# Patient Record
Sex: MALE | Race: WHITE | NOT HISPANIC OR LATINO | Employment: FULL TIME | URBAN - METROPOLITAN AREA
[De-identification: names, ages, dates, MRNs, and addresses within clinical notes are randomized per-mention and may not be internally consistent; named-entity substitution may affect disease eponyms.]

---

## 2018-01-24 ENCOUNTER — OFFICE VISIT (OUTPATIENT)
Dept: CARDIOLOGY CLINIC | Facility: CLINIC | Age: 58
End: 2018-01-24
Payer: COMMERCIAL

## 2018-01-24 VITALS
BODY MASS INDEX: 28.21 KG/M2 | WEIGHT: 208.3 LBS | SYSTOLIC BLOOD PRESSURE: 110 MMHG | DIASTOLIC BLOOD PRESSURE: 80 MMHG | HEIGHT: 72 IN

## 2018-01-24 DIAGNOSIS — I25.10 ATHEROSCLEROSIS OF NATIVE CORONARY ARTERY OF NATIVE HEART WITHOUT ANGINA PECTORIS: Primary | ICD-10-CM

## 2018-01-24 DIAGNOSIS — E78.2 MIXED HYPERLIPIDEMIA: ICD-10-CM

## 2018-01-24 DIAGNOSIS — I10 BENIGN ESSENTIAL HYPERTENSION: ICD-10-CM

## 2018-01-24 PROCEDURE — 99214 OFFICE O/P EST MOD 30 MIN: CPT | Performed by: INTERNAL MEDICINE

## 2018-01-24 RX ORDER — METOPROLOL SUCCINATE 50 MG/1
1 TABLET, EXTENDED RELEASE ORAL DAILY
COMMUNITY
Start: 2017-09-26 | End: 2018-10-30 | Stop reason: SDUPTHER

## 2018-01-24 RX ORDER — ATORVASTATIN CALCIUM 40 MG/1
1 TABLET, FILM COATED ORAL
COMMUNITY
Start: 2017-12-22 | End: 2018-10-30 | Stop reason: SDUPTHER

## 2018-01-24 RX ORDER — PRASUGREL 10 MG/1
1 TABLET, FILM COATED ORAL DAILY
COMMUNITY
Start: 2016-12-28 | End: 2018-03-20 | Stop reason: SDUPTHER

## 2018-01-24 RX ORDER — LISINOPRIL 10 MG/1
1 TABLET ORAL DAILY
COMMUNITY
Start: 2017-09-26 | End: 2018-10-30 | Stop reason: SDUPTHER

## 2018-01-24 NOTE — PATIENT INSTRUCTIONS
Low-Sodium Diet   AMBULATORY CARE:   A low-sodium diet  limits foods that are high in sodium (salt)  You will need to follow a low-sodium diet if you have high blood pressure, kidney disease, or heart failure  You may also need to follow this diet if you have a condition that is causing your body to retain (hold) extra fluid  You may need to limit the amount of sodium you eat to 1,500 mg  Ask your healthcare provider how much sodium you can have each day  How to use food labels to choose foods that are low in sodium:  Read food labels to find the amount of sodium they contain  The amount of sodium is listed in milligrams (mg)  The % Daily Value (DV) column tells you how much of your daily needs are met by 1 serving of the food for each nutrient listed  Choose foods that have less than 5% of the DV of sodium  These foods are considered low in sodium  Foods that have 20% or more of the DV of sodium are considered high in sodium  Some food labels may also list any of the following terms that tell you about the sodium content in the food:  · Sodium-free:  Less than 5 mg in each serving    · Very low sodium:  35 mg of sodium or less in each serving    · Low sodium:  140 mg of sodium or less in each serving    · Reduced sodium: At least 25% less sodium in each serving than the regular type    · Light in sodium:  50% less sodium in each serving    · Unsalted or no added salt:  No extra salt is added during processing (the food may still contain sodium)  Foods to avoid:  Salty foods are high in sodium   You should avoid the following:  · Processed foods:      ¨ Mixes for cornbread, biscuits, cake, and pudding     ¨ Instant foods, such as potatoes, cereals, noodles, and rice     ¨ Packaged foods, such as bread stuffing, rice and pasta mixes, snack dip mixes, and macaroni and cheese     ¨ Canned foods, such as canned vegetables, soups, broths, sauces, and vegetable or tomato juice    ¨ Snack foods, such as salted chips, popcorn, pretzels, pork rinds, salted crackers, and salted nuts    ¨ Frozen foods, such as dinners, entrees, vegetables with sauces, and breaded meats    ¨ Sauerkraut, pickled vegetables, and other foods prepared in brine    · Meats and cheeses:      ¨ Smoked or cured meat, such as corned beef, kebede, ham, hot dogs, and sausage    ¨ Canned meats or spreads, such as potted meats, sardines, anchovies, and imitation seafood    ¨ Deli or lunch meats, such as bologna, ham, turkey, and roast beef    ¨ Processed cheese, such as American cheese and cheese spreads    · Condiments, sauces, and seasonings:      ¨ Salt (¼ teaspoon of salt contains 575 mg of sodium)    ¨ Seasonings made with salt, such as garlic salt, celery salt, onion salt, and seasoned salt    ¨ Regular soy sauce, barbecue sauce, teriyaki sauce, steak sauce, Worcestershire sauce, and most flavored vinegars    ¨ Canned gravy and mixes     ¨ Regular condiments, such as mustard, ketchup, and salad dressings    ¨ Pickles and olives    ¨ Meat tenderizers and monosodium glutamate (MSG)  Foods to include:  Read the food label to find the exact amount of sodium in each serving  · Bread and cereal:  Try to choose breads with less than 80 mg of sodium per serving  ¨ Bread, roll, geovanny, tortilla, or unsalted crackers  ¨ Ready-to-eat cereals with less than 5% DV of sodium (examples include shredded wheat and puffed rice)    ¨ Pasta    · Vegetables and fruits:      ¨ Unsalted fresh, frozen, or canned vegetables    ¨ Fresh, frozen, or canned fruits    ¨ Fruit juice    · Dairy:  One serving has about 150 mg of sodium  ¨ Milk, all types    ¨ Yogurt    ¨ Hard cheese, such as cheddar, Swiss, [employer], or mozzarella    · Meat and other protein foods:  Some raw meats may have added sodium       ¨ Plain meats, fish, and poultry     ¨ Eggs    · Other foods:      ¨ Homemade pudding    ¨ Unsalted nuts, popcorn, or pretzels    ¨ Unsalted butter or margarine  Ways to decrease sodium:   · Add spices and herbs to foods instead of salt during cooking  Use salt-free seasonings to add flavor to foods  Examples include onion powder, garlic powder, basil, carty powder, paprika, and parsley  Try lemon or lime juice or vinegar to give foods a tart flavor  Use hot peppers, pepper, or cayenne pepper to add a spicy flavor to foods  · Do not keep a salt shaker at your kitchen table  This may help keep you from adding salt to food at the table  It may take time to get used to enjoying the natural flavor of food instead of adding salt  Talk to your healthcare provider before you use salt substitutes  Some salt substitutes have a high amount of potassium and need to be avoided if you have kidney disease  · Choose low-sodium foods at restaurants  Meals from restaurants are often high in sodium  Some restaurants have nutrition information on the menu that tells you the amount of sodium in their foods  If possible, ask for your food to be prepared with less, or no salt  · Shop for unsalted or low-sodium foods and snacks at the grocery store  Examples include unsalted or low-sodium broths, soups, and canned vegetables  Choose fresh or frozen vegetables instead  Choose unsalted nuts or seeds or fresh fruits or vegetables as snacks  Read food labels and choose salt-free, very low-sodium, or low-sodium foods  © 2017 [address] [employer] Information is for End User's use only and may not be sold, redistributed or otherwise used for commercial purposes  All illustrations and images included in CareNotes® are the copyrighted property of [employer]  or [address]  The above information is an  only  It is not intended as medical advice for individual conditions or treatments  Talk to your doctor, nurse or pharmacist before following any medical regimen to see if it is safe and effective for you

## 2018-01-24 NOTE — PROGRESS NOTES
Cardiology Follow Up    [facility]  1960  [id number]  [address] [facility]  [address] [place] [address] [address]    1  Atherosclerosis of native coronary artery of native heart without angina pectoris     2  Benign essential hypertension     3  Mixed hyperlipidemia         Interval History:  Patient feels well without any complaints since last visit  He has been staying active and trying to watch his weight - although did gain some weight last year, with a heart healthy diet and exercise regimen  Patient Active Problem List   Diagnosis    Atherosclerotic heart disease of native coronary artery without angina pectoris    Benign essential hypertension    Type 2 or unspecified type diabetes mellitus    Hyperlipidemia    Obstructive sleep apnea     Past Medical History:   Diagnosis Date    Coronary artery disease     cath Oct 2013: 95% prox LAD, 40% mid LAD, EF 60%, MANUEL to prox LAD (Xience Rx 3 5x18mm)     Social History     Social History    Marital status: /Civil Union     Spouse name: N/A    Number of children: N/A    Years of education: N/A     Occupational History    Not on file  Social History Main Topics    Smoking status: Former Smoker    Smokeless tobacco: Former User    Alcohol use Yes      Comment: social    Drug use: No    Sexual activity: Not on file     Other Topics Concern    Not on file     Social History Narrative    No narrative on file      Family History   Problem Relation Age of Onset    No Known Problems Mother      History reviewed  No pertinent surgical history      Current Outpatient Prescriptions:     aspirin (ASPIRIN LOW DOSE) 81 MG tablet, Take 1 tablet by mouth daily, Disp: , Rfl:     atorvastatin (LIPITOR) 40 mg tablet, Take 1 tablet by mouth, Disp: , Rfl:     lisinopril (ZESTRIL) 10 mg tablet, Take 1 tablet by mouth daily, Disp: , Rfl:     metoprolol succinate (TOPROL-XL) 50 mg 24 hr tablet, Take 1 tablet by mouth daily, Disp: , Rfl:     prasugrel (EFFIENT) tablet, Take 1 tablet by mouth daily, Disp: , Rfl:     metFORMIN (GLUCOPHAGE) 1000 MG tablet, Take 1 tablet by mouth 2 (two) times a day, Disp: , Rfl:   No Known Allergies    Labs:  No visits with results within 6 Month(s) from this visit     Latest known visit with results is:   Admission on 10/23/2015, Discharged on 10/23/2015   Component Date Value    GLUCOSE, GLUCOMETER 10/23/2015 216*    Protime 10/23/2015 13 0     INR 10/23/2015 1 04     PTT 10/23/2015 29     LACTIC ACID 10/23/2015 2 2     Sodium 10/23/2015 132*    Potassium 10/23/2015 4 1     Chloride 10/23/2015 96*    CO2 10/23/2015 23 1     Anion Gap 10/23/2015 13     Total Bilirubin 10/23/2015 1 04*    Total Protein 10/23/2015 7 1     Alkaline Phosphatase 10/23/2015 68     ALT 10/23/2015 80*    AST 10/23/2015 82*    Glucose 10/23/2015 194*    Albumin 10/23/2015 3 8     BUN 10/23/2015 13     Calcium 10/23/2015 8 9     Creatinine 10/23/2015 1 47*    AAGFR 10/23/2015 >60     NAAGFR 10/23/2015 50     PLT ESTIMATE 10/23/2015 DECREASED     WBC 10/23/2015 3 26*    RBC 10/23/2015 4 95     Hemoglobin 10/23/2015 14 8     Hematocrit 10/23/2015 41 7     MCV 10/23/2015 84     MCH 10/23/2015 29 9     MCHC 10/23/2015 35 5     RDW 10/23/2015 12 2     MPV 10/23/2015 10 0     Platelets 45/32/3928 80*    Neutrophils Relative 10/23/2015 73     Lymphocytes Relative 10/23/2015 12*    Monocytes Relative 10/23/2015 14*    Basophils Relative 10/23/2015 1     Neutrophils Absolute 10/23/2015 2 38     Lymphocytes Absolute 10/23/2015 0 39*    Monocytes Absolute 10/23/2015 0 46     Eosinophils Absolute 10/23/2015 0 00     Basophils Absolute 10/23/2015 0 03     Color, UA 10/23/2015 Yellow     Clarity, UA 10/23/2015 Clear     Glucose, UA 10/23/2015 Negative     Bilirubin, UA 10/23/2015 Negative     Ketones, UA 10/23/2015 15 (1+)*    Specific Gravity, UA 10/23/2015 1 020     Blood, UA 10/23/2015 Negative     pH, UA 10/23/2015 6 0     Protein, UA 10/23/2015 Trace*    Urobilinogen, UA 10/23/2015 2 0*    Nitrite, UA 10/23/2015 Negative     Leukocytes, UA 10/23/2015 Negative     Epithelial Cells 10/23/2015 Occasional     WBC, UA 10/23/2015 1-2     RBC, UA 10/23/2015 2-3     MUCOUS THREADS 10/23/2015 Occasional     Bacteria, UA 10/23/2015 None Seen     ERYTHROCYTE SEDIMENTATIO* 10/23/2015 5     C-REACTIVE PROTEIN 10/23/2015 66 1*    INFLU A PCR 10/23/2015 Not Detected     INFLU B PCR 10/23/2015 Not Detected     RSV PCR 10/23/2015 Not Detected     POC Troponin I 10/23/2015 0 01     Microbiology 10/23/2015 Specimen: Blood Culture Set     Microbiology 10/23/2015 Collected: 10/23/2015 13:45     Microbiology 10/23/2015       Microbiology 10/23/2015 Status: Final      Last Updated: 10/28/2015 22:14           Microbiology 10/23/2015       Microbiology 10/23/2015       Microbiology 10/23/2015   CULTURE RESULT (Final)     Microbiology 10/23/2015     No Growth After 5 Days     Microbiology 10/23/2015       Microbiology 10/23/2015       Microbiology 10/23/2015 Specimen: Blood Culture Set     Microbiology 10/23/2015 Collected: 10/23/2015 13:30     Microbiology 10/23/2015       Microbiology 10/23/2015 Status: Final      Last Updated: 10/28/2015 22:14           Microbiology 10/23/2015       Microbiology 10/23/2015       Microbiology 10/23/2015   CULTURE RESULT (Final)     Microbiology 10/23/2015     No Growth After 5 Days     Microbiology 10/23/2015       Microbiology 10/23/2015       LYME DISEASE(B BURGDORFE* 10/23/2015 See written report from [employer]       No results found for: CHOL, TRIG, HDL, LDLDIRECT  Imaging: No results found  Review of Systems:  Review of Systems   Constitutional: Negative for activity change, appetite change, fatigue and fever  HENT: Negative for nosebleeds and sore throat      Eyes: Negative for photophobia and visual disturbance  Respiratory: Negative for cough, chest tightness, shortness of breath and wheezing  Cardiovascular: Negative for chest pain, palpitations and leg swelling  Gastrointestinal: Negative for abdominal pain, diarrhea, nausea and vomiting  Endocrine: Negative for polyuria  Genitourinary: Negative for dysuria, frequency and hematuria  Musculoskeletal: Positive for back pain  Negative for arthralgias and gait problem  Skin: Negative for pallor and rash  Neurological: Negative for dizziness, syncope, speech difficulty and light-headedness  Hematological: Does not bruise/bleed easily  Psychiatric/Behavioral: Negative for agitation, behavioral problems and confusion  Physical Exam:  Physical Exam   Constitutional: He is oriented to person, place, and time  He appears well-developed and well-nourished  No distress  HENT:   Head: Normocephalic and atraumatic  Nose: Nose normal    Eyes: EOM are normal  Pupils are equal, round, and reactive to light  No scleral icterus  Neck: Normal range of motion  Neck supple  No JVD present  Cardiovascular: Normal rate, regular rhythm, S1 normal and S2 normal   Exam reveals no gallop, no S3, no distant heart sounds and no friction rub  No systolic murmur is present   Pulmonary/Chest: Effort normal and breath sounds normal  No respiratory distress  He has no wheezes  He has no rales  Abdominal: Soft  Bowel sounds are normal  He exhibits no distension  Musculoskeletal: He exhibits no edema or deformity  Neurological: He is alert and oriented to person, place, and time  No cranial nerve deficit  Skin: Skin is warm and dry  He is not diaphoretic  No erythema  Psychiatric: He has a normal mood and affect  His behavior is normal    Vitals reviewed  EKG performed in office:  NSR, NML EKG    Discussion/Summary:  1) CAD: s/p MANUEL to prox LAD, doing well and asymptomatic  Continue medical management        2) HTN: BP well-controlled, continue current management     3) Hyperlipidemia: current values are well-controlled with LDL of 61 (goal<70)  Will continue Lipitor at current dose  Recheck at this time

## 2018-03-20 DIAGNOSIS — I25.10 CORONARY ARTERY DISEASE INVOLVING NATIVE CORONARY ARTERY OF NATIVE HEART WITHOUT ANGINA PECTORIS: Primary | ICD-10-CM

## 2018-03-20 RX ORDER — PRASUGREL 10 MG/1
TABLET, FILM COATED ORAL
Qty: 90 TABLET | Refills: 0 | Status: SHIPPED | OUTPATIENT
Start: 2018-03-20 | End: 2018-07-14 | Stop reason: SDUPTHER

## 2018-06-29 ENCOUNTER — TELEPHONE (OUTPATIENT)
Dept: CARDIOLOGY CLINIC | Facility: CLINIC | Age: 58
End: 2018-06-29

## 2018-06-29 NOTE — TELEPHONE ENCOUNTER
Spouse called stating they lost their script for BW and asked to have a new one mailed  Placed in mail today

## 2018-07-14 DIAGNOSIS — I25.10 CORONARY ARTERY DISEASE INVOLVING NATIVE CORONARY ARTERY OF NATIVE HEART WITHOUT ANGINA PECTORIS: ICD-10-CM

## 2018-07-18 RX ORDER — PRASUGREL 10 MG/1
TABLET, FILM COATED ORAL
Qty: 90 TABLET | Refills: 0 | Status: SHIPPED | OUTPATIENT
Start: 2018-07-18 | End: 2018-10-30 | Stop reason: SDUPTHER

## 2018-09-16 LAB
CHOLEST SERPL-MCNC: 181 MG/DL (ref 100–199)
CHOLEST/HDLC SERPL: 2.5 RATIO (ref 0–5)
HDLC SERPL-MCNC: 71 MG/DL
LDLC SERPL CALC-MCNC: 93 MG/DL (ref 0–99)
SL AMB VLDL CHOLESTEROL CALC: 17 MG/DL (ref 5–40)
TRIGL SERPL-MCNC: 84 MG/DL (ref 0–149)

## 2018-09-21 ENCOUNTER — OFFICE VISIT (OUTPATIENT)
Dept: CARDIOLOGY CLINIC | Facility: CLINIC | Age: 58
End: 2018-09-21
Payer: COMMERCIAL

## 2018-09-21 VITALS
HEIGHT: 72 IN | BODY MASS INDEX: 27.98 KG/M2 | SYSTOLIC BLOOD PRESSURE: 118 MMHG | WEIGHT: 206.6 LBS | OXYGEN SATURATION: 98 % | HEART RATE: 82 BPM | DIASTOLIC BLOOD PRESSURE: 64 MMHG

## 2018-09-21 DIAGNOSIS — I10 BENIGN ESSENTIAL HYPERTENSION: ICD-10-CM

## 2018-09-21 DIAGNOSIS — E78.2 MIXED HYPERLIPIDEMIA: ICD-10-CM

## 2018-09-21 DIAGNOSIS — I25.10 ATHEROSCLEROSIS OF NATIVE CORONARY ARTERY OF NATIVE HEART WITHOUT ANGINA PECTORIS: Primary | ICD-10-CM

## 2018-09-21 PROCEDURE — 99214 OFFICE O/P EST MOD 30 MIN: CPT | Performed by: INTERNAL MEDICINE

## 2018-09-21 NOTE — PROGRESS NOTES
Cardiology Follow Up    Jose Santiago [facility]  1960  [id number]     [address] [facility]  [address] [place] [address] [address]    1  Atherosclerosis of native coronary artery of native heart without angina pectoris     2  Benign essential hypertension     3  Mixed hyperlipidemia       Chief Complaint   Patient presents with    Follow-up     f/u with lab    patient had arm pain one day in inside of left arm ring finger was a sleep  went away   Fatigue     per wife, he said from job      Interval History:  Patient feels well without any complaints since last visit  He has been staying active and trying to watch his weight  Some fatigue that he attributes to his job  He had some pain on the inside of his left arm last week with some tingling in his left 2 digits - has not come back again  Patient Active Problem List   Diagnosis    Atherosclerotic heart disease of native coronary artery without angina pectoris    Benign essential hypertension    Type 2 or unspecified type diabetes mellitus    Hyperlipidemia    Obstructive sleep apnea     Past Medical History:   Diagnosis Date    Coronary artery disease     cath Oct 2013: 95% prox LAD, 40% mid LAD, EF 60%, MANUEL to prox LAD (Xience Rx 3 5x18mm)     Social History     Social History    Marital status: /Civil Union     Spouse name: N/A    Number of children: N/A    Years of education: N/A     Occupational History    Not on file       Social History Main Topics    Smoking status: Former Smoker    Smokeless tobacco: Former User    Alcohol use Yes      Comment: social    Drug use: No    Sexual activity: Not on file     Other Topics Concern    Not on file     Social History Narrative    No narrative on file      Family History   Problem Relation Age of Onset    No Known Problems Mother     Heart attack Father     Arrhythmia Neg Hx     Clotting disorder Neg Hx     Fainting Neg Hx     Asthma Neg Hx     Anuerysm Neg Hx     Hypertension Neg Hx     Heart disease Neg Hx      History reviewed  No pertinent surgical history  Current Outpatient Prescriptions:     aspirin (ASPIRIN LOW DOSE) 81 MG tablet, Take 1 tablet by mouth daily, Disp: , Rfl:     atorvastatin (LIPITOR) 40 mg tablet, Take 1 tablet by mouth, Disp: , Rfl:     lisinopril (ZESTRIL) 10 mg tablet, Take 1 tablet by mouth daily, Disp: , Rfl:     metFORMIN (GLUCOPHAGE) 1000 MG tablet, Take 1 tablet by mouth 2 (two) times a day, Disp: , Rfl:     metoprolol succinate (TOPROL-XL) 50 mg 24 hr tablet, Take 1 tablet by mouth daily, Disp: , Rfl:     prasugrel (EFFIENT) tablet, TAKE 1 TABLET DAILY, Disp: 90 tablet, Rfl: 0  No Known Allergies    Labs:  No visits with results within 6 Month(s) from this visit  Latest known visit with results is:   Office Visit on 01/24/2018   Component Date Value    Cholesterol, Total 09/15/2018 181     Triglycerides 09/15/2018 84     HDL 09/15/2018 71     SL AMB VLDL CHOLESTEROL * 09/15/2018 17     LDL Direct 09/15/2018 93     T  Chol/HDL Ratio 09/15/2018 2 5      Lab Results   Component Value Date    TRIG 84 09/15/2018    HDL 71 09/15/2018     Imaging: No results found  Review of Systems:  Review of Systems   Constitutional: Negative for activity change, appetite change, fatigue and fever  HENT: Negative for nosebleeds and sore throat  Eyes: Negative for photophobia and visual disturbance  Respiratory: Negative for cough, chest tightness, shortness of breath and wheezing  Cardiovascular: Negative for chest pain, palpitations and leg swelling  Gastrointestinal: Negative for abdominal pain, diarrhea, nausea and vomiting  Endocrine: Negative for polyuria  Genitourinary: Negative for dysuria, frequency and hematuria  Musculoskeletal: Positive for back pain  Negative for arthralgias and gait problem  Skin: Negative for pallor and rash  Neurological: Negative for dizziness, syncope, speech difficulty and light-headedness  Hematological: Does not bruise/bleed easily  Psychiatric/Behavioral: Negative for agitation, behavioral problems and confusion  Physical Exam:  Physical Exam   Constitutional: He is oriented to person, place, and time  He appears well-developed and well-nourished  No distress  HENT:   Head: Normocephalic and atraumatic  Nose: Nose normal    Eyes: EOM are normal  Pupils are equal, round, and reactive to light  No scleral icterus  Neck: Normal range of motion  Neck supple  No JVD present  Cardiovascular: Normal rate, regular rhythm, S1 normal and S2 normal   Exam reveals no gallop, no S3, no distant heart sounds and no friction rub  No murmur heard  No systolic murmur is present   Pulmonary/Chest: Effort normal and breath sounds normal  No respiratory distress  He has no wheezes  He has no rales  Abdominal: Soft  Bowel sounds are normal  He exhibits no distension  Musculoskeletal: He exhibits no edema or deformity  Neurological: He is alert and oriented to person, place, and time  No cranial nerve deficit  Skin: Skin is warm and dry  He is not diaphoretic  No erythema  Psychiatric: He has a normal mood and affect  His behavior is normal    Vitals reviewed  Blood pressure 118/64, pulse 82, height 6' (1 829 m), weight 93 7 kg (206 lb 9 6 oz), SpO2 98 %  Discussion/Summary:  1) CAD: s/p MANUEL to prox LAD, doing well and asymptomatic  Continue medical management     2) HTN: BP well-controlled, continue current management      3) Hyperlipidemia: current values are well-controlled with LDL of 61 (goal<70)  Will continue Lipitor at current dose

## 2018-09-21 NOTE — PATIENT INSTRUCTIONS
Coronary Artery Disease   AMBULATORY CARE:   Coronary artery disease (CAD)  is narrowing of the arteries to your heart caused by a buildup of plaque  Plaque is made up of cholesterol and other substances  The narrowing in your arteries decreases the amount of blood that can flow to your heart  This causes your heart to get less oxygen  You may not have any symptoms of CAD  It is important for you to manage CAD even if you feel well  CAD can lead to a heart attack if it is not managed  Common symptoms include the following:   · Chest pain (angina), causing burning, squeezing, or crushing tightness in your chest    · Pain that spreads to your neck, jaw, or shoulder blade    · Nausea, vomiting, sweating, fainting, and hands and feet that are cold to the touch  Call 911 for any of the following:   · You have any of the following signs of a heart attack:      ¨ Squeezing, pressure, or pain in your chest that lasts longer than 5 minutes or returns    ¨ Discomfort or pain in your back, neck, jaw, stomach, or arm     ¨ Trouble breathing    ¨ Nausea or vomiting    ¨ Lightheadedness or a sudden cold sweat, especially with chest pain or trouble breathing    Contact your healthcare provider if:   · You have chest pain that is more frequent, or you have chest pain at rest     · You have questions or concerns about your condition or care  Medicines used to treat CAD:   · Blood pressure medicines  are given to lower your blood pressure  ACE inhibitors help keep your blood vessels relaxed and open to help keep blood flowing into your heart  Beta-blockers keep your heart pumping strongly and regularly so it does not work too hard to get oxygen  · Cholesterol medicines  help lower blood cholesterol levels  · Nitrates , such as nitroglycerin, relax the arteries of your heart so it gets more oxygen  They help to relieve your chest pain  · Antiplatelets , such as aspirin, help prevent blood clots   Take your antiplatelet medicine exactly as directed  These medicines make it more likely for you to bleed or bruise  If you are told to take aspirin, do not take acetaminophen or ibuprofen instead  · Blood thinners  keep clots from forming in your blood  Clots may cause heart attacks, strokes, or death  This medicine makes it more likely for you to bleed or bruise  · Do not take certain medicines without asking your healthcare provider first   These include NSAIDs, herbal or vitamin supplements, or hormones (estrogen or progestin)  Procedures used to treat CAD:   · Angioplasty  may be done to open an artery blocked by plaque  A tube with a balloon on the end is threaded into the blocked artery  Once the tube is in the artery, the balloon is inflated  As the balloon inflates, it presses the plaque against the artery wall to open the artery  A stent may be placed in your artery to keep it open  · Coronary artery bypass graft surgery (CABG)  is open heart surgery  Healthcare providers take arteries or veins from other areas in your body and use them to bypass or go around the blocked arteries of your heart  Cardiac rehabilitation:  Your healthcare provider may recommend that you attend cardiac rehabilitation (rehab)  This is a program run by specialists who will help you safely strengthen your heart and reduce the risk for more heart disease  The plan includes exercise, relaxation, stress management, and heart-healthy nutrition  Healthcare providers will also check to make sure any medicines you are taking are working  Manage CAD to prevent a heart attack:   · Do not smoke  Nicotine and other chemicals in cigarettes and cigars can cause heart and lung damage  Ask your healthcare provider for information if you currently smoke and need help to quit  E-cigarettes or smokeless tobacco still contain nicotine  Talk to your healthcare provider before you use these products  · Exercise regularly    Exercise at least 30 minutes each day, on most days of the week  Exercise helps to lower high cholesterol and high blood pressure  It can also help you maintain a healthy weight  Ask your healthcare provider about the kind of exercise you should do and how to get started  · Maintain a healthy weight  If you are overweight, talk to your healthcare provider about how to lose weight  A weight loss of 10% can improve your heart health  · Eat heart-healthy foods  Include fresh fruits and vegetables in your meal plan  Choose low-fat foods, such as skim or 1% fat milk, low-fat cheese and yogurt, fish, chicken (without skin), and lean meats  Eat two 4-ounce servings of fish high in omega-3 fats each week, such as salmon, fresh tuna, and herring  Do not eat foods that are high in sodium, such as canned foods, potato chips, salty snacks, and cold cuts  Put less table salt on your food  · Limit or do not drink alcohol  A drink of alcohol is 12 ounces of beer, 5 ounces of wine, or 1½ ounces of liquor  · Manage other health conditions  Follow your healthcare provider's advice on how to manage other conditions that can affect your heart health  These include diabetes, high blood pressure, and high cholesterol  You may need to take medicines for these conditions and make other lifestyle changes  · Ask if you should have a flu vaccine  The flu can be dangerous for a person who has CAD  The flu vaccine is available every year in the fall  Follow up with your healthcare provider as directed: You may need to return for other tests  You may also be referred to a cardiac surgeon  Write down your questions so you remember to ask them during your visits  © 2017 [address]  Information is for End User's use only and may not be sold, redistributed or otherwise used for commercial purposes  All illustrations and images included in CareNotes® are the copyrighted property of [employer]  or [employer]    The above information is an  only  It is not intended as medical advice for individual conditions or treatments  Talk to your doctor, nurse or pharmacist before following any medical regimen to see if it is safe and effective for you

## 2018-10-30 DIAGNOSIS — I25.10 CORONARY ARTERY DISEASE INVOLVING NATIVE CORONARY ARTERY OF NATIVE HEART WITHOUT ANGINA PECTORIS: ICD-10-CM

## 2018-10-30 RX ORDER — PRASUGREL 10 MG/1
TABLET, FILM COATED ORAL
Qty: 90 TABLET | Refills: 0 | Status: SHIPPED | OUTPATIENT
Start: 2018-10-30 | End: 2019-02-09 | Stop reason: SDUPTHER

## 2018-10-30 RX ORDER — ATORVASTATIN CALCIUM 40 MG/1
TABLET, FILM COATED ORAL
Qty: 90 TABLET | Refills: 0 | Status: SHIPPED | OUTPATIENT
Start: 2018-10-30 | End: 2019-05-20 | Stop reason: SDUPTHER

## 2018-10-30 RX ORDER — LISINOPRIL 10 MG/1
TABLET ORAL
Qty: 90 TABLET | Refills: 3 | Status: SHIPPED | OUTPATIENT
Start: 2018-10-30 | End: 2019-11-26 | Stop reason: SDUPTHER

## 2018-10-30 RX ORDER — METOPROLOL SUCCINATE 50 MG/1
TABLET, EXTENDED RELEASE ORAL
Qty: 90 TABLET | Refills: 3 | Status: SHIPPED | OUTPATIENT
Start: 2018-10-30 | End: 2019-11-26 | Stop reason: SDUPTHER

## 2019-02-09 DIAGNOSIS — I25.10 CORONARY ARTERY DISEASE INVOLVING NATIVE CORONARY ARTERY OF NATIVE HEART WITHOUT ANGINA PECTORIS: ICD-10-CM

## 2019-02-11 RX ORDER — PRASUGREL 10 MG/1
TABLET, FILM COATED ORAL
Qty: 90 TABLET | Refills: 0 | Status: SHIPPED | OUTPATIENT
Start: 2019-02-11 | End: 2019-05-20 | Stop reason: SDUPTHER

## 2019-05-20 DIAGNOSIS — I25.10 CORONARY ARTERY DISEASE INVOLVING NATIVE CORONARY ARTERY OF NATIVE HEART WITHOUT ANGINA PECTORIS: ICD-10-CM

## 2019-05-21 RX ORDER — PRASUGREL 10 MG/1
TABLET, FILM COATED ORAL
Qty: 90 TABLET | Refills: 0 | Status: SHIPPED | OUTPATIENT
Start: 2019-05-21 | End: 2019-08-20 | Stop reason: SDUPTHER

## 2019-05-21 RX ORDER — ATORVASTATIN CALCIUM 40 MG/1
TABLET, FILM COATED ORAL
Qty: 90 TABLET | Refills: 0 | Status: SHIPPED | OUTPATIENT
Start: 2019-05-21 | End: 2019-08-20 | Stop reason: SDUPTHER

## 2019-08-20 DIAGNOSIS — I25.10 CORONARY ARTERY DISEASE INVOLVING NATIVE CORONARY ARTERY OF NATIVE HEART WITHOUT ANGINA PECTORIS: ICD-10-CM

## 2019-08-20 RX ORDER — ATORVASTATIN CALCIUM 40 MG/1
TABLET, FILM COATED ORAL
Qty: 90 TABLET | Refills: 4 | Status: SHIPPED | OUTPATIENT
Start: 2019-08-20

## 2019-08-20 RX ORDER — PRASUGREL 10 MG/1
TABLET, FILM COATED ORAL
Qty: 90 TABLET | Refills: 4 | Status: SHIPPED | OUTPATIENT
Start: 2019-08-20 | End: 2020-09-29

## 2019-11-26 DIAGNOSIS — I25.10 CORONARY ARTERY DISEASE INVOLVING NATIVE CORONARY ARTERY OF NATIVE HEART WITHOUT ANGINA PECTORIS: ICD-10-CM

## 2019-11-27 RX ORDER — LISINOPRIL 10 MG/1
TABLET ORAL
Qty: 90 TABLET | Refills: 4 | Status: SHIPPED | OUTPATIENT
Start: 2019-11-27 | End: 2021-01-03

## 2019-11-27 RX ORDER — METOPROLOL SUCCINATE 50 MG/1
TABLET, EXTENDED RELEASE ORAL
Qty: 90 TABLET | Refills: 4 | Status: SHIPPED | OUTPATIENT
Start: 2019-11-27 | End: 2021-01-03

## 2020-09-24 DIAGNOSIS — I25.10 CORONARY ARTERY DISEASE INVOLVING NATIVE CORONARY ARTERY OF NATIVE HEART WITHOUT ANGINA PECTORIS: ICD-10-CM

## 2020-09-29 RX ORDER — PRASUGREL 10 MG/1
TABLET, FILM COATED ORAL
Qty: 90 TABLET | Refills: 3 | Status: SHIPPED | OUTPATIENT
Start: 2020-09-29 | End: 2021-11-10

## 2021-01-03 DIAGNOSIS — I25.10 CORONARY ARTERY DISEASE INVOLVING NATIVE CORONARY ARTERY OF NATIVE HEART WITHOUT ANGINA PECTORIS: ICD-10-CM

## 2021-01-03 RX ORDER — LISINOPRIL 10 MG/1
TABLET ORAL
Qty: 90 TABLET | Refills: 3 | Status: SHIPPED | OUTPATIENT
Start: 2021-01-03 | End: 2021-06-23

## 2021-01-03 RX ORDER — METOPROLOL SUCCINATE 50 MG/1
TABLET, EXTENDED RELEASE ORAL
Qty: 90 TABLET | Refills: 3 | Status: SHIPPED | OUTPATIENT
Start: 2021-01-03 | End: 2022-02-16 | Stop reason: SDUPTHER

## 2021-04-08 DIAGNOSIS — Z23 ENCOUNTER FOR IMMUNIZATION: ICD-10-CM

## 2021-05-17 ENCOUNTER — OFFICE VISIT (OUTPATIENT)
Dept: OBGYN CLINIC | Facility: HOSPITAL | Age: 61
End: 2021-05-17
Payer: COMMERCIAL

## 2021-05-17 VITALS
HEART RATE: 97 BPM | BODY MASS INDEX: 26.11 KG/M2 | SYSTOLIC BLOOD PRESSURE: 125 MMHG | WEIGHT: 192.8 LBS | DIASTOLIC BLOOD PRESSURE: 81 MMHG | HEIGHT: 72 IN

## 2021-05-17 DIAGNOSIS — G56.21 CUBITAL TUNNEL SYNDROME ON RIGHT: ICD-10-CM

## 2021-05-17 DIAGNOSIS — R20.0 NUMBNESS AND TINGLING IN RIGHT HAND: Primary | ICD-10-CM

## 2021-05-17 DIAGNOSIS — M72.0 DUPUYTREN'S CONTRACTURE OF RIGHT HAND: ICD-10-CM

## 2021-05-17 DIAGNOSIS — R20.2 NUMBNESS AND TINGLING IN RIGHT HAND: Primary | ICD-10-CM

## 2021-05-17 PROCEDURE — 99203 OFFICE O/P NEW LOW 30 MIN: CPT | Performed by: PHYSICIAN ASSISTANT

## 2021-05-17 NOTE — PROGRESS NOTES
Assessment/Plan   Diagnoses and all orders for this visit:        Cubital tunnel syndrome on right  -     US MSK limited; Future    Dupuytren's nodule of right hand    - Follow up with Dr Lisseth Cole after the US          Subjective   Patient ID: Jose Abraham is a 62 y o  male  Vitals:    05/17/21 1857   BP: 125/81   Pulse: 80     59yo male comes in for an evaluation of his right hand  He has been having numbness of the ring and long finger for about 3 months with no specific injury  The numbness is constant  It is not affected by wrist or elbow movement  No numbness in the arm  No pain  No neck pain  He has noticed some weakness in right hand   He has also recently nocticed some dimpling on his palm  This is not painful  He would like to follow up in [facility]  The following portions of the patient's history were reviewed and updated as appropriate: allergies, current medications, past family history, past medical history, past social history, past surgical history and problem list     Review of Systems  Ortho Exam  Past Medical History:   Diagnosis Date    Coronary artery disease     cath Oct 2013: 95% prox LAD, 40% mid LAD, EF 60%, MANUEL to prox LAD (Xience Rx 3 5x18mm)     History reviewed  No pertinent surgical history  Family History   Problem Relation Age of Onset    No Known Problems Mother     Heart attack Father     Arrhythmia Neg Hx     Clotting disorder Neg Hx     Fainting Neg Hx     Asthma Neg Hx     Anuerysm Neg Hx     Hypertension Neg Hx     Heart disease Neg Hx      Social History     Occupational History    Not on file   Tobacco Use    Smoking status: Former Smoker    Smokeless tobacco: Former User   Substance and Sexual Activity    Alcohol use: Yes     Comment: social    Drug use: No    Sexual activity: Not on file       Review of Systems   Constitutional: Negative  HENT: Negative  Eyes: Negative  Respiratory: Negative      Cardiovascular: Negative  Gastrointestinal: Negative  Endocrine: Negative  Genitourinary: Negative  Musculoskeletal: As below      Allergic/Immunologic: Negative  Neurological: Negative  Hematological: Negative  Psychiatric/Behavioral: Negative  Objective   Physical Exam    · Constitutional: Awake, Alert, Oriented  · Eyes: EOMI  · Psych: Mood and affect appropriate  · Heart: regular rate and rhythm  · Lungs: No audible wheezing  · Abdomen: soft  · Lymph: no lymphedema   right hand:  - Appearance   There is some dimpling / nodules of the palm just proximal to the ring finger     No swelling, discoloration, deformity, or ecchymosis  - Palpation  o + nodule just proximal to the ring mcp on the palm  o No tenderness to palpation of the wrist, hand, or fingers  - ROM  o Full passive ROM of All DIP, PIP, and MCP joints  - Motor  o 5/5 flexion, 5/5 extension  - Special Tests  o + Tinel's sign at the elbow  - NVI distally

## 2021-06-16 ENCOUNTER — HOSPITAL ENCOUNTER (OUTPATIENT)
Dept: ULTRASOUND IMAGING | Facility: HOSPITAL | Age: 61
Discharge: HOME | End: 2021-06-16
Payer: COMMERCIAL

## 2021-06-16 DIAGNOSIS — G56.21 CUBITAL TUNNEL SYNDROME ON RIGHT: ICD-10-CM

## 2021-06-16 PROCEDURE — 76882 US LMTD JT/FCL EVL NVASC XTR: CPT

## 2021-06-23 ENCOUNTER — OFFICE VISIT (OUTPATIENT)
Dept: OBGYN CLINIC | Facility: CLINIC | Age: 61
End: 2021-06-23
Payer: COMMERCIAL

## 2021-06-23 VITALS
HEIGHT: 72 IN | SYSTOLIC BLOOD PRESSURE: 145 MMHG | DIASTOLIC BLOOD PRESSURE: 95 MMHG | HEART RATE: 89 BPM | WEIGHT: 192 LBS | BODY MASS INDEX: 26.01 KG/M2

## 2021-06-23 DIAGNOSIS — G56.21 CUBITAL TUNNEL SYNDROME ON RIGHT: Primary | ICD-10-CM

## 2021-06-23 DIAGNOSIS — M72.0 DUPUYTREN'S DISEASE OF PALM OF RIGHT HAND: ICD-10-CM

## 2021-06-23 PROCEDURE — 99243 OFF/OP CNSLTJ NEW/EST LOW 30: CPT | Performed by: SURGERY

## 2021-06-23 RX ORDER — LISINOPRIL 20 MG/1
20 TABLET ORAL DAILY
COMMUNITY
Start: 2021-05-24 | End: 2022-02-23 | Stop reason: SDUPTHER

## 2021-06-23 NOTE — PATIENT INSTRUCTIONS
Cubital Tunnel Syndrome  What many people call the funny bone really is a nerve  This ulnar nerve runs behind a bone in the elbow through a space called the cubital tunnel (Figure 1)  Although banging the funny bone usually causes temporary symptoms, chronic pressure on or stretching of the nerve can affect the blood supply to the ulnar nerve, causing numbness or tingling in the ring and small fingers, pain in the forearm, and/or weakness in the hand  This is called cubital tunnel syndrome      Causes  There are a few causes of this ulnar nerve problem  These include:  Pressure  Because the nerve runs through that funny bone groove and has little padding over it, direct pressure (like leaning your arm on an arm rest) can compress the nerve, causing your arm and hand--especially the ring and small fingers--to fall asleep      Stretch  Keeping the elbow bent for a long time can stretchthe nerve behind the elbow  This usually happens during sleep  Anatomy  Sometimes, the ulnar nerve does not stay in its place and snaps back and forth over a bony bump as the elbow is moved  Repetitive snapping can irritate the nerve  Sometimes, the soft tissues over the nerve become thicker or there is an extra muscle over the nerve that can keep the nerve from working correctly  Signs and Symptoms  Cubital tunnel syndrome can cause pain, loss of sensation, and/or tingling  Pins and needles usually are felt in the ring and small fingers  These symptoms are often felt when the elbow is kept bent for a long time, such as while holding a phone or while sleeping  Some people feel weak or clumsy  Loss of sensation and loss of strength or muscle in the hand is serious  Diagnosis  Your doctor will be able to tell a lot by asking you about your symptoms and examining you  S/he might test you for other medical problems like diabetes or thyroid disease   A test called electromyography (EMG) and/or nerve conduction study (NCS) might be needed to see how much the nerve and muscle are being affected  This test also checks for other problems like a pinched nerve in the neck, which can cause similar symptoms  Treatment  The first treatment is to avoid actions that cause symptoms  Wrapping a pillow or towel around the elbow or wearing a splint at night to keep the elbow from bending during sleep can help  Avoiding leaning on the funny bone part of the elbow can help also  A hand therapist can help you learn ways to avoid pressure on the nerve  When symptoms are severe or not getting better, surgery may be needed to relieve the pressure on the nerve  This can involve releasing the nerve, moving the nerve to the front of the elbow, and/or removing a part of the bone  Your surgeon will talk to you about what is the right option for you and guide your care  Therapy sometimes is needed after surgery, and the time it takes to recover varies  Numbness and tingling may improve quickly or slowly, and it may take many months for the strength in your hand to improve  Cubital tunnel symptoms may not totally go away after surgery, especially if symptoms are severe  © 2012 [employer] Hand  www handcare  org  --------------------------------------------------------------------------------------------------------------------------------------------------------------------------------------------------------------------------        What is Dupuytren's Disease? Dupuytren's disease is an abnormal thickening of the tissue just beneath the skin  This thickening occurs in the palm and can extend into the fingers (see Figure 1)  Firm pits, nodules, and cords may develop that can cause the fingers to bend into the palm (see Figure 2), which is a condition described as Dupuytren's contracture  Although the skin may become involved in the process, the deeper structures--such as the tendons--are not directly involved  Occasionally, the disease will cause thickening on top of the finger knuckles (knuckle pads), or lumps or cords within the soles of the feet (plantar fibromatosis)  Causes  The cause of Dupuytren's disease is unknown but may be associated with certain biochemical factors within the involved tissue  The problem is more common in men over age 36 and in people of [place] descent  There is no proven evidence that hand injuries or specific occupational exposures lead to a higher risk of developing Dupuytren's disease  Signs and Symptoms  Symptoms of Dupuytren's disease usually include lumps and pits within the palm  The lumps are generally firm and adherent to the skin  Thick cords may develop, extending from the palm into one or more fingers, with the ring and little fingers most commonly affected  These cords may be mistaken for tendons, but they actually lie between the skin and the tendons  These cords cause bending or contractures of the fingers  In many cases, both hands are affected, although the degree of involvement may vary  The initial lumps may produce discomfort that usually resolves, but Dupuytren's disease is not typically painful  The disease may first be noticed because of difficulty placing the hand flat on an even surface, such as a tabletop (see Figure 3)  As the tissue thickens and fingers are drawn into the palm, one may notice increasing difficulty with activities such as washing, wearing gloves, shaking hands, and putting hands into pockets  Progression is unpredictable  Some individuals will have only small lumps or cords while others will develop severely bent fingers  More severe disease often occurs with an earlier age of onset  Treatment  In mild cases, especially if hand function is not affected, only observation is needed  For more severe cases, various treatment options are available in order to straighten the finger(s)   These options may include needles or open surgery  Your treating surgeon will discuss the method most appropriate for your condition based upon the stage and pattern of the disease and the joints involved  The goal of treatment is to improve finger position and thereby hand function  Despite treatment, the disease process may recur  Before treatment, your treating surgeon will discuss realistic goals, possible risks and results  Specific surgical considerations include the following:   The presence of a lump in the palm does not mean that surgery is required or that the disease will progress   Correction of finger position is best accomplished with milder contractures or contractures that affect the base of the finger  Complete correction sometimes cannot be attained, especially of the middle and end joints in the finger   Skin grafts are sometimes required to cover open areas in the fingers if the skin is deficient   The nerves that provide feeling to the fingertips are often intertwined with the cords   Splinting and hand therapy are often required after surgery in order to maximize and maintain the improvement in finger position and function  © 2012 American [employer] for [employer] Hand  www handcare  org

## 2021-06-23 NOTE — PROGRESS NOTES
Mohini SERRANO  Attending, Orthopaedic Surgery  Hand, Wrist, and Elbow Surgery  [facility]      ORTHOPAEDIC HAND, WRIST, AND ELBOW OFFICE  VISIT       ASSESSMENT/PLAN:      63 yo male with right cubital tunnel syndrome and right dupuytren's nodule in the palm    Discussed anatomy and physiology of the above diagnoses  Along with treatment options  In regards to the Dupuytren's nodule there is no indicated treatment at this time  Patient has no contractures but should continue to keep an eye on this  We discussed that this can get worse with any hand trauma or surgeries  Regards to the cubital tunnel which was seen on ultrasound already options include ulnar nerve glides, nighttime bracing with a knee pad or towel, oral steroids / anti-inflammatories, injectable steroids, and surgery  Patient opted to proceed with ulnar nerve glides and bracing, exercises printed out today  He he is a diabetic and reports his last A1c was just over 7 0 so will hold off on oral steroids at this time  If no improvement in 4-5 weeks or so will consider the next step likely with injection  The patient verbalized understanding of exam findings and treatment plan  We engaged in the shared decision-making process and treatment options were discussed at length with the patient  Surgical and conservative management discussed today along with risks and benefits  Diagnoses and all orders for this visit:    Cubital tunnel syndrome on right    Dupuytren's disease of palm of right hand    Other orders  -     lisinopril (ZESTRIL) 20 mg tablet        Follow Up:  Return in 5 weeks (on 7/28/2021) for Recheck  To Do Next Visit:  Re-evaluation of current issue      General Discussions:  Cubital Tunnel Syndrome: The anatomy and physiology of cubital tunnel syndrome were discussed with the patient today in the office    Typically, increased elbow flexion activities decrease blood flow within the intraneural spaces, resulting in a feeling of numbness, tingling, weakness, or clumsiness within the hand and fingers  Occasionally, anatomic structures such as medial elbow osteophytes, the medial head of the triceps, were subluxing ulnar nerve may result in increased pressure or aggravation at the cubital tunnel  Typical signs and symptoms usually include numbness and tingling within the ring and small finger, weakness with , and weakness with pinch  Conservative treatment and includes nocturnal bracing to keep the elbow in a semi-extended position, activity modification, therapy, and avoiding excessive elbow flexion activities  Vitamin B6 one tablet daily over the counter may helpful to reduce symptoms  A majority of patients typically respond to conservative treatment over a period of approximately 3-6 months  EMG/NCV testing of the ulnar nerve at the elbow is not as reliable as carpal tunnel syndrome  Surgical intervention in the form of in situ release of the ulnar nerve at the elbow or ulnar nerve transposition may be required in up to 20% of patients          ____________________________________________________________________________________________________________________________________________      CHIEF COMPLAINT:  Chief Complaint   Patient presents with    Right Hand - Numbness       SUBJECTIVE:  Mike Bear is a 62y o  year old RHD male seen in consultation requested by Marlon Woods [facility] who presents for evaluation of right small and ring finger paresthesias  He notes these symptoms have been present for a few weeks and involve persistent paresthesias in the small and ring fingers  He denies any previous symptoms or any paresthesias in other fingers  No injuries or surgeries on the elbow previously  He also has a nodule in the palm, no pain or contractures in the hand  He was seen by Marlon Woods and underwent an ultrasound of the elbow which showed evidence of cubital tunnel         Pain/symptom timing:  Worse during the day when active  Pain/symptom context:  Worse with activites and work  Pain/symptom modifying factors:  Rest makes better, activities make worse  Pain/symptom associated signs/symptoms: none    Prior treatment   · NSAIDsNo   · Injections No   · Bracing/Orthotics No    Physical Therapy No     I have personally reviewed all the relevant PMH, PSH, SH, FH, Medications and allergies      PAST MEDICAL HISTORY:  Past Medical History:   Diagnosis Date    Coronary artery disease     cath Oct 2013: 95% prox LAD, 40% mid LAD, EF 60%, MANUEL to prox LAD (Xience Rx 3 5x18mm)       PAST SURGICAL HISTORY:  History reviewed  No pertinent surgical history      FAMILY HISTORY:  Family History   Problem Relation Age of Onset    No Known Problems Mother     Heart attack Father     Arrhythmia Neg Hx     Clotting disorder Neg Hx     Fainting Neg Hx     Asthma Neg Hx     Anuerysm Neg Hx     Hypertension Neg Hx     Heart disease Neg Hx        SOCIAL HISTORY:  Social History     Tobacco Use    Smoking status: Former Smoker    Smokeless tobacco: Former User   Vaping Use    Vaping Use: Never used   Substance Use Topics    Alcohol use: Yes     Comment: social    Drug use: No       MEDICATIONS:    Current Outpatient Medications:     aspirin (ASPIRIN LOW DOSE) 81 MG tablet, Take 1 tablet by mouth daily, Disp: , Rfl:     lisinopril (ZESTRIL) 20 mg tablet, , Disp: , Rfl:     metFORMIN (GLUCOPHAGE) 1000 MG tablet, Take 1 tablet by mouth 2 (two) times a day, Disp: , Rfl:     metoprolol succinate (TOPROL-XL) 50 mg 24 hr tablet, TAKE 1 TABLET DAILY, Disp: 90 tablet, Rfl: 3    prasugrel (EFFIENT) tablet, TAKE 1 TABLET DAILY, Disp: 90 tablet, Rfl: 3    atorvastatin (LIPITOR) 40 mg tablet, TAKE 1 TABLET AT BEDTIME (NEED OFFICE VISIT FOR REFILLS) (Patient not taking: Reported on 5/17/2021), Disp: 90 tablet, Rfl: 4    ALLERGIES:  No Known Allergies        REVIEW OF SYSTEMS:  Review of Systems   Constitutional: Negative for chills, fatigue, fever and unexpected weight change  HENT: Negative for hearing loss, nosebleeds and sore throat  Eyes: Negative for pain, redness and visual disturbance  Respiratory: Negative for cough, shortness of breath and wheezing  Cardiovascular: Negative for chest pain, palpitations and leg swelling  Gastrointestinal: Negative for abdominal pain, nausea and vomiting  Endocrine: Negative for polydipsia and polyuria  Genitourinary: Negative for difficulty urinating and hematuria  Musculoskeletal: Negative for arthralgias, joint swelling and myalgias  Skin: Negative for rash and wound  Neurological: Positive for numbness  Negative for dizziness and headaches  Psychiatric/Behavioral: Negative for decreased concentration, dysphoric mood and suicidal ideas  The patient is not nervous/anxious  VITALS:  Vitals:    06/23/21 1158   BP: 145/95   Pulse: 89       LABS:  HgA1c: No results found for: HGBA1C  BMP:   Lab Results   Component Value Date    GLUCOSE 194 (H) 10/23/2015    CALCIUM 8 9 10/23/2015     (L) 10/23/2015    K 4 1 10/23/2015    CO2 23 1 10/23/2015    CL 96 (L) 10/23/2015    BUN 13 10/23/2015    CREATININE 1 47 (H) 10/23/2015       _____________________________________________________  PHYSICAL EXAMINATION:  General: well developed and well nourished, alert, oriented times 3 and appears comfortable  Psychiatric: Normal  HEENT: Normocephalic, Atraumatic Trachea Midline, No torticollis  Pulmonary: No audible wheezing or respiratory distress   Abdomen/GI: Non tender, non distended   Cardiovascular: No pitting edema, 2+ radial pulse   Skin: No masses, erythema, lacerations, fluctation, ulcerations  Neurovascular: Sensation Intact to the Median, Ulnar, Radial Nerve, Motor Intact to the Median, Ulnar, Radial Nerve and Pulses Intact  Musculoskeletal: Normal, except as noted in detailed exam and in HPI        MUSCULOSKELETAL EXAMINATION:  Right Ulnar Nerve Exam:    Negative intrinsic atrophy  Negative  deformity at the elbow  Full range of motion with flexion and extension of the elbow  Negative ulnar nerve compression test at the elbow  Negative tinels over the ulnar nerve at the elbow  Negative cross finger test in the index and long fingers  FDP strength is 5/5 to the ring finger  FDP strength is 5/5 to the small finger    Intrinsic strength 5/5        ___________________________________________________  STUDIES REVIEWED:  I have personally reviewed ultrasound of the right elbow which demonstrates evidence of right cubital tunnel syndrome            PROCEDURES PERFORMED:  Procedures  No Procedures performed today    _____________________________________________________      Scribe Attestation    I,:  Damion Lorenzo PA-C am acting as a scribe while in the presence of the attending physician :       I,:  Chantell Bermudez MD personally performed the services described in this documentation    as scribed in my presence :

## 2021-06-23 NOTE — LETTER
June 23, 2021     Elba Valentin MD  [facility]  [address]  [address]    Patient: Jose Antonio Wallace   YOB: 1960   Date of Visit: 6/23/2021       Dear Dr Gustavo Gardner: Thank you for referring Johnnie Alonso to me for evaluation  Below are my notes for this consultation  If you have questions, please do not hesitate to call me  I look forward to following your patient along with you  Sincerely,        Gema Love MD        CC: No Recipients  Gema Love MD  6/23/2021  1:00 PM  Lexii SERRANO  Attending, Orthopaedic Surgery  Hand, Wrist, and Elbow Surgery  [facility]      ORTHOPAEDIC HAND, WRIST, AND ELBOW OFFICE  VISIT       ASSESSMENT/PLAN:      61 yo male with right cubital tunnel syndrome and right dupuytren's nodule in the palm    Discussed anatomy and physiology of the above diagnoses  Along with treatment options  In regards to the Dupuytren's nodule there is no indicated treatment at this time  Patient has no contractures but should continue to keep an eye on this  We discussed that this can get worse with any hand trauma or surgeries  Regards to the cubital tunnel which was seen on ultrasound already options include ulnar nerve glides, nighttime bracing with a knee pad or towel, oral steroids / anti-inflammatories, injectable steroids, and surgery  Patient opted to proceed with ulnar nerve glides and bracing, exercises printed out today  He he is a diabetic and reports his last A1c was just over 7 0 so will hold off on oral steroids at this time  If no improvement in 4-5 weeks or so will consider the next step likely with injection  The patient verbalized understanding of exam findings and treatment plan  We engaged in the shared decision-making process and treatment options were discussed at length with the patient   Surgical and conservative management discussed today along with risks and benefits  Diagnoses and all orders for this visit:    Cubital tunnel syndrome on right    Dupuytren's disease of palm of right hand    Other orders  -     lisinopril (ZESTRIL) 20 mg tablet        Follow Up:  Return in 5 weeks (on 7/28/2021) for Recheck  To Do Next Visit:  Re-evaluation of current issue      General Discussions:  Cubital Tunnel Syndrome: The anatomy and physiology of cubital tunnel syndrome were discussed with the patient today in the office  Typically, increased elbow flexion activities decrease blood flow within the intraneural spaces, resulting in a feeling of numbness, tingling, weakness, or clumsiness within the hand and fingers  Occasionally, anatomic structures such as medial elbow osteophytes, the medial head of the triceps, were subluxing ulnar nerve may result in increased pressure or aggravation at the cubital tunnel  Typical signs and symptoms usually include numbness and tingling within the ring and small finger, weakness with , and weakness with pinch  Conservative treatment and includes nocturnal bracing to keep the elbow in a semi-extended position, activity modification, therapy, and avoiding excessive elbow flexion activities  Vitamin B6 one tablet daily over the counter may helpful to reduce symptoms  A majority of patients typically respond to conservative treatment over a period of approximately 3-6 months  EMG/NCV testing of the ulnar nerve at the elbow is not as reliable as carpal tunnel syndrome    Surgical intervention in the form of in situ release of the ulnar nerve at the elbow or ulnar nerve transposition may be required in up to 20% of patients          ____________________________________________________________________________________________________________________________________________      CHIEF COMPLAINT:  Chief Complaint   Patient presents with    Right Hand - Numbness       SUBJECTIVE:  Radha Garcia is a 62y o  year old RHD male seen in consultation requested by Priyanka Prajapati [facility] who presents for evaluation of right small and ring finger paresthesias  He notes these symptoms have been present for a few weeks and involve persistent paresthesias in the small and ring fingers  He denies any previous symptoms or any paresthesias in other fingers  No injuries or surgeries on the elbow previously  He also has a nodule in the palm, no pain or contractures in the hand  He was seen by Priyanka Prajapati and underwent an ultrasound of the elbow which showed evidence of cubital tunnel  Pain/symptom timing:  Worse during the day when active  Pain/symptom context:  Worse with activites and work  Pain/symptom modifying factors:  Rest makes better, activities make worse  Pain/symptom associated signs/symptoms: none    Prior treatment   · NSAIDsNo   · Injections No   · Bracing/Orthotics No    Physical Therapy No     I have personally reviewed all the relevant PMH, PSH, SH, FH, Medications and allergies      PAST MEDICAL HISTORY:  Past Medical History:   Diagnosis Date    Coronary artery disease     cath Oct 2013: 95% prox LAD, 40% mid LAD, EF 60%, MANUEL to prox LAD (Xience Rx 3 5x18mm)       PAST SURGICAL HISTORY:  History reviewed  No pertinent surgical history      FAMILY HISTORY:  Family History   Problem Relation Age of Onset    No Known Problems Mother     Heart attack Father     Arrhythmia Neg Hx     Clotting disorder Neg Hx     Fainting Neg Hx     Asthma Neg Hx     Anuerysm Neg Hx     Hypertension Neg Hx     Heart disease Neg Hx        SOCIAL HISTORY:  Social History     Tobacco Use    Smoking status: Former Smoker    Smokeless tobacco: Former User   Vaping Use    Vaping Use: Never used   Substance Use Topics    Alcohol use: Yes     Comment: social    Drug use: No       MEDICATIONS:    Current Outpatient Medications:     aspirin (ASPIRIN LOW DOSE) 81 MG tablet, Take 1 tablet by mouth daily, Disp: , Rfl:     lisinopril (ZESTRIL) 20 mg tablet, , Disp: , Rfl:     metFORMIN (GLUCOPHAGE) 1000 MG tablet, Take 1 tablet by mouth 2 (two) times a day, Disp: , Rfl:     metoprolol succinate (TOPROL-XL) 50 mg 24 hr tablet, TAKE 1 TABLET DAILY, Disp: 90 tablet, Rfl: 3    prasugrel (EFFIENT) tablet, TAKE 1 TABLET DAILY, Disp: 90 tablet, Rfl: 3    atorvastatin (LIPITOR) 40 mg tablet, TAKE 1 TABLET AT BEDTIME (NEED OFFICE VISIT FOR REFILLS) (Patient not taking: Reported on 5/17/2021), Disp: 90 tablet, Rfl: 4    ALLERGIES:  No Known Allergies        REVIEW OF SYSTEMS:  Review of Systems   Constitutional: Negative for chills, fatigue, fever and unexpected weight change  HENT: Negative for hearing loss, nosebleeds and sore throat  Eyes: Negative for pain, redness and visual disturbance  Respiratory: Negative for cough, shortness of breath and wheezing  Cardiovascular: Negative for chest pain, palpitations and leg swelling  Gastrointestinal: Negative for abdominal pain, nausea and vomiting  Endocrine: Negative for polydipsia and polyuria  Genitourinary: Negative for difficulty urinating and hematuria  Musculoskeletal: Negative for arthralgias, joint swelling and myalgias  Skin: Negative for rash and wound  Neurological: Positive for numbness  Negative for dizziness and headaches  Psychiatric/Behavioral: Negative for decreased concentration, dysphoric mood and suicidal ideas  The patient is not nervous/anxious          VITALS:  Vitals:    06/23/21 1158   BP: 145/95   Pulse: 89       LABS:  HgA1c: No results found for: HGBA1C  BMP:   Lab Results   Component Value Date    GLUCOSE 194 (H) 10/23/2015    CALCIUM 8 9 10/23/2015     (L) 10/23/2015    K 4 1 10/23/2015    CO2 23 1 10/23/2015    CL 96 (L) 10/23/2015    BUN 13 10/23/2015    CREATININE 1 47 (H) 10/23/2015       _____________________________________________________  PHYSICAL EXAMINATION:  General: well developed and well nourished, alert, oriented times 3 and appears comfortable  Psychiatric: Normal  HEENT: Normocephalic, Atraumatic Trachea Midline, No torticollis  Pulmonary: No audible wheezing or respiratory distress   Abdomen/GI: Non tender, non distended   Cardiovascular: No pitting edema, 2+ radial pulse   Skin: No masses, erythema, lacerations, fluctation, ulcerations  Neurovascular: Sensation Intact to the Median, Ulnar, Radial Nerve, Motor Intact to the Median, Ulnar, Radial Nerve and Pulses Intact  Musculoskeletal: Normal, except as noted in detailed exam and in HPI  MUSCULOSKELETAL EXAMINATION:  Right Ulnar Nerve Exam:    Negative intrinsic atrophy  Negative  deformity at the elbow  Full range of motion with flexion and extension of the elbow  Negative ulnar nerve compression test at the elbow  Negative tinels over the ulnar nerve at the elbow  Negative cross finger test in the index and long fingers  FDP strength is 5/5 to the ring finger  FDP strength is 5/5 to the small finger    Intrinsic strength 5/5        ___________________________________________________  STUDIES REVIEWED:  I have personally reviewed ultrasound of the right elbow which demonstrates evidence of right cubital tunnel syndrome            PROCEDURES PERFORMED:  Procedures  No Procedures performed today    _____________________________________________________      Scribe Attestation    I,:  Senia Vazquez PA-C am acting as a scribe while in the presence of the attending physician :       I,:  Bryant Dominguez MD personally performed the services described in this documentation    as scribed in my presence :

## 2021-09-04 ENCOUNTER — IMMUNIZATIONS (OUTPATIENT)
Dept: FAMILY MEDICINE CLINIC | Facility: HOSPITAL | Age: 61
End: 2021-09-04

## 2021-09-04 DIAGNOSIS — Z23 ENCOUNTER FOR IMMUNIZATION: Primary | ICD-10-CM

## 2021-09-04 PROCEDURE — 91300 SARS-COV-2 / COVID-19 MRNA VACCINE (PFIZER-BIONTECH) 30 MCG: CPT

## 2021-09-04 PROCEDURE — 0001A SARS-COV-2 / COVID-19 MRNA VACCINE (PFIZER-BIONTECH) 30 MCG: CPT

## 2021-11-10 DIAGNOSIS — I25.10 CORONARY ARTERY DISEASE INVOLVING NATIVE CORONARY ARTERY OF NATIVE HEART WITHOUT ANGINA PECTORIS: ICD-10-CM

## 2021-11-10 RX ORDER — PRASUGREL 10 MG/1
TABLET, FILM COATED ORAL
Qty: 90 TABLET | Refills: 3 | Status: SHIPPED | OUTPATIENT
Start: 2021-11-10

## 2021-12-23 ENCOUNTER — OFFICE VISIT (OUTPATIENT)
Dept: URGENT CARE | Facility: CLINIC | Age: 61
End: 2021-12-23
Payer: COMMERCIAL

## 2021-12-23 ENCOUNTER — NURSE TRIAGE (OUTPATIENT)
Dept: OTHER | Facility: OTHER | Age: 61
End: 2021-12-23

## 2021-12-23 VITALS
BODY MASS INDEX: 26.14 KG/M2 | HEART RATE: 86 BPM | HEIGHT: 72 IN | OXYGEN SATURATION: 97 % | WEIGHT: 193 LBS | RESPIRATION RATE: 20 BRPM | TEMPERATURE: 97.9 F

## 2021-12-23 DIAGNOSIS — Z11.59 SPECIAL SCREENING EXAMINATION FOR VIRAL DISEASE: Primary | ICD-10-CM

## 2021-12-23 PROCEDURE — U0003 INFECTIOUS AGENT DETECTION BY NUCLEIC ACID (DNA OR RNA); SEVERE ACUTE RESPIRATORY SYNDROME CORONAVIRUS 2 (SARS-COV-2) (CORONAVIRUS DISEASE [COVID-19]), AMPLIFIED PROBE TECHNIQUE, MAKING USE OF HIGH THROUGHPUT TECHNOLOGIES AS DESCRIBED BY CMS-2020-01-R: HCPCS | Performed by: PHYSICIAN ASSISTANT

## 2021-12-23 PROCEDURE — U0005 INFEC AGEN DETEC AMPLI PROBE: HCPCS | Performed by: PHYSICIAN ASSISTANT

## 2021-12-23 PROCEDURE — 99213 OFFICE O/P EST LOW 20 MIN: CPT | Performed by: PHYSICIAN ASSISTANT

## 2021-12-25 LAB — SARS-COV-2 RNA RESP QL NAA+PROBE: POSITIVE

## 2022-02-16 DIAGNOSIS — I25.10 CORONARY ARTERY DISEASE INVOLVING NATIVE CORONARY ARTERY OF NATIVE HEART WITHOUT ANGINA PECTORIS: ICD-10-CM

## 2022-02-17 RX ORDER — METOPROLOL SUCCINATE 50 MG/1
50 TABLET, EXTENDED RELEASE ORAL DAILY
Qty: 90 TABLET | Refills: 3 | Status: SHIPPED | OUTPATIENT
Start: 2022-02-17 | End: 2022-05-02

## 2022-02-23 ENCOUNTER — OFFICE VISIT (OUTPATIENT)
Dept: CARDIOLOGY CLINIC | Facility: CLINIC | Age: 62
End: 2022-02-23
Payer: COMMERCIAL

## 2022-02-23 VITALS
DIASTOLIC BLOOD PRESSURE: 78 MMHG | BODY MASS INDEX: 25.53 KG/M2 | OXYGEN SATURATION: 99 % | SYSTOLIC BLOOD PRESSURE: 160 MMHG | HEIGHT: 72 IN | HEART RATE: 101 BPM | WEIGHT: 188.5 LBS

## 2022-02-23 DIAGNOSIS — I10 HYPERTENSION, UNSPECIFIED TYPE: Primary | ICD-10-CM

## 2022-02-23 DIAGNOSIS — E78.5 HYPERLIPIDEMIA, UNSPECIFIED HYPERLIPIDEMIA TYPE: ICD-10-CM

## 2022-02-23 DIAGNOSIS — I25.10 CORONARY ARTERY DISEASE INVOLVING NATIVE HEART WITHOUT ANGINA PECTORIS, UNSPECIFIED VESSEL OR LESION TYPE: ICD-10-CM

## 2022-02-23 PROCEDURE — 93000 ELECTROCARDIOGRAM COMPLETE: CPT | Performed by: NURSE PRACTITIONER

## 2022-02-23 PROCEDURE — 99215 OFFICE O/P EST HI 40 MIN: CPT | Performed by: NURSE PRACTITIONER

## 2022-02-23 RX ORDER — LISINOPRIL 20 MG/1
TABLET ORAL
Qty: 60 TABLET | Refills: 3 | Status: SHIPPED | OUTPATIENT
Start: 2022-02-23

## 2022-02-23 NOTE — PATIENT INSTRUCTIONS
2gm sodium low fat low cholesterol low carbohydrate no concentrated sweets diet, eating fresh is best     Increase Lisinopril to 20mg BID  Rx walking Nuclear Stress test, hold Metoprolol prior to stress test   2 D echocardiogram   Fasting Lipid panel

## 2022-02-23 NOTE — PROGRESS NOTES
Cardiology Follow Up    Brigitte Barahona [facility]  1960  [id number]  [facility]  [address] [id number] [phone]    No diagnosis found      Interval History: ***        HPI:  CAD  Hypertension  Hyperlipidemia  ESTHER  Patient Active Problem List   Diagnosis    Atherosclerotic heart disease of native coronary artery without angina pectoris    Benign essential hypertension    Type 2 or unspecified type diabetes mellitus    Hyperlipidemia    Obstructive sleep apnea     Past Medical History:   Diagnosis Date    Coronary artery disease     cath Oct 2013: 95% prox LAD, 40% mid LAD, EF 60%, MANUEL to prox LAD (Xience Rx 3 5x18mm)     Social History     Socioeconomic History    Marital status: /Civil Union     Spouse name: Not on file    Number of children: Not on file    Years of education: Not on file    Highest education level: Not on file   Occupational History    Not on file   Tobacco Use    Smoking status: Former Smoker    Smokeless tobacco: Former User   Vaping Use    Vaping Use: Never used   Substance and Sexual Activity    Alcohol use: Yes     Comment: social    Drug use: No    Sexual activity: Not on file   Other Topics Concern    Not on file   Social History Narrative    Not on file     Social Determinants of Health     Financial Resource Strain: Not on file   Food Insecurity: Not on file   Transportation Needs: Not on file   Physical Activity: Not on file   Stress: Not on file   Social Connections: Not on file   Intimate Partner Violence: Not on file   Housing Stability: Not on file      Family History   Problem Relation Age of Onset    No Known Problems Mother     Heart attack Father     Arrhythmia Neg Hx     Clotting disorder Neg Hx     Fainting Neg Hx     Asthma Neg Hx     Anuerysm Neg Hx     Hypertension Neg Hx     Heart disease Neg Hx      No past surgical history on file  Current Outpatient Medications:     aspirin (ASPIRIN LOW DOSE) 81 MG tablet, Take 1 tablet by mouth daily, Disp: , Rfl:     atorvastatin (LIPITOR) 40 mg tablet, TAKE 1 TABLET AT BEDTIME (NEED OFFICE VISIT FOR REFILLS) (Patient not taking: Reported on 5/17/2021), Disp: 90 tablet, Rfl: 4    lisinopril (ZESTRIL) 20 mg tablet, , Disp: , Rfl:     metFORMIN (GLUCOPHAGE) 1000 MG tablet, Take 1 tablet by mouth 2 (two) times a day, Disp: , Rfl:     metoprolol succinate (TOPROL-XL) 50 mg 24 hr tablet, Take 1 tablet (50 mg total) by mouth daily, Disp: 90 tablet, Rfl: 3    prasugrel (EFFIENT) tablet, TAKE 1 TABLET DAILY, Disp: 90 tablet, Rfl: 3  No Known Allergies    Labs:{Recent [id number]::"WRQ applicable"}  Imaging: No results found      Review of Systems:  Review of Systems    Physical Exam:  Physical Exam    Discussion/Summary:***

## 2022-02-23 NOTE — PROGRESS NOTES
Cardiology Consultation     Arnuad Hansen  [id number]  1960  [facility] [id number]    1  Hypertension, unspecified type  POCT ECG    NM myocardial perfusion spect (rx stress and/or rest)    lisinopril (ZESTRIL) 20 mg tablet   2  Coronary artery disease involving native heart without angina pectoris, unspecified vessel or lesion type  Echo complete w/ contrast if indicated    NM myocardial perfusion spect (rx stress and/or rest)   3  Hyperlipidemia, unspecified hyperlipidemia type  Lipid panel    Echo complete w/ contrast if indicated    NM myocardial perfusion spect (rx stress and/or rest)    lisinopril (ZESTRIL) 20 mg tablet     Mr [place] Mccarthy presents to our office for a follow up visit  He has not been seen in our office since 9/2018  Mery Munoz did not come to the office due to [place]  In December he and his wife tested positive for COVID-19  They stayed home and monitored symptoms  His wife has been concerned that Mery Munoz is more fatigued  He denies  worsening shortness of breath chest pain palpitations lightheadedness or dizziness  Leeroy is attempting to control his DM with eating one meal a day and walking 2 miles during his lunch time  He has been experiencing right eye diabetic retinopathy and peripheral neuropathy with difficulty walking         HPI:  12/23/21 COVID-19+   CAD hx MANUEL to prox LAD  Hypertension  Hyperlipidemia 9/15/18 , TG 84, HDL 71, LDL 93   ESTHER  DM2 HgbA1C 7 2   Diabetic retinopathy cannot see well  Neuropathy in feet balance off     EOTH drinks a couple cups of wine every night   Patient Active Problem List     Patient Active Problem List   Diagnosis    Atherosclerotic heart disease of native coronary artery without angina pectoris    Benign essential hypertension    Type 2 or unspecified type diabetes mellitus    Hyperlipidemia  Obstructive sleep apnea     Past Medical History:   Diagnosis Date    Coronary artery disease     cath Oct 2013: 95% prox LAD, 40% mid LAD, EF 60%, MANUEL to prox LAD (Xience Rx 3 5x18mm)     Social History     Socioeconomic History    Marital status: /Civil Union     Spouse name: Not on file    Number of children: Not on file    Years of education: Not on file    Highest education level: Not on file   Occupational History    Not on file   Tobacco Use    Smoking status: Former Smoker    Smokeless tobacco: Former User   Vaping Use    Vaping Use: Never used   Substance and Sexual Activity    Alcohol use: Yes     Comment: social    Drug use: No    Sexual activity: Not on file   Other Topics Concern    Not on file   Social History Narrative    Not on file     Social Determinants of Health     Financial Resource Strain: Not on file   Food Insecurity: Not on file   Transportation Needs: Not on file   Physical Activity: Not on file   Stress: Not on file   Social Connections: Not on file   Intimate Partner Violence: Not on file   Housing Stability: Not on file      Family History   Problem Relation Age of Onset    No Known Problems Mother     Heart attack Father     Arrhythmia Neg Hx     Clotting disorder Neg Hx     Fainting Neg Hx     Asthma Neg Hx     Anuerysm Neg Hx     Hypertension Neg Hx     Heart disease Neg Hx      No past surgical history on file      Current Outpatient Medications:     aspirin (ASPIRIN LOW DOSE) 81 MG tablet, Take 1 tablet by mouth daily, Disp: , Rfl:     lisinopril (ZESTRIL) 20 mg tablet, 20 mg daily  , Disp: , Rfl:     metFORMIN (GLUCOPHAGE) 1000 MG tablet, Take 1,000 mg by mouth daily with breakfast  , Disp: , Rfl:     metoprolol succinate (TOPROL-XL) 50 mg 24 hr tablet, Take 1 tablet (50 mg total) by mouth daily, Disp: 90 tablet, Rfl: 3    prasugrel (EFFIENT) tablet, TAKE 1 TABLET DAILY (Patient taking differently: 10 mg daily  ), Disp: 90 tablet, Rfl: 3   atorvastatin (LIPITOR) 40 mg tablet, TAKE 1 TABLET AT BEDTIME (NEED OFFICE VISIT FOR REFILLS) (Patient not taking: Reported on 5/17/2021), Disp: 90 tablet, Rfl: 4  No Known Allergies  Vitals:    02/23/22 1640   BP: 160/78   BP Location: Left arm   Patient Position: Sitting   Cuff Size: Standard   Pulse: 101   SpO2: 99%   Weight: 85 5 kg (188 lb 8 oz)   Height: 6' (1 829 m)       Labs:  No visits with results within 2 Month(s) from this visit  Latest known visit with results is:   Office Visit on 12/23/2021   Component Date Value    SARS-CoV-2 12/23/2021 Positive*     Imaging: No results found  Review of Systems:  Review of Systems   Constitutional: Positive for fatigue  Eyes: Positive for visual disturbance  Neurological: Positive for tremors  Neuropathy in feet        Physical Exam:  Physical Exam  Vitals reviewed  Constitutional:       Appearance: Normal appearance  HENT:      Head: Normocephalic  Cardiovascular:      Rate and Rhythm: Regular rhythm  Tachycardia present  Pulses: Normal pulses  Heart sounds: Normal heart sounds  Pulmonary:      Effort: Pulmonary effort is normal       Breath sounds: Normal breath sounds  Abdominal:      General: Bowel sounds are normal       Palpations: Abdomen is soft  Musculoskeletal:         General: Normal range of motion  Cervical back: Normal range of motion and neck supple  Right lower leg: No edema  Left lower leg: No edema  Skin:     General: Skin is warm and dry  Capillary Refill: Capillary refill takes less than 2 seconds  Neurological:      General: No focal deficit present  Mental Status: He is alert and oriented to person, place, and time  Psychiatric:         Mood and Affect: Mood normal          Behavior: Behavior normal          Discussion/Summary:  1  Hx of CAD hx MANUEL to prox LAD, worsening fatigue, recent COVID-19+  Risk factors include DM2 uncontrolled, HLD not taking statin and HTN   Rx walking nuclear stress test R/O ischemia  Continue on aspirin 81 mg daily, Effient 10 mg daily, metoprolol succinate 50 mg daily, increase lisinopril to 20 mg b i d  heart healthy diet  2  Hypertension RUE sitting 158/78 increase Lisinopril to 20mg BID, continue on  Metoprolol succinate 50 mg daily, 2 D echocardiogram evaluate wall function and valvular function, 2gm sodium diet   3  Hyperlipidemia 9/15/18 , TG 84, HDL 71, LDL 93 - not taking statin, fasting lipid panel      4  DM2 HgbA1C 7 2  Continue on metformin 1000 mg daily

## 2022-04-11 ENCOUNTER — HOSPITAL ENCOUNTER (OUTPATIENT)
Dept: NON INVASIVE DIAGNOSTICS | Facility: CLINIC | Age: 62
Discharge: HOME | End: 2022-04-11
Payer: COMMERCIAL

## 2022-04-11 VITALS
SYSTOLIC BLOOD PRESSURE: 160 MMHG | HEIGHT: 72 IN | DIASTOLIC BLOOD PRESSURE: 78 MMHG | WEIGHT: 188 LBS | HEART RATE: 86 BPM | BODY MASS INDEX: 25.47 KG/M2

## 2022-04-11 DIAGNOSIS — E78.5 HYPERLIPIDEMIA, UNSPECIFIED HYPERLIPIDEMIA TYPE: ICD-10-CM

## 2022-04-11 DIAGNOSIS — I25.10 CORONARY ARTERY DISEASE INVOLVING NATIVE HEART WITHOUT ANGINA PECTORIS, UNSPECIFIED VESSEL OR LESION TYPE: ICD-10-CM

## 2022-04-11 DIAGNOSIS — I10 HYPERTENSION, UNSPECIFIED TYPE: ICD-10-CM

## 2022-04-11 LAB
AORTIC ROOT: 3.2 CM
APICAL FOUR CHAMBER EJECTION FRACTION: 56 %
ASCENDING AORTA: 3 CM (ref 2.07–3.1)
AV LVOT PEAK GRADIENT: 2 MMHG
AV PEAK GRADIENT: 6 MMHG
BASELINE ST DEPRESSION: 0 MM
DOP CALC LVOT AREA: 3.46 CM2
DOP CALC LVOT DIAMETER: 2.1 CM
E WAVE DECELERATION TIME: 141 MS
FRACTIONAL SHORTENING: 26 % (ref 28–44)
INTERVENTRICULAR SEPTUM IN DIASTOLE (PARASTERNAL SHORT AXIS VIEW): 1.2 CM
INTERVENTRICULAR SEPTUM: 1.2 CM (ref 0.54–1.02)
LAAS-AP2: 13.6 CM2
LAAS-AP4: 14.2 CM2
LEFT ATRIUM AREA SYSTOLE SINGLE PLANE A4C: 12.8 CM2
LEFT ATRIUM SIZE: 3.4 CM
LEFT INTERNAL DIMENSION IN SYSTOLE: 3.1 CM (ref 3.2–4.85)
LEFT VENTRICULAR INTERNAL DIMENSION IN DIASTOLE: 4.2 CM (ref 5.28–7.87)
LEFT VENTRICULAR POSTERIOR WALL IN END DIASTOLE: 1.1 CM (ref 0.53–1)
LEFT VENTRICULAR STROKE VOLUME: 43 ML
LVSV (TEICH): 43 ML
MV E'TISSUE VEL-SEP: 8 CM/S
MV PEAK A VEL: 0.57 M/S
MV PEAK E VEL: 51 CM/S
MV STENOSIS PRESSURE HALF TIME: 41 MS
MV VALVE AREA P 1/2 METHOD: 5.37 CM2
NUC STRESS EJECTION FRACTION: 66 %
RATE PRESSURE PRODUCT: NORMAL
RIGHT ATRIAL 2D VOLUME: 19 ML
RIGHT ATRIUM AREA SYSTOLE A4C: 9.8 CM2
RIGHT VENTRICLE ID DIMENSION: 3.2 CM
SL CV LEFT ATRIUM LENGTH A2C: 4.8 CM
SL CV LV EF: 60
SL CV PED ECHO LEFT VENTRICLE DIASTOLIC VOLUME (MOD BIPLANE) 2D: 80 ML
SL CV PED ECHO LEFT VENTRICLE SYSTOLIC VOLUME (MOD BIPLANE) 2D: 37 ML
SL CV REST NUCLEAR ISOTOPE DOSE: 10.9 MCI
SL CV STRESS NUCLEAR ISOTOPE DOSE: 32.7 MCI
SL CV STRESS RECOVERY BP: NORMAL MMHG
SL CV STRESS RECOVERY HR: 106 BPM
SL CV STRESS RECOVERY O2 SAT: 98 %
STRESS ANGINA INDEX: 0
STRESS BASELINE BP: NORMAL MMHG
STRESS BASELINE HR: 88 BPM
STRESS O2 SAT REST: 98 %
STRESS PEAK HR: 126 BPM
STRESS POST O2 SAT PEAK: 98 %
STRESS POST PEAK BP: 154 MMHG
STRESS ST DEPRESSION: 0 MM
STRESS/REST PERFUSION RATIO: 1.12
Z-SCORE OF ASCENDING AORTA: 1.58
Z-SCORE OF INTERVENTRICULAR SEPTUM IN END DIASTOLE: 3.48
Z-SCORE OF LEFT VENTRICULAR DIMENSION IN END DIASTOLE: -4.29
Z-SCORE OF LEFT VENTRICULAR DIMENSION IN END SYSTOLE: -1.9
Z-SCORE OF LEFT VENTRICULAR POSTERIOR WALL IN END DIASTOLE: 2.77

## 2022-04-11 PROCEDURE — 93016 CV STRESS TEST SUPVJ ONLY: CPT | Performed by: INTERNAL MEDICINE

## 2022-04-11 PROCEDURE — 93017 CV STRESS TEST TRACING ONLY: CPT

## 2022-04-11 PROCEDURE — 78452 HT MUSCLE IMAGE SPECT MULT: CPT

## 2022-04-11 PROCEDURE — 93306 TTE W/DOPPLER COMPLETE: CPT

## 2022-04-11 PROCEDURE — 78452 HT MUSCLE IMAGE SPECT MULT: CPT | Performed by: INTERNAL MEDICINE

## 2022-04-11 PROCEDURE — A9502 TC99M TETROFOSMIN: HCPCS

## 2022-04-11 PROCEDURE — G1004 CDSM NDSC: HCPCS

## 2022-04-11 PROCEDURE — 93018 CV STRESS TEST I&R ONLY: CPT | Performed by: INTERNAL MEDICINE

## 2022-04-11 PROCEDURE — 93306 TTE W/DOPPLER COMPLETE: CPT | Performed by: INTERNAL MEDICINE

## 2022-04-11 RX ADMIN — REGADENOSON 0.4 MG: 0.08 INJECTION, SOLUTION INTRAVENOUS at 12:31

## 2022-04-12 ENCOUNTER — TELEPHONE (OUTPATIENT)
Dept: CARDIOLOGY CLINIC | Facility: CLINIC | Age: 62
End: 2022-04-12

## 2022-04-12 LAB
CHEST PAIN STATEMENT: NORMAL
MAX DIASTOLIC BP: 100 MMHG
MAX HEART RATE: 126 BPM
MAX PREDICTED HEART RATE: 160 BPM
MAX. SYSTOLIC BP: 172 MMHG
PROTOCOL NAME: NORMAL
REASON FOR TERMINATION: NORMAL
TARGET HR FORMULA: NORMAL
TEST INDICATION: NORMAL
TIME IN EXERCISE PHASE: NORMAL

## 2022-04-12 NOTE — TELEPHONE ENCOUNTER
----- Message from Gia Franklin, [address] sent at 4/12/2022 10:27 AM EDT -----  Please call Jake Amado and inform him the 2 D echocardiogram is normal   Thank you     Called pt and lft msg re: normal Echo rslts

## 2022-04-12 NOTE — TELEPHONE ENCOUNTER
----- Message from Irvin Duran, [address] sent at 4/12/2022  8:55 AM EDT -----  Please call Leanna Nuno and inform him the stress test was normal   Thank you      Called pt and lft msg re: normal ST rslts  Adult Serous Otitis or Middle Ear Effusion (ear fluid) Treatment:  We use a multifaceted treatment to help clear ear fluid involving mucolytics, decongestants, antibiotics, nasal steroids, and the Valsalva maneuver.      1. Mucinex  (guaifenesin,) - helps to break up mucus and decongest the eustachian tube to help fluid exit the inner ear.  1 tab twice per day for 14 days.     2. Antibiotic - Helps prevent the reinfection of the fluid - any area that is warm and wet is prone to infection.  Bactrim DS 1 tab twice per day for 14 days.    3. Valsalva Maneuver - ear popping - forces air into the eustachian tube to allow for fluid to exit.  Do this 40 times per day and especially after you use your nasal spray.    4. Nasal Steroid Spray - Nasal steroids will reach the eustachian tube and help to decrease inflammation and open up the eustachian tube.  Fluticasone - 1 spray in each nostril twice per day.

## 2022-04-30 DIAGNOSIS — I25.10 CORONARY ARTERY DISEASE INVOLVING NATIVE CORONARY ARTERY OF NATIVE HEART WITHOUT ANGINA PECTORIS: ICD-10-CM

## 2022-05-02 RX ORDER — METOPROLOL SUCCINATE 50 MG/1
TABLET, EXTENDED RELEASE ORAL
Qty: 90 TABLET | Refills: 3 | Status: SHIPPED | OUTPATIENT
Start: 2022-05-02

## 2022-10-30 DIAGNOSIS — I25.10 CORONARY ARTERY DISEASE INVOLVING NATIVE CORONARY ARTERY OF NATIVE HEART WITHOUT ANGINA PECTORIS: ICD-10-CM

## 2022-10-31 RX ORDER — PRASUGREL 10 MG/1
10 TABLET, FILM COATED ORAL DAILY
Qty: 90 TABLET | Refills: 3 | Status: SHIPPED | OUTPATIENT
Start: 2022-10-31

## 2023-05-07 DIAGNOSIS — I25.10 CORONARY ARTERY DISEASE INVOLVING NATIVE CORONARY ARTERY OF NATIVE HEART WITHOUT ANGINA PECTORIS: ICD-10-CM

## 2023-05-08 RX ORDER — METOPROLOL SUCCINATE 50 MG/1
TABLET, EXTENDED RELEASE ORAL
Qty: 90 TABLET | Refills: 3 | Status: SHIPPED | OUTPATIENT
Start: 2023-05-08

## 2023-07-18 ENCOUNTER — TELEPHONE (OUTPATIENT)
Dept: NEUROLOGY | Facility: CLINIC | Age: 63
End: 2023-07-18

## 2023-07-18 NOTE — TELEPHONE ENCOUNTER
Called and scheduled sooner new patient appt (from waitlist) for: Tues, 8/15/23 at 12:30pm w/ Dr. Maryse Cee in [facility]. (Preferred CV office, and will be away on vacation, comes back 8/11/23.)    They have date/time/address of office.

## 2023-08-15 ENCOUNTER — OFFICE VISIT (OUTPATIENT)
Dept: NEUROLOGY | Facility: CLINIC | Age: 63
End: 2023-08-15
Payer: COMMERCIAL

## 2023-08-15 VITALS
SYSTOLIC BLOOD PRESSURE: 144 MMHG | WEIGHT: 187 LBS | DIASTOLIC BLOOD PRESSURE: 76 MMHG | OXYGEN SATURATION: 99 % | BODY MASS INDEX: 25.33 KG/M2 | RESPIRATION RATE: 20 BRPM | HEIGHT: 72 IN | HEART RATE: 89 BPM | TEMPERATURE: 97.7 F

## 2023-08-15 DIAGNOSIS — G62.9 PERIPHERAL NEUROPATHY: ICD-10-CM

## 2023-08-15 DIAGNOSIS — G25.0 TREMOR, ESSENTIAL: Primary | ICD-10-CM

## 2023-08-15 DIAGNOSIS — R26.2 AMBULATORY DYSFUNCTION: ICD-10-CM

## 2023-08-15 DIAGNOSIS — Z78.9 ALCOHOL USE: ICD-10-CM

## 2023-08-15 PROBLEM — F10.90 ALCOHOL USE: Status: ACTIVE | Noted: 2023-08-15

## 2023-08-15 PROCEDURE — 99205 OFFICE O/P NEW HI 60 MIN: CPT | Performed by: PSYCHIATRY & NEUROLOGY

## 2023-08-15 RX ORDER — PRIMIDONE 50 MG/1
TABLET ORAL
Qty: 60 TABLET | Refills: 3 | Status: SHIPPED | OUTPATIENT
Start: 2023-08-15

## 2023-08-15 NOTE — ASSESSMENT & PLAN NOTE
He has a history of hand tremor particularly when he tries to write or hold a cup. He gave me a writing sample today in which he does have a mild tremor on tracing swirls. He does not have micrographia. He does not have shuffling gait or any other features to suggest parkinsonism. His wife notes a voice tremor at times. He notices that when he drinks alcohol, the tremor dissipates. He most likely has essential tremor. Recommendations:  -Start primidone 50 mg tablet, half tablet twice daily for 5 days, followed by half tablet in the morning and 1 tablet in the evening for 5 days, followed by 1 tablet twice daily  -Advised to reduce alcohol consumption given addition of new medication  -I avoided topiramate as he is having some memory issues. His wife says he asks repetitive questions. This could also be related to underlying alcohol use.   -If primidone ineffective or not tolerable, we could consider asking cardiology if metoprolol could be switched to propranolol

## 2023-08-15 NOTE — PROGRESS NOTES
Assessment/Plan:     Tremor, essential  He has a history of hand tremor particularly when he tries to write or hold a cup. He gave me a writing sample today in which he does have a mild tremor on tracing swirls. He does not have micrographia. He does not have shuffling gait or any other features to suggest parkinsonism. His wife notes a voice tremor at times. He notices that when he drinks alcohol, the tremor dissipates. He most likely has essential tremor.     Recommendations:  -Start primidone 50 mg tablet, half tablet twice daily for 5 days, followed by half tablet in the morning and 1 tablet in the evening for 5 days, followed by 1 tablet twice daily  -Advised to reduce alcohol consumption given addition of new medication  -I avoided topiramate as he is having some memory issues. His wife says he asks repetitive questions. This could also be related to underlying alcohol use. -If primidone ineffective or not tolerable, we could consider asking cardiology if metoprolol could be switched to propranolol     Peripheral neuropathy  On exam, he has neuropathy involving both small and large fibers. This is most likely related to underlying diabetes. However, he admits to not eating well, does drink alcohol and could have a nutritional component.     Recommendations:  -Check EMG 1 upper and lower extremity to evaluate extent of neuropathy  -Check vitamin B12 and SPEP with immunofixation for completeness  -Advised to reduce alcohol consumption as excessive alcohol can worsen neuropathy     Alcohol use  He drinks daily. Advised that this will have an impact on both his memory and nerve function. Encouraged to taper.     Ambulatory dysfunction  He has significant difficulty walking likely related to underlying peripheral neuropathy. I offered PT. Patient is not inclined to try PT at this time due to his work schedule. He will reconsider after EMG completed.   Should they reconsider earlier and request a prescription for PT, they can certainly do so by portal.        Follow-up in 3 months or sooner if difficulties. I like to see him within the next 3 months to see his response to the primidone.     Total time 65 minutes including face-to-face time, review of previous data, on the same day of this visit.     Subjective:     Mr. Elvia Cruz is a 71-year-old right handed gentleman with PMH HTN, CAD s/p stent, HLD, diabetes, macular edema, HTN/DM retinopathy, alcohol use, who presents for new patient neurology consultation for frequent falls and tremor. He is here with his wife.     He developed symptoms of peripheral neuropathy in the last few years. He has had trouble walking and decreased balance. Feet feel heavy. Sometimes he has sharp shooting pain in the feet, despite feeling like his feet "are not there."     He had COVID in Dec 2021 and Feb 2023. He took Paxlovid the 2nd time. He did not need to be hospitalized. Vision worsened after having COVID. He overall feels worse since having had COVID.     He just returned from [place]. He rode a scooter while there. [facility] were at [facility]. For the first couple of days, he insisted on walking unassisted and then finally agreed to use the scooter.     He uses a cane on weekends. He does not use it at work. He is a  at a chemical plant.     He cannot walk stairs anymore. He gets very anxious when seeing stairs. He has fallen frequently. Last fall in June 2023. He was not using a cane at the time. Wife witnessed the fall in bedroom. He lost his balance. Sometimes the cats stop right in front of him and almost provoke a fall.      He is having trouble writing. His feels like his brain is giving a command but the hand is not responding. He has trouble writing numbers. Wine seems to help. No family history of tremor. Voice seems shaky. Head does not shake. He notes tremor of both hands, on holding a glass or typing.      He drives to work and home only. He has limited site on the R. He tells me ophthalmology cleared him to drive.     He drinks a lot of alcohol. His wife thinks it is to self medicate. He drinks 3 drinks per night.     He does not eat much. He is taking metformin only once in the morning. Per PCP last note, last HbA1c was 6.0.                    Objective:     Blood pressure 144/76, pulse 89, temperature 97.7 °F (36.5 °C), temperature source Temporal, resp. rate 20, height 5' 11.5" (1.816 m), weight 84.8 kg (187 lb), SpO2 99 %.     Physical Exam  Constitutional:       Appearance: Normal appearance. Eyes:      Comments: Right eye prosthesis   Neck:      Vascular: No carotid bruit. Cardiovascular:      Rate and Rhythm: Normal rate and regular rhythm. Pulmonary:      Effort: Pulmonary effort is normal.      Breath sounds: Normal breath sounds. Musculoskeletal:      Right lower leg: Edema present. Left lower leg: Edema present. Skin:     Comments: Clammy skin   Psychiatric:         Mood and Affect: Mood normal.            Neurological Exam     Cranial Nerves  CN II: Vision test: Right eye prosthesis. Mental status: Awake, alert, oriented to person place and time. Naming, knowledge, repetition, concentration and naming intact.     Cranial nerves: Extraocular movements intact. Pupils equally round and reactive to light. Visual fields full to confrontation. Facial sensation intact. Face symmetric. Hearing intact. Tongue, uvula, palate midline and intact. Sternocleidomastoid intact.     Motor:   Deltoid: Right 5-/5, left 5-5  Biceps: Right 5-/5, left 5-/5  Triceps: Right 5-/5, left 5-/5  : Right 4+/5, left 4+/5  Intrinsic hand muscles: Right 4/5, left 4/5     Iliopsoas: Right 5/5, left 4+/5  Quadriceps: Right 5/5, left 5/5  Tibialis anterior: Right 5-/5, left 5-/5  Medial gastrocnemius: Right 5-/5, left -5/5  Extensor hallucis longus: Right 4/5, left 4/5  Abductor hallucis: Right 5-/5, left 5-/5     Normal tone.   Rapid alternating movements intact. Tremor on finger-to-nose bilaterally. No resting tremor. No head tremor. No voice tremor.     Cerebellar: No dysmetria. Heel-to-shin intact. He walks with a cane. He has to look at the floor when he is walking.     Deep tendon reflexes absent. Plantar responses mute.     Sensory: Light touch intact. Vibration reduced to the ankle on the right and the shin on the left side. Position sense impaired. Romberg testing abnormal.     Light touch, temperature, vibration sensation intact. Romberg negative.        ROS:     Review of Systems   Constitutional: Positive for fatigue (always). Negative for appetite change and fever. HENT: Negative. Negative for hearing loss, tinnitus, trouble swallowing and voice change. 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 Negative for photophobia and pain. Respiratory: Negative. Negative for shortness of breath. Cardiovascular: Negative. Negative for palpitations. Gastrointestinal: Negative. Negative for nausea and vomiting. Endocrine: Negative. Negative for cold intolerance. Genitourinary: Negative. Negative for dysuria, frequency and urgency. Musculoskeletal: Positive for back pain (mid back tylenol helps a little ) and gait problem (poor balance ). Negative for myalgias and neck pain. Skin: Negative. Negative for rash. Allergic/Immunologic: Negative. Neurological: Positive for dizziness, tremors (hands, sometimes on right foot), weakness (legs), light-headedness and numbness (feet with pain). Negative for seizures, syncope, facial asymmetry, speech difficulty and headaches. Hematological: Negative. Does not bruise/bleed easily. Psychiatric/Behavioral: Negative for confusion, hallucinations and sleep disturbance.    All other systems reviewed and are negative.        DATA:  6/9/2022  Hemoglobin 14.3  Platelets 083 BUN 10  Creatinine 0.88  Glucose 168  AST 81  ALT 45   cholesterol 253  Triglycerides 130      PSA 1.7

## 2023-08-15 NOTE — PROGRESS NOTES
Patient ID: Damian Carpio is a 65 y.o. male. Assessment/Plan:    Tremor, essential  He has a history of hand tremor particularly when he tries to write or hold a cup. He gave me a writing sample today in which he does have a mild tremor on tracing swirls. He does not have micrographia. He does not have shuffling gait or any other features to suggest parkinsonism. His wife notes a voice tremor at times. He notices that when he drinks alcohol, the tremor dissipates. He most likely has essential tremor. Recommendations:  -Start primidone 50 mg tablet, half tablet twice daily for 5 days, followed by half tablet in the morning and 1 tablet in the evening for 5 days, followed by 1 tablet twice daily  -Advised to reduce alcohol consumption given addition of new medication  -I avoided topiramate as he is having some memory issues. His wife says he asks repetitive questions. This could also be related to underlying alcohol use. -If primidone ineffective or not tolerable, we could consider asking cardiology if metoprolol could be switched to propranolol    Peripheral neuropathy  On exam, he has neuropathy involving both small and large fibers. This is most likely related to underlying diabetes. However, he admits to not eating well, does drink alcohol and could have a nutritional component. Recommendations:  -Check EMG 1 upper and lower extremity to evaluate extent of neuropathy  -Check vitamin B12 and SPEP with immunofixation for completeness  -Advised to reduce alcohol consumption as excessive alcohol can worsen neuropathy    Alcohol use  He drinks daily. Advised that this will have an impact on both his memory and nerve function. Encouraged to taper. Ambulatory dysfunction  He has significant difficulty walking likely related to underlying peripheral neuropathy. I offered PT. Patient is not inclined to try PT at this time due to his work schedule. He will reconsider after EMG completed. Should they reconsider earlier and request a prescription for PT, they can certainly do so by portal.      Follow-up in 3 months or sooner if difficulties. I like to see him within the next 3 months to see his response to the primidone. Total time 65 minutes including face-to-face time, review of previous data, on the same day of this visit. Subjective:    Mr. Venkata Adame is a 60-year-old right handed gentleman with PMH HTN, CAD s/p stent, HLD, diabetes, macular edema, HTN/DM retinopathy, alcohol use, who presents for new patient neurology consultation for frequent falls and tremor. He is here with his wife. He developed symptoms of peripheral neuropathy in the last few years. He has had trouble walking and decreased balance. Feet feel heavy. Sometimes he has sharp shooting pain in the feet, despite feeling like his feet "are not there."    He had COVID in Dec 2021 and Feb 2023. He took Paxlovid the 2nd time. He did not need to be hospitalized. Vision worsened after having COVID. He overall feels worse since having had COVID. He just returned from [place]. He rode a scooter while there. They were at [facility]. For the first couple of days, he insisted on walking unassisted and then finally agreed to use the scooter. He uses a cane on weekends. He does not use it at work. He is a  at a chemical plant. He cannot walk stairs anymore. He gets very anxious when seeing stairs. He has fallen frequently. Last fall in June 2023. He was not using a cane at the time. Wife witnessed the fall in bedroom. He lost his balance. Sometimes the cats stop right in front of him and almost provoke a fall. He is having trouble writing. His feels like his brain is giving a command but the hand is not responding. He has trouble writing numbers. Wine seems to help. No family history of tremor. Voice seems shaky. Head does not shake. He notes tremor of both hands, on holding a glass or typing. He drives to work and home only. He has limited site on the [employer]. He tells me ophthalmology cleared him to drive. He drinks a lot of alcohol. His wife thinks it is to self medicate. He drinks 3 drinks per night. He does not eat much. He is taking metformin only once in the morning. Per PCP last note, last HbA1c was 6.0. Objective:    Blood pressure 144/76, pulse 89, temperature 97.7 °F (36.5 °C), temperature source Temporal, resp. rate 20, height 5' 11.5" (1.816 m), weight 84.8 kg (187 lb), SpO2 99 %. Physical Exam  Constitutional:       Appearance: Normal appearance. Eyes:      Comments: Right eye prosthesis   Neck:      Vascular: No carotid bruit. Cardiovascular:      Rate and Rhythm: Normal rate and regular rhythm. Pulmonary:      Effort: Pulmonary effort is normal.      Breath sounds: Normal breath sounds. Musculoskeletal:      Right lower leg: Edema present. Left lower leg: Edema present. Skin:     Comments: Clammy skin   Psychiatric:         Mood and Affect: Mood normal.         Neurological Exam    Cranial Nerves  CN II: Vision test: Right eye prosthesis. Mental status: Awake, alert, oriented to person place and time. Naming, knowledge, repetition, concentration and naming intact. Cranial nerves: Extraocular movements intact. Pupils equally round and reactive to light. Visual fields full to confrontation. Facial sensation intact. Face symmetric. Hearing intact. Tongue, uvula, palate midline and intact. Sternocleidomastoid intact.     Motor:   Deltoid: Right 5-/5, left 5-5  Biceps: Right 5-/5, left 5-/5  Triceps: Right 5-/5, left 5-/5  : Right 4+/5, left 4+/5  Intrinsic hand muscles: Right 4/5, left 4/5    Iliopsoas: Right 5/5, left 4+/5  Quadriceps: Right 5/5, left 5/5  Tibialis anterior: Right 5-/5, left 5-/5  Medial gastrocnemius: Right 5-/5, left -5/5  Extensor hallucis longus: Right 4/5, left 4/5  Abductor hallucis: Right 5-/5, left 5-/5    Normal tone. Rapid alternating movements intact. Tremor on finger-to-nose bilaterally. No resting tremor. No head tremor. No voice tremor. Cerebellar: No dysmetria. Heel-to-shin intact. He walks with a cane. He has to look at the floor when he is walking. Deep tendon reflexes absent. Plantar responses mute. Sensory: Light touch intact. Vibration reduced to the ankle on the right and the shin on the left side. Position sense impaired. Romberg testing abnormal.    Light touch, temperature, vibration sensation intact. Romberg negative. ROS:    Review of Systems   Constitutional: Positive for fatigue (always). Negative for appetite change and fever. HENT: Negative. Negative for hearing loss, tinnitus, trouble swallowing and voice change. 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 Negative for photophobia and pain. Respiratory: Negative. Negative for shortness of breath. Cardiovascular: Negative. Negative for palpitations. Gastrointestinal: Negative. Negative for nausea and vomiting. Endocrine: Negative. Negative for cold intolerance. Genitourinary: Negative. Negative for dysuria, frequency and urgency. Musculoskeletal: Positive for back pain (mid back tylenol helps a little ) and gait problem (poor balance ). Negative for myalgias and neck pain. Skin: Negative. Negative for rash. Allergic/Immunologic: Negative. Neurological: Positive for dizziness, tremors (hands, sometimes on right foot), weakness (legs), light-headedness and numbness (feet with pain). Negative for seizures, syncope, facial asymmetry, speech difficulty and headaches. Hematological: Negative. Does not bruise/bleed easily. Psychiatric/Behavioral: Negative for confusion, hallucinations and sleep disturbance. All other systems reviewed and are negative.       DATA:  6/9/2022  Hemoglobin 14.3  Platelets 760 BUN 10  Creatinine 0.88  Glucose 168  AST 81  ALT 45 cholesterol 253  Triglycerides 130      PSA 1.7

## 2023-08-15 NOTE — ASSESSMENT & PLAN NOTE
On exam, he has neuropathy involving both small and large fibers. This is most likely related to underlying diabetes. However, he admits to not eating well, does drink alcohol and could have a nutritional component.     Recommendations:  -Check EMG 1 upper and lower extremity to evaluate extent of neuropathy  -Check vitamin B12 and SPEP with immunofixation for completeness  -Advised to reduce alcohol consumption as excessive alcohol can worsen neuropathy

## 2023-08-15 NOTE — ASSESSMENT & PLAN NOTE
He has significant difficulty walking likely related to underlying peripheral neuropathy. I offered PT. Patient is not inclined to try PT at this time due to his work schedule. He will reconsider after EMG completed.   Should they reconsider earlier and request a prescription for PT, they can certainly do so by portal.

## 2023-08-15 NOTE — ASSESSMENT & PLAN NOTE
He drinks daily. Advised that this will have an impact on both his memory and nerve function. Encouraged to taper.

## 2023-08-16 ENCOUNTER — TELEPHONE (OUTPATIENT)
Dept: NEUROLOGY | Facility: CLINIC | Age: 63
End: 2023-08-16

## 2023-08-16 NOTE — TELEPHONE ENCOUNTER
Pharmacy calling for clarification on primidone script. Directions currently read: 1/2 tab oral twice a day for 5 days, then 1/2 tab in AM and 1/2 tab in PM x 5 days, then 1 tab twice a day continously      Per office note, should read: Start primidone 50 mg tablet, half tablet twice daily for 5 days, followed by half tablet in the morning and 1 tablet in the evening for 5 days, followed by 1 tablet twice daily. Call returned to pharmacy. Clarification provided. Nothing further needed at this time.

## 2023-09-01 ENCOUNTER — APPOINTMENT (EMERGENCY)
Dept: RADIOLOGY | Facility: HOSPITAL | Age: 63
DRG: 312 | End: 2023-09-01
Payer: COMMERCIAL

## 2023-09-01 ENCOUNTER — APPOINTMENT (OUTPATIENT)
Dept: NON INVASIVE DIAGNOSTICS | Facility: HOSPITAL | Age: 63
DRG: 312 | End: 2023-09-01
Payer: COMMERCIAL

## 2023-09-01 ENCOUNTER — APPOINTMENT (OUTPATIENT)
Dept: CT IMAGING | Facility: HOSPITAL | Age: 63
DRG: 312 | End: 2023-09-01
Payer: COMMERCIAL

## 2023-09-01 ENCOUNTER — APPOINTMENT (EMERGENCY)
Dept: CT IMAGING | Facility: HOSPITAL | Age: 63
DRG: 312 | End: 2023-09-01
Payer: COMMERCIAL

## 2023-09-01 ENCOUNTER — HOSPITAL ENCOUNTER (INPATIENT)
Facility: HOSPITAL | Age: 63
LOS: 1 days | Discharge: HOME | DRG: 312 | End: 2023-09-04
Attending: SURGERY | Admitting: INTERNAL MEDICINE
Payer: COMMERCIAL

## 2023-09-01 DIAGNOSIS — G25.0 ESSENTIAL TREMOR: ICD-10-CM

## 2023-09-01 DIAGNOSIS — R55 SYNCOPE AND COLLAPSE: Primary | ICD-10-CM

## 2023-09-01 DIAGNOSIS — E53.8 VITAMIN B 12 DEFICIENCY: ICD-10-CM

## 2023-09-01 DIAGNOSIS — F10.20 ALCOHOL DEPENDENCE (HCC): ICD-10-CM

## 2023-09-01 DIAGNOSIS — R07.81 RIB PAIN ON LEFT SIDE: ICD-10-CM

## 2023-09-01 DIAGNOSIS — M54.9 OTHER CHRONIC BACK PAIN: ICD-10-CM
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
## 2023-09-01 DIAGNOSIS — S00.83XA FACIAL CONTUSION: ICD-10-CM

## 2023-09-01 DIAGNOSIS — G89.29 OTHER CHRONIC BACK PAIN: ICD-10-CM

## 2023-09-01 PROBLEM — I10 PRIMARY HYPERTENSION: Status: ACTIVE | Noted: 2023-09-01

## 2023-09-01 PROBLEM — E11.42 TYPE 2 DIABETES MELLITUS WITH DIABETIC POLYNEUROPATHY, WITHOUT LONG-TERM CURRENT USE OF INSULIN (HCC): Status: ACTIVE | Noted: 2023-09-01

## 2023-09-01 PROBLEM — I25.10 ATHEROSCLEROSIS OF NATIVE CORONARY ARTERY OF NATIVE HEART WITHOUT ANGINA PECTORIS: Status: ACTIVE | Noted: 2023-09-01

## 2023-09-01 LAB
2HR DELTA HS TROPONIN: 0 NG/L
2HR DELTA HS TROPONIN: 0 NG/L
4HR DELTA HS TROPONIN: 1 NG/L
4HR DELTA HS TROPONIN: 1 NG/L
ALBUMIN SERPL BCP-MCNC: 3.7 G/DL (ref 3.5–5)
ALBUMIN SERPL BCP-MCNC: 3.7 G/DL (ref 3.5–5)
ALP SERPL-CCNC: 68 U/L (ref 34–104)
ALP SERPL-CCNC: 68 U/L (ref 34–104)
ALT SERPL W P-5'-P-CCNC: 25 U/L (ref 7–52)
ALT SERPL W P-5'-P-CCNC: 25 U/L (ref 7–52)
ANION GAP SERPL CALCULATED.3IONS-SCNC: 13 MMOL/L
APTT PPP: 26 SECONDS (ref 23–37)
APTT PPP: 26 SECONDS (ref 23–37)
AST SERPL W P-5'-P-CCNC: 33 U/L (ref 13–39)
AST SERPL W P-5'-P-CCNC: 33 U/L (ref 13–39)
BASE EXCESS BLDA CALC-SCNC: -8 MMOL/L (ref -2–3)
BASE EXCESS BLDA CALC-SCNC: -8 MMOL/L (ref -2–3)
BASOPHILS # BLD AUTO: 0.02 THOUSANDS/ÂΜL (ref 0–0.1)
BASOPHILS NFR BLD AUTO: 0 % (ref 0–1)
BILIRUB SERPL-MCNC: 0.44 MG/DL (ref 0.2–1)
BILIRUB SERPL-MCNC: 0.44 MG/DL (ref 0.2–1)
BNP SERPL-MCNC: 14 PG/ML (ref 0–100)
BNP SERPL-MCNC: 14 PG/ML (ref 0–100)
BUN SERPL-MCNC: 15 MG/DL (ref 5–25)
BUN SERPL-MCNC: 15 MG/DL (ref 5–25)
BUN SERPL-MCNC: 17 MG/DL (ref 5–25)
BUN SERPL-MCNC: 17 MG/DL (ref 5–25)
CA-I BLD-SCNC: 1.19 MMOL/L (ref 1.12–1.32)
CA-I BLD-SCNC: 1.19 MMOL/L (ref 1.12–1.32)
CALCIUM SERPL-MCNC: 8.9 MG/DL (ref 8.4–10.2)
CARDIAC TROPONIN I PNL SERPL HS: 2 NG/L
CARDIAC TROPONIN I PNL SERPL HS: 3 NG/L
CARDIAC TROPONIN I PNL SERPL HS: 3 NG/L
CHLORIDE SERPL-SCNC: 106 MMOL/L (ref 96–108)
CHLORIDE SERPL-SCNC: 106 MMOL/L (ref 96–108)
CHLORIDE SERPL-SCNC: 107 MMOL/L (ref 96–108)
CHLORIDE SERPL-SCNC: 107 MMOL/L (ref 96–108)
CHOLEST SERPL-MCNC: 187 MG/DL
CHOLEST SERPL-MCNC: 187 MG/DL
CK SERPL-CCNC: 59 U/L (ref 39–308)
CK SERPL-CCNC: 59 U/L (ref 39–308)
CO2 SERPL-SCNC: 16 MMOL/L (ref 21–32)
CO2 SERPL-SCNC: 16 MMOL/L (ref 21–32)
CO2 SERPL-SCNC: 17 MMOL/L (ref 21–32)
CO2 SERPL-SCNC: 17 MMOL/L (ref 21–32)
CREAT SERPL-MCNC: 0.97 MG/DL (ref 0.6–1.3)
CREAT SERPL-MCNC: 0.97 MG/DL (ref 0.6–1.3)
CREAT SERPL-MCNC: 1.08 MG/DL (ref 0.6–1.3)
CREAT SERPL-MCNC: 1.08 MG/DL (ref 0.6–1.3)
E WAVE DECELERATION TIME: 104 MS
E WAVE DECELERATION TIME: 104 MS
EOSINOPHIL # BLD AUTO: 0.04 THOUSAND/ÂΜL (ref 0–0.61)
EOSINOPHIL # BLD AUTO: 0.04 THOUSAND/ÂΜL (ref 0–0.61)
EOSINOPHIL # BLD AUTO: 0.05 THOUSAND/ÂΜL (ref 0–0.61)
EOSINOPHIL # BLD AUTO: 0.05 THOUSAND/ÂΜL (ref 0–0.61)
EOSINOPHIL NFR BLD AUTO: 1 % (ref 0–6)
ERYTHROCYTE [DISTWIDTH] IN BLOOD BY AUTOMATED COUNT: 12.8 % (ref 11.6–15.1)
ERYTHROCYTE [DISTWIDTH] IN BLOOD BY AUTOMATED COUNT: 12.8 % (ref 11.6–15.1)
ERYTHROCYTE [DISTWIDTH] IN BLOOD BY AUTOMATED COUNT: 12.9 % (ref 11.6–15.1)
ERYTHROCYTE [DISTWIDTH] IN BLOOD BY AUTOMATED COUNT: 12.9 % (ref 11.6–15.1)
FRACTIONAL SHORTENING: 32 (ref 28–44)
FRACTIONAL SHORTENING: 32 (ref 28–44)
GFR SERPL CREATININE-BSD FRML MDRD: 73 ML/MIN/1.73SQ M
GFR SERPL CREATININE-BSD FRML MDRD: 73 ML/MIN/1.73SQ M
GFR SERPL CREATININE-BSD FRML MDRD: 83 ML/MIN/1.73SQ M
GFR SERPL CREATININE-BSD FRML MDRD: 83 ML/MIN/1.73SQ M
GLUCOSE SERPL-MCNC: 142 MG/DL (ref 65–140)
GLUCOSE SERPL-MCNC: 142 MG/DL (ref 65–140)
GLUCOSE SERPL-MCNC: 143 MG/DL (ref 65–140)
GLUCOSE SERPL-MCNC: 145 MG/DL (ref 65–140)
GLUCOSE SERPL-MCNC: 226 MG/DL (ref 65–140)
GLUCOSE SERPL-MCNC: 226 MG/DL (ref 65–140)
HCO3 BLDA-SCNC: 16 MMOL/L (ref 24–30)
HCO3 BLDA-SCNC: 16 MMOL/L (ref 24–30)
HCT VFR BLD AUTO: 34.7 % (ref 36.5–49.3)
HCT VFR BLD AUTO: 34.7 % (ref 36.5–49.3)
HCT VFR BLD AUTO: 37.2 % (ref 36.5–49.3)
HCT VFR BLD AUTO: 37.2 % (ref 36.5–49.3)
HCT VFR BLD CALC: 34 % (ref 36.5–49.3)
HCT VFR BLD CALC: 34 % (ref 36.5–49.3)
HDLC SERPL-MCNC: 84 MG/DL
HDLC SERPL-MCNC: 84 MG/DL
HGB BLD-MCNC: 11.8 G/DL (ref 12–17)
HGB BLD-MCNC: 11.8 G/DL (ref 12–17)
HGB BLD-MCNC: 12.4 G/DL (ref 12–17)
HGB BLD-MCNC: 12.4 G/DL (ref 12–17)
HGB BLDA-MCNC: 11.6 G/DL (ref 12–17)
HGB BLDA-MCNC: 11.6 G/DL (ref 12–17)
IMM GRANULOCYTES # BLD AUTO: 0.04 THOUSAND/UL (ref 0–0.2)
IMM GRANULOCYTES # BLD AUTO: 0.04 THOUSAND/UL (ref 0–0.2)
IMM GRANULOCYTES # BLD AUTO: 0.06 THOUSAND/UL (ref 0–0.2)
IMM GRANULOCYTES # BLD AUTO: 0.06 THOUSAND/UL (ref 0–0.2)
IMM GRANULOCYTES NFR BLD AUTO: 1 % (ref 0–2)
INR PPP: 0.93 (ref 0.84–1.19)
INR PPP: 0.93 (ref 0.84–1.19)
INTERVENTRICULAR SEPTUM IN DIASTOLE (PARASTERNAL SHORT AXIS VIEW): 1.2 CM
INTERVENTRICULAR SEPTUM IN DIASTOLE (PARASTERNAL SHORT AXIS VIEW): 1.2 CM
INTERVENTRICULAR SEPTUM: 1.2 CM (ref 0.6–1.1)
INTERVENTRICULAR SEPTUM: 1.2 CM (ref 0.6–1.1)
LAAS-AP2: 17.3 CM2
LAAS-AP2: 17.3 CM2
LAAS-AP4: 14.8 CM2
LAAS-AP4: 14.8 CM2
LDLC SERPL CALC-MCNC: 84 MG/DL (ref 0–100)
LDLC SERPL CALC-MCNC: 84 MG/DL (ref 0–100)
LEFT ATRIUM SIZE: 3.2 CM
LEFT ATRIUM SIZE: 3.2 CM
LEFT ATRIUM VOLUME (MOD BIPLANE): 40 ML
LEFT ATRIUM VOLUME (MOD BIPLANE): 40 ML
LEFT INTERNAL DIMENSION IN SYSTOLE: 1.7 CM (ref 2.1–4)
LEFT INTERNAL DIMENSION IN SYSTOLE: 1.7 CM (ref 2.1–4)
LEFT VENTRICULAR INTERNAL DIMENSION IN DIASTOLE: 2.5 CM (ref 3.5–6)
LEFT VENTRICULAR INTERNAL DIMENSION IN DIASTOLE: 2.5 CM (ref 3.5–6)
LEFT VENTRICULAR POSTERIOR WALL IN END DIASTOLE: 1.2 CM
LEFT VENTRICULAR POSTERIOR WALL IN END DIASTOLE: 1.2 CM
LEFT VENTRICULAR STROKE VOLUME: 14 ML
LEFT VENTRICULAR STROKE VOLUME: 14 ML
LVSV (TEICH): 14 ML
LVSV (TEICH): 14 ML
LYMPHOCYTES # BLD AUTO: 1.47 THOUSANDS/ÂΜL (ref 0.6–4.47)
LYMPHOCYTES # BLD AUTO: 1.47 THOUSANDS/ÂΜL (ref 0.6–4.47)
LYMPHOCYTES # BLD AUTO: 1.69 THOUSANDS/ÂΜL (ref 0.6–4.47)
LYMPHOCYTES # BLD AUTO: 1.69 THOUSANDS/ÂΜL (ref 0.6–4.47)
LYMPHOCYTES NFR BLD AUTO: 24 % (ref 14–44)
LYMPHOCYTES NFR BLD AUTO: 24 % (ref 14–44)
LYMPHOCYTES NFR BLD AUTO: 31 % (ref 14–44)
LYMPHOCYTES NFR BLD AUTO: 31 % (ref 14–44)
MAGNESIUM SERPL-MCNC: 1.6 MG/DL (ref 1.9–2.7)
MAGNESIUM SERPL-MCNC: 1.6 MG/DL (ref 1.9–2.7)
MAGNESIUM SERPL-MCNC: 1.8 MG/DL (ref 1.9–2.7)
MCH RBC QN AUTO: 35.1 PG (ref 26.8–34.3)
MCH RBC QN AUTO: 35.1 PG (ref 26.8–34.3)
MCH RBC QN AUTO: 35.6 PG (ref 26.8–34.3)
MCH RBC QN AUTO: 35.6 PG (ref 26.8–34.3)
MCHC RBC AUTO-ENTMCNC: 33.3 G/DL (ref 31.4–37.4)
MCHC RBC AUTO-ENTMCNC: 33.3 G/DL (ref 31.4–37.4)
MCHC RBC AUTO-ENTMCNC: 34 G/DL (ref 31.4–37.4)
MCHC RBC AUTO-ENTMCNC: 34 G/DL (ref 31.4–37.4)
MCV RBC AUTO: 103 FL (ref 82–98)
MCV RBC AUTO: 103 FL (ref 82–98)
MCV RBC AUTO: 107 FL (ref 82–98)
MCV RBC AUTO: 107 FL (ref 82–98)
MONOCYTES # BLD AUTO: 0.72 THOUSAND/ÂΜL (ref 0.17–1.22)
MONOCYTES # BLD AUTO: 0.72 THOUSAND/ÂΜL (ref 0.17–1.22)
MONOCYTES # BLD AUTO: 0.73 THOUSAND/ÂΜL (ref 0.17–1.22)
MONOCYTES # BLD AUTO: 0.73 THOUSAND/ÂΜL (ref 0.17–1.22)
MONOCYTES NFR BLD AUTO: 12 % (ref 4–12)
MONOCYTES NFR BLD AUTO: 12 % (ref 4–12)
MONOCYTES NFR BLD AUTO: 13 % (ref 4–12)
MONOCYTES NFR BLD AUTO: 13 % (ref 4–12)
MV PEAK A VEL: 0.84 M/S
MV PEAK A VEL: 0.84 M/S
MV PEAK E VEL: 45 CM/S
MV PEAK E VEL: 45 CM/S
MV STENOSIS PRESSURE HALF TIME: 30 MS
MV STENOSIS PRESSURE HALF TIME: 30 MS
MV VALVE AREA P 1/2 METHOD: 7.33
MV VALVE AREA P 1/2 METHOD: 7.33
NEUTROPHILS # BLD AUTO: 2.98 THOUSANDS/ÂΜL (ref 1.85–7.62)
NEUTROPHILS # BLD AUTO: 2.98 THOUSANDS/ÂΜL (ref 1.85–7.62)
NEUTROPHILS # BLD AUTO: 3.73 THOUSANDS/ÂΜL (ref 1.85–7.62)
NEUTROPHILS # BLD AUTO: 3.73 THOUSANDS/ÂΜL (ref 1.85–7.62)
NEUTS SEG NFR BLD AUTO: 54 % (ref 43–75)
NEUTS SEG NFR BLD AUTO: 54 % (ref 43–75)
NEUTS SEG NFR BLD AUTO: 62 % (ref 43–75)
NEUTS SEG NFR BLD AUTO: 62 % (ref 43–75)
NONHDLC SERPL-MCNC: 103 MG/DL
NONHDLC SERPL-MCNC: 103 MG/DL
NRBC BLD AUTO-RTO: 0 /100 WBCS
PCO2 BLD: 17 MMOL/L (ref 21–32)
PCO2 BLD: 17 MMOL/L (ref 21–32)
PCO2 BLD: 28.9 MM HG (ref 42–50)
PCO2 BLD: 28.9 MM HG (ref 42–50)
PH BLD: 7.35 [PH] (ref 7.3–7.4)
PH BLD: 7.35 [PH] (ref 7.3–7.4)
PLATELET # BLD AUTO: 109 THOUSANDS/UL (ref 149–390)
PLATELET # BLD AUTO: 109 THOUSANDS/UL (ref 149–390)
PLATELET # BLD AUTO: 114 THOUSANDS/UL (ref 149–390)
PLATELET # BLD AUTO: 114 THOUSANDS/UL (ref 149–390)
PMV BLD AUTO: 8.9 FL (ref 8.9–12.7)
PMV BLD AUTO: 8.9 FL (ref 8.9–12.7)
PMV BLD AUTO: 9.3 FL (ref 8.9–12.7)
PMV BLD AUTO: 9.3 FL (ref 8.9–12.7)
PO2 BLD: 36 MM HG (ref 35–45)
PO2 BLD: 36 MM HG (ref 35–45)
POTASSIUM BLD-SCNC: 4.8 MMOL/L (ref 3.5–5.3)
POTASSIUM BLD-SCNC: 4.8 MMOL/L (ref 3.5–5.3)
POTASSIUM SERPL-SCNC: 4.8 MMOL/L (ref 3.5–5.3)
POTASSIUM SERPL-SCNC: 4.8 MMOL/L (ref 3.5–5.3)
POTASSIUM SERPL-SCNC: 5 MMOL/L (ref 3.5–5.3)
POTASSIUM SERPL-SCNC: 5 MMOL/L (ref 3.5–5.3)
POTASSIUM SERPL-SCNC: 5.8 MMOL/L (ref 3.5–5.3)
POTASSIUM SERPL-SCNC: 5.8 MMOL/L (ref 3.5–5.3)
PROT SERPL-MCNC: 6.3 G/DL (ref 6.4–8.4)
PROT SERPL-MCNC: 6.3 G/DL (ref 6.4–8.4)
PROTHROMBIN TIME: 13 SECONDS (ref 11.6–14.5)
PROTHROMBIN TIME: 13 SECONDS (ref 11.6–14.5)
RBC # BLD AUTO: 3.36 MILLION/UL (ref 3.88–5.62)
RBC # BLD AUTO: 3.36 MILLION/UL (ref 3.88–5.62)
RBC # BLD AUTO: 3.48 MILLION/UL (ref 3.88–5.62)
RBC # BLD AUTO: 3.48 MILLION/UL (ref 3.88–5.62)
RIGHT ATRIUM AREA SYSTOLE A4C: 11.8 CM2
RIGHT ATRIUM AREA SYSTOLE A4C: 11.8 CM2
RIGHT VENTRICLE ID DIMENSION: 2.7 CM
RIGHT VENTRICLE ID DIMENSION: 2.7 CM
SAO2 % BLD FROM PO2: 67 % (ref 60–85)
SAO2 % BLD FROM PO2: 67 % (ref 60–85)
SL CV LEFT ATRIUM LENGTH A2C: 5.8 CM
SL CV LEFT ATRIUM LENGTH A2C: 5.8 CM
SL CV LV EF: 65
SL CV LV EF: 65
SL CV PED ECHO LEFT VENTRICLE DIASTOLIC VOLUME (MOD BIPLANE) 2D: 22 ML
SL CV PED ECHO LEFT VENTRICLE DIASTOLIC VOLUME (MOD BIPLANE) 2D: 22 ML
SL CV PED ECHO LEFT VENTRICLE SYSTOLIC VOLUME (MOD BIPLANE) 2D: 9 ML
SL CV PED ECHO LEFT VENTRICLE SYSTOLIC VOLUME (MOD BIPLANE) 2D: 9 ML
SODIUM BLD-SCNC: 136 MMOL/L (ref 136–145)
SODIUM BLD-SCNC: 136 MMOL/L (ref 136–145)
SODIUM SERPL-SCNC: 136 MMOL/L (ref 135–147)
SPECIMEN SOURCE: ABNORMAL
SPECIMEN SOURCE: ABNORMAL
TRICUSPID ANNULAR PLANE SYSTOLIC EXCURSION: 2 CM
TRICUSPID ANNULAR PLANE SYSTOLIC EXCURSION: 2 CM
TRIGL SERPL-MCNC: 97 MG/DL
TRIGL SERPL-MCNC: 97 MG/DL
TSH SERPL DL<=0.05 MIU/L-ACNC: 0.92 UIU/ML (ref 0.45–4.5)
TSH SERPL DL<=0.05 MIU/L-ACNC: 0.92 UIU/ML (ref 0.45–4.5)
WBC # BLD AUTO: 5.51 THOUSAND/UL (ref 4.31–10.16)
WBC # BLD AUTO: 5.51 THOUSAND/UL (ref 4.31–10.16)
WBC # BLD AUTO: 6.04 THOUSAND/UL (ref 4.31–10.16)
WBC # BLD AUTO: 6.04 THOUSAND/UL (ref 4.31–10.16)

## 2023-09-01 PROCEDURE — 99223 1ST HOSP IP/OBS HIGH 75: CPT | Performed by: INTERNAL MEDICINE

## 2023-09-01 PROCEDURE — 72125 CT NECK SPINE W/O DYE: CPT

## 2023-09-01 PROCEDURE — 82330 ASSAY OF CALCIUM: CPT

## 2023-09-01 PROCEDURE — 71045 X-RAY EXAM CHEST 1 VIEW: CPT

## 2023-09-01 PROCEDURE — 82948 REAGENT STRIP/BLOOD GLUCOSE: CPT

## 2023-09-01 PROCEDURE — 80061 LIPID PANEL: CPT

## 2023-09-01 PROCEDURE — G1004 CDSM NDSC: HCPCS

## 2023-09-01 PROCEDURE — 90471 IMMUNIZATION ADMIN: CPT

## 2023-09-01 PROCEDURE — 93306 TTE W/DOPPLER COMPLETE: CPT

## 2023-09-01 PROCEDURE — 83735 ASSAY OF MAGNESIUM: CPT

## 2023-09-01 PROCEDURE — 93005 ELECTROCARDIOGRAM TRACING: CPT

## 2023-09-01 PROCEDURE — 84132 ASSAY OF SERUM POTASSIUM: CPT

## 2023-09-01 PROCEDURE — 97163 PT EVAL HIGH COMPLEX 45 MIN: CPT

## 2023-09-01 PROCEDURE — 85025 COMPLETE CBC W/AUTO DIFF WBC: CPT

## 2023-09-01 PROCEDURE — 84484 ASSAY OF TROPONIN QUANT: CPT

## 2023-09-01 PROCEDURE — 93308 TTE F-UP OR LMTD: CPT | Performed by: NURSE PRACTITIONER

## 2023-09-01 PROCEDURE — NC001 PR NO CHARGE: Performed by: NURSE PRACTITIONER

## 2023-09-01 PROCEDURE — 36415 COLL VENOUS BLD VENIPUNCTURE: CPT | Performed by: SURGERY

## 2023-09-01 PROCEDURE — 99285 EMERGENCY DEPT VISIT HI MDM: CPT

## 2023-09-01 PROCEDURE — 71250 CT THORAX DX C-: CPT

## 2023-09-01 PROCEDURE — 97167 OT EVAL HIGH COMPLEX 60 MIN: CPT

## 2023-09-01 PROCEDURE — EDAIR PR ED AIR: Performed by: EMERGENCY MEDICINE

## 2023-09-01 PROCEDURE — 74176 CT ABD & PELVIS W/O CONTRAST: CPT

## 2023-09-01 PROCEDURE — 84484 ASSAY OF TROPONIN QUANT: CPT | Performed by: SURGERY

## 2023-09-01 PROCEDURE — 80053 COMPREHEN METABOLIC PANEL: CPT | Performed by: SURGERY

## 2023-09-01 PROCEDURE — 94664 DEMO&/EVAL PT USE INHALER: CPT

## 2023-09-01 PROCEDURE — 83036 HEMOGLOBIN GLYCOSYLATED A1C: CPT

## 2023-09-01 PROCEDURE — 99205 OFFICE O/P NEW HI 60 MIN: CPT | Performed by: SURGERY

## 2023-09-01 PROCEDURE — 83880 ASSAY OF NATRIURETIC PEPTIDE: CPT

## 2023-09-01 PROCEDURE — 82947 ASSAY GLUCOSE BLOOD QUANT: CPT

## 2023-09-01 PROCEDURE — 90715 TDAP VACCINE 7 YRS/> IM: CPT | Performed by: SURGERY

## 2023-09-01 PROCEDURE — 84443 ASSAY THYROID STIM HORMONE: CPT

## 2023-09-01 PROCEDURE — 80048 BASIC METABOLIC PNL TOTAL CA: CPT

## 2023-09-01 PROCEDURE — 85025 COMPLETE CBC W/AUTO DIFF WBC: CPT | Performed by: SURGERY

## 2023-09-01 PROCEDURE — 85610 PROTHROMBIN TIME: CPT | Performed by: SURGERY

## 2023-09-01 PROCEDURE — 82550 ASSAY OF CK (CPK): CPT

## 2023-09-01 PROCEDURE — 82803 BLOOD GASES ANY COMBINATION: CPT

## 2023-09-01 PROCEDURE — 84295 ASSAY OF SERUM SODIUM: CPT

## 2023-09-01 PROCEDURE — 93306 TTE W/DOPPLER COMPLETE: CPT | Performed by: INTERNAL MEDICINE

## 2023-09-01 PROCEDURE — 85014 HEMATOCRIT: CPT

## 2023-09-01 PROCEDURE — 70450 CT HEAD/BRAIN W/O DYE: CPT

## 2023-09-01 PROCEDURE — 76705 ECHO EXAM OF ABDOMEN: CPT | Performed by: NURSE PRACTITIONER

## 2023-09-01 PROCEDURE — 85730 THROMBOPLASTIN TIME PARTIAL: CPT | Performed by: SURGERY

## 2023-09-01 RX ORDER — LISINOPRIL 20 MG/1
20 TABLET ORAL DAILY
COMMUNITY

## 2023-09-01 RX ORDER — METOPROLOL SUCCINATE 50 MG/1
50 TABLET, EXTENDED RELEASE ORAL DAILY
Status: DISCONTINUED | OUTPATIENT
Start: 2023-09-01 | End: 2023-09-01

## 2023-09-01 RX ORDER — INSULIN LISPRO 100 [IU]/ML
1-6 INJECTION, SOLUTION INTRAVENOUS; SUBCUTANEOUS
Status: DISCONTINUED | OUTPATIENT
Start: 2023-09-01 | End: 2023-09-01

## 2023-09-01 RX ORDER — METOPROLOL SUCCINATE 50 MG/1
50 TABLET, EXTENDED RELEASE ORAL DAILY
Status: DISCONTINUED | OUTPATIENT
Start: 2023-09-01 | End: 2023-09-04 | Stop reason: HOSPADM

## 2023-09-01 RX ORDER — OXYCODONE HYDROCHLORIDE 5 MG/1
5 TABLET ORAL EVERY 4 HOURS PRN
Status: DISCONTINUED | OUTPATIENT
Start: 2023-09-01 | End: 2023-09-04 | Stop reason: HOSPADM

## 2023-09-01 RX ORDER — LISINOPRIL 20 MG/1
20 TABLET ORAL DAILY
Status: DISCONTINUED | OUTPATIENT
Start: 2023-09-01 | End: 2023-09-01

## 2023-09-01 RX ORDER — ACETAMINOPHEN 325 MG/1
975 TABLET ORAL EVERY 8 HOURS SCHEDULED
Status: DISCONTINUED | OUTPATIENT
Start: 2023-09-01 | End: 2023-09-04 | Stop reason: HOSPADM

## 2023-09-01 RX ORDER — POLYETHYLENE GLYCOL 3350 17 G/17G
17 POWDER, FOR SOLUTION ORAL DAILY
Status: DISCONTINUED | OUTPATIENT
Start: 2023-09-01 | End: 2023-09-04 | Stop reason: HOSPADM

## 2023-09-01 RX ORDER — PRASUGREL 10 MG/1
10 TABLET, FILM COATED ORAL DAILY
Status: DISCONTINUED | OUTPATIENT
Start: 2023-09-01 | End: 2023-09-04 | Stop reason: HOSPADM

## 2023-09-01 RX ORDER — HYDROMORPHONE HCL IN WATER/PF 6 MG/30 ML
0.2 PATIENT CONTROLLED ANALGESIA SYRINGE INTRAVENOUS EVERY 2 HOUR PRN
Status: DISCONTINUED | OUTPATIENT
Start: 2023-09-01 | End: 2023-09-04 | Stop reason: HOSPADM

## 2023-09-01 RX ORDER — PRIMIDONE 50 MG/1
50 TABLET ORAL EVERY 12 HOURS SCHEDULED
Status: DISCONTINUED | OUTPATIENT
Start: 2023-09-01 | End: 2023-09-01

## 2023-09-01 RX ORDER — PRIMIDONE 50 MG/1
50 TABLET ORAL EVERY 12 HOURS SCHEDULED
Status: ON HOLD | COMMUNITY
End: 2023-09-04 | Stop reason: SDUPTHER

## 2023-09-01 RX ORDER — ATORVASTATIN CALCIUM 40 MG/1
40 TABLET, FILM COATED ORAL DAILY
COMMUNITY

## 2023-09-01 RX ORDER — LISINOPRIL 20 MG/1
20 TABLET ORAL DAILY
Status: DISCONTINUED | OUTPATIENT
Start: 2023-09-01 | End: 2023-09-04 | Stop reason: HOSPADM

## 2023-09-01 RX ORDER — PRASUGREL 10 MG/1
10 TABLET, FILM COATED ORAL DAILY
COMMUNITY

## 2023-09-01 RX ORDER — MAGNESIUM SULFATE HEPTAHYDRATE 40 MG/ML
2 INJECTION, SOLUTION INTRAVENOUS ONCE
Status: COMPLETED | OUTPATIENT
Start: 2023-09-01 | End: 2023-09-01

## 2023-09-01 RX ORDER — LANOLIN ALCOHOL/MO/W.PET/CERES
100 CREAM (GRAM) TOPICAL DAILY
Status: DISCONTINUED | OUTPATIENT
Start: 2023-09-01 | End: 2023-09-04 | Stop reason: HOSPADM

## 2023-09-01 RX ORDER — METOPROLOL SUCCINATE 50 MG/1
50 TABLET, EXTENDED RELEASE ORAL DAILY
COMMUNITY

## 2023-09-01 RX ORDER — FEXOFENADINE HCL 60 MG/1
60 TABLET, FILM COATED ORAL DAILY
COMMUNITY

## 2023-09-01 RX ORDER — METHOCARBAMOL 500 MG/1
500 TABLET, FILM COATED ORAL EVERY 6 HOURS PRN
Status: DISCONTINUED | OUTPATIENT
Start: 2023-09-01 | End: 2023-09-03

## 2023-09-01 RX ORDER — LIDOCAINE 50 MG/G
1 PATCH TOPICAL DAILY
Status: DISCONTINUED | OUTPATIENT
Start: 2023-09-01 | End: 2023-09-02

## 2023-09-01 RX ORDER — FOLIC ACID 1 MG/1
1 TABLET ORAL DAILY
Status: DISCONTINUED | OUTPATIENT
Start: 2023-09-01 | End: 2023-09-04 | Stop reason: HOSPADM

## 2023-09-01 RX ORDER — ENOXAPARIN SODIUM 100 MG/ML
40 INJECTION SUBCUTANEOUS DAILY
Status: DISCONTINUED | OUTPATIENT
Start: 2023-09-01 | End: 2023-09-01

## 2023-09-01 RX ORDER — OXYCODONE HYDROCHLORIDE 5 MG/1
5 TABLET ORAL EVERY 4 HOURS PRN
Status: DISCONTINUED | OUTPATIENT
Start: 2023-09-01 | End: 2023-09-01

## 2023-09-01 RX ORDER — KETOROLAC TROMETHAMINE 30 MG/ML
15 INJECTION, SOLUTION INTRAMUSCULAR; INTRAVENOUS EVERY 6 HOURS PRN
Status: DISCONTINUED | OUTPATIENT
Start: 2023-09-01 | End: 2023-09-02

## 2023-09-01 RX ORDER — INSULIN LISPRO 100 [IU]/ML
1-5 INJECTION, SOLUTION INTRAVENOUS; SUBCUTANEOUS
Status: DISCONTINUED | OUTPATIENT
Start: 2023-09-01 | End: 2023-09-04 | Stop reason: HOSPADM

## 2023-09-01 RX ORDER — PRASUGREL 10 MG/1
10 TABLET, FILM COATED ORAL DAILY
Status: DISCONTINUED | OUTPATIENT
Start: 2023-09-01 | End: 2023-09-01

## 2023-09-01 RX ORDER — ACETAMINOPHEN 325 MG/1
650 TABLET ORAL EVERY 4 HOURS PRN
Status: DISCONTINUED | OUTPATIENT
Start: 2023-09-01 | End: 2023-09-01

## 2023-09-01 RX ORDER — PRIMIDONE 50 MG/1
50 TABLET ORAL EVERY 12 HOURS SCHEDULED
Status: DISCONTINUED | OUTPATIENT
Start: 2023-09-01 | End: 2023-09-04 | Stop reason: HOSPADM

## 2023-09-01 RX ADMIN — NICOTINE POLACRILEX 2 MG: 2 GUM, CHEWING ORAL at 13:24

## 2023-09-01 RX ADMIN — LIDOCAINE 1 PATCH: 50 PATCH TOPICAL at 08:44

## 2023-09-01 RX ADMIN — ASPIRIN 81 MG: 81 TABLET, COATED ORAL at 08:46

## 2023-09-01 RX ADMIN — METHOCARBAMOL TABLETS 500 MG: 500 TABLET, COATED ORAL at 13:23

## 2023-09-01 RX ADMIN — PRIMIDONE 50 MG: 50 TABLET ORAL at 08:46

## 2023-09-01 RX ADMIN — Medication 100 MG: at 08:45

## 2023-09-01 RX ADMIN — INSULIN LISPRO 2 UNITS: 100 INJECTION, SOLUTION INTRAVENOUS; SUBCUTANEOUS at 21:06

## 2023-09-01 RX ADMIN — LISINOPRIL 20 MG: 20 TABLET ORAL at 08:47

## 2023-09-01 RX ADMIN — MAGNESIUM SULFATE HEPTAHYDRATE 2 G: 40 INJECTION, SOLUTION INTRAVENOUS at 05:27

## 2023-09-01 RX ADMIN — MULTIPLE VITAMINS W/ MINERALS TAB 1 TABLET: TAB ORAL at 09:08

## 2023-09-01 RX ADMIN — NICOTINE POLACRILEX 2 MG: 2 GUM, CHEWING ORAL at 17:33

## 2023-09-01 RX ADMIN — OXYCODONE HYDROCHLORIDE 5 MG: 5 TABLET ORAL at 08:45

## 2023-09-01 RX ADMIN — PRASUGREL 10 MG: 10 TABLET, FILM COATED ORAL at 08:44

## 2023-09-01 RX ADMIN — ACETAMINOPHEN 975 MG: 325 TABLET, FILM COATED ORAL at 13:23

## 2023-09-01 RX ADMIN — KETOROLAC TROMETHAMINE 15 MG: 30 INJECTION, SOLUTION INTRAMUSCULAR at 14:20

## 2023-09-01 RX ADMIN — NICOTINE POLACRILEX 2 MG: 2 GUM, CHEWING ORAL at 21:05

## 2023-09-01 RX ADMIN — HYDROMORPHONE HYDROCHLORIDE 0.2 MG: 0.2 INJECTION, SOLUTION INTRAMUSCULAR; INTRAVENOUS; SUBCUTANEOUS at 10:48

## 2023-09-01 RX ADMIN — ACETAMINOPHEN 650 MG: 325 TABLET, FILM COATED ORAL at 05:32

## 2023-09-01 RX ADMIN — NICOTINE POLACRILEX 2 MG: 2 GUM, CHEWING ORAL at 09:12

## 2023-09-01 RX ADMIN — ACETAMINOPHEN 975 MG: 325 TABLET, FILM COATED ORAL at 21:05

## 2023-09-01 RX ADMIN — METOPROLOL SUCCINATE 50 MG: 50 TABLET, EXTENDED RELEASE ORAL at 08:46

## 2023-09-01 RX ADMIN — POLYETHYLENE GLYCOL 3350 17 G: 17 POWDER, FOR SOLUTION ORAL at 08:46

## 2023-09-01 RX ADMIN — PRIMIDONE 50 MG: 50 TABLET ORAL at 21:05

## 2023-09-01 RX ADMIN — FOLIC ACID 1 MG: 1 TABLET ORAL at 08:46

## 2023-09-01 RX ADMIN — TETANUS TOXOID, REDUCED DIPHTHERIA TOXOID AND ACELLULAR PERTUSSIS VACCINE, ADSORBED 0.5 ML: 5; 2.5; 8; 8; 2.5 SUSPENSION INTRAMUSCULAR at 00:45

## 2023-09-01 NOTE — QUICK NOTE
Previous and additional CT imaging reviewed due to patient's complaints of facial pain and back pain noted during tertiary survey. No evidence of underlying facial fractures on review of the CT scan of the head which did evaluate his facial structures including the left maxillary sinus at the area of his tenderness. Additionally the CT scan of the chest, abdomen and pelvis completed today was reviewed without any evidence of underlying intrathoracic or abdominal traumatic injuries and/or bony abnormalities of the spine. Back pain is consistent with his chronic issues and not with an acute traumatic injury. No further work-up indicated from a trauma service standpoint. Trauma service will sign off, but remains available for reevaluation at any time. Please call with questions or concerns.     Fabian Luque PA-C  9/1/2023  1:27 PM

## 2023-09-01 NOTE — ASSESSMENT & PLAN NOTE
· Drinks 2 or more glasses of wine daily  · Denies drinking beer or hard liquor    Plan  · CIWA protocol  · Brief intervention encouraging patient to decrease intake

## 2023-09-01 NOTE — ASSESSMENT & PLAN NOTE
No results found for: "HGBA1C"    No results for input(s): "POCGLU" in the last 72 hours.     Blood Sugar Average: Last 72 hrs:  Plan:  • Hold home regimen: metformin 1,000 mg daily  • Diet Consistent CHO Level 2 (5 CHO servings/75g CHO per meal)  • SSI and Accucheck  • Goal -180 while admitted, adjusting insulin regimen as appropriate  • Monitor for hypoglycemia and treat per protocol  • Will check HbA1c

## 2023-09-01 NOTE — ASSESSMENT & PLAN NOTE
· BIB by EMS following syncopal event that occurred PTA  · Similar episode occurred 5 days ago  · Multiple falls within the past months to years  · Poor coordination and balance 2/2 peripheral neuropathy  · Right eye prosthesis  · H/O alcohol dependence  · Essential tremor present  · ED workup: ECG showed NSR with 88 bpm, trops negative, CBC and CMP unremarkable  · Trauma team workup unremarkable  · Ddx include orthostatic hypotension, vasovagal, cardiac etiology    Plan  · Telemetry  · ECHO  · Orthostatic BP  · PT/OT  · Fall precautions

## 2023-09-01 NOTE — PLAN OF CARE
Problem: OCCUPATIONAL THERAPY ADULT  Goal: Performs self-care activities at highest level of function for planned discharge setting. See evaluation for individualized goals. Description: Treatment Interventions: ADL retraining, Functional transfer training, Endurance training, Cognitive reorientation, Patient/family training, Equipment evaluation/education, Compensatory technique education, Energy conservation, Activityengagement          See flowsheet documentation for full assessment, interventions and recommendations. Note: Limitation: Decreased ADL status, Decreased Safe judgement during ADL, Decreased cognition, Decreased endurance, Decreased self-care trans, Decreased high-level ADLs (impaired fxnl mobility, act vaibhav, fxnl reach, fxnl sitting balance, fxnl sitting vaibhav, attention to task, safety awareness, insight, sequencing, problem solving)  Prognosis: Good  Assessment: Pt is a 65 y.o. male seen for OT evaluation s/p [facility] on 9/1/2023 due to fall. Diagnosed with Syncope and collapse. Personal and env factors supporting pt at time of IE include (I) PLOF, supportive wife, accessible home environment and [address] [facility]. Personal and env factors inhibiting engagement in occupations include lifestyle patterns and limited social support (is home alone most of the time). Performance deficits that affect the pt’s occupational performance can be seen above. Due to pt's current functional limitations and medical complications pt is functioning below baseline. Pt would benefit from continued skilled OT treatment in order to maximize safety, independence and overall performance with ADLs, functional mobility and cognition in order to achieve highest level of function.      OT Discharge Recommendation: Home with outpatient rehabilitation (OP cognitive therapy + F[employer] Drive Evaluation)

## 2023-09-01 NOTE — H&P
[facility]  H&P  Name: Travon Mendiola 65 y.o. male I MRN: [id number]  Unit/Bed#: S -01 I Date of Admission: 9/1/2023   Date of Service: 9/1/2023 I Hospital Day: 0      Assessment/Plan   * Syncope and collapse  Assessment & Plan  · BIB by EMS following syncopal event that occurred PTA  · Similar episode occurred 5 days ago  · Multiple falls within the past months to years  · Poor coordination and balance 2/2 peripheral neuropathy  · Right eye prosthesis  · H/O alcohol dependence  · Essential tremor present  · ED workup: ECG showed NSR with 88 bpm, trops negative, CBC and CMP unremarkable  · Trauma team workup unremarkable  · Ddx include orthostatic hypotension, vasovagal, cardiac etiology    Plan  · Telemetry  · ECHO  · Orthostatic BP  · PT/OT  · Fall precautions    Type 2 diabetes mellitus with diabetic polyneuropathy, without long-term current use of insulin ([address])  Assessment & Plan  No results found for: "HGBA1C"    No results for input(s): "POCGLU" in the last 72 hours. Blood Sugar Average: Last 72 hrs:  Plan:  • Hold home regimen: metformin 1,000 mg daily  • Diet Consistent CHO Level 2 (5 CHO servings/75g CHO per meal)  • SSI and Accucheck  • Goal -180 while admitted, adjusting insulin regimen as appropriate  • Monitor for hypoglycemia and treat per protocol  • Will check HbA1c        Alcohol dependence (HCC)  Assessment & Plan  · Drinks 2 or more glasses of wine daily  · Denies drinking beer or hard liquor    Plan  · CIWA protocol  · Brief intervention encouraging patient to decrease intake    Chronic back pain  Assessment & Plan  · Self medicates with wine and Tylenol daily  · TRAUMA - CT C-spine impression: No cervical spine fracture or traumatic malalignment. · MRI L-spine 2015 revealed Mild L3-4 and L4-5 degenerative disc disease. Small left foraminal disc protrusion at L3-4. Only minimal canal stenosis noted.   No significant foraminal narrowing or foraminal nerve impingement. Plan  · Multimodal pain regimen  · Consider repeat MRI (probably more appropriate in the outpatient setting)      Atherosclerosis of native coronary artery of native heart without angina pectoris  Assessment & Plan  · Oct 2013: 95% prox LAD, 40% mid LAD, EF 60%; MANUEL to prox LAD  · Continue home medications: aspirin, metoprolol, prasugrel  · Patient stopped taking Lipitor due to adverse reactions      Primary hypertension  Assessment & Plan  · Blood Pressure: (!) 171/93  · Continue home medication: Lisinopril and metoprolol    Essential tremor  Assessment & Plan  · Continue home medication: primidone         VTE Pharmacologic Prophylaxis: VTE Score: 3 Moderate Risk (Score 3-4) - Pharmacological DVT Prophylaxis Ordered: other medication prasugrel. Code Status: Level 1 - Full Code    Discussion with family: Updated  (wife and daughter) at bedside. Anticipated Length of Stay: Patient will be admitted on an observation basis with an anticipated length of stay of less than 2 midnights secondary to syncope and collapse. Chief Complaint: syncope    History of Present Illness:  Sheila Do is a 65 y.o. male with a PMH of HTN, CAD s/p stent placement, HLD, T2DM, macular edema, HTN/DM retinopathy and alcohol use who presents after sustaining a fall at home just prior to arrival. The patient reports that he got up to use the bathroom when he fell. He doesn't remember how he fell or  the moments that immediately preceded/followed the fall. His wife, who was in another room, heard him fall and when she called out to him, he did not respond. She reports that he was unconscious for a couple of minutes before he became responsive again. Patient had a similar event occur 5 days ago, but he did not want to come to the hospital at that time. The patient does endorse extensive difficulty coordination/balance due to the peripheral neuropathy.   He uses a cane to ambulate and a scooter for longer distances. When standing up, he often has to stand for 1-2 minutes before he can start walking. He also has a history of chronic back pain and has been self-medicating with Tylenol and alcohol daily. The patient admits to having at least 2 glasses of wine per day. Of note, the patient has been having frequent falls over the past year due to his comorbid conditions. Upon arrival to the ED, patient was assessed by the trauma team and CT brain and trauma imaging studies were unremarkable. The patient does have some bruising on his left and left side that is painful. He also has some bruising on the left cheek from the fall. He denies any CP, SOB, abdominal pain, N/V or Headache. Labs were unremarkable. The patient will be admitted for continued assessment for syncope. Review of Systems:  Review of Systems   Constitutional: Positive for fatigue. Negative for chills and fever. Eyes: Positive for visual disturbance. Respiratory: Negative for shortness of breath. Cardiovascular: Negative for chest pain. Gastrointestinal: Negative for abdominal pain, nausea and vomiting. Musculoskeletal: Positive for back pain. Skin: Positive for wound. Neurological: Positive for syncope. Negative for dizziness and headaches. Past Medical and Surgical History:   No past medical history on file. No past surgical history on file. Meds/Allergies:  Prior to Admission medications    Medication Sig Start Date End Date Taking?  Authorizing Provider   aspirin (ECOTRIN LOW STRENGTH) 81 mg EC tablet Take 81 mg by mouth daily   Yes Historical Provider, MD   fexofenadine (ALLEGRA) 60 MG tablet Take 60 mg by mouth daily   Yes Historical Provider, MD   lisinopril (ZESTRIL) 20 mg tablet Take 20 mg by mouth daily   Yes Historical Provider, MD   metFORMIN (GLUCOPHAGE) 1000 MG tablet Take 1,000 mg by mouth daily with breakfast   Yes Historical Provider, MD   metoprolol succinate (TOPROL-XL) 50 mg 24 hr tablet Take 50 mg by mouth daily   Yes Historical Provider, MD   prasugrel (EFFIENT) tablet Take 10 mg by mouth daily   Yes Historical Provider, MD   primidone (MYSOLINE) 50 mg tablet Take 50 mg by mouth every 12 (twelve) hours   Yes Historical Provider, MD   atorvastatin (LIPITOR) 40 mg tablet Take 40 mg by mouth daily  Patient not taking: Reported on 9/1/2023    Historical Provider, MD     I have reviewed home medications with patient family member. Allergies: No Known Allergies    Social History:  Marital Status: /Civil Union   Occupation:  Patient Pre-hospital Living Situation: Home, With spouse  Patient Pre-hospital Level of Mobility: walks with cane  Patient Pre-hospital Diet Restrictions: diabetic  Substance Use History:   Social History     Substance and Sexual Activity   Alcohol Use Not on file     Social History     Tobacco Use   Smoking Status Not on file   Smokeless Tobacco Not on file     Social History     Substance and Sexual Activity   Drug Use Not on file       Family History:  No family history on file. Physical Exam:     Vitals:   Blood Pressure: (!) 171/93 (09/01/23 0416)  Pulse: 71 (09/01/23 0600)  Temperature: 97.7 °F (36.5 °C) (09/01/23 0316)  Temp Source: Oral (09/01/23 0007)  Respirations: 20 (09/01/23 0200)  Height: 6' (182.9 cm) (09/01/23 0316)  Weight - Scale: 86.4 kg (190 lb 7.6 oz) (09/01/23 0539)  SpO2: 91 % (09/01/23 0600)    Physical Exam  Vitals reviewed. Constitutional:       General: He is not in acute distress. Appearance: Normal appearance. He is not ill-appearing. HENT:      Head: Normocephalic and atraumatic. Right Ear: External ear normal.      Left Ear: External ear normal.   Eyes:      Extraocular Movements: Extraocular movements intact. Conjunctiva/sclera: Conjunctivae normal.      Pupils: Pupils are equal, round, and reactive to light. Comments: Right eye prosthesis   Cardiovascular:      Rate and Rhythm: Normal rate and regular rhythm.       Pulses: Normal pulses. Heart sounds: Normal heart sounds. Pulmonary:      Effort: Pulmonary effort is normal. No respiratory distress. Breath sounds: Normal breath sounds. Abdominal:      Palpations: Abdomen is soft. Tenderness: There is no abdominal tenderness. There is no guarding or rebound. Musculoskeletal:         General: Normal range of motion. Cervical back: Normal range of motion and neck supple. Right lower leg: No edema. Left lower leg: No edema. Skin:     General: Skin is warm and dry. Findings: Bruising (left arm, left cheek) present. Neurological:      General: No focal deficit present. Mental Status: He is alert and oriented to person, place, and time. Cranial Nerves: No cranial nerve deficit. Sensory: No sensory deficit. Coordination: Coordination abnormal.      Gait: Gait abnormal.         Additional Data:     Lab Results:  Results from last 7 days   Lab Units 09/01/23  0515   WBC Thousand/uL 6.04   HEMOGLOBIN g/dL 12.4   HEMATOCRIT % 37.2   PLATELETS Thousands/uL 109*   NEUTROS PCT % 62   LYMPHS PCT % 24   MONOS PCT % 12   EOS PCT % 1     Results from last 7 days   Lab Units 09/01/23  0515 09/01/23  0013 09/01/23  0012   SODIUM mmol/L 136  --  136   POTASSIUM mmol/L 5.8*  --  4.8   CHLORIDE mmol/L 107  --  106   CO2 mmol/L 16*  --  17*   CO2, I-STAT   --    < >  --    BUN mg/dL 15  --  17   CREATININE mg/dL 0.97  --  1.08   ANION GAP mmol/L 13  --  13   CALCIUM mg/dL 8.9  --  8.9   ALBUMIN g/dL  --   --  3.7   TOTAL BILIRUBIN mg/dL  --   --  0.44   ALK PHOS U/L  --   --  68   ALT U/L  --   --  25   AST U/L  --   --  33   GLUCOSE RANDOM mg/dL 145*  --  142*    < > = values in this interval not displayed. Results from last 7 days   Lab Units 09/01/23  0012   INR  0.93                   Lines/Drains:  Invasive Devices     Peripheral Intravenous Line  Duration           Peripheral IV 09/01/23 Distal;Dorsal (posterior); Left Forearm <1 day Peripheral IV 09/01/23 Right Antecubital <1 day                    Imaging: Reviewed radiology reports from this admission including: chest xray, CT head and xray(s)  TRAUMA - CT head wo contrast   Final Result by Parminder Duque MD (09/01 0040)      No intracranial hemorrhage or calvarial fracture. Above findings discussed with Dr. Suma Thomas at 12:40 a.m. on 9/1/2023. Workstation performed: [id number]         TRAUMA - CT spine cervical wo contrast   Final Result by Parminder Duque MD (09/01 0050)      No cervical spine fracture or traumatic malalignment. Above findings discussed with Dr. Suma Thomas at 12:40 a.m. on 9/1/2023. Workstation performed: [id number]         XR trauma multiple    (Results Pending)   XR chest 1 view    (Results Pending)       EKG and Other Studies Reviewed on Admission:   · EKG: NSR. HR 88.    ** Please Note: This note has been constructed using a voice recognition system.  **

## 2023-09-01 NOTE — ED PROVIDER NOTES
Emergency Department Airway Evaluation and Management Form    History  Obtained from: EMS and patient  Patient has no allergy information on record. Chief Complaint   Patient presents with   • Trauma     Fall slurred speech thinners syncopal episode     HPI    No past medical history on file. No past surgical history on file. No family history on file. I have reviewed and agree with the history as documented. Review of Systems    Physical Exam  /77   Pulse 90   Temp 97.5 °F (36.4 °C) (Oral)   Resp 20   Wt 86.5 kg (190 lb 11.2 oz)   SpO2 100%     Physical Exam    ED Medications  Medications   tetanus-diphtheria-acellular pertussis (BOOSTRIX) IM injection 0.5 mL (has no administration in time range)       Intubation  Procedures    Notes  60-year-old male presents to the emergency department via EMS. They provided history that patient was found on the floor in his home with loss of consciousness. Fall occurred just after standing. Patient does not have recollection of preceding symptoms or of event including falling. He had additionally fallen with loss of consciousness on Sunday (4 days ago). He reports having injured his left arm and left side of his chest at that time. He declined interest in evaluation at that point. He was receptive to this tonight. He notes that his speech is slightly slurred-newly tonight. No other neurological deficits noted by EMS. Patient endorses having had a couple of alcoholic beverages tonight. He denies that this was more than typical.  He does take aspirin and Effient among others. Speech is very mildly dysarthric. Strong odor of alcohol appreciated on breath. No orofacial trauma. Airway intact. O2 sat 100% on room air. Respirations unlabored. Cervical collar was applied. No airway intervention needed.   Following logroll/assessment of spine care continued by trauma team.    Final Diagnosis  Final diagnoses:   Syncope and collapse       ED Provider  Electronically Signed by     Jair Garcia MD  09/01/23 1114

## 2023-09-01 NOTE — ASSESSMENT & PLAN NOTE
· Self medicates with wine and Tylenol daily  · TRAUMA - CT C-spine impression: No cervical spine fracture or traumatic malalignment. · MRI L-spine 2015 revealed Mild L3-4 and L4-5 degenerative disc disease. Small left foraminal disc protrusion at L3-4. Only minimal canal stenosis noted. No significant foraminal narrowing or foraminal nerve impingement.     Plan  · Multimodal pain regimen  · Consider repeat MRI (probably more appropriate in the outpatient setting)

## 2023-09-01 NOTE — H&P
H&P - Trauma   Lyndsey Allison 65 y.o. male MRN: [id number]  Unit/Bed#: TR-02 Encounter: [id number]    Trauma Alert: Level B   Model of Arrival: Ambulance    Trauma Team: Attending Gt Lopez and AWA Rod  Consultants:     None     Assessment/Plan   Active Problems / Assessment:   · Syncope and collapse-CT head and C-spine were negative for acute traumatic injury. Plan:   · Admit to medicine for syncope work-up. · Trauma will continue to follow for tertiary evaluation in AM    History of Present Illness     Chief Complaint: "I feel fine now"  Mechanism:Fall     HPI:    Lyndsey Allison is a 65 y.o. male with history of diabetes type 2, CAD with previous stenting, and chronic back pain, presenting this evening for evaluation after suffering a fall. Patient does not remember any of the events associated with the fall, though denies any presyncopal type symptoms including but not limited to headache, dizziness, lightheadedness, chest pain, palpitations, shortness of breath, difficulty breathing. EMS describes that wife heard a thud upstairs and found patient lying facedown on the floor making "gurgling sounds". He was noted to have mild slurred speech per patient and family. Patient does note having several glasses of wine this evening. He denies having any weakness, numbness, tingling, loss of bowel or bladder function, seizure type activity. On my examination patient denies any complaints. He states that he feels "fine now". Patient does note having another event of syncope and collapse approximately 5 days prior, again not associated with any presyncopal type symptoms. He does take aspirin/Effient. Review of Systems   Skin:        Abrasion on left arm from previous fall. Neurological: Positive for syncope. Negative for headaches. All other systems reviewed and are negative. 12-point, complete review of systems was reviewed and negative except as stated above.      Historical Information Medical history: Diabetes type 2, CAD, chronic pain  Surgical history: Previous cardiac stenting  Social history: Previous smoker, occasional alcohol use, 3 glasses of wine this evening. No drug use. There is no immunization history for the selected administration types on file for this patient. Last Tetanus: Unknown. Updated. Family History: Non-contributory     Meds/Allergies   all current active meds have been reviewed  Allergies have not been reviewed; Not on File    Objective   Initial Vitals:   Temperature: 97.5 °F (36.4 °C) (09/01/23 0007)  Pulse: 95 (09/01/23 0000)  Respirations: 20 (09/01/23 0000)  Blood Pressure: 163/80 (09/01/23 0000)    Primary Survey:   Airway:        Status: patent;        Pre-hospital Interventions: none        Hospital Interventions: none  Breathing:        Pre-hospital Interventions: none       Effort: normal       Right breath sounds: normal       Left breath sounds: normal  Circulation:        Rhythm: regular       Rate: regular   Right Pulses Left Pulses    R radial: 2+  R femoral: 2+  R pedal: 2+     L radial: 2+  L femoral: 2+  L pedal: 2+       Disability: Interventions: 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 Appearance: He is not ill-appearing. HENT:      Head: Normocephalic and atraumatic. Comments: Denies any head or face pain on palpation. Right Ear: External ear normal.      Left Ear: External ear normal.      Nose: Nose normal.      Mouth/Throat:      Mouth: Mucous membranes are moist.      Pharynx: Oropharynx is clear.    Eyes:      Extraocular Movements: Extraocular movements intact. Pupils: Pupils are equal, round, and reactive to light. Comments: Left eye has subconjunctival hemorrhage which she associates with previous eye injections from earlier this week. Neck:      Comments: Cervical collar in place. Cardiovascular:      Rate and Rhythm: Normal rate and regular rhythm. Pulses: Normal pulses. Heart sounds: Normal heart sounds. No murmur heard. No friction rub. No gallop. Pulmonary:      Effort: Pulmonary effort is normal. No respiratory distress. Breath sounds: Normal breath sounds. No stridor. No wheezing, rhonchi or rales. Chest:      Chest wall: No tenderness. Abdominal:      General: Abdomen is flat. Bowel sounds are normal. There is no distension. Palpations: Abdomen is soft. Tenderness: There is no abdominal tenderness. There is no guarding. Genitourinary:     Comments: Pelvis is stable. Musculoskeletal:      Comments: No extremity tenderness. Patient is able to fully range all extremities with 5/5 strength. No lateralizing weakness. No C, T, L-spine tenderness. No palpable step-offs. No joint effusions. No hematomas. Skin:     General: Skin is warm and dry. Capillary Refill: Capillary refill takes less than 2 seconds. Comments: Scattered old ecchymosis. Abrasion noted on left forearm. Neurological:      General: No focal deficit present. Mental Status: He is alert and oriented to person, place, and time. Sensory: No sensory deficit. Motor: No weakness. Psychiatric:         Mood and Affect: Mood normal.         Behavior: Behavior normal.         Thought Content: Thought content normal.      Comments: Speech is clear. No slurring appreciated. Invasive Devices     Peripheral Intravenous Line  Duration           Peripheral IV 09/01/23 Distal;Dorsal (posterior); Left Forearm <1 day    Peripheral IV 09/01/23 Right Antecubital <1 day              Lab Results: I have personally reviewed all pertinent laboratory/test results 09/01/23 and in the preceding 24 hours. No results for input(s): "WBC", "HGB", "HCT", "PLT", "BANDSPCT", "SODIUM", "K", "CL", "CO2", "BUN", "CREATININE", "GLUC", "CAIONIZED", "MG", "PHOS", "AST", "ALT", "ALB", "TBILI", "DBILI", "ALKPHOS", "PTT", "INR", "HSTNI0", "HSTNI2", "BNP", "LACTICACID" in the last 72 hours. Imaging Results: I have personally reviewed pertinent images saved in PACS. CT scan findings (and other pertinent positive findings on images) were discussed with radiology. My interpretation of the images/reports are as follows:  Chest Xray(s): negative for acute findings   FAST exam(s): negative for acute findings   CT Scan(s): negative for acute findings   Additional Xray(s): N/A     Other Studies: none    Code Status: No Order  Advance Directive and Living Will:      Power of :    POLST:    I have spent 28 minutes with Patient and family today in which greater than 50% of this time was spent in counseling/coordination of care regarding Diagnostic results, Prognosis, Risks and benefits of tx options, Instructions for management, Patient and family education, Importance of tx compliance, Risk factor reductions, Impressions, Counseling / Coordination of care, Documenting in the medical record, Reviewing / ordering tests, medicine, procedures  , Obtaining or reviewing history   and Communicating with other healthcare professionals . Cervical Collar Clearance: The patient had a CT scan of the cervical spine demonstrating no acute injury. On exam, the patient had no midline point tenderness or paresthesias/numbness/weakness in the extremities. The patient had full range of motion (was then able to flex, extend, and rotate head laterally) without pain. There were no distracting injuries and the patient was not intoxicated. The patient's cervical spine was cleared radiologically and clinically.  Cervical collar removed at this time.      MANNY Scott  9/1/2023 2:44 AM

## 2023-09-01 NOTE — RESPIRATORY THERAPY NOTE
09/01/23 0842   Incentive Spirometry Tx   IS level of assistance Assisted by respiratory care provider;Assisted by nursing; Independent   Frequency q1hr W/A   Respiratory Effort Normal   Treatment Tolerance Tolerated well   Incentive Spirometry Goal (mL) 1164 mL  (Per Rib fracture goal)   Incentive Spirometry Achieved (mL) 2250 mL

## 2023-09-01 NOTE — ASSESSMENT & PLAN NOTE
· Secondary to syncope and collapse  · Left-sided facial contusion with tenderness  · CT head showed left maxillary sinus which does not appear to show any fracture or maxillary fluid. CT head negative for acute traumatic injury. · Defer CT face  · Ice and analgesia as needed.

## 2023-09-01 NOTE — ASSESSMENT & PLAN NOTE
· Secondary to syncope and collapse  · Patient complaining of left lateral rib pain on tertiary evaluation. Tender on exam.  No flail segment. No orthopnea, no tachypnea. No crepitus.   · CT chest, abdomen pelvis pending  · Analgesia as needed  · Rib fracture protocol

## 2023-09-01 NOTE — ASSESSMENT & PLAN NOTE
· Oct 2013: 95% prox LAD, 40% mid LAD, EF 60%; MANUEL to prox LAD  · Continue home medications: aspirin, metoprolol, prasugrel  · Patient stopped taking Lipitor due to adverse reactions

## 2023-09-01 NOTE — LETTER
[address] [address]  Dept: [phone]    September 4, 2023     Patient: Aman Arevalo   YOB: 1960   Date of Visit: 9/1/2023       To Whom it May Concern:    Aman Arevalo is under my professional care. He was seen in the hospital from 9/1/2023 to 09/04/23. He  may return to work on 9/5/2023 as tolerated . If you have any questions or concerns, please don't hesitate to call.          Sincerely,        Geneva Lara MD

## 2023-09-01 NOTE — PROGRESS NOTES
[facility]  Progress Note  Name: Nav Cano  MRN: [id number]  Unit/Bed#: S -01 I Date of Admission: 9/1/2023   Date of Service: 9/1/2023 I Hospital Day: 0    Assessment/Plan   * Syncope and collapse  Assessment & Plan  · Over the past 5 days patient has suffered 2 syncopal events, most recently last night. · On primary and secondary evaluation patient was found to have no injuries and admitted to medicine for syncope work-up. · Continue syncope work-up per medical service  · On tertiary evaluation patient does complain of left facial pain, left lateral rib pain, and chronic back pain-see below for further details. Facial contusion  Assessment & Plan  · Secondary to syncope and collapse  · Left-sided facial contusion with tenderness  · CT head showed left maxillary sinus which does not appear to show any fracture or maxillary fluid. CT head negative for acute traumatic injury. · Defer CT face  · Ice and analgesia as needed. Rib pain on left side  Assessment & Plan  · Secondary to syncope and collapse  · Patient complaining of left lateral rib pain on tertiary evaluation. Tender on exam.  No flail segment. No orthopnea, no tachypnea. No crepitus. · CT chest, abdomen pelvis pending  · Analgesia as needed  · Rib fracture protocol    Chronic back pain  Assessment & Plan  · Patient describes having chronic back pain. · Nontender on initial evaluation. During tertiary evaluation he does complain of lower T-spine and upper L-spine generalized tenderness. No palpable step-offs. · Patient has not had any CT imaging since 2015 of his spine. We will complete CT chest, abdomen and pelvis to rule out fracture. · Neurologically intact with the exception of chronic lower extremity neuropathy  · Analgesia as needed.     Fall-resolved as of 9/1/2023  Assessment & Plan  · Injuries listed below  · PT/OT             TRAUMA TERTIARY SURVEY NOTE    VTE Prophylaxis:Sequential compression device (Venodyne)  and Reason for no pharmacologic prophylaxis Per primary team-slim     Disposition: Trauma team will continue to follow until CT chest, abdomen, pelvis is completed. If negative trauma team will sign off. Please contact with any questions or concerns. Code status:  Level 1 - Full Code    Consultants: None    Subjective     Mechanism of Injury:Fall     Chief Complaint: "I could be doing better"    HPI/Last 24 hour events: This is a 20-year-old male who suffered 2 episodes of syncope and collapse over the past 5 days. Patient states that since coming to the hospital he has has had worsening left lateral rib pain. He denies any shortness of breath or difficulty breathing. He also notes that his chronic back pain has been causing him significant discomfort. He denies any change in his back pain. He denies any new numbness, tingling, weakness associated. He does note having baseline lower extremity neuropathy. He also notes a left facial contusion that has become more sore overnight. Patient denies any other complaints this morning. Objective   Vitals:   Temp:  [97.5 °F (36.4 °C)-98.6 °F (37 °C)] 98.6 °F (37 °C)  HR:  [70-98] 98  Resp:  [19-20] 19  BP: (121-171)/(73-93) 150/85    I/O       08/30 0701  08/31 0700 08/31 0701  09/01 0700 09/01 0701  09/02 0700    Urine (mL/kg/hr)  300     Total Output  300     Net  -300                   Physical Exam:   GENERAL APPEARANCE: No acute distress  NEURO: GCS is 15. Light touch sensation decreased in lower extremities. No lateralizing weakness. HEENT: Left maxillary facial contusion mildly tender on palpation. No crepitus. 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 Neck is supple. CV: Regular rate and rhythm.  +2 radial dorsalis pedis pulses, bilaterally. LUNGS: Clear to auscultation, bilaterally. Left lateral ribs are tender on palpation. No crepitus. No orthopnea. No tachypnea. GI: Abdomen is soft nontender. : Pelvis is stable.   MSK: Fully ranging all extremities. No deformities. SKIN: Warm, dry. Abrasion on left forearm. Invasive Devices     Peripheral Intravenous Line  Duration           Peripheral IV 09/01/23 Distal;Dorsal (posterior); Left Forearm <1 day    Peripheral IV 09/01/23 Right Antecubital <1 day                      PIC Score  PIC Pain Score: 2 (9/1/2023  5:32 AM)       If the Total PIC Score </=5, did you consult APS and evaluate patient for further intervention?: yes      Pain:    Incentive Spirometry  Cough  3 = Controlled  4 = Above goal volume 3 = Strong  2 = Moderate  3 = Goal to alert volume 2 = Weak  1 = Severe  2 = Below alert volume 1 = Absent     1 = Unable to perform IS      Lab Results:   Results: I have personally reviewed all pertinent laboratory/tests results, BMP/CMP:   Lab Results   Component Value Date    SODIUM 136 09/01/2023    K 5.8 (H) 09/01/2023     09/01/2023    CO2 16 (L) 09/01/2023    CO2 17 (L) 09/01/2023    BUN 15 09/01/2023    CREATININE 0.97 09/01/2023    GLUCOSE 145 (H) 09/01/2023    CALCIUM 8.9 09/01/2023    AST 33 09/01/2023    ALT 25 09/01/2023    ALKPHOS 68 09/01/2023    EGFR 83 09/01/2023    and CBC:   Lab Results   Component Value Date    WBC 6.04 09/01/2023    HGB 12.4 09/01/2023    HCT 37.2 09/01/2023     (H) 09/01/2023     (L) 09/01/2023    RBC 3.48 (L) 09/01/2023    MCH 35.6 (H) 09/01/2023    MCHC 33.3 09/01/2023    RDW 12.9 09/01/2023    MPV 8.9 09/01/2023    NRBC 0 09/01/2023       Imaging Results: I have personally reviewed pertinent reports.     Chest Xray(s): pending   FAST exam(s): N/A   CT Scan(s): pending   Additional Xray(s): N/A     Other Studies: none

## 2023-09-01 NOTE — PROCEDURES
POC FAST US    Date/Time: 9/1/2023 12:11 AM    Performed by: MANNY Tenorio  Authorized by: MANNY Tenorio    Patient location:  Trauma  Procedure details:     Exam Type:  Diagnostic    Indications: blunt abdominal trauma and blunt chest trauma      Assess for:  Intra-abdominal fluid and pericardial effusion    Technique: FAST      Views obtained:  Heart - Pericardial sac, LUQ - Splenorenal space, Suprapubic - Pouch of Foreign and RUQ - Ann's Pouch    Image quality: diagnostic      Image availability:  Images available in PACS and video obtained  FAST Findings:     RUQ (Hepatorenal) free fluid: absent      LUQ (Splenorenal) free fluid: absent      Suprapubic free fluid: absent      Cardiac wall motion: identified      Pericardial effusion: absent    Interpretation:     Impressions: negative

## 2023-09-01 NOTE — PLAN OF CARE
Problem: MOBILITY - ADULT  Goal: Maintain or return to baseline ADL function  Description: INTERVENTIONS:  -  Assess patient's ability to carry out ADLs; assess patient's baseline for ADL function and identify physical deficits which impact ability to perform ADLs (bathing, care of mouth/teeth, toileting, grooming, dressing, etc.)  - Assess/evaluate cause of self-care deficits   - Assess range of motion  - Assess patient's mobility; develop plan if impaired  - Assess patient's need for assistive devices and provide as appropriate  - Encourage maximum independence but intervene and supervise when necessary  - Involve family in performance of ADLs  - Assess for home care needs following discharge   - Consider OT consult to assist with ADL evaluation and planning for discharge  - Provide patient education as appropriate  Outcome: Progressing     Problem: PAIN - ADULT  Goal: Verbalizes/displays adequate comfort level or baseline comfort level  Description: Interventions:  - Encourage patient to monitor pain and request assistance  - Assess pain using appropriate pain scale  - Administer analgesics based on type and severity of pain and evaluate response  - Implement non-pharmacological measures as appropriate and evaluate response  - Consider cultural and social influences on pain and pain management  - Notify physician/advanced practitioner if interventions unsuccessful or patient reports new pain  Outcome: Not Progressing     Problem: SAFETY ADULT  Goal: Maintain or return to baseline ADL function  Description: INTERVENTIONS:  -  Assess patient's ability to carry out ADLs; assess patient's baseline for ADL function and identify physical deficits which impact ability to perform ADLs (bathing, care of mouth/teeth, toileting, grooming, dressing, etc.)  - Assess/evaluate cause of self-care deficits   - Assess range of motion  - Assess patient's mobility; develop plan if impaired  - Assess patient's need for assistive devices and provide as appropriate  - Encourage maximum independence but intervene and supervise when necessary  - Involve family in performance of ADLs  - Assess for home care needs following discharge   - Consider OT consult to assist with ADL evaluation and planning for discharge  - Provide patient education as appropriate  Outcome: Progressing     Problem: METABOLIC, FLUID AND ELECTROLYTES - ADULT  Goal: Electrolytes maintained within normal limits  Description: INTERVENTIONS:  - Monitor labs and assess patient for signs and symptoms of electrolyte imbalances  - Administer electrolyte replacement as ordered  - Monitor response to electrolyte replacements, including repeat lab results as appropriate  - Instruct patient on fluid and nutrition as appropriate  Outcome: Progressing  Goal: Glucose maintained within target range  Description: INTERVENTIONS:  - Monitor Blood Glucose as ordered  - Assess for signs and symptoms of hyperglycemia and hypoglycemia  - Administer ordered medications to maintain glucose within target range  - Assess nutritional intake and initiate nutrition service referral as needed  Outcome: Progressing

## 2023-09-01 NOTE — ASSESSMENT & PLAN NOTE
· Over the past 5 days patient has suffered 2 syncopal events, most recently last night. · On primary and secondary evaluation patient was found to have no injuries and admitted to medicine for syncope work-up. · Continue syncope work-up per medical service  · On tertiary evaluation patient does complain of left facial pain, left lateral rib pain, and chronic back pain-see below for further details.

## 2023-09-01 NOTE — PLAN OF CARE
Problem: PHYSICAL THERAPY ADULT  Goal: Performs mobility at highest level of function for planned discharge setting. See evaluation for individualized goals. Description: Treatment/Interventions: Functional transfer training, LE strengthening/ROM, Therapeutic exercise, Elevations, Cognitive reorientation, Patient/family training, Equipment eval/education, Bed mobility, Gait training, Spoke to nursing, Spoke to case management, OT          See flowsheet documentation for full assessment, interventions and recommendations. 9/1/2023 1532 by Foster Soulier, PT  Note: Prognosis: Fair  Problem List: Decreased strength, Decreased endurance, Impaired balance, Decreased mobility, Decreased safety awareness, Impaired vision, Obesity, Pain  Assessment: Pt is a 65 y.o. male seen for PT evaluation s/p admit to [facility] on 9/1/2023. Pt was admitted with a primary dx of: syncope and collapse. PT now consulted for assessment of mobility and d/c needs. Pt with Up and OOB as tolerated orders. Pts current comorbidities and personal factors effecting treatment include: chronic back pain, DM II, alcohol dependence, history of falls, HTN, atherosclerosis of native coronary artery of native heart. . Pts current clinical presentation is Unstable/Unpredictable (high complexity) due to Ongoing medical management for primary dx, Decreased activity tolerance compared to baseline, Fall risk, Increased assistance needed from caregiver at current time, Ongoing telemetry monitoring, Cog status. Prior to admission, pt was independent without AD. Upon evaluation, pt currently is requiring Modified Independent for bed mobility; Supervision for transfers and CGA for ambulation 100 ft w/ no AD.  Pt presents at PT eval functioning below baseline and currently w/ overall mobility deficits 2* to: BLE weakness, impaired balance, gait deviations, decreased activity tolerance compared to baseline, decreased functional mobility tolerance compared to baseline, decreased safety awareness, impaired judgement, fall risk. Pt currently at a fall risk 2* to impairments listed above. Pt will continue to benefit from skilled acute PT interventions to address stated impairments; to maximize functional mobility; for ongoing pt/ family training; and DME needs. At conclusion of PT session all needs in reach, RN notified of session findings/recommendations and pt returned back in recliner chair with phone and call bell within reach. Pt denies any further questions at this time. Recommend home with family care and OPPT upon hospital D/C. PT Discharge Recommendation: Home with outpatient rehabilitation (vestibular/balance)    See flowsheet documentation for full assessment. 9/1/2023 1532 by Luisito Baron PT  Note: Prognosis: Fair  Problem List: Decreased strength, Decreased endurance, Impaired balance, Decreased mobility, Decreased safety awareness, Impaired vision, Obesity, Pain  Assessment: Pt is a 65 y.o. male seen for PT evaluation s/p admit to [address] [place] [address] on 9/1/2023. Pt was admitted with a primary dx of: syncope and collapse. PT now consulted for assessment of mobility and d/c needs. Pt with Up and OOB as tolerated orders. Pts current comorbidities and personal factors effecting treatment include: chronic back pain, DM II, alcohol dependence, history of falls, HTN, atherosclerosis of native coronary artery of native heart. . Pts current clinical presentation is Unstable/Unpredictable (high complexity) due to Ongoing medical management for primary dx, Decreased activity tolerance compared to baseline, Fall risk, Increased assistance needed from caregiver at current time, Ongoing telemetry monitoring, Cog status. Prior to admission, pt was independent without AD. Upon evaluation, pt currently is requiring Modified Independent for bed mobility; Supervision for transfers and CGA for ambulation 100 ft w/ no AD.  [facility] and currently w/ overall mobility deficits 2* to: BLE weakness, impaired balance, gait deviations, decreased activity tolerance compared to baseline, decreased functional mobility tolerance compared to baseline, decreased safety awareness, impaired judgement, fall risk. Pt currently at a fall risk 2* to impairments listed above. Pt will continue to benefit from skilled acute PT interventions to address stated impairments; to maximize functional mobility; for ongoing pt/ family training; and DME needs. At conclusion of PT session all needs in reach, RN notified of session findings/recommendations and pt returned back in recliner chair with phone and call bell within reach. Pt denies any further questions at this time. Recommend home with family care and OPPT upon hospital D/C. PT Discharge Recommendation: Home with outpatient rehabilitation (vestibular/balance)    See flowsheet documentation for full assessment.

## 2023-09-01 NOTE — PHYSICAL THERAPY NOTE
Physical Therapy Evaluation    Patient's Name: Aide Bazan    Admitting Diagnosis  Syncope and collapse [R55]  Unwitnessed fall [R29.6]    Problem List  Patient Active Problem List   Diagnosis    Syncope and collapse    Chronic back pain    Type 2 diabetes mellitus with diabetic polyneuropathy, without long-term current use of insulin (HCC)    Atherosclerosis of native coronary artery of native heart without angina pectoris    Primary hypertension    Essential tremor    Alcohol dependence ([address])    Facial contusion    Rib pain on left side        09/01/23 1330 pt seen from [id number]   Note Type   Note type Evaluation   Pain Assessment   Pain Assessment Tool 0-10   Pain Score 8  (pain prior to PT evaluation, per RN, pt noted discomfort in chest during PT eval, did not state pain)   Pain Location/Orientation Location: Chest   Restrictions/Precautions   Weight Bearing Precautions Per Order No   Other Precautions Fall Risk;Pain;Telemetry; Chair Alarm; Bed Alarm;Cognitive   Home Living   Type of [address] [place] [address] One level;Stairs to enter with rails  (2 FARTUN)   Bathroom Shower/Tub Tub/shower unit   Bathroom Toilet Standard   Home Equipment Cane  (walking stick)   Prior Function   Level of [place] Independent with ADLs; Independent with functional mobility   Lives With Spouse   Receives Help From Family   IADLs Independent with driving; Independent with medication management; Family/Friend/Other provides meals   Falls in the last 6 months (S)  5 to 10   Vocational Full time employment  ()   Comments + drives to work, utilizing walking stick out of home occasionally   General   Family/Caregiver Present Yes   Cognition   Arousal/Participation Cooperative   Orientation Level Oriented X4   Following Commands Follows one step commands with increased time or repetition   Subjective   Subjective pt states most recent falls he is "blacking" out, stating not remembering the falls   RLE Assessment RLE Assessment WFL  (functionally 3+/5)   LLE Assessment   LLE Assessment WFL  (functionally 3+/5)   Bed Mobility   Supine to Sit 6  Modified independent   Additional items Increased time required;HOB elevated   Transfers   Sit to Stand 5  Supervision   Additional items Increased time required;Verbal cues   Stand to Sit 5  Supervision   Additional items Increased time required;Verbal cues   Additional Comments significant sway noted with standing balance when urinating in toilet, required CGA for task   Ambulation/Elevation   Gait pattern Wide TAMEKA; Decreased foot clearance   Gait Assistance 4  Minimal assist  (CGA)   Additional items Assist x 1   Assistive Device None   Distance 100'x2   Stair Management Assistance Not tested  ("i don't do stairs, i have a panic attack")   Ambulation/Elevation Additional Comments right sided sway with ambulation, minor vc for obstacle negotiation   Balance   Static Sitting Fair +   Dynamic Sitting Fair   Static Standing Fair -   Dynamic Standing Fair -   Ambulatory Fair -   Activity Tolerance   Activity Tolerance Patient limited by fatigue;Patient limited by pain   Medical Staff Made Aware care coordinated with LEI Meeks CM   Nurse Made Aware RN pre/post   Assessment   Prognosis Fair   Problem List Decreased strength;Decreased endurance; Impaired balance;Decreased mobility; Decreased safety awareness; Impaired vision;Obesity;Pain   Assessment Pt is a 65 y.o. male seen for PT evaluation s/p admit to [facility] on 9/1/2023. Pt was admitted with a primary dx of: syncope and collapse. PT now consulted for assessment of mobility and d/c needs. Pt with Up and OOB as tolerated orders. Pts current comorbidities and personal factors effecting treatment include: chronic back pain, DM II, alcohol dependence, history of falls, HTN, atherosclerosis of native coronary artery of native heart.  . Pts current clinical presentation is Unstable/Unpredictable (high complexity) due to Ongoing medical management for primary dx, Decreased activity tolerance compared to baseline, Fall risk, Increased assistance needed from caregiver at current time, Ongoing telemetry monitoring, Cog status. Prior to admission, pt was independent without AD. Upon evaluation, pt currently is requiring Modified Independent for bed mobility; Supervision for transfers and CGA for ambulation 100 ft w/ no AD. Pt presents at PT eval functioning below baseline and currently w/ overall mobility deficits 2* to: BLE weakness, impaired balance, gait deviations, decreased activity tolerance compared to baseline, decreased functional mobility tolerance compared to baseline, decreased safety awareness, impaired judgement, fall risk. Pt currently at a fall risk 2* to impairments listed above. Pt will continue to benefit from skilled acute PT interventions to address stated impairments; to maximize functional mobility; for ongoing pt/ family training; and DME needs. At conclusion of PT session all needs in reach, RN notified of session findings/recommendations and pt returned back in recliner chair with phone and call bell within reach. Pt denies any further questions at this time. Recommend home with family care and OPPT upon hospital D/C. Goals   Patient Goals to go home   STG Expiration Date 09/11/23   Short Term Goal #1 In 10 days pt will be able to: 1. Demonstrate ability to perform all aspects of bed mobility independently to improve functional safety. 2. Perform functional transfers independently to facilitate safe return to previous living environment. 3.  Ambulate 250 ft independently with stable vitals to improve safety with household distances and reduce fall risk. 4. Improve LE strength grades by 1 to increase ease of functional mobility with transfers and gait. 5. Pt will demonstrate improved balance by one grade in order to decrease risk of falls. 6. Climb 6 steps with 1 HR independently to simulate entrance to home.    PT Treatment Day 0   Plan   Treatment/Interventions Functional transfer training;LE strengthening/ROM; Therapeutic exercise;Elevations;Cognitive reorientation;Patient/family training;Equipment eval/education; Bed mobility;Gait training;Spoke to nursing;Spoke to case management;OT   PT Frequency 1-2x/wk   Recommendation   PT Discharge Recommendation Home with outpatient rehabilitation  (vestibular/balance)   AM-PAC Basic Mobility Inpatient   Turning in Flat Bed Without Bedrails 3   Lying on Back to Sitting on Edge of Flat Bed Without Bedrails 3   Moving Bed to Chair 3   Standing Up From Chair Using Arms 4   Walk in Room 4   Climb 3-5 Stairs With Railing 3   Basic Mobility Inpatient Raw Score 20   Basic Mobility Standardized Score 43.99   Highest Level Of Mobility   JH-HLM Goal 6: Walk 10 steps or more   JH-HLM Achieved 7: Walk 25 feet or more   End of Consult   Patient Position at End of Consult Bedside chair; All needs within reach  (with OT ivonne)   The patient's AM-PAC Basic Mobility Inpatient Short Form Raw Score is 20. A Raw score of greater than 16 suggests the patient may benefit from discharge to home. Please also refer to the recommendation of the Physical Therapist for safe discharge planning.   Arvind Sage, PT

## 2023-09-01 NOTE — QUICK NOTE
Seen and examined at bedside. He reports he is continuing to have back pain, and still is unable to remember events immediately preceding or following the fall last night. He does have anterior chest pain following echocardiogram that was performed shortly prior to my evaluation. He does report he has unsteadiness on his feet, but not necessarily dizziness or lightheadedness. He has had this for several years. Wife states he has had multiple falls over the past year, however the falls that occurred Sunday and last night were different because he does not remember them and he has seemed to be "off" for approximately the past week. VSS. Wife is present at bedside; updated wife and patient regarding interval lab results. Physical Exam  Vitals reviewed. Constitutional:       General: He is not in acute distress. Appearance: Normal appearance. He is not ill-appearing, toxic-appearing or diaphoretic. 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 Heart sounds: Normal heart sounds. Pulmonary:      Effort: Pulmonary effort is normal. No respiratory distress. Breath sounds: Normal breath sounds. Abdominal:      General: Abdomen is flat. Bowel sounds are normal.   Skin:     General: Skin is warm and dry. Findings: Bruising (L periorbital bruising, bilateral UE) and wound (two wounds are bandaged on the LUE just distal to the elbow) present. Neurological:      Mental Status: He is alert and oriented to person, place, and time.       Comments: Gait not observed, reported to be unsteady per wife   Psychiatric:         Mood and Affect: Mood normal.         Behavior: Behavior normal.       - Repeat K drawn following K of 5.8, repeat 5.0.  - Echo with EF 41-15%; systolic function is normal, wall motion is mornal, prior TTE in 2022 compared and no significant changes noted compared to the prior study  - CK negative, TSH normal  - Repeat mag 1.8  - OT rec home with home outpatient rehab (OP cognitive therapy + fitness to drive eval)  - Orthostatic BPs negative    - Started robaxin 500mg q6h  - Scheduled tylenol 975 q8h  - Pain regimen adjusted to toradol 15mg IV for moderate pain  - Recheck CBC, CMP, magnesium in a.m.  - Fall precautions  - Continue on telemetry  - PT consulted, appreciate recs

## 2023-09-01 NOTE — OCCUPATIONAL THERAPY NOTE
Occupational Therapy Evaluation     Patient Name: Jean Claude Zarate  Today's Date: 9/1/2023  Problem List  Principal Problem:    Syncope and collapse  Active Problems:    Chronic back pain    Type 2 diabetes mellitus with diabetic polyneuropathy, without long-term current use of insulin (HCC)    Atherosclerosis of native coronary artery of native heart without angina pectoris    Primary hypertension    Essential tremor    Alcohol dependence ([address])    Facial contusion    Rib pain on left side    Past Medical History  No past medical history on file. Past Surgical History  No past surgical history on file. 09/01/23 1331   OT Last Visit   OT Visit Date 09/01/23   Note Type   Note type Evaluation   Pain Assessment   Pain Assessment Tool 0-10   Pain Score No Pain   Restrictions/Precautions   Weight Bearing Precautions Per Order No   Other Precautions Cognitive; Chair Alarm; Bed Alarm; Fall Risk   Home Living   Type of [address] [place] [address] One level;Stairs to enter with rails  (2 FARTUN)   Bathroom Shower/Tub Tub/shower unit   Bathroom Toilet Standard   Bathroom Equipment   (none: reports that he holds onto the door of the shower)   [address]  (walking stick)   Additional Comments no use of AD in home, furniture surfing. Use of walking stick in community. Reports not using AD at work   Prior Function   Level of [place] Independent with ADLs  (Pt reports that he gets dressed in standing and leaning against the bed or the wall. Reports that he supports his UE on the back of a chair. Denies ever sitting in the chair to get dressed)   Lives With Spouse   Receives Help From Family   IADLs Independent with driving; Independent with medication management; Family/Friend/Other provides meals  ((I) with laundry and cleaning, (A) with cooking)   Falls in the last 6 months (S)  5 to 10   Vocational Full time employment  (: mainly sedentary work)   Comments Pt reports that he drives daily to/from work   Lifestyle   Autonomy PTA pt living with wife in [address], pt (I) with ADLs and IADLs, (+)falls, (+)drives, no use of AD in home   Reciprocal Relationships supportive wife, however wife works 3-4 jobs and is rarely home   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, reports that he does not use a walking stick or cane at work because he doesn't want to look weak   [facility] enjoys watching [place] and drinking wine   General   Family/Caregiver Present Yes  (wife)   Subjective   Subjective "I don't think I'd go" (to OP balance therapy)   ADL   Eating Assistance 6  Modified independent   Grooming Assistance 6  Modified Independent   UB Bathing Assistance 5  Supervision/Setup   LB Bathing Assistance 5  Supervision/Setup   UB Dressing Assistance 5  Supervision/Setup   LB Dressing Assistance 5  Supervision/Setup   LB Dressing Deficit Increased time to complete;Supervision/safety;Verbal cueing; Thread RLE into pants; Thread LLE into pants;Pull up over hips  (standing against wall requiring min A for steadying)   Toileting Assistance  5  Supervision/Setup   Toileting Deficit   (standing at toilet, requiring min A for steadying)   Bed Mobility   Supine to Sit 6  Modified independent   Transfers   Sit to Stand 5  Supervision   Additional items Increased time required;Verbal cues   Stand to Sit 5  Supervision   Additional items Increased time required;Verbal cues   Additional Comments no use of AD, requiring steadying assist in standing, with use of UE on wall vs door frame for upright balance   Functional Mobility   Functional Mobility 4  Minimal assistance  (CGA)   Additional Comments Ax1; functional household distance   Additional items   (no AD)   Balance   Static Sitting Fair +   Dynamic Sitting Fair   Static Standing Fair -   Dynamic Standing Poor +   Ambulatory Poor +   Activity Tolerance   Activity Tolerance Patient limited by fatigue   Medical Staff Made Aware PT [facility]TA[facility] [place]   RUE Assessment   RUE Assessment WFL   LUE Assessment   LUE Assessment WFL   Hand Function   Gross Motor Coordination Functional   Fine Motor Coordination Functional   Vision - Complex Assessment   Additional Comments (S)  Blind in R eye, prosthesis   Cognition   Overall Cognitive Status (S)  Impaired   Arousal/Participation Alert; Cooperative   Attention Attends with cues to redirect   Orientation Level Oriented X4  (disoriented to time of day reports 5:30 pm)   Memory Decreased short term memory;Decreased recall of recent events   Following Commands Follows one step commands with increased time or repetition   Comments Pt with limited recall, poor problem solving and safety awareness   Cognition Assessment Tools (S)    (Short Blessed Test)   Score (S)  5  (score indicates questionable impairment: would benefit from continued cognitive evaluation)   Assessment   Limitation Decreased ADL status; Decreased Safe judgement during ADL;Decreased cognition;Decreased endurance;Decreased self-care trans;Decreased high-level ADLs  (impaired fxnl mobility, act vaibhav, fxnl reach, fxnl sitting balance, fxnl sitting vaibhav, attention to task, safety awareness, insight, sequencing, problem solving)   Prognosis Good   Assessment Pt is a 65 y.o. male seen for OT evaluation s/p [facility] due to fall. Diagnosed with Syncope and collapse. Personal and env factors supporting pt at time of IE include (I) PLOF, supportive wife, accessible home environment and [address]. Personal and env factors inhibiting engagement in occupations include lifestyle patterns and limited social support (is home alone most of the time). Performance deficits that affect the pt’s occupational performance can be seen above. Due to pt's current functional limitations and medical complications pt is functioning below baseline.  Pt would benefit from continued skilled OT treatment in order to maximize safety, independence and overall performance with ADLs, functional mobility and cognition in order to achieve highest level of function. Goals   Patient Goals to go home   LTG Time Frame 10-14   Long Term Goal see goals listed below   Plan   Treatment Interventions ADL retraining;Functional transfer training; Endurance training;Cognitive reorientation;Patient/family training;Equipment evaluation/education; Compensatory technique education; Energy conservation; Activityengagement   Goal Expiration Date 09/11/23   OT Treatment Day 0   OT Frequency 3-5x/wk   Recommendation   OT Discharge Recommendation Home with outpatient rehabilitation  (OP cognitive therapy + Fitness to Drive Evaluation)   AM-PAC Daily Activity Inpatient   Lower Body Dressing 3   Bathing 3   Toileting 3   Upper Body Dressing 3   Grooming 4   Eating 4   Daily Activity Raw Score 20   Daily Activity Standardized Score (Calc for Raw Score >=11) 42.03   AM-PAC Applied Cognition Inpatient   Following a Speech/Presentation 3   Understanding Ordinary Conversation 3   Taking Medications 3   Remembering Where Things Are Placed or Put Away 2   Remembering List of 4-5 Errands 2   Taking Care of Complicated Tasks 2   Applied Cognition Raw Score 15   Applied Cognition Standardized Score 33.54   End of Consult   Patient Position at End of Consult Bedside chair;Bed/Chair alarm activated; All needs within reach       GOALS:      -Patient will be Mod I with UB dressing using AE and AD as needed in order to increase (I) with ADLs    -Patient will be Mod I with UB bathing using AE and AD as needed in order to increase (I) with ADLs    -Patient will be Mod I with LB dressing with use of AE and AD as needed in order to increase (I) with ADLs    -Patient will be Mod I with LB bathing with use of AE and AD as needed in order to increase (I) with ADLs    -Patient will complete toileting w/ Mod I w/ G hygiene/thoroughness in order to reduce caregiver burden    -Patient will demonstrate Mod I with bed mobility for ability to manage own comfort and initiate OOB tasks.     -Patient will perform functional transfers with Mod I to/from all surfaces using DME as needed in order to increase (I) with functional tasks    -Patient will be Mod I with functional mobility to/from bathroom for increased independence with toileting tasks    -Patient will engage in ongoing cognitive assessment in order to assist with safe discharge planning/recommendations. The patient's raw score on the -PAC Daily Activity Inpatient Short Form is 20. A raw score of greater than or equal to 19 suggests the patient may benefit from discharge to home. Please refer to the recommendation of the Occupational Therapist for safe discharge planning. This session, pt required and most appropriately benefited from skilled OT/PT co-eval due to significant regression from functional baseline and decreased activity tolerance. OT and PT goals were addressed separately as seen in documentation.      Rojelio Reardon MS, OTR/L

## 2023-09-01 NOTE — ASSESSMENT & PLAN NOTE
· Patient describes having chronic back pain. · Nontender on initial evaluation. During tertiary evaluation he does complain of lower T-spine and upper L-spine generalized tenderness. No palpable step-offs. · Patient has not had any CT imaging since 2015 of his spine. We will complete CT chest, abdomen and pelvis to rule out fracture. · Neurologically intact with the exception of chronic lower extremity neuropathy  · Analgesia as needed.

## 2023-09-02 LAB
ALBUMIN SERPL BCP-MCNC: 3.8 G/DL (ref 3.5–5)
ALBUMIN SERPL BCP-MCNC: 3.8 G/DL (ref 3.5–5)
ALP SERPL-CCNC: 59 U/L (ref 34–104)
ALP SERPL-CCNC: 59 U/L (ref 34–104)
ALT SERPL W P-5'-P-CCNC: 22 U/L (ref 7–52)
ALT SERPL W P-5'-P-CCNC: 22 U/L (ref 7–52)
ANION GAP SERPL CALCULATED.3IONS-SCNC: 12 MMOL/L
ANION GAP SERPL CALCULATED.3IONS-SCNC: 12 MMOL/L
AST SERPL W P-5'-P-CCNC: 25 U/L (ref 13–39)
AST SERPL W P-5'-P-CCNC: 25 U/L (ref 13–39)
ATRIAL RATE: 88 BPM
ATRIAL RATE: 88 BPM
BASOPHILS # BLD AUTO: 0.02 THOUSANDS/ÂΜL (ref 0–0.1)
BASOPHILS # BLD AUTO: 0.02 THOUSANDS/ÂΜL (ref 0–0.1)
BASOPHILS NFR BLD AUTO: 0 % (ref 0–1)
BASOPHILS NFR BLD AUTO: 0 % (ref 0–1)
BILIRUB SERPL-MCNC: 1.02 MG/DL (ref 0.2–1)
BILIRUB SERPL-MCNC: 1.02 MG/DL (ref 0.2–1)
BUN SERPL-MCNC: 18 MG/DL (ref 5–25)
BUN SERPL-MCNC: 18 MG/DL (ref 5–25)
CALCIUM SERPL-MCNC: 9 MG/DL (ref 8.4–10.2)
CALCIUM SERPL-MCNC: 9 MG/DL (ref 8.4–10.2)
CHLORIDE SERPL-SCNC: 100 MMOL/L (ref 96–108)
CHLORIDE SERPL-SCNC: 100 MMOL/L (ref 96–108)
CO2 SERPL-SCNC: 21 MMOL/L (ref 21–32)
CO2 SERPL-SCNC: 21 MMOL/L (ref 21–32)
CREAT SERPL-MCNC: 0.98 MG/DL (ref 0.6–1.3)
CREAT SERPL-MCNC: 0.98 MG/DL (ref 0.6–1.3)
EOSINOPHIL # BLD AUTO: 0.05 THOUSAND/ÂΜL (ref 0–0.61)
EOSINOPHIL # BLD AUTO: 0.05 THOUSAND/ÂΜL (ref 0–0.61)
EOSINOPHIL NFR BLD AUTO: 1 % (ref 0–6)
EOSINOPHIL NFR BLD AUTO: 1 % (ref 0–6)
ERYTHROCYTE [DISTWIDTH] IN BLOOD BY AUTOMATED COUNT: 12.6 % (ref 11.6–15.1)
ERYTHROCYTE [DISTWIDTH] IN BLOOD BY AUTOMATED COUNT: 12.6 % (ref 11.6–15.1)
GFR SERPL CREATININE-BSD FRML MDRD: 82 ML/MIN/1.73SQ M
GFR SERPL CREATININE-BSD FRML MDRD: 82 ML/MIN/1.73SQ M
GLUCOSE SERPL-MCNC: 137 MG/DL (ref 65–140)
GLUCOSE SERPL-MCNC: 137 MG/DL (ref 65–140)
GLUCOSE SERPL-MCNC: 138 MG/DL (ref 65–140)
GLUCOSE SERPL-MCNC: 138 MG/DL (ref 65–140)
GLUCOSE SERPL-MCNC: 150 MG/DL (ref 65–140)
GLUCOSE SERPL-MCNC: 150 MG/DL (ref 65–140)
GLUCOSE SERPL-MCNC: 247 MG/DL (ref 65–140)
GLUCOSE SERPL-MCNC: 247 MG/DL (ref 65–140)
HCT VFR BLD AUTO: 34.6 % (ref 36.5–49.3)
HCT VFR BLD AUTO: 34.6 % (ref 36.5–49.3)
HGB BLD-MCNC: 12.1 G/DL (ref 12–17)
HGB BLD-MCNC: 12.1 G/DL (ref 12–17)
IMM GRANULOCYTES # BLD AUTO: 0.03 THOUSAND/UL (ref 0–0.2)
IMM GRANULOCYTES # BLD AUTO: 0.03 THOUSAND/UL (ref 0–0.2)
IMM GRANULOCYTES NFR BLD AUTO: 1 % (ref 0–2)
IMM GRANULOCYTES NFR BLD AUTO: 1 % (ref 0–2)
LYMPHOCYTES # BLD AUTO: 1.2 THOUSANDS/ÂΜL (ref 0.6–4.47)
LYMPHOCYTES # BLD AUTO: 1.2 THOUSANDS/ÂΜL (ref 0.6–4.47)
LYMPHOCYTES NFR BLD AUTO: 22 % (ref 14–44)
LYMPHOCYTES NFR BLD AUTO: 22 % (ref 14–44)
MAGNESIUM SERPL-MCNC: 1.6 MG/DL (ref 1.9–2.7)
MAGNESIUM SERPL-MCNC: 1.6 MG/DL (ref 1.9–2.7)
MCH RBC QN AUTO: 36.2 PG (ref 26.8–34.3)
MCH RBC QN AUTO: 36.2 PG (ref 26.8–34.3)
MCHC RBC AUTO-ENTMCNC: 35 G/DL (ref 31.4–37.4)
MCHC RBC AUTO-ENTMCNC: 35 G/DL (ref 31.4–37.4)
MCV RBC AUTO: 104 FL (ref 82–98)
MCV RBC AUTO: 104 FL (ref 82–98)
MONOCYTES # BLD AUTO: 0.63 THOUSAND/ÂΜL (ref 0.17–1.22)
MONOCYTES # BLD AUTO: 0.63 THOUSAND/ÂΜL (ref 0.17–1.22)
MONOCYTES NFR BLD AUTO: 11 % (ref 4–12)
MONOCYTES NFR BLD AUTO: 11 % (ref 4–12)
NEUTROPHILS # BLD AUTO: 3.66 THOUSANDS/ÂΜL (ref 1.85–7.62)
NEUTROPHILS # BLD AUTO: 3.66 THOUSANDS/ÂΜL (ref 1.85–7.62)
NEUTS SEG NFR BLD AUTO: 65 % (ref 43–75)
NEUTS SEG NFR BLD AUTO: 65 % (ref 43–75)
NRBC BLD AUTO-RTO: 0 /100 WBCS
NRBC BLD AUTO-RTO: 0 /100 WBCS
P AXIS: 64 DEGREES
P AXIS: 64 DEGREES
PLATELET # BLD AUTO: 110 THOUSANDS/UL (ref 149–390)
PLATELET # BLD AUTO: 110 THOUSANDS/UL (ref 149–390)
PMV BLD AUTO: 9.3 FL (ref 8.9–12.7)
PMV BLD AUTO: 9.3 FL (ref 8.9–12.7)
POTASSIUM SERPL-SCNC: 4.7 MMOL/L (ref 3.5–5.3)
POTASSIUM SERPL-SCNC: 4.7 MMOL/L (ref 3.5–5.3)
PR INTERVAL: 162 MS
PR INTERVAL: 162 MS
PROT SERPL-MCNC: 6.6 G/DL (ref 6.4–8.4)
PROT SERPL-MCNC: 6.6 G/DL (ref 6.4–8.4)
QRS AXIS: 47 DEGREES
QRS AXIS: 47 DEGREES
QRSD INTERVAL: 66 MS
QRSD INTERVAL: 66 MS
QT INTERVAL: 362 MS
QT INTERVAL: 362 MS
QTC INTERVAL: 438 MS
QTC INTERVAL: 438 MS
RBC # BLD AUTO: 3.34 MILLION/UL (ref 3.88–5.62)
RBC # BLD AUTO: 3.34 MILLION/UL (ref 3.88–5.62)
SODIUM SERPL-SCNC: 133 MMOL/L (ref 135–147)
SODIUM SERPL-SCNC: 133 MMOL/L (ref 135–147)
T WAVE AXIS: 64 DEGREES
T WAVE AXIS: 64 DEGREES
VENTRICULAR RATE: 88 BPM
VENTRICULAR RATE: 88 BPM
VIT B12 SERPL-MCNC: 153 PG/ML (ref 180–914)
VIT B12 SERPL-MCNC: 153 PG/ML (ref 180–914)
WBC # BLD AUTO: 5.59 THOUSAND/UL (ref 4.31–10.16)
WBC # BLD AUTO: 5.59 THOUSAND/UL (ref 4.31–10.16)

## 2023-09-02 PROCEDURE — 83735 ASSAY OF MAGNESIUM: CPT

## 2023-09-02 PROCEDURE — 82607 VITAMIN B-12: CPT

## 2023-09-02 PROCEDURE — 80053 COMPREHEN METABOLIC PANEL: CPT

## 2023-09-02 PROCEDURE — 93010 ELECTROCARDIOGRAM REPORT: CPT | Performed by: INTERNAL MEDICINE

## 2023-09-02 PROCEDURE — 82948 REAGENT STRIP/BLOOD GLUCOSE: CPT

## 2023-09-02 PROCEDURE — 85025 COMPLETE CBC W/AUTO DIFF WBC: CPT

## 2023-09-02 PROCEDURE — 99232 SBSQ HOSP IP/OBS MODERATE 35: CPT | Performed by: INTERNAL MEDICINE

## 2023-09-02 RX ORDER — LIDOCAINE 50 MG/G
2 PATCH TOPICAL DAILY
Status: DISCONTINUED | OUTPATIENT
Start: 2023-09-02 | End: 2023-09-04 | Stop reason: HOSPADM

## 2023-09-02 RX ORDER — CYANOCOBALAMIN 1000 UG/ML
1000 INJECTION, SOLUTION INTRAMUSCULAR; SUBCUTANEOUS
Status: DISCONTINUED | OUTPATIENT
Start: 2023-09-02 | End: 2023-09-03

## 2023-09-02 RX ORDER — MAGNESIUM SULFATE HEPTAHYDRATE 40 MG/ML
4 INJECTION, SOLUTION INTRAVENOUS ONCE
Status: COMPLETED | OUTPATIENT
Start: 2023-09-02 | End: 2023-09-02

## 2023-09-02 RX ADMIN — Medication 100 MG: at 08:46

## 2023-09-02 RX ADMIN — INSULIN LISPRO 2 UNITS: 100 INJECTION, SOLUTION INTRAVENOUS; SUBCUTANEOUS at 21:07

## 2023-09-02 RX ADMIN — ACETAMINOPHEN 975 MG: 325 TABLET, FILM COATED ORAL at 21:05

## 2023-09-02 RX ADMIN — METHOCARBAMOL TABLETS 500 MG: 500 TABLET, COATED ORAL at 03:55

## 2023-09-02 RX ADMIN — NICOTINE POLACRILEX 2 MG: 2 GUM, CHEWING ORAL at 10:19

## 2023-09-02 RX ADMIN — OXYCODONE HYDROCHLORIDE 5 MG: 5 TABLET ORAL at 09:35

## 2023-09-02 RX ADMIN — ASPIRIN 81 MG: 81 TABLET, COATED ORAL at 08:47

## 2023-09-02 RX ADMIN — ACETAMINOPHEN 975 MG: 325 TABLET, FILM COATED ORAL at 05:00

## 2023-09-02 RX ADMIN — MAGNESIUM SULFATE HEPTAHYDRATE 4 G: 40 INJECTION, SOLUTION INTRAVENOUS at 10:12

## 2023-09-02 RX ADMIN — ACETAMINOPHEN 975 MG: 325 TABLET, FILM COATED ORAL at 14:03

## 2023-09-02 RX ADMIN — LISINOPRIL 20 MG: 20 TABLET ORAL at 08:46

## 2023-09-02 RX ADMIN — OXYCODONE HYDROCHLORIDE 5 MG: 5 TABLET ORAL at 03:48

## 2023-09-02 RX ADMIN — PRASUGREL 10 MG: 10 TABLET, FILM COATED ORAL at 10:11

## 2023-09-02 RX ADMIN — METHOCARBAMOL TABLETS 500 MG: 500 TABLET, COATED ORAL at 09:35

## 2023-09-02 RX ADMIN — CYANOCOBALAMIN 1000 MCG: 1000 INJECTION, SOLUTION INTRAMUSCULAR; SUBCUTANEOUS at 17:25

## 2023-09-02 RX ADMIN — METOPROLOL SUCCINATE 50 MG: 50 TABLET, EXTENDED RELEASE ORAL at 08:46

## 2023-09-02 RX ADMIN — FOLIC ACID 1 MG: 1 TABLET ORAL at 08:46

## 2023-09-02 RX ADMIN — LIDOCAINE 1 PATCH: 50 PATCH TOPICAL at 08:46

## 2023-09-02 RX ADMIN — OXYCODONE HYDROCHLORIDE 5 MG: 5 TABLET ORAL at 21:05

## 2023-09-02 RX ADMIN — PRIMIDONE 50 MG: 50 TABLET ORAL at 21:05

## 2023-09-02 RX ADMIN — LIDOCAINE 2 PATCH: 700 PATCH TOPICAL at 12:33

## 2023-09-02 RX ADMIN — INSULIN LISPRO 1 UNITS: 100 INJECTION, SOLUTION INTRAVENOUS; SUBCUTANEOUS at 08:49

## 2023-09-02 RX ADMIN — NICOTINE POLACRILEX 2 MG: 2 GUM, CHEWING ORAL at 03:51

## 2023-09-02 RX ADMIN — PRIMIDONE 50 MG: 50 TABLET ORAL at 08:47

## 2023-09-02 RX ADMIN — MULTIPLE VITAMINS W/ MINERALS TAB 1 TABLET: TAB ORAL at 08:46

## 2023-09-02 NOTE — ASSESSMENT & PLAN NOTE
· Drinks 2 or more glasses of wine daily  · Denies drinking beer or hard liquor    Plan  · CIWA protocol; spoke with nurse and all CIWA's were 0  · Brief intervention encouraging patient to decrease intake

## 2023-09-02 NOTE — ASSESSMENT & PLAN NOTE
· BIB by EMS following syncopal event that occurred PTA  · Similar episode occurred last week on prior ED visit  · Multiple falls within the past months to years  · Poor coordination and balance 2/2 peripheral neuropathy  · Right eye prosthesis  · H/O alcohol dependence  · Essential tremor present  · ED workup: ECG showed NSR trops negative, CBC and CMP unremarkable  · Trauma team workup unremarkable  · Orthostatic Hypotension BP readings negitive  · Echo and telemetry unremarkable   · Macrocytosis mcv of 105 w/ low magnesium   · Ddx: ambulatory dysfunction due to poor vision, neuropathy and alcohol abuse; Seizure & medication induced syncope not excluded (less likely)  Plan  · Telemetry  · Orthostatic BP  · PT/OT  · Fall precautions  · Neuro consult pending   · Mg replaced 4g over 4 hours IVpg  · b12 ordered  · Thiamine and folate supplementation

## 2023-09-02 NOTE — ASSESSMENT & PLAN NOTE
No results found for: "HGBA1C"    Recent Labs     09/01/23  1113 09/01/23  1608 09/01/23  2106 09/02/23  1227   POCGLU 143* 143* 226* 138       Blood Sugar Average: Last 72 hrs:  Plan:  • Hold home regimen: metformin 1,000 mg daily  • Diet Consistent CHO Level 2 (5 CHO servings/75g CHO per meal)  • SSI and Accucheck  • Goal -180 while admitted, adjusting insulin regimen as appropriate  • Monitor for hypoglycemia and treat per protocol  • Will check HbA1c

## 2023-09-02 NOTE — ASSESSMENT & PLAN NOTE
· Self medicates with wine and Tylenol daily  · TRAUMA - CT C-spine impression: No cervical spine fracture or traumatic malalignment. · MRI L-spine 2015 revealed Mild L3-4 and L4-5 degenerative disc disease. Small left foraminal disc protrusion at L3-4. Only minimal canal stenosis noted. No significant foraminal narrowing or foraminal nerve impingement.     Plan  · Multimodal pain regimen  · Lidocaine patches for chest and back   · Toradol d/c due to risk with alcohol abuse  · Hydromorphone and Robaxin as needed  · Consider repeat MRI (probably more appropriate in the outpatient setting)

## 2023-09-02 NOTE — PROGRESS NOTES
[facility]  Progress Note  Name: Zachery Lucio  MRN: [id number]  Unit/Bed#: S -01 I Date of Admission: 9/1/2023   Date of Service: 9/2/2023 I Hospital Day: 0    Assessment/Plan   Rib pain on left side  Assessment & Plan  Lidocaine patch ordered for chest    Robaxin and hydromorphone as needed    Facial contusion  Assessment & Plan  Stable today. Bruising most likely due to blood thinners and history of fall        Alcohol dependence ([address])  Assessment & Plan  · Drinks 2 or more glasses of wine daily  · Denies drinking beer or hard liquor    Plan  · CIWA protocol; spoke with nurse and all CIWA's were 0  · Brief intervention encouraging patient to decrease intake    Essential tremor  Assessment & Plan  · Continue home medication: primidone      Primary hypertension  Assessment & Plan  Blood Pressure: 161/88   Hypertensive episodes most likely caused by pain. We will continue to monitor. Blood pressure seems to decrease with pain control.   · Continue home medication: Lisinopril and metoprolol    Atherosclerosis of native coronary artery of native heart without angina pectoris  Assessment & Plan  · Oct 2013: 95% prox LAD, 40% mid LAD, EF 60%; MANUEL to prox LAD  · Continue home medications: aspirin, metoprolol, prasugrel  · Patient stopped taking Lipitor due to adverse reactions      Type 2 diabetes mellitus with diabetic polyneuropathy, without long-term current use of insulin [facility])  Assessment & Plan  No results found for: "HGBA1C"    Recent Labs     09/01/23  1113 09/01/23  1608 09/01/23  2106 09/02/23  1227   POCGLU 143* 143* 226* 138       Blood Sugar Average: Last 72 hrs:  Plan:  • Hold home regimen: metformin 1,000 mg daily  • Diet Consistent CHO Level 2 (5 CHO servings/75g CHO per meal)  • SSI and Accucheck  • Goal -180 while admitted, adjusting insulin regimen as appropriate  • Monitor for hypoglycemia and treat per protocol  • Will check HbA1c        Chronic back pain  Assessment & Plan  · Self medicates with wine and Tylenol daily  · TRAUMA - CT C-spine impression: No cervical spine fracture or traumatic malalignment. · MRI L-spine 2015 revealed Mild L3-4 and L4-5 degenerative disc disease. Small left foraminal disc protrusion at L3-4. Only minimal canal stenosis noted. No significant foraminal narrowing or foraminal nerve impingement. Plan  · Multimodal pain regimen  · Lidocaine patches for chest and back   · Toradol d/c due to risk with alcohol abuse  · Hydromorphone and Robaxin as needed  · Consider repeat MRI (probably more appropriate in the outpatient setting)      * Syncope and collapse  Assessment & Plan  · BIB by EMS following syncopal event that occurred PTA  · Similar episode occurred last week on prior ED visit  · Multiple falls within the past months to years  · Poor coordination and balance 2/2 peripheral neuropathy  · Right eye prosthesis  · H/O alcohol dependence  · Essential tremor present  · ED workup: ECG showed NSR trops negative, CBC and CMP unremarkable  · Trauma team workup unremarkable  · Orthostatic Hypotension BP readings negitive  · Echo and telemetry unremarkable   · Macrocytosis mcv of 105 w/ low magnesium   · Ddx: ambulatory dysfunction due to poor vision, neuropathy and alcohol abuse; Seizure & medication induced syncope not excluded (less likely)  Plan  · Telemetry  · Orthostatic BP  · PT/OT  · Fall precautions  · Neuro consult pending   · Mg replaced 4g over 4 hours IVpg  · b12 ordered  · Thiamine and folate supplementation              VTE Pharmacologic Prophylaxis:   VTE Score: 3 Moderate Risk (Score 3-4) - Pharmacological DVT Prophylaxis Contraindicated. Sequential Compression Devices Ordered. Mechanical VTE Prophylaxis in Place: Yes    Patient Centered Rounds: I have performed bedside rounds with nursing staff today.     Discussions with Specialists or Other Care Team Provider: Neurology     Education and Discussions with Family / Patient: Updated  (wife) at bedside. Current Length of Stay: 0 day(s)    Current Patient Status: Observation     Discharge Plan / Estimated Discharge Date: Anticipate discharge in 48 hrs to home. Code Status: Level 1 - Full Code      Subjective:   Patient seen at the bedside today in no acute distress. Patient complains of chronic back pain and right-sided rib pain. Patient states this comes and goes and the pain medicine is helping slightly. Patient states he had no issues sleeping overnight. He is just frustrated that there are no answers as to his condition. Patient and his wife educated at bedside ensuring them we are undergoing a thorough work-up. They want to pursue all avenues. Told the patient and wife we will reach out to neurology for consult. Patient and wife are relieved to hear that there is no cardiac abnormalities. Patient becomes visibly frustrated when questioned about alcohol use and does not think that is the culprit of the problem. Objective:     Vitals:   Temp (24hrs), Av.2 °F (36.8 °C), Min:98.1 °F (36.7 °C), Max:98.4 °F (36.9 °C)    Temp:  [98.1 °F (36.7 °C)-98.4 °F (36.9 °C)] 98.1 °F (36.7 °C)  HR:  [72-85] 72  Resp:  [16-18] 16  BP: (117-184)/() 161/88  SpO2:  [97 %-99 %] 98 %  Body mass index is 25.8 kg/m². Input and Output Summary (last 24 hours):     No intake or output data in the 24 hours ending 23 1441    Physical Exam:     Physical Exam  Constitutional:       Appearance: Normal appearance. HENT:      Head: Normocephalic. Comments: Bruising around the left orbit  Cardiovascular:      Rate and Rhythm: Normal rate and regular rhythm. Pulses: Normal pulses. Heart sounds: Normal heart sounds. No murmur heard. Pulmonary:      Effort: Pulmonary effort is normal. No respiratory distress. Breath sounds: Normal breath sounds. No stridor. No wheezing or rhonchi. Abdominal:      General: There is no distension. Palpations: Abdomen is soft. Tenderness: There is no abdominal tenderness. There is no guarding. Musculoskeletal:      Comments: Bruising and erythema down the left arm   Neurological:      General: No focal deficit present. Mental Status: He is alert and oriented to person, place, and time. Psychiatric:         Mood and Affect: Mood normal.         Behavior: Behavior normal.         Thought Content:  Thought content normal.          Additional Data:     Labs:  Results from last 7 days   Lab Units 09/02/23  0453   WBC Thousand/uL 5.59   HEMOGLOBIN g/dL 12.1   HEMATOCRIT % 34.6*   PLATELETS Thousands/uL 110*   NEUTROS PCT % 65   LYMPHS PCT % 22   MONOS PCT % 11   EOS PCT % 1     Results from last 7 days   Lab Units 09/02/23  0453   SODIUM mmol/L 133*   POTASSIUM mmol/L 4.7   CHLORIDE mmol/L 100   CO2 mmol/L 21   BUN mg/dL 18   CREATININE mg/dL 0.98   ANION GAP mmol/L 12   CALCIUM mg/dL 9.0   ALBUMIN g/dL 3.8   TOTAL BILIRUBIN mg/dL 1.02*   ALK PHOS U/L 59   ALT U/L 22   AST U/L 25   GLUCOSE RANDOM mg/dL 150*     Results from last 7 days   Lab Units 09/01/23  0012   INR  0.93     Results from last 7 days   Lab Units 09/02/23  1227 09/01/23  2106 09/01/23  1608 09/01/23  1113   POC GLUCOSE mg/dl 138 226* 143* 143*               Imaging: Reviewed radiology reports from this admission including: abdominal/pelvic CT scan, CT head and ECHO    Recent Cultures (last 7 days):           Lines/Drains:  Invasive Devices     Peripheral Intravenous Line  Duration           Peripheral IV 09/01/23 Right Antecubital 1 day                Telemetry:   Telemetry Orders (From admission, onward)             24 Hour Telemetry Monitoring  Continuous x 24 Hours (Telem)        Question:  Reason for 24 Hour Telemetry  Answer:  Syncope suspected to be cardiac in origin                    Last 24 Hours Medication List:   Current Facility-Administered Medications   Medication Dose Route Frequency Provider Last Rate   • acetaminophen 975 mg Oral [facility] Skyler Arambula, DO     • aspirin  81 mg Oral Daily Jesus Manuel Bush MD     • folic acid  1 mg Oral Daily Jesus Manuel Bush MD     • HYDROmorphone  0.2 mg Intravenous Q2H PRN Jesus Manuel Bush MD     • insulin lispro  1-5 Units Subcutaneous 4x Daily (with meals and at bedtime) Pink Lady, DO     • lidocaine  2 patch Topical Daily Maurilio Perdomo MD     • lisinopril  20 mg Oral Daily Jesus Manuel Bush MD     • methocarbamol  500 mg Oral Q6H PRN Lacy Hammond DO     • metoprolol succinate  50 mg Oral Daily Jesus Manuel Bush MD     • multivitamin-minerals  1 tablet Oral Daily Jesus Manuel Bush MD     • nicotine polacrilex  2 mg Oral Q2H PRN Jesus Manuel Bush MD     • oxyCODONE  5 mg Oral Q4H PRN Skyler Arambula DO     • polyethylene glycol  17 g Oral Daily Jesus Manuel Bush MD     • prasugrel  10 mg Oral Daily Jesus Manuel Bush MD     • primidone  50 mg Oral Q12H Luli Ontiveros MD     • thiamine  100 mg Oral Daily Jesus Manuel Bush MD          Today, Patient Was Seen By: Lacho Dave DO    ** Please Note: This note has been constructed using a voice recognition system.  **

## 2023-09-02 NOTE — UTILIZATION REVIEW
Initial Clinical Review    Admission: Date/Time/Statement: 9/1/23 0221 observation AND CHANGED 9/3/23 1650 TO INPATIENT AS HAS SURPASSED 48 HOURS IN OBSERVATION AND SYNCOPAL WORK UP IN PROGRESS, TELEMETRY CONTINUES; BACK AND RIB PAIN WITH ONGOING PAIN CONTROL   Admission Orders (From admission, onward)     Ordered        09/03/23 1650  Inpatient Admission  Once                      Orders Placed This Encounter   Procedures   • Inpatient Admission     Standing Status:   Standing     Number of Occurrences:   1     Order Specific Question:   Level of Care     Answer:   Med Surg [16]     Order Specific Question:   Estimated length of stay     Answer:   More than 2 Midnights     Order Specific Question:   Certification     Answer:   I certify that inpatient services are medically necessary for this patient for a duration of greater than two midnights. See H&P and MD Progress Notes for additional information about the patient's course of treatment. ED Arrival Information     Expected   -    Arrival   9/1/2023 00:01    Acuity   Emergent            Means of arrival   Ambulance    Escorted by   [facility]) Emergency Squad    Service   Hospitalist    Admission type   Emergency            Arrival complaint   -           Chief Complaint   Patient presents with   • Trauma     Fall slurred speech thinners syncopal episode       Initial Presentation: 65 y.o. male from home to ED via ems admitted to observation [address]  due to Syncope and collapse/alcohol dependence. PMH of HTN, CAD s/p stent placement, HLD, T2DM, macular edema, HTN/DM retinopathy and alcohol use. Presented   after fall from syncopal event just prior to arrival, wife heard thud and found patient lying face down making gurgling sounds. Slurred speech. Had several glasses wine earlier in evening. Has previous syncope about 5 days previous to arrival.   On asa,/Effient. Recently started Primidone for tremors. Has + back pain.   On exam abrasion left arm previous fall. Left eye has subconjunctival hemorrhage which she associates with previous eye injections from earlier this week. Scattered old ecchymosis. Alert and oriented. No slurred speech. Glucose 142. H&h 11.8/34.7. Platelets 724. In the ED given tetanus. Plan is admit to medicine. Telemetry. Echo. CIWA. Start thiamine and folate. PT/OT. Pain control. Avoid narcotics. Continue home medications. 9/2/23 observation:   Has chronic back pain, right sided rib pain today. Becomes frustrated about alcohol use and does not think that is contributing to problem. Wants all avenues explored for culprit of symptoms. No signs withdrawal.   On exam bruising around the left orbit. Bruising and erythema down left arm. Orthostatic Hypotension BP readings negitive. H&H 12.1/34. 6. Plan is Lidocaine patch, continue robaxin and Dilaudid as needed for pain control. Dc Toradol. CIWA. BP control and continue home medications. Goal glucose 140 -180, continue SSI. continue telemetry. Consult neurology. Replete Mg, continue thiamine and folate, B12 ordered. 9/3/23 CHANGED TO INPATIENT   Has dyspnea with exertion. Fatigued. Continued back pain, rates 8 to 10/10. On exam sclera red on left eye, right with prosthesis. Lungs diminished breath sounds. Incentive spirometry 1164 with goal of 2400. Telemetry sinus. Continue pain control. Given dose of B12. Neurology to consult. Pain control in progress. 9/3/23 per neurology - patient with syncope. Differential is use of Primidone (not typical) vs seizures (doubt Primidone would cause this0. Unsteady gait is likely due to neuropathy. On week of admission, 2 episodes of LOC with standing and confused about 10 minutes afterward. Recommend vitamin B12, outpatient eeg, taper off Primidone.       ED Triage Vitals   Temperature Pulse Respirations Blood Pressure SpO2   09/01/23 0007 09/01/23 0000 09/01/23 0000 09/01/23 0000 09/01/23 0000   97.5 °F (36.4 °C) 95 20 163/80 100 %      Temp Source Heart Rate Source Patient Position - Orthostatic VS BP Location FiO2 (%)   09/01/23 0007 09/01/23 0000 09/01/23 0410 09/01/23 0410 --   Oral Monitor Lying - Orthostatic VS Left arm       Pain Score       09/01/23 0316       4          Wt Readings from Last 1 Encounters:   09/03/23 83.7 kg (184 lb 9.6 oz)     Additional Vital Signs:   09/03/23 07:12:06 97.5 °F (36.4 °C) 71 16 149/91 110 94 % -- -- --   09/02/23 21:56:14 97.6 °F (36.4 °C) 77 -- 120/75 90 95 % -- -- --   09/02/23 2100 -- -- -- -- -- -- -- None (Room air) --   09/02/23 14:57:42 98.1 °F (36.7 °C) 75 -- 130/73 92 98 % -- -- --   09/02/23 0756 98.1 °F (36.7 °C) 72 16 161/88 112 98 %        09/02/23 05:04:41 -- 85 -- 166/101 Abnormal  123 98 % -- -- --   09/02/23 03:10:37 98.4 °F (36.9 °C) 75 18 184/93 Abnormal  123 98 % -- -- --   09/01/23 22:32:01 98.2 °F (36.8 °C) 80 18 155/89 111 97 % -- -- --   09/01/23 22:31:50 98.2 °F (36.8 °C) -- 18 155/89 111 -- -- -- --   09/01/23 18:29:17 98.1 °F (36.7 °C) 77 18 119/70 86 99 % -- -- --   09/01/23 14:50:41 98.3 °F (36.8 °C) 82 18 117/77 90 98 % -- -- --   09/01/23 11:13:39 98.2 °F (36.8 °C) 96 19 163/86 112 96 % -- -- --   09/01/23 0845 -- 113 Abnormal  -- -- -- -- -- -- --   09/01/23 0805 -- 103 -- 150/85 -- 97 % -- None (Room air) --   09/01/23 06:40:02 98.6 °F (37 °C) 98 19 150/85 107 96 % -- -- --   09/01/23 0423 -- -- -- -- -- 91 % 0 L/min None (Room air) --   09/01/23 0416 -- 70 -- 171/93 Abnormal  -- -- -- -- Standing for 3 minutes - Orthostatic VS   09/01/23 0413 -- 70 -- 134/88 -- -- -- -- Standing - Orthostatic VS   09/01/23 0411 -- 76 -- 144/88 -- -- -- -- Sitting - Orthostatic VS   09/01/23 0410 -- 75 -- 150/88 -- -- -- -- Lying - Orthostatic VS   09/01/23 03:16:36 97.7 °F (36.5 °C) 75 -- 146/89 108 91 % -- -- --     CIWA  9/3/23:  0 at 0400.   0 at 0000.  9/2/23:   0 at 2156.   0 at 1600. 0 at 1200.  0 at 0800.   0 at 0504.   0 at 0310.     9/1/23:  0 at 2231.   0 at [address] [facility],  [address].   0 at 1600.  0 at 1200.   0 at 0805.  0 at 0413    Pertinent Labs/Diagnostic Test Results:   CT chest abdomen pelvis wo contrast   Final Result by Orquidea Nava MD (09/01 1231)      No acute traumatic injury in the chest abdomen or pelvis. Incidental 9 mm nodule in the left lobe of the thyroid gland identified on this CT. According to guidelines published in the February 2015 white paper on incidental thyroid nodules in the Journal of the [employer] of Radiology Brandon Coello), because the    nodule(s) are less than 1.5 cm in size and without suspicious feature, no further evaluation is recommended      Mild centrilobular emphysema. Workstation performed: [id number]         TRAUMA - CT head wo contrast   Final Result by Yessica Ying MD (09/01 0040)      No intracranial hemorrhage or calvarial fracture. Above findings discussed with Dr. Blake Guzmán at 12:40 a.m. on 9/1/2023. Workstation performed: [id number]         TRAUMA - CT spine cervical wo contrast   Final Result by Yessica Ying MD (09/01 0050)      No cervical spine fracture or traumatic malalignment. Above findings discussed with Dr. Blake Guzmán at 12:40 a.m. on 9/1/2023. Workstation performed: [id number]         XR trauma multiple    (Results Pending)   XR chest 1 view    (Results Pending)     9/1/23 ecg Normal sinus rhythm   Normal ECG   No previous ECGs available     9/1/23 echo Left Ventricle: Left ventricular cavity size is normal. The left ventricular ejection fraction is 60-65%. Systolic function is normal. Wall motion is normal.  •  Right Ventricle: Right ventricular cavity size is normal. Systolic function is normal.  •  Prior TTE study available for comparison. Prior study date: 4/11/2022.  No significant changes noted compared to the prior study    Results from last 7 days   Lab Units 09/03/23  0447 09/02/23  0453 09/01/23  0515 09/01/23  0013 09/01/23  0012 WBC Thousand/uL 4.75 5.59 6.04  --  5.51   HEMOGLOBIN g/dL 11.9* 12.1 12.4  --  11.8*   I STAT HEMOGLOBIN g/dl  --   --   --  11.6*  --    HEMATOCRIT % 34.7* 34.6* 37.2  --  34.7*   HEMATOCRIT, ISTAT %  --   --   --  34*  --    PLATELETS Thousands/uL 107* 110* 109*  --  114*   NEUTROS ABS Thousands/µL 2.99 3.66 3.73  --  2.98     Results from last 7 days   Lab Units 09/03/23  0447 09/02/23  0453 09/01/23  1404 09/01/23  0829 09/01/23  0515 09/01/23  0013 09/01/23  0012   SODIUM mmol/L 135 133*  --   --  136  --  136   POTASSIUM mmol/L 4.3 4.7  --  5.0 5.8*  --  4.8   CHLORIDE mmol/L 102 100  --   --  107  --  106   CO2 mmol/L 25 21  --   --  16*  --  17*   CO2, I-STAT mmol/L  --   --   --   --   --  17*  --    ANION GAP mmol/L 8 12  --   --  13  --  13   BUN mg/dL 11 18  --   --  15  --  17   CREATININE mg/dL 0.88 0.98  --   --  0.97  --  1.08   EGFR ml/min/1.73sq m 92 82  --   --  83  --  73   CALCIUM mg/dL 9.0 9.0  --   --  8.9  --  8.9   CALCIUM, IONIZED, ISTAT mmol/L  --   --   --   --   --  1.19  --    MAGNESIUM mg/dL 1.9 1.6* 1.8*  --  1.8*  --  1.6*     Results from last 7 days   Lab Units 09/02/23  0453 09/01/23  0012   AST U/L 25 33   ALT U/L 22 25   ALK PHOS U/L 59 68   TOTAL PROTEIN g/dL 6.6 6.3*   ALBUMIN g/dL 3.8 3.7   TOTAL BILIRUBIN mg/dL 1.02* 0.44     Results from last 7 days   Lab Units 09/03/23  1630 09/03/23  1103 09/03/23  0711 09/02/23  2104 09/02/23  1626 09/02/23  1227 09/01/23  2106 09/01/23  1608 09/01/23  1113   POC GLUCOSE mg/dl 171* 143* 143* 247* 137 138 226* 143* 143*     Results from last 7 days   Lab Units 09/03/23  0447 09/02/23  0453 09/01/23  0515 09/01/23  0012   GLUCOSE RANDOM mg/dL 124 150* 145* 142*     Results from last 7 days   Lab Units 09/01/23  0013   PH, MICHELLE I-STAT  7.350   PCO2, MICHELLE ISTAT mm HG 28.9*   PO2, MICHELLE ISTAT mm HG 36.0   HCO3, MICHELLE ISTAT mmol/L 16.0*   I STAT BASE EXC mmol/L -8*   I STAT O2 SAT % 67     Results from last 7 days   Lab Units 09/01/23  0829   CK TOTAL U/L 59     Results from last 7 days   Lab Units 09/01/23  0515 09/01/23  0213 09/01/23  0012   HS TNI 0HR ng/L  --   --  2   HS TNI 2HR ng/L  --  2  --    HSTNI D2 ng/L  --  0  --    HS TNI 4HR ng/L 3  --   --    HSTNI D4 ng/L 1  --   --      Results from last 7 days   Lab Units 09/01/23  0012   PROTIME seconds 13.0   INR  0.93   PTT seconds 26     Results from last 7 days   Lab Units 09/01/23  0515   TSH 3RD GENERATON uIU/mL 0.916     Results from last 7 days   Lab Units 09/01/23  0012   BNP pg/mL 14       ED Treatment:   Medication Administration from 08/31/2023 2356 to 09/01/2023 0305       Date/Time Order Dose Route Action Comments     09/01/2023 0045 EDT tetanus-diphtheria-acellular pertussis (BOOSTRIX) IM injection 0.5 mL 0.5 mL Intramuscular Given --        No past medical history on file. Present on Admission:  **None**      Admitting Diagnosis: Syncope and collapse [R55]  Unwitnessed fall [R29.6]  Age/Sex: 65 y.o. male  Admission Orders:  Scheduled Medications:  acetaminophen, 975 mg, Oral, Q8H TERRENCE  aspirin, 81 mg, Oral, Daily  folic acid, 1 mg, Oral, Daily  insulin lispro, 1-5 Units, Subcutaneous, 4x Daily (with meals and at bedtime)  lidocaine, 1 patch, Topical, Daily and increased to 2 on 9/2/23  lisinopril, 20 mg, Oral, Daily  metoprolol succinate, 50 mg, Oral, Daily  multivitamin-minerals, 1 tablet, Oral, Daily  polyethylene glycol, 17 g, Oral, Daily  prasugrel, 10 mg, Oral, Daily  primidone, 50 mg, Oral, Q12H [address]  thiamine, 100 mg, Oral, Daily    magnesium sulfate 2 g/50 mL IVPB (premix) 2 g  Dose: 2 g  Freq: Once Route: IV  Last Dose: Stopped (09/01/23 0800)  Start: 09/01/23 0430 End: 09/01/23 0800    cyanocobalamin injection 1,000 mcg  Dose: 1,000 mcg  Freq: Every 30 days Route: IM  Indications of Use: VITAMIN B12 DEFICIENCY  magnesium sulfate 4 g/100 mL IVPB (premix) 4 g  Dose: 4 g  Freq:  Once Route: IV  Last Dose: Stopped (09/02/23 2105)  Start: 09/02/23 1015 End: 09/02/23 2105    cyanocobalamin injection 1,000 mcg  Dose: 1,000 mcg  Freq: Daily Route: IM  Indications of Use: VITAMIN B12 DEFICIENCY  Start: 09/03/23 1115 End: 09/05/23 0859    Continuous IV Infusions:     PRN Meds:  HYDROmorphone, 0.2 mg, Intravenous, Q2H PRN x 1 9/1/23 , x 1 9/3/23   ketorolac, 15 mg, Intravenous, Q6H PRN x 1 9/1/23   methocarbamol, 500 mg, Oral, Q6H PRN x 1 9/1, x 1 9/2  nicotine polacrilex, 2 mg, Oral, Q2H PRN x 4 9/1, x 1 9/2  oxyCODONE, 5 mg, Oral, Q4H PRN x 1 9/1, x 3 9/2, x 1 9/3/23     acetaminophen (TYLENOL) tablet 650 mg x 1 9/1  Dose: 650 mg  Freq: Every 4 hours PRN Route: PO  PRN Reason: mild pain  Start: 09/01/23 0443 End: 09/01/23 1015    Oxygen to keep sat > 92%  [facility]  PT/OT      Network Utilization Review Department  ATTENTION: Please call with any questions or concerns to [phone] and carefully listen to the prompts so that you are directed to the right person. All voicemails are confidential.  Huseyin Correak all requests for admission clinical reviews, approved or denied determinations and any other requests to dedicated fax number below belonging to the campus where the patient is receiving treatment.  List of dedicated fax numbers for the Facilities:  [place] DENIALS (Administrative/Medical Necessity) [phone]   [address] [facility] (Maternity/NICU/Pediatrics) [phone]   [address] [place] [address] [phone]   [facility] [address] [facility] [phone]   [address] [facility] [address] [phone]   [address] [place] [address] [facility] [phone]

## 2023-09-02 NOTE — ASSESSMENT & PLAN NOTE
Blood Pressure: 161/88   Hypertensive episodes most likely caused by pain. We will continue to monitor. Blood pressure seems to decrease with pain control.   · Continue home medication: Lisinopril and metoprolol

## 2023-09-03 LAB
ANION GAP SERPL CALCULATED.3IONS-SCNC: 8 MMOL/L
BASOPHILS # BLD AUTO: 0.01 THOUSANDS/ÂΜL (ref 0–0.1)
BASOPHILS NFR BLD AUTO: 0 % (ref 0–1)
BUN SERPL-MCNC: 11 MG/DL (ref 5–25)
CALCIUM SERPL-MCNC: 9 MG/DL (ref 8.4–10.2)
CHLORIDE SERPL-SCNC: 102 MMOL/L (ref 96–108)
CO2 SERPL-SCNC: 25 MMOL/L (ref 21–32)
CREAT SERPL-MCNC: 0.88 MG/DL (ref 0.6–1.3)
EOSINOPHIL # BLD AUTO: 0.08 THOUSAND/ÂΜL (ref 0–0.61)
EOSINOPHIL NFR BLD AUTO: 2 % (ref 0–6)
ERYTHROCYTE [DISTWIDTH] IN BLOOD BY AUTOMATED COUNT: 12.6 % (ref 11.6–15.1)
GFR SERPL CREATININE-BSD FRML MDRD: 92 ML/MIN/1.73SQ M
GLUCOSE P FAST SERPL-MCNC: 124 MG/DL (ref 65–99)
GLUCOSE SERPL-MCNC: 124 MG/DL (ref 65–140)
GLUCOSE SERPL-MCNC: 143 MG/DL (ref 65–140)
GLUCOSE SERPL-MCNC: 143 MG/DL (ref 65–140)
GLUCOSE SERPL-MCNC: 171 MG/DL (ref 65–140)
GLUCOSE SERPL-MCNC: 207 MG/DL (ref 65–140)
HCT VFR BLD AUTO: 34.7 % (ref 36.5–49.3)
HGB BLD-MCNC: 11.9 G/DL (ref 12–17)
IMM GRANULOCYTES # BLD AUTO: 0.03 THOUSAND/UL (ref 0–0.2)
IMM GRANULOCYTES NFR BLD AUTO: 1 % (ref 0–2)
LYMPHOCYTES # BLD AUTO: 1.17 THOUSANDS/ÂΜL (ref 0.6–4.47)
LYMPHOCYTES NFR BLD AUTO: 25 % (ref 14–44)
MAGNESIUM SERPL-MCNC: 1.9 MG/DL (ref 1.9–2.7)
MCH RBC QN AUTO: 35.6 PG (ref 26.8–34.3)
MCHC RBC AUTO-ENTMCNC: 34.3 G/DL (ref 31.4–37.4)
MCV RBC AUTO: 104 FL (ref 82–98)
MONOCYTES # BLD AUTO: 0.47 THOUSAND/ÂΜL (ref 0.17–1.22)
MONOCYTES NFR BLD AUTO: 10 % (ref 4–12)
NEUTROPHILS # BLD AUTO: 2.99 THOUSANDS/ÂΜL (ref 1.85–7.62)
NEUTS SEG NFR BLD AUTO: 62 % (ref 43–75)
NRBC BLD AUTO-RTO: 0 /100 WBCS
PLATELET # BLD AUTO: 107 THOUSANDS/UL (ref 149–390)
PMV BLD AUTO: 9.4 FL (ref 8.9–12.7)
POTASSIUM SERPL-SCNC: 4.3 MMOL/L (ref 3.5–5.3)
RBC # BLD AUTO: 3.34 MILLION/UL (ref 3.88–5.62)
SODIUM SERPL-SCNC: 135 MMOL/L (ref 135–147)
WBC # BLD AUTO: 4.75 THOUSAND/UL (ref 4.31–10.16)

## 2023-09-03 PROCEDURE — 85025 COMPLETE CBC W/AUTO DIFF WBC: CPT

## 2023-09-03 PROCEDURE — 82948 REAGENT STRIP/BLOOD GLUCOSE: CPT

## 2023-09-03 PROCEDURE — 80048 BASIC METABOLIC PNL TOTAL CA: CPT

## 2023-09-03 PROCEDURE — 99254 IP/OBS CNSLTJ NEW/EST MOD 60: CPT | Performed by: STUDENT IN AN ORGANIZED HEALTH CARE EDUCATION/TRAINING PROGRAM

## 2023-09-03 PROCEDURE — 83735 ASSAY OF MAGNESIUM: CPT

## 2023-09-03 PROCEDURE — 99232 SBSQ HOSP IP/OBS MODERATE 35: CPT | Performed by: INTERNAL MEDICINE

## 2023-09-03 RX ORDER — HYDROXYZINE HYDROCHLORIDE 25 MG/1
25 TABLET, FILM COATED ORAL EVERY 6 HOURS PRN
Status: DISCONTINUED | OUTPATIENT
Start: 2023-09-03 | End: 2023-09-04 | Stop reason: HOSPADM

## 2023-09-03 RX ORDER — METHOCARBAMOL 750 MG/1
750 TABLET, FILM COATED ORAL EVERY 8 HOURS SCHEDULED
Status: DISCONTINUED | OUTPATIENT
Start: 2023-09-03 | End: 2023-09-04 | Stop reason: HOSPADM

## 2023-09-03 RX ORDER — CYANOCOBALAMIN 1000 UG/ML
1000 INJECTION, SOLUTION INTRAMUSCULAR; SUBCUTANEOUS DAILY
Status: COMPLETED | OUTPATIENT
Start: 2023-09-03 | End: 2023-09-04

## 2023-09-03 RX ORDER — CYANOCOBALAMIN 1000 UG/ML
1000 INJECTION, SOLUTION INTRAMUSCULAR; SUBCUTANEOUS DAILY
Status: DISCONTINUED | OUTPATIENT
Start: 2023-09-04 | End: 2023-09-03

## 2023-09-03 RX ADMIN — INSULIN LISPRO 1 UNITS: 100 INJECTION, SOLUTION INTRAVENOUS; SUBCUTANEOUS at 16:38

## 2023-09-03 RX ADMIN — POLYETHYLENE GLYCOL 3350 17 G: 17 POWDER, FOR SOLUTION ORAL at 09:41

## 2023-09-03 RX ADMIN — ASPIRIN 81 MG: 81 TABLET, COATED ORAL at 09:37

## 2023-09-03 RX ADMIN — OXYCODONE HYDROCHLORIDE 5 MG: 5 TABLET ORAL at 04:21

## 2023-09-03 RX ADMIN — LIDOCAINE 2 PATCH: 700 PATCH TOPICAL at 09:41

## 2023-09-03 RX ADMIN — MULTIPLE VITAMINS W/ MINERALS TAB 1 TABLET: TAB ORAL at 09:41

## 2023-09-03 RX ADMIN — ACETAMINOPHEN 975 MG: 325 TABLET, FILM COATED ORAL at 21:53

## 2023-09-03 RX ADMIN — ACETAMINOPHEN 975 MG: 325 TABLET, FILM COATED ORAL at 14:17

## 2023-09-03 RX ADMIN — METHOCARBAMOL TABLETS 750 MG: 750 TABLET, COATED ORAL at 21:53

## 2023-09-03 RX ADMIN — HYDROXYZINE HYDROCHLORIDE 25 MG: 25 TABLET ORAL at 17:18

## 2023-09-03 RX ADMIN — PRIMIDONE 50 MG: 50 TABLET ORAL at 21:53

## 2023-09-03 RX ADMIN — METHOCARBAMOL TABLETS 500 MG: 500 TABLET, COATED ORAL at 16:38

## 2023-09-03 RX ADMIN — INSULIN LISPRO 1 UNITS: 100 INJECTION, SOLUTION INTRAVENOUS; SUBCUTANEOUS at 21:53

## 2023-09-03 RX ADMIN — Medication 2.5 MG: at 09:40

## 2023-09-03 RX ADMIN — Medication 100 MG: at 09:42

## 2023-09-03 RX ADMIN — OXYCODONE HYDROCHLORIDE 5 MG: 5 TABLET ORAL at 17:39

## 2023-09-03 RX ADMIN — ACETAMINOPHEN 975 MG: 325 TABLET, FILM COATED ORAL at 05:18

## 2023-09-03 RX ADMIN — METOPROLOL SUCCINATE 50 MG: 50 TABLET, EXTENDED RELEASE ORAL at 09:41

## 2023-09-03 RX ADMIN — HYDROMORPHONE HYDROCHLORIDE 0.2 MG: 0.2 INJECTION, SOLUTION INTRAMUSCULAR; INTRAVENOUS; SUBCUTANEOUS at 12:51

## 2023-09-03 RX ADMIN — NICOTINE 7 MG: 7 PATCH, EXTENDED RELEASE TRANSDERMAL at 09:41

## 2023-09-03 RX ADMIN — LISINOPRIL 20 MG: 20 TABLET ORAL at 09:40

## 2023-09-03 RX ADMIN — PRASUGREL 10 MG: 10 TABLET, FILM COATED ORAL at 09:41

## 2023-09-03 RX ADMIN — CYANOCOBALAMIN 1000 MCG: 1000 INJECTION, SOLUTION INTRAMUSCULAR; SUBCUTANEOUS at 14:17

## 2023-09-03 RX ADMIN — PRIMIDONE 50 MG: 50 TABLET ORAL at 09:40

## 2023-09-03 RX ADMIN — FOLIC ACID 1 MG: 1 TABLET ORAL at 09:41

## 2023-09-03 NOTE — ASSESSMENT & PLAN NOTE
Reportedly medicating with ETOH and tylenol daily  TRAUMA - CT C-spine impression: No cervical spine fracture or traumatic malalignment. MRI L-spine 2015 revealed Mild L3-4 and L4-5 degenerative disc disease. Small left foraminal disc protrusion at L3-4. Only minimal canal stenosis noted. No significant foraminal narrowing or foraminal nerve impingement.     Plan  - Scheduled 975mg Tylenol  - Lidocaine patch up to 12hrs q24  - Robaxin 500mg q6h PRN  - Roxicodone 2.5mg for moderate pain, 5mg for severe pain, dilaudid 0.2mg IV for breakthrough  - D/cd toradol due to risk with ETOH abuse  - May consider repeat MRI as outpatient

## 2023-09-03 NOTE — PROGRESS NOTES
[address] [facility]  Progress Note  Name: Josué Diop  MRN: [id number]  Unit/Bed#: S -01 I Date of Admission: 9/1/2023   Date of Service: 9/3/2023 I Hospital Day: 0    Assessment/Plan   * Syncope and collapse  Assessment & Plan  BIBEMS following syncopal event that occurred PTA, similar episode occurring prior in the week without presentation for evaluation. Patient's wife reports patient has had multiple falls over the past year. Pt w/ poor coordination and balance 2/2 peripheral neuropathy, right eye prosthesis. Hx of ETOH dependence. Additionally has essential tremor. ED workup: ECG showed NSR trops negative, CBC and CMP unremarkable. Trauma workup unremarkable. Orthostatics negative, echo and tele unremarkable, pt with low magnesium and B12    Ddx: ambulatory dysfunction due multifactorial etiology (poor vision, neuropathy, ETOH) vs seizure vs medication-induced syncope     - Monitor on telemetry  - PT recs: home with home health (vestibular/balance)  - OT recs: home with home health (outpt cognitive rehab, fitness to drive eval)  - Fall precautions  - Neurology consulted, appreciate recs  - Thiamine and folate supplementation   - Vitamin B12 low, supplementation started with 1000mg IM qd x3d    Rib pain on left side  Assessment & Plan  - Scheduled 975mg Tylenol  - Lidocaine patch up to 12hrs q24  - Robaxin 500mg q6h PRN  - Roxicodone 2.5mg for moderate pain, 5mg for severe pain, dilaudid 0.2mg IV for breakthrough  - D/cd toradol due to risk with ETOH abuse  - Incentive spirometry    Chronic back pain  Assessment & Plan  Reportedly medicating with ETOH and tylenol daily  TRAUMA - CT C-spine impression: No cervical spine fracture or traumatic malalignment. MRI L-spine 2015 revealed Mild L3-4 and L4-5 degenerative disc disease. Small left foraminal disc protrusion at L3-4. Only minimal canal stenosis noted.   No significant foraminal narrowing or foraminal nerve impingement. Plan  - Scheduled 975mg Tylenol  - Lidocaine patch up to 12hrs q24  - Robaxin 500mg q6h PRN  - Roxicodone 2.5mg for moderate pain, 5mg for severe pain, dilaudid 0.2mg IV for breakthrough  - D/cd toradol due to risk with ETOH abuse  - May consider repeat MRI as outpatient      Alcohol dependence ([address])  Assessment & Plan  Patient reports 2 or more glasses of wine daily, no use of beer or hard liquor    Plan  - [facility] protocol  - Counseled on recommendation to decrease ETOH intake    Facial contusion  Assessment & Plan  / fall, unchanged from my prior exam             VTE Pharmacologic Prophylaxis: VTE Score: 3 Moderate Risk (Score 3-4) - Pharmacological DVT Prophylaxis Contraindicated. Sequential Compression Devices Ordered. Patient Centered Rounds: I performed bedside rounds with nursing staff today. Discussions with Specialists or Other Care Team Provider: neurology    Education and Discussions with Family / Patient: Will call to update. Current Length of Stay: 0 day(s)  Current Patient Status: Observation   Discharge Plan: Anticipate discharge in 24-48 hrs to home with home services. Code Status: Level 1 - Full Code    Subjective:   Seen and examined at bedside. He reports he is continuing to have back pain and rib pain, but pain is responding well to PRN pain regimen. He otherwise denies any acute concerns. Objective:     Vitals:   Temp (24hrs), Av.6 °F (36.4 °C), Min:97.5 °F (36.4 °C), Max:97.8 °F (36.6 °C)    Temp:  [97.5 °F (36.4 °C)-97.8 °F (36.6 °C)] 97.8 °F (36.6 °C)  HR:  [68-78] 78  Resp:  [16-18] 18  BP: (120-149)/(75-91) 146/81  SpO2:  [93 %-95 %] 93 %  Body mass index is 25.04 kg/m². Input and Output Summary (last 24 hours):   No intake or output data in the 24 hours ending 23 0395    Physical Exam:   Physical Exam  Constitutional:       General: He is not in acute distress. Appearance: Normal appearance.  He is not ill-appearing, toxic-appearing or diaphoretic. HENT:      Head: Normocephalic. Eyes:      Comments: Periorbital bruising of left eye, left eye EOMI, right eye prosthesis   Cardiovascular:      Rate and Rhythm: Normal rate and regular rhythm. Pulses: Normal pulses. Heart sounds: Normal heart sounds. No murmur heard. Pulmonary:      Effort: Pulmonary effort is normal. No respiratory distress. Breath sounds: Normal breath sounds. No stridor. No wheezing or rhonchi. Abdominal:      General: Bowel sounds are normal. There is no distension. Palpations: Abdomen is soft. Skin:     General: Skin is warm and dry. Findings: Bruising (L periorbital bruising, b/l UE with bruises) and wound (two wounds bandaged on LUE distal to the elbow) present. Neurological:      General: No focal deficit present. Mental Status: He is alert and oriented to person, place, and time. Mental status is at baseline.    Psychiatric:         Mood and Affect: Mood normal.         Behavior: Behavior normal.          Additional Data:     Labs:  Results from last 7 days   Lab Units 09/03/23  0447   WBC Thousand/uL 4.75   HEMOGLOBIN g/dL 11.9*   HEMATOCRIT % 34.7*   PLATELETS Thousands/uL 107*   NEUTROS PCT % 62   LYMPHS PCT % 25   MONOS PCT % 10   EOS PCT % 2     Results from last 7 days   Lab Units 09/03/23  0447 09/02/23  0453   SODIUM mmol/L 135 133*   POTASSIUM mmol/L 4.3 4.7   CHLORIDE mmol/L 102 100   CO2 mmol/L 25 21   BUN mg/dL 11 18   CREATININE mg/dL 0.88 0.98   ANION GAP mmol/L 8 12   CALCIUM mg/dL 9.0 9.0   ALBUMIN g/dL  --  3.8   TOTAL BILIRUBIN mg/dL  --  1.02*   ALK PHOS U/L  --  59   ALT U/L  --  22   AST U/L  --  25   GLUCOSE RANDOM mg/dL 124 150*     Results from last 7 days   Lab Units 09/01/23  0012   INR  0.93     Results from last 7 days   Lab Units 09/03/23  1103 09/03/23  0711 09/02/23  2104 09/02/23  1626 09/02/23  1227 09/01/23  2106 09/01/23  1608 09/01/23  1113   POC GLUCOSE mg/dl 143* 143* 247* 137 138 226* 143* 143* Lines/Drains:  Invasive Devices     Peripheral Intravenous Line  Duration           Peripheral IV 09/01/23 Right Antecubital 2 days                      Imaging: Reviewed radiology reports from this admission including: chest xray, chest CT scan, abdominal/pelvic CT, CT head and xray(s)    Recent Cultures (last 7 days):         Last 24 Hours Medication List:   Current Facility-Administered Medications   Medication Dose Route Frequency Provider Last Rate   • acetaminophen  975 mg Oral [facility] Fernando Moore DO     • aspirin  81 mg Oral Daily Parvin Hogue MD     • cyanocobalamin  1,000 mcg Intramuscular Daily Lacy Hammond DO     • folic acid  1 mg Oral Daily Parvin Hogue MD     • HYDROmorphone  0.2 mg Intravenous Q2H PRN Parvin Hogue MD     • insulin lispro  1-5 Units Subcutaneous 4x Daily (with meals and at bedtime) Randall Melara DO     • lidocaine  2 patch Topical Daily Mars Walter MD     • lisinopril  20 mg Oral Daily Parvin Hogue MD     • methocarbamol  500 mg Oral Q6H PRN Lacy Hammond DO     • metoprolol succinate  50 mg Oral Daily Parvin Hogue MD     • multivitamin-minerals  1 tablet Oral Daily Parvin Hogue MD     • nicotine  7 mg Transdermal Daily Ted Aparicio DO     • nicotine polacrilex  2 mg Oral Q2H PRN Parvin Hogue MD     • oxyCODONE  5 mg Oral Q4H PRN Fernando Moore DO     • oxyCODONE  2.5 mg Oral Q4H PRN Lacy Hammond DO     • polyethylene glycol  17 g Oral Daily Parvin Hogue MD     • prasugrel  10 mg Oral Daily Parvin Hogue MD     • primidone  50 mg Oral Q12H Yusuf Vazquez MD     • thiamine  100 mg Oral Daily Parvin Hogue MD          Today, Patient Was Seen By: Randall Melara DO    **Please Note: This note may have been constructed using a voice recognition system. **

## 2023-09-03 NOTE — PLAN OF CARE
Problem: MOBILITY - ADULT  Goal: Maintain or return to baseline ADL function  Description: INTERVENTIONS:  -  Assess patient's ability to carry out ADLs; assess patient's baseline for ADL function and identify physical deficits which impact ability to perform ADLs (bathing, care of mouth/teeth, toileting, grooming, dressing, etc.)  - Assess/evaluate cause of self-care deficits   - Assess range of motion  - Assess patient's mobility; develop plan if impaired  - Assess patient's need for assistive devices and provide as appropriate  - Encourage maximum independence but intervene and supervise when necessary  - Involve family in performance of ADLs  - Assess for home care needs following discharge   - Consider OT consult to assist with ADL evaluation and planning for discharge  - Provide patient education as appropriate  Outcome: Progressing  Goal: Maintains/Returns to pre admission functional level  Description: INTERVENTIONS:  - Perform BMAT or MOVE assessment daily.   - Set and communicate daily mobility goal to care team and patient/family/caregiver. - Collaborate with rehabilitation services on mobility goals if consulted  - Perform Range of Motion  times a day. - Reposition patient every  hours.   - Dangle patient  times a day  - Stand patient  times a day  - Ambulate patient  times a day  - Out of bed to chair  times a day   - Out of bed for meals  times a day  - Out of bed for toileting  - Record patient progress and toleration of activity level   Outcome: Progressing     Problem: PAIN - ADULT  Goal: Verbalizes/displays adequate comfort level or baseline comfort level  Description: Interventions:  - Encourage patient to monitor pain and request assistance  - Assess pain using appropriate pain scale  - Administer analgesics based on type and severity of pain and evaluate response  - Implement non-pharmacological measures as appropriate and evaluate response  - Consider cultural and social influences on pain and pain management  - Notify physician/advanced practitioner if interventions unsuccessful or patient reports new pain  Outcome: Progressing     Problem: INFECTION - ADULT  Goal: Absence or prevention of progression during hospitalization  Description: INTERVENTIONS:  - Assess and monitor for signs and symptoms of infection  - Monitor lab/diagnostic results  - Monitor all insertion sites, i.e. indwelling lines, tubes, and drains  - Monitor endotracheal if appropriate and nasal secretions for changes in amount and color  - Spraggs appropriate cooling/warming therapies per order  - Administer medications as ordered  - Instruct and encourage patient and family to use good hand hygiene technique  - Identify and instruct in appropriate isolation precautions for identified infection/condition  Outcome: Progressing  Goal: Absence of fever/infection during neutropenic period  Description: INTERVENTIONS:  - Monitor WBC    Outcome: Progressing     Problem: SAFETY ADULT  Goal: Maintain or return to baseline ADL function  Description: INTERVENTIONS:  -  Assess patient's ability to carry out ADLs; assess patient's baseline for ADL function and identify physical deficits which impact ability to perform ADLs (bathing, care of mouth/teeth, toileting, grooming, dressing, etc.)  - Assess/evaluate cause of self-care deficits   - Assess range of motion  - Assess patient's mobility; develop plan if impaired  - Assess patient's need for assistive devices and provide as appropriate  - Encourage maximum independence but intervene and supervise when necessary  - Involve family in performance of ADLs  - Assess for home care needs following discharge   - Consider OT consult to assist with ADL evaluation and planning for discharge  - Provide patient education as appropriate  Outcome: Progressing  Goal: Maintains/Returns to pre admission functional level  Description: INTERVENTIONS:  - Perform BMAT or MOVE assessment daily.   - Set and communicate daily mobility goal to care team and patient/family/caregiver. - Collaborate with rehabilitation services on mobility goals if consulted  - Perform Range of Motion  times a day. - Reposition patient every  hours.   - Dangle patient  times a day  - Stand patient  times a day  - Ambulate patient  times a day  - Out of bed to chair  times a day   - Out of bed for meals  times a day  - Out of bed for toileting  - Record patient progress and toleration of activity level   Outcome: Progressing  Goal: Patient will remain free of falls  Description: INTERVENTIONS:  - Educate patient/family on patient safety including physical limitations  - Instruct patient to call for assistance with activity   - Consult OT/PT to assist with strengthening/mobility   - Keep Call bell within reach  - Keep bed low and locked with side rails adjusted as appropriate  - Keep care items and personal belongings within reach  - Initiate and maintain comfort rounds  - Make Fall Risk Sign visible to staff  - Offer Toileting every  Hours, in advance of need  - Initiate/Maintain alarm  - Obtain necessary fall risk management equipment:   - Apply yellow socks and bracelet for high fall risk patients  - Consider moving patient to room near nurses station  Outcome: Progressing     Problem: DISCHARGE PLANNING  Goal: Discharge to home or other facility with appropriate resources  Description: INTERVENTIONS:  - Identify barriers to discharge w/patient and caregiver  - Arrange for needed discharge resources and transportation as appropriate  - Identify discharge learning needs (meds, wound care, etc.)  - Arrange for interpretive services to assist at discharge as needed  - Refer to Case Management Department for coordinating discharge planning if the patient needs post-hospital services based on physician/advanced practitioner order or complex needs related to functional status, cognitive ability, or social support system  Outcome: Progressing Problem: Knowledge Deficit  Goal: Patient/family/caregiver demonstrates understanding of disease process, treatment plan, medications, and discharge instructions  Description: Complete learning assessment and assess knowledge base. Interventions:  - Provide teaching at level of understanding  - Provide teaching via preferred learning methods  Outcome: Progressing     Problem: METABOLIC, FLUID AND ELECTROLYTES - ADULT  Goal: Electrolytes maintained within normal limits  Description: INTERVENTIONS:  - Monitor labs and assess patient for signs and symptoms of electrolyte imbalances  - Administer electrolyte replacement as ordered  - Monitor response to electrolyte replacements, including repeat lab results as appropriate  - Instruct patient on fluid and nutrition as appropriate  Outcome: Progressing  Goal: Glucose maintained within target range  Description: INTERVENTIONS:  - Monitor Blood Glucose as ordered  - Assess for signs and symptoms of hyperglycemia and hypoglycemia  - Administer ordered medications to maintain glucose within target range  - Assess nutritional intake and initiate nutrition service referral as needed  Outcome: Progressing     Problem: Nutrition/Hydration-ADULT  Goal: Nutrient/Hydration intake appropriate for improving, restoring or maintaining nutritional needs  Description: Monitor and assess patient's nutrition/hydration status for malnutrition. Collaborate with interdisciplinary team and initiate plan and interventions as ordered. Monitor patient's weight and dietary intake as ordered or per policy. Utilize nutrition screening tool and intervene as necessary. Determine patient's food preferences and provide high-protein, high-caloric foods as appropriate.      INTERVENTIONS:  - Monitor oral intake, urinary output, labs, and treatment plans  - Assess nutrition and hydration status and recommend course of action  - Evaluate amount of meals eaten  - Assist patient with eating if necessary   - Allow adequate time for meals  - Recommend/ encourage appropriate diets, oral nutritional supplements, and vitamin/mineral supplements  - Order, calculate, and assess calorie counts as needed  - Recommend, monitor, and adjust tube feedings and TPN/PPN based on assessed needs  - Assess need for intravenous fluids  - Provide specific nutrition/hydration education as appropriate  - Include patient/family/caregiver in decisions related to nutrition  Outcome: Progressing     Problem: Prexisting or High Potential for Compromised Skin Integrity  Goal: Skin integrity is maintained or improved  Description: INTERVENTIONS:  - Identify patients at risk for skin breakdown  - Assess and monitor skin integrity  - Assess and monitor nutrition and hydration status  - Monitor labs   - Assess for incontinence   - Turn and reposition patient  - Assist with mobility/ambulation  - Relieve pressure over bony prominences  - Avoid friction and shearing  - Provide appropriate hygiene as needed including keeping skin clean and dry  - Evaluate need for skin moisturizer/barrier cream  - Collaborate with interdisciplinary team   - Patient/family teaching  - Consider wound care consult   Outcome: Progressing

## 2023-09-03 NOTE — CONSULTS
Consultation - Neurology   Meghan Pressley 65 y.o. male MRN: [id number]  Unit/Bed#: S -01 Encounter: [id number]      Assessment/Plan     * Syncope and collapse  Assessment & Plan  62-year-old male with HTN, DM, CAD, peripheral neuropathy, essential tremor, alcohol abuse, tobacco abuse, ESTHER, presents with LOC. Patient has had 2 episodes of LOC over the past week, both occurring upon standing. Patient was confused for 10 or so minutes after the episodes but denies associated tongue bite, bowel/bladder incontinence. Relative drop in SBP measured here, but technically does not qualify as orthostasis (150-->134 supine to standing). B12 153. CTH demonstrated no acute changes. Unsteady gait likely related to peripheral neuropathy. Cannot entirely exclude use of primidone as contributor to syncopal episodes but would not be typical.  Also cannot entirely exclude seizures at etiology of LOC; however, would not expect primidone to cause seizures (phenobarbital is a metabolite of primidone and is antiepileptic). Plan:  -Agree with B12 supplementation. Prefer level >400.  -Recommend outpatient EEG and tapering off primidone.  -Further recs as per attending attestation. Recommendations for outpatient neurological follow up have yet to be determined. History of Present Illness     Reason for Consult / Principal Problem: LOC  Hx and PE limited by: n/a  HPI: Meghan Pressley is a 65 y.o.  male with HTN, DM, CAD, ESTHER, peripheral neuropathy, essential tremor, alcohol abuse, tobacco abuse, presents with LOC. Patient has had 2 episodes of LOC over the past week. On the first occasion, he recalls attempting to stand up but then lost consciousness. His wife found him sitting up on the floor, confused appearing for approximately 20 minutes. On the most recent occasion which led to this admission, shortly after dinner, patient went to sleep on the couch.  His wife woke him up and after talking to him for about 20 mins, he went to bed. About 2 hours later, his wife (who sleeps in a different room), found him on the floor of the bedroom flat on his face, close to the bathroom. He does not recall getting up in the middle of the night. He was snoring loudly and his wife could not rouse him for several minutes. He was confused and thought he was in bed (lasting about 10 mins). He was able to get himself up and went to the bathroom. He notes he wakes up several times in the night to use the bathroom. He notes his speech seemed slurred when he was in the ambulance. Denies tongue bite, bowel/bladder incontinence. He has a history of syncope in the remote past related to giving blood and strong emotional reactions or pain. Unremarkable birth history, no developmental delays, no significant head trauma, no hx meningitis/encephalitis, or brain surgery or stroke, no family hx of epilepsy. Started on primidone for essential tremor approx 2 weeks ago. CTH demonstrated no acute changes. Inpatient consult to Neurology  Consult performed by: Alfreda Veloz PA-C  Consult ordered by: Maurilio Perdomo MD          Review of Systems   Constitutional: Negative. HENT: Negative. Eyes: Negative. Respiratory: Negative. Cardiovascular: Negative. Gastrointestinal: Negative. Endocrine: Negative. Genitourinary: Negative. Musculoskeletal: Negative. Skin: Negative for rash. Allergic/Immunologic: Negative. Neurological: Positive for tremors and syncope. As above. Hematological: Negative. Psychiatric/Behavioral: Negative. Historical Information   No past medical history on file. No past surgical history on file. Social History   Social History     Substance and Sexual Activity   Alcohol Use Not on file     Social History     Substance and Sexual Activity   Drug Use Not on file     No existing history information found. No existing history information found.   Social History     Tobacco Use   Smoking Status Not on file   Smokeless Tobacco Not on file     Family History: No family history on file. Review of previous medical records was completed. Meds/Allergies   PTA meds:   Prior to Admission Medications   Prescriptions Last Dose Informant Patient Reported? Taking?   aspirin (ECOTRIN LOW STRENGTH) 81 mg EC tablet 8/31/2023  Yes Yes   Sig: Take 81 mg by mouth daily   atorvastatin (LIPITOR) 40 mg tablet Not Taking  Yes No   Sig: Take 40 mg by mouth daily   Patient not taking: Reported on 9/1/2023   fexofenadine (ALLEGRA) 60 MG tablet 8/31/2023 Self, Spouse/Significant Other Yes Yes   Sig: Take 60 mg by mouth daily   lisinopril (ZESTRIL) 20 mg tablet 8/31/2023  Yes Yes   Sig: Take 20 mg by mouth daily   metFORMIN (GLUCOPHAGE) 1000 MG tablet 8/31/2023  Yes Yes   Sig: Take 1,000 mg by mouth daily with breakfast   metoprolol succinate (TOPROL-XL) 50 mg 24 hr tablet 8/31/2023  Yes Yes   Sig: Take 50 mg by mouth daily   prasugrel (EFFIENT) tablet 8/31/2023  Yes Yes   Sig: Take 10 mg by mouth daily   primidone (MYSOLINE) 50 mg tablet 8/31/2023  Yes Yes   Sig: Take 50 mg by mouth every 12 (twelve) hours      Facility-Administered Medications: None       No Known Allergies    Objective   Vitals:Blood pressure 149/91, pulse 71, temperature 97.5 °F (36.4 °C), resp. rate 16, height 6' (1.829 m), weight 83.7 kg (184 lb 9.6 oz), SpO2 94 %. ,Body mass index is 25.04 kg/m². No intake or output data in the 24 hours ending 09/03/23 1521    Invasive Devices: Invasive Devices     Peripheral Intravenous Line  Duration           Peripheral IV 09/01/23 Right Antecubital 2 days                Physical Exam   General:  Patient is well-developed, well-nourished, and in no acute distress. HEENT:  Head normocephalic. Eyes anicteric. Cardiovascular:  With regular rhythm. Lungs:  Normal effort. Nonlabored breathing. Extremities:  With no significant edema. Skin: No rashes.  Ecchymoses RUE and under left eye. Neurologic Exam  Neurologic:  Patient is alert, pleasantly interactive, and appropriately conversational.  No obvious symbolic language difficulty or dysarthria, and the patient is fully oriented. Correctly identified the current president and past presidents. Sway noted with feet together eyes open. Gait unsteady, wide-based, shortened strides with decreased step height. Cranial Nerves:   II: Visual fields full to confrontation on left (prosthesis on right). Cannot visualize optic fundi. III,IV,VI: extraocular movements intact with no nystagmus. R ptosis noted. V: Sensation in the V1 through V3 distributions intact to light touch bilaterally. VII: Face is symmetric with no weakness noted. XII: Tongue midline with no atrophy or fasciculations with appropriate movement. Coordination:  Accurate with finger-to-nose and heel-to-shin maneuvers bilaterally. Mild tremor of the hands when in sustention only. No rest tremor. Tone normal.    Motor testing with no upper or lower extremity drift. Full strength throughout. Sensory testing grossly intact to light touch throughout, with diminished appreciation noted in the distal BL LE. Diminished pin appreciation noted distal BL LE. Reflexes 2 BL UE, 2 BL knees, 1 BL ankles. Gutiérrez's (-). Toe responses are downgoing bilaterally.     Lab Results:   CBC:   Results from last 7 days   Lab Units 09/03/23 0447 09/02/23 0453 09/01/23  0515   WBC Thousand/uL 4.75 5.59 6.04   RBC Million/uL 3.34* 3.34* 3.48*   HEMOGLOBIN g/dL 11.9* 12.1 12.4   HEMATOCRIT % 34.7* 34.6* 37.2   MCV fL 104* 104* 107*   PLATELETS Thousands/uL 107* 110* 109*   , BMP/CMP:   Results from last 7 days   Lab Units 09/03/23 0447 09/02/23 0453 09/01/23  0829 09/01/23  0515 09/01/23  0013 09/01/23  0012   SODIUM mmol/L 135 133*  --  136  --  136   POTASSIUM mmol/L 4.3 4.7 5.0 5.8*  --  4.8   CHLORIDE mmol/L 102 100  --  107  --  106   CO2 mmol/L 25 21  --  16*  -- 17*   CO2, I-STAT mmol/L  --   --   --   --  17*  --    BUN mg/dL 11 18  --  15  --  17   CREATININE mg/dL 0.88 0.98  --  0.97  --  1.08   GLUCOSE, ISTAT mg/dl  --   --   --   --  145*  --    CALCIUM mg/dL 9.0 9.0  --  8.9  --  8.9   AST U/L  --  25  --   --   --  33   ALT U/L  --  22  --   --   --  25   ALK PHOS U/L  --  59  --   --   --  68   EGFR ml/min/1.73sq  92 82  --  83  --  73     Imaging Studies: I have personally reviewed pertinent reports. and I have personally reviewed pertinent films in PACS [address]  EKG, Pathology, and Other Studies: I have personally reviewed pertinent reports.     VTE Prophylaxis: Sequential compression device Maninder Pacheco     Code Status: Level 1 - Full Code  Advance Directive and Living Will:      Power of :    POLST:

## 2023-09-03 NOTE — ASSESSMENT & PLAN NOTE
Patient reports 2 or more glasses of wine daily, no use of beer or hard liquor    Plan  - [facility] protocol  - Counseled on recommendation to decrease ETOH intake

## 2023-09-03 NOTE — ASSESSMENT & PLAN NOTE
- Scheduled 975mg Tylenol  - Lidocaine patch up to 12hrs q24  - Robaxin 500mg q6h PRN  - Roxicodone 2.5mg for moderate pain, 5mg for severe pain, dilaudid 0.2mg IV for breakthrough  - D/cd toradol due to risk with ETOH abuse  - Incentive spirometry

## 2023-09-03 NOTE — ASSESSMENT & PLAN NOTE
BIBEMS following syncopal event that occurred PTA, similar episode occurring prior in the week without presentation for evaluation. Patient's wife reports patient has had multiple falls over the past year. Pt w/ poor coordination and balance 2/2 peripheral neuropathy, right eye prosthesis. Hx of ETOH dependence. Additionally has essential tremor. ED workup: ECG showed NSR trops negative, CBC and CMP unremarkable. Trauma workup unremarkable.       Orthostatics negative, echo and tele unremarkable, pt with low magnesium and B12    Ddx: ambulatory dysfunction due multifactorial etiology (poor vision, neuropathy, ETOH) vs seizure vs medication-induced syncope     - Monitor on telemetry  - PT recs: home with home health (vestibular/balance)  - OT recs: home with home health (outpt cognitive rehab, fitness to drive eval)  - Fall precautions  - Neurology consulted, appreciate recs  - Thiamine and folate supplementation   - Vitamin B12 low, supplementation started with 1000mg IM qd x3d

## 2023-09-03 NOTE — ASSESSMENT & PLAN NOTE
36-year-old male with HTN, DM, CAD, peripheral neuropathy, essential tremor, alcohol abuse, tobacco abuse, ESTHER, presents with LOC. Patient has had 2 episodes of LOC over the past week, both occurring upon standing. Patient was confused for 10 or so minutes after the episodes but denies associated tongue bite, bowel/bladder incontinence. Relative drop in SBP measured here, but technically does not qualify as orthostasis (150-->134 supine to standing). B12 153. CTH demonstrated no acute changes. Unsteady gait likely related to peripheral neuropathy. Cannot entirely exclude use of primidone as contributor to syncopal episodes but would not be typical.  Also cannot entirely exclude seizures at etiology of LOC; however, would not expect primidone to cause seizures (phenobarbital is a metabolite of primidone and is antiepileptic). Plan:  -Agree with B12 supplementation. Prefer level >400.  -Recommend outpatient EEG and tapering off primidone.  -Further recs as per attending attestation.

## 2023-09-04 VITALS
RESPIRATION RATE: 18 BRPM | SYSTOLIC BLOOD PRESSURE: 124 MMHG | DIASTOLIC BLOOD PRESSURE: 68 MMHG | WEIGHT: 195.33 LBS | BODY MASS INDEX: 26.46 KG/M2 | TEMPERATURE: 98.1 F | OXYGEN SATURATION: 97 % | HEART RATE: 76 BPM | HEIGHT: 72 IN

## 2023-09-04 PROBLEM — R55 SYNCOPE AND COLLAPSE: Status: RESOLVED | Noted: 2023-09-01 | Resolved: 2023-09-04

## 2023-09-04 LAB
ALBUMIN SERPL BCP-MCNC: 3.7 G/DL (ref 3.5–5)
ALP SERPL-CCNC: 59 U/L (ref 34–104)
ALT SERPL W P-5'-P-CCNC: 24 U/L (ref 7–52)
ANION GAP SERPL CALCULATED.3IONS-SCNC: 6 MMOL/L
AST SERPL W P-5'-P-CCNC: 36 U/L (ref 13–39)
BASOPHILS # BLD AUTO: 0.01 THOUSANDS/ÂΜL (ref 0–0.1)
BASOPHILS NFR BLD AUTO: 0 % (ref 0–1)
BILIRUB SERPL-MCNC: 0.79 MG/DL (ref 0.2–1)
BUN SERPL-MCNC: 8 MG/DL (ref 5–25)
CALCIUM SERPL-MCNC: 9.1 MG/DL (ref 8.4–10.2)
CHLORIDE SERPL-SCNC: 104 MMOL/L (ref 96–108)
CO2 SERPL-SCNC: 25 MMOL/L (ref 21–32)
CREAT SERPL-MCNC: 0.94 MG/DL (ref 0.6–1.3)
EOSINOPHIL # BLD AUTO: 0.1 THOUSAND/ÂΜL (ref 0–0.61)
EOSINOPHIL NFR BLD AUTO: 2 % (ref 0–6)
ERYTHROCYTE [DISTWIDTH] IN BLOOD BY AUTOMATED COUNT: 12.9 % (ref 11.6–15.1)
GFR SERPL CREATININE-BSD FRML MDRD: 87 ML/MIN/1.73SQ M
GLUCOSE SERPL-MCNC: 128 MG/DL (ref 65–140)
GLUCOSE SERPL-MCNC: 147 MG/DL (ref 65–140)
GLUCOSE SERPL-MCNC: 188 MG/DL (ref 65–140)
HCT VFR BLD AUTO: 35.6 % (ref 36.5–49.3)
HGB BLD-MCNC: 11.8 G/DL (ref 12–17)
IMM GRANULOCYTES # BLD AUTO: 0.04 THOUSAND/UL (ref 0–0.2)
IMM GRANULOCYTES NFR BLD AUTO: 1 % (ref 0–2)
LYMPHOCYTES # BLD AUTO: 0.99 THOUSANDS/ÂΜL (ref 0.6–4.47)
LYMPHOCYTES NFR BLD AUTO: 22 % (ref 14–44)
MCH RBC QN AUTO: 35.2 PG (ref 26.8–34.3)
MCHC RBC AUTO-ENTMCNC: 33.1 G/DL (ref 31.4–37.4)
MCV RBC AUTO: 106 FL (ref 82–98)
MONOCYTES # BLD AUTO: 0.49 THOUSAND/ÂΜL (ref 0.17–1.22)
MONOCYTES NFR BLD AUTO: 11 % (ref 4–12)
NEUTROPHILS # BLD AUTO: 2.85 THOUSANDS/ÂΜL (ref 1.85–7.62)
NEUTS SEG NFR BLD AUTO: 64 % (ref 43–75)
NRBC BLD AUTO-RTO: 0 /100 WBCS
PLATELET # BLD AUTO: 103 THOUSANDS/UL (ref 149–390)
PMV BLD AUTO: 9 FL (ref 8.9–12.7)
POTASSIUM SERPL-SCNC: 4.5 MMOL/L (ref 3.5–5.3)
PROT SERPL-MCNC: 6.4 G/DL (ref 6.4–8.4)
RBC # BLD AUTO: 3.35 MILLION/UL (ref 3.88–5.62)
SODIUM SERPL-SCNC: 135 MMOL/L (ref 135–147)
WBC # BLD AUTO: 4.48 THOUSAND/UL (ref 4.31–10.16)

## 2023-09-04 PROCEDURE — 82948 REAGENT STRIP/BLOOD GLUCOSE: CPT

## 2023-09-04 PROCEDURE — 80053 COMPREHEN METABOLIC PANEL: CPT

## 2023-09-04 PROCEDURE — 99239 HOSP IP/OBS DSCHRG MGMT >30: CPT | Performed by: INTERNAL MEDICINE

## 2023-09-04 PROCEDURE — 85025 COMPLETE CBC W/AUTO DIFF WBC: CPT

## 2023-09-04 RX ORDER — FOLIC ACID 1 MG/1
1 TABLET ORAL DAILY
Qty: 90 TABLET | Refills: 0 | Status: SHIPPED | OUTPATIENT
Start: 2023-09-04

## 2023-09-04 RX ORDER — CYANOCOBALAMIN 1000 UG/ML
1000 INJECTION, SOLUTION INTRAMUSCULAR; SUBCUTANEOUS
Qty: 12 ML | Refills: 0 | Status: SHIPPED | OUTPATIENT
Start: 2023-09-04

## 2023-09-04 RX ORDER — PRIMIDONE 50 MG/1
25 TABLET ORAL EVERY 12 HOURS SCHEDULED
Qty: 10 TABLET | Refills: 0 | Status: SHIPPED | OUTPATIENT
Start: 2023-09-04 | End: 2023-09-14

## 2023-09-04 RX ORDER — LANOLIN ALCOHOL/MO/W.PET/CERES
100 CREAM (GRAM) TOPICAL DAILY
Qty: 90 TABLET | Refills: 0 | Status: SHIPPED | OUTPATIENT
Start: 2023-09-05

## 2023-09-04 RX ORDER — OXYCODONE HYDROCHLORIDE 5 MG/1
5 TABLET ORAL EVERY 6 HOURS PRN
Qty: 15 TABLET | Refills: 0 | Status: SHIPPED | OUTPATIENT
Start: 2023-09-04 | End: 2023-09-14

## 2023-09-04 RX ORDER — PRIMIDONE 50 MG/1
TABLET ORAL
Qty: 5 TABLET | Refills: 0 | OUTPATIENT
Start: 2023-09-04 | End: 2023-09-07

## 2023-09-04 RX ORDER — METHOCARBAMOL 750 MG/1
750 TABLET, FILM COATED ORAL EVERY 8 HOURS PRN
Qty: 60 TABLET | Refills: 0 | Status: SHIPPED | OUTPATIENT
Start: 2023-09-04

## 2023-09-04 RX ORDER — LIDOCAINE 50 MG/G
2 PATCH TOPICAL DAILY
Qty: 30 PATCH | Refills: 0 | Status: SHIPPED | OUTPATIENT
Start: 2023-09-05

## 2023-09-04 RX ADMIN — NICOTINE 7 MG: 7 PATCH, EXTENDED RELEASE TRANSDERMAL at 08:37

## 2023-09-04 RX ADMIN — FOLIC ACID 1 MG: 1 TABLET ORAL at 08:36

## 2023-09-04 RX ADMIN — PRIMIDONE 50 MG: 50 TABLET ORAL at 08:36

## 2023-09-04 RX ADMIN — ACETAMINOPHEN 975 MG: 325 TABLET, FILM COATED ORAL at 13:45

## 2023-09-04 RX ADMIN — METHOCARBAMOL TABLETS 750 MG: 750 TABLET, COATED ORAL at 05:14

## 2023-09-04 RX ADMIN — ASPIRIN 81 MG: 81 TABLET, COATED ORAL at 08:37

## 2023-09-04 RX ADMIN — Medication 100 MG: at 08:36

## 2023-09-04 RX ADMIN — METHOCARBAMOL TABLETS 750 MG: 750 TABLET, COATED ORAL at 13:45

## 2023-09-04 RX ADMIN — LISINOPRIL 20 MG: 20 TABLET ORAL at 08:37

## 2023-09-04 RX ADMIN — LIDOCAINE 2 PATCH: 700 PATCH TOPICAL at 08:37

## 2023-09-04 RX ADMIN — MULTIPLE VITAMINS W/ MINERALS TAB 1 TABLET: TAB ORAL at 08:36

## 2023-09-04 RX ADMIN — METOPROLOL SUCCINATE 50 MG: 50 TABLET, EXTENDED RELEASE ORAL at 08:36

## 2023-09-04 RX ADMIN — ACETAMINOPHEN 975 MG: 325 TABLET, FILM COATED ORAL at 05:14

## 2023-09-04 RX ADMIN — OXYCODONE HYDROCHLORIDE 5 MG: 5 TABLET ORAL at 10:30

## 2023-09-04 RX ADMIN — INSULIN LISPRO 1 UNITS: 100 INJECTION, SOLUTION INTRAVENOUS; SUBCUTANEOUS at 08:41

## 2023-09-04 RX ADMIN — CYANOCOBALAMIN 1000 MCG: 1000 INJECTION, SOLUTION INTRAMUSCULAR; SUBCUTANEOUS at 08:42

## 2023-09-04 RX ADMIN — PRASUGREL 10 MG: 10 TABLET, FILM COATED ORAL at 09:13

## 2023-09-04 NOTE — ASSESSMENT & PLAN NOTE
Patient reports 2 or more glasses of wine daily, no use of beer or hard liquor    Plan  - Counseled on recommendation to decrease ETOH intake  - Continue thiamine, folate, and B12 supplementation on d/c.

## 2023-09-04 NOTE — ASSESSMENT & PLAN NOTE
Oct 2013 CAB% prox LAD, 40% mid LAD, EF 60%; MANUEL to prox LAD  Continue PTA medications: aspirin, metoprolol, prasugrel

## 2023-09-04 NOTE — UTILIZATION REVIEW
Continued Stay Review    Date: 9/04                        Current Patient Class:  IP  Current Level of Care:   MS     HPI:62 y.o. male initially admitted on 9/01  For workup of syncope and fatigue, CORRAL  (monitoring for signs alcohol withdrawal)        9/3/23 CHANGED TO INPATIENT   Has dyspnea with exertion. Fatigued. Continued back pain, rates 8 to 10/10. On exam sclera red on left eye, right with prosthesis. Lungs diminished breath sounds. Incentive spirometry 1164 with goal of 2400. Telemetry sinus. Continue pain control. Given dose of B12. Neurology to consult. Pain control in progress.      9/3/23 per neurology - patient with syncope. Differential is use of Primidone (not typical) vs seizures (doubt Primidone would cause this0. Unsteady gait is likely due to neuropathy. On week of admission, 2 episodes of LOC with standing and confused about 10 minutes afterward. Recommend vitamin B12, outpatient eeg, taper off Primidone.       Date: 9/04      Day 3:    GCS 15.  C/o ongoing pain  (3/10 this am)  But "better controlled"   Than at  Admission. Cont    Primidone taper,   B12  IM  supplementation. conts to require po (and intermittent IV)   prn meds for pain control         09/04/23 07:16:14 98.2 °F (36.8 °C) 83 -- 128/75 93 93 % rm air      9/04/2023  acetaminophen, 975 mg, Oral, Q8H [address]  aspirin, 81 mg, Oral, Daily  folic acid, 1 mg, Oral, Daily  insulin lispro, 1-5 Units, Subcutaneous, 4x Daily (with meals and at bedtime)  lidocaine, 2 patch, Topical, Daily  lisinopril, 20 mg, Oral, Daily  methocarbamol, 750 mg, Oral, Q8H TERRENCE  metoprolol succinate, 50 mg, Oral, Daily  multivitamin-minerals, 1 tablet, Oral, Daily  nicotine, 7 mg, Transdermal, Daily  polyethylene glycol, 17 g, Oral, Daily  prasugrel, 10 mg, Oral, Daily  primidone, 50 mg, Oral, Q12H [address]  thiamine, 100 mg, Oral, Daily    9/04  PRN MEDS:    HYDROmorphone, 0.2 mg, Intravenous, Q2H PRN  . ..   9/3 @ 1251  hydrOXYzine HCL, 25 mg, Oral, Q6H PRN. .. 9/3 @ 1055  nicotine polacrilex, 2 mg, Oral, Q2H PRN  oxyCODONE, 5 mg, Oral, Q4H PRN  oxyCODONE, 2.5 mg, Oral, Q4H PRN . ..  9/3  @ 3048    Pertinent Labs/Diagnostic Results:       Results from last 7 days   Lab Units 09/04/23 0522 09/03/23 0447 09/02/23  0453 09/01/23  0515 09/01/23  0013   WBC Thousand/uL 4.48 4.75 5.59 6.04  --    HEMOGLOBIN g/dL 11.8* 11.9* 12.1 12.4  --    I STAT HEMOGLOBIN g/dl  --   --   --   --  11.6*   HEMATOCRIT % 35.6* 34.7* 34.6* 37.2  --    HEMATOCRIT, ISTAT %  --   --   --   --  34*   PLATELETS Thousands/uL 103* 107* 110* 109*  --    NEUTROS ABS Thousands/µL 2.85 2.99 3.66 3.73  --          Results from last 7 days   Lab Units 09/04/23 0522 09/03/23 0447 09/02/23 0453 09/01/23  1404 09/01/23  0829 09/01/23  0515 09/01/23  0013 09/01/23  0012   SODIUM mmol/L 135 135 133*  --   --  136  --  136   POTASSIUM mmol/L 4.5 4.3 4.7  --  5.0 5.8*  --  4.8   CHLORIDE mmol/L 104 102 100  --   --  107  --  106   CO2 mmol/L 25 25 21  --   --  16*  --  17*   CO2, I-STAT mmol/L  --   --   --   --   --   --  17*  --    ANION GAP mmol/L 6 8 12  --   --  13  --  13   BUN mg/dL 8 11 18  --   --  15  --  17   CREATININE mg/dL 0.94 0.88 0.98  --   --  0.97  --  1.08   EGFR ml/min/1.73sq m 87 92 82  --   --  83  --  73   CALCIUM mg/dL 9.1 9.0 9.0  --   --  8.9  --  8.9   CALCIUM, IONIZED, ISTAT mmol/L  --   --   --   --   --   --  1.19  --    MAGNESIUM mg/dL  --  1.9 1.6* 1.8*  --  1.8*  --  1.6*     Results from last 7 days   Lab Units 09/04/23  0522 09/02/23  0453 09/01/23  0012   AST U/L 36 25 33   ALT U/L 24 22 25   ALK PHOS U/L 59 59 68   TOTAL PROTEIN g/dL 6.4 6.6 6.3*   ALBUMIN g/dL 3.7 3.8 3.7   TOTAL BILIRUBIN mg/dL 0.79 1.02* 0.44     Results from last 7 days   Lab Units 09/04/23  0715 09/03/23  2125 09/03/23  1630 09/03/23  1103 09/03/23  0711 09/02/23  2104 09/02/23  1626 09/02/23  1227 09/01/23  2106 09/01/23  1608 09/01/23  1113   POC GLUCOSE mg/dl 188* 207* 171* 143* 143* 247* 137 138 226* 143* 143*     Results from last 7 days   Lab Units 09/04/23  0522 09/03/23  0447 09/02/23  0453 09/01/23  0515 09/01/23  0012   GLUCOSE RANDOM mg/dL 147* 124 150* 145* 142*             No results found for: "BETA-HYDROXYBUTYRATE"           Results from last 7 days   Lab Units 09/01/23  0013   PH, MICHELLE I-STAT  7.350   PCO2, MICHELLE ISTAT mm HG 28.9*   PO2, MICHELLE ISTAT mm HG 36.0   HCO3, MICHELLE ISTAT mmol/L 16.0*   I STAT BASE EXC mmol/L -8*   I STAT O2 SAT % 67     Results from last 7 days   Lab Units 09/01/23  0829   CK TOTAL U/L 59     Results from last 7 days   Lab Units 09/01/23  0515 09/01/23  0213 09/01/23  0012   HS TNI 0HR ng/L  --   --  2   HS TNI 2HR ng/L  --  2  --    HSTNI D2 ng/L  --  0  --    HS TNI 4HR ng/L 3  --   --    HSTNI D4 ng/L 1  --   --          Results from last 7 days   Lab Units 09/01/23  0012   PROTIME seconds 13.0   INR  0.93   PTT seconds 26     Results from last 7 days   Lab Units 09/01/23  0515   TSH 3RD GENERATON uIU/mL 0.916                     Results from last 7 days   Lab Units 09/01/23  0012   BNP pg/mL 14                                                                                       Medications:   Scheduled Medications:  acetaminophen, 975 mg, Oral, Q8H TERRENCE  aspirin, 81 mg, Oral, Daily  folic acid, 1 mg, Oral, Daily  insulin lispro, 1-5 Units, Subcutaneous, 4x Daily (with meals and at bedtime)  lidocaine, 2 patch, Topical, Daily  lisinopril, 20 mg, Oral, Daily  methocarbamol, 750 mg, Oral, Q8H TERRENCE  metoprolol succinate, 50 mg, Oral, Daily  multivitamin-minerals, 1 tablet, Oral, Daily  nicotine, 7 mg, Transdermal, Daily  polyethylene glycol, 17 g, Oral, Daily  prasugrel, 10 mg, Oral, Daily  primidone, 50 mg, Oral, Q12H [address]  thiamine, 100 mg, Oral, Daily      Continuous IV Infusions:     PRN Meds:  HYDROmorphone, 0.2 mg, Intravenous, Q2H PRN  hydrOXYzine HCL, 25 mg, Oral, Q6H PRN  nicotine polacrilex, 2 mg, Oral, Q2H PRN  oxyCODONE, 5 mg, Oral, Q4H PRN  oxyCODONE, 2.5 mg, Oral, Q4H PRN        Discharge Plan: tbd    Network Utilization Review Department  ATTENTION: Please call with any questions or concerns to [phone] and carefully listen to the prompts so that you are directed to the right person. All voicemails are confidential.  Chhyaa Shaw all requests for admission clinical reviews, approved or denied determinations and any other requests to dedicated fax number below belonging to the campus where the patient is receiving treatment.  List of dedicated fax numbers for the Facilities:  [place] DENIALS (Administrative/Medical Necessity) [phone] [address] [facility] (Maternity/NICU/Pediatrics) [phone]   [address] [place] [address] [phone]   [facility] [address] [facility] [phone]   [address] [place] [address] [address] [place] [address] [id number] [place] [address] [phone]   [address] [address] [phone]

## 2023-09-04 NOTE — QUICK NOTE
Per consult note from 9/3/23 by Dr. Sanchez Getting:    · Primidone taper as follows: 25 mg a.m. and 50 mg p.m. x3 days THEN 25 mg twice daily x3 days THEN 5 mg daily for 3 days THEN discontinue  · Our team will contact his outpatient neurologist regarding starting a different antitremor medication  · Outpatient standard EEG (ordered)  · F/u with neurologist, [address] [place] [address] outpatient in 6-8weeks post discharge.

## 2023-09-04 NOTE — ASSESSMENT & PLAN NOTE
Blood Pressure: 128/75   Hypertensive episodes most likely caused by pain. We will continue to monitor. Blood pressure seems to decrease with pain control.   · Continue PTA toprol 50qd and lisinopril 20qd

## 2023-09-04 NOTE — DISCHARGE INSTR - AVS FIRST PAGE
Dear Lloyd Doherty,     It was our pleasure to care for you here at [facility], [facility]. It is our hope that we were always able to exceed the expected standards for your care during your stay. You were hospitalized due to syncope. You were cared for on the 2nd floor by Mary Goyal DO under the service of Erica Pearson MD with the [facility] who covers for your primary care physician (PCP), Alonso Pruett MD, while you were hospitalized. If you have any questions or concerns related to this hospitalization, you may contact us at [phone]. For follow up as well as any medication refills, we recommend that you follow up with your primary care physician. A registered nurse will reach out to you by phone within a few days after your discharge to answer any additional questions that you may have after going home. However, at this time we provide for you here, the most important instructions / recommendations at discharge:     Notable Medication Adjustments -   CHANGE how you take Primidone. You will be tapering off of the medication as follows: Take 25 mg in the morning and 50 mg in the evenings for three days. On day 4, start 25 mg twice daily (morning and evening) for three days. On day 7, take 25 mg once daily for three days. Then, stop taking the medication altogether. CONTINUE taking thiamine 647GR daily and folic acid 1mg daily as supplements started for you in the hospital.  CONTINUE with vitamin B12 injection 1000mcg weekly. CONTINUE taking methocarbamol (Robaxin) 750 mg every 8 hours as needed for muscle spasms. Do not drive when you take this medication. CONTINUE using topical lidocaine patches as needed. These can remain in place for up to 12 hours in a 24 hour period. RESTART your metformin 1000mg daily with breakfast.  Testing Required after Discharge -   Complete an outpatient EEG.   This has been ordered by neurology for you. Recheck a vitamin B12 level in approximately 3 months. Please contact your PCP to request testing orders. Important follow up information -   Follow up with neurology to discuss starting a different medication in place of Primidone. Follow up with neurologist Dr. Mike Olivares in 6-8 weeks. Follow up with PCP if vitamin B12 shots need to be scheduled as nurse visits. Discuss with your PCP potentially following up with endocrinology for concerns about your diabetes. Please review this entire after visit summary as additional general instructions including medication list, appointments, activity, diet, any pertinent wound care, and other additional recommendations from your care team that may be provided for you.       Sincerely,     Jeffrey Roman, DO

## 2023-09-04 NOTE — ASSESSMENT & PLAN NOTE
No results found for: "HGBA1C"    Recent Labs     09/03/23  1103 09/03/23  1630 09/03/23  2125 09/04/23  0715   POCGLU 143* 171* 207* 188*       Blood Sugar Average: Last 72 hrs:  Plan:  - Restart home metformin at d/c  - Discuss f/u with endocrinologist with PCP if desired

## 2023-09-04 NOTE — ASSESSMENT & PLAN NOTE
Per neurology recommendations, primidone taper and f/u with outpatient neurology for change in medication. Patient has been having primidone 50mg BID in hospital as of 9/4/23.  Start dose at home or at hospital 9/5/23.     - Primidone taper as follows: 25 mg a.m. and 50 mg p.m. x3 days, then 25 mg twice daily x3 days, then 25 mg daily for 3 days then discontinue  - F/u outpatient with neurology to discuss starting another medication in place of primidone

## 2023-09-04 NOTE — ASSESSMENT & PLAN NOTE
BIBEMS following syncopal event that occurred PTA, similar episode occurring prior in the week without presentation for evaluation. Patient's wife reports patient has had multiple falls over the past year. Pt w/ poor coordination and balance 2/2 peripheral neuropathy, right eye prosthesis. Hx of ETOH dependence. Additionally has essential tremor. ED workup: ECG showed NSR trops negative, CBC and CMP unremarkable. Trauma workup unremarkable. Orthostatics negative, echo and tele unremarkable, pt with low magnesium and B12    Ddx: ambulatory dysfunction due multifactorial etiology (poor vision, neuropathy, ETOH) vs seizure vs medication-induced syncope     - Home with outpatient PT and OT  - Continue thiamine, folate, B12 supplementation. Recheck vit B12 level with PCP in 3 months.   - Taper primidone  - F/u with neurology  - Outpatient EEG ordered by neurology

## 2023-09-04 NOTE — ASSESSMENT & PLAN NOTE
Reportedly medicating with ETOH and tylenol daily  TRAUMA - CT C-spine impression: No cervical spine fracture or traumatic malalignment. MRI L-spine 2015 revealed Mild L3-4 and L4-5 degenerative disc disease. Small left foraminal disc protrusion at L3-4. Only minimal canal stenosis noted. No significant foraminal narrowing or foraminal nerve impingement.     Plan  - As above for rib pain on left side  - May consider outpatient MRI if not improving

## 2023-09-04 NOTE — PLAN OF CARE
Problem: MOBILITY - ADULT  Goal: Maintain or return to baseline ADL function  Description: INTERVENTIONS:  -  Assess patient's ability to carry out ADLs; assess patient's baseline for ADL function and identify physical deficits which impact ability to perform ADLs (bathing, care of mouth/teeth, toileting, grooming, dressing, etc.)  - Assess/evaluate cause of self-care deficits   - Assess range of motion  - Assess patient's mobility; develop plan if impaired  - Assess patient's need for assistive devices and provide as appropriate  - Encourage maximum independence but intervene and supervise when necessary  - Involve family in performance of ADLs  - Assess for home care needs following discharge   - Consider OT consult to assist with ADL evaluation and planning for discharge  - Provide patient education as appropriate  Outcome: Progressing  Goal: Maintains/Returns to pre admission functional level  Description: INTERVENTIONS:  - Perform BMAT or MOVE assessment daily.   - Set and communicate daily mobility goal to care team and patient/family/caregiver.    - Collaborate with rehabilitation services on mobility goals if consulted  - Out of bed for toileting  - Record patient progress and toleration of activity level   Outcome: Progressing     Problem: PAIN - ADULT  Goal: Verbalizes/displays adequate comfort level or baseline comfort level  Description: Interventions:  - Encourage patient to monitor pain and request assistance  - Assess pain using appropriate pain scale  - Administer analgesics based on type and severity of pain and evaluate response  - Implement non-pharmacological measures as appropriate and evaluate response  - Consider cultural and social influences on pain and pain management  - Notify physician/advanced practitioner if interventions unsuccessful or patient reports new pain  Outcome: Progressing     Problem: INFECTION - ADULT  Goal: Absence or prevention of progression during hospitalization  Description: INTERVENTIONS:  - Assess and monitor for signs and symptoms of infection  - Monitor lab/diagnostic results  - Monitor all insertion sites, i.e. indwelling lines, tubes, and drains  - Monitor endotracheal if appropriate and nasal secretions for changes in amount and color  - [place] appropriate cooling/warming therapies per order  - Administer medications as ordered  - Instruct and encourage patient and family to use good hand hygiene technique  - Identify and instruct in appropriate isolation precautions for identified infection/condition  Outcome: Progressing  Goal: Absence of fever/infection during neutropenic period  Description: INTERVENTIONS:  - Monitor WBC    Outcome: Progressing     Problem: SAFETY ADULT  Goal: Maintain or return to baseline ADL function  Description: INTERVENTIONS:  -  Assess patient's ability to carry out ADLs; assess patient's baseline for ADL function and identify physical deficits which impact ability to perform ADLs (bathing, care of mouth/teeth, toileting, grooming, dressing, etc.)  - Assess/evaluate cause of self-care deficits   - Assess range of motion  - Assess patient's mobility; develop plan if impaired  - Assess patient's need for assistive devices and provide as appropriate  - Encourage maximum independence but intervene and supervise when necessary  - Involve family in performance of ADLs  - Assess for home care needs following discharge   - Consider OT consult to assist with ADL evaluation and planning for discharge  - Provide patient education as appropriate  Outcome: Progressing  Goal: Maintains/Returns to pre admission functional level  Description: INTERVENTIONS:  - Perform BMAT or MOVE assessment daily.   - Set and communicate daily mobility goal to care team and patient/family/caregiver.    - Collaborate with rehabilitation services on mobility goals if consulted  - Out of bed for toileting  - Record patient progress and toleration of activity level   Outcome: Progressing  Goal: Patient will remain free of falls  Description: INTERVENTIONS:  - Educate patient/family on patient safety including physical limitations  - Instruct patient to call for assistance with activity   - Consult OT/PT to assist with strengthening/mobility   - Keep Call bell within reach  - Keep bed low and locked with side rails adjusted as appropriate  - Keep care items and personal belongings within reach  - Initiate and maintain comfort rounds  - Make Fall Risk Sign visible to staff  - Apply yellow socks and bracelet for high fall risk patients  - Consider moving patient to room near nurses station  Outcome: Progressing     Problem: DISCHARGE PLANNING  Goal: Discharge to home or other facility with appropriate resources  Description: INTERVENTIONS:  - Identify barriers to discharge w/patient and caregiver  - Arrange for needed discharge resources and transportation as appropriate  - Identify discharge learning needs (meds, wound care, etc.)  - Arrange for interpretive services to assist at discharge as needed  - Refer to Case Management Department for coordinating discharge planning if the patient needs post-hospital services based on physician/advanced practitioner order or complex needs related to functional status, cognitive ability, or social support system  Outcome: Progressing     Problem: Knowledge Deficit  Goal: Patient/family/caregiver demonstrates understanding of disease process, treatment plan, medications, and discharge instructions  Description: Complete learning assessment and assess knowledge base.   Interventions:  - Provide teaching at level of understanding  - Provide teaching via preferred learning methods  Outcome: Progressing     Problem: METABOLIC, FLUID AND ELECTROLYTES - ADULT  Goal: Electrolytes maintained within normal limits  Description: INTERVENTIONS:  - Monitor labs and assess patient for signs and symptoms of electrolyte imbalances  - Administer electrolyte replacement as ordered  - Monitor response to electrolyte replacements, including repeat lab results as appropriate  - Instruct patient on fluid and nutrition as appropriate  Outcome: Progressing  Goal: Glucose maintained within target range  Description: INTERVENTIONS:  - Monitor Blood Glucose as ordered  - Assess for signs and symptoms of hyperglycemia and hypoglycemia  - Administer ordered medications to maintain glucose within target range  - Assess nutritional intake and initiate nutrition service referral as needed  Outcome: Progressing     Problem: Nutrition/Hydration-ADULT  Goal: Nutrient/Hydration intake appropriate for improving, restoring or maintaining nutritional needs  Description: Monitor and assess patient's nutrition/hydration status for malnutrition. Collaborate with interdisciplinary team and initiate plan and interventions as ordered. Monitor patient's weight and dietary intake as ordered or per policy. Utilize nutrition screening tool and intervene as necessary. Determine patient's food preferences and provide high-protein, high-caloric foods as appropriate.      INTERVENTIONS:  - Monitor oral intake, urinary output, labs, and treatment plans  - Assess nutrition and hydration status and recommend course of action  - Evaluate amount of meals eaten  - Assist patient with eating if necessary   - Allow adequate time for meals  - Recommend/ encourage appropriate diets, oral nutritional supplements, and vitamin/mineral supplements  - Order, calculate, and assess calorie counts as needed  - Recommend, monitor, and adjust tube feedings and TPN/PPN based on assessed needs  - Assess need for intravenous fluids  - Provide specific nutrition/hydration education as appropriate  - Include patient/family/caregiver in decisions related to nutrition  Outcome: Progressing     Problem: Prexisting or High Potential for Compromised Skin Integrity  Goal: Skin integrity is maintained or improved  Description: INTERVENTIONS:  - Identify patients at risk for skin breakdown  - Assess and monitor skin integrity  - Assess and monitor nutrition and hydration status  - Monitor labs   - Assess for incontinence   - Turn and reposition patient  - Assist with mobility/ambulation  - Relieve pressure over bony prominences  - Avoid friction and shearing  - Provide appropriate hygiene as needed including keeping skin clean and dry  - Evaluate need for skin moisturizer/barrier cream  - Collaborate with interdisciplinary team   - Patient/family teaching  - Consider wound care consult   Outcome: Progressing

## 2023-09-05 LAB
EST. AVERAGE GLUCOSE BLD GHB EST-MCNC: 134 MG/DL
HBA1C MFR BLD: 6.3 %

## 2023-09-05 NOTE — DISCHARGE SUMMARY
[address]  Discharge- Paola Rojas 1960, 65 y.o. male MRN: [id number]  Unit/Bed#: S -01 Encounter: [id number]  Primary Care Provider: Cat Velazquez MD   Date and time admitted to hospital: 9/1/2023 12:01 AM    * Syncope and collapse-resolved as of 9/4/2023  Assessment & Plan  BIBEMS following syncopal event that occurred PTA, similar episode occurring prior in the week without presentation for evaluation. Patient's wife reports patient has had multiple falls over the past year. Pt w/ poor coordination and balance 2/2 peripheral neuropathy, right eye prosthesis. Hx of ETOH dependence. Additionally has essential tremor. ED workup: ECG showed NSR trops negative, CBC and CMP unremarkable. Trauma workup unremarkable. Orthostatics negative, echo and tele unremarkable, pt with low magnesium and B12    Ddx: ambulatory dysfunction due multifactorial etiology (poor vision, neuropathy, ETOH) vs seizure vs medication-induced syncope     - Home with outpatient PT and OT  - Continue thiamine, folate, B12 supplementation. Recheck vit B12 level with PCP in 3 months. - Taper primidone  - F/u with neurology  - Outpatient EEG ordered by neurology    Rib pain on left side  Assessment & Plan  - Home with PRN tylenol, lidocaine, robaxin, ron    Chronic back pain  Assessment & Plan  Reportedly medicating with ETOH and tylenol daily  TRAUMA - CT C-spine impression: No cervical spine fracture or traumatic malalignment. MRI L-spine 2015 revealed Mild L3-4 and L4-5 degenerative disc disease. Small left foraminal disc protrusion at L3-4. Only minimal canal stenosis noted. No significant foraminal narrowing or foraminal nerve impingement.     Plan  - As above for rib pain on left side  - May consider outpatient MRI if not improving    Alcohol dependence ([address])  Assessment & Plan  Patient reports 2 or more glasses of wine daily, no use of beer or hard liquor    Plan  - Counseled on recommendation to decrease ETOH intake  - Continue thiamine, folate, and B12 supplementation on d/c. Facial contusion  Assessment & Plan  Improving      Essential tremor  Assessment & Plan  Per neurology recommendations, primidone taper and f/u with outpatient neurology for change in medication. Patient has been having primidone 50mg BID in hospital as of 23. Start dose at home or at hospital 23.     - Primidone taper as follows: 25 mg a.m. and 50 mg p.m. x3 days, then 25 mg twice daily x3 days, then 25 mg daily for 3 days then discontinue  - F/u outpatient with neurology to discuss starting another medication in place of primidone    Atherosclerosis of native coronary artery of native heart without angina pectoris  Assessment & Plan  Oct 2013 CAB% prox LAD, 40% mid LAD, EF 60%; MANUEL to prox LAD  Continue PTA medications: aspirin, metoprolol, prasugrel      Primary hypertension  Assessment & Plan  Blood Pressure: 128/75   Hypertensive episodes most likely caused by pain. We will continue to monitor. Blood pressure seems to decrease with pain control.   · Continue PTA toprol 50qd and lisinopril 20qd    Type 2 diabetes mellitus with diabetic polyneuropathy, without long-term current use of insulin [facility])  Assessment & Plan  No results found for: "HGBA1C"    Recent Labs     23  1103 23  1630 23  2125 23  0715   POCGLU 143* 171* 207* 188*       Blood Sugar Average: Last 72 hrs:  Plan:  - Restart home metformin at d/c  - Discuss f/u with endocrinologist with PCP if desired      Medical Problems     Resolved Problems  Date Reviewed: 2023          Resolved    * (Principal) Syncope and collapse 2023     Resolved by  Patria Solano DO    Overview Deleted 2023  2:23 PM by Patria Solano DO            Fall 2023     Resolved by  MANNY Kendall        Discharging Resident: Patria Solano DO  Discharging Attending: Dr. Quitman Ahumada, MD  PCP: Johnnie Vargas MD  Admission Date:   Admission Orders (From admission, onward)     Ordered        09/03/23 1650  Inpatient Admission  Once            09/01/23 0221  Place in Observation  Once                      Discharge Date: 09/04/23    Consultations During Hospital Stay:  · RT, OT, PT, neurology    Procedures Performed:   · None    Significant Findings / Test Results:   · Vitamin B12 abnormally low at 153  · Hypomagnesemia to 1.6    Incidental Findings:   · Incidental 9 mm nodule in the left lobe of the thyroid gland identified on this CT. According to guidelines published in the February 2015 white paper on incidental thyroid nodules in the Journal of the [address] [place] [address] of Radiology [facility]), because the nodule(s) are less than 1.5 cm in size and without suspicious feature, no further evaluation is recommended  · I reviewed the above mentioned incidental findings with the patient and/or family and they expressed understanding. Test Results Pending at Discharge (will require follow up): · None     Outpatient Tests Requested:  · Recheck vitamin B12 in 3 months    Complications:  None    Reason for Admission: syncope and collapse    Hospital Course:   Akosua Ivy is a 65 y.o. male patient who originally presented to the hospital on 9/1/2023 due to syncope with fall. Trauma workup including CT head, CT C-spine, and CT abdomen/pelvis were negative for signs of hemorrhage or fracture. Patient was started on supplementary thiamine and folate due to the patient's history long-standing alcohol use, as well as being monitored on CIWA protocol. He was found to be vitamin B12 deficient and started on supplementation. He was notably recently started on primidone as an outpatient; evaluation by neurology on 9/3/2023 led to recommendation of taper off of primidone as well as outpatient EEG.  Patient was discharged with instructions to follow-up with outpatient neurology for EEG as well as change in antiseizure medication. Please see above list of diagnoses and related plan for additional information. Condition at Discharge: stable    Discharge Day Visit / Exam:   Subjective: Seen and examined at bedside. Patient's primary concern regarding potential discharge home today is ongoing rib and back pain, which is new since the time of the fall. We will send Robaxin and Roxicodone scripts for as needed use as outpatient. He is to taper primidone per neurology team and follow up with his neurologist as an outpatient. Discussed with him that he should continue supplementation with vitamin B12 injections weekly for the next 3 months, then have level rechecked with PCP. Otherwise denies any acute complaints at this time. Vitals: Blood Pressure: 124/68 (09/04/23 1110)  Pulse: 76 (09/04/23 1110)  Temperature: 98.1 °F (36.7 °C) (09/04/23 1110)  Temp Source: Oral (09/01/23 0007)  Respirations: 18 (09/03/23 1523)  Height: 6' (182.9 cm) (09/01/23 0835)  Weight - Scale: 88.6 kg (195 lb 5.2 oz) (09/04/23 0500)  SpO2: 97 % (09/04/23 1110)  Exam:   Physical Exam  Constitutional:       General: He is not in acute distress. Appearance: Normal appearance. He is not ill-appearing, toxic-appearing or diaphoretic. HENT:      Head: Normocephalic. Nose: Nose normal.   Eyes:      Comments: Periorbital bruising of left eye, left eye EOMI, right eye prosthesis   Cardiovascular:      Rate and Rhythm: Normal rate and regular rhythm. Pulses: Normal pulses. Heart sounds: Normal heart sounds. No murmur heard. Pulmonary:      Effort: Pulmonary effort is normal. No respiratory distress. Breath sounds: Normal breath sounds. No stridor. No wheezing or rhonchi. Abdominal:      General: Bowel sounds are normal. There is no distension. Palpations: Abdomen is soft. Skin:     General: Skin is warm and dry.       Findings: Bruising (L periorbital bruising, b/l UE with bruises) and wound (two wounds bandaged on LUE distal to the elbow) present. Neurological:      General: No focal deficit present. Mental Status: He is alert and oriented to person, place, and time. Mental status is at baseline. Psychiatric:         Mood and Affect: Mood normal.         Behavior: Behavior normal.         Discussion with Family: Updated  (wife) at bedside. Discharge instructions/Information to patient and family:   See after visit summary for information provided to patient and family. Provisions for Follow-Up Care:  See after visit summary for information related to follow-up care and any pertinent home health orders. Disposition:   Home    Planned Readmission: None    Discharge Medications:  See after visit summary for reconciled discharge medications provided to patient and/or family.       **Please Note: This note may have been constructed using a voice recognition system**

## 2023-09-06 ENCOUNTER — TELEPHONE (OUTPATIENT)
Dept: NEUROLOGY | Facility: CLINIC | Age: 63
End: 2023-09-06

## 2023-09-06 NOTE — TELEPHONE ENCOUNTER
HFU appt made for 10/12 @ 2:30 in [facility]. Pts wife did express concern for pt as he will not be on his medication for a few weeks. She wanted me to pass that on to you. I did let her know he is on the wait list for both locations and we will move it up ASA.    Thank you,  Paulo Shelby

## 2023-09-06 NOTE — TELEPHONE ENCOUNTER
Pt was admitted to [facility] 9/1-9/4 for Syncope and Collapse. Spoke to pts wife she is concerned that the hospital Neurologist changed medication and is weaning pt off meds that Dr. Marlo Early put the pt on. She would like to have the Pt see Dr. Colin [facility]. Please let me know how to schedule the HFU appt as instructions say 6-8 weeks and pts wife is very concerned. Thank you.

## 2023-09-18 ENCOUNTER — HOSPITAL ENCOUNTER (OUTPATIENT)
Dept: NEUROLOGY | Facility: CLINIC | Age: 63
Discharge: HOME | End: 2023-09-18
Payer: COMMERCIAL

## 2023-09-18 DIAGNOSIS — R55 SYNCOPE AND COLLAPSE: ICD-10-CM
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
## 2023-09-18 PROCEDURE — 95816 EEG AWAKE AND DROWSY: CPT

## 2023-09-18 PROCEDURE — 95816 EEG AWAKE AND DROWSY: CPT | Performed by: STUDENT IN AN ORGANIZED HEALTH CARE EDUCATION/TRAINING PROGRAM

## 2023-09-25 ENCOUNTER — PATIENT MESSAGE (OUTPATIENT)
Dept: NEUROLOGY | Facility: CLINIC | Age: 63
End: 2023-09-25

## 2023-09-26 NOTE — PATIENT COMMUNICATION
9/25/23 at 0939     Hi. This is [employer]. My , Meena Van, birthdate, 1960. A pt of Dr. Aman Henry. He was in the hospital over labor day and she ordered an eeg that he had last Monday. They weaned him off of his anti tremor meds when he was in the hospital and now his tremors are really beginning to come back. We are not scheduled to see her until the middle of October. I was wondering if there was any possible way, she could see him sooner, or if there's anything else we could do. My number is [phone]. And again, it's for Meena Van. And his YOB: 1960. Thank you.

## 2023-10-02 ENCOUNTER — OFFICE VISIT (OUTPATIENT)
Dept: NEUROLOGY | Facility: CLINIC | Age: 63
End: 2023-10-02
Payer: COMMERCIAL

## 2023-10-02 VITALS
HEART RATE: 83 BPM | WEIGHT: 189.8 LBS | OXYGEN SATURATION: 98 % | SYSTOLIC BLOOD PRESSURE: 138 MMHG | TEMPERATURE: 98.1 F | DIASTOLIC BLOOD PRESSURE: 84 MMHG | BODY MASS INDEX: 25.74 KG/M2

## 2023-10-02 DIAGNOSIS — Z78.9 ALCOHOL USE: ICD-10-CM

## 2023-10-02 DIAGNOSIS — G25.0 TREMOR, ESSENTIAL: Primary | ICD-10-CM

## 2023-10-02 DIAGNOSIS — E53.8 VITAMIN B12 DEFICIENCY: ICD-10-CM

## 2023-10-02 DIAGNOSIS — G62.9 PERIPHERAL NEUROPATHY: ICD-10-CM

## 2023-10-02 PROCEDURE — 99215 OFFICE O/P EST HI 40 MIN: CPT | Performed by: PSYCHIATRY & NEUROLOGY

## 2023-10-02 NOTE — ASSESSMENT & PLAN NOTE
He has essential tremor. He was doing really well on primidone but then had 2 episodes of loss of consciousness. He was continuing to drink alcohol, 3 drinks per day with the primidone. It is unclear if this was a reaction to the alcohol with the primidone. EEG was unremarkable. Recommendations:  -Advised to eliminate alcohol altogether if possible but no more than 1 glass of wine on the weekend  -If he is able to maintain low intake of alcohol in the next 2 to 3 weeks, we can restart primidone at a low dose  -If he is unable to restrict his alcohol use, topiramate would be an option.   We would have to monitor his memory closely.  -I have advised him not to drive for the next 6 months because of the unpredictable nature of the loss of consciousness

## 2023-10-02 NOTE — PROGRESS NOTES
Patient ID: Karma Patel is a 65 y.o. male. Assessment/Plan:    Essential tremor  He has essential tremor. He was doing really well on primidone but then had 2 episodes of loss of consciousness. He was continuing to drink alcohol, 3 drinks per day with the primidone. It is unclear if this was a reaction to the alcohol with the primidone. EEG was unremarkable. Recommendations:  -Advised to eliminate alcohol altogether if possible but no more than 1 glass of wine on the weekend  -If he is able to maintain low intake of alcohol in the next 2 to 3 weeks, we can restart primidone at a low dose  -If he is unable to restrict his alcohol use, topiramate would be an option. We would have to monitor his memory closely.  -I have advised him not to drive for the next 6 months because of the unpredictable nature of the loss of consciousness    Peripheral neuropathy  On initial examination, he had both large and fiber involvement suggestive of neuropathy. Most likely related to underlying diabetes, alcohol use and significantly low B12 level. Recommendations:  -Check EMG 1 upper and lower extremity to evaluate extent of neuropathy  -Current appointment is in January 2024. I will try to expedite the EMG  -As above, advised to reduce alcohol consumption  -B12 supplementation    Alcohol use  As above, strongly advised to restrict alcohol use so that we can reinitiate primidone    Vitamin B12 deficiency  Underlying B12 deficiency is likely contributing to neuropathy, gait changes and memory loss. Recommendations:  -Continue  vitamin B12 repletion. He is getting B12 IM. Follow-up in 2 months or sooner if difficulties. Total time 50 minutes, including face-to-face time with the patient and time before and after this visit reviewing data, all on the same day of the visit.       Subjective:      Mr. Sabino Ford is a 71-year-old right handed gentleman with PMH HTN, CAD s/p stent, HLD, diabetes, macular edema, HTN/DM retinopathy, alcohol use, who is here for frequent falls and tremor.  He is here with his wife. Last visit 8/15/23. In the interim, he was admitted to the hospital after an unresponsive episode concerning for seizure. In August 2023, he initially presented for falls and tremor. He developed symptoms of peripheral neuropathy in the last few years and was having increasing difficulty walking and with balance. Intermittently, he had shooting pain into the feet. He did have COVID in December 2021 and February 2023 and took Paxlovid the second time. He did not get hospitalized. He has felt worse ever since having had COVID.      He continues to work. He uses a cane on weekends. He does not use it at work. He is a  at a chemical plant. He has difficulty with stairs. Prior to the hospitalization, the last fall was in June 2023. He was not using his cane at the time. He lost his balance.     His second symptom at last visit was difficulty with writing. He felt like his brain was not communicating with his hand. Wine seems to help. No family history of tremor. Voice did seem shaky. No complaints of head intubation. Tremor worsens on holding a glass or typing. I suspected essential tremor and started him on phenobarbital.  I was reluctant to start topiramate at the time because he was having some short-term memory issues. The phenobarbital was extremely helpful for the tremor. However, after his fall admission to the hospital, phenobarbital was weaned off by the hospital admitting team.    On 8/27/2023, patient's wife was asleep when she heard a noise early in the morning. She found her  sitting on the ground confused. She wanted to call 911 but the patient refused. It took him a long time to get off the floor because he was weak. He did seem a little bit confused.   On 8/31/2023, the patient was asleep on the sofa when she came home from  her meetings at school. It is not unusual for him to fall asleep on the couch. He awoke, had a conversation with his wife. They also called their daughter. He was not inebriated. He went to bed at 9 PM.  Just before 11 PM, she thought she heard something. She found him face down on the ground unresponsive. She describes guttural snoring. It lasted several minutes and then it took him several minutes to come back to baseline. She does not know how long he was on the ground.     Patient was admitted to [facility]. Orthostatic vital signs were reportedly negative. The admitting team questioned whether this was due to the primidone since it had been started recently. It was weaned off. He went to the hospital via ambulance and when he arrived there he did tell hospital clinicians that he had 3 glasses of wine earlier in the day. He tells me the last glass was at 4 PM.  The symptoms happened around 11 PM.         Objective:    Blood pressure 138/84, pulse 83, temperature 98.1 °F (36.7 °C), temperature source Temporal, weight 86.1 kg (189 lb 12.8 oz), SpO2 98 %. Physical Exam  Constitutional:       Appearance: Normal appearance. HENT:      Head:      Comments: Prosthetic right eye     Mouth/Throat:      Mouth: Mucous membranes are moist.      Pharynx: Oropharynx is clear. Eyes:      Conjunctiva/sclera: Conjunctivae normal.   Neck:      Vascular: No carotid bruit. Cardiovascular:      Rate and Rhythm: Normal rate and regular rhythm. Heart sounds: Normal heart sounds. Pulmonary:      Effort: Pulmonary effort is normal.      Breath sounds: Normal breath sounds. Skin:     General: Skin is warm and dry. Psychiatric:         Mood and Affect: Mood normal.         Behavior: Behavior normal.         Neurological Exam  Mental status: Awake, alert, oriented to person place and time.  Naming, knowledge, repetition, concentration and recall intact.     Cranial nerves: Prosthetic right eye.   Extraocular movements intact on the left. Left pupil reactive to light. Left visual fields full to confrontation.   Facial sensation intact.  Face symmetric.  Hearing intact.  Tongue, uvula, palate midline and intact.  Sternocleidomastoid intact.     Motor:   Deltoid: Right 5/5, left 5-5  Biceps: Right 5/5, left 5-/5  Triceps: Right 5/5, left 5-/5  : Right 4+/5, left 4+/5       Iliopsoas: Right 5-/5, left 4+/5  Quadriceps: Right 5/5, left 5/5  Tibialis anterior: Right 5-/5, left 5-/5  Medial gastrocnemius: Right 5-/5, left -5/5    Normal tone.  Tremor on finger-to-nose bilaterally.  No resting tremor.  No head tremor.  No voice tremor.     Cerebellar: No dysmetria.  Heel-to-shin intact.  He was able to ambulate without a cane.  He has to look at the floor when he is walking.     Deep tendon reflexes trace in the arms and absent in the legs. Sensory: Light touch intact.  Vibration reduced to the ankles bilaterally. Slight sway on Romberg testing. Light touch, temperature, vibration sensation intact.  Romberg negative    Light touch intact in all 4 extremities. Vibration reduced to the ankle on the right and the shin on the left side. Romberg abnormal.    ROS:    Review of Systems   Constitutional: Negative for appetite change, fatigue and fever. HENT: Negative. Negative for hearing loss, tinnitus, trouble swallowing and voice change. Eyes: Positive for visual disturbance. Negative for photophobia and pain. Respiratory: Negative. Negative for shortness of breath. Cardiovascular: Negative. Negative for palpitations. Gastrointestinal: Negative. Negative for nausea and vomiting. Endocrine: Negative. Negative for cold intolerance. Genitourinary: Negative. Negative for dysuria, frequency and urgency. Musculoskeletal: Positive for back pain. Negative for gait problem, myalgias and neck pain. Skin: Negative. Negative for rash. Allergic/Immunologic: Negative. Neurological: Positive for weakness and numbness. Negative for dizziness, tremors, seizures, syncope, facial asymmetry, speech difficulty, light-headedness and headaches. Daniel Manual twice over labor day weekend   Hematological: Negative. Does not bruise/bleed easily. Psychiatric/Behavioral: Negative. Negative for confusion, hallucinations and sleep disturbance.      Data:   Component Ref Range & Units 9/4/23  5:22 AM 9/3/23  4:47 AM 9/2/23  4:53 AM 9/1/23  8:29 AM 9/1/23  5:15 AM 9/1/23 12:12 AM 10/23/15  2:00 PM   Sodium 135 - 147 mmol/L 135  135  133 Low    136  136  132 Low  R    Potassium 3.5 - 5.3 mmol/L 4.5  4.3  4.7  5.0  5.8 High   4.8  4.1    Chloride 96 - 108 mmol/L 104  102  100   107  106  96 Low  R    CO2 21 - 32 mmol/L 25  25  21 CM   16 Low   17 Low   23.1 R    ANION GAP mmol/L 6  8  12   13  13  13 R    BUN 5 - 25 mg/dL 8  11  18   15  17  13    Creatinine 0.60 - 1.30 mg/dL 0.94  0.88 CM  0.98 CM   0.97 CM  1.08 CM  1.47 High  CM    Comment: Standardized to IDMS reference method   Glucose 65 - 140 mg/dL 147 High              Component Ref Range & Units 9/4/23  5:22 AM 9/3/23  4:47 AM 9/2/23  4:53 AM 9/1/23  5:15 AM 9/1/23 12:12 AM 10/23/15  2:00 PM   WBC 4.31 - 10.16 Thousand/uL 4.48  4.75  5.59  6.04  5.51  3.26 Low     RBC 3.88 - 5.62 Million/uL 3.35 Low   3.34 Low   3.34 Low   3.48 Low   3.36 Low   4.95    Hemoglobin 12.0 - 17.0 g/dL 11.8 Low   11.9 Low   12.1  12.4  11.8 Low   14.8    Hematocrit 36.5 - 49.3 % 35.6 Low   34.7 Low   34.6 Low   37.2  34.7 Low   41.7    MCV 82 - 98 fL 106 High   104 High   104 High   107 High   103 High   84    MCH 26.8 - 34.3 pg 35.2 High   35.6 High   36.2 High   35.6 High   35.1 High   29.9    MCHC 31.4 - 37.4 g/dL 33.1  34.3  35.0  33.3  34.0  35.5    RDW 11.6 - 15.1 % 12.9  12.6  12.6  12.9  12.8  12.2    MPV 8.9 - 12.7 fL 9.0  9.4  9.3  8.9  9.3  10.0    Platelets 711 - 944 Thousands/uL 103 Low           Component Ref Range & Units 9/2/23  4:53 AM   Vitamin B-12 180 - 914 pg/mL 153 Low           EEG 9/18/23:  EEG impression: This was a normal awake and drowsy routine EEG recording     Clinical Interpretation: This was a normal awake and drowsy routine EEG study. No electrographic seizures, interictal epileptiform discharges (seizure tendency) or focal slowing were seen. CT head 9/1/23:  PARENCHYMA: Decreased attenuation is noted in periventricular and subcortical white matter demonstrating an appearance that is statistically most likely to represent mild microangiopathic change. No CT signs of acute infarction. No intracranial mass, mass effect or midline shift. No acute parenchymal hemorrhage. VENTRICLES AND EXTRA-AXIAL SPACES: Ventricles and extra-axial CSF spaces are prominent commensurate with the degree of volume loss. No hydrocephalus. No acute extra-axial hemorrhage. VISUALIZED ORBITS: No acute abnormality. Post changes at the right orbit with probable intraocular silicone injection. PARANASAL SINUSES: Polypoid mucosal thickening right maxillary sinus. No fluid level. CALVARIUM AND EXTRACRANIAL SOFT TISSUES: No discrete scalp hematoma. Left infraorbital/premaxillary soft tissue contusion. IMPRESSION:     No intracranial hemorrhage or calvarial fracture. CT cervical spine 9/1/23:  No cervical spine fracture or traumatic malalignment.

## 2023-10-02 NOTE — ASSESSMENT & PLAN NOTE
On initial examination, he had both large and fiber involvement suggestive of neuropathy. Most likely related to underlying diabetes, alcohol use and significantly low B12 level. Recommendations:  -Check EMG 1 upper and lower extremity to evaluate extent of neuropathy  -Current appointment is in January 2024.   I will try to expedite the EMG  -As above, advised to reduce alcohol consumption  -B12 supplementation

## 2023-10-04 PROBLEM — E53.8 VITAMIN B12 DEFICIENCY: Status: ACTIVE | Noted: 2023-10-04

## 2023-10-04 NOTE — ASSESSMENT & PLAN NOTE
Underlying B12 deficiency is likely contributing to neuropathy, gait changes and memory loss. Recommendations:  -Continue  vitamin B12 repletion. He is getting B12 IM.

## 2023-11-16 ENCOUNTER — HOSPITAL ENCOUNTER (OUTPATIENT)
Dept: NEUROLOGY | Facility: CLINIC | Age: 63
End: 2023-11-16
Payer: COMMERCIAL

## 2023-11-16 DIAGNOSIS — R26.2 AMBULATORY DYSFUNCTION: ICD-10-CM

## 2023-11-16 DIAGNOSIS — G62.9 PERIPHERAL NEUROPATHY: ICD-10-CM

## 2023-11-16 DIAGNOSIS — Z78.9 ALCOHOL USE: ICD-10-CM

## 2023-11-16 PROBLEM — G62.89 MIXED AXONAL-DEMYELINATING POLYNEUROPATHY: Status: ACTIVE | Noted: 2023-11-16

## 2023-11-16 PROCEDURE — 95913 NRV CNDJ TEST 13/> STUDIES: CPT | Performed by: PSYCHIATRY & NEUROLOGY

## 2023-11-16 PROCEDURE — 95886 MUSC TEST DONE W/N TEST COMP: CPT | Performed by: PSYCHIATRY & NEUROLOGY

## 2023-12-05 ENCOUNTER — TELEPHONE (OUTPATIENT)
Dept: NEUROLOGY | Facility: CLINIC | Age: 63
End: 2023-12-05

## 2023-12-06 ENCOUNTER — TELEPHONE (OUTPATIENT)
Dept: NEUROLOGY | Facility: CLINIC | Age: 63
End: 2023-12-06

## 2023-12-06 NOTE — TELEPHONE ENCOUNTER
Called patient and LM to see if he can move appointment from 1pm to 4pm to accommodate a new position @12:30pm.

## 2023-12-11 NOTE — PROGRESS NOTES
Patient ID: Barbara Corona is a 59 y.o. male. Assessment/Plan:    Essential tremor  He has essential tremor. He was doing really well on primidone but then had 2 episodes of loss of consciousness. He was continuing to drink alcohol, 3 drinks per day with the primidone. It is unclear if this was a reaction to the alcohol with the primidone. EEG was unremarkable. Recommendations:  -Advised to eliminate alcohol altogether if possible but no more than 1 glass of wine on the weekend  -If he is able to maintain low intake of alcohol, we can consider trying primidone again  -If he is unable to restrict his alcohol use, topiramate or gabapentin would be an option. We would have to monitor his memory closely.  -I have advised him not to drive for late February because of the unpredictable nature of the loss of consciousness    Mixed axonal-demyelinating polyneuropathy  On initial examination, he had both large and fiber involvement suggestive of neuropathy. EMG showed mixed axonal and demyelinating neuropathy. Conduction velocities in the peroneal nerves were in the 20 m/s range. This raises possibility of antimag neuropathy which can cause both tremor and neuropathy. Axonal features likely related to underlying diabetes and significantly low B12. Recommendations:  -Continue vitamin B12 1000 mcg, but changed to IM monthly. He has completed several months of weekly IM injections.  -We can recheck B12 level with next set of labs  -Strict glycemic control  -I will most likely order [facility] [place] demyelinating neuropathy panel. May need insurance approval, will check.  -As above, advised to reduce alcohol consumption      Vitamin B12 deficiency  Underlying B12 deficiency is likely contributing to neuropathy, gait changes and mild memory loss. Recommendations:  -Continue  vitamin B12 repletion. See above for regimen.       Alcohol use  As above, strongly advised to restrict alcohol use so that we can reinitiate primidone    Follow-up in 3 months or sooner if difficulties. Advised to contact me in the interim if he would like to resume medication for the tremor. I have spent a total time of 35 minutes on 12/12/23 in caring for this patient including Diagnostic results, Prognosis, Impressions, Counseling / Coordination of care, Documenting in the medical record, Reviewing / ordering tests, medicine, procedures  , and Obtaining or reviewing history  . Subjective:    Mr. Pricila Gonzales is a 23-year-old right handed gentleman with PMH HTN, CAD s/p stent, HLD, diabetes, macular edema, HTN/DM retinopathy, alcohol use, who is here for follow up for frequent falls, recent EMG diagnostic of neuropathy and essential tremor. He is here with his wife. Last visit in Oct 2023. Tremor is about the same. May get worse at the end of the day. If he exerts himself it is worse. He has good days and bad days. He continues to have trouble writing. In the past, he told me it felt like his brain was not communicating with his rest.  Wine does help. He is drinking about 3 drinks per night. When he comes home from work his tremor is so bad that he cannot pour his own drink. No family history of tremor. No head titubation. Tremor can get worse on holding a glass or typing. I did try him on primidone which was very helpful with the tremor, but after starting it, he had to fall with change in mental status prompting hospital admission. He is reluctant to try it again after what happened. We were concerned that perhaps the alcohol plus the primidone caused the symptoms. I had avoided topiramate because of some mild memory issues. On 8/27/2023, patient's wife was asleep when she heard a noise early in the morning. She found her  sitting on the ground confused. She wanted to call 911 but the patient refused. It took him a long time to get off the floor because he was weak.   He did seem a little bit confused. On 8/31/2023, the patient was asleep on the sofa when she came home from  her meetings at school. It is not unusual for him to fall asleep on the couch. He awoke, had a conversation with his wife. They also called their daughter. He was not inebriated. He went to bed at 9 PM.  Just before 11 PM, she thought she heard something. She found him face down on the ground unresponsive. She describes guttural snoring. It lasted several minutes and then it took him several minutes to come back to baseline. She does not know how long he was on the ground. Patient was admitted to [facility]. Orthostatic vital signs were reportedly negative. The admitting team questioned whether this was due to the primidone since it had been started recently. It was weaned off. He went to the hospital via ambulance and when he arrived there he did tell hospital clinicians that he had 3 glasses of wine earlier in the day. He tells me the last glass was at 4 PM.  The symptoms happened around 6 PM.     Wife notes a lot of napping. He is no longer excited to go to see his grandson. He developed symptoms of peripheral neuropathy in the last few years and was having increasing difficulty walking and with balance. Intermittently, he had shooting pain into the feet. He did have COVID in December 2021 and February 2023 and took Paxlovid the second time. He did not get hospitalized. He has felt worse ever since having had COVID. He continues to work. He is a  at a chemical plant. He has difficulty with stairs. Objective:    Blood pressure 138/82, pulse 81, temperature 97.9 °F (36.6 °C), temperature source Temporal, weight 85.6 kg (188 lb 12.8 oz), SpO2 99 %. Physical Exam  Constitutional:       Appearance: Normal appearance. HENT:      Head:      Comments: Right eye prosthesis     Mouth/Throat:      Mouth: Mucous membranes are moist.      Pharynx: Oropharynx is clear. Eyes:      Conjunctiva/sclera: Conjunctivae normal.   Neck:      Vascular: No carotid bruit. Cardiovascular:      Rate and Rhythm: Normal rate and regular rhythm. Heart sounds: Normal heart sounds. Pulmonary:      Effort: Pulmonary effort is normal.      Breath sounds: Normal breath sounds. Skin:     General: Skin is warm and dry. Neurological Exam  Mental status: Awake, alert, oriented to person place and time. Naming, knowledge, repetition, concentration and recall intact. Cranial nerves: Prosthetic right eye. Extraocular movements intact on the left. Left pupil reactive to light. Left visual fields full to confrontation. Facial sensation intact. Face symmetric. Hearing intact. Tongue, uvula, palate midline and intact. Sternocleidomastoid intact. Motor:   Deltoid: Right 5/5, left 5-5  Biceps: Right 5/5, left 5/5  Triceps: Right 5/5, left 5-/5  : Right 4+/5, left 4+/5     Iliopsoas: Right 5-/5, left 4+/5  Quadriceps: Right 5/5, left 5/5  Tibialis anterior: Right 5-/5, left 5-/5  Medial gastrocnemius: Right 5-/5, left -5/5     Normal tone. Tremor on finger-to-nose. No resting tremor. No head tremor. No voice tremor. Normal tone. Cerebellar: Mild dysmetria which improves with repetition. Heel-to-shin intact. He was able to ambulate without a cane. He has to look at the floor when he is walking. Deep tendon reflexes trace in the arms and absent in the legs. Sensory: Light touch intact. Vibration reduced to 3 seconds in the right great toe into the ankle on the left side. ROS:  Review of Systems  Constitutional:  Negative for appetite change, fatigue and fever. HENT: Negative. Negative for hearing loss, tinnitus, trouble swallowing and voice change. Eyes: Negative. Negative for photophobia, pain and visual disturbance. Respiratory: Negative. Negative for shortness of breath. Cardiovascular: Negative. Negative for palpitations. Gastrointestinal: Negative. Negative for nausea and vomiting. Endocrine: Negative. Negative for cold intolerance. Genitourinary: Negative. Negative for dysuria, frequency and urgency. Musculoskeletal:  Positive for gait problem. Negative for back pain, myalgias and neck pain. Skin: Negative. Negative for rash. Allergic/Immunologic: Negative. Neurological:  Positive for tremors. Negative for dizziness, seizures, syncope, facial asymmetry, speech difficulty, weakness, light-headedness, numbness and headaches. Hematological: Negative. Does not bruise/bleed easily. Psychiatric/Behavioral: Negative. Negative for confusion, hallucinations and sleep disturbance. Data:  EMG right upper and lower extremities, 11/16/2023:  1. Mixed axonal and demyelinating sensorimotor polyneuropathy  2. Given conduction velocity of 20 m/s in the peroneal nerves and history of tremor, anti-MAG antibody should be tested   3. Right L5-S1 radiculopathy cannot be excluded  4.   No cervical radiculopathy on the right side

## 2023-12-12 ENCOUNTER — OFFICE VISIT (OUTPATIENT)
Dept: NEUROLOGY | Facility: CLINIC | Age: 63
End: 2023-12-12
Payer: COMMERCIAL

## 2023-12-12 VITALS
SYSTOLIC BLOOD PRESSURE: 138 MMHG | WEIGHT: 188.8 LBS | TEMPERATURE: 97.9 F | OXYGEN SATURATION: 99 % | DIASTOLIC BLOOD PRESSURE: 82 MMHG | BODY MASS INDEX: 25.61 KG/M2 | HEART RATE: 81 BPM

## 2023-12-12 DIAGNOSIS — E53.8 B12 DEFICIENCY: ICD-10-CM

## 2023-12-12 DIAGNOSIS — G62.9 PERIPHERAL NEUROPATHY: ICD-10-CM

## 2023-12-12 DIAGNOSIS — G25.0 TREMOR, ESSENTIAL: Primary | ICD-10-CM

## 2023-12-12 PROCEDURE — 99214 OFFICE O/P EST MOD 30 MIN: CPT | Performed by: PSYCHIATRY & NEUROLOGY

## 2023-12-12 NOTE — ASSESSMENT & PLAN NOTE
He has essential tremor. He was doing really well on primidone but then had 2 episodes of loss of consciousness. He was continuing to drink alcohol, 3 drinks per day with the primidone. It is unclear if this was a reaction to the alcohol with the primidone. EEG was unremarkable. Recommendations:  -Advised to eliminate alcohol altogether if possible but no more than 1 glass of wine on the weekend  -If he is able to maintain low intake of alcohol, we can consider trying primidone again  -If he is unable to restrict his alcohol use, topiramate or gabapentin would be an option.   We would have to monitor his memory closely.  -I have advised him not to drive for late February because of the unpredictable nature of the loss of consciousness

## 2023-12-12 NOTE — ASSESSMENT & PLAN NOTE
On initial examination, he had both large and fiber involvement suggestive of neuropathy. EMG showed mixed axonal and demyelinating neuropathy. Conduction velocities in the peroneal nerves were in the 20 m/s range. This raises possibility of antimag neuropathy which can cause both tremor and neuropathy. Axonal features likely related to underlying diabetes and significantly low B12. Recommendations:  -Continue vitamin B12 1000 mcg, but changed to IM monthly. He has completed several months of weekly IM injections.  -We can recheck B12 level with next set of labs  -Strict glycemic control  -I will most likely order [facility] demyelinating neuropathy panel.   May need insurance approval, will check.  -As above, advised to reduce alcohol consumption

## 2023-12-12 NOTE — ASSESSMENT & PLAN NOTE
Underlying B12 deficiency is likely contributing to neuropathy, gait changes and mild memory loss. Recommendations:  -Continue  vitamin B12 repletion. See above for regimen.

## 2023-12-12 NOTE — PROGRESS NOTES
Patient ID: Eliseo Morales is a 59 y.o. male. Assessment/Plan:    Essential tremor  He has essential tremor. He was doing really well on primidone but then had 2 episodes of loss of consciousness. He was continuing to drink alcohol, 3 drinks per day with the primidone. It is unclear if this was a reaction to the alcohol with the primidone. EEG was unremarkable. Recommendations:  -Advised to eliminate alcohol altogether if possible but no more than 1 glass of wine on the weekend  -If he is able to maintain low intake of alcohol, we can consider trying primidone again  -If he is unable to restrict his alcohol use, topiramate or gabapentin would be an option. We would have to monitor his memory closely.  -I have advised him not to drive for late February because of the unpredictable nature of the loss of consciousness    Mixed axonal-demyelinating polyneuropathy  On initial examination, he had both large and fiber involvement suggestive of neuropathy. EMG showed mixed axonal and demyelinating neuropathy. Conduction velocities in the peroneal nerves were in the 20 m/s range. This raises possibility of antimag neuropathy which can cause both tremor and neuropathy. Axonal features likely related to underlying diabetes and significantly low B12. Recommendations:  -Continue vitamin B12 1000 mcg, but changed to IM monthly. He has completed several months of weekly IM injections.  -We can recheck B12 level with next set of labs  -Strict glycemic control  -I will most likely order [facility] [place] demyelinating neuropathy panel. May need insurance approval, will check.  -As above, advised to reduce alcohol consumption      Vitamin B12 deficiency  Underlying B12 deficiency is likely contributing to neuropathy, gait changes and mild memory loss. Recommendations:  -Continue  vitamin B12 repletion. See above for regimen.        {Assess/PlanSmartLinks:84590}       Subjective:    HPI    {[facility] HPI texts:51698}    {Common ambulatory SmartLinks:91661}         Objective:    Blood pressure 138/82, pulse 81, temperature 97.9 °F (36.6 °C), temperature source Temporal, weight 85.6 kg (188 lb 12.8 oz), SpO2 99 %. Physical Exam    Neurological Exam      ROS:    Review of Systems   Constitutional:  Negative for appetite change, fatigue and fever. HENT: Negative. Negative for hearing loss, tinnitus, trouble swallowing and voice change. Eyes: Negative. Negative for photophobia, pain and visual disturbance. Respiratory: Negative. Negative for shortness of breath. Cardiovascular: Negative. Negative for palpitations. Gastrointestinal: Negative. Negative for nausea and vomiting. Endocrine: Negative. Negative for cold intolerance. Genitourinary: Negative. Negative for dysuria, frequency and urgency. Musculoskeletal:  Positive for gait problem. Negative for back pain, myalgias and neck pain. Skin: Negative. Negative for rash. Allergic/Immunologic: Negative. Neurological:  Positive for tremors. Negative for dizziness, seizures, syncope, facial asymmetry, speech difficulty, weakness, light-headedness, numbness and headaches. Hematological: Negative. Does not bruise/bleed easily. Psychiatric/Behavioral: Negative. Negative for confusion, hallucinations and sleep disturbance.

## 2024-02-29 DIAGNOSIS — I25.10 CORONARY ARTERY DISEASE INVOLVING NATIVE CORONARY ARTERY OF NATIVE HEART WITHOUT ANGINA PECTORIS: ICD-10-CM

## 2024-03-05 RX ORDER — PRASUGREL 10 MG/1
10 TABLET, FILM COATED ORAL DAILY
Qty: 30 TABLET | Refills: 1 | Status: SHIPPED | OUTPATIENT
Start: 2024-03-05

## 2024-03-20 ENCOUNTER — TELEPHONE (OUTPATIENT)
Dept: NEUROLOGY | Facility: CLINIC | Age: 64
End: 2024-03-20

## 2024-04-05 ENCOUNTER — OFFICE VISIT (OUTPATIENT)
Dept: PODIATRY | Facility: CLINIC | Age: 64
End: 2024-04-05
Payer: COMMERCIAL

## 2024-04-05 VITALS
HEIGHT: 72 IN | HEART RATE: 75 BPM | SYSTOLIC BLOOD PRESSURE: 128 MMHG | DIASTOLIC BLOOD PRESSURE: 83 MMHG | BODY MASS INDEX: 25.47 KG/M2 | WEIGHT: 188 LBS

## 2024-04-05 DIAGNOSIS — E11.42 TYPE 2 DIABETES MELLITUS WITH DIABETIC POLYNEUROPATHY, WITHOUT LONG-TERM CURRENT USE OF INSULIN (HCC): Primary | ICD-10-CM

## 2024-04-05 DIAGNOSIS — B35.1 ONYCHOMYCOSIS: ICD-10-CM

## 2024-04-05 DIAGNOSIS — L97.511 ULCER OF RIGHT FOOT, LIMITED TO BREAKDOWN OF SKIN (HCC): ICD-10-CM

## 2024-04-05 DIAGNOSIS — I73.9 PERIPHERAL VASCULAR DISEASE (HCC): ICD-10-CM

## 2024-04-05 PROCEDURE — 99203 OFFICE O/P NEW LOW 30 MIN: CPT | Performed by: PODIATRIST

## 2024-04-05 PROCEDURE — 11721 DEBRIDE NAIL 6 OR MORE: CPT | Performed by: PODIATRIST

## 2024-04-05 NOTE — LETTER
April 5, 2024     Yael Matute MD  [address] [facility]  [address] [place] [id number]    Patient: Aquilino Dowell   YOB: 1960   Date of Visit: 4/5/2024       Dear Dr. Matute:    Thank you for referring Aquilino Dowell to me for evaluation. Below are my notes for this consultation.    If you have questions, please do not hesitate to call me. I look forward to following your patient along with you.         Sincerely,        Rigo Cardona DPM        CC: No Recipients    Rigo Cardona DPM  4/5/2024  1:57 PM  Sign when Signing Visit        PATIENT:  Aquilino Dowell    1960    ASSESSMENT:     1. Type 2 diabetes mellitus with diabetic polyneuropathy, without long-term current use of insulin (HCC)  Diabetic Shoe Inserts    Diabetic Shoe    VAS ARTERIAL DUPLEX- LOWER LIMB BILATERAL      2. Peripheral vascular disease (HCC)  Diabetic Shoe Inserts    Diabetic Shoe    VAS ARTERIAL DUPLEX- LOWER LIMB BILATERAL      3. Onychomycosis  Debridement      4. Ulcer of right foot, limited to breakdown of skin (HCC)  Diabetic Shoe Inserts    Diabetic Shoe            PLAN:  1.  Reviewed medical records.  Patient was counseled on the condition and diagnosis.    2.  Educated disease prevention and risks related to diabetes.    3.  Educated proper daily foot care and exam.  Instructed proper skin care / protection and footwear.  Instructed to identify any signs of infection and related foot problem.    4.  The recent blood work was reviewed / discussed and the last HbA1c was 6.3.  Discussed proper blood glucose control with diet and exercise.    5. He has healed blood blister on right submet 5.  No active wound presents.  Instructed skin protection and proper care.  Pt to monitor it daily for any bleeding or worsening.    6. Sent him for arterial study to evaluate vascular status.    7. He has high risk diabetic foot and referred him for DM shoes and inserts.    8. The patient has high risk diabetic  foot and will return in 3 months for diabetic foot exam.      Imaging: I have personally reviewed pertinent films in PACS  Labs, pathology, and Other Studies: I have personally reviewed pertinent reports.        Procedure: All mycotic toenails were reduced and debrided in length, width, and girth using a nail nipper and dremel.  Patient tolerated procedure(s) well without complications.          Subjective:      HPI  The patient presents with his daughter for chief complaint of discoloration on right plantar foot.  His wife noticed it last week and made an appointment.  No pain or drainage.  He did not know he had the problem.  He denied any injury.  The patient has diabetes for 22 years.  The blood glucose is under control.  He has numbness in his feet due to neuropathy.  No history of DFU.  Denied symptoms of arterial claudication in legs.       The following portions of the patient's history were reviewed and updated as appropriate: allergies, current medications, past family history, past medical history, past social history, past surgical history and problem list.  All pertinent labs and images were reviewed.    Past Medical History  Past Medical History:   Diagnosis Date   • Coronary artery disease     cath Oct 2013: 95% prox LAD, 40% mid LAD, EF 60%, MANUEL to prox LAD (Xience Rx 3.5x18mm)   • Diabetes mellitus ([facility]) 2010    not sure on date   • Difficulty walking    • Hypertension 2013    medication   • Memory loss    • Neuropathy in diabetes ([facility])    • Peripheral neuropathy    • Sleep apnea        Past Surgical History  History reviewed. No pertinent surgical history.     Allergies:  Patient has no known allergies.    Medications:  Current Outpatient Medications   Medication Sig Dispense Refill   • aspirin (ASPIRIN LOW DOSE) 81 MG tablet Take 1 tablet by mouth daily     • cyanocobalamin 1,000 mcg/mL Inject 1 mL (1,000 mcg total) into a muscle every 7 days 12 mL 0   • fexofenadine (ALLEGRA) 60 MG tablet Take 60  mg by mouth daily     • folic acid (FOLVITE) 1 mg tablet Take 1 tablet (1 mg total) by mouth daily 90 tablet 0   • lisinopril (ZESTRIL) 20 mg tablet One tab BID (Patient taking differently: Take 20 mg by mouth daily One tab BID) 60 tablet 3   • metFORMIN (GLUCOPHAGE) 1000 MG tablet Take 1,000 mg by mouth daily with breakfast     • metoprolol succinate (TOPROL-XL) 50 mg 24 hr tablet TAKE 1 TABLET DAILY 90 tablet 3   • prasugrel (EFFIENT) tablet TAKE 1 TABLET DAILY 30 tablet 1   • thiamine 100 MG tablet Take 1 tablet (100 mg total) by mouth daily Do not start before September 5, 2023. 90 tablet 0   • aspirin (ECOTRIN LOW STRENGTH) 81 mg EC tablet Take 81 mg by mouth daily (Patient not taking: Reported on 10/2/2023)     • atorvastatin (LIPITOR) 40 mg tablet TAKE 1 TABLET AT BEDTIME (NEED OFFICE VISIT FOR REFILLS) (Patient not taking: Reported on 12/12/2023) 90 tablet 4   • atorvastatin (LIPITOR) 40 mg tablet Take 40 mg by mouth daily (Patient not taking: Reported on 12/12/2023)     • Fexofenadine HCl (ALLEGRA ALLERGY PO) Take by mouth (Patient not taking: Reported on 10/2/2023)     • lidocaine (LIDODERM) 5 % Apply 2 patches topically over 12 hours daily Remove & Discard patch within 12 hours or as directed by MD Do not start before September 5, 2023. (Patient not taking: Reported on 10/2/2023) 30 patch 0   • lisinopril (ZESTRIL) 20 mg tablet Take 20 mg by mouth daily (Patient not taking: Reported on 10/2/2023)     • metFORMIN (GLUCOPHAGE) 1000 MG tablet Take 1,000 mg by mouth daily with breakfast   (Patient not taking: Reported on 10/2/2023)     • methocarbamol (ROBAXIN) 750 mg tablet Take 1 tablet (750 mg total) by mouth every 8 (eight) hours as needed for muscle spasms (Patient not taking: Reported on 12/12/2023) 60 tablet 0   • metoprolol succinate (TOPROL-XL) 50 mg 24 hr tablet Take 50 mg by mouth daily (Patient not taking: Reported on 10/2/2023)     • prasugrel (EFFIENT) tablet Take 10 mg by mouth daily (Patient  not taking: Reported on 10/2/2023)     • primidone (MYSOLINE) 50 mg tablet 1/2 tab oral twice a day for 5 days, then 1/2 tab in AM and 1/2 tab in PM x 5 days, then 1 tab twice a day continously (Patient not taking: Reported on 12/12/2023) 60 tablet 3   • primidone (MYSOLINE) 50 mg tablet Take 0.5 tablets (25 mg total) by mouth every 12 (twelve) hours for 20 doses (Patient not taking: Reported on 10/2/2023) 10 tablet 0     No current facility-administered medications for this visit.       Social History:  Social History     Socioeconomic History   • Marital status: /Civil Union     Spouse name: None   • Number of children: None   • Years of education: None   • Highest education level: None   Occupational History   • None   Tobacco Use   • Smoking status: Former     Current packs/day: 1.00     Types: Cigarettes   • Smokeless tobacco: Current     Types: Chew   Vaping Use   • Vaping status: Never Used   Substance and Sexual Activity   • Alcohol use: Yes     Alcohol/week: 21.0 standard drinks of alcohol     Types: 21 Glasses of wine per week     Comment: At least 3 per day   • Drug use: No   • Sexual activity: None   Other Topics Concern   • None   Social History Narrative   • None     Social Determinants of Health     Financial Resource Strain: Not on file   Food Insecurity: Not on file   Transportation Needs: Not on file   Physical Activity: Not on file   Stress: Not on file   Social Connections: Not on file   Intimate Partner Violence: Not on file   Housing Stability: Not on file        Review of Systems   Constitutional:  Negative for chills and fever.   Respiratory:  Negative for cough and shortness of breath.    Cardiovascular:  Negative for chest pain.   Gastrointestinal:  Negative for nausea and vomiting.   Musculoskeletal:  Negative for gait problem.   Skin:  Negative for wound.   Allergic/Immunologic: Negative for immunocompromised state.   Neurological:  Negative for weakness and numbness.    Hematological: Negative.    Psychiatric/Behavioral:  Negative for behavioral problems and confusion.          Objective:      /83   Pulse 75   Ht 6' (1.829 m)   Wt 85.3 kg (188 lb)   BMI 25.50 kg/m²          Physical Exam  Vitals reviewed.   Constitutional:       General: He is not in acute distress.     Appearance: He is not toxic-appearing or diaphoretic.   HENT:      Head: Normocephalic and atraumatic.   Eyes:      Extraocular Movements: Extraocular movements intact.   Cardiovascular:      Rate and Rhythm: Normal rate and regular rhythm.      Pulses: Pulses are weak.           Dorsalis pedis pulses are 0 on the right side and 0 on the left side.        Posterior tibial pulses are 1+ on the right side and 1+ on the left side.   Pulmonary:      Effort: Pulmonary effort is normal. No respiratory distress.   Musculoskeletal:         General: No signs of injury.      Cervical back: Normal range of motion and neck supple.      Right lower leg: No edema.      Left lower leg: No edema.      Right foot: No Charcot foot or foot drop.      Left foot: No Charcot foot or foot drop.   Feet:      Right foot:      Protective Sensation: 10 sites tested.  0 sites sensed.      Skin integrity: Dry skin present. No ulcer or skin breakdown.      Left foot:      Protective Sensation: 10 sites tested.  0 sites sensed.      Skin integrity: Dry skin present. No ulcer or skin breakdown.   Skin:     General: Skin is warm and dry.      Capillary Refill: Capillary refill takes less than 2 seconds.      Coloration: Skin is not cyanotic or mottled.      Findings: No abscess.      Nails: There is no clubbing.      Comments: Dry healed blood blister on right submet 5.  It was removed and there is no active ulcer.  No port of entry or abscess.  No cellulitis.  Hypertrophy of toenails X 6 with discoloration and onycholysis.  He has class findings with decreased pedal hairs, dry skin / atrophy, and brittle nails.   Neurological:       General: No focal deficit present.      Mental Status: He is alert and oriented to person, place, and time.      Cranial Nerves: No cranial nerve deficit.      Sensory: No sensory deficit.      Motor: No weakness.      Coordination: Coordination normal.   Psychiatric:         Mood and Affect: Mood normal.         Behavior: Behavior normal.         Thought Content: Thought content normal.         Judgment: Judgment normal.           Diabetic Foot Exam    Patient's shoes and socks removed.    Right Foot/Ankle   Right Foot Inspection  Skin Exam: skin intact and dry skin. No maceration and no ulcer.     Toe Exam: No swelling, no tenderness, erythema and  no right toe deformity    Sensory   Vibration: absent  Monofilament testing: absent    Vascular  Capillary refills: < 3 seconds  The right DP pulse is 0. The right PT pulse is 1+.     Left Foot/Ankle  Left Foot Inspection  Skin Exam: skin intact and dry skin. No maceration and no ulcer.     Toe Exam: No swelling, no tenderness, no erythema and no left toe deformity.     Sensory   Vibration: absent  Monofilament testing: absent    Vascular  Capillary refills: < 3 seconds  The left DP pulse is 0. The left PT pulse is 1+.     Assign Risk Category  No deformity present  Loss of protective sensation  Weak pulses  Risk: 2

## 2024-04-05 NOTE — PROGRESS NOTES
PATIENT:  Aquilino Dowell    1960    ASSESSMENT:     1. Type 2 diabetes mellitus with diabetic polyneuropathy, without long-term current use of insulin (HCC)  Diabetic Shoe Inserts    Diabetic Shoe    VAS ARTERIAL DUPLEX- LOWER LIMB BILATERAL      2. Peripheral vascular disease (HCC)  Diabetic Shoe Inserts    Diabetic Shoe    VAS ARTERIAL DUPLEX- LOWER LIMB BILATERAL      3. Onychomycosis  Debridement      4. Ulcer of right foot, limited to breakdown of skin (HCC)  Diabetic Shoe Inserts    Diabetic Shoe            PLAN:  1.  Reviewed medical records.  Patient was counseled on the condition and diagnosis.    2.  Educated disease prevention and risks related to diabetes.    3.  Educated proper daily foot care and exam.  Instructed proper skin care / protection and footwear.  Instructed to identify any signs of infection and related foot problem.    4.  The recent blood work was reviewed / discussed and the last HbA1c was 6.3.  Discussed proper blood glucose control with diet and exercise.    5. He has healed blood blister on right submet 5.  No active wound presents.  Instructed skin protection and proper care.  Pt to monitor it daily for any bleeding or worsening.    6. Sent him for arterial study to evaluate vascular status.    7. He has high risk diabetic foot and referred him for DM shoes and inserts.    8. The patient has high risk diabetic foot and will return in 3 months for diabetic foot exam.      Imaging: I have personally reviewed pertinent films in PACS  Labs, pathology, and Other Studies: I have personally reviewed pertinent reports.        Procedure: All mycotic toenails were reduced and debrided in length, width, and girth using a nail nipper and dremel.  Patient tolerated procedure(s) well without complications.          Subjective:      HPI  The patient presents with his daughter for chief complaint of discoloration on right plantar foot.  His wife noticed it last week and made an  appointment.  No pain or drainage.  He did not know he had the problem.  He denied any injury.  The patient has diabetes for 22 years.  The blood glucose is under control.  He has numbness in his feet due to neuropathy.  No history of DFU.  Denied symptoms of arterial claudication in legs.       The following portions of the patient's history were reviewed and updated as appropriate: allergies, current medications, past family history, past medical history, past social history, past surgical history and problem list.  All pertinent labs and images were reviewed.    Past Medical History  Past Medical History:   Diagnosis Date   • Coronary artery disease     cath Oct 2013: 95% prox LAD, 40% mid LAD, EF 60%, MANUEL to prox LAD (Xience Rx 3.5x18mm)   • Diabetes mellitus ([facility]) 2010    not sure on date   • Difficulty walking    • Hypertension 2013    medication   • Memory loss    • Neuropathy in diabetes ([facility])    • Peripheral neuropathy    • Sleep apnea        Past Surgical History  History reviewed. No pertinent surgical history.     Allergies:  Patient has no known allergies.    Medications:  Current Outpatient Medications   Medication Sig Dispense Refill   • aspirin (ASPIRIN LOW DOSE) 81 MG tablet Take 1 tablet by mouth daily     • cyanocobalamin 1,000 mcg/mL Inject 1 mL (1,000 mcg total) into a muscle every 7 days 12 mL 0   • fexofenadine (ALLEGRA) 60 MG tablet Take 60 mg by mouth daily     • folic acid (FOLVITE) 1 mg tablet Take 1 tablet (1 mg total) by mouth daily 90 tablet 0   • lisinopril (ZESTRIL) 20 mg tablet One tab BID (Patient taking differently: Take 20 mg by mouth daily One tab BID) 60 tablet 3   • metFORMIN (GLUCOPHAGE) 1000 MG tablet Take 1,000 mg by mouth daily with breakfast     • metoprolol succinate (TOPROL-XL) 50 mg 24 hr tablet TAKE 1 TABLET DAILY 90 tablet 3   • prasugrel (EFFIENT) tablet TAKE 1 TABLET DAILY 30 tablet 1   • thiamine 100 MG tablet Take 1 tablet (100 mg total) by mouth daily Do not  start before September 5, 2023. 90 tablet 0   • aspirin (ECOTRIN LOW STRENGTH) 81 mg EC tablet Take 81 mg by mouth daily (Patient not taking: Reported on 10/2/2023)     • atorvastatin (LIPITOR) 40 mg tablet TAKE 1 TABLET AT BEDTIME (NEED OFFICE VISIT FOR REFILLS) (Patient not taking: Reported on 12/12/2023) 90 tablet 4   • atorvastatin (LIPITOR) 40 mg tablet Take 40 mg by mouth daily (Patient not taking: Reported on 12/12/2023)     • Fexofenadine HCl (ALLEGRA ALLERGY PO) Take by mouth (Patient not taking: Reported on 10/2/2023)     • lidocaine (LIDODERM) 5 % Apply 2 patches topically over 12 hours daily Remove & Discard patch within 12 hours or as directed by MD Do not start before September 5, 2023. (Patient not taking: Reported on 10/2/2023) 30 patch 0   • lisinopril (ZESTRIL) 20 mg tablet Take 20 mg by mouth daily (Patient not taking: Reported on 10/2/2023)     • metFORMIN (GLUCOPHAGE) 1000 MG tablet Take 1,000 mg by mouth daily with breakfast   (Patient not taking: Reported on 10/2/2023)     • methocarbamol (ROBAXIN) 750 mg tablet Take 1 tablet (750 mg total) by mouth every 8 (eight) hours as needed for muscle spasms (Patient not taking: Reported on 12/12/2023) 60 tablet 0   • metoprolol succinate (TOPROL-XL) 50 mg 24 hr tablet Take 50 mg by mouth daily (Patient not taking: Reported on 10/2/2023)     • prasugrel (EFFIENT) tablet Take 10 mg by mouth daily (Patient not taking: Reported on 10/2/2023)     • primidone (MYSOLINE) 50 mg tablet 1/2 tab oral twice a day for 5 days, then 1/2 tab in AM and 1/2 tab in PM x 5 days, then 1 tab twice a day continously (Patient not taking: Reported on 12/12/2023) 60 tablet 3   • primidone (MYSOLINE) 50 mg tablet Take 0.5 tablets (25 mg total) by mouth every 12 (twelve) hours for 20 doses (Patient not taking: Reported on 10/2/2023) 10 tablet 0     No current facility-administered medications for this visit.       Social History:  Social History     Socioeconomic History   •  Marital status: /Civil Union     Spouse name: None   • Number of children: None   • Years of education: None   • Highest education level: None   Occupational History   • None   Tobacco Use   • Smoking status: Former     Current packs/day: 1.00     Types: Cigarettes   • Smokeless tobacco: Current     Types: Chew   Vaping Use   • Vaping status: Never Used   Substance and Sexual Activity   • Alcohol use: Yes     Alcohol/week: 21.0 standard drinks of alcohol     Types: 21 Glasses of wine per week     Comment: At least 3 per day   • Drug use: No   • Sexual activity: None   Other Topics Concern   • None   Social History Narrative   • None     Social Determinants of Health     Financial Resource Strain: Not on file   Food Insecurity: Not on file   Transportation Needs: Not on file   Physical Activity: Not on file   Stress: Not on file   Social Connections: Not on file   Intimate Partner Violence: Not on file   Housing Stability: Not on file        Review of Systems   Constitutional:  Negative for chills and fever.   Respiratory:  Negative for cough and shortness of breath.    Cardiovascular:  Negative for chest pain.   Gastrointestinal:  Negative for nausea and vomiting.   Musculoskeletal:  Negative for gait problem.   Skin:  Negative for wound.   Allergic/Immunologic: Negative for immunocompromised state.   Neurological:  Negative for weakness and numbness.   Hematological: Negative.    Psychiatric/Behavioral:  Negative for behavioral problems and confusion.          Objective:      /83   Pulse 75   Ht 6' (1.829 m)   Wt 85.3 kg (188 lb)   BMI 25.50 kg/m²          Physical Exam  Vitals reviewed.   Constitutional:       General: He is not in acute distress.     Appearance: He is not toxic-appearing or diaphoretic.   HENT:      Head: Normocephalic and atraumatic.   Eyes:      Extraocular Movements: Extraocular movements intact.   Cardiovascular:      Rate and Rhythm: Normal rate and regular rhythm.       Pulses: Pulses are weak.           Dorsalis pedis pulses are 0 on the right side and 0 on the left side.        Posterior tibial pulses are 1+ on the right side and 1+ on the left side.   Pulmonary:      Effort: Pulmonary effort is normal. No respiratory distress.   Musculoskeletal:         General: No signs of injury.      Cervical back: Normal range of motion and neck supple.      Right lower leg: No edema.      Left lower leg: No edema.      Right foot: No Charcot foot or foot drop.      Left foot: No Charcot foot or foot drop.   Feet:      Right foot:      Protective Sensation: 10 sites tested.  0 sites sensed.      Skin integrity: Dry skin present. No ulcer or skin breakdown.      Left foot:      Protective Sensation: 10 sites tested.  0 sites sensed.      Skin integrity: Dry skin present. No ulcer or skin breakdown.   Skin:     General: Skin is warm and dry.      Capillary Refill: Capillary refill takes less than 2 seconds.      Coloration: Skin is not cyanotic or mottled.      Findings: No abscess.      Nails: There is no clubbing.      Comments: Dry healed blood blister on right submet 5.  It was removed and there is no active ulcer.  No port of entry or abscess.  No cellulitis.  Hypertrophy of toenails X 6 with discoloration and onycholysis.  He has class findings with decreased pedal hairs, dry skin / atrophy, and brittle nails.   Neurological:      General: No focal deficit present.      Mental Status: He is alert and oriented to person, place, and time.      Cranial Nerves: No cranial nerve deficit.      Sensory: No sensory deficit.      Motor: No weakness.      Coordination: Coordination normal.   Psychiatric:         Mood and Affect: Mood normal.         Behavior: Behavior normal.         Thought Content: Thought content normal.         Judgment: Judgment normal.           Diabetic Foot Exam    Patient's shoes and socks removed.    Right Foot/Ankle   Right Foot Inspection  Skin Exam: skin intact and  dry skin. No maceration and no ulcer.     Toe Exam: No swelling, no tenderness, erythema and  no right toe deformity    Sensory   Vibration: absent  Monofilament testing: absent    Vascular  Capillary refills: < 3 seconds  The right DP pulse is 0. The right PT pulse is 1+.     Left Foot/Ankle  Left Foot Inspection  Skin Exam: skin intact and dry skin. No maceration and no ulcer.     Toe Exam: No swelling, no tenderness, no erythema and no left toe deformity.     Sensory   Vibration: absent  Monofilament testing: absent    Vascular  Capillary refills: < 3 seconds  The left DP pulse is 0. The left PT pulse is 1+.     Assign Risk Category  No deformity present  Loss of protective sensation  Weak pulses  Risk: 2

## 2024-05-02 ENCOUNTER — TELEPHONE (OUTPATIENT)
Dept: NEUROLOGY | Facility: CLINIC | Age: 64
End: 2024-05-02

## 2024-05-02 NOTE — TELEPHONE ENCOUNTER
Patient Wife called in to cancel appt with Val- USMANV -5/7/24. Patient will call back to reschedule.

## 2024-05-21 ENCOUNTER — TELEPHONE (OUTPATIENT)
Age: 64
End: 2024-05-21

## 2024-05-21 DIAGNOSIS — I25.10 CORONARY ARTERY DISEASE INVOLVING NATIVE CORONARY ARTERY OF NATIVE HEART WITHOUT ANGINA PECTORIS: ICD-10-CM

## 2024-05-21 NOTE — TELEPHONE ENCOUNTER
Pt contacted Call Center requested refill of their medication.        Doctor Name: Dr. Grossman      Medication Name: Effient/prosugrel      Dosage of Med: 10 mg      Frequency of Med: 1 tablet daily      Remaining Medication: 1 week's worth      Pharmacy and Location: [facility]        Pt. Preferred Callback Phone Number: [phone]      Thank you.       Patient has an upcoming appt with Dr. Grossman on 6/27/2024

## 2024-05-21 NOTE — TELEPHONE ENCOUNTER
Hi Dr. Grossman, patient has requested refill to [facility] if you could send medication.      Thank you.

## 2024-05-22 RX ORDER — PRASUGREL 10 MG/1
10 TABLET, FILM COATED ORAL DAILY
Qty: 30 TABLET | Refills: 11 | Status: SHIPPED | OUTPATIENT
Start: 2024-05-22

## 2024-06-04 ENCOUNTER — APPOINTMENT (EMERGENCY)
Dept: CT IMAGING | Facility: HOSPITAL | Age: 64
DRG: 074 | End: 2024-06-04
Payer: COMMERCIAL

## 2024-06-04 ENCOUNTER — APPOINTMENT (EMERGENCY)
Dept: RADIOLOGY | Facility: HOSPITAL | Age: 64
DRG: 074 | End: 2024-06-04
Payer: COMMERCIAL

## 2024-06-04 ENCOUNTER — APPOINTMENT (INPATIENT)
Dept: VASCULAR ULTRASOUND | Facility: HOSPITAL | Age: 64
DRG: 074 | End: 2024-06-04
Payer: COMMERCIAL

## 2024-06-04 ENCOUNTER — HOSPITAL ENCOUNTER (INPATIENT)
Facility: HOSPITAL | Age: 64
LOS: 1 days | Discharge: HOME | DRG: 074 | End: 2024-06-05
Attending: EMERGENCY MEDICINE | Admitting: INTERNAL MEDICINE
Payer: COMMERCIAL

## 2024-06-04 DIAGNOSIS — E87.29 METABOLIC ACIDOSIS, INCREASED ANION GAP: ICD-10-CM

## 2024-06-04 DIAGNOSIS — K30 GASTROINTESTINAL DISCOMFORT: ICD-10-CM

## 2024-06-04 DIAGNOSIS — F10.90 ALCOHOL USE DISORDER: ICD-10-CM

## 2024-06-04 DIAGNOSIS — E87.5 HYPERKALEMIA: Primary | ICD-10-CM

## 2024-06-04 DIAGNOSIS — E87.20 LACTIC ACIDOSIS: ICD-10-CM

## 2024-06-04 DIAGNOSIS — R74.01 ELEVATED AST (SGOT): ICD-10-CM
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
## 2024-06-04 DIAGNOSIS — E87.1 HYPONATREMIA: ICD-10-CM

## 2024-06-04 DIAGNOSIS — L81.9 DISCOLORATION OF SKIN OF TOE: ICD-10-CM

## 2024-06-04 DIAGNOSIS — R06.00 DYSPNEA: ICD-10-CM

## 2024-06-04 DIAGNOSIS — S92.901S FRACTURE, FOOT, RIGHT, SEQUELA: ICD-10-CM

## 2024-06-04 DIAGNOSIS — R10.9 ABDOMINAL PAIN: ICD-10-CM

## 2024-06-04 PROBLEM — M79.671 RIGHT FOOT PAIN: Status: ACTIVE | Noted: 2024-06-04

## 2024-06-04 LAB
25(OH)D3 SERPL-MCNC: <7 NG/ML (ref 30–100)
2HR DELTA HS TROPONIN: -2 NG/L
4HR DELTA HS TROPONIN: -2 NG/L
ALBUMIN SERPL BCP-MCNC: 4.6 G/DL (ref 3.5–5)
ALP SERPL-CCNC: 85 U/L (ref 34–104)
ALT SERPL W P-5'-P-CCNC: 44 U/L (ref 7–52)
ANION GAP SERPL CALCULATED.3IONS-SCNC: 16 MMOL/L (ref 4–13)
ANION GAP SERPL CALCULATED.3IONS-SCNC: 9 MMOL/L (ref 4–13)
APTT PPP: 25 SECONDS (ref 23–37)
AST SERPL W P-5'-P-CCNC: 59 U/L (ref 13–39)
ATRIAL RATE: 241 BPM
B-OH-BUTYR SERPL-MCNC: <0.05 MMOL/L (ref 0.02–0.27)
BACTERIA UR QL AUTO: NORMAL /HPF
BASE EX.OXY STD BLDV CALC-SCNC: 63.7 % (ref 60–80)
BASE EXCESS BLDV CALC-SCNC: -1.9 MMOL/L
BASOPHILS # BLD AUTO: 0.04 THOUSANDS/ÂΜL (ref 0–0.1)
BASOPHILS NFR BLD AUTO: 1 % (ref 0–1)
BILIRUB SERPL-MCNC: 1.24 MG/DL (ref 0.2–1)
BILIRUB UR QL STRIP: NEGATIVE
BNP SERPL-MCNC: 36 PG/ML (ref 0–100)
BUN SERPL-MCNC: 12 MG/DL (ref 5–25)
BUN SERPL-MCNC: 12 MG/DL (ref 5–25)
CALCIUM SERPL-MCNC: 10.2 MG/DL (ref 8.4–10.2)
CALCIUM SERPL-MCNC: 8.7 MG/DL (ref 8.4–10.2)
CARDIAC TROPONIN I PNL SERPL HS: 4 NG/L
CARDIAC TROPONIN I PNL SERPL HS: 4 NG/L
CARDIAC TROPONIN I PNL SERPL HS: 6 NG/L
CHLORIDE SERPL-SCNC: 97 MMOL/L (ref 96–108)
CHLORIDE SERPL-SCNC: 98 MMOL/L (ref 96–108)
CLARITY UR: CLEAR
CO2 SERPL-SCNC: 19 MMOL/L (ref 21–32)
CO2 SERPL-SCNC: 24 MMOL/L (ref 21–32)
COLOR UR: ABNORMAL
CREAT SERPL-MCNC: 1.15 MG/DL (ref 0.6–1.3)
CREAT SERPL-MCNC: 1.23 MG/DL (ref 0.6–1.3)
EOSINOPHIL # BLD AUTO: 0.03 THOUSAND/ÂΜL (ref 0–0.61)
EOSINOPHIL NFR BLD AUTO: 0 % (ref 0–6)
ERYTHROCYTE [DISTWIDTH] IN BLOOD BY AUTOMATED COUNT: 12.6 % (ref 11.6–15.1)
ETHANOL SERPL-MCNC: <10 MG/DL
FLUAV RNA RESP QL NAA+PROBE: NEGATIVE
FLUBV RNA RESP QL NAA+PROBE: NEGATIVE
GFR SERPL CREATININE-BSD FRML MDRD: 62 ML/MIN/1.73SQ M
GFR SERPL CREATININE-BSD FRML MDRD: 67 ML/MIN/1.73SQ M
GLUCOSE SERPL-MCNC: 133 MG/DL (ref 65–140)
GLUCOSE SERPL-MCNC: 165 MG/DL (ref 65–140)
GLUCOSE SERPL-MCNC: 187 MG/DL (ref 65–140)
GLUCOSE SERPL-MCNC: 201 MG/DL (ref 65–140)
GLUCOSE SERPL-MCNC: 89 MG/DL (ref 65–140)
GLUCOSE UR STRIP-MCNC: ABNORMAL MG/DL
HCO3 BLDV-SCNC: 21 MMOL/L (ref 24–30)
HCT VFR BLD AUTO: 46.4 % (ref 36.5–49.3)
HGB BLD-MCNC: 16.1 G/DL (ref 12–17)
HGB UR QL STRIP.AUTO: NEGATIVE
IMM GRANULOCYTES # BLD AUTO: 0.03 THOUSAND/UL (ref 0–0.2)
IMM GRANULOCYTES NFR BLD AUTO: 0 % (ref 0–2)
INR PPP: 0.88 (ref 0.84–1.19)
KETONES UR STRIP-MCNC: ABNORMAL MG/DL
LACTATE SERPL-SCNC: 2.9 MMOL/L (ref 0.5–2)
LACTATE SERPL-SCNC: 3.9 MMOL/L (ref 0.5–2)
LACTATE SERPL-SCNC: 4.4 MMOL/L (ref 0.5–2)
LACTATE SERPL-SCNC: 5.5 MMOL/L (ref 0.5–2)
LEUKOCYTE ESTERASE UR QL STRIP: NEGATIVE
LYMPHOCYTES # BLD AUTO: 1.34 THOUSANDS/ÂΜL (ref 0.6–4.47)
LYMPHOCYTES NFR BLD AUTO: 16 % (ref 14–44)
MCH RBC QN AUTO: 33.9 PG (ref 26.8–34.3)
MCHC RBC AUTO-ENTMCNC: 34.7 G/DL (ref 31.4–37.4)
MCV RBC AUTO: 98 FL (ref 82–98)
MONOCYTES # BLD AUTO: 0.78 THOUSAND/ÂΜL (ref 0.17–1.22)
MONOCYTES NFR BLD AUTO: 9 % (ref 4–12)
NEUTROPHILS # BLD AUTO: 6.19 THOUSANDS/ÂΜL (ref 1.85–7.62)
NEUTS SEG NFR BLD AUTO: 74 % (ref 43–75)
NITRITE UR QL STRIP: NEGATIVE
NON-SQ EPI CELLS URNS QL MICRO: NORMAL /HPF
NRBC BLD AUTO-RTO: 0 /100 WBCS
O2 CT BLDV-SCNC: 11.8 ML/DL
P AXIS: -85 DEGREES
PCO2 BLDV: 30.7 MM HG (ref 42–50)
PH BLDV: 7.45 [PH] (ref 7.3–7.4)
PH UR STRIP.AUTO: 7 [PH]
PLATELET # BLD AUTO: 193 THOUSANDS/UL (ref 149–390)
PMV BLD AUTO: 9.1 FL (ref 8.9–12.7)
PO2 BLDV: 33.1 MM HG (ref 35–45)
POTASSIUM SERPL-SCNC: 4.9 MMOL/L (ref 3.5–5.3)
POTASSIUM SERPL-SCNC: 6.2 MMOL/L (ref 3.5–5.3)
PROT SERPL-MCNC: 8.2 G/DL (ref 6.4–8.4)
PROT UR STRIP-MCNC: ABNORMAL MG/DL
PROTHROMBIN TIME: 12.5 SECONDS (ref 11.6–14.5)
QRS AXIS: 43 DEGREES
QRSD INTERVAL: 136 MS
QT INTERVAL: 132 MS
QTC INTERVAL: 264 MS
RBC # BLD AUTO: 4.75 MILLION/UL (ref 3.88–5.62)
RBC #/AREA URNS AUTO: NORMAL /HPF
RSV RNA RESP QL NAA+PROBE: NEGATIVE
SARS-COV-2 RNA RESP QL NAA+PROBE: NEGATIVE
SODIUM SERPL-SCNC: 131 MMOL/L (ref 135–147)
SODIUM SERPL-SCNC: 132 MMOL/L (ref 135–147)
SP GR UR STRIP.AUTO: 1.03 (ref 1–1.03)
T WAVE AXIS: 0 DEGREES
TSH SERPL DL<=0.05 MIU/L-ACNC: 4.37 UIU/ML (ref 0.45–4.5)
UROBILINOGEN UR STRIP-ACNC: <2 MG/DL
VENTRICULAR RATE: 241 BPM
WBC # BLD AUTO: 8.41 THOUSAND/UL (ref 4.31–10.16)
WBC #/AREA URNS AUTO: NORMAL /HPF

## 2024-06-04 PROCEDURE — 82306 VITAMIN D 25 HYDROXY: CPT | Performed by: INTERNAL MEDICINE

## 2024-06-04 PROCEDURE — 82607 VITAMIN B-12: CPT | Performed by: INTERNAL MEDICINE

## 2024-06-04 PROCEDURE — 87040 BLOOD CULTURE FOR BACTERIA: CPT

## 2024-06-04 PROCEDURE — 85730 THROMBOPLASTIN TIME PARTIAL: CPT

## 2024-06-04 PROCEDURE — 80053 COMPREHEN METABOLIC PANEL: CPT

## 2024-06-04 PROCEDURE — 93010 ELECTROCARDIOGRAM REPORT: CPT | Performed by: INTERNAL MEDICINE

## 2024-06-04 PROCEDURE — 84443 ASSAY THYROID STIM HORMONE: CPT

## 2024-06-04 PROCEDURE — 96365 THER/PROPH/DIAG IV INF INIT: CPT

## 2024-06-04 PROCEDURE — 0241U HB NFCT DS VIR RESP RNA 4 TRGT: CPT

## 2024-06-04 PROCEDURE — 71275 CT ANGIOGRAPHY CHEST: CPT

## 2024-06-04 PROCEDURE — 93923 UPR/LXTR ART STDY 3+ LVLS: CPT

## 2024-06-04 PROCEDURE — 83880 ASSAY OF NATRIURETIC PEPTIDE: CPT

## 2024-06-04 PROCEDURE — 96375 TX/PRO/DX INJ NEW DRUG ADDON: CPT

## 2024-06-04 PROCEDURE — 99223 1ST HOSP IP/OBS HIGH 75: CPT | Performed by: INTERNAL MEDICINE

## 2024-06-04 PROCEDURE — 83605 ASSAY OF LACTIC ACID: CPT

## 2024-06-04 PROCEDURE — 82948 REAGENT STRIP/BLOOD GLUCOSE: CPT

## 2024-06-04 PROCEDURE — 99291 CRITICAL CARE FIRST HOUR: CPT | Performed by: EMERGENCY MEDICINE

## 2024-06-04 PROCEDURE — 36415 COLL VENOUS BLD VENIPUNCTURE: CPT

## 2024-06-04 PROCEDURE — 96372 THER/PROPH/DIAG INJ SC/IM: CPT

## 2024-06-04 PROCEDURE — 85025 COMPLETE CBC W/AUTO DIFF WBC: CPT

## 2024-06-04 PROCEDURE — 93922 UPR/L XTREMITY ART 2 LEVELS: CPT | Performed by: SURGERY

## 2024-06-04 PROCEDURE — NC001 PR NO CHARGE: Performed by: PODIATRIST

## 2024-06-04 PROCEDURE — 73630 X-RAY EXAM OF FOOT: CPT

## 2024-06-04 PROCEDURE — 93925 LOWER EXTREMITY STUDY: CPT

## 2024-06-04 PROCEDURE — 84484 ASSAY OF TROPONIN QUANT: CPT

## 2024-06-04 PROCEDURE — 73706 CT ANGIO LWR EXTR W/O&W/DYE: CPT

## 2024-06-04 PROCEDURE — 80048 BASIC METABOLIC PNL TOTAL CA: CPT

## 2024-06-04 PROCEDURE — 82805 BLOOD GASES W/O2 SATURATION: CPT

## 2024-06-04 PROCEDURE — 99285 EMERGENCY DEPT VISIT HI MDM: CPT

## 2024-06-04 PROCEDURE — 70450 CT HEAD/BRAIN W/O DYE: CPT

## 2024-06-04 PROCEDURE — 96367 TX/PROPH/DG ADDL SEQ IV INF: CPT

## 2024-06-04 PROCEDURE — 74177 CT ABD & PELVIS W/CONTRAST: CPT

## 2024-06-04 PROCEDURE — 82077 ASSAY SPEC XCP UR&BREATH IA: CPT

## 2024-06-04 PROCEDURE — 82010 KETONE BODYS QUAN: CPT

## 2024-06-04 PROCEDURE — 81001 URINALYSIS AUTO W/SCOPE: CPT

## 2024-06-04 PROCEDURE — 93925 LOWER EXTREMITY STUDY: CPT | Performed by: SURGERY

## 2024-06-04 PROCEDURE — 85610 PROTHROMBIN TIME: CPT

## 2024-06-04 PROCEDURE — 71045 X-RAY EXAM CHEST 1 VIEW: CPT

## 2024-06-04 PROCEDURE — 93005 ELECTROCARDIOGRAM TRACING: CPT

## 2024-06-04 RX ORDER — PANTOPRAZOLE SODIUM 40 MG/1
40 TABLET, DELAYED RELEASE ORAL
Status: DISCONTINUED | OUTPATIENT
Start: 2024-06-05 | End: 2024-06-05 | Stop reason: HOSPADM

## 2024-06-04 RX ORDER — SODIUM CHLORIDE, SODIUM GLUCONATE, SODIUM ACETATE, POTASSIUM CHLORIDE, MAGNESIUM CHLORIDE, SODIUM PHOSPHATE, DIBASIC, AND POTASSIUM PHOSPHATE .53; .5; .37; .037; .03; .012; .00082 G/100ML; G/100ML; G/100ML; G/100ML; G/100ML; G/100ML; G/100ML
1000 INJECTION, SOLUTION INTRAVENOUS ONCE
Status: COMPLETED | OUTPATIENT
Start: 2024-06-04 | End: 2024-06-04

## 2024-06-04 RX ORDER — SODIUM CHLORIDE 9 MG/ML
125 INJECTION, SOLUTION INTRAVENOUS CONTINUOUS
Status: DISPENSED | OUTPATIENT
Start: 2024-06-04 | End: 2024-06-04

## 2024-06-04 RX ORDER — SODIUM CHLORIDE 9 MG/ML
100 INJECTION, SOLUTION INTRAVENOUS CONTINUOUS
Status: DISPENSED | OUTPATIENT
Start: 2024-06-04 | End: 2024-06-05

## 2024-06-04 RX ORDER — CALCIUM GLUCONATE 20 MG/ML
1 INJECTION, SOLUTION INTRAVENOUS ONCE
Status: COMPLETED | OUTPATIENT
Start: 2024-06-04 | End: 2024-06-04

## 2024-06-04 RX ORDER — FOLIC ACID 1 MG/1
1 TABLET ORAL DAILY
Status: DISCONTINUED | OUTPATIENT
Start: 2024-06-04 | End: 2024-06-05 | Stop reason: HOSPADM

## 2024-06-04 RX ORDER — INSULIN LISPRO 100 [IU]/ML
2-12 INJECTION, SOLUTION INTRAVENOUS; SUBCUTANEOUS
Status: DISCONTINUED | OUTPATIENT
Start: 2024-06-04 | End: 2024-06-05 | Stop reason: HOSPADM

## 2024-06-04 RX ORDER — LANOLIN ALCOHOL/MO/W.PET/CERES
100 CREAM (GRAM) TOPICAL DAILY
Status: DISCONTINUED | OUTPATIENT
Start: 2024-06-04 | End: 2024-06-05 | Stop reason: HOSPADM

## 2024-06-04 RX ORDER — DEXTROSE MONOHYDRATE 25 G/50ML
25 INJECTION, SOLUTION INTRAVENOUS ONCE
Status: COMPLETED | OUTPATIENT
Start: 2024-06-04 | End: 2024-06-04

## 2024-06-04 RX ORDER — NICOTINE 21 MG/24HR
14 PATCH, TRANSDERMAL 24 HOURS TRANSDERMAL ONCE
Status: COMPLETED | OUTPATIENT
Start: 2024-06-04 | End: 2024-06-05

## 2024-06-04 RX ADMIN — SODIUM CHLORIDE, SODIUM LACTATE, POTASSIUM CHLORIDE, AND CALCIUM CHLORIDE 1000 ML: .6; .31; .03; .02 INJECTION, SOLUTION INTRAVENOUS at 05:01

## 2024-06-04 RX ADMIN — SODIUM BICARBONATE 50 MEQ: 84 INJECTION INTRAVENOUS at 06:02

## 2024-06-04 RX ADMIN — INSULIN LISPRO 2 UNITS: 100 INJECTION, SOLUTION INTRAVENOUS; SUBCUTANEOUS at 22:52

## 2024-06-04 RX ADMIN — DEXTROSE MONOHYDRATE 25 G: 25 INJECTION, SOLUTION INTRAVENOUS at 06:02

## 2024-06-04 RX ADMIN — INSULIN HUMAN 5 UNITS: 100 INJECTION, SOLUTION PARENTERAL at 06:02

## 2024-06-04 RX ADMIN — MULTIPLE VITAMINS W/ MINERALS TAB 1 TABLET: TAB ORAL at 09:04

## 2024-06-04 RX ADMIN — SODIUM CHLORIDE 125 ML/HR: 0.9 INJECTION, SOLUTION INTRAVENOUS at 11:45

## 2024-06-04 RX ADMIN — CALCIUM GLUCONATE 1 G: 20 INJECTION, SOLUTION INTRAVENOUS at 06:02

## 2024-06-04 RX ADMIN — SODIUM CHLORIDE, SODIUM GLUCONATE, SODIUM ACETATE, POTASSIUM CHLORIDE, MAGNESIUM CHLORIDE, SODIUM PHOSPHATE, DIBASIC, AND POTASSIUM PHOSPHATE 1000 ML: .53; .5; .37; .037; .03; .012; .00082 INJECTION, SOLUTION INTRAVENOUS at 09:00

## 2024-06-04 RX ADMIN — NICOTINE 14 MG: 14 PATCH, EXTENDED RELEASE TRANSDERMAL at 13:19

## 2024-06-04 RX ADMIN — FOLIC ACID 1 MG: 1 TABLET ORAL at 09:01

## 2024-06-04 RX ADMIN — SODIUM CHLORIDE 100 ML/HR: 0.9 INJECTION, SOLUTION INTRAVENOUS at 22:52

## 2024-06-04 RX ADMIN — Medication 100 MG: at 09:01

## 2024-06-04 NOTE — ASSESSMENT & PLAN NOTE
Patient reported that when he woke up this morning he noticed that his second right toes were blue/purple so he decided come to the ED for further evaluation.  Denies any trauma to the foot.    Have a history of recurrent falls however he states that he does not think he fell.   X-ray of the right foot demonstrated Cortical irregularities involving the bases of the fourth and fifth proximal phalanges suspicious for nondisplaced fractures.  CTA of the right lower extremity demonstratedNo evidence of embolic disease to the right lower extremity, as queried.  2.  Calcific, diminutive in caliber right lower extremity tibial vessels.     Initially consulted orthopedic surgery however they recommended podiatry consult instead  Consult placed to podiatry-appreciate recommendation  Arterial duplex of the lower extremity bilaterally given diminished pulses and cold feet

## 2024-06-04 NOTE — DISCHARGE INSTR - AVS FIRST PAGE
Instructions: Right foot  Please elevate right foot to level of chest with 2 pillows when in bed & chair  Patient is to be WBAT in flat surgical shoe on right foot.   Recommend use of  scooter, Cane, or Walker.    Dear Aquilino Dowell,     It was our pleasure to care for you here at [facility].  It is our hope that we were always able to exceed the expected standards for your care during your stay.  You were hospitalized due to fracture of your toes, gastrointestinal discomfort, and metabolic acidosis.  You were cared for on the [facility] fourth floor by Lucio Soriano DO under the service of Kaushal Davis DO with the [facility] who covers for your primary care physician (PCP), Yael Mautte MD, while you were hospitalized.  If you have any questions or concerns related to this hospitalization, you may contact us at .  For follow up as well as any medication refills, we recommend that you follow up with your primary care physician.  A registered nurse will reach out to you by phone within a few days after your discharge to answer any additional questions that you may have after going home.  However, at this time we provide for you here, the most important instructions / recommendations at discharge:     Notable Medication Adjustments -   Stop taking your metformin until you meet with your primary care physician  Testing Required after Discharge -   Follow-up with your primary care physician in regards to your celiac disease panel  ** Please contact your PCP to request testing orders for any of the testing recommended here **  Important follow up information -   Please follow-up with podiatry in 1 week.  You will continue to wear your walking boot until that time.  Please follow-up with gastroenterology to plan your EGD and colonoscopy  Please follow-up with your family physician within 1 week of discharge from the  hospital.  Other Instructions -   Continue to limit the amount of unsweetened tea and other caffeinated beverages that you are drinking  Cut back on alcoholic drinks  Drink more water daily.  You should also eat multiple small meals a day as you can tolerate.  Please return to the hospital if your symptoms return or worsen.  Please review this entire after visit summary as additional general instructions including medication list, appointments, activity, diet, any pertinent wound care, and other additional recommendations from your care team that may be provided for you.      Sincerely,     Lucio Soriano, DO

## 2024-06-04 NOTE — ASSESSMENT & PLAN NOTE
Thought to be secondary to a combination of alcohol, B12 and diabetes  Follows with neurology Dr. Zhu  Last year September patient B12 was noted to be 153 was started on weekly B12 replacement.   Patient may benefit from gabapentin on discharge  We will recheck B12 and vitamin D

## 2024-06-04 NOTE — ED PROVIDER NOTES
History  Chief Complaint   Patient presents with    Shortness of Breath     Pt reports sob, achy and abdominal pain. Also reports fall last Wednesday and hit face. Is on effient ans baby sajan Rolle is a 63 yom with history of diabetes, chronic vision deficits, peripheral neuropathy, CAD, and peripheral vascular disease who presents with multiple complaints including dyspnea, blue toes on right foot, poor appetite, and abdominal discomfort with nausea and vomiting.  Also experienced mechanical fall last Wednesday.    Blue toes:  States that he woke up yesterday morning with blue/purple discoloration of the right second through fourth toes, states they are chronically numb and does not recall injuring them, is able to walk without any pain.  Denies wounds or ulcerations at the site, is concerned that they feel colder than usual.  Denies any right leg swelling, edema, warmth, or calf pain.    Dyspnea:  Had onset of dyspnea with exertion several days ago, feels like he cannot take a deep breath but denies chest pain.  Denies fever, chills, URI, or cough.  Denies calf swelling, erythema, or pain, also denies peripheral edema. No history of asthma or COPD.    Abdominal symptoms:  States that he had a poor appetite for the past several months, however did not have have any associated symptoms.  2 days ago, had onset of abdominal bloating, nausea, and dry heaves with very minimal PO intake since.  Denies constipation, diarrhea, blood in stool, or vomiting.  Denies urinary symptoms.    Fall:  Had mechanical fall last Wednesday, fell forward striking his lip on the ground, did not lose consciousness, did have an abrasion on his lip(very minimal bleeding) but denies any other injuries.  Lip wound completely healed. Denies neck or back pain, denies chest pain or abdominal pain associated with that fall. Is on aspirin and prasugrel.    Of note, patient has chronic alcohol use disorder, has drink at least 2 glasses of wine  daily for many years, last intake around 6 PM last night.  Does get the shakes when he starts to withdraw, however has not had an alcohol withdrawal seizure in the past.        Prior to Admission Medications   Prescriptions Last Dose Informant Patient Reported? Taking?   Fexofenadine HCl (ALLEGRA ALLERGY PO)  Self Yes No   Sig: Take by mouth   Patient not taking: Reported on 10/2/2023   aspirin (ASPIRIN LOW DOSE) 81 MG tablet  Self Yes No   Sig: Take 1 tablet by mouth daily   aspirin (ECOTRIN LOW STRENGTH) 81 mg EC tablet   Yes No   Sig: Take 81 mg by mouth daily   Patient not taking: Reported on 10/2/2023   atorvastatin (LIPITOR) 40 mg tablet  Self No No   Sig: TAKE 1 TABLET AT BEDTIME (NEED OFFICE VISIT FOR REFILLS)   Patient not taking: Reported on 12/12/2023   atorvastatin (LIPITOR) 40 mg tablet   Yes No   Sig: Take 40 mg by mouth daily   Patient not taking: Reported on 12/12/2023   cyanocobalamin 1,000 mcg/mL   No No   Sig: Inject 1 mL (1,000 mcg total) into a muscle every 7 days   fexofenadine (ALLEGRA) 60 MG tablet  Self, Spouse/Significant Other Yes No   Sig: Take 60 mg by mouth daily   folic acid (FOLVITE) 1 mg tablet   No No   Sig: Take 1 tablet (1 mg total) by mouth daily   lidocaine (LIDODERM) 5 %   No No   Sig: Apply 2 patches topically over 12 hours daily Remove & Discard patch within 12 hours or as directed by MD Do not start before September 5, 2023.   Patient not taking: Reported on 10/2/2023   lisinopril (ZESTRIL) 20 mg tablet  Self No No   Sig: One tab BID   Patient taking differently: Take 20 mg by mouth daily One tab BID   lisinopril (ZESTRIL) 20 mg tablet   Yes No   Sig: Take 20 mg by mouth daily   Patient not taking: Reported on 10/2/2023   metFORMIN (GLUCOPHAGE) 1000 MG tablet  Self Yes No   Sig: Take 1,000 mg by mouth daily with breakfast     Patient not taking: Reported on 10/2/2023   metFORMIN (GLUCOPHAGE) 1000 MG tablet   Yes No   Sig: Take 1,000 mg by mouth daily with breakfast    methocarbamol (ROBAXIN) 750 mg tablet   No No   Sig: Take 1 tablet (750 mg total) by mouth every 8 (eight) hours as needed for muscle spasms   Patient not taking: Reported on 2023   metoprolol succinate (TOPROL-XL) 50 mg 24 hr tablet  Self No No   Sig: TAKE 1 TABLET DAILY   metoprolol succinate (TOPROL-XL) 50 mg 24 hr tablet   Yes No   Sig: Take 50 mg by mouth daily   Patient not taking: Reported on 10/2/2023   prasugrel (EFFIENT) tablet   No No   Sig: TAKE 1 TABLET DAILY   prasugrel (EFFIENT) tablet   Yes No   Sig: Take 10 mg by mouth daily   Patient not taking: Reported on 10/2/2023   primidone (MYSOLINE) 50 mg tablet   No No   Si/2 tab oral twice a day for 5 days, then 1/2 tab in AM and 1/2 tab in PM x 5 days, then 1 tab twice a day continously   Patient not taking: Reported on 2023   primidone (MYSOLINE) 50 mg tablet   No No   Sig: Take 0.5 tablets (25 mg total) by mouth every 12 (twelve) hours for 20 doses   Patient not taking: Reported on 10/2/2023   thiamine 100 MG tablet   No No   Sig: Take 1 tablet (100 mg total) by mouth daily Do not start before 2023.      Facility-Administered Medications: None       Past Medical History:   Diagnosis Date    Coronary artery disease     cath Oct 2013: 95% prox LAD, 40% mid LAD, EF 60%, MANUEL to prox LAD ([employer]ience Rx 3.5x18mm)    Diabetes mellitus ([facility])     not sure on date    Difficulty walking     Hypertension     medication    Memory loss     Neuropathy in diabetes ([facility])     Peripheral neuropathy     Sleep apnea        History reviewed. No pertinent surgical history.    Family History   Problem Relation Age of Onset    No Known Problems Mother     Heart attack Father     Arrhythmia Neg Hx     Clotting disorder Neg Hx     Fainting Neg Hx     Asthma Neg Hx     Anuerysm Neg Hx     Hypertension Neg Hx     Heart disease Neg Hx      I have reviewed and agree with the history as documented.    E-Cigarette/Vaping    E-Cigarette Use Never User       E-Cigarette/Vaping Substances    Nicotine No     THC No     CBD No     Flavoring No     Other No     Unknown No      Social History     Tobacco Use    Smoking status: Former     Current packs/day: 1.00     Types: Cigarettes    Smokeless tobacco: Current     Types: Chew   Vaping Use    Vaping status: Never Used   Substance Use Topics    Alcohol use: Yes     Alcohol/week: 21.0 standard drinks of alcohol     Types: 21 Glasses of wine per week     Comment: At least 3 per day    Drug use: No        Review of Systems   Constitutional:  Positive for appetite change and fatigue. Negative for chills, diaphoresis, fever and unexpected weight change.   HENT: Negative.  Negative for congestion, facial swelling, sneezing and sore throat.    Eyes: Negative.  Negative for visual disturbance.   Respiratory:  Positive for shortness of breath. Negative for cough and chest tightness.    Cardiovascular: Negative.  Negative for chest pain, palpitations and leg swelling.   Gastrointestinal:  Positive for abdominal distention and nausea. Negative for abdominal pain, blood in stool, constipation, diarrhea and vomiting.   Endocrine: Negative.    Genitourinary: Negative.  Negative for decreased urine volume, difficulty urinating, dysuria, flank pain and hematuria.   Musculoskeletal: Negative.  Negative for arthralgias, back pain, gait problem, myalgias, neck pain and neck stiffness.   Skin:  Positive for color change. Negative for pallor, rash and wound.   Allergic/Immunologic: Negative.    Neurological:  Negative for dizziness, weakness, light-headedness and headaches.        Chronic peripheral neuropathy of the bilateral feet, at baseline.   Hematological: Negative.  Does not bruise/bleed easily.   Psychiatric/Behavioral: Negative.  Negative for confusion.    All other systems reviewed and are negative.      Physical Exam  ED Triage Vitals   Temperature Pulse Respirations Blood Pressure SpO2   06/04/24 0445 06/04/24 0428 06/04/24 0428  06/04/24 0428 06/04/24 0428   98 °F (36.7 °C) (!) 119 20 130/88 100 %      Temp Source Heart Rate Source Patient Position - Orthostatic VS BP Location FiO2 (%)   06/04/24 0445 06/04/24 0428 06/04/24 0428 06/04/24 0428 --   Oral Monitor Sitting Right arm       Pain Score       06/04/24 0428       3             Orthostatic Vital Signs  Vitals:    06/04/24 0737 06/04/24 0745 06/04/24 0800 06/04/24 0830   BP: 146/82 146/82 165/97 162/86   Pulse: 90 89 82 79   Patient Position - Orthostatic VS: Sitting          Physical Exam  Vitals and nursing note reviewed.   Constitutional:       General: He is not in acute distress.     Appearance: Normal appearance. He is not ill-appearing.   HENT:      Head: Normocephalic and atraumatic.      Right Ear: Tympanic membrane and external ear normal.      Left Ear: Tympanic membrane and external ear normal.      Nose: Nose normal.      Mouth/Throat:      Mouth: Mucous membranes are dry.      Pharynx: Oropharynx is clear.   Eyes:      General: No scleral icterus.     Extraocular Movements: Extraocular movements intact.      Conjunctiva/sclera: Conjunctivae normal.      Pupils: Pupils are equal, round, and reactive to light.   Cardiovascular:      Rate and Rhythm: Regular rhythm. Tachycardia present.      Pulses:           Radial pulses are 2+ on the right side and 2+ on the left side.        Dorsalis pedis pulses are 1+ on the right side and 1+ on the left side.        Posterior tibial pulses are 1+ on the right side and 1+ on the left side.      Heart sounds: Normal heart sounds. No murmur heard.     No friction rub. No gallop.   Pulmonary:      Effort: Pulmonary effort is normal. No respiratory distress.      Breath sounds: Normal breath sounds.   Chest:      Chest wall: No tenderness.   Abdominal:      General: Abdomen is flat. Bowel sounds are normal. There is no distension.      Palpations: Abdomen is soft.      Tenderness: There is no abdominal tenderness. There is no right CVA  tenderness, left CVA tenderness, guarding or rebound.   Musculoskeletal:         General: No swelling or tenderness. Normal range of motion.      Cervical back: Normal range of motion and neck supple. No tenderness.      Right lower leg: No edema.      Left lower leg: No edema.      Comments: No tenderness to palpation, step-offs, or deformities of the cervical, thoracic, or lumbar spine.  Bilateral upper and lower extremities without evidence of trauma.  No calf erythema, warmth, swelling, or tenderness to palpation bilaterally.   Feet:      Comments: Right toes 2 through 4 purple MTP joints through the tips, are cool to touch however similar in temperature to left toes.  Toes are non-tender to palpation and without crepitus or deformity.  No visible wounds or ulcerations of the bilateral feet.  Bilateral toe sensation significantly diminished(however patient states this is baseline).  Lymphadenopathy:      Cervical: No cervical adenopathy.   Skin:     General: Skin is warm and dry.      Capillary Refill: Capillary refill takes less than 2 seconds.      Coloration: Skin is not pale.      Findings: No rash.   Neurological:      General: No focal deficit present.      Mental Status: He is alert.   Psychiatric:         Mood and Affect: Mood normal.         Behavior: Behavior normal.         ED Medications  Medications   multi-electrolyte (ISOLYTE-S PH 7.4) bolus 1,000 mL (has no administration in time range)   thiamine tablet 100 mg (has no administration in time range)   folic acid (FOLVITE) tablet 1 mg (has no administration in time range)   multivitamin-minerals (CENTRUM) tablet 1 tablet (has no administration in time range)   lactated ringers bolus 1,000 mL (0 mL Intravenous Stopped 6/4/24 0601)   calcium gluconate 1 g in sodium chloride 0.9% 50 mL (premix) (0 g Intravenous Stopped 6/4/24 0632)   insulin regular (HumuLIN R,NovoLIN R) injection 5 Units (5 Units Subcutaneous Given 6/4/24 0602)   dextrose 50 % IV  solution 25 g (25 g Intravenous Given 6/4/24 0602)   sodium bicarbonate 8.4 % injection 50 mEq (50 mEq Intravenous Given 6/4/24 0602)   iohexol (OMNIPAQUE) 350 MG/ML injection (MULTI-DOSE) 175 mL (175 mL Intravenous Given 6/4/24 0638)       Diagnostic Studies  Results Reviewed       Procedure Component Value Units Date/Time    Blood gas, venous [[id number]]     Lab Status: No result Specimen: Blood     Beta Hydroxybutyrate [[id number]]     Lab Status: No result Specimen: Blood     Ethanol (Medical Alcohol) [[id number]]     Lab Status: No result Specimen: Blood     HS Troponin I 2hr [[id number]]  (Normal) Collected: 06/04/24 0747    Lab Status: Final result Specimen: Blood from Arm, Right Updated: 06/04/24 0830     hs TnI 2hr 4 ng/L      Delta 2hr hsTnI -2 ng/L     Lactic acid 2 Hours [[id number]]  (Abnormal) Collected: 06/04/24 0747    Lab Status: Final result Specimen: Blood from Arm, Right Updated: 06/04/24 0826     LACTIC ACID 5.5 mmol/L     Narrative:      Result may be elevated if tourniquet was used during collection.    TSH, 3rd generation with Free T4 reflex [[id number]]  (Normal) Collected: 06/04/24 0451    Lab Status: Final result Specimen: Blood from Arm, Right Updated: 06/04/24 0825     TSH 3RD GENERATON 4.369 uIU/mL     Urine Microscopic [[id number]]  (Normal) Collected: 06/04/24 0736    Lab Status: Final result Specimen: Urine, Clean Catch Updated: 06/04/24 0821     RBC, UA None Seen /hpf      WBC, UA 1-2 /hpf      Epithelial Cells None Seen /hpf      Bacteria, UA Occasional /hpf     UA w Reflex to Microscopic w Reflex to Culture [[id number]]  (Abnormal) Collected: 06/04/24 0736    Lab Status: Final result Specimen: Urine, Clean Catch Updated: 06/04/24 0800     Color, UA Light Yellow     Clarity, UA Clear     Specific Gravity, UA 1.028     pH, UA 7.0     Leukocytes, UA Negative     Nitrite, UA Negative     Protein, UA Trace mg/dl      Glucose,  (1/2%) mg/dl      Ketones, UA Trace mg/dl      Urobilinogen,  UA <2.0 mg/dl      Bilirubin, UA Negative     Occult Blood, UA Negative    HS Troponin I 4hr [[id number]]     Lab Status: No result Specimen: Blood     FLU/RSV/COVID - if FLU/RSV clinically relevant [[id number]]  (Normal) Collected: 06/04/24 0451    Lab Status: Final result Specimen: Nares from Nose Updated: 06/04/24 0557     SARS-CoV-2 Negative     INFLUENZA A PCR Negative     INFLUENZA B PCR Negative     RSV PCR Negative    Narrative:      FOR PEDIATRIC PATIENTS - copy/paste COVID Guidelines URL to browser: [email or web address]    SARS-CoV-2 assay is a Nucleic Acid Amplification assay intended for the  qualitative detection of nucleic acid from SARS-CoV-2 in nasopharyngeal  swabs. Results are for the presumptive identification of SARS-CoV-2 RNA.    Positive results are indicative of infection with SARS-CoV-2, the virus  causing COVID-19, but do not rule out bacterial infection or co-infection  with other viruses. Laboratories within the [place] and its  territories are required to report all positive results to the appropriate  public health authorities. Negative results do not preclude SARS-CoV-2  infection and should not be used as the sole basis for treatment or other  patient management decisions. Negative results must be combined with  clinical observations, patient history, and epidemiological information.  This test has not been FDA cleared or approved.    This test has been authorized by FDA under an Emergency Use Authorization  (EUA). This test is only authorized for the duration of time the  declaration that circumstances exist justifying the authorization of the  emergency use of an in vitro diagnostic tests for detection of SARS-CoV-2  virus and/or diagnosis of COVID-19 infection under section 564(b)(1) of  the Act, 21 U.S.C. 360bbb-3(b)(1), unless the authorization is terminated  or revoked sooner. The test has been validated but independent  review by FDA  and CLIA is pending.    Test performed using [employer] GeneXpert: This RT-PCR assay targets N2,  a region unique to SARS-CoV-2. A conserved region in the E-gene was chosen  for pan-Sarbecovirus detection which includes SARS-CoV-2.    According to CMS-2020-01-R, this platform meets the definition of high-throughput technology.    B-Type Natriuretic Peptide(BNP) [[id number]]  (Normal) Collected: 06/04/24 0451    Lab Status: Final result Specimen: Blood from Arm, Right Updated: 06/04/24 0549     BNP 36 pg/mL     HS Troponin 0hr (reflex protocol) [[id number]]  (Normal) Collected: 06/04/24 0451    Lab Status: Final result Specimen: Blood from Arm, Right Updated: 06/04/24 0543     hs TnI 0hr 6 ng/L     Comprehensive metabolic panel [[id number]]  (Abnormal) Collected: 06/04/24 0451    Lab Status: Final result Specimen: Blood from Arm, Right Updated: 06/04/24 0539     Sodium 132 mmol/L      Potassium 6.2 mmol/L      Chloride 97 mmol/L      CO2 19 mmol/L      ANION GAP 16 mmol/L      BUN 12 mg/dL      Creatinine 1.23 mg/dL      Glucose 201 mg/dL      Calcium 10.2 mg/dL      AST 59 U/L      ALT 44 U/L      Alkaline Phosphatase 85 U/L      Total Protein 8.2 g/dL      Albumin 4.6 g/dL      Total Bilirubin 1.24 mg/dL      eGFR 62 ml/min/1.73sq m     Narrative:      National Kidney Disease Foundation guidelines for Chronic Kidney Disease (CKD):     Stage 1 with normal or high GFR (GFR > 90 mL/min/1.73 square meters)    Stage 2 Mild CKD (GFR = 60-89 mL/min/1.73 square meters)    Stage 3A Moderate CKD (GFR = 45-59 mL/min/1.73 square meters)    Stage 3B Moderate CKD (GFR = 30-44 mL/min/1.73 square meters)    Stage 4 Severe CKD (GFR = 15-29 mL/min/1.73 square meters)    Stage 5 End Stage CKD (GFR <15 mL/min/1.73 square meters)  Note: GFR calculation is accurate only with a steady state creatinine    Lactic acid, plasma (w/reflex if result > 2.0) [[id number]]  (Abnormal) Collected: 06/04/24 0451    Lab Status: Final result  Specimen: Blood from Arm, Right Updated: 06/04/24 0537     LACTIC ACID 3.9 mmol/L     Narrative:      Result may be elevated if tourniquet was used during collection.    Protime-INR [[id number]]  (Normal) Collected: 06/04/24 0451    Lab Status: Final result Specimen: Blood from Arm, Right Updated: 06/04/24 0528     Protime 12.5 seconds      INR 0.88    APTT [[id number]]  (Normal) Collected: 06/04/24 0451    Lab Status: Final result Specimen: Blood from Arm, Right Updated: 06/04/24 0528     PTT 25 seconds     CBC and differential [[id number]] Collected: 06/04/24 0451    Lab Status: Final result Specimen: Blood from Arm, Left Updated: 06/04/24 0507     WBC 8.41 Thousand/uL      RBC 4.75 Million/uL      Hemoglobin 16.1 g/dL      Hematocrit 46.4 %      MCV 98 fL      MCH 33.9 pg      MCHC 34.7 g/dL      RDW 12.6 %      MPV 9.1 fL      Platelets 193 Thousands/uL      nRBC 0 /100 WBCs      Segmented % 74 %      Immature Grans % 0 %      Lymphocytes % 16 %      Monocytes % 9 %      Eosinophils Relative 0 %      Basophils Relative 1 %      Absolute Neutrophils 6.19 Thousands/µL      Absolute Immature Grans 0.03 Thousand/uL      Absolute Lymphocytes 1.34 Thousands/µL      Absolute Monocytes 0.78 Thousand/µL      Eosinophils Absolute 0.03 Thousand/µL      Basophils Absolute 0.04 Thousands/µL     Blood culture #2 [[id number]] Collected: 06/04/24 0451    Lab Status: In process Specimen: Blood from Arm, Right Updated: 06/04/24 0503    Blood culture #1 [[id number]] Collected: 06/04/24 0451    Lab Status: In process Specimen: Blood from Arm, Left Updated: 06/04/24 0502    Fingerstick Glucose (POCT) [[id number]]  (Abnormal) Collected: 06/04/24 0443    Lab Status: Final result Specimen: Blood Updated: 06/04/24 0444     POC Glucose 187 mg/dl                    XR foot 3+ views RIGHT   ED Interpretation by Katharine Dean DO (06/04 0712)   No obvious osseous abnormalities, no obvious air in the soft tissues as per my independent  interpretation.      CT head without contrast   Final Result by Yogesh Tate MD (06/04 0700)      No acute intracranial abnormality.                  Workstation performed: [id number]         CTA chest (pe study) abdomen pelvis contrast   Final Result by Yogesh Tate MD (06/04 0717)      1.  No acute pulmonary embolism or thoracic or abdominopelvic pathology.   2.  Nonacute fractures of the right 9th and 10th ribs with partial healing are new since September 1, 2023.                     Workstation performed: [id number]         XR chest 1 view portable   ED Interpretation by Katharine Dean DO (06/04 0712)   No obvious acute cardiopulmonary findings as per my independent interpretation.      CTA lower extremity right w wo contrast    (Results Pending)         Procedures  ECG 12 Lead Documentation Only    Date/Time: 6/4/2024 4:27 AM    Performed by: Katharine Dean DO  Authorized by: Katharine Dean DO    Indications / Diagnosis:  Dyspnea  ECG reviewed by me, the ED Provider: yes    Patient location:  ED  Previous ECG:     Previous ECG:  Compared to current    Comparison ECG info:  10/23/2015    Similarity:  Changes noted  Interpretation:     Interpretation: non-specific    Rate:     ECG rate:  120    ECG rate assessment: tachycardic      ECG rate assessment comment:  Estimated rate.  Rhythm:     Rhythm: sinus rhythm    Ectopy:     Ectopy: none    QRS:     QRS axis:  Normal    QRS intervals:  Normal  Conduction:     Conduction: normal    ST segments:     ST segments:  Normal  T waves:     T waves: inverted      Inverted:  AVL  Comments:      Hyperacute T waves.        ED Course  ED Course as of 06/04/24 0858   Tue Jun 04, 2024   0520 CBC and differential  Normal.   0528 PTT: 25  WNL.   0528 POCT INR: 0.88  WNL.   0540 LACTIC ACID(!): 3.9  LR bolus in progress.   0542 Sodium(!): 132  Acute hyponatremia.   0543 Potassium(!): 6.2  Acute hyperkalemia.  Does have hyperacute T waves on ECG.   Will treat.   0543 ANION GAP(!): 16  Acute WAGMA, likely 2/2 lactic acidosis of unknown etiology.   0545 hs TnI 0hr: 6   0703 CT head without contrast  IMPRESSION:     No acute intracranial abnormality.     0712 XR foot 3+ views RIGHT  No obvious osseous abnormalities, no obvious air in the soft tissues as per my independent interpretation.   0712 XR chest 1 view portable  No obvious acute cardiopulmonary findings as per my independent interpretation.   0727 CTA chest (pe study) abdomen pelvis contrast  IMPRESSION:     1.  No acute pulmonary embolism or thoracic or abdominopelvic pathology.  2.  Nonacute fractures of the right 9th and 10th ribs with partial healing are new since September 1, 2023.     0803 UA w Reflex to Microscopic w Reflex to Culture(!)  No evidence of UTI.   0824 TSH 3RD GENERATON: 4.369  Normal.   0825 Urine Microscopic  Normal.   0826 LACTIC ACID(!!): 5.5   0839 Delta 2hr hsTnI: -2  Normal.  Doubt ACS.   0841 LACTIC ACID(!!): 5.5  Worsening despite initial fluid bolus.  Does not have evidence of infection on exam, CT of abdomen or chest, does not have leukocytosis.  Has alcohol use disorder but does not have significant ketones in urine or high specific gravity to suggest alcoholic ketoacidosis.  Abdomen remains soft and nontender, doubt bowel ischemia.  Will give additional fluid bolus, reassess.     0857 Signed out to Dr. Merritt pending CT results.             HEART Risk Score      Flowsheet Row Most Recent Value   Heart Score Risk Calculator    History 0 Filed at: 06/04/2024 0550   ECG 1 Filed at: 06/04/2024 0550   Age 1 Filed at: 06/04/2024 0550   Risk Factors 2 Filed at: 06/04/2024 0550   Troponin 0 Filed at: 06/04/2024 0550   HEART Score 4 Filed at: 06/04/2024 0550                     Initial Sepsis Screening       Row Name 06/04/24 0858 06/04/24 0545             Is the patient's history suggestive of a new or worsening infection? No  -AW No  -AW                 User Key  (r) = Recorded  By, (t) = Taken By, (c) = Cosigned By      Initials Name Provider Type    RAF Briscoetim Tillman Jermaine,  Physician                    SBIRT 20yo+      Flowsheet Row Most Recent Value   Initial Alcohol Screen: US AUDIT-C     1. How often do you have a drink containing alcohol? 6 Filed at: 06/04/2024 0449   2. How many drinks containing alcohol do you have on a typical day you are drinking?  3 Filed at: 06/04/2024 0449   3a. Male UNDER 65: How often do you have five or more drinks on one occasion? 6   [Wine] Filed at: 06/04/2024 0449   3b. FEMALE Any Age, or MALE 65+: How often do you have 4 or more drinks on one occassion? 0 Filed at: 06/04/2024 0449   Audit-C Score 15 Filed at: 06/04/2024 0449   Full Alcohol Screen: US AUDIT    4. How often during the last year have you found that you were not able to stop drinking once you had started? 0 Filed at: 06/04/2024 0449   5. How often during past year have you failed to do what was normally expected of you because of drinking?  0 Filed at: 06/04/2024 0449   6. How often in past year have you needed a first drink in the morning to get yourself going after a heavy drinking session?  0 Filed at: 06/04/2024 0449   7. How often in past year have you had feeling of guilt or remorse after drinking?  0 Filed at: 06/04/2024 0449   8. How often in past year have you been unable to remember what happened night before because you had been drinking?  0 Filed at: 06/04/2024 0449   9. Have you or someone else been injured as a result of your drinking?  0 Filed at: 06/04/2024 0449   10. Has a relative, friend, doctor or other health worker been concerned about your drinking and suggested you cut down?  0 Filed at: 06/04/2024 0449   AUDIT Total Score 15 Filed at: 06/04/2024 0449   MIKEY: How many times in the past year have you...    Used an illegal drug or used a prescription medication for non-medical reasons? Never Filed at: 06/04/2024 0449            Wells' Criteria for PE       Flowsheet Row Most Recent Value   Wells' Criteria for PE    Clinical signs and symptoms of DVT 0 Filed at: 06/04/2024 0609   PE is primary diagnosis or equally likely 3 Filed at: 06/04/2024 0609   HR >100 1.5 Filed at: 06/04/2024 0609   Immobilization at least 3 days or Surgery in the previous 4 weeks 1.5 Filed at: 06/04/2024 0609   Previous, objectively diagnosed PE or DVT 0 Filed at: 06/04/2024 0609   Hemoptysis 0 Filed at: 06/04/2024 0609   Malignancy with treatment within 6 months or palliative 0 Filed at: 06/04/2024 0609   Wells' Criteria Total 6 Filed at: 06/04/2024 0609              Medical Decision Making  Pt presents with symptoms as above.  No evidence of trauma on head to toe exam with exception to purple discoloration of right toes, however this may also represent embolic/thrombotic event to right foot(however does have pulses present in RLE).  Differential also includes PVD, PE, electrolyte derangement, intra-abdominal process, ACS, less likely infectious process. No evidence of fluid overload or DVT on exam.    Will obtain labs and imaging to evaluate further.  See ED course for remainder of discussion.     Amount and/or Complexity of Data Reviewed  Labs: ordered. Decision-making details documented in ED Course.  Radiology: ordered and independent interpretation performed. Decision-making details documented in ED Course.    Risk  OTC drugs.  Prescription drug management.          Disposition  Final diagnoses:   None     ED Disposition       None          Follow-up Information    None         Patient's Medications   Discharge Prescriptions    No medications on file     No discharge procedures on file.    PDMP Review         Value Time User    PDMP Reviewed  Yes 6/4/2024  4:15 AM Rafa Saba MD             ED Provider  Attending physically available and evaluated Aquilino Dowell. I managed the patient along with the ED Attending.    Electronically Signed by           Katharine Tillman  DO Jermaine  06/04/24 0858

## 2024-06-04 NOTE — ASSESSMENT & PLAN NOTE
He reports that for the past few weeks he has been experiencing abdominal pain before and after meals, early satiety, bloating  He reports that over the past few weeks this has progressively worsened.  Wife reports that he has been avoiding meals due to fear of developing abdominal pain  Denies any dark stools, bright red blood per rectum or coffee-ground emesis  No history of prior colonoscopies    Symptoms concerning for possible PUD versus gastritis versus mesenteric ischemia given history of CAD with likely PAD    Plan  Will Start patient on Protonix  Will consult GI to see if patient would benefit from possible EGD during admission

## 2024-06-04 NOTE — ED PROCEDURE NOTE
Procedure  POC Cardiac US    Date/Time: 6/4/2024 12:16 PM    Performed by: Gilson Lyon MD  Authorized by: Gilson Lyon MD    Patient location:  ED  Other Assisting Provider: Yes (comment) (Edgar Rios Mounika Peethala)    Procedure details:     Exam Type:  Diagnostic    Indications: dyspnea      Assessment / Evaluation for: cardiac function and pericardial effusion      Exam Type: initial exam      Image quality: diagnostic      Image availability:  Video obtained  Cardiac findings:     Views obtained: apical          POC Lung US    Date/Time: 6/4/2024 12:16 PM    Performed by: Gilson Lyon MD  Authorized by: Gilson Lyon MD    Patient location:  ED  Other Assisting Provider: Yes (comment) (Edgar Rios Mounika Peethala)    Procedure details:     Exam Type:  Diagnostic    Indications: dyspnea      Assessment / Evaluation for: Effusion, pleural sliding    Exam Type: initial exam      Image quality: diagnostic      Image availability:  Video obtained    POC Gallbladder US    Date/Time: 6/4/2024 12:16 PM    Performed by: Gilson Lyon MD  Authorized by: Gilson Lyon MD    Patient location:  ED  Other Assisting Provider: Yes (comment) (Edgar Rios, Jennifer Peethala)    Procedure details:     Exam Type:  Diagnostic    Indications: Abdominal pain      Assessment / Evaluation for: biliary duct dilation and stone presence    Exam Type: initial exam      Image quality: diagnostic      Image availability:  Stills obtained  Cardiac findings:      POC Carotid US    Date/Time: 6/4/2024 12:16 PM    Performed by: Gilson Lyon MD  Authorized by: Gilson Lyon MD    Patient location:  ED  Other Assisting Provider: Yes (comment) (Edgar Rios Mounika Peethala)    Procedure details:     Exam Type:  Diagnostic    Indications: Dizziness/fall      Assessment / Evaluation for: stenosis    Exam Type: initial exam      Image quality: diagnostic      Image  availability:  stills obtained  Cardiac findings:     Views obtained: b/l long and short axis           Gilson Lyon MD  06/04/24 1611

## 2024-06-04 NOTE — H&P
[facility]  H&P  Name: Aquilino Dowell 63 y.o. male I MRN: [id number]  Unit/Bed#: ED-40 I Date of Admission: 6/4/2024   Date of Service: 6/4/2024 I Hospital Day: 0      Assessment & Plan   * Right foot pain  Assessment & Plan  Patient reported that when he woke up this morning he noticed that his second right toes were blue/purple so he decided come to the ED for further evaluation.  Denies any trauma to the foot.    Have a history of recurrent falls however he states that he does not think he fell.   X-ray of the right foot demonstrated Cortical irregularities involving the bases of the fourth and fifth proximal phalanges suspicious for nondisplaced fractures.  CTA of the right lower extremity demonstratedNo evidence of embolic disease to the right lower extremity, as queried.  2.  Calcific, diminutive in caliber right lower extremity tibial vessels.     Initially consulted orthopedic surgery however they recommended podiatry consult instead  Consult placed to podiatry-appreciate recommendation  Arterial duplex of the lower extremity bilaterally given diminished pulses and cold feet    High anion gap metabolic acidosis  Assessment & Plan  On admission noted to be 16 with a bicarbonate of 19  Lactic acid on admission 3.9  Likely secondary to lactic acidosis  Improving with IV fluids most recent 2.9  Evidence of infection at this time    Trend lactic acid  A.m. BMP CBC  Monitor off antibiotics  Continue IV fluids for 10 hours    Hyperkalemia  Assessment & Plan  Admission potassium noted to be 6.2  In the ED received hyperkalemia treatment with calcium gluconate insulin and D5  Potassium currently 4.9  Possibly secondary in setting of metabolic acidosis, patient also on lisinopril  No evidence of acute renal failure    A.m. BMP    Abdominal pain  Assessment & Plan  He reports that for the past few weeks he has been experiencing abdominal pain before and after meals, early satiety,  bloating  He reports that over the past few weeks this has progressively worsened.  Wife reports that he has been avoiding meals due to fear of developing abdominal pain  Denies any dark stools, bright red blood per rectum or coffee-ground emesis  No history of prior colonoscopies    Symptoms concerning for possible PUD versus gastritis versus mesenteric ischemia given history of CAD with likely PAD    Plan  Will Start patient on Protonix  Will consult GI to see if patient would benefit from possible EGD during admission    Peripheral neuropathy  Assessment & Plan  Thought to be secondary to a combination of alcohol, B12 and diabetes  Follows with neurology Dr. Zhu  Last year September patient B12 was noted to be 153 was started on weekly B12 replacement.   Patient may benefit from gabapentin on discharge  We will recheck B12 and vitamin D           VTE Pharmacologic Prophylaxis:   Low Risk (Score 0-2) - Encourage Ambulation.  Code Status: Prior full code. Spoke with patient and daughter at bedside   Discussion with family: Updated  (wife) at bedside.    Anticipated Length of Stay: Patient will be admitted on an observation basis with an anticipated length of stay of less than 2 midnights secondary to foot pain and abdominal pain.    Chief Complaint: Right Foot pain    History of Present Illness:  Aquilino Dowell is a 63 y.o. male with a PMH of alcohol use disorders, peripheral neuropathy, type 2 diabetes and hypertension who presented to the ED due to right foot pain.  Orts that he woke up this morning and noticed that his right second and third toes were blue/purple he decided to come to the ED.  He denied any recent falls however he does states that due to his neuropathy and gait dysfunction he has been experiencing recurrent falls.  Denies any trauma to the foot  He also complains of abdominal pain/discomfort that has been ongoing for the past few months.  Associated with early satiety,  bloating.  Due to his abdominal pain he has been avoiding eating.  States that his symptoms have worsened over the past few days  States that his neuropathy has gotten worse and he is falling more at home.  He reports that he does follow with Dr. Sheth and has had workup for recurrent for neuropathy.   Review of Systems:  Review of Systems   Constitutional:  Negative for chills and fever.   HENT:  Negative for ear pain and sore throat.    Eyes:  Negative for pain and visual disturbance.   Respiratory:  Negative for cough and shortness of breath.    Cardiovascular:  Negative for chest pain and palpitations.   Gastrointestinal:  Positive for abdominal pain, nausea and vomiting. Negative for constipation and diarrhea.        Early satiety and abdominal bloating   Genitourinary:  Negative for dysuria and hematuria.   Musculoskeletal:  Negative for arthralgias and back pain.   Skin:  Negative for color change and rash.   Neurological:  Positive for tremors. Negative for seizures.   All other systems reviewed and are negative.      Past Medical and Surgical History:   Past Medical History:   Diagnosis Date    Coronary artery disease     cath Oct 2013: 95% prox LAD, 40% mid LAD, EF 60%, MANUEL to prox LAD (Xience Rx 3.5x18mm)    Diabetes mellitus ([facility]) 2010    not sure on date    Difficulty walking     Hypertension 2013    medication    Memory loss     Neuropathy in diabetes ([facility])     Peripheral neuropathy     Sleep apnea        History reviewed. No pertinent surgical history.    Meds/Allergies:  Prior to Admission medications    Medication Sig Start Date End Date Taking? Authorizing Provider   aspirin (ASPIRIN LOW DOSE) 81 MG tablet Take 1 tablet by mouth daily    Historical Provider, MD   aspirin (ECOTRIN LOW STRENGTH) 81 mg EC tablet Take 81 mg by mouth daily  Patient not taking: Reported on 10/2/2023    Historical Provider, MD   atorvastatin (LIPITOR) 40 mg tablet TAKE 1 TABLET AT BEDTIME (NEED OFFICE VISIT FOR  REFILLS)  Patient not taking: Reported on 12/12/2023 8/20/19   Ella Grossman MD   atorvastatin (LIPITOR) 40 mg tablet Take 40 mg by mouth daily  Patient not taking: Reported on 12/12/2023    Historical Provider, MD   cyanocobalamin 1,000 mcg/mL Inject 1 mL (1,000 mcg total) into a muscle every 7 days 9/4/23   Lacy Hammond DO   fexofenadine (ALLEGRA) 60 MG tablet Take 60 mg by mouth daily    Historical Provider, MD   Fexofenadine HCl (ALLEGRA ALLERGY PO) Take by mouth  Patient not taking: Reported on 10/2/2023    Historical Provider, MD   folic acid (FOLVITE) 1 mg tablet Take 1 tablet (1 mg total) by mouth daily 9/4/23   Lacy Hammond DO   lidocaine (LIDODERM) 5 % Apply 2 patches topically over 12 hours daily Remove & Discard patch within 12 hours or as directed by MD Do not start before September 5, 2023.  Patient not taking: Reported on 10/2/2023 9/5/23   Lacy Hammond DO   lisinopril (ZESTRIL) 20 mg tablet One tab BID  Patient taking differently: Take 20 mg by mouth daily One tab BID 2/23/22   MANNY Woodard   lisinopril (ZESTRIL) 20 mg tablet Take 20 mg by mouth daily  Patient not taking: Reported on 10/2/2023    Historical Provider, MD   metFORMIN (GLUCOPHAGE) 1000 MG tablet Take 1,000 mg by mouth daily with breakfast    Patient not taking: Reported on 10/2/2023    Historical Provider, MD   metFORMIN (GLUCOPHAGE) 1000 MG tablet Take 1,000 mg by mouth daily with breakfast    Historical Provider, MD   methocarbamol (ROBAXIN) 750 mg tablet Take 1 tablet (750 mg total) by mouth every 8 (eight) hours as needed for muscle spasms  Patient not taking: Reported on 12/12/2023 9/4/23   Lacy Hammond DO   metoprolol succinate (TOPROL-XL) 50 mg 24 hr tablet TAKE 1 TABLET DAILY 5/8/23   Ella Grossman MD   metoprolol succinate (TOPROL-XL) 50 mg 24 hr tablet Take 50 mg by mouth daily  Patient not taking: Reported on 10/2/2023    Historical Provider, MD   prasugrel (EFFIENT) tablet TAKE  1 TABLET DAILY 5/22/24   Ella Grossman MD   prasugrel (EFFIENT) tablet Take 10 mg by mouth daily  Patient not taking: Reported on 10/2/2023    Historical Provider, MD   primidone (MYSOLINE) 50 mg tablet 1/2 tab oral twice a day for 5 days, then 1/2 tab in AM and 1/2 tab in PM x 5 days, then 1 tab twice a day continously  Patient not taking: Reported on 12/12/2023 8/15/23   Aleta Neal MD   primidone (MYSOLINE) 50 mg tablet Take 0.5 tablets (25 mg total) by mouth every 12 (twelve) hours for 20 doses  Patient not taking: Reported on 10/2/2023 9/4/23 9/14/23  Lacy Hammond DO   thiamine 100 MG tablet Take 1 tablet (100 mg total) by mouth daily Do not start before September 5, 2023. 9/5/23   Lacy Hammond DO     I have reviewed home medications with patient personally.    Allergies: No Known Allergies    Social History:  Marital Status: /Civil Union   Occupation:   Patient Pre-hospital Living Situation: Home  Patient Pre-hospital Level of Mobility: walks  Patient Pre-hospital Diet Restrictions:   Substance Use History:   Social History     Substance and Sexual Activity   Alcohol Use Yes    Alcohol/week: 21.0 standard drinks of alcohol    Types: 21 Glasses of wine per week    Comment: At least 3 per day     Social History     Tobacco Use   Smoking Status Former    Current packs/day: 1.00    Types: Cigarettes   Smokeless Tobacco Current    Types: Chew     Social History     Substance and Sexual Activity   Drug Use No       Family History:  Family History   Problem Relation Age of Onset    No Known Problems Mother     Heart attack Father     Arrhythmia Neg Hx     Clotting disorder Neg Hx     Fainting Neg Hx     Asthma Neg Hx     Anuerysm Neg Hx     Hypertension Neg Hx     Heart disease Neg Hx        Physical Exam:     Vitals:   Blood Pressure: 156/98 (06/04/24 1400)  Pulse: 91 (06/04/24 1400)  Temperature: 98 °F (36.7 °C) (06/04/24 0445)  Temp Source: Oral (06/04/24 0445)  Respirations: 20  (06/04/24 0737)  Height: 6' (182.9 cm) (06/04/24 0445)  Weight - Scale: 83.9 kg (184 lb 15.5 oz) (06/04/24 0445)  SpO2: 98 % (06/04/24 1400)    Physical Exam  Vitals and nursing note reviewed.   Constitutional:       General: He is not in acute distress.     Appearance: He is well-developed.   HENT:      Head: Normocephalic and atraumatic.   Eyes:      Conjunctiva/sclera: Conjunctivae normal.   Cardiovascular:      Rate and Rhythm: Normal rate and regular rhythm.      Heart sounds: No murmur heard.  Pulmonary:      Effort: Pulmonary effort is normal. No respiratory distress.      Breath sounds: Normal breath sounds.   Abdominal:      General: There is distension.      Palpations: Abdomen is soft.      Tenderness: There is no abdominal tenderness. There is no guarding.   Musculoskeletal:         General: No swelling.      Cervical back: Neck supple.   Skin:     General: Skin is warm and dry.      Capillary Refill: Capillary refill takes less than 2 seconds.   Neurological:      Mental Status: He is alert.   Psychiatric:         Mood and Affect: Mood normal.          Additional Data:     Lab Results:  Results from last 7 days   Lab Units 06/04/24  0451   WBC Thousand/uL 8.41   HEMOGLOBIN g/dL 16.1   HEMATOCRIT % 46.4   PLATELETS Thousands/uL 193   SEGS PCT % 74   LYMPHO PCT % 16   MONO PCT % 9   EOS PCT % 0     Results from last 7 days   Lab Units 06/04/24  0902 06/04/24 0451   SODIUM mmol/L 131* 132*   POTASSIUM mmol/L 4.9 6.2*   CHLORIDE mmol/L 98 97   CO2 mmol/L 24 19*   BUN mg/dL 12 12   CREATININE mg/dL 1.15 1.23   ANION GAP mmol/L 9 16*   CALCIUM mg/dL 8.7 10.2   ALBUMIN g/dL  --  4.6   TOTAL BILIRUBIN mg/dL  --  1.24*   ALK PHOS U/L  --  85   ALT U/L  --  44   AST U/L  --  59*   GLUCOSE RANDOM mg/dL 89 201*     Results from last 7 days   Lab Units 06/04/24  0451   INR  0.88     Results from last 7 days   Lab Units 06/04/24  0443   POC GLUCOSE mg/dl 187*         Results from last 7 days   Lab Units  06/04/24  1302 06/04/24  1022 06/04/24  0747 06/04/24  0451   LACTIC ACID mmol/L 2.9* 4.4* 5.5* 3.9*       Lines/Drains:  Invasive Devices       Peripheral Intravenous Line  Duration             Peripheral IV 06/04/24 Left Antecubital <1 day    Peripheral IV 06/04/24 Proximal;Right;Ventral (anterior) Forearm <1 day                        Imaging: Reviewed radiology reports from this admission including: xray(s)  XR foot 3+ views RIGHT   ED Interpretation by Katharine Dean DO (06/04 0712)   No obvious osseous abnormalities, no obvious air in the soft tissues as per my independent interpretation.      Final Result by Jose Cruz Lynch DO (06/04 1420)      Cortical irregularities involving the bases of the fourth and fifth proximal phalanges suspicious for nondisplaced fractures.      The study was marked in EPIC for immediate notification.      Resident: HARRY CORDERO I, the attending radiologist, have reviewed the images and agree with the final report above.      Workstation performed: [id number]         CT head without contrast   Final Result by Yogesh Tate MD (06/04 0700)      No acute intracranial abnormality.                  Workstation performed: [id number]         CTA lower extremity right w wo contrast   Final Result by Aquilino Lopes MD (06/04 1034)      1.  No evidence of embolic disease to the right lower extremity, as queried.   2.  Calcific, diminutive in caliber right lower extremity tibial vessels.            Workstation performed: [id number]         CTA chest (pe study) abdomen pelvis contrast   Final Result by Yogesh Tate MD (06/04 0717)      1.  No acute pulmonary embolism or thoracic or abdominopelvic pathology.   2.  Nonacute fractures of the right 9th and 10th ribs with partial healing are new since September 1, 2023.                     Workstation performed: [id number]         XR chest 1 view portable   ED Interpretation by Katharine Dean DO (06/04 0712)    No obvious acute cardiopulmonary findings as per my independent interpretation.      Final Result by Marainna Malone MD (06/04 1229)      No acute cardiopulmonary disease.            Workstation performed: [id number]         VAS ARTERIAL DUPLEX- LOWER LIMB BILATERAL    (Results Pending)       EKG and Other Studies Reviewed on Admission:   EKG:     ** Please Note: This note has been constructed using a voice recognition system. **

## 2024-06-04 NOTE — ASSESSMENT & PLAN NOTE
On admission noted to be 16 with a bicarbonate of 19  Lactic acid on admission 3.9  Likely secondary to lactic acidosis  Improving with IV fluids most recent 2.9  Evidence of infection at this time    Trend lactic acid  A.m. BMP CBC  Monitor off antibiotics  Continue IV fluids for 10 hours

## 2024-06-04 NOTE — LETTER
[facility]  [address]  [place] [address]  Dept: [phone]    June 5, 2024     Patient: Aquilino Dowell   YOB: 1960   Date of Visit: 6/4/2024       To Whom it May Concern:    Aquilino Dowell is under my professional care. He was seen in the hospital from 6/4/2024 to 06/05/24. He may return to work on 06/10 without limitations.    If you have any questions or concerns, please don't hesitate to call.         Sincerely,          Lucio Soriano, DO

## 2024-06-04 NOTE — CONSULTS
Consultation -  [facility]  Aquilino Dowell 63 y.o. male MRN: [id number]  Unit/Bed#: ED-40 Encounter: [id number]        Inpatient consult to gastroenterology  Consult performed by: Rojelio Ibarra PA-C  Consult ordered by: Jessie Mac MD          Reason for Consult / Principal Problem: Abdominal pain    ASSESSMENT and PLAN:    Active Problems:    High anion gap metabolic acidosis  Generalized abdominal pain  Postprandial bloating  Change in bowel habits  -Repeat TSH  -Get celiac panel  -Get KUB to assess stool burden  -Continue pantoprazole 40 mg daily  -Patient is on Effient and aspirin secondary to history of cardiac stenting.  Ideally would like to hold these for 5 days prior to an EGD so small bowel biopsies can be obtained.  Can consider diagnostic EGD while on these medicines.    Screening colonoscopy  -Would pursue as an outpatient  -No family history of colorectal cancer or inflammatory bowel disease  --------------------------------------------------------------------------------------------------------------------    HPI: This is a 63-year-old male with past medical history of diabetes, diabetic peripheral neuropathy, coronary artery disease on aspirin and Effient, peripheral vascular disease and vision issues who presents with multiple complaints including shortness of breath, discoloration of the right foot, poor appetite and abdominal discomfort with nausea and vomiting.  He has had an associated poor appetite over the past several months.  He has had 2 days of abdominal pain bloating and nausea and decreased appetite.  Denies any change in bowels black or bloody stool.  He does drink daily with at least 2 glasses of wine daily.  Abdominal pain decreased appetite and bloating over the past year.  Worsened over the past few patient reports no appetite earlier in the day and generally only eats once daily.  He does not notice that the symptoms are worse  with food.  He does report that his bowels alternate between diarrhea and constipation.  He has never had a colonoscopy before.  He had an EGD about 27 years ago.  He denies any issues with lactose intolerance or gluten sensitivity.    Vital signs stable.  CBC normal.  CMP with low sodium at 132 high potassium 6.2 high glucose at 201.  Liver functions show elevated AST at 59 and bilirubin at 1.24 with the remaining values normal.  INR normal.  There is a lactic acidosis with lactic acid 3.9 with repeat in 2 hours up to 5.5 and now down to 2.9..  Urinalysis shows trace protein, glucose and ketones.  Urine microscopic shows small amount of white blood cells and occasional bacteria.  Troponins at 02 and 4 hours normal.  TSH in September 2023 normal.    Endoscopic History:  EGD -27 years ago  Colonoscopy -none previously    REVIEW OF SYSTEMS:    CONSTITUTIONAL: Decreased appetite and fatigue.  Denies any fever, chills, or rigors.  HEENT: No earache or tinnitus. Denies hearing loss or visual disturbances.  CARDIOVASCULAR: No chest pain or palpitations.   RESPIRATORY: Reports shortness of breath and dyspnea on exertion.  Denies any cough, hemoptysis  GASTROINTESTINAL: As noted in the History of Present Illness.   GENITOURINARY: No problems with urination. Denies any hematuria or dysuria.  NEUROLOGIC: No dizziness or vertigo, denies headaches.   MUSCULOSKELETAL: Denies any muscle or joint pain.   SKIN: Reports blue discoloration to right toes.  Denies skin rashes or itching.   ENDOCRINE: Denies excessive thirst. Denies intolerance to heat or cold.  PSYCHOSOCIAL: Denies depression or anxiety. Denies any recent memory loss.       Historical Information   Past Medical History:   Diagnosis Date    Coronary artery disease     cath Oct 2013: 95% prox LAD, 40% mid LAD, EF 60%, MANUEL to prox LAD (Xience Rx 3.5x18mm)    Diabetes mellitus (HCC) 2010    not sure on date    Difficulty walking     Hypertension 2013    medication     Memory loss     Neuropathy in diabetes (HCC)     Peripheral neuropathy     Sleep apnea      History reviewed. No pertinent surgical history.  Social History   Social History     Substance and Sexual Activity   Alcohol Use Yes    Alcohol/week: 21.0 standard drinks of alcohol    Types: 21 Glasses of wine per week    Comment: At least 3 per day     Social History     Substance and Sexual Activity   Drug Use No     Social History     Tobacco Use   Smoking Status Former    Current packs/day: 1.00    Types: Cigarettes   Smokeless Tobacco Current    Types: Chew     Family History   Problem Relation Age of Onset    No Known Problems Mother     Heart attack Father     Arrhythmia Neg Hx     Clotting disorder Neg Hx     Fainting Neg Hx     Asthma Neg Hx     Anuerysm Neg Hx     Hypertension Neg Hx     Heart disease Neg Hx        Meds/Allergies     Not in a hospital admission.  Current Facility-Administered Medications   Medication Dose Route Frequency    folic acid (FOLVITE) tablet 1 mg  1 mg Oral Daily    multivitamin-minerals (CENTRUM) tablet 1 tablet  1 tablet Oral Daily    nicotine (NICODERM CQ) 14 mg/24hr TD 24 hr patch 14 mg  14 mg Transdermal Once    [START ON 6/5/2024] pantoprazole (PROTONIX) EC tablet 40 mg  40 mg Oral Early Morning    sodium chloride 0.9 % infusion  125 mL/hr Intravenous Continuous    thiamine tablet 100 mg  100 mg Oral Daily       No Known Allergies        Objective     Blood pressure 156/98, pulse 91, temperature 98 °F (36.7 °C), temperature source Oral, resp. rate 20, height 6' (1.829 m), weight 83.9 kg (184 lb 15.5 oz), SpO2 98%.      Intake/Output Summary (Last 24 hours) at 6/4/2024 1519  Last data filed at 6/4/2024 1145  Gross per 24 hour   Intake 2050 ml   Output 600 ml   Net 1450 ml         PHYSICAL EXAM:      General Appearance:   Alert, cooperative, mild distress, appears stated age    HEENT:   Normocephalic, atraumatic, sclera anicteric   Neck:  Supple, symmetrical   Lungs:   Clear to  auscultation bilaterally; no rales, rhonchi or wheezing; respirations unlabored    Heart::   S1 and S2 normal; regular rate and rhythm; no murmur, rub, or gallop.   Abdomen:   Soft, non-tender, mild distention; normal bowel sounds; no masses, no organomegaly    Genitalia:   Deferred    Rectal:   Deferred    Extremities:  No cyanosis, clubbing or edema    Pulses:  2+ and symmetric all extremities    Skin:  Skin color, texture normal, no rashes or lesions    Lymph nodes:  Not assessed  Neuro: alert and oriented x 3  Psych: Anxious       Lab Results:   Results from last 7 days   Lab Units 06/04/24  0451   WBC Thousand/uL 8.41   HEMOGLOBIN g/dL 16.1   HEMATOCRIT % 46.4   PLATELETS Thousands/uL 193   SEGS PCT % 74   LYMPHO PCT % 16   MONO PCT % 9   EOS PCT % 0     Results from last 7 days   Lab Units 06/04/24  0902 06/04/24  0451   POTASSIUM mmol/L 4.9 6.2*   CHLORIDE mmol/L 98 97   CO2 mmol/L 24 19*   BUN mg/dL 12 12   CREATININE mg/dL 1.15 1.23   CALCIUM mg/dL 8.7 10.2   ALK PHOS U/L  --  85   ALT U/L  --  44   AST U/L  --  59*     Results from last 7 days   Lab Units 06/04/24  0451   INR  0.88           Imaging Studies: I have personally reviewed pertinent imaging studies.    XR foot 3+ views RIGHT    Result Date: 6/4/2024  Impression: Cortical irregularities involving the bases of the fourth and fifth proximal phalanges suspicious for nondisplaced fractures. The study was marked in EPIC for immediate notification. Resident: HARRY CORDERO I, the attending radiologist, have reviewed the images and agree with the final report above. Workstation performed: [id number]     XR chest 1 view portable    Result Date: 6/4/2024  Impression: No acute cardiopulmonary disease. Workstation performed: [id number]     CTA lower extremity right w wo contrast    Result Date: 6/4/2024  Impression: 1.  No evidence of embolic disease to the right lower extremity, as queried. 2.  Calcific, diminutive in caliber right lower extremity  tibial vessels. Workstation performed: [id number]     CTA chest (pe study) abdomen pelvis contrast    Result Date: 6/4/2024  Impression: 1.  No acute pulmonary embolism or thoracic or abdominopelvic pathology. 2.  Nonacute fractures of the right 9th and 10th ribs with partial healing are new since September 1, 2023. Workstation performed: [id number]     CT head without contrast    Result Date: 6/4/2024  Impression: No acute intracranial abnormality. Workstation performed: [id number]           Patient was seen and examined by Dr. Tomlinson. All balderas medical decisions were made by Dr. Tomlinson. Thank you for allowing us to participate in the care of this present patient. We will follow-up with you closely.      Rojelio Ibarra PA-C  Gastroenterology

## 2024-06-04 NOTE — ED PROCEDURE NOTE
PROCEDURE  CriticalCare Time    Date/Time: 6/4/2024 6:00 AM    Performed by: Rafa Saba MD  Authorized by: Rafa Saba MD    Critical care provider statement:     Critical care time (minutes):  35    Critical care time was exclusive of:  Separately billable procedures and treating other patients and teaching time    Critical care was necessary to treat or prevent imminent or life-threatening deterioration of the following conditions:  Metabolic crisis (hyperkalemia, elevated lactic acid level)    Critical care was time spent personally by me on the following activities:  Obtaining history from patient or surrogate, development of treatment plan with patient or surrogate, evaluation of patient's response to treatment, examination of patient, ordering and performing treatments and interventions, ordering and review of laboratory studies, ordering and review of radiographic studies, re-evaluation of patient's condition and review of old charts    I assumed direction of critical care for this patient from another provider in my specialty: no         Rafa Saba MD  06/04/24 0911

## 2024-06-04 NOTE — PLAN OF CARE
Problem: Potential for Falls  Goal: Patient will remain free of falls  Description: INTERVENTIONS:  - Educate patient/family on patient safety including physical limitations  - Instruct patient to call for assistance with activity   - Consult OT/PT to assist with strengthening/mobility   - Keep Call bell within reach  - Keep bed low and locked with side rails adjusted as appropriate  - Keep care items and personal belongings within reach  - Initiate and maintain comfort rounds  - Make Fall Risk Sign visible to staff  - Offer Toileting every 2 Hours, in advance of need  - Initiate/Maintain bed alarm  - Obtain necessary fall risk management equipment: alarms  - Apply yellow socks and bracelet for high fall risk patients  - Consider moving patient to room near nurses station  Outcome: Progressing     Problem: PAIN - ADULT  Goal: Verbalizes/displays adequate comfort level or baseline comfort level  Description: Interventions:  - Encourage patient to monitor pain and request assistance  - Assess pain using appropriate pain scale  - Administer analgesics based on type and severity of pain and evaluate response  - Implement non-pharmacological measures as appropriate and evaluate response  - Consider cultural and social influences on pain and pain management  - Notify physician/advanced practitioner if interventions unsuccessful or patient reports new pain  Outcome: Progressing     Problem: INFECTION - ADULT  Goal: Absence or prevention of progression during hospitalization  Description: INTERVENTIONS:  - Assess and monitor for signs and symptoms of infection  - Monitor lab/diagnostic results  - Monitor all insertion sites, i.e. indwelling lines, tubes, and drains  - Monitor endotracheal if appropriate and nasal secretions for changes in amount and color  - [place] appropriate cooling/warming therapies per order  - Administer medications as ordered  - Instruct and encourage patient and family to use good hand hygiene  technique  - Identify and instruct in appropriate isolation precautions for identified infection/condition  Outcome: Progressing     Problem: DISCHARGE PLANNING  Goal: Discharge to home or other facility with appropriate resources  Description: INTERVENTIONS:  - Identify barriers to discharge w/patient and caregiver  - Arrange for needed discharge resources and transportation as appropriate  - Identify discharge learning needs (meds, wound care, etc.)  - Arrange for interpretive services to assist at discharge as needed  - Refer to Case Management Department for coordinating discharge planning if the patient needs post-hospital services based on physician/advanced practitioner order or complex needs related to functional status, cognitive ability, or social support system  Outcome: Progressing     Problem: Knowledge Deficit  Goal: Patient/family/caregiver demonstrates understanding of disease process, treatment plan, medications, and discharge instructions  Description: Complete learning assessment and assess knowledge base.  Interventions:  - Provide teaching at level of understanding  - Provide teaching via preferred learning methods  Outcome: Progressing

## 2024-06-04 NOTE — ASSESSMENT & PLAN NOTE
Admission potassium noted to be 6.2  In the ED received hyperkalemia treatment with calcium gluconate insulin and D5  Potassium currently 4.9  Possibly secondary in setting of metabolic acidosis, patient also on lisinopril  No evidence of acute renal failure    A.m. BMP

## 2024-06-04 NOTE — ED ATTENDING ATTESTATION
6/4/2024  I, Rafa Saba MD, saw and evaluated the patient. I have discussed the patient with the resident/non-physician practitioner and agree with the resident's/non-physician practitioner's findings, Plan of Care, and MDM as documented in the resident's/non-physician practitioner's note, except where noted. All available labs and Radiology studies were reviewed.  I was present for key portions of any procedure(s) performed by the resident/non-physician practitioner and I was immediately available to provide assistance.       At this point I agree with the current assessment done in the Emergency Department.  I have conducted an independent evaluation of this patient a history and physical is as follows: Patient is a 63 year old male who got dizzy and fell and struck his face this past Wednesday. Patient is on ASA and effient. Denies LOC. States his balance is poor. No N/V. (+) R purple foot this AM. Denies trauma to foot. Has chronic peripheral neuropathy. (+) sob. (+) abdominal bloating. No N/V/D. No fever. (+) cough. Was last seen at  [facility] on 4/5/24 for type 2 DM with peripheral neuropathy. [facility] website checked on this patient and last Rx filled was on 9/5/23 for oxycodone for 3 day supply. Normocephalic. L Pupil reactive. Blind R eye. Neck nontender. Lungs clear. Tachycardia without murmur. Abdomen distended. Nontender. Good bowel sounds. No edema. No rash noted. (+) ecchymosis noted distal R foot and toes. Decreased sensation bilaterally. Feet cool to touch. Pulses palpated. DDx including but not limited to: intracranial injury, concussion, cervical injury, intrathoracic injury, intraabdominal injury, extremity injury--fracture, dislocation, strain, sprain, contusion.  DDX including but not limited to: pneumonia, pleural effusion, CHF, SCAPE, PE, PTX, ACS, MI, asthma exacerbation, COPD exacerbation, COVID 19,  anemia, metabolic abnormality, renal failure, PVD, arterial occlusion.  Will check EKG,labs, x-ray and CTs. .    ED Course         Critical Care Time  Procedures

## 2024-06-04 NOTE — CONSULTS
CONSULTATION - PODIATRY  Aquilino Dowell 63 y.o. male MRN: [id number]  Unit/Bed#: ED-40 Encounter: [id number]    ASSESSMENT:  Right toe fx's 4, 5  DM2 with PDN  ETOH abuse  CAD    PLAN:  Independently reviewed all labs, imaging & diagnostic studies available at time of visit.  Reviewed right XR 3 views (weight bearing if able) of foot revealed proximal phalanx base minimally displaced fx at digits 4-5, no calcaneal fx.  On clinical exam, ecchymosis noted at right toes 2-5 & lateral foot, mild pitting edema noted, no open lesions.  Educated patient on importance of rigid shoe use to stabilize non-displaced toe fx's.  Educated pt on etiology of neuropathy including uncontrolled blood glucose, excessive alcohol consumption. Educated patient on diabetic foot care including daily foot checks, routine podiatric foot care visits.  Recommend outpatient clinic appointment at least 1 week after discharge with Dr. Cardona.  No further plans for intervention at this time.     Rec PT c/s for gait training, assistive device (cane) training    Wound Care:  compressive ACE wrap for edema control  Appreciate changes as needed if soiled per Nursing.    Activity:  WBAT in flat surgical shoe  Elevation of leg to level of chest with 2 pillows while in bed & chair    History of Present Illness   Chief Complaint   Patient presents with    Shortness of Breath     Pt reports sob, achy and abdominal pain. Also reports fall last Wednesday and hit face. Is on effient ans baby asa     63 yrs male presents with blue toes of right foot s/p fall (DOI: 5/29/24). Pt reports chronic alcohol use.  Podiatry consulted to evaluate right foot.    Pt seen bedside. Pt's wife present this visit.  Pt reports fall last week 2/2 inability to properly feel his feet when walking. Pt reports having to concentrate on his feet when walking to prevent falls.  Pt endorses 2-3 glasses of wine per day, increased consumption on weekends.  Pt reports prior alcohol  cessation for 2-3 weeks without significant improvement of symptoms.  Pt's wife reports family vacation to [place] in the coming weeks.    Patient denies fever, chills, nausea, vomiting, chest pain, shortness of breath  Patient endorses paresthesia    Review of Systems:  All other systems are negative except for HPI.    Allergies as of 06/04/2024    (No Known Allergies)     Past Medical History:   Diagnosis Date    Coronary artery disease     cath Oct 2013: 95% prox LAD, 40% mid LAD, EF 60%, MANUEL to prox LAD (Xience Rx 3.5x18mm)    Diabetes mellitus (HCC) 2010    not sure on date    Difficulty walking     Hypertension 2013    medication    Memory loss     Neuropathy in diabetes ([facility])     Peripheral neuropathy     Sleep apnea      History reviewed. No pertinent surgical history.  Social Determinants of Health     Tobacco Use: High Risk (6/4/2024)    Patient History     Smoking Tobacco Use: Former     Smokeless Tobacco Use: Current     Passive Exposure: Not on file   Alcohol Use: Not on file   Financial Resource Strain: Not on file   Food Insecurity: Not on file   Transportation Needs: Not on file   Physical Activity: Not on file   Stress: Not on file   Social Connections: Not on file   Intimate Partner Violence: Not on file   Depression: Not on file   Housing Stability: Not on file   Utilities: Not on file   Health Literacy: Not on file     Family History   Problem Relation Age of Onset    No Known Problems Mother     Heart attack Father     Arrhythmia Neg Hx     Clotting disorder Neg Hx     Fainting Neg Hx     Asthma Neg Hx     Anuerysm Neg Hx     Hypertension Neg Hx     Heart disease Neg Hx        Objective     /98   Pulse 91   Temp 98 °F (36.7 °C) (Oral)   Resp 20   Ht 6' (1.829 m)   Wt 83.9 kg (184 lb 15.5 oz)   SpO2 98%   BMI 25.09 kg/m²     Lab Results   Component Value Date    HGBA1C 6.3 (H) 09/01/2023      PHYSICAL EXAM:  General: Afebrile, cooperative & no distress  Skin:  dry, pink, (-) webspace  maceration, (-) open lesions    Ecchymosis of dorsal right digits 2-5, lateral foot            Vascular:  Cap refill < 2 seconds.   R DP triphasic, PT biphasic  L DP biphasic, PT biphasic    Neurologic:  Sensation is absent to light touch.      Lower Extremity:  Manual muscle testing 5/5 at ankle in anterior, posterior & lateral groups, b/l  No gross deformity.    Current Facility-Administered Medications   Medication Dose Route Frequency Provider Last Rate Last Admin    folic acid (FOLVITE) tablet 1 mg  1 mg Oral Daily Katharine Dean, DO   1 mg at 06/04/24 0901    multivitamin-minerals (CENTRUM) tablet 1 tablet  1 tablet Oral Daily Katharine Dean, DO   1 tablet at 06/04/24 0904    nicotine (NICODERM CQ) 14 mg/24hr TD 24 hr patch 14 mg  14 mg Transdermal Once Gunnar Merritt, DO   14 mg at 06/04/24 1319    [START ON 6/5/2024] pantoprazole (PROTONIX) EC tablet 40 mg  40 mg Oral Early Morning Jessie Mac MD        sodium chloride 0.9 % infusion  125 mL/hr Intravenous Continuous Gunnar Merritt,  mL/hr at 06/04/24 1145 125 mL/hr at 06/04/24 1145    thiamine tablet 100 mg  100 mg Oral Daily Katharine Dean, DO   100 mg at 06/04/24 0901     Current Outpatient Medications   Medication Sig Dispense Refill    aspirin (ASPIRIN LOW DOSE) 81 MG tablet Take 1 tablet by mouth daily      aspirin (ECOTRIN LOW STRENGTH) 81 mg EC tablet Take 81 mg by mouth daily (Patient not taking: Reported on 10/2/2023)      atorvastatin (LIPITOR) 40 mg tablet TAKE 1 TABLET AT BEDTIME (NEED OFFICE VISIT FOR REFILLS) (Patient not taking: Reported on 12/12/2023) 90 tablet 4    atorvastatin (LIPITOR) 40 mg tablet Take 40 mg by mouth daily (Patient not taking: Reported on 12/12/2023)      cyanocobalamin 1,000 mcg/mL Inject 1 mL (1,000 mcg total) into a muscle every 7 days 12 mL 0    fexofenadine (ALLEGRA) 60 MG tablet Take 60 mg by mouth daily      Fexofenadine HCl (ALLEGRA ALLERGY PO) Take by  mouth (Patient not taking: Reported on 10/2/2023)      folic acid (FOLVITE) 1 mg tablet Take 1 tablet (1 mg total) by mouth daily 90 tablet 0    lidocaine (LIDODERM) 5 % Apply 2 patches topically over 12 hours daily Remove & Discard patch within 12 hours or as directed by MD Do not start before September 5, 2023. (Patient not taking: Reported on 10/2/2023) 30 patch 0    lisinopril (ZESTRIL) 20 mg tablet One tab BID (Patient taking differently: Take 20 mg by mouth daily One tab BID) 60 tablet 3    lisinopril (ZESTRIL) 20 mg tablet Take 20 mg by mouth daily (Patient not taking: Reported on 10/2/2023)      metFORMIN (GLUCOPHAGE) 1000 MG tablet Take 1,000 mg by mouth daily with breakfast   (Patient not taking: Reported on 10/2/2023)      metFORMIN (GLUCOPHAGE) 1000 MG tablet Take 1,000 mg by mouth daily with breakfast      methocarbamol (ROBAXIN) 750 mg tablet Take 1 tablet (750 mg total) by mouth every 8 (eight) hours as needed for muscle spasms (Patient not taking: Reported on 12/12/2023) 60 tablet 0    metoprolol succinate (TOPROL-XL) 50 mg 24 hr tablet TAKE 1 TABLET DAILY 90 tablet 3    metoprolol succinate (TOPROL-XL) 50 mg 24 hr tablet Take 50 mg by mouth daily (Patient not taking: Reported on 10/2/2023)      prasugrel (EFFIENT) tablet TAKE 1 TABLET DAILY 30 tablet 11    prasugrel (EFFIENT) tablet Take 10 mg by mouth daily (Patient not taking: Reported on 10/2/2023)      primidone (MYSOLINE) 50 mg tablet 1/2 tab oral twice a day for 5 days, then 1/2 tab in AM and 1/2 tab in PM x 5 days, then 1 tab twice a day continously (Patient not taking: Reported on 12/12/2023) 60 tablet 3    primidone (MYSOLINE) 50 mg tablet Take 0.5 tablets (25 mg total) by mouth every 12 (twelve) hours for 20 doses (Patient not taking: Reported on 10/2/2023) 10 tablet 0    thiamine 100 MG tablet Take 1 tablet (100 mg total) by mouth daily Do not start before September 5, 2023. 90 tablet 0     XR foot 3+ views RIGHT   ED Interpretation by  Katharine Dean DO (06/04 0712)   No obvious osseous abnormalities, no obvious air in the soft tissues as per my independent interpretation.      Final Result by Jose Cruz Lynch DO (06/04 1420)      Cortical irregularities involving the bases of the fourth and fifth proximal phalanges suspicious for nondisplaced fractures.      The study was marked in EPIC for immediate notification.      Resident: HARRY CORDERO I, the attending radiologist, have reviewed the images and agree with the final report above.      Workstation performed: [id number]         CT head without contrast   Final Result by Yogesh Tate MD (06/04 0700)      No acute intracranial abnormality.                  Workstation performed: [id number]         CTA lower extremity right w wo contrast   Final Result by Aquilino Lopes MD (06/04 1034)      1.  No evidence of embolic disease to the right lower extremity, as queried.   2.  Calcific, diminutive in caliber right lower extremity tibial vessels.            Workstation performed: [id number]         CTA chest (pe study) abdomen pelvis contrast   Final Result by Yogesh Tate MD (06/04 0717)      1.  No acute pulmonary embolism or thoracic or abdominopelvic pathology.   2.  Nonacute fractures of the right 9th and 10th ribs with partial healing are new since September 1, 2023.                     Workstation performed: [id number]         XR chest 1 view portable   ED Interpretation by Katharine Dean DO (06/04 0712)   No obvious acute cardiopulmonary findings as per my independent interpretation.      Final Result by Marianna Malone MD (06/04 1229)      No acute cardiopulmonary disease.            Workstation performed: [id number]         VAS ARTERIAL DUPLEX- LOWER LIMB BILATERAL    (Results Pending)     Results from last 7 days   Lab Units 06/04/24  0451   WBC Thousand/uL 8.41   HEMOGLOBIN g/dL 16.1   HEMATOCRIT % 46.4   PLATELETS Thousands/uL 193   SEGS PCT % 74  "  LYMPHO PCT % 16   MONO PCT % 9   EOS PCT % 0     Lab Results   Component Value Date    CRP 66.1 (H) 10/23/2015   , No results found for: \"ELLA\", \"RF\", \"SEDRATE\"  Results from last 7 days   Lab Units 06/04/24  0902 06/04/24  0451   POTASSIUM mmol/L 4.9 6.2*   CHLORIDE mmol/L 98 97   CO2 mmol/L 24 19*   BUN mg/dL 12 12   CREATININE mg/dL 1.15 1.23   CALCIUM mg/dL 8.7 10.2   ALK PHOS U/L  --  85   ALT U/L  --  44   AST U/L  --  59*     Results from last 7 days   Lab Units 06/04/24  0451   INR  0.88     Results from last 7 days   Lab Units 06/04/24  0451   BLOOD CULTURE  Received in Microbiology Lab. Culture in Progress.  Received in Microbiology Lab. Culture in Progress.           Aquilino Villa DPM  6/4/2024   "

## 2024-06-04 NOTE — ED CARE HANDOFF
Emergency Department Sign Out Note        Sign out and transfer of care from Dr. Dean. See Separate Emergency Department note.     The patient, Aquilino Dowell, was evaluated by the previous provider for R foot pain, abdominal discomfort/n/v.    Workup Completed:  CBC, CMP, troponin, BNP, UA, ethanol level, lactate, TSH, glucose, septic workup    CT PE study with abdomen pelvis, CT head Noncon, x-ray chest and right foot    ED Course / Workup Pending (followup):  CT right lower extremity pending read by radiologist.    Since beta-hydroxybutyrate to assess for alcoholic ketoacidosis, and VBG, pending repeat BMP to assess treatment for hyperkalemia.    Beta-hydroxybutyrate level normal, VBG showed not acidotic.  CTA lower extremity normal with only minor calcific changes and anterior tibial caliber slightly decreased.  EMG showed significant improvement in potassium levels to 4.9.  Patient stable for admission for further evaluation and treatment at this time.  Patient was excepted by [facility] under their service.    HEART Risk Score      Flowsheet Row Most Recent Value   Heart Score Risk Calculator    History 0 Filed at: 06/04/2024 0550   ECG 1 Filed at: 06/04/2024 0550   Age 1 Filed at: 06/04/2024 0550   Risk Factors 2 Filed at: 06/04/2024 0550   Troponin 0 Filed at: 06/04/2024 0550   HEART Score 4 Filed at: 06/04/2024 0550                          Wells' Criteria for PE      Flowsheet Row Most Recent Value   Wells' Criteria for PE    Clinical signs and symptoms of DVT 0 Filed at: 06/04/2024 0609   PE is primary diagnosis or equally likely 3 Filed at: 06/04/2024 0609   HR >100 1.5 Filed at: 06/04/2024 0609   Immobilization at least 3 days or Surgery in the previous 4 weeks 1.5 Filed at: 06/04/2024 0609   Previous, objectively diagnosed PE or DVT 0 Filed at: 06/04/2024 0609   Hemoptysis 0 Filed at: 06/04/2024 0609   Malignancy with treatment within 6 months or palliative 0 Filed at: 06/04/2024 0609   Ángel  Criteria Total 6 Filed at: 06/04/2024 0609                  ED Course as of 06/04/24 1138   Tue Jun 04, 2024   0850 SO PVD, COPD, toes on r foot blue, chronic falls, poor vision.  New SOB, poor appetite, 2 days n/v.  PE negative, CT abd negative.  CTA RLE pending, good pulses.  On CIWA due to wine.   1051 Potassium: 4.9     Procedures  Medical Decision Making  Amount and/or Complexity of Data Reviewed  Labs: ordered. Decision-making details documented in ED Course.  Radiology: ordered and independent interpretation performed.    Risk  OTC drugs.  Prescription drug management.  Decision regarding hospitalization.            Disposition  Final diagnoses:   Hyperkalemia   Lactic acidosis   Fall, initial encounter   Hyponatremia   Dyspnea   Discoloration of skin of toe   Alcohol use disorder   Metabolic acidosis, increased anion gap   Elevated AST (SGOT)     Time reflects when diagnosis was documented in both MDM as applicable and the Disposition within this note       Time User Action Codes Description Comment    6/4/2024  9:02 AM Katharine Dean [E87.5] Hyperkalemia     6/4/2024  9:02 AM Katharine Dean Add [E87.20] Lactic acidosis     6/4/2024  9:02 AM Katharine Dean Add [W19.XXXA] Fall, initial encounter     6/4/2024  9:02 AM Katharine Dean Add [E87.1] Hyponatremia     6/4/2024  9:02 AM Katharine Dean Add [R06.00] Dyspnea     6/4/2024  9:03 AM Katharine Dean Add [L81.9] Discoloration of skin of toe     6/4/2024  9:03 AM Katharine Dean Add [F10.90] Alcohol use disorder     6/4/2024  9:03 AM Katharine Dean Add [E87.29] Metabolic acidosis, increased anion gap     6/4/2024  9:03 AM Katharine Dean Add [R74.01] Elevated AST (SGOT)           ED Disposition       ED Disposition   Admit    Condition   Stable    Date/Time   Tue Jun 4, 2024 1107    Comment   Case was discussed with DANIELLE and the patient's admission status was agreed to be Admission Status: inpatient status to the service of Dr. Roldan .                Follow-up Information    None       Patient's Medications   Discharge Prescriptions    No medications on file     No discharge procedures on file.       ED Provider  Electronically Signed by     Gunnar Merritt DO  06/04/24 1131

## 2024-06-04 NOTE — LETTER
Patient presented to the hospital with gastrointestinal discomfort and discolored toes status post fall.  Patient was found to have metabolic acidosis with elevated lactate likely in the setting of dehydration/starvation.  Patient had not been eating or drinking well in the last couple of months.  At this point it is thought that he might be suffering from gastroparesis versus GERD versus side effect of metformin.  He has been instructed to hold his metformin until following up with you outpatient.  He also needs to follow-up with podiatry outpatient in GI outpatient to set up a time to get EGD and colonoscopy.    Kind regards,  Lucio Soriano, DO

## 2024-06-05 ENCOUNTER — APPOINTMENT (INPATIENT)
Dept: RADIOLOGY | Facility: HOSPITAL | Age: 64
DRG: 074 | End: 2024-06-05
Payer: COMMERCIAL

## 2024-06-05 VITALS
WEIGHT: 187 LBS | TEMPERATURE: 98.4 F | HEART RATE: 157 BPM | HEIGHT: 72 IN | DIASTOLIC BLOOD PRESSURE: 89 MMHG | RESPIRATION RATE: 18 BRPM | BODY MASS INDEX: 25.33 KG/M2 | OXYGEN SATURATION: 98 % | SYSTOLIC BLOOD PRESSURE: 126 MMHG

## 2024-06-05 PROBLEM — E87.5 HYPERKALEMIA: Status: RESOLVED | Noted: 2024-06-04 | Resolved: 2024-06-05

## 2024-06-05 LAB
ANION GAP SERPL CALCULATED.3IONS-SCNC: 10 MMOL/L (ref 4–13)
BASOPHILS # BLD AUTO: 0.02 THOUSANDS/ÂΜL (ref 0–0.1)
BASOPHILS NFR BLD AUTO: 1 % (ref 0–1)
BUN SERPL-MCNC: 9 MG/DL (ref 5–25)
CALCIUM SERPL-MCNC: 8.1 MG/DL (ref 8.4–10.2)
CHLORIDE SERPL-SCNC: 104 MMOL/L (ref 96–108)
CO2 SERPL-SCNC: 22 MMOL/L (ref 21–32)
CREAT SERPL-MCNC: 1.08 MG/DL (ref 0.6–1.3)
EOSINOPHIL # BLD AUTO: 0.02 THOUSAND/ÂΜL (ref 0–0.61)
EOSINOPHIL NFR BLD AUTO: 1 % (ref 0–6)
ERYTHROCYTE [DISTWIDTH] IN BLOOD BY AUTOMATED COUNT: 12.6 % (ref 11.6–15.1)
FERRITIN SERPL-MCNC: 364 NG/ML (ref 24–336)
GFR SERPL CREATININE-BSD FRML MDRD: 73 ML/MIN/1.73SQ M
GLUCOSE SERPL-MCNC: 100 MG/DL (ref 65–140)
GLUCOSE SERPL-MCNC: 116 MG/DL (ref 65–140)
GLUCOSE SERPL-MCNC: 127 MG/DL (ref 65–140)
HCT VFR BLD AUTO: 33.9 % (ref 36.5–49.3)
HGB BLD-MCNC: 11.3 G/DL (ref 12–17)
IMM GRANULOCYTES # BLD AUTO: 0.02 THOUSAND/UL (ref 0–0.2)
IMM GRANULOCYTES NFR BLD AUTO: 1 % (ref 0–2)
IRON SATN MFR SERPL: 24 % (ref 15–50)
IRON SERPL-MCNC: 59 UG/DL (ref 50–212)
LACTATE SERPL-SCNC: 1.4 MMOL/L (ref 0.5–2)
LIPASE SERPL-CCNC: 18 U/L (ref 11–82)
LYMPHOCYTES # BLD AUTO: 1.05 THOUSANDS/ÂΜL (ref 0.6–4.47)
LYMPHOCYTES NFR BLD AUTO: 35 % (ref 14–44)
MCH RBC QN AUTO: 33.9 PG (ref 26.8–34.3)
MCHC RBC AUTO-ENTMCNC: 33.6 G/DL (ref 31.4–37.4)
MCV RBC AUTO: 101 FL (ref 82–98)
MONOCYTES # BLD AUTO: 0.47 THOUSAND/ÂΜL (ref 0.17–1.22)
MONOCYTES NFR BLD AUTO: 16 % (ref 4–12)
NEUTROPHILS # BLD AUTO: 1.42 THOUSANDS/ÂΜL (ref 1.85–7.62)
NEUTS SEG NFR BLD AUTO: 46 % (ref 43–75)
NRBC BLD AUTO-RTO: 0 /100 WBCS
PLATELET # BLD AUTO: 85 THOUSANDS/UL (ref 149–390)
PLATELET BLD QL SMEAR: ABNORMAL
PMV BLD AUTO: 9.3 FL (ref 8.9–12.7)
POTASSIUM SERPL-SCNC: 4.2 MMOL/L (ref 3.5–5.3)
RBC # BLD AUTO: 3.36 MILLION/UL (ref 3.88–5.62)
RBC MORPH BLD: NORMAL
SODIUM SERPL-SCNC: 136 MMOL/L (ref 135–147)
TIBC SERPL-MCNC: 250 UG/DL (ref 250–450)
UIBC SERPL-MCNC: 191 UG/DL (ref 155–355)
VIT B12 SERPL-MCNC: 1621 PG/ML (ref 180–914)
VIT B12 SERPL-MCNC: 3326 PG/ML (ref 180–914)
WBC # BLD AUTO: 3 THOUSAND/UL (ref 4.31–10.16)

## 2024-06-05 PROCEDURE — 83690 ASSAY OF LIPASE: CPT

## 2024-06-05 PROCEDURE — 83550 IRON BINDING TEST: CPT

## 2024-06-05 PROCEDURE — 84207 ASSAY OF VITAMIN B-6: CPT

## 2024-06-05 PROCEDURE — 85025 COMPLETE CBC W/AUTO DIFF WBC: CPT

## 2024-06-05 PROCEDURE — 83540 ASSAY OF IRON: CPT

## 2024-06-05 PROCEDURE — 97167 OT EVAL HIGH COMPLEX 60 MIN: CPT

## 2024-06-05 PROCEDURE — 82728 ASSAY OF FERRITIN: CPT

## 2024-06-05 PROCEDURE — 97163 PT EVAL HIGH COMPLEX 45 MIN: CPT

## 2024-06-05 PROCEDURE — 80048 BASIC METABOLIC PNL TOTAL CA: CPT

## 2024-06-05 PROCEDURE — 99254 IP/OBS CNSLTJ NEW/EST MOD 60: CPT | Performed by: INTERNAL MEDICINE

## 2024-06-05 PROCEDURE — 74018 RADEX ABDOMEN 1 VIEW: CPT

## 2024-06-05 PROCEDURE — 82607 VITAMIN B-12: CPT

## 2024-06-05 PROCEDURE — 99239 HOSP IP/OBS DSCHRG MGMT >30: CPT | Performed by: INTERNAL MEDICINE

## 2024-06-05 PROCEDURE — 83605 ASSAY OF LACTIC ACID: CPT | Performed by: INTERNAL MEDICINE

## 2024-06-05 PROCEDURE — 82948 REAGENT STRIP/BLOOD GLUCOSE: CPT

## 2024-06-05 RX ORDER — PANTOPRAZOLE SODIUM 40 MG/1
40 TABLET, DELAYED RELEASE ORAL
Qty: 30 TABLET | Refills: 0 | Status: SHIPPED | OUTPATIENT
Start: 2024-06-06 | End: 2024-07-06

## 2024-06-05 RX ADMIN — FOLIC ACID 1 MG: 1 TABLET ORAL at 09:11

## 2024-06-05 RX ADMIN — MULTIPLE VITAMINS W/ MINERALS TAB 1 TABLET: TAB ORAL at 09:11

## 2024-06-05 RX ADMIN — Medication 100 MG: at 09:11

## 2024-06-05 RX ADMIN — PANTOPRAZOLE SODIUM 40 MG: 40 TABLET, DELAYED RELEASE ORAL at 05:25

## 2024-06-05 NOTE — ASSESSMENT & PLAN NOTE
Patient reported that when he woke up this morning he noticed that his second right toes were blue/purple so he decided come to the ED for further evaluation.  Denies any trauma to the foot.    Have a history of recurrent falls however he states that he does not think he fell.   X-ray of the right foot demonstrated Cortical irregularities involving the bases of the fourth and fifth proximal phalanges suspicious for nondisplaced fractures.  CTA of the right lower extremity demonstratedNo evidence of embolic disease to the right lower extremity, as queried.  2.  Calcific, diminutive in caliber right lower extremity tibial vessels.     Initially consulted orthopedic surgery however they recommended podiatry consult instead  Podiatry recommend walking boot until follow up with outpatient podiatry   Arterial duplex of the lower extremity bilaterally given diminished pulses and cold feet

## 2024-06-05 NOTE — DISCHARGE SUMMARY
Pending sale to [facility]  Discharge- Aquilino Dowell 1960, 63 y.o. male MRN: [id number]  Unit/Bed#: S -01 Encounter: [id number]  Primary Care Provider: Yael Matute MD   Date and time admitted to hospital: 6/4/2024  4:29 AM    Abdominal pain  Assessment & Plan  He reports that for the past few weeks he has been experiencing abdominal pain before and after meals, early satiety, bloating  He reports that over the past few weeks this has progressively worsened.  Wife reports that he has been avoiding meals due to fear of developing abdominal pain  Denies any dark stools, bright red blood per rectum or coffee-ground emesis  No history of prior colonoscopies    Symptoms concerning for gastroparesis versus GERD versus side effect of metformin     Symptoms improved on 06/05    Plan  GI recommending outpatient EGD and colonoscopy  Patient should continue Protonix 40 mg daily  Metformin will be held until patient follows up with PCP  Patient should eat multiple small meals each day and consume more water    High anion gap metabolic acidosis  Assessment & Plan  On admission noted to be 16 with a bicarbonate of 19  Lactic acid on admission 3.9  Likely secondary to lactic acidosis  Improving with IV fluids most recent 2.9  Acidosis most likely secondary to dehydration and/or starvation    Lactic acid trended down with fluids  Patient no longer acidotic after receiving fluids    Peripheral neuropathy  Assessment & Plan  Thought to be secondary to a combination of alcohol, B12 and diabetes  Follows with neurology Dr. Zhu  Last year September patient B12 was noted to be 153 was started on weekly B12 replacement.   Patient may benefit from gabapentin on discharge  We will recheck B12 and vitamin D    * Right foot pain  Assessment & Plan  Patient reported that when he woke up this morning he noticed that his second right toes were blue/purple so he decided come to the ED for further  evaluation.  Denies any trauma to the foot.    Have a history of recurrent falls however he states that he does not think he fell.   X-ray of the right foot demonstrated Cortical irregularities involving the bases of the fourth and fifth proximal phalanges suspicious for nondisplaced fractures.  CTA of the right lower extremity demonstratedNo evidence of embolic disease to the right lower extremity, as queried.  2.  Calcific, diminutive in caliber right lower extremity tibial vessels.     Initially consulted orthopedic surgery however they recommended podiatry consult instead  Podiatry recommend walking boot until follow up with outpatient podiatry   Arterial duplex of the lower extremity bilaterally given diminished pulses and cold feet    Hyperkalemia-resolved as of 6/5/2024  Assessment & Plan  Admission potassium noted to be 6.2  In the ED received hyperkalemia treatment with calcium gluconate insulin and D5  Potassium currently 4.9  Possibly secondary in setting of metabolic acidosis, patient also on lisinopril  No evidence of acute renal failure    A.m. BMP        Medical Problems       Resolved Problems  Date Reviewed: 6/4/2024            Resolved    Hyperkalemia 6/5/2024     Resolved by  Lucio Soriano DO        Discharging Resident: Lucio Soriano DO  Discharging Attending: Kaushal Davis DO  PCP: Yael Matute MD  Admission Date:   Admission Orders (From admission, onward)       Ordered        06/04/24 1107  INPATIENT ADMISSION  Once                          Discharge Date: 06/05/24    Consultations During Hospital Stay:  Podiatry  Gastroenterology     Procedures Performed:   None    Significant Findings / Test Results:   None    Incidental Findings:   None     Test Results Pending at Discharge (will require follow up):  None     Outpatient Tests Requested:  Celiac disease panel    Complications:  None    Reason for Admission: Gastrointestinal discomfort and discoloration in R  [facility] Hospital Course:   Aquilino Dowell is a 63 y.o. male patient who originally presented to the hospital on 6/4/2024 due to dyspnea, discoloration in toes, and gastrointestinal discomfort.  Patient had a fall last week without head strike or LOC.  At that time he thinks he injured his toes which became more discolored as the week went on.  He also has been experiencing gastrointestinal discomfort in the form of early satiety, bloatedness, nausea after eating.  This has been going on for couple of months.  Due to the symptoms patient eats 1 small meal a day and does not consume much water.  In ED patient was found to have high anion gap metabolic acidosis with lactate of 5.5, and JOSE ANGEL with creatinine of 1.24 up from 0.79, and potassium of 6.2.  All of these labs improved with hydration.  Determined that patient most likely was experiencing the symptoms due to dehydration/starvation.  Patient was informed that he should be drinking more water and trying to eat smaller meals.  X-ray of patient's foot showed fractures in digits 4 and 5.  Podiatry saw patient and recommended walking boot.  GI saw patient and recommended outpatient follow-up for EGD and colonoscopy.    The patient, initially admitted to the hospital as inpatient, was discharged earlier than expected given the following: improvement of symptoms.    Please see above list of diagnoses and related plan for additional information.     Condition at Discharge: stable    Discharge Day Visit / Exam:   Subjective:  Patient today is reporting great improvement in his symptoms. He stated today that he has   Vitals: Blood Pressure: 126/89 (06/05/24 1203)  Pulse: (!) 157 (06/05/24 1203)  Temperature: 98.4 °F (36.9 °C) (06/05/24 1112)  Temp Source: Oral (06/04/24 0445)  Respirations: 18 (06/05/24 1112)  Height: 6' (182.9 cm) (06/04/24 0445)  Weight - Scale: 84.8 kg (187 lb) (06/05/24 0600)  SpO2: 98 % (06/05/24 1203)  Exam:   Physical Exam  Constitutional:        Appearance: Normal appearance.   Cardiovascular:      Rate and Rhythm: Normal rate and regular rhythm.   Pulmonary:      Effort: Pulmonary effort is normal.      Breath sounds: Normal breath sounds.   Abdominal:      General: Bowel sounds are normal. There is no distension.      Tenderness: There is no abdominal tenderness.   Musculoskeletal:         General: Swelling and signs of injury present.      Comments: Ecchymosis of R digits 2 and 3   Neurological:      General: No focal deficit present.      Mental Status: He is alert. Mental status is at baseline.          Discussion with Family: Updated  (wife) at bedside.    Discharge instructions/Information to patient and family:   See after visit summary for information provided to patient and family.      Provisions for Follow-Up Care:  See after visit summary for information related to follow-up care and any pertinent home health orders.      Mobility at time of Discharge:   Basic Mobility Inpatient Raw Score: 24  JH-HLM Goal: 8: Walk 250 feet or more  JH-HLM Achieved: 7: Walk 25 feet or more  HLM Goal achieved. Continue to encourage appropriate mobility.     Disposition:   Home    Planned Readmission: no    Discharge Medications:  See after visit summary for reconciled discharge medications provided to patient and/or family.      **Please Note: This note may have been constructed using a voice recognition system**

## 2024-06-05 NOTE — PLAN OF CARE
Problem: Potential for Falls  Goal: Patient will remain free of falls  Description: INTERVENTIONS:  - Educate patient/family on patient safety including physical limitations  - Instruct patient to call for assistance with activity   - Consult OT/PT to assist with strengthening/mobility   - Keep Call bell within reach  - Keep bed low and locked with side rails adjusted as appropriate  - Keep care items and personal belongings within reach  - Initiate and maintain comfort rounds  - Make Fall Risk Sign visible to staff  - Offer Toileting every 2 Hours, in advance of need  - Initiate/Maintain bed alarm  - Obtain necessary fall risk management equipment: alarms  - Apply yellow socks and bracelet for high fall risk patients  - Consider moving patient to room near nurses station  Outcome: Progressing     Problem: PAIN - ADULT  Goal: Verbalizes/displays adequate comfort level or baseline comfort level  Description: Interventions:  - Encourage patient to monitor pain and request assistance  - Assess pain using appropriate pain scale  - Administer analgesics based on type and severity of pain and evaluate response  - Implement non-pharmacological measures as appropriate and evaluate response  - Consider cultural and social influences on pain and pain management  - Notify physician/advanced practitioner if interventions unsuccessful or patient reports new pain  Outcome: Progressing     Problem: DISCHARGE PLANNING  Goal: Discharge to home or other facility with appropriate resources  Description: INTERVENTIONS:  - Identify barriers to discharge w/patient and caregiver  - Arrange for needed discharge resources and transportation as appropriate  - Identify discharge learning needs (meds, wound care, etc.)  - Arrange for interpretive services to assist at discharge as needed  - Refer to Case Management Department for coordinating discharge planning if the patient needs post-hospital services based on physician/advanced  practitioner order or complex needs related to functional status, cognitive ability, or social support system  Outcome: Progressing     Problem: Knowledge Deficit  Goal: Patient/family/caregiver demonstrates understanding of disease process, treatment plan, medications, and discharge instructions  Description: Complete learning assessment and assess knowledge base.  Interventions:  - Provide teaching at level of understanding  - Provide teaching via preferred learning methods  Outcome: Progressing

## 2024-06-05 NOTE — CASE MANAGEMENT
Case Management Assessment & Discharge Planning Note    Patient name Aquilino Dowell  Location S /S -01 MRN [id number]  : 1960 Date 2024       Current Admission Date: 2024  Current Admission Diagnosis:Right foot pain   Patient Active Problem List    Diagnosis Date Noted Date Diagnosed    High anion gap metabolic acidosis 2024     Abdominal pain 2024     Right foot pain 2024     Peripheral vascular disease (HCC) 2024     Ulcer of right foot, limited to breakdown of skin (HCC) 2024     Mixed axonal-demyelinating polyneuropathy 2023     Vitamin B12 deficiency 10/04/2023     Chronic back pain 2023     Type 2 diabetes mellitus with diabetic polyneuropathy, without long-term current use of insulin (HCC) 2023     Atherosclerosis of native coronary artery of native heart without angina pectoris 2023     Primary hypertension 2023     Essential tremor 2023     Alcohol dependence (HCC) 2023     Facial contusion 2023     Rib pain on left side 2023     Tremor, essential 08/15/2023     Peripheral neuropathy 08/15/2023     Alcohol use 08/15/2023     Ambulatory dysfunction 08/15/2023     Atherosclerotic heart disease of native coronary artery without angina pectoris 2013     Benign essential hypertension 2013     Type 2 or unspecified type diabetes mellitus 2013     Hyperlipidemia 2013     Obstructive sleep apnea 2013       LOS (days): 1  Geometric Mean LOS (GMLOS) (days):   Days to GMLOS:     OBJECTIVE:    Risk of Unplanned Readmission Score: 12.72         Current admission status: Inpatient       Preferred Pharmacy:   [id number] - [place] [facility] [place] - [address]  [address] [facility] [place] [address]  Phone: [phone] Fax: [phone]    [facility] [place], [address]  [facility] [address]  Phone: [phone] Fax: [phone]    UNKNOWN - FOLLOW UP PRIOR TO DISCHARGE TO E-PRESCRIBE  No address on file      Primary Care Provider: Yael Matute MD    Primary Insurance: BLUE CROSS  Secondary Insurance:     ASSESSMENT:  Active Health Care Proxies       Ene Dowell Health Care Representative - Spouse   Primary Phone: [phone] (Mobile)  Home Phone: [phone]                                Patient Information  Admitted from:: Home  Mental Status: Alert  During Assessment patient was accompanied by: Spouse  Assessment information provided by:: Patient, Spouse  Primary Caregiver: Self  Support Systems: Spouse/significant other  What city do you live in[place]  Home entry access options. Select all that apply.: Stairs  Number of steps to enter home.: 3  Do the steps have railings?: Yes  Type of Current Residence: [facility]  Living Arrangements: Lives w/ Spouse/significant other    Activities of Daily Living Prior to Admission  Functional Status: Independent  Completes ADLs independently?: Yes  Ambulates independently?: Yes  Does patient use assisted devices?: Yes  Assisted Devices (DME) used: Straight Cane  Does patient currently own DME?: Yes  What DME does the patient currently own?: Straight Cane  Does patient have a history of Outpatient Therapy (PT/OT)?: Yes  Does the patient have a history of Short-Term Rehab?: No  Does patient have a history of HHC?: No  Does patient currently have HHC?: No         Patient Information Continued  Income Source: Employed  Does patient have prescription coverage?: Yes  Does patient receive dialysis treatments?: No  Does patient have a history of substance abuse?: No  Does patient have a history of Mental Health Diagnosis?: No         Means of Transportation  Means of Transport to Appts:: Drives Self      Social Determinants of Health (SDOH)      Flowsheet Row Most Recent Value   Housing Stability    In the last 12 months,  was there a time when you were not able to pay the mortgage or rent on time? N   At any time in the past 12 months, were you homeless or living in a shelter (including now)? N   Transportation Needs    In the past 12 months, has lack of transportation kept you from medical appointments or from getting medications? no   In the past 12 months, has lack of transportation kept you from meetings, work, or from getting things needed for daily living? No   Food Insecurity    Within the past 12 months, you worried that your food would run out before you got the money to buy more. Never true   Within the past 12 months, the food you bought just didn't last and you didn't have money to get more. Never true   Utilities    In the past 12 months has the electric, gas, oil, or water company threatened to shut off services in your home? No            DISCHARGE DETAILS:    Discharge planning discussed with:: Patient & spouse at bedside  Freedom of Choice: Yes     CM contacted family/caregiver?: Yes  Were Treatment Team discharge recommendations reviewed with patient/caregiver?: Yes  Did patient/caregiver verbalize understanding of patient care needs?: Yes  Were patient/caregiver advised of the risks associated with not following Treatment Team discharge recommendations?: Yes    Contacts  Patient Contacts: Spouse, Ene  Contact Method: In Person  Reason/Outcome: Discharge Planning    Requested Home Health Care         Is the patient interested in HHC at discharge?: No    DME Referral Provided  Referral made for DME?: No    Other Referral/Resources/Interventions Provided:  Interventions: Outpatient PT  Referral Comments: CM spoke with patient & spouse at bedside this morning to introduce role of CM and begin discharge planning. Patient resides with spouse in a 1L home with 3 FARTUN -- Patient was entirely IPTA, uses SPC for ambulation. Patient denies owning further DME (nor need for same). Patient works FT, +drives -- As per OT,  patient has very poor sensation in BLEs, is now wearing a surgical shoe on his R foot, and has poor eyesight. Provider made aware re: questionable fitness to drive. Patient is recommended for OPPT upon medical stability - Both patient/spouse in agreement with same. Spouse states she can provide transportation to/from appointments. List of available OPPT facilities given at bedside for their review. No further CM needs indicated at this time. CM will remain available.         Treatment Team Recommendation: Home (w/ OPPT)  Discharge Destination Plan:: Home (w/ OPPT)

## 2024-06-05 NOTE — PLAN OF CARE
Problem: OCCUPATIONAL THERAPY ADULT  Goal: Performs self-care activities at highest level of function for planned discharge setting.  See evaluation for individualized goals.  Description: Treatment Interventions: ADL retraining, Functional transfer training, Patient/family training, Compensatory technique education  Equipment Recommended: Shower/Tub chair with back ($)       See flowsheet documentation for full assessment, interventions and recommendations.   Note: Limitation: Decreased ADL status, Decreased Safe judgement during ADL, Decreased self-care trans, Decreased high-level ADLs (balance, fxnl mobility, sensation, fxnl reach, standing vaibhav, and visual deficit, safety awareness, insight, and problem solving, response time)  Prognosis: Good  Assessment: Pt is a 63 y.o. male seen for OT evaluation s/p admit to [facility] on 6/4/2024 w/Right foot pain, found to have 4th and 5th metatarsal fx's. Prior to admission, pt was living with spouse in a 1 level house, (I) with ADLs, light (A) with IADLs, (+) falls, (+) . Personal and environmental factors affecting patient at time of evaluation include baseline visual and sensation deficits, lifestyle patterns, difficulty completing ADLs, and difficulty completing IADLs. Personal factors supporting patient at time of evaluation include age, (I) PLOF, supportive spouse who is home all the time, attitude towards recovery, accessible home environment, and [facility]. Based upon this evaluation, pt is functioning below baseline due to decreased balance, decreased BLE sensation, decreased vision and impaired judgement impacting safety/independence w/ ADLs. Pt will benefit from continued skilled inpatient OT to maximize safety, level of independence overall performance in ADLs, functional transfers, functional mobility to return to functional baseline/highest level of function.     Rehab Resource Intensity Level, OT: III (Minimum Resource Intensity) (increased  social support for IADL completion, f/u w/ OPOT for participation in FITNESS TO DRIVE EVALUATION)     Diane Roberts, RENA, OTR/L  [place] License [id number]  [place] License [id number]

## 2024-06-05 NOTE — ASSESSMENT & PLAN NOTE
He reports that for the past few weeks he has been experiencing abdominal pain before and after meals, early satiety, bloating  He reports that over the past few weeks this has progressively worsened.  Wife reports that he has been avoiding meals due to fear of developing abdominal pain  Denies any dark stools, bright red blood per rectum or coffee-ground emesis  No history of prior colonoscopies    Symptoms concerning for gastroparesis versus GERD versus side effect of metformin     Symptoms improved on 06/05    Plan  GI recommending outpatient EGD and colonoscopy  Patient should continue Protonix 40 mg daily  Metformin will be held until patient follows up with PCP  Patient should eat multiple small meals each day and consume more water

## 2024-06-05 NOTE — ASSESSMENT & PLAN NOTE
He reports that for the past few weeks he has been experiencing abdominal pain before and after meals, early satiety, bloating  He reports that over the past few weeks this has progressively worsened.  Wife reports that he has been avoiding meals due to fear of developing abdominal pain  Denies any dark stools, bright red blood per rectum or coffee-ground emesis  No history of prior colonoscopies    Symptoms concerning for gastroparesis versus GERD versus side effect of metformin     Symptoms improved on 06/05    Plan  GI recommending outpatient EGD and colonoscopy  Metformin will be held until patient follows up with PCP  Patient should eat multiple small meals each day and consume more water

## 2024-06-05 NOTE — PLAN OF CARE
Problem: PHYSICAL THERAPY ADULT  Goal: Performs mobility at highest level of function for planned discharge setting.  See evaluation for individualized goals.  Description: Treatment/Interventions: Functional transfer training, LE strengthening/ROM, Elevations, Therapeutic exercise, Endurance training, Patient/family training, Equipment eval/education, Bed mobility, Gait training, Compensatory technique education          See flowsheet documentation for full assessment, interventions and recommendations.  6/5/2024 1451 by Ana Daly PT  Note: Prognosis: Good  Problem List: Decreased strength  Assessment: Aquilino Dowell is a 63 y.o. Male who presents to [facility] on 6/4/24 due to SOB and diagnosis of R foot pain. Orders for PT eval and treat received. Comorbidities affecting pt's functional mobility at time of evaluation include: peripheral neuropathy, DM, essential tremor, PVD, HTN. Personal factors affecting DC include: ambulating w/ assistive device, stairs to enter home, positive fall history, and visual impairments. At baseline, pt mobilizes independently w/ cane prn in the community, and w/ 3 fall(s) in the previous 6 months. Upon evaluation, pt presents w/ the following deficits: altered sensation, impaired vision, impaired skin integrity, impaired balance, and gait deviations. Pt currently requires  mod I for bed mobility, supervision for transfers, supervision w/ cane for ambulation. Pt's clinical presentation is unstable/unpredictable due to poor blood sugar control, abnormal lab values, need for input for mobility technique, recent h/o falls, ongoing medical management. From a PT/mobility standpoint given the above findings, DC recommendation is level: III (Minimum Rehab Resource Intensity). During current admission, pt will benefit from continued skilled inpatient PT in the acute care setting in order to address the above deficits and to maximize function and mobility prior to DC from acute care.         Rehab Resource Intensity Level, PT: III (Minimum Resource Intensity)    See flowsheet documentation for full assessment.

## 2024-06-05 NOTE — PHYSICAL THERAPY NOTE
PHYSICAL THERAPY EVALUATION NOTE          Patient Name: Aquilino Dowell  Today's Date: 2024        AGE:   63 y.o.  Mrn:   [id number]  ADMIT DX:  Hyperkalemia [E87.5]  Lactic acidosis [E87.20]  Hyponatremia [E87.1]  Dyspnea [R06.00]  SOB (shortness of breath) [R06.02]  Abdominal pain [R10.9]  Discoloration of skin of toe [L81.9]  Metabolic acidosis, increased anion gap [E87.29]  Elevated AST (SGOT) [R74.01]  Fall, initial encounter [W19.XXXA]  Fracture, foot, right, sequela [S92.901S]  Alcohol use disorder [F10.90]    Past Medical History:  Past Medical History:   Diagnosis Date    Coronary artery disease     cath Oct 2013: 95% prox LAD, 40% mid LAD, EF 60%, MANUEL to prox LAD (Xience Rx 3.5x18mm)    Diabetes mellitus (HCC)     not sure on date    Difficulty walking     Hypertension     medication    Memory loss     Neuropathy in diabetes (HCC)     Peripheral neuropathy     Sleep apnea        Past Surgical History:  History reviewed. No pertinent surgical history.  Length Of Stay: 1        PHYSICAL THERAPY EVALUATION:    Patient's identity confirmed via 2 patient identifiers (full name and ) at start of session       24 1052   PT Last Visit   PT Visit Date 24   Note Type   Note type Evaluation   Pain Assessment   Pain Assessment Tool 0-10   Pain Score No Pain   Restrictions/Precautions   Weight Bearing Precautions Per Order Yes   RLE Weight Bearing Per Order WBAT  (in flat surgical shoe per Podiatry, provided w/ size S for improved fit)   Braces or Orthoses   (R surgical shoe)   Other Precautions Chair Alarm;Bed Alarm;Fall Risk;WBS;Visual impairment  (blind in R eye)   Home Living   Type of Home House   Home Layout One level;Performs ADLs on one level;Able to live on main level with bedroom/bathroom;Stairs to enter with rails  (3 FARTUN)   Bathroom Shower/Tub Tub/shower unit   Bathroom Toilet Standard   Bathroom Equipment Grab bars in  shower   Bathroom Accessibility Accessible   Home Equipment Cane  (wooden cane)   Prior Function   Level of [place] Independent with functional mobility;Independent with ADLs;Needs assistance with IADLS   Lives With Spouse   Receives Help From Family   IADLs Independent with driving;Family/Friend/Other provides meals;Family/Friend/Other provides medication management  (wife sets up pill box)   Falls in the last 6 months 1 to 4  (3 per pt and wife, pt reports due to dizziness and losing his balance)   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 Negotiates FARTUN w/ railing and SPC. Wears steel toe boots at work   General   Family/Caregiver Present Yes  (pt's wife)   Cognition   Overall Cognitive Status WFL   Arousal/Participation Cooperative   Attention Within functional limits   Orientation Level Oriented X4   Memory Within functional limits   Following Commands Follows one step commands with increased time or repetition   Comments Pt ID via name and ; pt agreeable to PT eval and mobility, questionable insight   Strength RLE   RLE Overall Strength 3+/5  (grossly assessed w/ functional mobility)   Strength LLE   LLE Overall Strength 3+/5  (grossly assessed w/ functional mobility)   Vision-Basic Assessment   Current Vision Wears glasses only for reading   Visual History   (blind in R eye, pt reports diabetic retinopathy in L eye)   Light Touch   RLE Light Touch Impaired   RLE Light Touch Comments impaired sensation to bilateral feet, pt unable to consistently report touch location w/ vision occluded   LLE Light Touch Impaired   LLE Light Touch Comments impaired sensation to bilateral feet, pt unable to consistently report touch location w/ vision occluded   Bed Mobility   Supine to Sit 6  Modified independent   Additional items HOB elevated;Bedrails   Additional Comments able to maintain  sitting balance at EOB. surgical shoe donned at EOB prior to OOB mobility   Transfers   Sit to Stand 5  Supervision   Additional items Assist x 1;Increased time required;Verbal cues   Stand to Sit 5  Supervision   Additional items Assist x 1;Armrests;Increased time required;Verbal cues   Ambulation/Elevation   Gait pattern Improper Weight shift;Wide TAMEKA;Decreased foot clearance;Short stride;Decreased heel strike;Decreased toe off   Gait Assistance 5  Supervision   Additional items Assist x 1;Verbal cues   Assistive Device Straight cane  (RUE, pt reaching for additional LUE support at times)   Distance 60'+40'   Stair Management Assistance Not tested  (pt declined trial)   Ambulation/Elevation Additional Comments able to negotiate around obstacles and turns w/o evidence of LOB. declined ambulation trial w/ RW   Balance   Static Sitting Good   Dynamic Sitting Fair +   Static Standing Fair   Dynamic Standing Fair -   Ambulatory Fair -   Activity Tolerance   Activity Tolerance Patient tolerated treatment well   Medical Staff Made Aware [facility]   Nurse Made Aware RN Jennifer   Assessment   Prognosis Good   Problem List Decreased strength   Assessment Aquilino Dowell is a 63 y.o. Male who presents to [facility] on 6/4/24 due to SOB and diagnosis of R foot pain. Orders for PT eval and treat received. Comorbidities affecting pt's functional mobility at time of evaluation include: peripheral neuropathy, DM, essential tremor, PVD, HTN. Personal factors affecting DC include: ambulating w/ assistive device, stairs to enter home, positive fall history, and visual impairments. At baseline, pt mobilizes independently w/ cane prn in the community, and w/ 3 fall(s) in the previous 6 months. Upon evaluation, pt presents w/ the following deficits: altered sensation, impaired vision, impaired skin integrity, impaired balance, and gait deviations. Pt currently requires  mod I for bed mobility, supervision for transfers,  supervision w/ cane for ambulation. Pt's clinical presentation is unstable/unpredictable due to poor blood sugar control, abnormal lab values, need for input for mobility technique, recent h/o falls, ongoing medical management. From a PT/mobility standpoint given the above findings, DC recommendation is level: III (Minimum Rehab Resource Intensity). During current admission, pt will benefit from continued skilled inpatient PT in the acute care setting in order to address the above deficits and to maximize function and mobility prior to DC from acute care.   Goals   Patient Goals to get is DC papers signed   STG Expiration Date 06/15/24   Short Term Goal #1 Pt will: perform bed mobility independently to decrease pt's burden of care and increase pt's independence w/ repositioning in bed; perform transfers independently to promote OOB mobility; ambulate at least 250' w/ LRAD and mod I to increase pt's ambulatory endurance/tolerance; negotiate at least 3 stair(s) w/ UE support and mod I to facilitate pt returning to previous living environment; increase all balance ratings by at least 1 grade to decrease pt's risk of falls   PT Treatment Day 0   Plan   Treatment/Interventions Functional transfer training;LE strengthening/ROM;Elevations;Therapeutic exercise;Endurance training;Patient/family training;Equipment eval/education;Bed mobility;Gait training;Compensatory technique education   PT Frequency 2-3x/wk   Discharge Recommendation   Rehab Resource Intensity Level, PT III (Minimum Resource Intensity)   AM-PAC Basic Mobility Inpatient   Turning in Flat Bed Without Bedrails 4   Lying on Back to Sitting on Edge of Flat Bed Without Bedrails 4   Moving Bed to Chair 3   Standing Up From Chair Using Arms 3   Walk in Room 3   Climb 3-5 Stairs With Railing 3   Basic Mobility Inpatient Raw Score 20   Basic Mobility Standardized Score 43.99   [facility] Highest Level Of Mobility   [facility] Goal 6: Walk 10 steps or more   [facility]  Achieved 7: Walk 25 feet or more   End of Consult   Patient Position at End of Consult Bedside chair;Bed/Chair alarm activated;All needs within reach       The patient's AM-PAC Basic Mobility Inpatient Short Form Raw Score is 20. A Raw score of greater than 16 suggests the patient may benefit from discharge to home. Please also refer to the recommendation of the Physical Therapist for safe discharge planning.    Pt will benefit from skilled inpatient PT during this admission in order to facilitate progress towards goals and to maximize functional independence prior to DC      DC rec: level III (Minimum Rehab Resource Intensity)        Ana Daly, PT, DPT  06/05/24

## 2024-06-05 NOTE — OCCUPATIONAL THERAPY NOTE
"    Occupational Therapy Evaluation     Patient Name: Aquilino Dowell  Today's Date: 2024  Problem List  Principal Problem:    Right foot pain  Active Problems:    Peripheral neuropathy    High anion gap metabolic acidosis    Hyperkalemia    Abdominal pain    Past Medical History  Past Medical History:   Diagnosis Date    Coronary artery disease     cath Oct 2013: 95% prox LAD, 40% mid LAD, EF 60%, MANUEL to prox LAD (Xience Rx 3.5x18mm)    Diabetes mellitus (HCC)     not sure on date    Difficulty walking     Hypertension     medication    Memory loss     Neuropathy in diabetes (HCC)     Peripheral neuropathy     Sleep apnea      Past Surgical History  History reviewed. No pertinent surgical history.    Patient's identity confirmed via 2 patient identifiers (full name and ) at start of session        24 1029   OT Last Visit   OT Visit Date 24   Note Type   Note type Evaluation   Pain Assessment   Pain Assessment Tool 0-10   Pain Score No Pain   Restrictions/Precautions   Weight Bearing Precautions Per Order Yes   RLE Weight Bearing Per Order WBAT  (s/p R 4th ad 5th metatarsal fx in surgical shoe per podiatry)   Other Precautions Chair Alarm;Bed Alarm;WBS;Fall Risk;Visual impairment  (blind R eye)   Home Living   Type of Home House   Home Layout One level;Performs ADLs on one level;Able to live on main level with bedroom/bathroom;Stairs to enter with rails  (3 FARTUN)   Bathroom Shower/Tub Tub/shower unit   Bathroom Toilet Standard   Bathroom Equipment Grab bars in shower   Bathroom Accessibility Accessible   Home Equipment Cane  (\"walking stick\")   Prior Function   Level of [place] Independent with ADLs;Independent with functional mobility;Needs assistance with IADLS   Lives With Spouse  (who is off of work over the summer)   Receives Help From Family   IADLs Independent with driving;Family/Friend/Other provides medication management;Family/Friend/Other provides meals  (wife setup up " "pill box)   Falls in the last 6 months 1 to 4  (at least 3 per pt and spouse \"get dizzy and lose my balance\")   Vocational 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 Reports he has his system for getting dressed at work into his uniform, will stand against a chair and brace himself w/ his arms. Uses walking stick outside of house, does not use AD at work. Reports work has adapted his work area w/ a 24\" screen so he can see the font.   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 Per podiatry, pt is WBAT in flat surgical shoe. PMH includes: peripheral neuropathy, HTN, T2DM, HLD, essential tremor, alcohol abuse, chronic back pain, PVD   Family/Caregiver Present Yes  (spouse midway through [facility])   Subjective   Subjective Pt reports he live sw/ his spouse and \"with 4 cats that try to kill ya\"   ADL   Where Assessed Edge of bed   Eating Assistance 7  Independent   Eating Deficit Beverage management   Grooming Assistance 6  Modified Independent   UB Bathing Assistance 6  Modified Independent   LB Bathing Assistance 5  Supervision/Setup   UB Dressing Assistance 6  Modified independent   LB Dressing Assistance 5  Supervision/Setup   LB Dressing Deficit Setup;Steadying;Requires assistive device for steadying;Verbal cueing;Supervision/safety;Increased time to complete;Thread RLE into pants;Thread LLE into pants;Pull up over hips;Don/doff R shoe;Don/doff L shoe  (sitting EOB, increased time and attempts to thread into openings due to decreased " "sensation, A to don surgical shoe RLE)   Toileting Assistance  5  Supervision/Setup   Bed Mobility   Supine to Sit 6  Modified independent   Additional items HOB elevated   Additional Comments OOB to recliner at end of session   Transfers   Sit to Stand 5  Supervision   Additional items Assist x 1;Increased time required;Verbal cues   Stand to Sit 5  Supervision   Additional items Assist x 1;Increased time required;Verbal cues   Additional Comments CGA for steadying w/ SPC. Reaching for additional surfaces/furniture to steady   Functional Mobility   Functional Mobility 5  Supervision   Additional Comments Household distance in room w/ SPC and S. Generally unsteady, pt reports \"I can't feel my feet on the floor\". Reaching for furniture to steady   Additional items SPC   Balance   Static Sitting Good   Dynamic Sitting Fair +   Static Standing Fair   Dynamic Standing Fair -   Activity Tolerance   Activity Tolerance Patient tolerated treatment well   Medical Staff Made Aware Care coordinated w/ PT Ana TT to CM Christine, Family medicine resident Jarred Herrera regarding safety w/ driving   Nurse Made Aware yes, RN Jennifer ok to see pt   RUE Assessment   RUE Assessment WFL   LUE Assessment   LUE Assessment WFL   Hand Function   Gross Motor Coordination Functional   Fine Motor Coordination Functional   Sensation   Light Touch (Severe peripheral neuropathy BLEs's)   Vision-Basic Assessment   Current Vision Wears glasses only for reading   Visual History Other (Comment)  (Blind R eye, diabetic retinopathy L eye)   Vision - Complex Assessment   Additional Comments When questioned about difficulties w/ driving pt reports \"I've been driving like this since I was 16\". TT to [facility] and Family medicine resident about fitness to drive   Cognition   Overall Cognitive Status WFL   Arousal/Participation Alert;Cooperative   Attention Within functional limits   Orientation Level Oriented X4   Memory Within functional limits   Following Commands " Follows multistep commands with increased time or repetition   Comments Pleasant and agreeable, questionable insight/safety awareness. Questionable historian, wife present to A w/ hx   Assessment   Limitation Decreased ADL status;Decreased Safe judgement during ADL;Decreased self-care trans;Decreased high-level ADLs  (balance, fxnl mobility, sensation, fxnl reach, standing vaibhav, and visual deficit, safety awareness, insight, and problem solving, response time)   Prognosis Good   Assessment Pt is a 63 y.o. male seen for OT evaluation s/p admit to [facility] on 6/4/2024 w/Right foot pain, found to have 4th and 5th metatarsal fx's. Prior to admission, pt was living with spouse in a 1 level house, (I) with ADLs, light (A) with IADLs, (+) falls, (+) . Personal and environmental factors affecting patient at time of evaluation include baseline visual and sensation deficits, lifestyle patterns, difficulty completing ADLs, and difficulty completing IADLs. Personal factors supporting patient at time of evaluation include age, (I) PLOF, supportive spouse who is home all the time, attitude towards recovery, accessible home environment, and [facility]. Based upon this evaluation, pt is functioning below baseline due to decreased balance, decreased BLE sensation, decreased vision and impaired judgement impacting safety/independence w/ ADLs. Pt will benefit from continued skilled inpatient OT to maximize safety, level of independence overall performance in ADLs, functional transfers, functional mobility to return to functional baseline/highest level of function.   Goals   Patient Goals to get my dicharge papers   LTG Time Frame 10-14   Long Term Goal #1 see goals below   Plan   Treatment Interventions ADL retraining;Functional transfer training;Patient/family training;Compensatory technique education   Goal Expiration Date 06/15/24   OT Treatment Day 0   OT Frequency 2-3x/wk   Discharge Recommendation   Rehab Resource  Intensity Level, OT III (Minimum Resource Intensity)  (increased social support for IADL completion, f/u w/ OPOT for participation in FITNESS TO DRIVE EVALUATION; educated on purchase of shower chair for increased safety w/ bathing as wife has concerns)   Additional Comments  Pt seen as a co-eval with PT due to the patient's co-morbidities and clinically unstable presentation indicated by chart review. During session, pt benefited from two skilled therapists due to extensive medical needs and pt's decreased activity tolerance.   Additional Comments 2 The patient's raw score on the -PAC Daily Activity Inpatient Short Form is 21. A raw score of greater than or equal to 19 suggests the patient may benefit from discharge to home. Please also refer to the recommendation of the Occupational Therapist for safe discharge planning.   AM-PAC Daily Activity Inpatient   Lower Body Dressing 3   Bathing 3   Toileting 3   Upper Body Dressing 4   Grooming 4   Eating 4   Daily Activity Raw Score 21   Daily Activity Standardized Score (Calc for Raw Score >=11) 44.27   AM-PAC Applied Cognition Inpatient   Following a Speech/Presentation 4   Understanding Ordinary Conversation 4   Taking Medications 3   Remembering Where Things Are Placed or Put Away 4   Remembering List of 4-5 Errands 3   Taking Care of Complicated Tasks 3   Applied Cognition Raw Score 21   Applied Cognition Standardized Score 44.3   End of Consult   Patient Position at End of Consult Bedside chair;All needs within reach   Nurse Communication Nurse aware of consult     GOALS:    *Goals established to promote patient goal of to get my discharge papers:      *Patient will perform grooming tasks standing at sink with (I) in order to increase overall independence    *LB ADL with mod (I) for inc'd independence with self care    *Toileting with (I) for clothing management and hygiene to increase hygiene/thoroughness in order to reduce caregiver burden    *ADL transfers  with (I) for inc'd independence with ADLs/purposeful tasks    *Patient will increase functional mobility to and from bathroom with single point cane independently to increase independence with toileting    *Increase stand tolerance x 10  m for inc'd tolerance with standing purposeful tasks including meal prep/household management    *Patient will engage in ongoing cognitive assessment to assist with safe discharge planning/recommendations.     *Pt vs caregiver will verbalize and demonstrate good understanding/technique for donning/doffing surgical shoe to increase safety and compliance w/ collar management during bathing     RENA Ramos, OTR/L    [place] License [id number]  [place] License [id number]

## 2024-06-05 NOTE — INCIDENTAL FINDINGS
The following findings require follow up:  Radiographic finding   Finding: Cortical irregularities involving the bases of the fourth and fifth proximal phalanges suspicious for nondisplaced fractures.    Follow up required: Podiatry outpatient    Follow up should be done within 1 week    Please notify the following clinician to assist with the follow up:   Podiatry

## 2024-06-05 NOTE — UTILIZATION REVIEW
Initial Clinical Review    Admission: Date/Time/Statement:   Admission Orders (From admission, onward)       Ordered        06/04/24 1107  INPATIENT ADMISSION  Once                          Orders Placed This Encounter   Procedures    INPATIENT ADMISSION     Standing Status:   Standing     Number of Occurrences:   1     Order Specific Question:   Level of Care     Answer:   Med Surg [16]     Order Specific Question:   Estimated length of stay     Answer:   More than 2 Midnights     Order Specific Question:   Certification     Answer:   I certify that inpatient services are medically necessary for this patient for a duration of greater than two midnights. See H&P and MD Progress Notes for additional information about the patient's course of treatment.     ED Arrival Information       Expected   -    Arrival   6/4/2024 04:12    Acuity   Emergent              Means of arrival   Wheelchair    Escorted by   Spouse    Service   Hospitalist    Admission type   Emergency              Arrival complaint   Right Foot Color Change/SOB             Chief Complaint   Patient presents with    Shortness of Breath     Pt reports sob, achy and abdominal pain. Also reports fall last Wednesday and hit face. Is on effient ans baby asa       Initial Presentation: 63 y.o. male PMH of alcohol use disorders, peripheral neuropathy, type 2 diabetes , HTN  who presented to the ED from home due to right foot pain. Pt noticed this am that his right second and third toes were blue/purple . Pt reports recurrent falls 2/2 diabetic neuropathy and gait dysfunction  and reorts abdominal pain  x months that has worsened past feew days, pt avoiding eating die to abdominal pain . On exam, tachycardic, abdomen distended . Right toes 2 through 4 purple MTP joints through the tips, are cool to touch however similar in temperature to left toes. Toes are non-tender to palpation .  Bilateral toe sensation significantly diminished(however patient states this is  baseline ). Labs : K 6.2, high anion gap metabolic [id number] with a bicarbonate of 19 ,  lactate level of 5.5 , JOSE ANGEL Cr 1.24 from 0.79 last likely from profound dehydration. XR R ft suspicious for non displaced fx 4th and 5th proximal phalanges . CTA RLE neg for embolic disease. Pt given calcium gluconate insulin and D5 for hyperkalemia with K down to 4.9 ,also given  IVF, IV Na bicarb in ED.  Pt admitted as Inpatient to telemetry with R ft pain, High anion gap metabolic acidosis, hyperkalemia, abdominal pain , JOSE ANGEL . Plan- telemetry . Podiatry consult, Arterial duplex LE . Trend lactic acid. CBC, BMP in am. IVF x 10 h  . Hold Lisinopril. GI consult. PPI  . CIWA .     Anticipated Length of Stay/Certification Statement: Patient will be admitted on an observation basis with an anticipated length of stay of less than 2 midnights secondary to foot pain and abdominal pain.     Podiatry consult- Right toe fx's 4, 5 . DM2 with PDN .Endorses 2-3 glasses of wine per day, increased consumption on weekends.  Imaging shows proximal phalanx base minimally displaced fx at digits 4-5, no calcaneal fx. On exam, ecchymosis noted at right toes 2-5 & lateral foot, mild pitting edema noted, no open lesions. I mportance of rigid shoe use to stabilize non-displaced toe fx's. Recommend PT . WBAT in flat surgical shoe. Compressive ACE for edema control. Leg elevation .       Date: 6/5    Day 2:    GI consult- generalized abdominal pain, postprandial bloating . Pt reports that his bowels alternate between diarrhea and constipation. Liver functions show elevated AST at 59 and bilirubin at 1.24 lactic acidosis with lactic acid 3.9 with repeat in 2 hours up to 5.5 and now down to 2.9. Abdomen soft, mild distention, normal bowel sounds . Plan- Obtain TSH,  celiac panel, KUB to assess stool burden . Continue PPI. is on Effient and aspirin secondary to history of cardiac stenting. Ideally would like to hold these for 5 days prior to an EGD so  small bowel biopsies can be obtained. Can consider diagnostic EGD while on these medicines.    Medicine- abdominal sx improved today . Metformin will be held until patient follows up with PCP . Patient should eat multiple small meals each day and consume more water  . Lactic acid trended down , no longer acidotic with fluids  .Ecchymosis R digits 2, 3 .  D/C order to home placed today - outpt PT         All photos below 6/4/24 R ft               ED Triage Vitals   Temperature Pulse Respirations Blood Pressure SpO2   06/04/24 0445 06/04/24 0428 06/04/24 0428 06/04/24 0428 06/04/24 0428   98 °F (36.7 °C) (!) 119 20 130/88 100 %      Temp Source Heart Rate Source Patient Position - Orthostatic VS BP Location FiO2 (%)   06/04/24 0445 06/04/24 0428 06/04/24 0428 06/04/24 0428 --   Oral Monitor Sitting Right arm       Pain Score       06/04/24 0428       3          Wt Readings from Last 1 Encounters:   06/05/24 84.8 kg (187 lb)     Additional Vital Signs:   Date/Time Temp Pulse Resp BP MAP (mmHg) SpO2   06/05/24 07:10:19 98.6 °F (37 °C) 82 20 143/85 104 99 %   06/05/24 03:39:38 97.9 °F (36.6 °C) 87 18 125/85 98 98 %   06/04/24 22:27:51 98.8 °F (37.1 °C) 98 16 134/77 96 99 %   06/04/24 2055 -- -- -- -- -- --   06/04/24 20:29:56 98 °F (36.7 °C) 88 18 165/94 118 99 %   06/04/24 2000 -- 86 -- 139/84 108 97 %   06/04/24 1910 -- 91 18 135/78 101 98 %   06/04/24 1900 -- 101 -- 169/100 130 97 %   06/04/24 1800 -- 87 -- 140/79 104 96 %   06/04/24 1700 -- 87 -- 140/87 109 96 %   06/04/24 1600 -- 91  -- 131/81 102 97 %   Pulse: Simultaneous filing. User may not have seen previous data. at 06/04/24 1600   06/04/24 1400 -- 91 -- 156/98 122 98 %   06/04/24 1302 -- 92 -- 161/84 117 99 %   06/04/24 1200 -- 95 -- 175/84 Abnormal  120 98 %   06/04/24 1000 -- 86 -- 145/71 102 99 %   06/04/24 0915 -- 77 -- 162/91 120 100 %   06/04/24 0830 -- 79 -- 162/86 117 99 %   06/04/24 0800 -- 82 -- 165/97 122 99 %   06/04/24 0745 -- 89 -- 146/82 108 99  %   06/04/24 0737 -- 90 20 146/82 108 99 %   06/04/24 0600 -- 77 -- 164/85 116 98 %   06/04/24 0530 -- 74 -- 169/83 119 100 %   06/04/24 0500 -- 93 -- 149/85 111 99 %   06/04/24 0445 98 °F (36.7 °C) 105 20 158/90 118 98 %       Date and Time R Radial Pulse L Radial Pulse R Pedal Pulse L Pedal Pulse   06/04/24 2055 -- -- +1 +1   06/04/24 1200 +2 +2 Doppler +1   06/04/24 0530 -- -- Doppler +1     CIWA-Ar Score    Row Name 06/05/24 07:10:19 06/05/24 03:39:38 06/04/24 22:27:51 06/04/24 20:29:56 06/04/24 2000   CIWA-Ar   /85  -/85  -/77  -/94  -/84  -CF   Pulse 82  -DI 87  -DI 98  -DI 88  -DI 86  -CF   Nausea and Vomiting -- 0  -IR 0  -IR -- 0  -MD   Tactile Disturbances -- 0  -IR 0  -IR -- 0  -MD   Tremor -- 0  -IR 0  -IR -- 0  -MD   Auditory Disturbances -- 0  -IR 0  -IR -- 0  -MD   Paroxysmal Sweats -- 0  -IR 0  -IR -- 0  -MD   Visual Disturbances -- 0  -IR 0  -IR -- 0  -MD   Anxiety -- 0  -IR 0  -IR -- 0  -MD   Headache, Fullness in Head -- 0  -IR 0  -IR -- 0  -MD   Agitation -- 0  -IR 0  -IR -- 0  -MD   Orientation and Clouding of Sensorium -- 0  -IR 0  -IR -- 0  -MD   CIWA-Ar Total -- 0  -IR 0  -IR -- 0  -MD   Row Name 06/04/24 1910 06/04/24 1900 06/04/24 1800 06/04/24 1700 06/04/24 1600   CIWA-Ar   /78  -/100  -/79  -/87  -/81  -CF   Pulse 91  -  -CF 87  -CF 87  -KL 91  Simultaneous filing. User may not have seen previous data.  -KL   Nausea and Vomiting -- -- -- -- 0  -KL   Tactile Disturbances -- -- -- -- 0  -KL   Tremor -- -- -- -- 0  -KL   Auditory Disturbances -- -- -- -- 0  -KL   Paroxysmal Sweats -- -- -- -- 0  -KL   Visual Disturbances -- -- -- -- 0  -KL   Anxiety -- -- -- -- 0  -KL   Headache, Fullness in Head -- -- -- -- 0  -KL   Agitation -- -- -- -- 0  -KL   Orientation and Clouding of Sensorium -- -- -- -- 0  -KL   [facility] Total -- -- -- -- 0  -KL   Row Name 06/04/24 1400 06/04/24 1302 06/04/24 1200 06/04/24 1000 06/04/24 0915   [facility]-Ar    /98  -MICHELLE 161/84  -MICHELLE 175/84 Abnormal   -MICHELLE 145/71  -MICHELLE 162/91  -MICHELLE   Pulse 91  -MICHELLE 92  -MICHELLE 95  -MICHELLE 86  -MICHELLE 77  -MICHELLE   Row Name 06/04/24 0830 06/04/24 0800 06/04/24 0745 06/04/24 0737 06/04/24 0600   CIWA-Ar   /86  -MICHELLE 165/97  -MICHELLE 146/82  -MICHELLE 146/82  -MICHELLE 164/85  -MICHELLE   Pulse 79  -MICHELLE 82  -MICHELLE 89  -MICHELLE 90  -MICHELLE 77  -MICHELLE   Nausea and Vomiting -- 0  -MICHELLE -- -- --   Tactile Disturbances -- 0  -MICHELLE -- -- --   Tremor -- 0  -MICHELLE -- -- --   Auditory Disturbances -- 0  -MICHELLE -- -- --   Paroxysmal Sweats -- 0  -MICHELLE -- -- --   Visual Disturbances -- 0  -MICHELLE -- -- --   Anxiety -- 0  -MICHELLE -- -- --   Headache, Fullness in Head -- 0  -MICHELLE -- -- --   Agitation -- 0  -MICHELLE -- -- --   Orientation and Clouding of Sensorium -- 0  -MICHELLE -- -- --   CIWA-Ar Total -- 0  -MICHELLE -- -- --         Pertinent Labs/Diagnostic Test Results:    6/4 ECG- Undetermined rhythm  Non-specific intra-ventricular conduction block  Nonspecific T wave abnormality  VAS ARTERIAL DUPLEX- LOWER LIMB BILATERAL   Final Result by Violetta Hurtado MD (06/04 2125)    RIGHT LOWER LIMB:  Diffuse disease noted throughout the femoral-popliteal and tibioperoneal  arteries without significant focal stenosis.  Ankle/Brachial index: non-compressible due to calcified tibial vessels  PVR/ PPG tracings are dampened.  Metatarsal pressure of 138 mmHg  Great toe pressure of 81 mmHg, within the diabetic healing range     LEFT LOWER LIMB:  Diffuse disease noted throughout the femoral-popliteal and tibioperoneal  arteries without significant focal stenosis.  Ankle/Brachial index: non-compressible due to calcified tibial vessels  PVR/ PPG tracings are dampened.  Metatarsal pressure of 231 mmHg  Great toe pressure of 90 mmHg, within the diabetic healing range          XR foot 3+ views RIGHT   ED Interpretation by Katharine Dean DO (06/04 0712)   No obvious osseous abnormalities, no obvious air in the soft tissues as per my independent interpretation.      Final Result by Jose Cruz Lynch,  DO (06/04 1420)      Cortical irregularities involving the bases of the fourth and fifth proximal phalanges suspicious for nondisplaced fractures.      The study was marked in EPIC for immediate notification.      Resident: HARRY CORDERO I, the attending radiologist, have reviewed the images and agree with the final report above.      Workstation performed: [id number]         CT head without contrast   Final Result by Yogesh Tate MD (06/04 0700)      No acute intracranial abnormality.                  Workstation performed: [id number]         CTA lower extremity right w wo contrast   Final Result by Aquilino Lopes MD (06/04 1034)      1.  No evidence of embolic disease to the right lower extremity, as queried.   2.  Calcific, diminutive in caliber right lower extremity tibial vessels.            Workstation performed: [id number]         CTA chest (pe study) abdomen pelvis contrast   Final Result by Yogesh Tate MD (06/04 0717)      1.  No acute pulmonary embolism or thoracic or abdominopelvic pathology.   2.  Nonacute fractures of the right 9th and 10th ribs with partial healing are new since September 1, 2023.                     Workstation performed: [id number]         XR chest 1 view portable   ED Interpretation by Katharine Dean DO (06/04 0712)   No obvious acute cardiopulmonary findings as per my independent interpretation.      Final Result by Marianna Malone MD (06/04 1229)      No acute cardiopulmonary disease.            Workstation performed: [id number]         XR abdomen 1 view kub    (Results Pending)             Results from last 7 days   Lab Units 06/04/24  0451   SARS-COV-2  Negative     Results from last 7 days   Lab Units 06/05/24  0439 06/04/24  0451   WBC Thousand/uL 3.00* 8.41   HEMOGLOBIN g/dL 11.3* 16.1   HEMATOCRIT % 33.9* 46.4   PLATELETS Thousands/uL 85* 193   TOTAL NEUT ABS Thousands/µL 1.42* 6.19         Results from last 7 days   Lab Units 06/05/24  0439  06/04/24  0902 06/04/24  0451   SODIUM mmol/L 136 131* 132*   POTASSIUM mmol/L 4.2 4.9 6.2*   CHLORIDE mmol/L 104 98 97   CO2 mmol/L 22 24 19*   ANION GAP mmol/L 10 9 16*   BUN mg/dL 9 12 12   CREATININE mg/dL 1.08 1.15 1.23   EGFR ml/min/1.73sq m 73 67 62   CALCIUM mg/dL 8.1* 8.7 10.2     Results from last 7 days   Lab Units 06/04/24  0451   AST U/L 59*   ALT U/L 44   ALK PHOS U/L 85   TOTAL PROTEIN g/dL 8.2   ALBUMIN g/dL 4.6   TOTAL BILIRUBIN mg/dL 1.24*     Results from last 7 days   Lab Units 06/05/24  0712 06/04/24  2041 06/04/24  1658 06/04/24  0443   POC GLUCOSE mg/dl 116 165* 133 187*     Results from last 7 days   Lab Units 06/05/24  0439 06/04/24  0902 06/04/24  0451   GLUCOSE RANDOM mg/dL 100 89 201*             Beta- Hydroxybutyrate   Date Value Ref Range Status   06/04/2024 <0.05 0.02 - 0.27 mmol/L Final          Results from last 7 days   Lab Units 06/04/24  0901   PH MICHELLE  7.453*   PCO2 MICHELLE mm Hg 30.7*   PO2 MICHELLE mm Hg 33.1*   HCO3 MICHELLE mmol/L 21.0*   BASE EXC MICHELLE mmol/L -1.9   O2 CONTENT MICHELLE ml/dL 11.8   O2 HGB, VENOUS % 63.7             Results from last 7 days   Lab Units 06/04/24  0902 06/04/24  0747 06/04/24  0451   HS TNI 0HR ng/L  --   --  6   HS TNI 2HR ng/L  --  4  --    HSTNI D2 ng/L  --  -2  --    HS TNI 4HR ng/L 4  --   --    HSTNI D4 ng/L -2  --   --          Results from last 7 days   Lab Units 06/04/24  0451   PROTIME seconds 12.5   INR  0.88   PTT seconds 25     Results from last 7 days   Lab Units 06/04/24  0451   TSH 3RD GENERATON uIU/mL 4.369         Results from last 7 days   Lab Units 06/05/24  0439 06/04/24  1302 06/04/24  1022 06/04/24  0747 06/04/24  0451   LACTIC ACID mmol/L 1.4 2.9* 4.4* 5.5* 3.9*             Results from last 7 days   Lab Units 06/04/24  0451   BNP pg/mL 36                     Results from last 7 days   Lab Units 06/05/24  0439   LIPASE u/L 18                 Results from last 7 days   Lab Units 06/04/24  0736   CLARITY UA  Clear   COLOR UA  Light Yellow    SPEC GRAV UA  1.028   PH UA  7.0   GLUCOSE UA mg/dl 500 (1/2%)*   KETONES UA mg/dl Trace*   BLOOD UA  Negative   PROTEIN UA mg/dl Trace*   NITRITE UA  Negative   BILIRUBIN UA  Negative   UROBILINOGEN UA (BE) mg/dl <2.0   LEUKOCYTES UA  Negative   WBC UA /hpf 1-2   RBC UA /hpf None Seen   BACTERIA UA /hpf Occasional   EPITHELIAL CELLS WET PREP /hpf None Seen     Results from last 7 days   Lab Units 06/04/24  0451   INFLUENZA A PCR  Negative   INFLUENZA B PCR  Negative   RSV PCR  Negative             Results from last 7 days   Lab Units 06/04/24  0901   ETHANOL LVL mg/dL <10                 Results from last 7 days   Lab Units 06/04/24  0451   BLOOD CULTURE  No Growth at 24 hrs.  No Growth at 24 hrs.                   ED Treatment:   Medication Administration from 06/04/2024 0412 to 06/04/2024 2025         Date/Time Order Dose Route Action     06/04/2024 0601 EDT lactated ringers bolus 1,000 mL 0 mL Intravenous Stopped     06/04/2024 0501 EDT lactated ringers bolus 1,000 mL 1,000 mL Intravenous New Bag     06/04/2024 0632 EDT calcium gluconate 1 g in sodium chloride 0.9% 50 mL (premix) 0 g Intravenous Stopped     06/04/2024 0602 EDT calcium gluconate 1 g in sodium chloride 0.9% 50 mL (premix) 1 g Intravenous New Bag     06/04/2024 0602 EDT insulin regular (HumuLIN R,NovoLIN R) injection 5 Units 5 Units Subcutaneous Given     06/04/2024 0602 EDT dextrose 50 % IV solution 25 g 25 g Intravenous Given     06/04/2024 0602 EDT sodium bicarbonate 8.4 % injection 50 mEq 50 mEq Intravenous Given     06/04/2024 0638 EDT iohexol (OMNIPAQUE) 350 MG/ML injection (MULTI-DOSE) 175 mL 175 mL Intravenous Given     06/04/2024 1000 EDT multi-electrolyte (ISOLYTE-S PH 7.4) bolus 1,000 mL 0 mL Intravenous Stopped     06/04/2024 0900 EDT multi-electrolyte (ISOLYTE-S PH 7.4) bolus 1,000 mL 1,000 mL Intravenous New Bag     06/04/2024 0901 EDT thiamine tablet 100 mg 100 mg Oral Given     06/04/2024 0901 EDT folic acid (FOLVITE) tablet 1 mg  1 mg Oral Given     06/04/2024 0904 EDT multivitamin-minerals (CENTRUM) tablet 1 tablet 1 tablet Oral Given     06/04/2024 1145 EDT sodium chloride 0.9 % infusion 125 mL/hr Intravenous New Bag     06/04/2024 1319 EDT nicotine (NICODERM CQ) 14 mg/24hr TD 24 hr patch 14 mg 14 mg Transdermal Medication Applied     06/04/2024 1707 EDT insulin lispro (HumALOG/ADMELOG) 100 units/mL subcutaneous injection 2-12 Units -- Subcutaneous Not Given          Past Medical History:   Diagnosis Date    Coronary artery disease     cath Oct 2013: 95% prox LAD, 40% mid LAD, EF 60%, MANUEL to prox LAD (Xience Rx 3.5x18mm)    Diabetes mellitus ([facility]) 2010    not sure on date    Difficulty walking     Hypertension 2013    medication    Memory loss     Neuropathy in diabetes ([facility])     Peripheral neuropathy     Sleep apnea      Present on Admission:   Peripheral neuropathy      Admitting Diagnosis: Hyperkalemia [E87.5]  Lactic acidosis [E87.20]  Hyponatremia [E87.1]  Dyspnea [R06.00]  SOB (shortness of breath) [R06.02]  Abdominal pain [R10.9]  Discoloration of skin of toe [L81.9]  Metabolic acidosis, increased anion gap [E87.29]  Elevated AST (SGOT) [R74.01]  Fall, initial encounter [W19.XXXA]  Fracture, foot, right, sequela [S92.901S]  Alcohol use disorder [F10.90]  Age/Sex: 63 y.o. male  Admission Orders:  Scheduled Medications:  folic acid, 1 mg, Oral, Daily  insulin lispro, 2-12 Units, Subcutaneous, 4x Daily (AC & HS)  multivitamin-minerals, 1 tablet, Oral, Daily  nicotine, 14 mg, Transdermal, Once  pantoprazole, 40 mg, Oral, Early Morning  thiamine, 100 mg, Oral, Daily      Continuous IV Infusions:   sodium chloride 0.9 % infusion  Rate: 125 mL/hr Dose: 125 mL/hr  Freq: Continuous Route: IV  Last Dose: 125 mL/hr (06/04/24 1145)  Start: 06/04/24 1115 End: 06/04/24 2144  sodium chloride 0.9 % infusion  Rate: 100 mL/hr Dose: 100 mL/hr  Freq: Continuous Route: IV  Last Dose: Stopped (06/05/24 0910)  Start: 06/04/24 2200 End: 06/05/24  0851        PRN Meds:       Reg diet   Continuous pulse ox   CIWA   Telemetry   Elevated RLE   WBAT RLE in Surgical shoe R LE   Ortho BP  x1           IP CONSULT TO GASTROENTEROLOGY  IP CONSULT TO PODIATRY    Network Utilization Review Department  ATTENTION: Please call with any questions or concerns to [phone] and carefully listen to the prompts so that you are directed to the right person. All voicemails are confidential.   For Discharge needs, contact Care Management DC Support Team at [phone] opt. 2  Send all requests for admission clinical reviews, approved or denied determinations and any other requests to dedicated fax number below belonging to the campus where the patient is receiving treatment. List of dedicated fax numbers for the Facilities:  FACILITY NAME UR FAX NUMBER   ADMISSION DENIALS (Administrative/Medical Necessity) [phone]   DISCHARGE SUPPORT TEAM (NETWORK) [phone]   PARENT CHILD HEALTH (Maternity/NICU/Pediatrics) [phone]   [facility] [phone]   [facility] [phone]   [facility] [phone]   [facility] [phone]   [facility] [phone]   [facility] [phone]   [facility] [phone]   [facility] [phone]   [facility] [phone]   [facility] [phone]   [facility] [phone]   [facility] [phone]

## 2024-06-05 NOTE — ASSESSMENT & PLAN NOTE
On admission noted to be 16 with a bicarbonate of 19  Lactic acid on admission 3.9  Likely secondary to lactic acidosis  Improving with IV fluids most recent 2.9  Acidosis most likely secondary to dehydration and/or starvation    Lactic acid trended down with fluids  Patient no longer acidotic after receiving fluids

## 2024-06-06 ENCOUNTER — TELEPHONE (OUTPATIENT)
Age: 64
End: 2024-06-06

## 2024-06-06 NOTE — TELEPHONE ENCOUNTER
Continue the lisinopril at the dose recommended by the hospital and continue following the blood pressures to make sure that he does not have acute drops.

## 2024-06-06 NOTE — TELEPHONE ENCOUNTER
Spoke with patient and wife regarding medication changes.    Patient had a fall last week, stood up too fast, got dizzy and fell; BP at this time was 70/40's.    Tuesday AM patient woke up, noted a couple toes were dark/purple, went to the ED, admitted with high lactic acid and high potassium. Blood pressure in the hospital was high 150-160/'s. Discharge instructions were to increase the Lisinopril 20mg from daily to 2x/day but to check with cardiology too.    Hospital feels patient was dehydrated and also doesn't eat correctly during the day. Working on making lifestyle changes.    Right foot xray revealed bases of 4th & 5th toes fractured, patient has neuropathy so did not feel it.    Please advise regarding the Lisinopril change.    Patient has an appointment the end of June.

## 2024-06-06 NOTE — UTILIZATION REVIEW
NOTIFICATION OF ADMISSION DISCHARGE   This is a Notification of Discharge from [facility]. Please be advised that this patient has been discharge from our facility. Below you will find the admission and discharge date and time including the patient’s disposition.   UTILIZATION REVIEW CONTACT:  Phylicia Jenkins  Utilization   Network Utilization Review Department  Phone: [phone] carefully listen to the prompts. All voicemails are confidential.  Email: [email or web address]     ADMISSION INFORMATION  PRESENTATION DATE: 6/4/2024  4:29 AM  OBERVATION ADMISSION DATE:   INPATIENT ADMISSION DATE: 6/4/24 11:07 AM   DISCHARGE DATE: 6/5/2024  6:34 PM   DISPOSITION:Home/Self Care    Network Utilization Review Department  ATTENTION: Please call with any questions or concerns to [phone] and carefully listen to the prompts so that you are directed to the right person. All voicemails are confidential.   For Discharge needs, contact Care Management DC Support Team at [phone] opt. 2  Send all requests for admission clinical reviews, approved or denied determinations and any other requests to dedicated fax number below belonging to the campus where the patient is receiving treatment. List of dedicated fax numbers for the Facilities:  FACILITY NAME UR FAX NUMBER   ADMISSION DENIALS (Administrative/Medical Necessity) [phone]   DISCHARGE SUPPORT TEAM ([facility]) [phone]   PARENT CHILD HEALTH (Maternity/NICU/Pediatrics) [phone]   [facility] [phone]   [facility] [phone]   [facility] [phone]   [facility] [phone]   [facility] [phone]   [facility] [phone]   [facility] [phone]   [facility] [phone]   [facility]  [facility] [phone]   [facility] [phone]   [facility] [phone]   [facility] [phone]

## 2024-06-09 LAB
BACTERIA BLD CULT: NORMAL
BACTERIA BLD CULT: NORMAL

## 2024-06-10 LAB — VIT B6 SERPL-MCNC: 5.3 UG/L (ref 3.4–65.2)

## 2024-06-27 ENCOUNTER — OFFICE VISIT (OUTPATIENT)
Dept: CARDIOLOGY CLINIC | Facility: CLINIC | Age: 64
End: 2024-06-27
Payer: COMMERCIAL

## 2024-06-27 ENCOUNTER — DOCUMENTATION (OUTPATIENT)
Dept: CARDIOLOGY CLINIC | Facility: CLINIC | Age: 64
End: 2024-06-27

## 2024-06-27 VITALS
DIASTOLIC BLOOD PRESSURE: 84 MMHG | RESPIRATION RATE: 18 BRPM | WEIGHT: 189 LBS | BODY MASS INDEX: 25.6 KG/M2 | HEIGHT: 72 IN | SYSTOLIC BLOOD PRESSURE: 122 MMHG | HEART RATE: 117 BPM | OXYGEN SATURATION: 98 %

## 2024-06-27 DIAGNOSIS — E78.2 MIXED HYPERLIPIDEMIA: ICD-10-CM

## 2024-06-27 DIAGNOSIS — I25.10 CORONARY ARTERY DISEASE INVOLVING NATIVE CORONARY ARTERY OF NATIVE HEART WITHOUT ANGINA PECTORIS: Primary | ICD-10-CM

## 2024-06-27 DIAGNOSIS — I10 BENIGN ESSENTIAL HYPERTENSION: ICD-10-CM

## 2024-06-27 DIAGNOSIS — I25.10 ATHEROSCLEROSIS OF NATIVE CORONARY ARTERY OF NATIVE HEART WITHOUT ANGINA PECTORIS: ICD-10-CM

## 2024-06-27 PROCEDURE — 93000 ELECTROCARDIOGRAM COMPLETE: CPT | Performed by: INTERNAL MEDICINE

## 2024-06-27 PROCEDURE — 99244 OFF/OP CNSLTJ NEW/EST MOD 40: CPT | Performed by: INTERNAL MEDICINE

## 2024-06-27 RX ORDER — PRASUGREL 10 MG/1
10 TABLET, FILM COATED ORAL DAILY
Qty: 90 TABLET | Refills: 3 | Status: SHIPPED | OUTPATIENT
Start: 2024-06-27

## 2024-06-27 NOTE — PROGRESS NOTES
Cardiology Consultation     Jason Magalys  [id number]  1960  [facility]  [address] [facility]  [address]  [facility] [id number]    1. Coronary artery disease involving native coronary artery of native heart without angina pectoris  POCT ECG    prasugrel (EFFIENT) tablet      2. Atherosclerosis of native coronary artery of native heart without angina pectoris        3. Benign essential hypertension        4. Mixed hyperlipidemia          Chief Complaint   Patient presents with    Shortness of Breath     HPI: Patient is here for reestablishment of cardiac care.  Patient feels well, without complaints.  No reported chest pain, shortness of breath, palpitations, lightheadedness, syncope, LE edema, orthopnea, PND, or significant weight changes.  Patient remains active without any increased fatigue out of the ordinary.      Patient Active Problem List   Diagnosis    Benign essential hypertension    Type 2 or unspecified type diabetes mellitus    Hyperlipidemia    Obstructive sleep apnea    Tremor, essential    Peripheral neuropathy    Alcohol use    Ambulatory dysfunction    Chronic back pain    Type 2 diabetes mellitus with diabetic polyneuropathy, without long-term current use of insulin (HCC)    Atherosclerosis of native coronary artery of native heart without angina pectoris    Essential tremor    Alcohol dependence (HCC)    Facial contusion    Rib pain on left side    Vitamin B12 deficiency    Mixed axonal-demyelinating polyneuropathy    Peripheral vascular disease (HCC)    Ulcer of right foot, limited to breakdown of skin (HCC)    High anion gap metabolic acidosis    Abdominal pain    Right foot pain     Past Medical History:   Diagnosis Date    Coronary artery disease     cath Oct 2013: 95% prox LAD, 40% mid LAD, EF 60%, MANUEL to prox LAD (Xience Rx 3.5x18mm)    Diabetes mellitus (HCC) 2010    not sure on date     Difficulty walking     Hypertension 2013    medication    Memory loss     Neuropathy in diabetes (HCC)     Peripheral neuropathy     Sleep apnea      Social History     Socioeconomic History    Marital status: /Civil Union     Spouse name: Not on file    Number of children: Not on file    Years of education: Not on file    Highest education level: Not on file   Occupational History    Not on file   Tobacco Use    Smoking status: Former     Current packs/day: 1.00     Types: Cigarettes    Smokeless tobacco: Current     Types: Chew   Vaping Use    Vaping status: Never Used   Substance and Sexual Activity    Alcohol use: Yes     Alcohol/week: 21.0 standard drinks of alcohol     Types: 21 Glasses of wine per week     Comment: At least 3 per day    Drug use: No    Sexual activity: Not on file   Other Topics Concern    Not on file   Social History Narrative    Not on file     Social Determinants of Health     Financial Resource Strain: Not on file   Food Insecurity: No Food Insecurity (6/5/2024)    Hunger Vital Sign     Worried About Running Out of Food in the Last Year: Never true     Ran Out of Food in the Last Year: Never true   Transportation Needs: No Transportation Needs (6/5/2024)    PRAPARE - Transportation     Lack of Transportation (Medical): No     Lack of Transportation (Non-Medical): No   Physical Activity: Not on file   Stress: Not on file   Social Connections: Not on file   Intimate Partner Violence: Not on file   Housing Stability: Unknown (6/5/2024)    Housing Stability Vital Sign     Unable to Pay for Housing in the Last Year: No     Number of Times Moved in the Last Year: Not on file     Homeless in the Last Year: No      Family History   Problem Relation Age of Onset    No Known Problems Mother     Heart attack Father     Arrhythmia Neg Hx     Clotting disorder Neg Hx     Fainting Neg Hx     Asthma Neg Hx     Anuerysm Neg Hx     Hypertension Neg Hx     Heart disease Neg Hx      History reviewed.  No pertinent surgical history.    Current Outpatient Medications:     aspirin (ASPIRIN LOW DOSE) 81 MG tablet, Take 1 tablet by mouth daily, Disp: , Rfl:     cyanocobalamin 1,000 mcg/mL, Inject 1 mL (1,000 mcg total) into a muscle every 7 days, Disp: 12 mL, Rfl: 0    fexofenadine (ALLEGRA) 60 MG tablet, Take 60 mg by mouth daily, Disp: , Rfl:     folic acid (FOLVITE) 1 mg tablet, Take 1 tablet (1 mg total) by mouth daily, Disp: 90 tablet, Rfl: 0    lisinopril (ZESTRIL) 20 mg tablet, One tab BID (Patient taking differently: Take 20 mg by mouth daily One tab BID), Disp: 60 tablet, Rfl: 3    metFORMIN (GLUCOPHAGE) 1000 MG tablet, Take 1,000 mg by mouth daily with breakfast, Disp: , Rfl:     metoprolol succinate (TOPROL-XL) 50 mg 24 hr tablet, TAKE 1 TABLET DAILY, Disp: 90 tablet, Rfl: 3    pantoprazole (PROTONIX) 40 mg tablet, Take 1 tablet (40 mg total) by mouth daily in the early morning, Disp: 30 tablet, Rfl: 0    prasugrel (EFFIENT) tablet, Take 1 tablet (10 mg total) by mouth daily, Disp: 90 tablet, Rfl: 3    thiamine 100 MG tablet, Take 1 tablet (100 mg total) by mouth daily Do not start before September 5, 2023., Disp: 90 tablet, Rfl: 0  No Known Allergies  Vitals:    06/27/24 0925   BP: 122/84   BP Location: Left arm   Patient Position: Sitting   Pulse: (!) 117   Resp: 18   SpO2: 98%   Weight: 85.7 kg (189 lb)   Height: 6' (1.829 m)       Labs:  Admission on 06/04/2024, Discharged on 06/05/2024   Component Date Value    Ventricular Rate 06/04/2024 241     Atrial Rate 06/04/2024 241     QRSD Interval 06/04/2024 136     QT Interval 06/04/2024 132     QTC Interval 06/04/2024 264     P Axis 06/04/2024 -85     QRS [place] 06/04/2024 43     T Wave [place] 06/04/2024 0     POC Glucose 06/04/2024 187 (H)     WBC 06/04/2024 8.41     RBC 06/04/2024 4.75     Hemoglobin 06/04/2024 16.1     Hematocrit 06/04/2024 46.4     MCV 06/04/2024 98     MCH 06/04/2024 33.9     MCHC 06/04/2024 34.7     RDW 06/04/2024 12.6     MPV 06/04/2024  9.1     Platelets 06/04/2024 193     nRBC 06/04/2024 0     Segmented % 06/04/2024 74     Immature Grans % 06/04/2024 0     Lymphocytes % 06/04/2024 16     Monocytes % 06/04/2024 9     Eosinophils Relative 06/04/2024 0     Basophils Relative 06/04/2024 1     Absolute Neutrophils 06/04/2024 6.19     Absolute Immature Grans 06/04/2024 0.03     Absolute Lymphocytes 06/04/2024 1.34     Absolute Monocytes 06/04/2024 0.78     Eosinophils Absolute 06/04/2024 0.03     Basophils Absolute 06/04/2024 0.04     Protime 06/04/2024 12.5     INR 06/04/2024 0.88     PTT 06/04/2024 25     SARS-CoV-2 06/04/2024 Negative     INFLUENZA A PCR 06/04/2024 Negative     INFLUENZA B PCR 06/04/2024 Negative     RSV PCR 06/04/2024 Negative     Blood Culture 06/04/2024 No Growth After 5 Days.     Blood Culture 06/04/2024 No Growth After 5 Days.     Color, UA 06/04/2024 Light Yellow     Clarity, UA 06/04/2024 Clear     Specific Gravity, UA 06/04/2024 1.028     pH, UA 06/04/2024 7.0     Leukocytes, UA 06/04/2024 Negative     Nitrite, UA 06/04/2024 Negative     Protein, UA 06/04/2024 Trace (A)     Glucose, UA 06/04/2024 500 (1/2%) (A)     Ketones, UA 06/04/2024 Trace (A)     Urobilinogen, UA 06/04/2024 <2.0     Bilirubin, UA 06/04/2024 Negative     Occult Blood, UA 06/04/2024 Negative     Sodium 06/04/2024 132 (L)     Potassium 06/04/2024 6.2 (H)     Chloride 06/04/2024 97     CO2 06/04/2024 19 (L)     ANION GAP 06/04/2024 16 (H)     BUN 06/04/2024 12     Creatinine 06/04/2024 1.23     Glucose 06/04/2024 201 (H)     Calcium 06/04/2024 10.2     AST 06/04/2024 59 (H)     ALT 06/04/2024 44     Alkaline Phosphatase 06/04/2024 85     Total Protein 06/04/2024 8.2     Albumin 06/04/2024 4.6     Total Bilirubin 06/04/2024 1.24 (H)     eGFR 06/04/2024 62     TSH 3RD GENERATON 06/04/2024 4.369     LACTIC ACID 06/04/2024 3.9 (H)     hs TnI 0hr 06/04/2024 6     BNP 06/04/2024 36     LACTIC ACID 06/04/2024 5.5 (HH)     hs TnI 2hr 06/04/2024 4     Delta 2hr hsTnI  06/04/2024 -2     hs TnI 4hr 06/04/2024 4     Delta 4hr hsTnI 06/04/2024 -2     RBC, UA 06/04/2024 None Seen     WBC, UA 06/04/2024 1-2     Epithelial Cells 06/04/2024 None Seen     Bacteria, UA 06/04/2024 Occasional     Ethanol Lvl 06/04/2024 <10     pH, Nasir 06/04/2024 7.453 (H)     pCO2, Nasir 06/04/2024 30.7 (L)     pO2, Nasir 06/04/2024 33.1 (L)     HCO3, Nasir 06/04/2024 21.0 (L)     Base Excess, Nasir 06/04/2024 -1.9     O2 Content, Nasir 06/04/2024 11.8     O2 HGB, VENOUS 06/04/2024 63.7     Beta- Hydroxybutyrate 06/04/2024 <0.05     Sodium 06/04/2024 131 (L)     Potassium 06/04/2024 4.9     Chloride 06/04/2024 98     CO2 06/04/2024 24     ANION GAP 06/04/2024 9     BUN 06/04/2024 12     Creatinine 06/04/2024 1.15     Glucose 06/04/2024 89     Calcium 06/04/2024 8.7     eGFR 06/04/2024 67     LACTIC ACID 06/04/2024 4.4 (HH)     LACTIC ACID 06/04/2024 2.9 (H)     Vitamin B-12 06/04/2024 3,326 (H)     Vit D, 25-Hydroxy 06/04/2024 <7.0 (L)     POC Glucose 06/04/2024 133     POC Glucose 06/04/2024 165 (H)     WBC 06/05/2024 3.00 (L)     RBC 06/05/2024 3.36 (L)     Hemoglobin 06/05/2024 11.3 (L)     Hematocrit 06/05/2024 33.9 (L)     MCV 06/05/2024 101 (H)     MCH 06/05/2024 33.9     MCHC 06/05/2024 33.6     RDW 06/05/2024 12.6     MPV 06/05/2024 9.3     Platelets 06/05/2024 85 (L)     nRBC 06/05/2024 0     Segmented % 06/05/2024 46     Immature Grans % 06/05/2024 1     Lymphocytes % 06/05/2024 35     Monocytes % 06/05/2024 16 (H)     Eosinophils Relative 06/05/2024 1     Basophils Relative 06/05/2024 1     Absolute Neutrophils 06/05/2024 1.42 (L)     Absolute Immature Grans 06/05/2024 0.02     Absolute Lymphocytes 06/05/2024 1.05     Absolute Monocytes 06/05/2024 0.47     Eosinophils Absolute 06/05/2024 0.02     Basophils Absolute 06/05/2024 0.02     Sodium 06/05/2024 136     Potassium 06/05/2024 4.2     Chloride 06/05/2024 104     CO2 06/05/2024 22     ANION GAP 06/05/2024 10     BUN 06/05/2024 9     Creatinine 06/05/2024  1.08     Glucose 06/05/2024 100     Calcium 06/05/2024 8.1 (L)     eGFR 06/05/2024 73     LACTIC ACID 06/05/2024 1.4     RBC Morphology 06/05/2024 Normal     Platelet Estimate 06/05/2024 Decreased (A)     Lipase 06/05/2024 18     Vitamin B-12 06/05/2024 1,621 (H)     Vitamin B6 06/05/2024 5.3     POC Glucose 06/05/2024 116     Iron Saturation 06/05/2024 24     TIBC 06/05/2024 250     Iron 06/05/2024 59     UIBC 06/05/2024 191     Ferritin 06/05/2024 364 (H)     POC Glucose 06/05/2024 127      Lab Results   Component Value Date    TRIG 97 09/01/2023    TRIG 84 09/15/2018    HDL 84 09/01/2023    HDL 71 09/15/2018     Imaging: XR abdomen 1 view kub    Result Date: 6/6/2024  Narrative: XR ABDOMEN 1 VIEW KUB INDICATION: Assess stool burden.   Bloating. COMPARISON: CT chest abdomen/pelvis 6/4/2024. FINDINGS: Nonobstructive bowel gas pattern. Moderate stool burden. No evidence of pneumoperitoneum on this supine study. Upright or left lateral decubitus imaging is more sensitive to detect subtle free air in the appropriate setting. No pathologic calcification or soft tissue mass. Clear lung bases. Bones are unremarkable for the patient's age.     Impression: Nonobstructive bowel gas pattern. Workstation performed: [id number]     VAS ARTERIAL DUPLEX- LOWER LIMB BILATERAL    Result Date: 6/4/2024  Narrative:  [facility] REPORT CLINICAL: Indications: Patient presents with new bluish discoloration of the right toes and numbness of the feet. Risk Factors The patient has history of HTN, Diabetes, Hyperlipidemia, Smoking and CAD. Clinical Right Pressure:  131/ mm Hg, Left Pressure:  128/ mm Hg.  FINDINGS:  Segment                Right            Left                                          PSV (cm/s)  EDV  PSV (cm/s)  EDV  Common Femoral Artery          83   12          80    1  Prox Profunda                  61    7          54    0  Prox SFA                       87   10          93    0  Mid SFA                         66    8          67    2  Dist SFA                       66   11          55    0  Proximal Pop                   48    0          51    0  Distal Pop                     88   13          83    0  Tibioperoneal                  63   12          64    0  Dist Post Tibial              120   34          60    0  Prox. Ant. Tibial              62    0          57    0  Dist. Ant. Tibial              89   15          58    0  Dist Peroneal                  78   16          41    0     CONCLUSION:  Impression:  RIGHT LOWER LIMB: Diffuse disease noted throughout the femoral-popliteal and tibioperoneal arteries without significant focal stenosis. Ankle/Brachial index: non-compressible due to calcified tibial vessels PVR/ PPG tracings are dampened. Metatarsal pressure of 138 mmHg Great toe pressure of 81 mmHg, within the diabetic healing range  LEFT LOWER LIMB: Diffuse disease noted throughout the femoral-popliteal and tibioperoneal arteries without significant focal stenosis. Ankle/Brachial index: non-compressible due to calcified tibial vessels PVR/ PPG tracings are dampened. Metatarsal pressure of 231 mmHg Great toe pressure of 90 mmHg, within the diabetic healing range  No prior study for comparison.  SIGNATURE: Electronically Signed by: MERRY MEEKS MD on 2024-06-04 09:25:45 PM    XR foot 3+ views RIGHT    Result Date: 6/4/2024  Narrative: XR FOOT 3+ VW RIGHT INDICATION: toes 2-4 purple, history of trauma. COMPARISON: Same day CTA lower extremity FINDINGS: Irregularities are seen involving the bases of the fourth and fifth proximal phalanges, suspicious for nondisplaced fractures. Calcaneal enthesophyte(s). No lytic or blastic osseous lesion. Atherosclerotic calcifications. Otherwise unremarkable soft tissues.     Impression: Cortical irregularities involving the bases of the fourth and fifth proximal phalanges suspicious for nondisplaced fractures. The study was marked in EPIC for immediate notification. Resident:  HARRY CORDERO I, the attending radiologist, have reviewed the images and agree with the final report above. Workstation performed: [id number]     XR chest 1 view portable    Result Date: 6/4/2024  Narrative: XR CHEST PORTABLE INDICATION: dyspnea. COMPARISON: Chest CT 6/4/2024, CXR 8/31/2023. FINDINGS: Clear lungs. No pneumothorax or pleural effusion. Normal cardiomediastinal silhouette. Cardiac stent. Bones are unremarkable for age. Normal upper abdomen.     Impression: No acute cardiopulmonary disease. Workstation performed: [id number]     US bedside procedure    Result Date: 6/4/2024  Narrative: [id number]    CTA lower extremity right w wo contrast    Result Date: 6/4/2024  Narrative: CT ARTERIOGRAM OF THE RIGHT LOWER EXTREMITY(IES) WITHOUT AND WITH IV CONTRAST INDICATION:  right toes 2-4 purple, cool to touch, and with concern for microemboli vs thrombus COMPARISON: CT, dated 9/1/2023. TECHNIQUE:  CT angiogram examination of the right  lower extremity(ies) was performed according to standard protocol without and with  intravenous contrast.  This examination, like all CT scans performed in the [facility], was performed utilizing techniques to minimize radiation dose exposure, including the use of iterative reconstruction and automated exposure control. Rad dose 941.05 mGy-cm . IV Contrast:  175 mL of iohexol (OMNIPAQUE) FINDINGS: VASCULAR STRUCTURES: ABDOMINAL VASCULAR STRUCTURES: AORTA: The infrarenal abdominal aorta is normal in caliber, without atherosclerotic calcifications. RIGHT LOWER EXTREMITY: RIGHT COMMON ILIAC ARTERY: Mild calcific atherosclerotic disease.  The vessel is normal in caliber. RIGHT INTERNAL ILIAC ARTERY: Mild atherosclerotic disease.  Visualized branches are normal in caliber. RIGHT EXTERNAL ILIAC ARTERY: Normal in caliber, without atherosclerotic disease. RIGHT COMMON FEMORAL ARTERY: Mild calcific atherosclerotic disease.  The vessel is  normal in caliber. RIGHT PROFUNDA FEMORIS: Normal in caliber, without atherosclerotic disease. RIGHT SUPERFICIAL FEMORAL ARTERY: Mild calcific atherosclerotic disease.  The vessel is normal in caliber. RIGHT POPLITEAL ARTERY: Mild calcific atherosclerotic disease.  The vessel is normal in caliber. RIGHT TIBIOPERONEAL TRUNK: Moderate calcific atherosclerotic disease.  The vessel is normal in caliber. RIGHT ANTERIOR TIBIAL ARTERY: Diminutive with atherosclerotic calcifications but patent. RIGHT PERONEAL ARTERY: Diminutive with moderate atherotic calcifications but patent. RIGHT POSTERIOR TIBIAL ARTERY: Severe calcific atherosclerotic disease, which limits evaluation of intraluminal contrast.  The vessel is normal in caliber. RIGHT FOOT: Calcaneal vessels opacify. Dorsalis pedis artery is diminutive but patent. VENOUS: Iliocaval system is normal in caliber and normal in opacification. EXTREMITY(IES) SOFT TISSUE STRUCTURES: Fatty atrophy of the quadriceps musculature. OSSEOUS STRUCTURES: Age-related degenerative changes. No fracture. ABDOMEN/PELVIS: Visualized colon is normal. Visualized small bowel is normal. Appendix is normal. Mesentery is normal. Bladder is normal. Prostate is enlarged. Fat-containing right inguinal hernia.     Impression: 1.  No evidence of embolic disease to the right lower extremity, as queried. 2.  Calcific, diminutive in caliber right lower extremity tibial vessels. Workstation performed: [id number]     CTA chest (pe study) abdomen pelvis contrast    Result Date: 6/4/2024  Narrative: CT PULMONARY ANGIOGRAM OF THE CHEST AND CT ABDOMEN AND PELVIS WITH INTRAVENOUS CONTRAST INDICATION: dyspnea, tachycardia. COMPARISON: CT chest abdomen pelvis September 1, 2023 TECHNIQUE: CT examination of the chest, abdomen and pelvis was performed. Thin section CT angiographic technique was used in the chest in order to evaluate for pulmonary embolus and coronal 3D MIP postprocessing was performed on the  acquisition scanner. Multiplanar 2D reformatted images were created from the source data. This examination, like all CT scans performed in the [facility], was performed utilizing techniques to minimize radiation dose exposure, including the use of iterative reconstruction and automated exposure control. Radiation dose length product (DLP) for this visit: 1178 mGy-cm IV Contrast: 175 mL of iohexol (OMNIPAQUE) Enteric Contrast: Not administered. FINDINGS: CHEST PULMONARY ARTERIAL TREE: No pulmonary embolus is seen. LUNGS: Mild emphysema. No focal consolidation. No suspicious pulmonary nodules. PLEURA: Unremarkable. HEART/AORTA: Normal heart size. Coronary artery calcifications. No thoracic aortic aneurysm. MEDIASTINUM AND CONCETTA: Unremarkable. CHEST WALL AND LOWER NECK: Unremarkable. ABDOMEN LIVER/BILIARY TREE: Unremarkable. GALLBLADDER: No calcified gallstones. No pericholecystic inflammatory change. SPLEEN: Unremarkable. PANCREAS: Unremarkable. ADRENAL GLANDS: Unremarkable. KIDNEYS/URETERS: Unremarkable. No hydronephrosis. STOMACH AND BOWEL: Unremarkable. APPENDIX: Normal appendix. ABDOMINOPELVIC CAVITY: No ascites. No pneumoperitoneum. No lymphadenopathy. VESSELS: Atherosclerosis without abdominal aortic aneurysm. PELVIS REPRODUCTIVE ORGANS: Unremarkable for patient's age. URINARY BLADDER: Unremarkable. ABDOMINAL WALL/INGUINAL REGIONS: Unremarkable. BONES: Nonacute fractures of the anterolateral right 9th and lateral right 10th ribs with callus formation are new since prior study. No acute fracture. No suspicious osseous lesion.     Impression: 1.  No acute pulmonary embolism or thoracic or abdominopelvic pathology. 2.  Nonacute fractures of the right 9th and 10th ribs with partial healing are new since September 1, 2023. Workstation performed: [id number]     CT head without contrast    Result Date: 6/4/2024  Narrative: CT BRAIN - WITHOUT CONTRAST INDICATION:   fall one week ago with head strike.  COMPARISON: CT head September 1, 2023 TECHNIQUE:  CT examination of the brain was performed.  Multiplanar 2D reformatted images were created from the source data. Radiation dose length product (DLP) for this visit:  1041 mGy-cm .  This examination, like all CT scans performed in the [facility], was performed utilizing techniques to minimize radiation dose exposure, including the use of iterative reconstruction and automated exposure control. IMAGE QUALITY:  Diagnostic. FINDINGS: PARENCHYMA: Decreased attenuation is noted in periventricular and subcortical white matter demonstrating an appearance that is statistically most likely to represent mild microangiopathic change. No CT signs of acute infarction.  No intracranial mass, mass effect or midline shift.  No acute parenchymal hemorrhage. VENTRICLES AND EXTRA-AXIAL SPACES:  Normal for the patient's age. VISUALIZED ORBITS: Postsurgical changes in the right orbit and eye with probable intraocular silicone injection. No acute findings. PARANASAL SINUSES: Polypoid mucosal thickening of the right maxillary sinus is similar to prior study. No air-fluid levels CALVARIUM AND EXTRACRANIAL SOFT TISSUES:  Normal.     Impression: No acute intracranial abnormality. Workstation performed: [id number]       Review of Systems:  Review of Systems   Constitutional:  Negative for activity change, appetite change, fatigue and fever.   HENT:  Negative for nosebleeds and sore throat.    Eyes:  Negative for photophobia and visual disturbance.   Respiratory:  Negative for cough, chest tightness, shortness of breath and wheezing.    Cardiovascular:  Negative for chest pain, palpitations and leg swelling.   Gastrointestinal:  Negative for abdominal pain, diarrhea, nausea and vomiting.   Endocrine: Negative for polyuria.   Genitourinary:  Negative for dysuria, frequency and hematuria.   Musculoskeletal:  Negative for arthralgias, back pain and gait problem.   Skin:  Negative  for pallor and rash.   Neurological:  Negative for dizziness, syncope, speech difficulty and light-headedness.   Hematological:  Does not bruise/bleed easily.   Psychiatric/Behavioral:  Negative for agitation, behavioral problems and confusion.        Physical Exam:  Physical Exam  Vitals reviewed.   Constitutional:       General: He is not in acute distress.     Appearance: Normal appearance. He is well-developed. He is not diaphoretic.   HENT:      Head: Normocephalic and atraumatic.      Nose: Nose normal.   Eyes:      General: No scleral icterus.     Extraocular Movements: Extraocular movements intact.      Pupils: Pupils are equal, round, and reactive to light.   Neck:      Vascular: No JVD.   Cardiovascular:      Rate and Rhythm: Normal rate and regular rhythm.      Heart sounds: S1 normal and S2 normal. Heart sounds not distant. No murmur heard.     No systolic murmur is present.      No friction rub. No gallop. No S3 sounds.   Pulmonary:      Effort: Pulmonary effort is normal. No respiratory distress.      Breath sounds: Normal breath sounds. No wheezing or rales.   Abdominal:      General: Bowel sounds are normal. There is no distension.      Palpations: Abdomen is soft.   Musculoskeletal:         General: No deformity.      Cervical back: Normal range of motion and neck supple.      Right lower leg: No edema.      Left lower leg: No edema.   Skin:     General: Skin is warm and dry.      Findings: No erythema.   Neurological:      Mental Status: He is alert and oriented to person, place, and time.      Cranial Nerves: No cranial nerve deficit.   Psychiatric:         Mood and Affect: Mood normal.         Behavior: Behavior normal.       Blood pressure 122/84, pulse (!) 117, resp. rate 18, height 6' (1.829 m), weight 85.7 kg (189 lb), SpO2 98%.    EKG:  Sinus tachycardia  Otherwise normal ECG    Discussion/Summary:  1) CAD: s/p MANUEL to prox LAD, doing well and asymptomatic. Continue medical management with  aspirin, Effient, beta-blocker, and ACE inhibitor.  Most recent echocardiogram was September 2023 revealing normal LV size and function with no wall motion abnormalities with no significant valvular disease.  He also had a nuclear stress test in April 2022 that revealed no perfusion defects and a normal ejection fraction.     2) HTN: BP well-controlled, if not low resulting in falls occasionally.  Continue current regimen for now and may require down-titration of lisinopril if has continued low BPs.      3) Hyperlipidemia: Values have been well-controlled with LDL of 61 (goal<70).  He is no longer on Lipitor and most recent LDL was 84 in September 2023.

## 2024-06-27 NOTE — LETTER
June 27, 2024     Yael Matute MD  [address] [facility] [address] [place], [place] [address]    Patient: Aquilino Dowell   YOB: 1960   Date of Visit: 6/27/2024       Dear Dr. Matute:    Thank you for referring Aquilino Dowell to me for evaluation. Below are my notes for this consultation.    If you have questions, please do not hesitate to call me. I look forward to following your patient along with you.         Sincerely,        Ella Grossman MD        CC: No Recipients

## 2024-06-28 ENCOUNTER — OFFICE VISIT (OUTPATIENT)
Dept: PODIATRY | Facility: CLINIC | Age: 64
End: 2024-06-28
Payer: COMMERCIAL

## 2024-06-28 VITALS
BODY MASS INDEX: 25.33 KG/M2 | HEART RATE: 111 BPM | SYSTOLIC BLOOD PRESSURE: 95 MMHG | WEIGHT: 187 LBS | DIASTOLIC BLOOD PRESSURE: 67 MMHG | RESPIRATION RATE: 18 BRPM | HEIGHT: 72 IN

## 2024-06-28 DIAGNOSIS — E11.42 TYPE 2 DIABETES MELLITUS WITH DIABETIC POLYNEUROPATHY, WITHOUT LONG-TERM CURRENT USE OF INSULIN (HCC): Primary | ICD-10-CM

## 2024-06-28 DIAGNOSIS — L81.9 DISCOLORATION OF SKIN OF TOE: ICD-10-CM

## 2024-06-28 DIAGNOSIS — S92.901S FRACTURE, FOOT, RIGHT, SEQUELA: ICD-10-CM

## 2024-06-28 PROCEDURE — 99213 OFFICE O/P EST LOW 20 MIN: CPT | Performed by: PODIATRIST

## 2024-06-28 NOTE — PROGRESS NOTES
PATIENT:  Aquilino Dowell    1960    ASSESSMENT:     1. Type 2 diabetes mellitus with diabetic polyneuropathy, without long-term current use of insulin (Formerly [facility])        2. Discoloration of skin of toe  Ambulatory Referral to Podiatry      3. Fracture, foot, right, sequela  Ambulatory Referral to Podiatry            PLAN:  1.  Reviewed medical records.  Reviewed X-ray and arterial study.  Patient was counseled on the condition and diagnosis.    2.  Educated disease prevention and risks related to diabetes.  Nails debrided.    3.  Discoloration in his foot is from bruises after the injury.  He has stable fracture and instructed supportive care.    4. The patient will return in 3 months for diabetic foot exam.      Imaging: I have personally reviewed pertinent films in PACS  Labs, pathology, and Other Studies: I have personally reviewed pertinent reports.          Subjective:      HPI  The patient presents for diabetic foot exam.  He had discoloration of right toes and went to ED on 6/4/24.  X-ray showed toe fracture.  No pain. The blood glucose is under control.  He has numbness in his feet due to neuropathy.        The following portions of the patient's history were reviewed and updated as appropriate: allergies, current medications, past family history, past medical history, past social history, past surgical history and problem list.  All pertinent labs and images were reviewed.    Past Medical History  Past Medical History:   Diagnosis Date    Coronary artery disease     cath Oct 2013: 95% prox LAD, 40% mid LAD, EF 60%, MANUEL to prox LAD (Xience Rx 3.5x18mm)    Diabetes mellitus ([facility]) 2010    not sure on date    Difficulty walking     Hypertension 2013    medication    Memory loss     Neuropathy in diabetes ([facility])     Peripheral neuropathy     Sleep apnea        Past Surgical History  History reviewed. No pertinent surgical history.     Allergies:  Patient has no known  allergies.    Medications:  Current Outpatient Medications   Medication Sig Dispense Refill    aspirin (ASPIRIN LOW DOSE) 81 MG tablet Take 1 tablet by mouth daily      cyanocobalamin 1,000 mcg/mL Inject 1 mL (1,000 mcg total) into a muscle every 7 days 12 mL 0    fexofenadine (ALLEGRA) 60 MG tablet Take 60 mg by mouth daily      folic acid (FOLVITE) 1 mg tablet Take 1 tablet (1 mg total) by mouth daily 90 tablet 0    lisinopril (ZESTRIL) 20 mg tablet One tab BID (Patient taking differently: Take 20 mg by mouth daily One tab BID) 60 tablet 3    metFORMIN (GLUCOPHAGE) 1000 MG tablet Take 1,000 mg by mouth daily with breakfast      metoprolol succinate (TOPROL-XL) 50 mg 24 hr tablet TAKE 1 TABLET DAILY 90 tablet 3    pantoprazole (PROTONIX) 40 mg tablet Take 1 tablet (40 mg total) by mouth daily in the early morning 30 tablet 0    prasugrel (EFFIENT) tablet Take 1 tablet (10 mg total) by mouth daily 90 tablet 3    thiamine 100 MG tablet Take 1 tablet (100 mg total) by mouth daily Do not start before September 5, 2023. 90 tablet 0     No current facility-administered medications for this visit.       Social History:  Social History     Socioeconomic History    Marital status: /Civil Union     Spouse name: None    Number of children: None    Years of education: None    Highest education level: None   Occupational History    None   Tobacco Use    Smoking status: Former     Current packs/day: 1.00     Types: Cigarettes    Smokeless tobacco: Current     Types: Chew   Vaping Use    Vaping status: Never Used   Substance and Sexual Activity    Alcohol use: Yes     Alcohol/week: 21.0 standard drinks of alcohol     Types: 21 Glasses of wine per week     Comment: At least 3 per day    Drug use: No    Sexual activity: None   Other Topics Concern    None   Social History Narrative    None     Social Determinants of Health     Financial Resource Strain: Not on file   Food Insecurity: No Food Insecurity (6/5/2024)    Hunger  Vital Sign     Worried About Running Out of Food in the Last Year: Never true     Ran Out of Food in the Last Year: Never true   Transportation Needs: No Transportation Needs (6/5/2024)    PRAPARE - Transportation     Lack of Transportation (Medical): No     Lack of Transportation (Non-Medical): No   Physical Activity: Not on file   Stress: Not on file   Social Connections: Not on file   Intimate Partner Violence: Not on file   Housing Stability: Unknown (6/5/2024)    Housing Stability Vital Sign     Unable to Pay for Housing in the Last Year: No     Number of Times Moved in the Last Year: Not on file     Homeless in the Last Year: No        Review of Systems   Constitutional:  Negative for chills and fever.   Respiratory:  Negative for cough and shortness of breath.    Cardiovascular:  Negative for chest pain.   Gastrointestinal:  Negative for nausea and vomiting.   Skin:  Negative for wound.   Neurological:  Positive for numbness. Negative for weakness.   Psychiatric/Behavioral:  Negative for confusion.          Objective:      BP 95/67   Pulse (!) 111   Resp 18   Ht 6' (1.829 m)   Wt 84.8 kg (187 lb)   BMI 25.36 kg/m²          Physical Exam  Vitals reviewed.   Constitutional:       General: He is not in acute distress.     Appearance: He is not toxic-appearing or diaphoretic.   Cardiovascular:      Rate and Rhythm: Normal rate and regular rhythm.      Pulses:           Dorsalis pedis pulses are 0 on the right side and 0 on the left side.        Posterior tibial pulses are 1+ on the right side and 1+ on the left side.   Pulmonary:      Effort: Pulmonary effort is normal. No respiratory distress.   Musculoskeletal:         General: No tenderness.      Right lower leg: No edema.      Left lower leg: No edema.      Right foot: No Charcot foot or foot drop.      Left foot: No Charcot foot or foot drop.   Feet:      Right foot:      Skin integrity: Dry skin present. No ulcer or skin breakdown.      Left foot:       Skin integrity: Dry skin present. No ulcer or skin breakdown.   Skin:     General: Skin is warm and dry.      Capillary Refill: Capillary refill takes less than 2 seconds.      Coloration: Skin is not cyanotic or mottled.      Findings: No abscess.      Nails: There is no clubbing.      Comments: Minimal swelling in his toes right foot.  Ecchymosis resolved.  Hypertrophy of toenails X 6 with discoloration and onycholysis.  He has class findings with decreased pedal hairs, dry skin / atrophy, and brittle nails.   Neurological:      General: No focal deficit present.      Mental Status: He is alert and oriented to person, place, and time.      Cranial Nerves: No cranial nerve deficit.      Sensory: Sensory deficit present.      Motor: No weakness.      Coordination: Coordination normal.   Psychiatric:         Mood and Affect: Mood normal.         Behavior: Behavior normal.         Thought Content: Thought content normal.         Judgment: Judgment normal.

## 2024-07-19 ENCOUNTER — APPOINTMENT (INPATIENT)
Dept: NON INVASIVE DIAGNOSTICS | Facility: HOSPITAL | Age: 64
DRG: 309 | End: 2024-07-19
Payer: COMMERCIAL

## 2024-07-19 ENCOUNTER — HOSPITAL ENCOUNTER (INPATIENT)
Facility: HOSPITAL | Age: 64
LOS: 1 days | Discharge: HOME | DRG: 309 | End: 2024-07-20
Attending: EMERGENCY MEDICINE | Admitting: INTERNAL MEDICINE
Payer: COMMERCIAL

## 2024-07-19 ENCOUNTER — APPOINTMENT (EMERGENCY)
Dept: RADIOLOGY | Facility: HOSPITAL | Age: 64
DRG: 309 | End: 2024-07-19
Payer: COMMERCIAL

## 2024-07-19 DIAGNOSIS — G47.33 OBSTRUCTIVE SLEEP APNEA: ICD-10-CM

## 2024-07-19 DIAGNOSIS — I47.10 SVT (SUPRAVENTRICULAR TACHYCARDIA): Primary | ICD-10-CM

## 2024-07-19 DIAGNOSIS — Z78.9 ALCOHOL USE: ICD-10-CM

## 2024-07-19 DIAGNOSIS — E83.42 HYPOMAGNESEMIA: ICD-10-CM

## 2024-07-19 DIAGNOSIS — E86.0 DEHYDRATION: ICD-10-CM

## 2024-07-19 DIAGNOSIS — N17.9 AKI (ACUTE KIDNEY INJURY) (HCC): ICD-10-CM

## 2024-07-19 DIAGNOSIS — I25.10 CORONARY ARTERY DISEASE INVOLVING NATIVE CORONARY ARTERY OF NATIVE HEART WITHOUT ANGINA PECTORIS: ICD-10-CM

## 2024-07-19 DIAGNOSIS — E87.20 METABOLIC ACIDOSIS: ICD-10-CM

## 2024-07-19 PROBLEM — E11.9 TYPE 2 DIABETES MELLITUS (HCC): Status: ACTIVE | Noted: 2023-09-01

## 2024-07-19 PROBLEM — R07.9 CARDIAC CHEST PAIN: Status: ACTIVE | Noted: 2024-07-19

## 2024-07-19 PROBLEM — Z72.0 TOBACCO CHEW USE: Status: ACTIVE | Noted: 2024-07-19

## 2024-07-19 PROBLEM — G25.0 ESSENTIAL TREMOR: Status: RESOLVED | Noted: 2023-09-01 | Resolved: 2024-07-19

## 2024-07-19 PROBLEM — R79.89 ELEVATED SERUM CREATININE: Status: ACTIVE | Noted: 2024-07-19

## 2024-07-19 LAB
2HR DELTA HS TROPONIN: 3 NG/L
ALBUMIN SERPL BCG-MCNC: 4.3 G/DL (ref 3.5–5)
ALP SERPL-CCNC: 93 U/L (ref 34–104)
ALT SERPL W P-5'-P-CCNC: 36 U/L (ref 7–52)
ANION GAP SERPL CALCULATED.3IONS-SCNC: 15 MMOL/L (ref 4–13)
ANION GAP SERPL CALCULATED.3IONS-SCNC: 6 MMOL/L (ref 4–13)
AORTIC ROOT: 3.4 CM
APICAL FOUR CHAMBER EJECTION FRACTION: 59 %
ASCENDING AORTA: 3 CM
AST SERPL W P-5'-P-CCNC: 62 U/L (ref 13–39)
ATRIAL RATE: 250 BPM
AV LVOT MEAN GRADIENT: 2 MMHG
AV LVOT PEAK GRADIENT: 3 MMHG
AV PEAK GRADIENT: 8 MMHG
B-OH-BUTYR SERPL-MCNC: 0.71 MMOL/L (ref 0.02–0.27)
BASE EX.OXY STD BLDV CALC-SCNC: 63.3 % (ref 60–80)
BASE EXCESS BLDV CALC-SCNC: -2.9 MMOL/L
BASOPHILS # BLD AUTO: 0.05 THOUSANDS/ÂΜL (ref 0–0.1)
BASOPHILS NFR BLD AUTO: 1 % (ref 0–1)
BILIRUB SERPL-MCNC: 0.78 MG/DL (ref 0.2–1)
BNP SERPL-MCNC: 108 PG/ML (ref 0–100)
BSA FOR ECHO PROCEDURE: 2.07 M2
BUN SERPL-MCNC: 14 MG/DL (ref 5–25)
BUN SERPL-MCNC: 14 MG/DL (ref 5–25)
CALCIUM SERPL-MCNC: 10 MG/DL (ref 8.4–10.2)
CALCIUM SERPL-MCNC: 8.6 MG/DL (ref 8.4–10.2)
CARDIAC TROPONIN I PNL SERPL HS: 10 NG/L
CARDIAC TROPONIN I PNL SERPL HS: 7 NG/L
CHLORIDE SERPL-SCNC: 100 MMOL/L (ref 96–108)
CHLORIDE SERPL-SCNC: 106 MMOL/L (ref 96–108)
CO2 SERPL-SCNC: 19 MMOL/L (ref 21–32)
CO2 SERPL-SCNC: 24 MMOL/L (ref 21–32)
CREAT SERPL-MCNC: 1.17 MG/DL (ref 0.6–1.3)
CREAT SERPL-MCNC: 1.41 MG/DL (ref 0.6–1.3)
D DIMER PPP FEU-MCNC: 0.6 UG/ML FEU
DOP CALC LVOT PEAK VEL VTI: 18.81 CM
DOP CALC LVOT PEAK VEL: 0.87 M/S
E WAVE DECELERATION TIME: 153 MS
E/A RATIO: 0.68
EOSINOPHIL # BLD AUTO: 0.11 THOUSAND/ÂΜL (ref 0–0.61)
EOSINOPHIL NFR BLD AUTO: 1 % (ref 0–6)
ERYTHROCYTE [DISTWIDTH] IN BLOOD BY AUTOMATED COUNT: 12.2 % (ref 11.6–15.1)
EST. AVERAGE GLUCOSE BLD GHB EST-MCNC: 143 MG/DL
FRACTIONAL SHORTENING: 16 (ref 28–44)
GFR SERPL CREATININE-BSD FRML MDRD: 53 ML/MIN/1.73SQ M
GFR SERPL CREATININE-BSD FRML MDRD: 66 ML/MIN/1.73SQ M
GLUCOSE SERPL-MCNC: 155 MG/DL (ref 65–140)
GLUCOSE SERPL-MCNC: 158 MG/DL (ref 65–140)
GLUCOSE SERPL-MCNC: 165 MG/DL (ref 65–140)
GLUCOSE SERPL-MCNC: 171 MG/DL (ref 65–140)
GLUCOSE SERPL-MCNC: 240 MG/DL (ref 65–140)
GLUCOSE SERPL-MCNC: 270 MG/DL (ref 65–140)
HBA1C MFR BLD: 6.6 %
HCO3 BLDV-SCNC: 20.4 MMOL/L (ref 24–30)
HCT VFR BLD AUTO: 40 % (ref 36.5–49.3)
HGB BLD-MCNC: 13.3 G/DL (ref 12–17)
IMM GRANULOCYTES # BLD AUTO: 0.04 THOUSAND/UL (ref 0–0.2)
IMM GRANULOCYTES NFR BLD AUTO: 1 % (ref 0–2)
INTERVENTRICULAR SEPTUM IN DIASTOLE (PARASTERNAL SHORT AXIS VIEW): 1.2 CM
INTERVENTRICULAR SEPTUM: 1.2 CM (ref 0.6–1.1)
LAAS-AP2: 15.7 CM2
LAAS-AP4: 14 CM2
LACTATE SERPL-SCNC: 2 MMOL/L (ref 0.5–2)
LEFT ATRIUM SIZE: 3.1 CM
LEFT ATRIUM VOLUME (MOD BIPLANE): 36 ML
LEFT ATRIUM VOLUME INDEX (MOD BIPLANE): 17.4 ML/M2
LEFT INTERNAL DIMENSION IN SYSTOLE: 2.7 CM (ref 2.1–4)
LEFT VENTRICULAR INTERNAL DIMENSION IN DIASTOLE: 3.2 CM (ref 3.5–6)
LEFT VENTRICULAR POSTERIOR WALL IN END DIASTOLE: 1.2 CM
LEFT VENTRICULAR STROKE VOLUME: 16 ML
LIPASE SERPL-CCNC: 46 U/L (ref 11–82)
LVSV (TEICH): 16 ML
LYMPHOCYTES # BLD AUTO: 3.31 THOUSANDS/ÂΜL (ref 0.6–4.47)
LYMPHOCYTES NFR BLD AUTO: 41 % (ref 14–44)
MAGNESIUM SERPL-MCNC: 1.4 MG/DL (ref 1.9–2.7)
MCH RBC QN AUTO: 33.5 PG (ref 26.8–34.3)
MCHC RBC AUTO-ENTMCNC: 33.3 G/DL (ref 31.4–37.4)
MCV RBC AUTO: 101 FL (ref 82–98)
MONOCYTES # BLD AUTO: 0.64 THOUSAND/ÂΜL (ref 0.17–1.22)
MONOCYTES NFR BLD AUTO: 8 % (ref 4–12)
MV E'TISSUE VEL-LAT: 10 CM/S
MV E'TISSUE VEL-SEP: 7 CM/S
MV PEAK A VEL: 0.78 M/S
MV PEAK E VEL: 53 CM/S
MV STENOSIS PRESSURE HALF TIME: 44 MS
MV VALVE AREA P 1/2 METHOD: 5
NEUTROPHILS # BLD AUTO: 3.92 THOUSANDS/ÂΜL (ref 1.85–7.62)
NEUTS SEG NFR BLD AUTO: 48 % (ref 43–75)
NRBC BLD AUTO-RTO: 0 /100 WBCS
O2 CT BLDV-SCNC: 10.9 ML/DL
PCO2 BLDV: 30.8 MM HG (ref 42–50)
PH BLDV: 7.44 [PH] (ref 7.3–7.4)
PLATELET # BLD AUTO: 121 THOUSANDS/UL (ref 149–390)
PLATELET # BLD AUTO: 192 THOUSANDS/UL (ref 149–390)
PMV BLD AUTO: 9.5 FL (ref 8.9–12.7)
PMV BLD AUTO: 9.6 FL (ref 8.9–12.7)
PO2 BLDV: 33.7 MM HG (ref 35–45)
POTASSIUM SERPL-SCNC: 4.7 MMOL/L (ref 3.5–5.3)
POTASSIUM SERPL-SCNC: 4.8 MMOL/L (ref 3.5–5.3)
PROT SERPL-MCNC: 7.9 G/DL (ref 6.4–8.4)
QRS AXIS: 59 DEGREES
QRSD INTERVAL: 112 MS
QT INTERVAL: 230 MS
QTC INTERVAL: 421 MS
RBC # BLD AUTO: 3.97 MILLION/UL (ref 3.88–5.62)
RIGHT ATRIUM AREA SYSTOLE A4C: 12.1 CM2
RIGHT VENTRICLE ID DIMENSION: 3.5 CM
SL CV LEFT ATRIUM LENGTH A2C: 4.9 CM
SL CV LV EF: 60
SL CV PED ECHO LEFT VENTRICLE DIASTOLIC VOLUME (MOD BIPLANE) 2D: 42 ML
SL CV PED ECHO LEFT VENTRICLE SYSTOLIC VOLUME (MOD BIPLANE) 2D: 26 ML
SODIUM SERPL-SCNC: 134 MMOL/L (ref 135–147)
SODIUM SERPL-SCNC: 136 MMOL/L (ref 135–147)
T WAVE AXIS: 81 DEGREES
TRICUSPID ANNULAR PLANE SYSTOLIC EXCURSION: 1.7 CM
TSH SERPL DL<=0.05 MIU/L-ACNC: 4.16 UIU/ML (ref 0.45–4.5)
VENTRICULAR RATE: 202 BPM
WBC # BLD AUTO: 8.07 THOUSAND/UL (ref 4.31–10.16)

## 2024-07-19 PROCEDURE — 80048 BASIC METABOLIC PNL TOTAL CA: CPT

## 2024-07-19 PROCEDURE — 84443 ASSAY THYROID STIM HORMONE: CPT

## 2024-07-19 PROCEDURE — 83880 ASSAY OF NATRIURETIC PEPTIDE: CPT

## 2024-07-19 PROCEDURE — 82805 BLOOD GASES W/O2 SATURATION: CPT | Performed by: EMERGENCY MEDICINE

## 2024-07-19 PROCEDURE — 93306 TTE W/DOPPLER COMPLETE: CPT | Performed by: INTERNAL MEDICINE

## 2024-07-19 PROCEDURE — 83735 ASSAY OF MAGNESIUM: CPT

## 2024-07-19 PROCEDURE — 96365 THER/PROPH/DIAG IV INF INIT: CPT

## 2024-07-19 PROCEDURE — 85379 FIBRIN DEGRADATION QUANT: CPT

## 2024-07-19 PROCEDURE — 71045 X-RAY EXAM CHEST 1 VIEW: CPT

## 2024-07-19 PROCEDURE — 82010 KETONE BODYS QUAN: CPT | Performed by: EMERGENCY MEDICINE

## 2024-07-19 PROCEDURE — 93306 TTE W/DOPPLER COMPLETE: CPT

## 2024-07-19 PROCEDURE — 83690 ASSAY OF LIPASE: CPT

## 2024-07-19 PROCEDURE — 83605 ASSAY OF LACTIC ACID: CPT | Performed by: EMERGENCY MEDICINE

## 2024-07-19 PROCEDURE — 82948 REAGENT STRIP/BLOOD GLUCOSE: CPT

## 2024-07-19 PROCEDURE — 80053 COMPREHEN METABOLIC PANEL: CPT

## 2024-07-19 PROCEDURE — 84484 ASSAY OF TROPONIN QUANT: CPT

## 2024-07-19 PROCEDURE — 96375 TX/PRO/DX INJ NEW DRUG ADDON: CPT

## 2024-07-19 PROCEDURE — 96366 THER/PROPH/DIAG IV INF ADDON: CPT

## 2024-07-19 PROCEDURE — 85025 COMPLETE CBC W/AUTO DIFF WBC: CPT

## 2024-07-19 PROCEDURE — 99223 1ST HOSP IP/OBS HIGH 75: CPT | Performed by: INTERNAL MEDICINE

## 2024-07-19 PROCEDURE — 99291 CRITICAL CARE FIRST HOUR: CPT | Performed by: EMERGENCY MEDICINE

## 2024-07-19 PROCEDURE — 85049 AUTOMATED PLATELET COUNT: CPT

## 2024-07-19 PROCEDURE — 83036 HEMOGLOBIN GLYCOSYLATED A1C: CPT

## 2024-07-19 PROCEDURE — 99291 CRITICAL CARE FIRST HOUR: CPT

## 2024-07-19 PROCEDURE — 36415 COLL VENOUS BLD VENIPUNCTURE: CPT

## 2024-07-19 PROCEDURE — 93005 ELECTROCARDIOGRAM TRACING: CPT

## 2024-07-19 RX ORDER — FENTANYL CITRATE 50 UG/ML
INJECTION, SOLUTION INTRAMUSCULAR; INTRAVENOUS
Status: DISCONTINUED
Start: 2024-07-19 | End: 2024-07-19

## 2024-07-19 RX ORDER — METOPROLOL SUCCINATE 50 MG/1
50 TABLET, EXTENDED RELEASE ORAL ONCE
Status: COMPLETED | OUTPATIENT
Start: 2024-07-19 | End: 2024-07-19

## 2024-07-19 RX ORDER — PRASUGREL 10 MG/1
10 TABLET, FILM COATED ORAL DAILY
Status: DISCONTINUED | OUTPATIENT
Start: 2024-07-19 | End: 2024-07-20 | Stop reason: HOSPADM

## 2024-07-19 RX ORDER — MAGNESIUM SULFATE HEPTAHYDRATE 40 MG/ML
2 INJECTION, SOLUTION INTRAVENOUS ONCE
Status: COMPLETED | OUTPATIENT
Start: 2024-07-19 | End: 2024-07-19

## 2024-07-19 RX ORDER — FENTANYL CITRATE 50 UG/ML
50 INJECTION, SOLUTION INTRAMUSCULAR; INTRAVENOUS ONCE
Status: DISCONTINUED | OUTPATIENT
Start: 2024-07-19 | End: 2024-07-19

## 2024-07-19 RX ORDER — ADENOSINE 3 MG/ML
INJECTION, SOLUTION INTRAVENOUS
Status: COMPLETED
Start: 2024-07-19 | End: 2024-07-19

## 2024-07-19 RX ORDER — ADENOSINE 3 MG/ML
6 INJECTION INTRAVENOUS ONCE
Status: COMPLETED | OUTPATIENT
Start: 2024-07-19 | End: 2024-07-19

## 2024-07-19 RX ORDER — ASPIRIN 81 MG/1
81 TABLET, CHEWABLE ORAL DAILY
Status: DISCONTINUED | OUTPATIENT
Start: 2024-07-19 | End: 2024-07-20 | Stop reason: HOSPADM

## 2024-07-19 RX ORDER — METOPROLOL SUCCINATE 50 MG/1
50 TABLET, EXTENDED RELEASE ORAL 2 TIMES DAILY
Status: DISCONTINUED | OUTPATIENT
Start: 2024-07-20 | End: 2024-07-20 | Stop reason: HOSPADM

## 2024-07-19 RX ORDER — MIDAZOLAM HYDROCHLORIDE 2 MG/2ML
INJECTION, SOLUTION INTRAMUSCULAR; INTRAVENOUS
Status: DISCONTINUED
Start: 2024-07-19 | End: 2024-07-19

## 2024-07-19 RX ORDER — SODIUM CHLORIDE, SODIUM GLUCONATE, SODIUM ACETATE, POTASSIUM CHLORIDE, MAGNESIUM CHLORIDE, SODIUM PHOSPHATE, DIBASIC, AND POTASSIUM PHOSPHATE .53; .5; .37; .037; .03; .012; .00082 G/100ML; G/100ML; G/100ML; G/100ML; G/100ML; G/100ML; G/100ML
75 INJECTION, SOLUTION INTRAVENOUS CONTINUOUS
Status: DISCONTINUED | OUTPATIENT
Start: 2024-07-19 | End: 2024-07-20 | Stop reason: HOSPADM

## 2024-07-19 RX ORDER — LORATADINE 10 MG/1
5 TABLET ORAL DAILY
Status: DISCONTINUED | OUTPATIENT
Start: 2024-07-19 | End: 2024-07-20 | Stop reason: HOSPADM

## 2024-07-19 RX ORDER — FOLIC ACID 1 MG/1
1 TABLET ORAL DAILY
Status: DISCONTINUED | OUTPATIENT
Start: 2024-07-19 | End: 2024-07-20 | Stop reason: HOSPADM

## 2024-07-19 RX ORDER — ENOXAPARIN SODIUM 100 MG/ML
40 INJECTION SUBCUTANEOUS EVERY 24 HOURS
Status: DISCONTINUED | OUTPATIENT
Start: 2024-07-19 | End: 2024-07-20 | Stop reason: HOSPADM

## 2024-07-19 RX ORDER — INSULIN LISPRO 100 [IU]/ML
1-6 INJECTION, SOLUTION INTRAVENOUS; SUBCUTANEOUS
Status: DISCONTINUED | OUTPATIENT
Start: 2024-07-19 | End: 2024-07-20 | Stop reason: HOSPADM

## 2024-07-19 RX ORDER — NICOTINE 21 MG/24HR
1 PATCH, TRANSDERMAL 24 HOURS TRANSDERMAL DAILY
Status: DISCONTINUED | OUTPATIENT
Start: 2024-07-19 | End: 2024-07-20 | Stop reason: HOSPADM

## 2024-07-19 RX ORDER — METOPROLOL SUCCINATE 50 MG/1
50 TABLET, EXTENDED RELEASE ORAL DAILY
Status: DISCONTINUED | OUTPATIENT
Start: 2024-07-20 | End: 2024-07-19

## 2024-07-19 RX ORDER — PANTOPRAZOLE SODIUM 40 MG/1
40 TABLET, DELAYED RELEASE ORAL
Status: DISCONTINUED | OUTPATIENT
Start: 2024-07-19 | End: 2024-07-20 | Stop reason: HOSPADM

## 2024-07-19 RX ORDER — LANOLIN ALCOHOL/MO/W.PET/CERES
100 CREAM (GRAM) TOPICAL DAILY
Status: DISCONTINUED | OUTPATIENT
Start: 2024-07-19 | End: 2024-07-20 | Stop reason: HOSPADM

## 2024-07-19 RX ORDER — LORAZEPAM 2 MG/ML
2 INJECTION INTRAMUSCULAR EVERY 6 HOURS PRN
Status: DISCONTINUED | OUTPATIENT
Start: 2024-07-19 | End: 2024-07-20 | Stop reason: HOSPADM

## 2024-07-19 RX ORDER — MIDAZOLAM HYDROCHLORIDE 2 MG/2ML
4 INJECTION, SOLUTION INTRAMUSCULAR; INTRAVENOUS ONCE
Status: DISCONTINUED | OUTPATIENT
Start: 2024-07-19 | End: 2024-07-19

## 2024-07-19 RX ADMIN — INSULIN LISPRO 1 UNITS: 100 INJECTION, SOLUTION INTRAVENOUS; SUBCUTANEOUS at 17:10

## 2024-07-19 RX ADMIN — LORATADINE 5 MG: 10 TABLET ORAL at 08:59

## 2024-07-19 RX ADMIN — INSULIN LISPRO 1 UNITS: 100 INJECTION, SOLUTION INTRAVENOUS; SUBCUTANEOUS at 11:42

## 2024-07-19 RX ADMIN — PANTOPRAZOLE SODIUM 40 MG: 40 TABLET, DELAYED RELEASE ORAL at 08:59

## 2024-07-19 RX ADMIN — SODIUM CHLORIDE, SODIUM GLUCONATE, SODIUM ACETATE, POTASSIUM CHLORIDE, MAGNESIUM CHLORIDE, SODIUM PHOSPHATE, DIBASIC, AND POTASSIUM PHOSPHATE 75 ML/HR: .53; .5; .37; .037; .03; .012; .00082 INJECTION, SOLUTION INTRAVENOUS at 22:18

## 2024-07-19 RX ADMIN — ADENOSINE 6 MG: 3 INJECTION INTRAVENOUS at 04:41

## 2024-07-19 RX ADMIN — INSULIN LISPRO 3 UNITS: 100 INJECTION, SOLUTION INTRAVENOUS; SUBCUTANEOUS at 22:13

## 2024-07-19 RX ADMIN — Medication 100 MG: at 09:00

## 2024-07-19 RX ADMIN — SODIUM CHLORIDE 1000 ML: 0.9 INJECTION, SOLUTION INTRAVENOUS at 05:42

## 2024-07-19 RX ADMIN — SODIUM CHLORIDE, SODIUM GLUCONATE, SODIUM ACETATE, POTASSIUM CHLORIDE, MAGNESIUM CHLORIDE, SODIUM PHOSPHATE, DIBASIC, AND POTASSIUM PHOSPHATE 75 ML/HR: .53; .5; .37; .037; .03; .012; .00082 INJECTION, SOLUTION INTRAVENOUS at 08:59

## 2024-07-19 RX ADMIN — ENOXAPARIN SODIUM 40 MG: 40 INJECTION SUBCUTANEOUS at 12:32

## 2024-07-19 RX ADMIN — ASPIRIN 81 MG CHEWABLE TABLET 81 MG: 81 TABLET CHEWABLE at 09:00

## 2024-07-19 RX ADMIN — MAGNESIUM SULFATE HEPTAHYDRATE 2 G: 40 INJECTION, SOLUTION INTRAVENOUS at 05:42

## 2024-07-19 RX ADMIN — PRASUGREL 10 MG: 10 TABLET, FILM COATED ORAL at 10:00

## 2024-07-19 RX ADMIN — INSULIN LISPRO 1 UNITS: 100 INJECTION, SOLUTION INTRAVENOUS; SUBCUTANEOUS at 09:00

## 2024-07-19 RX ADMIN — FOLIC ACID 1 MG: 1 TABLET ORAL at 09:00

## 2024-07-19 RX ADMIN — METOPROLOL SUCCINATE 50 MG: 50 TABLET, EXTENDED RELEASE ORAL at 05:44

## 2024-07-19 NOTE — CONSULTS
Consultation - Cardiology Team 1  Aquilino Dowell 63 y.o. male MRN: [id number]  Unit/Bed#: ED-02 Encounter: [id number]    Assessment & Plan     Active Problems:  There are no active Hospital Problems.      Assessment  SVT- presenting ECG SVT HR 200bpm  Presented with chest tightness, short of breath, diaphoretic, blurred vision, presyncope  Vagal maneuvers unsuccessful  Given 6mg IV adenosine with conversion to NSR with PAC's  On BB- toprol 25mg daily. Wife reluctant to increase d/t history of low BP's. BP since arrival normotensive to slight hypertensive. No hypotension    CAD s/p MANUEL to pLAD- remote  On asa 81mg. Effient 10mg. BB- toprol 50mg   Pharmacological myoview stress 2022 no ischemia  No reports of exertional CP.   Normal troponins even with extreme tachycardia    HTN- in addition to BB, lisinopril 10mg   Slight hypertensive  Wife reports labile BP's. Not noted here  Orthostatics negative    Type 2 diabetes    CKD- baseline creat 1. Presented with creat 1.4. Receiving fluids intravenously    Hypomagnesemia -supplemented     7.   Daily alcohol use - 3 glasses ( 6-8 oz) wine daily    Plan:  Telemetry  Would increase toprol to 50mg daily if patient agreeable  Follow up with TTE  May need further monitoring as outpatient  Consider EP evaluation for SVT ablation- pt reluctant  Eventual updated ischemia evaluation  Decrease alcohol intake  Repeat ESTHER study    History of Present Illness   Physician Requesting Consult: Kaushal Davis DO  Reason for Consult / Principal Problem: SVT    He is followed by Dr. Grossman    HPI: Aquilino Dowell is a 63 y.o. year old male with ESTHER, CAD s/p MANUEL to prox LAD, HTN, type 2 diabetes who presents with chest pain, shortness of breath and blurred vision. At one point he felt presyncopal.  Noted BP at home 80's . He was in SVT on arrival. Vagal maneuvers unsuccessful. He received 6mg adenosine with conversion to sinus rhythm. He was started on oral BB. He has similar  symptoms for several hours day prior and resolved on own. No palpitations. He reports presyncope several months ago, fell and had a foot injury. No other symptoms at that time.     0 hr trop 7  2 hr trop 10  Lactic acid 2  CBC normal   BUN/creat- 14/1.4    Mag 1.4    TTE 9/2023- LVEF 60-65%. No RMWA.     4/2022- No evidence of ischemia or infarction by pharmacologic vasodilatory nuclear stress testing.     Inpatient consult to Cardiology  Consult performed by: MANNY Lucas  Consult ordered by: MANNY Moser        Review of Systems   Constitutional: Negative.    HENT: Negative.     Eyes: Negative.    Respiratory:  Positive for shortness of breath.    Cardiovascular:  Positive for chest pain.   Gastrointestinal: Negative.    Endocrine: Negative.    Genitourinary: Negative.    Musculoskeletal: Negative.    Neurological:  Positive for light-headedness.   Hematological: Negative.    Psychiatric/Behavioral: Negative.     All other systems reviewed and are negative.      Historical Information   Past Medical History:   Diagnosis Date    Coronary artery disease     cath Oct 2013: 95% prox LAD, 40% mid LAD, EF 60%, MANUEL to prox LAD (Xience Rx 3.5x18mm)    Diabetes mellitus ([facility]) 2010    not sure on date    Difficulty walking     Hypertension 2013    medication    Memory loss     Neuropathy in diabetes ([facility])     Peripheral neuropathy     Sleep apnea      History reviewed. No pertinent surgical history.  Social History     Substance and Sexual Activity   Alcohol Use Yes    Alcohol/week: 21.0 standard drinks of alcohol    Types: 21 Glasses of wine per week    Comment: At least 3 per day     Social History     Substance and Sexual Activity   Drug Use No     Social History     Tobacco Use   Smoking Status Former    Current packs/day: 1.00    Types: Cigarettes   Smokeless Tobacco Current    Types: Chew     Family History:   Family History   Problem Relation Age of Onset    No Known Problems  Mother     Heart attack Father     Arrhythmia Neg Hx     Clotting disorder Neg Hx     Fainting Neg Hx     Asthma Neg Hx     Anuerysm Neg Hx     Hypertension Neg Hx     Heart disease Neg Hx        Meds/Allergies   current meds:   Current Facility-Administered Medications   Medication Dose Route Frequency    aspirin chewable tablet 81 mg  81 mg Oral Daily    folic acid (FOLVITE) tablet 1 mg  1 mg Oral Daily    insulin lispro (HumALOG/ADMELOG) 100 units/mL subcutaneous injection 1-6 Units  1-6 Units Subcutaneous 4x Daily (AC & HS)    loratadine (CLARITIN) tablet 5 mg  5 mg Oral Daily    [START ON 7/20/2024] metoprolol succinate (TOPROL-XL) 24 hr tablet 50 mg  50 mg Oral Daily    multi-electrolyte (PLASMALYTE-A/ISOLYTE-S PH 7.4) IV solution  75 mL/hr Intravenous Continuous    pantoprazole (PROTONIX) EC tablet 40 mg  40 mg Oral Early Morning    prasugrel (EFFIENT) tablet 10 mg  10 mg Oral Daily    thiamine tablet 100 mg  100 mg Oral Daily    and PTA meds:  Not in a hospital admission.  No Known Allergies    Objective   Vitals: Blood pressure 156/77, pulse 100, temperature 98.5 °F (36.9 °C), temperature source Oral, resp. rate 20, height 6' (1.829 m), weight 84.8 kg (187 lb), SpO2 98%.  Orthostatic Blood Pressures      Flowsheet Row Most Recent Value   Blood Pressure 156/77 filed at 07/19/2024 0915   Patient Position - Orthostatic VS Sitting filed at 07/19/2024 0615              Intake/Output Summary (Last 24 hours) at 7/19/2024 0951  Last data filed at 7/19/2024 0713  Gross per 24 hour   Intake 1050 ml   Output --   Net 1050 ml       Invasive Devices       Peripheral Intravenous Line  Duration             Peripheral IV 07/19/24 Right Antecubital <1 day                    Physical Exam: /77   Pulse 100   Temp 98.5 °F (36.9 °C) (Oral)   Resp 20   Ht 6' (1.829 m)   Wt 84.8 kg (187 lb)   SpO2 98%   BMI 25.36 kg/m²   General Appearance:    Alert, cooperative, no distress, appears stated age   Head:     Normocephalic, no scleral icterus   Eyes:    PERRL   Nose:   Nares normal, septum midline, mucosa normal, no drainage    Throat:   Lips, mucosa, and tongue normal   Neck:   Supple, symmetrical, trachea midline     no carotid bruit or JVD   Back:     Symmetric   Lungs:     Clear to auscultation bilaterally, respirations unlabored   Chest Wall:    No tenderness or deformity    Heart:    Regular rate and rhythm, S1 and S2 normal, no murmur, rub   or gallop   Abdomen:     Soft, non-tender, bowel sounds active all four quadrants,     no masses, no organomegaly   Extremities:   Extremities normal, atraumatic, no cyanosis or edema       Skin:   Skin color, texture, turgor normal, no rashes or lesions   Neurologic:   Alert and oriented to person place and time. No focal deficits       Lab Results:   Recent Results (from the past 72 hour(s))   ECG 12 lead    Collection Time: 07/19/24  4:33 AM   Result Value Ref Range    Ventricular Rate 202 BPM    Atrial Rate 250 BPM    NH Interval  ms    QRSD Interval 112 ms    QT Interval 230 ms    QTC Interval 421 ms    P Axis  degrees    QRS Axis 59 degrees    T Wave Axis 81 degrees   ECG 12 lead    Collection Time: 07/19/24  4:42 AM   Result Value Ref Range    Ventricular Rate 113 BPM    Atrial Rate 153 BPM    NH Interval  ms    QRSD Interval 58 ms    QT Interval 264 ms    QTC Interval 362 ms    P Axis  degrees    QRS Axis 66 degrees    T Wave Axis 78 degrees   ECG 12 lead    Collection Time: 07/19/24  4:42 AM   Result Value Ref Range    Ventricular Rate 129 BPM    Atrial Rate 129 BPM    NH Interval 140 ms    QRSD Interval 60 ms    QT Interval 282 ms    QTC Interval 413 ms    P [place] 63 degrees    QRS Axis 68 degrees    T Wave Axis 78 degrees   CBC and differential    Collection Time: 07/19/24  4:43 AM   Result Value Ref Range    WBC 8.07 4.31 - 10.16 Thousand/uL    RBC 3.97 3.88 - 5.62 Million/uL    Hemoglobin 13.3 12.0 - 17.0 g/dL    Hematocrit 40.0 36.5 - 49.3 %     (H) 82 - 98  "fL    MCH 33.5 26.8 - 34.3 pg    MCHC 33.3 31.4 - 37.4 g/dL    RDW 12.2 11.6 - 15.1 %    MPV 9.6 8.9 - 12.7 fL    Platelets 192 149 - 390 Thousands/uL    nRBC 0 /100 WBCs    Segmented % 48 43 - 75 %    Immature Grans % 1 0 - 2 %    Lymphocytes % 41 14 - 44 %    Monocytes % 8 4 - 12 %    Eosinophils Relative 1 0 - 6 %    Basophils Relative 1 0 - 1 %    Absolute Neutrophils 3.92 1.85 - 7.62 Thousands/µL    Absolute Immature Grans 0.04 0.00 - 0.20 Thousand/uL    Absolute Lymphocytes 3.31 0.60 - 4.47 Thousands/µL    Absolute Monocytes 0.64 0.17 - 1.22 Thousand/µL    Eosinophils Absolute 0.11 0.00 - 0.61 Thousand/µL    Basophils Absolute 0.05 0.00 - 0.10 Thousands/µL   Comprehensive metabolic panel    Collection Time: 07/19/24  4:43 AM   Result Value Ref Range    Sodium 134 (L) 135 - 147 mmol/L    Potassium 4.7 3.5 - 5.3 mmol/L    Chloride 100 96 - 108 mmol/L    CO2 19 (L) 21 - 32 mmol/L    ANION GAP 15 (H) 4 - 13 mmol/L    BUN 14 5 - 25 mg/dL    Creatinine 1.41 (H) 0.60 - 1.30 mg/dL    Glucose 270 (H) 65 - 140 mg/dL    Calcium 10.0 8.4 - 10.2 mg/dL    AST 62 (H) 13 - 39 U/L    ALT 36 7 - 52 U/L    Alkaline Phosphatase 93 34 - 104 U/L    Total Protein 7.9 6.4 - 8.4 g/dL    Albumin 4.3 3.5 - 5.0 g/dL    Total Bilirubin 0.78 0.20 - 1.00 mg/dL    eGFR 53 ml/min/1.73sq m   TSH, 3rd generation with Free T4 reflex    Collection Time: 07/19/24  4:43 AM   Result Value Ref Range    TSH 3RD GENERATON 4.160 0.450 - 4.500 uIU/mL   HS Troponin 0hr (reflex protocol)    Collection Time: 07/19/24  4:43 AM   Result Value Ref Range    hs TnI 0hr 7 \"Refer to ACS Flowchart\"- see link ng/L   Lipase    Collection Time: 07/19/24  4:43 AM   Result Value Ref Range    Lipase 46 11 - 82 u/L   D-Dimer    Collection Time: 07/19/24  4:43 AM   Result Value Ref Range    D-Dimer, Quant 0.60 (H) <0.50 ug/ml FEU   B-Type Natriuretic Peptide(BNP)    Collection Time: 07/19/24  4:43 AM   Result Value Ref Range     (H) 0 - 100 pg/mL   Magnesium    " "Collection Time: 07/19/24  4:43 AM   Result Value Ref Range    Magnesium 1.4 (L) 1.9 - 2.7 mg/dL   Blood gas, venous    Collection Time: 07/19/24  5:36 AM   Result Value Ref Range    pH, Nasir 7.438 (H) 7.300 - 7.400    pCO2, Nasir 30.8 (L) 42.0 - 50.0 mm Hg    pO2, Nasir 33.7 (L) 35.0 - 45.0 mm Hg    HCO3, Nasir 20.4 (L) 24 - 30 mmol/L    Base Excess, Nasir -2.9 mmol/L    O2 Content, Nasir 10.9 ml/dL    O2 HGB, VENOUS 63.3 60.0 - 80.0 %   Lactic acid, plasma (w/reflex if result > 2.0)    Collection Time: 07/19/24  5:36 AM   Result Value Ref Range    LACTIC ACID 2.0 0.5 - 2.0 mmol/L   Beta Hydroxybutyrate    Collection Time: 07/19/24  5:36 AM   Result Value Ref Range    Beta- Hydroxybutyrate 0.71 (H) 0.02 - 0.27 mmol/L   ECG 12 lead    Collection Time: 07/19/24  5:40 AM   Result Value Ref Range    Ventricular Rate 127 BPM    Atrial Rate 127 BPM    KS Interval 146 ms    QRSD Interval 58 ms    QT Interval 308 ms    QTC Interval 447 ms    P Axis 68 degrees    QRS Axis 57 degrees    T Wave [place] 65 degrees   HS Troponin I 2hr    Collection Time: 07/19/24  7:12 AM   Result Value Ref Range    hs TnI 2hr 10 \"Refer to ACS Flowchart\"- see link ng/L    Delta 2hr hsTnI 3 <20 ng/L   Fingerstick Glucose (POCT)    Collection Time: 07/19/24  8:10 AM   Result Value Ref Range    POC Glucose 165 (H) 65 - 140 mg/dl   Echo complete w/ contrast if indicated    Collection Time: 07/19/24  9:17 AM   Result Value Ref Range    RAA A4C 12.1 cm2    LA Volume Index (BP) 17.4 mL/m2    MV Peak A Elías 0.78 m/s    MV stenosis pressure 1/2 time 44 ms    MV Peak E Elías 53 cm/s    AV peak gradient 8 mmHg    LVOT peak VTI 18.81 cm    LVOT peak elías 0.87 m/s    E wave deceleration time 153 ms    E/A ratio 0.68     MV valve area p 1/2 method 5.00     AV LVOT peak gradient 3 mmHg    LVOT mn grad 2.0 mmHg    RVID d 3.5 cm    A4C EF 59 %    Left ventricular stroke volume (2D) 16.00 mL    IVSd 1.20 cm    Tricuspid annular plane systolic excursion 1.70 cm    Ao root 3.40 cm "    LVPWd 1.20 cm    LA size 3.1 cm    Asc Ao 3 cm    LA volume (BP) 36 mL    FS 16 28 - 44    LVIDS 2.70 cm    IVS 1.2 cm    LVIDd 3.20 cm    LA length (A2C) 4.90 cm    LEFT VENTRICLE SYSTOLIC VOLUME (MOD BIPLANE) 2D 26 mL    LV DIASTOLIC VOLUME (MOD BIPLANE) 2D 42 mL    Left Atrium Area-systolic Four Chamber 14 cm2    Left Atrium Area-systolic Apical Two Chamber 15.7 cm2    MV E' Tissue Velocity Lateral 10 cm/s    MV E' Tissue Velocity Septal 7 cm/s    LVSV, 2D 16 mL    BSA 2.07 m2     Imaging: I have personally reviewed pertinent reports.    EKG: SVT 200bpm      Code Status: Level 1 - Full Code  Advance Directive and Living Will:      Power of :    POLST:      Counseling / Coordination of Care  Total floor / unit time spent today 45 minutes.  Greater than 50% of total time was spent with the patient and / or family counseling and / or coordination of care.

## 2024-07-19 NOTE — ED PROVIDER NOTES
History  Chief Complaint   Patient presents with    Chest Pain     Patient presents to ED with CP, SOB, blurred vision starting yesterday morning at 2AM. Reports systolic BP of 80s last night. Did not take any medications this morning. Cardiac stent placed in 2013     63-year-old male with history of hypertension, type 2 diabetes, status post coronary stent placement in 2013 presenting today for chest pressure and shortness of breath.  Patient reports symptoms started yesterday morning at 2 AM.  Patient woke from sleep with chest tightness, dizziness, near-syncope, blurry vision and shortness of breath on exertion that improved with rest.  Pt took a home blood pressure which was reportedly in the 80s. Symptoms subsided in the evening.  However this morning patient awoke with the same symptoms.  Patient was unable to obtain a home blood pressure and presented to the ED.  Denies new onset headache, persistent vision changes, abdominal pain, weakness, or other acute symptoms.      Chest Pain  Associated symptoms: cough, dizziness and shortness of breath    Associated symptoms: no abdominal pain, no back pain, no fever, no palpitations and not vomiting        Prior to Admission Medications   Prescriptions Last Dose Informant Patient Reported? Taking?   aspirin (ASPIRIN LOW DOSE) 81 MG tablet  Self Yes No   Sig: Take 1 tablet by mouth daily   cyanocobalamin 1,000 mcg/mL  Self No No   Sig: Inject 1 mL (1,000 mcg total) into a muscle every 7 days   fexofenadine (ALLEGRA) 60 MG tablet  Self, Spouse/Significant Other Yes No   Sig: Take 60 mg by mouth daily   folic acid (FOLVITE) 1 mg tablet  Self No No   Sig: Take 1 tablet (1 mg total) by mouth daily   lisinopril (ZESTRIL) 20 mg tablet  Self No No   Sig: One tab BID   Patient taking differently: Take 20 mg by mouth daily One tab BID   metFORMIN (GLUCOPHAGE) 1000 MG tablet  Self Yes No   Sig: Take 1,000 mg by mouth daily with breakfast   metoprolol succinate (TOPROL-XL) 50 mg  24 hr tablet  Self No No   Sig: TAKE 1 TABLET DAILY   pantoprazole (PROTONIX) 40 mg tablet  Self No No   Sig: Take 1 tablet (40 mg total) by mouth daily in the early morning   prasugrel (EFFIENT) tablet  Self No No   Sig: Take 1 tablet (10 mg total) by mouth daily   thiamine 100 MG tablet  Self No No   Sig: Take 1 tablet (100 mg total) by mouth daily Do not start before September 5, 2023.      Facility-Administered Medications: None       Past Medical History:   Diagnosis Date    Coronary artery disease     cath Oct 2013: 95% prox LAD, 40% mid LAD, EF 60%, MANUEL to prox LAD (Xience Rx 3.5x18mm)    Diabetes mellitus ([facility]) 2010    not sure on date    Difficulty walking     Hypertension 2013    medication    Memory loss     Neuropathy in diabetes ([facility])     Peripheral neuropathy     Sleep apnea        History reviewed. No pertinent surgical history.    Family History   Problem Relation Age of Onset    No Known Problems Mother     Heart attack Father     Arrhythmia Neg Hx     Clotting disorder Neg Hx     Fainting Neg Hx     Asthma Neg Hx     Anuerysm Neg Hx     Hypertension Neg Hx     Heart disease Neg Hx      I have reviewed and agree with the history as documented.    E-Cigarette/Vaping    E-Cigarette Use Never User      E-Cigarette/Vaping Substances    Nicotine No     THC No     CBD No     Flavoring No     Other No     Unknown No      Social History     Tobacco Use    Smoking status: Former     Current packs/day: 1.00     Types: Cigarettes    Smokeless tobacco: Current     Types: Chew   Vaping Use    Vaping status: Never Used   Substance Use Topics    Alcohol use: Yes     Alcohol/week: 21.0 standard drinks of alcohol     Types: 21 Glasses of wine per week     Comment: At least 3 per day    Drug use: No        Review of Systems   Constitutional:  Negative for chills and fever.   HENT:  Negative for ear pain and sore throat.    Eyes:  Negative for pain and visual disturbance.   Respiratory:  Positive for cough, chest  tightness and shortness of breath.    Cardiovascular:  Positive for chest pain. Negative for palpitations.   Gastrointestinal:  Negative for abdominal pain and vomiting.   Genitourinary:  Negative for dysuria and hematuria.   Musculoskeletal:  Negative for arthralgias and back pain.   Skin:  Negative for color change and rash.   Neurological:  Positive for dizziness and light-headedness. Negative for seizures and syncope.   All other systems reviewed and are negative.      Physical Exam  ED Triage Vitals   Temperature Pulse Respirations Blood Pressure SpO2   07/19/24 0615 07/19/24 0430 07/19/24 0430 07/19/24 0430 07/19/24 0430   98.5 °F (36.9 °C) (S) (!) 209 20 118/66 98 %      Temp Source Heart Rate Source Patient Position - Orthostatic VS BP Location FiO2 (%)   07/19/24 0615 07/19/24 0430 07/19/24 0430 07/19/24 0430 --   Oral Monitor Sitting Left arm       Pain Score       --                    Orthostatic Vital Signs  Vitals:    07/19/24 0615 07/19/24 0715 07/19/24 0730 07/19/24 0745   BP: 132/60 139/72 145/81 126/68   Pulse: (!) 112 92 97 92   Patient Position - Orthostatic VS: Sitting          Physical Exam  Vitals and nursing note reviewed.   Constitutional:       General: He is not in acute distress.     Appearance: He is well-developed.   HENT:      Head: Normocephalic and atraumatic.   Eyes:      Conjunctiva/sclera: Conjunctivae normal.   Cardiovascular:      Rate and Rhythm: Regular rhythm. Tachycardia present.      Heart sounds: No murmur heard.  Pulmonary:      Effort: Pulmonary effort is normal. No respiratory distress.      Breath sounds: Normal breath sounds.   Abdominal:      Palpations: Abdomen is soft.      Tenderness: There is no abdominal tenderness.   Musculoskeletal:         General: No swelling.      Cervical back: Neck supple.   Skin:     General: Skin is warm and dry.      Capillary Refill: Capillary refill takes less than 2 seconds.   Neurological:      Mental Status: He is alert.    Psychiatric:         Mood and Affect: Mood normal.         ED Medications  Medications   aspirin chewable tablet 81 mg (has no administration in time range)   loratadine (CLARITIN) tablet 5 mg (has no administration in time range)   folic acid (FOLVITE) tablet 1 mg (has no administration in time range)   metoprolol succinate (TOPROL-XL) 24 hr tablet 50 mg (has no administration in time range)   pantoprazole (PROTONIX) EC tablet 40 mg (has no administration in time range)   prasugrel (EFFIENT) tablet 10 mg (has no administration in time range)   thiamine tablet 100 mg (has no administration in time range)   insulin lispro (HumALOG/ADMELOG) 100 units/mL subcutaneous injection 1-6 Units (has no administration in time range)   multi-electrolyte (PLASMALYTE-A/ISOLYTE-S PH 7.4) IV solution (has no administration in time range)   adenosine (ADENOCARD) injection 6 mg (6 mg Intravenous Given 7/19/24 0441)   sodium chloride 0.9 % bolus 1,000 mL (1,000 mL Intravenous New Bag 7/19/24 0542)   magnesium sulfate 2 g/50 mL IVPB (premix) 2 g (0 g Intravenous Stopped 7/19/24 0713)   metoprolol succinate (TOPROL-XL) 24 hr tablet 50 mg (50 mg Oral Given 7/19/24 0544)       Diagnostic Studies  Results Reviewed       Procedure Component Value Units Date/Time    HS Troponin I 2hr [[id number]]  (Normal) Collected: 07/19/24 0712    Lab Status: Final result Specimen: Blood from Line, Venous Updated: 07/19/24 0740     hs TnI 2hr 10 ng/L      Delta 2hr hsTnI 3 ng/L     HS Troponin I 4hr [[id number]]     Lab Status: No result Specimen: Blood     Beta Hydroxybutyrate [[id number]]  (Abnormal) Collected: 07/19/24 0536    Lab Status: Final result Specimen: Blood from Arm, Right Updated: 07/19/24 0559     Beta- Hydroxybutyrate 0.71 mmol/L     Lactic acid, plasma (w/reflex if result > 2.0) [[id number]]  (Normal) Collected: 07/19/24 0536    Lab Status: Final result Specimen: Blood from Line, Venous Updated: 07/19/24 0559     LACTIC ACID 2.0 mmol/L      Narrative:      Result may be elevated if tourniquet was used during collection.    Blood gas, venous [[id number]]  (Abnormal) Collected: 07/19/24 0536    Lab Status: Final result Specimen: Blood from Line, Venous Updated: 07/19/24 0545     pH, Nasir 7.438     pCO2, Nasir 30.8 mm Hg      pO2, Nasir 33.7 mm Hg      HCO3, Nasir 20.4 mmol/L      Base Excess, Nasir -2.9 mmol/L      O2 Content, Nasir 10.9 ml/dL      O2 HGB, VENOUS 63.3 %     TSH, 3rd generation with Free T4 reflex [[id number]]  (Normal) Collected: 07/19/24 0443    Lab Status: Final result Specimen: Blood from Arm, Right Updated: 07/19/24 0535     TSH 3RD GENERATON 4.160 uIU/mL     HS Troponin 0hr (reflex protocol) [[id number]]  (Normal) Collected: 07/19/24 0443    Lab Status: Final result Specimen: Blood from Arm, Right Updated: 07/19/24 0525     hs TnI 0hr 7 ng/L     D-Dimer [[id number]]  (Abnormal) Collected: 07/19/24 0443    Lab Status: Final result Specimen: Blood from Arm, Right Updated: 07/19/24 0522     D-Dimer, Quant 0.60 ug/ml FEU     Narrative:      In the evaluation for possible pulmonary embolism, in the appropriate (Well's Score of 4 or less) patient, the age adjusted d-dimer cutoff for this patient can be calculated as:    Age x 0.01 (in ug/mL) for Age-adjusted D-dimer exclusion threshold for a patient over 50 years.    Comprehensive metabolic panel [[id number]]  (Abnormal) Collected: 07/19/24 0443    Lab Status: Final result Specimen: Blood from Arm, Right Updated: 07/19/24 0519     Sodium 134 mmol/L      Potassium 4.7 mmol/L      Chloride 100 mmol/L      CO2 19 mmol/L      ANION GAP 15 mmol/L      BUN 14 mg/dL      Creatinine 1.41 mg/dL      Glucose 270 mg/dL      Calcium 10.0 mg/dL      AST 62 U/L      ALT 36 U/L      Alkaline Phosphatase 93 U/L      Total Protein 7.9 g/dL      Albumin 4.3 g/dL      Total Bilirubin 0.78 mg/dL      eGFR 53 ml/min/1.73sq m     Narrative:      National Kid[employer] guidelines for Chronic Kidney Disease (CKD):      Stage 1 with normal or high GFR (GFR > 90 mL/min/1.73 square meters)    Stage 2 Mild CKD (GFR = 60-89 mL/min/1.73 square meters)    Stage 3A Moderate CKD (GFR = 45-59 mL/min/1.73 square meters)    Stage 3B Moderate CKD (GFR = 30-44 mL/min/1.73 square meters)    Stage 4 Severe CKD (GFR = 15-29 mL/min/1.73 square meters)    Stage 5 End Stage CKD (GFR <15 mL/min/1.73 square meters)  Note: GFR calculation is accurate only with a steady state creatinine    Lipase [[id number]]  (Normal) Collected: 07/19/24 0443    Lab Status: Final result Specimen: Blood from Arm, Right Updated: 07/19/24 0519     Lipase 46 u/L     Magnesium [[id number]]  (Abnormal) Collected: 07/19/24 0443    Lab Status: Final result Specimen: Blood from Arm, Right Updated: 07/19/24 0519     Magnesium 1.4 mg/dL     CBC and differential [[id number]]  (Abnormal) Collected: 07/19/24 0443    Lab Status: Final result Specimen: Blood from Arm, Right Updated: 07/19/24 0502     WBC 8.07 Thousand/uL      RBC 3.97 Million/uL      Hemoglobin 13.3 g/dL      Hematocrit 40.0 %       fL      MCH 33.5 pg      MCHC 33.3 g/dL      RDW 12.2 %      MPV 9.6 fL      Platelets 192 Thousands/uL      nRBC 0 /100 WBCs      Segmented % 48 %      Immature Grans % 1 %      Lymphocytes % 41 %      Monocytes % 8 %      Eosinophils Relative 1 %      Basophils Relative 1 %      Absolute Neutrophils 3.92 Thousands/µL      Absolute Immature Grans 0.04 Thousand/uL      Absolute Lymphocytes 3.31 Thousands/µL      Absolute Monocytes 0.64 Thousand/µL      Eosinophils Absolute 0.11 Thousand/µL      Basophils Absolute 0.05 Thousands/µL     B-Type Natriuretic Peptide(BNP) [[id number]]     Lab Status: No result Specimen: Blood                    XR chest 1 view portable   Final Result by René Vaughan MD (07/19 0451)      No acute cardiopulmonary disease.      Findings are stable      Workstation performed: [id number]               Procedures  ECG 12 Lead Documentation  Only    Date/Time: 7/19/2024 5:37 AM    Performed by: Param Cai MD  Authorized by: Param Cai MD    ECG reviewed by me, the ED Provider: yes    Patient location:  ED  Interpretation:     Interpretation: abnormal    Rate:     ECG rate assessment: tachycardic    Rhythm:     Rhythm: SVT    QRS:     QRS intervals:  Normal  ST segments:     ST segments:  Normal  T waves:     T waves: normal          ED Course  ED Course as of 07/19/24 0758   Fri Jul 19, 2024   0511 63-year-old male with history of hypertension, type 2 diabetes, status post coronary stent placement in 2013 presenting today for chest pressure and shortness of breath.   0624 Pt in SVT on arrival with HR in 200's. Vitals otherwise stable. Vagal maneuvers were unsuccessful. Pt sucessfully converted to sinus rhythm with 6mg adenosine confirmed on repeat EKG.   0642 Differential of SVT cause includes JOSE ANGEL, DKA, PE, ischemia, thyroid disease, and electrolyte abnormalities. CBC WNL. CMP notable for Na 134, JOSE ANGEL, and marginally high anion gap. B-hydroxy 0.71, however, pH 7.438 ruling out DKA. TSH WNL. D-dimer 0.60 - PE ruled out by age-based critieria. Mg 1.4. Started on 1L NS for pre-renal JOSE ANGEL and Mg repletion.   0648 Provided 50mg Metoprolol. Pt persistently tachycardic with frequent PACs on cardiac monitor   0722 Discussed patient with internal medicine. Plan to admit  cardiac monitoring and cardiology evaluation.                     PERC Rule for PE      Flowsheet Row Most Recent Value   PERC Rule for PE    Age >=50 1 Filed at: 07/19/2024 0622   HR >=100 1 Filed at: 07/19/2024 0622   O2 Sat on room air < 95% 0 Filed at: 07/19/2024 0622   History of PE or DVT 0 Filed at: 07/19/2024 0622   Recent trauma or surgery 0 Filed at: 07/19/2024 0622   Hemoptysis 0 Filed at: 07/19/2024 0622   Exogenous estrogen 0 Filed at: 07/19/2024 0622   Unilateral leg swelling 0 Filed at: 07/19/2024 0622   PERC Rule for PE Results 2 Filed at: 07/19/2024 0622                SBIRT  22yo+      Flowsheet Row Most Recent Value   Initial Alcohol Screen: US AUDIT-C     1. How often do you have a drink containing alcohol? 6 Filed at: 07/19/2024 0430   2. How many drinks containing alcohol do you have on a typical day you are drinking?  2 Filed at: 07/19/2024 0430   3a. Male UNDER 65: How often do you have five or more drinks on one occasion? 1 Filed at: 07/19/2024 0430   Audit-C Score 9 Filed at: 07/19/2024 0430   MIKEY: How many times in the past year have you...    Used an illegal drug or used a prescription medication for non-medical reasons? Never Filed at: 07/19/2024 0430          FORTUNATO Risk Score      Flowsheet Row Most Recent Value   Age >= 65 0 Filed at: 07/19/2024 0737   Known CAD (stenosis >= 50%) 1 Filed at: 07/19/2024 0737   Recent (<=24 hrs) Service Angina 0 Filed at: 07/19/2024 0737   ST Deviation >= 0.5 mm 1 Filed at: 07/19/2024 0737   3+ CAD Risk Factors (FHx, HTN, HLP, DM, Smoker) 1 Filed at: 07/19/2024 0737   Aspirin Use Past 7 Days 1 Filed at: 07/19/2024 0737   Elevated Cardiac Markers 0 Filed at: 07/19/2024 0737   FORTUNATO Risk Score (Calculated) 4 Filed at: 07/19/2024 0737          Wells' Criteria for PE      Flowsheet Row Most Recent Value   Wells' Criteria for PE    Clinical signs and symptoms of DVT 0 Filed at: 07/19/2024 0621   PE is primary diagnosis or equally likely 0 Filed at: 07/19/2024 0621   HR >100 1.5 Filed at: 07/19/2024 0621   Immobilization at least 3 days or Surgery in the previous 4 weeks 0 Filed at: 07/19/2024 0621   Previous, objectively diagnosed PE or DVT 0 Filed at: 07/19/2024 0621   Hemoptysis 0 Filed at: 07/19/2024 0621   Malignancy with treatment within 6 months or palliative 0 Filed at: 07/19/2024 0621   Wells' Criteria Total 1.5 Filed at: 07/19/2024 0621              Medical Decision Making  See ED course    Amount and/or Complexity of Data Reviewed  Labs: ordered.  Radiology: ordered.    Risk  Prescription drug management.  Decision regarding  hospitalization.          Disposition  Final diagnoses:   SVT (supraventricular tachycardia)   Dehydration   Metabolic acidosis   JOSE ANGEL (acute kidney injury) (HCC)   Hypomagnesemia     Time reflects when diagnosis was documented in both MDM as applicable and the Disposition within this note       Time User Action Codes Description Comment    7/19/2024  7:25 AM Dichter, Myles Add [I47.10] SVT (supraventricular tachycardia)     7/19/2024  7:25 AM Dichter, Myles Add [E86.0] Dehydration     7/19/2024  7:25 AM Dichter, Myles Add [E87.20] Metabolic acidosis     7/19/2024  7:25 AM Dichter, Myles Add [N17.9] JOSE ANGEL (acute kidney injury) (HCC)     7/19/2024  7:25 AM Dichter, Myles Add [E83.42] Hypomagnesemia           ED Disposition       ED Disposition   Admit    Condition   Stable    Date/Time   Fri Jul 19, 2024 [id number]    Comment   Case was discussed with DANIELLE and the patient's admission status was agreed to be Admission Status: inpatient status to the service of Dr. Davis .               Follow-up Information    None         Patient's Medications   Discharge Prescriptions    No medications on file     No discharge procedures on file.    PDMP Review         Value Time User    PDMP Reviewed  Yes 6/4/2024  4:15 AM Rafa Saba MD             ED Provider  Attending physically available and evaluated Aquilino Dowell. I managed the patient along with the ED Attending.    Electronically Signed by           Param Cai MD  07/19/24 1364

## 2024-07-19 NOTE — ASSESSMENT & PLAN NOTE
Patient is a 64 y/o male w/ a pmhx of htn, dm type 2, s/p coronary stent 2013 due to 99% LAD blockage, and alcohol dependence use who presents w/ chief complaint of chest tightness, dizziness, SOB, and near syncope that began at 2:00 am two nights ago. Patient reports he woke up from the chest pain and was diaphoretic and dizzy. Patient states he was unable to take a deep breath due to pain. States this happened a second time last night. Both episodes lasted 3 hours.  In ed found to be in SVT w/ HR of 127 bpm. Cardioverted w/ 6 mg adenosine    Wife took bp this am and found SBP to be about 85  CXR no cardiopulmonary disease  D-Dimer mildly elevated   BNP elevated at 108  Troponins at 0 and 2 hours negative  SVT v. Pericarditis v. GERD v. POTS    PLAN:  Orthostatic BP  Echo pending   Cardiology consult placed. Appreciate recommendations.   Continue at home Protonix

## 2024-07-19 NOTE — ASSESSMENT & PLAN NOTE
Magnesium on admission decreased at 1.4  Given 2 g Mg in ED    PLAN:  Continue to trend magnesium

## 2024-07-19 NOTE — ASSESSMENT & PLAN NOTE
Patient presents w/ metabolic acidosis w/ elevated anion gap  Elevated b-hydroxybutyrate and acidotic ph   Last A1C 6.3% 9/2023  No lactate acidosis   Low suspicion for DKA due to lack of DKA like symptoms (no increased thirst, nausea/vomiting, etc)   Possibly due to alcoholic ketoacidosis (30+ drinks/week) v. Starvation ketoacidosis (poor po intake)    PLAN:  On isolyte maintenance fluids  Continue to trend CMP   Placed CHO controlled diet and encouraged patient to eat  Consult placed to case management due to alcohol use  A1C pending  Repeat BMP ordered to monitor acidosis

## 2024-07-19 NOTE — ASSESSMENT & PLAN NOTE
Patient has history of B12 deficiency on once weekly shots  Last B12 in June 2024 1,621.  Continue weekly B12 shots

## 2024-07-19 NOTE — QUICK NOTE
I, Dr. Mushtaq Wong, performed an independent evaluation and exam of patient's case in follow-up. I have done an independent review of the patient's record and have discussed the case in detail with the Resident. I agree with the Resident's documented findings and plan of care as outlined in their note with additions/exceptions as follows:     A:  # Chest pain with shortness of breath likely precipitated by episode of SVT.  Less likely cardiac ischemia with normal cardiac enzymes   # SVT s/p 6 mg IV adenosine with conversion to ST with frequent PACs, sx improved  # AGMA.  Less likely mild DKA with normal pH on VBG though respiratory compensation.  LA wnl though this was after fluid resusc.  Good be element of starvation ketoacidosis w/ poor oral intake  # DM2  # ESTHER, non-compliant with CPAP  # Hypomagnesemia  # Elevated sCr on CKD stage 2, not meeting KDIGO JOSE ANGEL creteria.  Possibly from low BP PTA?  # Alcohol use, 3-4 glasses of wine daily in the evening, more on weekends  # CAD s/p remote MANUEL to proximal LAD  # HTN  # Dizziness, positional with standing, r/o orthostasis, SVT?      P:  Multiple risk factors for precipitant of SVT with underlying CAD, doubt new ischemia with normal troponins though will f/u on TTE, assess for valvular disease may be contributing to LA dilation and prone to arrhythmia.  TSH wnl.  Alcohol use also puts him at risk for SVT as well as untreated sleep apnea syndrome.  Appreciate cardiology consultation  Continue telemetry monitoring  Now on BB, NSR with PACs rate in the 80s, continue  EP eval?  Will f/u with cardiology  Outpatient sleep medicine referral   Alcohol cessation advised  Check ortho stats  Recheck BMP now

## 2024-07-19 NOTE — ASSESSMENT & PLAN NOTE
AST elevated at 62.     CIWA protocol ordered  Case management consult placed for alcohol abuse  OT/PT consult placed due to complaints of difficulties walking  2 mg Ativan prn in case of seizure  Consider outpatient psychiatry evaluation for counseling or medication for depression

## 2024-07-19 NOTE — ED ATTENDING ATTESTATION
7/19/2024  IMyles DO, saw and evaluated the patient. I have discussed the patient with the resident/non-physician practitioner and agree with the resident's/non-physician practitioner's findings, Plan of Care, and MDM as documented in the resident's/non-physician practitioner's note, except where noted. All available labs and Radiology studies were reviewed.  I was present for key portions of any procedure(s) performed by the resident/non-physician practitioner and I was immediately available to provide assistance.       At this point I agree with the current assessment done in the Emergency Department.  I have conducted an independent evaluation of this patient a history and physical is as follows:    62 yo M in the ED for eval of chest tightness, feeling sob, blurred vision, and feeling unwell. Symptoms started overnight tonight, also occurred briefly last night.  Arrives in the ED w/ a HR of 200 bpm. Denies feeling palpitations. No prior history of SVT or other arrhythmias. Prior hx of MI w/ stent.    PE:  The patient is ill appearing, diaphoretic in acute distress. Head: normocephalic, atraumatic. HEENT: mucous membranes moist.  Lungs: CTA b/l, no resp distress. Heart: tachycardic with regular rhythm at 200 bpm. No M/R/G. Abdomen: NT, ND, no R/R/G. Neuro: CN2-12 intact, GCS 15. Normal strength and sensation, normal speech and gait. Cap refill < 2 sec, skin warm and dry. No rashes or lesions.    Initially borderline hypotensive - in the 90s-100s systolic.  EKG - narrow complex tachycardic, consistent with acute SVT.  Patient was cardioverted chemically with adenosine. He was then in persistent sinus tach with frequent PACs, PO metoprolol given with gradual improvement in tachycardia and PACs.  JOSE ANGEL present on labs, along with hypomagnesemia.   Pt admitted to [facility] for further electrolyte replacement and cardiology consult.      ED Course         Critical Care Time  CriticalCare Time    Date/Time:  7/19/2024 6:51 AM    Performed by: Myles Aldana DO  Authorized by: Myles Aldana DO    Critical care provider statement:     Critical care time (minutes):  45    Critical care time was exclusive of:  Separately billable procedures and treating other patients and teaching time    Critical care was necessary to treat or prevent imminent or life-threatening deterioration of the following conditions:  Circulatory failure (acute SVT requiring cardioversion w/ adenosine, also hypomag and JOSE ANGEL)    Critical care was time spent personally by me on the following activities:  Obtaining history from patient or surrogate, development of treatment plan with patient or surrogate, discussions with primary provider, evaluation of patient's response to treatment, examination of patient, review of old charts, re-evaluation of patient's condition, ordering and review of radiographic studies, ordering and review of laboratory studies and ordering and performing treatments and interventions    I assumed direction of critical care for this patient from another provider in my specialty: no

## 2024-07-19 NOTE — ASSESSMENT & PLAN NOTE
Lab Results   Component Value Date    HGBA1C 6.3 (H) 09/01/2023       Recent Labs     07/19/24  0810 07/19/24  1126   POCGLU 165* 171*       Blood Sugar Average: Last 72 hrs:  (P) 168    SSI ordered  Metformin held   A1C pending

## 2024-07-19 NOTE — H&P
[facility]  H&P  Name: Aquilino Dowell 63 y.o. male I MRN: [id number]  Unit/Bed#: ED-02 I Date of Admission: 7/19/2024   Date of Service: 7/19/2024 I Hospital Day: 0      Assessment & Plan   * Cardiac chest pain  Assessment & Plan  Patient is a 64 y/o male w/ a pmhx of htn, dm type 2, s/p coronary stent 2013 due to 99% LAD blockage, and alcohol dependence use who presents w/ chief complaint of chest tightness, dizziness, SOB, and near syncope that began at 2:00 am two nights ago. Patient reports he woke up from the chest pain and was diaphoretic and dizzy. Patient states he was unable to take a deep breath due to pain. States this happened a second time last night. Both episodes lasted 3 hours.  In ed found to be in SVT w/ HR of 127 bpm. Cardioverted w/ 6 mg adenosine    Wife took bp this am and found SBP to be about 85  CXR no cardiopulmonary disease  D-Dimer mildly elevated   BNP elevated at 108  Troponins at 0 and 2 hours negative  SVT v. Pericarditis v. GERD v. POTS    PLAN:  Orthostatic BP  Echo pending   Cardiology consult placed. Appreciate recommendations.   Continue at home Protonix    SVT (supraventricular tachycardia)  Assessment & Plan  Consult placed to cardiology. Appreciate recommendations  6 mg Adenosine if SVT seen on EKG again. If does not respond, 12 mg Adenosine. If unsuccessful again, cardiovert.   Given 50 mg metoprolol in ED. Consider adding metoprolol as an at-home medication for SVT.     Hypomagnesemia  Assessment & Plan  Magnesium on admission decreased at 1.4  Given 2 g Mg in ED    PLAN:  Continue to trend magnesium     Alcohol dependence (HCC)  Assessment & Plan  AST elevated at 62.     CIWA protocol ordered  Case management consult placed for alcohol abuse  OT/PT consult placed due to complaints of difficulties walking  2 mg Ativan prn in case of seizure  Consider outpatient psychiatry evaluation for counseling or medication for depression    Metabolic  acidosis  Assessment & Plan  Patient presents w/ metabolic acidosis w/ elevated anion gap  Elevated b-hydroxybutyrate and acidotic ph   Last A1C 6.3% 9/2023  No lactate acidosis   Low suspicion for DKA due to lack of DKA like symptoms (no increased thirst, nausea/vomiting, etc)   Possibly due to alcoholic ketoacidosis (30+ drinks/week) v. Starvation ketoacidosis (poor po intake)    PLAN:  On isolyte maintenance fluids  Continue to trend CMP   Placed CHO controlled diet and encouraged patient to eat  Consult placed to case management due to alcohol use  A1C pending  Repeat BMP ordered to monitor acidosis    Obstructive sleep apnea  Assessment & Plan  Patient reports he used a CPAP many years ago but is not compliant with it at present  Counseled patient on the importance of CPAP use to prevent HTN, HF, and arrhythmias  Consider outpatient or at home sleep study - patient agreeable    Benign essential hypertension  Assessment & Plan  Takes 20 mg Lisinopril at home  Holding Lisinopril for one day due to elevated Cr    Tobacco chew use  Assessment & Plan  Patient admits to using chew every day.  Nicotine patch ordered to reduce cravings   patient on tobacco cessation     Vitamin B12 deficiency  Assessment & Plan  Patient has history of B12 deficiency on once weekly shots  Last B12 in June 2024 1,621.  Continue weekly B12 shots    Elevated serum creatinine  Assessment & Plan  Likely secondary to poor po intake  On isolyte maintenance fluids  Holding Lisinopril for one day due to elevated Cr    Type 2 diabetes mellitus (HCC)  Assessment & Plan  Lab Results   Component Value Date    HGBA1C 6.3 (H) 09/01/2023       Recent Labs     07/19/24  0810 07/19/24  1126   POCGLU 165* 171*       Blood Sugar Average: Last 72 hrs:  (P) 168    SSI ordered  Metformin held   A1C pending            VTE Pharmacologic Prophylaxis: VTE Score: 7 High Risk (Score >/= 5) - Pharmacological DVT Prophylaxis Ordered: enoxaparin (Lovenox).  "Sequential Compression Devices Ordered.  Code Status: Level 1 - Full Code   Discussion with family: Updated  (wife) at bedside.    Anticipated Length of Stay: Patient will be admitted on an inpatient basis with an anticipated length of stay of greater than 2 midnights secondary to inpatient cardiology treatment.    Chief Complaint: chest tightness     History of Present Illness:  Aquilino Dowell is a 63 y.o. male with a PMH of HTN, DM 2, s/p coronary stent 2023, alcohol dependence, tobacco use, b12 deficiency who presents with chest tightness, dysnpea, near syncope, and diphoresis that first began 2 nights ago and lasted for 3 hours. Patient states he was woken from the pain at 2 am two nights ago and was diphoretic, dizzy, and generally unwell. Patient reports he was able to drive to work at 3:45 am the first morning he awoke from the pain but did not feel generally well. The pain did not resolve until 5:00 am the same morning. States he had a repeat episode last night, which prompted him to come to the ED. Wife reports she took pt's bp and found SBP to be about 80. Describes pain as a \"crushing\" sensation that is nonradiating. Denies any palpitations. States he follows with cardiology and was just seen last month.     Patient admits to drinking 30+ drinks per week. He did not drink more than usual the nights prior to these episodes. States he also has ESTHER and is not compliant with his CPAP. Denies any history of seizures, but does admit to several episodes where he was found on the ground confused after falling. No bowel/bladder incontinence or tongue biting reported.     Review of Systems:  Review of Systems   Constitutional:  Negative for chills and fever.   HENT:  Negative for ear pain and sore throat.    Eyes:  Negative for pain and visual disturbance.   Respiratory:  Positive for chest tightness and shortness of breath. Negative for cough.    Cardiovascular:  Negative for chest pain, " palpitations and leg swelling.   Gastrointestinal:  Negative for abdominal pain and vomiting.   Genitourinary:  Positive for frequency. Negative for dysuria and hematuria.   Musculoskeletal:  Negative for arthralgias and back pain.   Skin:  Negative for color change and rash.   Neurological:  Positive for dizziness, tremors and weakness. Negative for seizures and syncope.   All other systems reviewed and are negative.      Past Medical and Surgical History:   Past Medical History:   Diagnosis Date    Coronary artery disease     cath Oct 2013: 95% prox LAD, 40% mid LAD, EF 60%, MANUEL to prox LAD (Xience Rx 3.5x18mm)    Diabetes mellitus ([facility]) 2010    not sure on date    Difficulty walking     Hypertension 2013    medication    Memory loss     Neuropathy in diabetes ([facility])     Peripheral neuropathy     Sleep apnea        History reviewed. No pertinent surgical history.    Meds/Allergies:  Prior to Admission medications    Medication Sig Start Date End Date Taking? Authorizing Provider   aspirin (ASPIRIN LOW DOSE) 81 MG tablet Take 1 tablet by mouth daily   Yes Historical Provider, MD   cyanocobalamin 1,000 mcg/mL Inject 1 mL (1,000 mcg total) into a muscle every 7 days 9/4/23  Yes Lacy Hammond DO   fexofenadine (ALLEGRA) 60 MG tablet Take 60 mg by mouth daily   Yes Historical Provider, MD   folic acid (FOLVITE) 1 mg tablet Take 1 tablet (1 mg total) by mouth daily 9/4/23  Yes Lacy Hammond DO   lisinopril (ZESTRIL) 20 mg tablet One tab BID  Patient taking differently: Take 20 mg by mouth daily One tab BID 2/23/22  Yes MANNY Woodard   metFORMIN (GLUCOPHAGE) 1000 MG tablet Take 1,000 mg by mouth daily with breakfast   Yes Historical Provider, MD   metoprolol succinate (TOPROL-XL) 50 mg 24 hr tablet TAKE 1 TABLET DAILY 5/8/23  Yes Ella Grossman MD   prasugrel (EFFIENT) tablet Take 1 tablet (10 mg total) by mouth daily 6/27/24  Yes Ella Grossman MD   thiamine 100 MG tablet Take 1 tablet (100 mg  total) by mouth daily Do not start before September 5, 2023. 9/5/23  Yes Lacy Hammond DO   pantoprazole (PROTONIX) 40 mg tablet Take 1 tablet (40 mg total) by mouth daily in the early morning 6/6/24 7/6/24  Lucio Soriano DO     I have reviewed home medications with patient personally.    Allergies: No Known Allergies    Social History:  Marital Status: /Civil Union   Occupation: na  Patient Pre-hospital Living Situation: Home  Patient Pre-hospital Level of Mobility: walks  Patient Pre-hospital Diet Restrictions: none  Substance Use History:   Social History     Substance and Sexual Activity   Alcohol Use Yes    Alcohol/week: 21.0 standard drinks of alcohol    Types: 21 Glasses of wine per week    Comment: At least 3 per day     Social History     Tobacco Use   Smoking Status Former    Current packs/day: 1.00    Types: Cigarettes   Smokeless Tobacco Current    Types: Chew     Social History     Substance and Sexual Activity   Drug Use No       Family History:  Family History   Problem Relation Age of Onset    No Known Problems Mother     Heart attack Father     Arrhythmia Neg Hx     Clotting disorder Neg Hx     Fainting Neg Hx     Asthma Neg Hx     Anuerysm Neg Hx     Hypertension Neg Hx     Heart disease Neg Hx        Physical Exam:     Vitals:   Blood Pressure: 145/83 (07/19/24 1239)  Pulse: 86 (07/19/24 1239)  Temperature: 98 °F (36.7 °C) (07/19/24 1146)  Temp Source: Oral (07/19/24 1146)  Respirations: 20 (07/19/24 1145)  Height: 6' (182.9 cm) (07/19/24 0900)  Weight - Scale: 86.5 kg (190 lb 9.6 oz) (07/19/24 1145)  SpO2: 98 % (07/19/24 1145)    Physical Exam  Vitals and nursing note reviewed.   Constitutional:       General: He is not in acute distress.     Appearance: He is well-developed.   HENT:      Head: Normocephalic and atraumatic.   Eyes:      Conjunctiva/sclera: Conjunctivae normal.   Cardiovascular:      Rate and Rhythm: Normal rate and regular rhythm.      Heart sounds: No murmur  heard.  Pulmonary:      Effort: Pulmonary effort is normal. No respiratory distress.      Breath sounds: Normal breath sounds.   Abdominal:      Palpations: Abdomen is soft.      Tenderness: There is no abdominal tenderness.   Musculoskeletal:         General: No swelling.      Cervical back: Neck supple.   Skin:     General: Skin is warm and dry.      Capillary Refill: Capillary refill takes less than 2 seconds.   Neurological:      Mental Status: He is alert.      Motor: Weakness present.      Gait: Gait abnormal.   Psychiatric:         Mood and Affect: Mood normal.          Additional Data:     Lab Results:  Results from last 7 days   Lab Units 07/19/24  0443   WBC Thousand/uL 8.07   HEMOGLOBIN g/dL 13.3   HEMATOCRIT % 40.0   PLATELETS Thousands/uL 192   SEGS PCT % 48   LYMPHO PCT % 41   MONO PCT % 8   EOS PCT % 1     Results from last 7 days   Lab Units 07/19/24  0443   SODIUM mmol/L 134*   POTASSIUM mmol/L 4.7   CHLORIDE mmol/L 100   CO2 mmol/L 19*   BUN mg/dL 14   CREATININE mg/dL 1.41*   ANION GAP mmol/L 15*   CALCIUM mg/dL 10.0   ALBUMIN g/dL 4.3   TOTAL BILIRUBIN mg/dL 0.78   ALK PHOS U/L 93   ALT U/L 36   AST U/L 62*   GLUCOSE RANDOM mg/dL 270*         Results from last 7 days   Lab Units 07/19/24  1126 07/19/24  0810   POC GLUCOSE mg/dl 171* 165*     Lab Results   Component Value Date    HGBA1C 6.3 (H) 09/01/2023     Results from last 7 days   Lab Units 07/19/24  0536   LACTIC ACID mmol/L 2.0       Lines/Drains:  Invasive Devices       Peripheral Intravenous Line  Duration             Peripheral IV 07/19/24 Right Antecubital <1 day                        Imaging: Reviewed radiology reports from this admission including: chest xray  XR chest 1 view portable   Final Result by René Vaughan MD (07/19 0451)      No acute cardiopulmonary disease.      Findings are stable      Workstation performed: [id number]             EKG and Other Studies Reviewed on Admission:   EKG:  Sinus tachycardia with  Premature supraventricular complexes and with occasional Premature ventricular complexes bpm 127. (After 6 mg adenosine)     ** Please Note: This note has been constructed using a voice recognition system. **

## 2024-07-19 NOTE — ASSESSMENT & PLAN NOTE
Patient reports he used a CPAP many years ago but is not compliant with it at present  Counseled patient on the importance of CPAP use to prevent HTN, HF, and arrhythmias  Consider outpatient or at home sleep study - patient agreeable

## 2024-07-19 NOTE — PLAN OF CARE
Problem: PAIN - ADULT  Goal: Verbalizes/displays adequate comfort level or baseline comfort level  Description: Interventions:  - Encourage patient to monitor pain and request assistance  - Assess pain using appropriate pain scale  - Administer analgesics based on type and severity of pain and evaluate response  - Implement non-pharmacological measures as appropriate and evaluate response  - Consider cultural and social influences on pain and pain management  - Notify physician/advanced practitioner if interventions unsuccessful or patient reports new pain  Outcome: Progressing     Problem: INFECTION - ADULT  Goal: Absence or prevention of progression during hospitalization  Description: INTERVENTIONS:  - Assess and monitor for signs and symptoms of infection  - Monitor lab/diagnostic results  - Monitor all insertion sites, i.e. indwelling lines, tubes, and drains  - Monitor endotracheal if appropriate and nasal secretions for changes in amount and color  - [place] appropriate cooling/warming therapies per order  - Administer medications as ordered  - Instruct and encourage patient and family to use good hand hygiene technique  - Identify and instruct in appropriate isolation precautions for identified infection/condition  Outcome: Progressing  Goal: Absence of fever/infection during neutropenic period  Description: INTERVENTIONS:  - Monitor WBC    Outcome: Progressing     Problem: SAFETY ADULT  Goal: Patient will remain free of falls  Description: INTERVENTIONS:  - Educate patient/family on patient safety including physical limitations  - Instruct patient to call for assistance with activity   - Consult OT/PT to assist with strengthening/mobility   - Keep Call bell within reach  - Keep bed low and locked with side rails adjusted as appropriate  - Keep care items and personal belongings within reach  - Initiate and maintain comfort rounds  - Make Fall Risk Sign visible to staff  - Offer Toileting every 2 Hours,  in advance of need  - Apply yellow socks and bracelet for high fall risk patients  - Consider moving patient to room near nurses station  Outcome: Progressing  Goal: Maintain or return to baseline ADL function  Description: INTERVENTIONS:  -  Assess patient's ability to carry out ADLs; assess patient's baseline for ADL function and identify physical deficits which impact ability to perform ADLs (bathing, care of mouth/teeth, toileting, grooming, dressing, etc.)  - Assess/evaluate cause of self-care deficits   - Assess range of motion  - Assess patient's mobility; develop plan if impaired  - Assess patient's need for assistive devices and provide as appropriate  - Encourage maximum independence but intervene and supervise when necessary  - Involve family in performance of ADLs  - Assess for home care needs following discharge   - Consider OT consult to assist with ADL evaluation and planning for discharge  - Provide patient education as appropriate  Outcome: Progressing  Goal: Maintains/Returns to pre admission functional level  Description: INTERVENTIONS:  - Perform AM-PAC 6 Click Basic Mobility/ Daily Activity assessment daily.  - Set and communicate daily mobility goal to care team and patient/family/caregiver.   - Collaborate with rehabilitation services on mobility goals if consulted  - Perform Range of Motion 3 times a day.  - Reposition patient every 2 hours.  - Dangle patient 3 times a day  - Stand patient 3 times a day  - Ambulate patient 3 times a day  - Out of bed to chair 3 times a day   - Out of bed for meals 3 times a day  - Out of bed for toileting  - Record patient progress and toleration of activity level   Outcome: Progressing     Problem: DISCHARGE PLANNING  Goal: Discharge to home or other facility with appropriate resources  Description: INTERVENTIONS:  - Identify barriers to discharge w/patient and caregiver  - Arrange for needed discharge resources and transportation as appropriate  - Identify  discharge learning needs (meds, wound care, etc.)  - Arrange for interpretive services to assist at discharge as needed  - Refer to Case Management Department for coordinating discharge planning if the patient needs post-hospital services based on physician/advanced practitioner order or complex needs related to functional status, cognitive ability, or social support system  Outcome: Progressing

## 2024-07-19 NOTE — ASSESSMENT & PLAN NOTE
Consult placed to cardiology. Appreciate recommendations  6 mg Adenosine if SVT seen on EKG again. If does not respond, 12 mg Adenosine. If unsuccessful again, cardiovert.   Given 50 mg metoprolol in ED. Consider adding metoprolol as an at-home medication for SVT.

## 2024-07-19 NOTE — ASSESSMENT & PLAN NOTE
Patient admits to using chew every day.  Nicotine patch ordered to reduce cravings   patient on tobacco cessation

## 2024-07-19 NOTE — ASSESSMENT & PLAN NOTE
Likely secondary to poor po intake  On isolyte maintenance fluids  Holding Lisinopril for one day due to elevated Cr

## 2024-07-20 VITALS
HEART RATE: 83 BPM | WEIGHT: 188.6 LBS | DIASTOLIC BLOOD PRESSURE: 87 MMHG | BODY MASS INDEX: 25.55 KG/M2 | OXYGEN SATURATION: 98 % | SYSTOLIC BLOOD PRESSURE: 158 MMHG | TEMPERATURE: 98 F | HEIGHT: 72 IN | RESPIRATION RATE: 18 BRPM

## 2024-07-20 LAB
ALBUMIN SERPL BCG-MCNC: 3.2 G/DL (ref 3.5–5)
ALP SERPL-CCNC: 57 U/L (ref 34–104)
ALT SERPL W P-5'-P-CCNC: 26 U/L (ref 7–52)
ANION GAP SERPL CALCULATED.3IONS-SCNC: 8 MMOL/L (ref 4–13)
AST SERPL W P-5'-P-CCNC: 37 U/L (ref 13–39)
BILIRUB SERPL-MCNC: 0.62 MG/DL (ref 0.2–1)
BUN SERPL-MCNC: 10 MG/DL (ref 5–25)
CALCIUM ALBUM COR SERPL-MCNC: 9.2 MG/DL (ref 8.3–10.1)
CALCIUM SERPL-MCNC: 8.6 MG/DL (ref 8.4–10.2)
CHLORIDE SERPL-SCNC: 108 MMOL/L (ref 96–108)
CO2 SERPL-SCNC: 22 MMOL/L (ref 21–32)
CREAT SERPL-MCNC: 1.03 MG/DL (ref 0.6–1.3)
ERYTHROCYTE [DISTWIDTH] IN BLOOD BY AUTOMATED COUNT: 12.1 % (ref 11.6–15.1)
GFR SERPL CREATININE-BSD FRML MDRD: 77 ML/MIN/1.73SQ M
GLUCOSE SERPL-MCNC: 132 MG/DL (ref 65–140)
GLUCOSE SERPL-MCNC: 142 MG/DL (ref 65–140)
GLUCOSE SERPL-MCNC: 194 MG/DL (ref 65–140)
HCT VFR BLD AUTO: 31 % (ref 36.5–49.3)
HGB BLD-MCNC: 10.4 G/DL (ref 12–17)
MAGNESIUM SERPL-MCNC: 1.6 MG/DL (ref 1.9–2.7)
MCH RBC QN AUTO: 33.3 PG (ref 26.8–34.3)
MCHC RBC AUTO-ENTMCNC: 33.5 G/DL (ref 31.4–37.4)
MCV RBC AUTO: 99 FL (ref 82–98)
PLATELET # BLD AUTO: 98 THOUSANDS/UL (ref 149–390)
PMV BLD AUTO: 9.6 FL (ref 8.9–12.7)
POTASSIUM SERPL-SCNC: 4.4 MMOL/L (ref 3.5–5.3)
PROT SERPL-MCNC: 5.8 G/DL (ref 6.4–8.4)
RBC # BLD AUTO: 3.12 MILLION/UL (ref 3.88–5.62)
SODIUM SERPL-SCNC: 138 MMOL/L (ref 135–147)
WBC # BLD AUTO: 3.9 THOUSAND/UL (ref 4.31–10.16)

## 2024-07-20 PROCEDURE — 83735 ASSAY OF MAGNESIUM: CPT

## 2024-07-20 PROCEDURE — 99239 HOSP IP/OBS DSCHRG MGMT >30: CPT | Performed by: INTERNAL MEDICINE

## 2024-07-20 PROCEDURE — 82948 REAGENT STRIP/BLOOD GLUCOSE: CPT

## 2024-07-20 PROCEDURE — 85027 COMPLETE CBC AUTOMATED: CPT

## 2024-07-20 PROCEDURE — 80053 COMPREHEN METABOLIC PANEL: CPT

## 2024-07-20 PROCEDURE — 99232 SBSQ HOSP IP/OBS MODERATE 35: CPT | Performed by: INTERNAL MEDICINE

## 2024-07-20 RX ORDER — MAGNESIUM SULFATE HEPTAHYDRATE 40 MG/ML
2 INJECTION, SOLUTION INTRAVENOUS ONCE
Status: COMPLETED | OUTPATIENT
Start: 2024-07-20 | End: 2024-07-20

## 2024-07-20 RX ORDER — METOPROLOL SUCCINATE 50 MG/1
50 TABLET, EXTENDED RELEASE ORAL 2 TIMES DAILY
Qty: 60 TABLET | Refills: 0 | Status: SHIPPED | OUTPATIENT
Start: 2024-07-20

## 2024-07-20 RX ADMIN — Medication 100 MG: at 08:08

## 2024-07-20 RX ADMIN — MAGNESIUM SULFATE HEPTAHYDRATE 2 G: 40 INJECTION, SOLUTION INTRAVENOUS at 06:41

## 2024-07-20 RX ADMIN — INSULIN LISPRO 2 UNITS: 100 INJECTION, SOLUTION INTRAVENOUS; SUBCUTANEOUS at 13:03

## 2024-07-20 RX ADMIN — LORATADINE 5 MG: 10 TABLET ORAL at 08:09

## 2024-07-20 RX ADMIN — FOLIC ACID 1 MG: 1 TABLET ORAL at 08:09

## 2024-07-20 RX ADMIN — PRASUGREL 10 MG: 10 TABLET, FILM COATED ORAL at 09:18

## 2024-07-20 RX ADMIN — ASPIRIN 81 MG CHEWABLE TABLET 81 MG: 81 TABLET CHEWABLE at 08:08

## 2024-07-20 RX ADMIN — PANTOPRAZOLE SODIUM 40 MG: 40 TABLET, DELAYED RELEASE ORAL at 05:49

## 2024-07-20 RX ADMIN — METOPROLOL SUCCINATE 50 MG: 50 TABLET, EXTENDED RELEASE ORAL at 08:08

## 2024-07-20 NOTE — PLAN OF CARE
Problem: PAIN - ADULT  Goal: Verbalizes/displays adequate comfort level or baseline comfort level  Description: Interventions:  - Encourage patient to monitor pain and request assistance  - Assess pain using appropriate pain scale  - Administer analgesics based on type and severity of pain and evaluate response  - Implement non-pharmacological measures as appropriate and evaluate response  - Consider cultural and social influences on pain and pain management  - Notify physician/advanced practitioner if interventions unsuccessful or patient reports new pain  Outcome: Progressing     Problem: INFECTION - ADULT  Goal: Absence or prevention of progression during hospitalization  Description: INTERVENTIONS:  - Assess and monitor for signs and symptoms of infection  - Monitor lab/diagnostic results  - Monitor all insertion sites, i.e. indwelling lines, tubes, and drains  - Monitor endotracheal if appropriate and nasal secretions for changes in amount and color  - [place] appropriate cooling/warming therapies per order  - Administer medications as ordered  - Instruct and encourage patient and family to use good hand hygiene technique  - Identify and instruct in appropriate isolation precautions for identified infection/condition  Outcome: Progressing  Goal: Absence of fever/infection during neutropenic period  Description: INTERVENTIONS:  - Monitor WBC    Outcome: Progressing     Problem: DISCHARGE PLANNING  Goal: Discharge to home or other facility with appropriate resources  Description: INTERVENTIONS:  - Identify barriers to discharge w/patient and caregiver  - Arrange for needed discharge resources and transportation as appropriate  - Identify discharge learning needs (meds, wound care, etc.)  - Arrange for interpretive services to assist at discharge as needed  - Refer to Case Management Department for coordinating discharge planning if the patient needs post-hospital services based on physician/advanced  practitioner order or complex needs related to functional status, cognitive ability, or social support system  Outcome: Progressing     Problem: Potential for Falls  Goal: Patient will remain free of falls  Description: INTERVENTIONS:  - Educate patient/family on patient safety including physical limitations  - Instruct patient to call for assistance with activity   - Consult OT/PT to assist with strengthening/mobility   - Keep Call bell within reach  - Keep bed low and locked with side rails adjusted as appropriate  - Keep care items and personal belongings within reach  - Initiate and maintain comfort rounds  - Make Fall Risk Sign visible to staff  - Offer Toileting every 2 Hours, in advance of need  - Apply yellow socks and bracelet for high fall risk patients  - Consider moving patient to room near nurses station  Outcome: Progressing

## 2024-07-20 NOTE — ASSESSMENT & PLAN NOTE
Lab Results   Component Value Date    HGBA1C 6.6 (H) 07/19/2024       Recent Labs     07/19/24  1126 07/19/24  1526 07/19/24  2119 07/20/24  0736   POCGLU 171* 158* 240* 132         Blood Sugar Average: Last 72 hrs:  (P) 173.2    Plan:  Continue home medications

## 2024-07-20 NOTE — ASSESSMENT & PLAN NOTE
Patient presented with chief complaint of chest pain.  Was found to be in SVT with a rate in the 200s at the ED.  Successfully converted to normal sinus rhythm after 6 mg of adenosine.  Chest x-ray no acute cardiopulmonary disease.  EKG and troponins were negative for ACS.  BNP mildly elevated at 108.  Echo showed EF of 60%, no pericardial effusion.  Orthostatics negative.     Chest pain likely secondary to SVT episode    PLAN:  Continue metoprolol 50 mg twice a day  2-week Zio monitor outpatient  Close follow-up with cardiologist, Dr. Grossman

## 2024-07-20 NOTE — UTILIZATION REVIEW
Initial Clinical Review    Admission: Date/Time/Statement:   Admission Orders (From admission, onward)    Ordered        07/19/24 0726  INPATIENT ADMISSION  Once           Orders Placed This Encounter  Procedures    INPATIENT ADMISSION  Standing Status:    Standing  Number of Occurrences:    1  Order Specific Question:    Level of Care  Answer:    Med Surg [16]  Order Specific Question:    Estimated length of stay  Answer:    More than 2 Midnights  Order Specific Question:    Certification  Answer:    I certify that inpatient services are medically necessary for this patient for a duration of greater than two midnights. See H&P and MD Progress Notes for additional information about the patient's course of treatment.    ED Arrival Information     Expected  -  Arrival  7/19/2024 04:23  Acuity  Immediate       Means of arrival  Walk-In  Escorted by  Self  Service  Hospitalist  Admission type  Emergency       Arrival complaint  sob - blurry vision - cp      Chief Complaint  Patient presents with    Chest Pain  Patient presents to ED with CP, SOB, blurred vision starting yesterday morning at 2AM. Reports systolic BP of 80s last night. Did not take any medications this morning. Cardiac stent placed in 2013      Initial Presentation: 63 y.o. male with a PMH of HTN, DM 2, s/p coronary stent 2023, alcohol dependence, tobacco use, b12 deficiency who presents with chest tightness, dysnpea, near syncope, and diphoresis that first began 2 nights ago and lasted for 3 hours. Patient states he was woken from the pain at 2 am two nights ago and was diphoretic, dizzy, and generally unwell. Patient reports he was able to drive to work at 3:45 am the first morning he awoke from the pain but did not feel generally well. The pain did not resolve until 5:00 am the same morning. States he had a repeat episode last night, which prompted him to come to the ED. Wife reports she took pt's bp and found SBP to be about 80. Describes pain as a  "\"crushing\" sensation that is nonradiating. Denies any palpitations. States he follows with cardiology and was just seen last month. Patient admits to drinking 30+ drinks per week. He did not drink more than usual the nights prior to these episodes. States he also has ESTHER and is not compliant with his CPAP. Denies any history of seizures, but does admit to several episodes where he was found on the ground confused after falling. Plan: Inpatient admission for evaluation and treatment of chest pain, SVT, hypomagnesemia, alcohol dependence, metabolic acidosis, HTN, chews tobacco, Vitamin B12 deficiency, elevated serum creatinine, DM: orthostatic BP, Echo, Cardiology consult, continue Protonix, trend Mg, CIWA protocol, OT/PT eval, IV fluids, trend VMP, CM consult, HgbA1c pending, hold lisinopril due to elevated creatinine, NRT, hold metformin, start SSI.     Cardiology consult: telemetry, increase Toprol to 50 mg bid, TTE, consider EP evaluation for SVT ablation- pt reluctant.     Anticipated Length of Stay/Certification Statement: Patient will be admitted on an inpatient basis with an anticipated length of stay of greater than 2 midnights secondary to inpatient cardiology treatment.     Date: 7/20   Day 2:     Cardiology: continue Toprol 50 mg bid, Zio patch as outpatient.     Internal medicine: Continue metoprolol 50 mg twice a day. Echo pending. Cardiology following. Continue Protonix. Continue holding metformin and lisinopril. Continue SSI, NRT. Trend Mg.     ED Triage Vitals  Temperature  Pulse  Respirations  Blood Pressure  SpO2  Pain Score  07/19/24 0615  07/19/24 0430  07/19/24 0430  07/19/24 0430  07/19/24 0430  07/19/24 1145  98.5 °F (36.9 °C)  (S) (!) 209  20  118/66  98 %  No Pain    Weight (last 2 days)     Date/Time  Weight  07/20/24 0600  85.5 (188.6)  07/19/24 1145  86.5 (190.6)  07/19/24 1135  86.5 (190.6)  07/19/24 0900  84.8 (187)        Vital Signs (last 3 days) "     Date/Time  Temp  Pulse  Resp  BP  MAP (mmHg)  SpO2  O2 Device  Patient Position - Orthostatic VS  CIWA-Ar Total  Pain  07/20/24 08:02:58  98 °F (36.7 °C)  95  18  150/92  111  98 %  None (Room air)  Lying  --  --  07/20/24 0645  --  --  --  --  --  --  --  --  0  --  07/20/24 0530  --  --  --  --  --  --  --  --  0  --  07/20/24 0245  --  --  --  --  --  --  --  --  0  --  07/19/24 2245  --  --  --  --  --  --  --  --  0  --  07/19/24 2130  --  --  --  --  --  --  --  --  --  No Pain  07/19/24 21:19:31  98.2 °F (36.8 °C)  95  --  151/87  108  97 %  --  --  --  --  07/19/24 20:07:47  --  93  --  137/80  99  96 %  --  --  --  --  07/19/24 1845  --  --  --  --  --  --  --  --  0  --  07/19/24 14:45:58  97.8 °F (36.6 °C)  88  18  157/94  115  99 %  --  --  0  --  07/19/24 1444  --  --  --  --  --  --  --  --  --  No Pain  07/19/24 1400  --  91  20  147/75  105  97 %  None (Room air)  Lying  --  --  07/19/24 1330  --  91  20  160/82  113  98 %  None (Room air)  Lying  --  --  07/19/24 1300  --  87  20  177/85  122  99 %  None (Room air)  Standing  --  --  07/19/24 1239  --  86  --  145/83  --  --  --  --  1  --  07/19/24 1146  98 °F (36.7 °C)  --  --  --  --  --  --  --  --  --  07/19/24 1145  98 °F (36.7 °C)  86  20  145/83  108  98 %  None (Room air)  Standing  --  No Pain  07/19/24 1134  --  --  --  145/83  --  --  --  Standing - Orthostatic VS  --  --  07/19/24 1132  --  --  --  148/87  --  --  --  Sitting - Orthostatic VS  --  --  07/19/24 1130  --  --  --  156/78  --  --  --  Lying - Orthostatic VS  --  --  07/19/24 1100  --  83  20  146/83  107  98 %  None (Room air)  Sitting  --  --  07/19/24 1000  --  86  --  147/69  99  98 %  --  --  --  --  07/19/24 0915  --  100  --  156/77  106  98 %  --  --  --  --  07/19/24 0900  --  89  --  118/66  --  --  --  --  --  --  07/19/24 0745  --  92  --  126/68  90  100 %  --  --  --  --  07/19/24 0730  --  97  --  145/81  110  100 %  --  --  --  --  07/19/24  0715  --  92  --  139/72  101  100 %  --  --  --  --  07/19/24 0615  98.5 °F (36.9 °C)  112  20  132/60  87  100 %  None (Room air)  Sitting  --  --  07/19/24 0545  --  115  20  138/79  103  99 %  None (Room air)  Sitting  --  --  07/19/24 0544  --  114  --  138/79  --  --  --  --  --  --  07/19/24 0530  --  116  20  149/70  100  100 %  None (Room air)  Sitting  --  --  07/19/24 0500  --  119  20  102/71  82  100 %  None (Room air)  Sitting  --  --  07/19/24 0457  --  --  --  --  --  --  None (Room air)  --  --  --  07/19/24 0445  --  122  20  134/70  98  100 %  None (Room air)  Sitting  --  --  07/19/24 0430  --  209   20  118/66  83  98 %  None (Room air)  Sitting  --  --      CIWA-Ar Score   Row Name  07/20/24 0645  07/20/24 0530  07/20/24 0245  CIWA-Ar  Nausea and Vomiting  0  0  0  Tactile Disturbances  0  0  0  Tremor  0  0  0  Auditory Disturbances  0  0  0  Paroxysmal Sweats  0  0  0  Visual Disturbances  0  0  0  Anxiety  0  0  0  Headache, Fullness in Head  0  0  0  Agitation  0  0  0  Orientation and Clouding of Sensorium  0  0  0  CIWA-Ar Total  0  0  0  Row Name  07/19/24 2245  07/19/24 1845  07/19/24 14:45:58  CIWA-Ar  Nausea and Vomiting  0  0  0  Tactile Disturbances  0  0  0  Tremor  0  0  0  Auditory Disturbances  0  0  0  Paroxysmal Sweats  0  0  0  Visual Disturbances  0  0  0  Anxiety  0  0  0  Headache, Fullness in Head  0  0  0  Agitation  0  0  0  Orientation and Clouding of Sensorium  0  0  0  CIWA-Ar Total  0  0  0  [facility] Name  07/19/24 1239  CIWA-Ar  BP  145/83  Pulse  86  Nausea and Vomiting  0  Tactile Disturbances  1  Tremor  0  Auditory Disturbances  0  Paroxysmal Sweats  0  Visual Disturbances  0  Anxiety  0  Headache, Fullness in Head  0  Agitation  0  Orientation and Clouding of Sensorium  0  CIWA-Ar Total  1        Pertinent Labs/Diagnostic Test Results:   Radiology:  XR chest 1 view portable  Final Interpretation by René Vaughan MD (07/19 8090)  No acute cardiopulmonary  disease.  Findings are stable  Workstation performed: [id number]    Cardiology:  No orders to display    GI:  No orders to display          Results from last 7 days  Lab  Units  07/20/24  0442  07/19/24  1236  07/19/24  0443  WBC  Thousand/uL  3.90*   --   8.07  HEMOGLOBIN  g/dL  10.4*   --   13.3  HEMATOCRIT  %  31.0*   --   40.0  PLATELETS  Thousands/uL  98*  121*  192  TOTAL NEUT ABS  Thousands/µL   --    --   3.92        Results from last 7 days  Lab  Units  07/20/24  0442  07/19/24  1236  07/19/24  0443  SODIUM  mmol/L  138  136  134*  POTASSIUM  mmol/L  4.4  4.8  4.7  CHLORIDE  mmol/L  108  106  100  CO2  mmol/L  22  24  19*  ANION GAP  mmol/L  8  6  15*  BUN  mg/dL  10  14  14  CREATININE  mg/dL  1.03  1.17  1.41*  EGFR  ml/min/1.73sq m  77  66  53  CALCIUM  mg/dL  8.6  8.6  10.0  MAGNESIUM  mg/dL  1.6*   --   1.4*    Results from last 7 days  Lab  Units  07/20/24  0442  07/19/24  0443  AST  U/L  37  62*  ALT  U/L  26  36  ALK PHOS  U/L  57  93  TOTAL PROTEIN  g/dL  5.8*  7.9  ALBUMIN  g/dL  3.2*  4.3  TOTAL BILIRUBIN  mg/dL  0.62  0.78    Results from last 7 days  Lab  Units  07/20/24  0736  07/19/24  2119  07/19/24  1526  07/19/24  1126  07/19/24  0810  POC GLUCOSE  mg/dl  132  240*  158*  171*  165*    Results from last 7 days  Lab  Units  07/20/24  0442  07/19/24  1236  07/19/24  0443  GLUCOSE RANDOM  mg/dL  142*  155*  270*        Results from last 7 days  Lab  Units  07/19/24  1236  HEMOGLOBIN A1C  %  6.6*  EAG  mg/dl  143    Beta- Hydroxybutyrate  Date  Value  Ref Range  Status  07/19/2024  0.71 (H)  0.02 - 0.27 mmol/L  Final  06/04/2024  <0.05  0.02 - 0.27 mmol/L  Final         Results from last 7 days  Lab  Units  07/19/24  0536  PH MICHELLE  7.438*  PCO2 MICHELLE  mm Hg  30.8*  PO2 MICHELLE  mm Hg  33.7*  HCO3 MICHELLE  mmol/L  20.4*  BASE EXC MICEHLLE  mmol/L  -2.9  O2 CONTENT MICHELLE  ml/dL  10.9  O2 HGB, VENOUS  %  63.3            Results from last 7 days  Lab  Units  07/19/24  0712  07/19/24  0443  HS TNI 0HR  ng/L   --    7  HS TNI 2HR  ng/L  10   --   HSTNI D2  ng/L  3   --     Results from last 7 days  Lab  Units  07/19/24  0443  D-DIMER QUANTITATIVE  ug/ml FEU  0.60*        Results from last 7 days  Lab  Units  07/19/24  0443  TSH 3RD GENERATON  uIU/mL  4.160        Results from last 7 days  Lab  Units  07/19/24  0536  LACTIC ACID  mmol/L  2.0            Results from last 7 days  Lab  Units  07/19/24  0443  BNP  pg/mL  108*        Results from last 7 days  Lab  Units  07/19/24  0443  LIPASE  u/L  46        ED Treatment-Medication Administration from 07/19/2024 0423 to 07/19/2024 1438        Date/Time  Order  Dose  Route  Action  07/19/2024 0441  adenosine (ADENOCARD) injection 6 mg  6 mg  Intravenous  Given  07/19/2024 0542  sodium chloride 0.9 % bolus 1,000 mL  1,000 mL  Intravenous  New Bag  07/19/2024 0542  magnesium sulfate 2 g/50 mL IVPB (premix) 2 g  2 g  Intravenous  New Bag  07/19/2024 0544  metoprolol succinate (TOPROL-XL) 24 hr tablet 50 mg  50 mg  Oral  Given  07/19/2024 0900  aspirin chewable tablet 81 mg  81 mg  Oral  Given  07/19/2024 0859  loratadine (CLARITIN) tablet 5 mg  5 mg  Oral  Given  07/19/2024 0900  folic acid (FOLVITE) tablet 1 mg  1 mg  Oral  Given  07/19/2024 0859  pantoprazole (PROTONIX) EC tablet 40 mg  40 mg  Oral  Given  07/19/2024 1000  prasugrel (EFFIENT) tablet 10 mg  10 mg  Oral  Given  07/19/2024 0900  thiamine tablet 100 mg  100 mg  Oral  Given  07/19/2024 0900  insulin lispro (HumALOG/ADMELOG) 100 units/mL subcutaneous injection 1-6 Units  1 Units  Subcutaneous  Given  07/19/2024 1142  insulin lispro (HumALOG/ADMELOG) 100 units/mL subcutaneous injection 1-6 Units  1 Units  Subcutaneous  Given  07/19/2024 0859  multi-electrolyte (PLASMALYTE-A/ISOLYTE-S PH 7.4) IV solution  75 mL/hr  Intravenous  New Bag  07/19/2024 1232  enoxaparin (LOVENOX) subcutaneous injection 40 mg  40 mg  Subcutaneous  Given        Past Medical History:  Diagnosis  Date    Coronary artery disease  cath Oct 2013: 95%  prox LAD, 40% mid LAD, EF 60%, MANUEL to prox LAD ([employer] Rx 3.5x18mm)    Diabetes mellitus ([facility])  2010  not sure on date    Difficulty walking    Hypertension  2013  medication    Memory loss    Neuropathy in diabetes ([facility])    Peripheral neuropathy    Sleep apnea    Present on Admission:   Benign essential hypertension   Obstructive sleep apnea   Alcohol dependence ([facility])   Vitamin B12 deficiency   Metabolic acidosis   Type 2 diabetes mellitus ([facility])      Admitting Diagnosis: Dehydration [E86.0]  Hypomagnesemia [E83.42]  SVT (supraventricular tachycardia) [I47.10]  Metabolic acidosis [E87.20]  Alcohol use [Z78.9]  JOSE ANGEL (acute kidney injury) ([facility]) [N17.9]  Age/Sex: 63 y.o. male  Admission Orders:  Scheduled Medications:  aspirin, 81 mg, Oral, Daily  enoxaparin, 40 mg, Subcutaneous, Q24H  folic acid, 1 mg, Oral, Daily  insulin lispro, 1-6 Units, Subcutaneous, 4x Daily (AC & HS)  loratadine, 5 mg, Oral, Daily  metoprolol succinate, 50 mg, Oral, BID  nicotine, 1 patch, Transdermal, Daily  pantoprazole, 40 mg, Oral, Early Morning  prasugrel, 10 mg, Oral, Daily  thiamine, 100 mg, Oral, Daily      Continuous IV Infusions:  multi-electrolyte, 75 mL/hr, Intravenous, Continuous      PRN Meds:  LORazepam, 2 mg, Intravenous, Q6H PRN        IP CONSULT TO CARDIOLOGY  IP CONSULT TO CASE MANAGEMENT    Network Utilization Review Department  ATTENTION: Please call with any questions or concerns to [phone] and carefully listen to the prompts so that you are directed to the right person. All voicemails are confidential.   For Discharge needs, contact Care Management DC Support Team at [phone] opt. [phone]  Send all requests for admission clinical reviews, approved or denied determinations and any other requests to dedicated fax number below belonging to the campus where the patient is receiving treatment. List of dedicated fax numbers for the Facilities:  FACILITY NAME  UR FAX NUMBER  ADMISSION DENIALS (Administrative/Medical  Necessity)  [phone]  DISCHARGE SUPPORT TEAM (NETWORK)  [phone]  PARENT CHILD HEALTH (Maternity/NICU/Pediatrics)  [phone]  [facility]  [phone]  [facility]  [phone]  [facility]  [phone]  [facility]  [phone]  [facility]  [phone]  [facility]  [phone]  [facility]  [phone]  [facility]  [phone]  [facility]  [phone]  [facility]  [phone]  [facility]  [phone]  [facility]  [phone]

## 2024-07-20 NOTE — PROGRESS NOTES
Cardiology Progress Note - Aquilino Dowell 63 y.o. male MRN: [id number]    Unit/Bed#: S -01 Encounter: [id number]    Assessment:  SVT - would benefit from increasing b-blockers.    Labile BP at home - may be having episodic SVT, leading to hypotension.     Plan:  Continue current medications.   Zio monitor as outpt.  Stable for discharge from cardiac standpoint (on metoprolol succinate 50mg BID).    Subjective:    No significant events overnight.      Review of Systems   Cardiovascular:  Negative for chest pain, leg swelling and palpitations.   Respiratory:  Negative for shortness of breath.        Objective:   Vitals: Blood pressure 150/92, pulse 95, temperature 98 °F (36.7 °C), temperature source Oral, resp. rate 18, height 6' (1.829 m), weight 85.5 kg (188 lb 9.6 oz), SpO2 98%., Body mass index is 25.58 kg/m².,   Orthostatic Blood Pressures      Flowsheet Row Most Recent Value   Blood Pressure 150/92 filed at 2024 0802   Patient Position - Orthostatic VS Lying filed at 2024 0802           Systolic (24hrs), Av , Min:137 , Max:177     Diastolic (24hrs), Av, Min:75, Max:94      Intake/Output Summary (Last 24 hours) at 2024 1000  Last data filed at 2024 0901  Gross per 24 hour   Intake 900 ml   Output 1000 ml   Net -100 ml     Weight (last 2 days)       Date/Time Weight    24 0600 85.5 (188.6)    24 1145 86.5 (190.6)    24 1135 86.5 (190.6)    24 0900 84.8 (187)                Telemetry Review: NSR    Physical Exam  Cardiovascular:      Rate and Rhythm: Normal rate and regular rhythm.      Heart sounds: Normal heart sounds. No murmur heard.     No friction rub. No gallop.   Pulmonary:      Breath sounds: Normal breath sounds. No wheezing or rales.           Laboratory Results:        CBC with diff:   Results from last 7 days   Lab Units 24  0442 24  1236 24  0443   WBC Thousand/uL 3.90*  --  8.07   HEMOGLOBIN g/dL 10.4*  --  13.3    HEMATOCRIT % 31.0*  --  40.0   MCV fL 99*  --  101*   PLATELETS Thousands/uL 98* 121* 192   RBC Million/uL 3.12*  --  3.97   MCH pg 33.3  --  33.5   MCHC g/dL 33.5  --  33.3   RDW % 12.1  --  12.2   MPV fL 9.6 9.5 9.6   NRBC AUTO /100 WBCs  --   --  0         CMP:  Results from last 7 days   Lab Units 07/20/24  0442 07/19/24  1236 07/19/24 0443   POTASSIUM mmol/L 4.4 4.8 4.7   CHLORIDE mmol/L 108 106 100   CO2 mmol/L 22 24 19*   BUN mg/dL 10 14 14   CREATININE mg/dL 1.03 1.17 1.41*   CALCIUM mg/dL 8.6 8.6 10.0   AST U/L 37  --  62*   ALT U/L 26  --  36   ALK PHOS U/L 57  --  93   EGFR ml/min/1.73sq m 77 66 53         BMP:  Results from last 7 days   Lab Units 07/20/24 0442 07/19/24  1236 07/19/24 0443   POTASSIUM mmol/L 4.4 4.8 4.7   CHLORIDE mmol/L 108 106 100   CO2 mmol/L 22 24 19*   BUN mg/dL 10 14 14   CREATININE mg/dL 1.03 1.17 1.41*   CALCIUM mg/dL 8.6 8.6 10.0       BNP:   Results Reviewed       Procedure Component Value Units Date/Time    Magnesium [[id number]]  (Abnormal) Collected: 07/20/24 0442    Lab Status: Final result Specimen: Blood from Hand, Left Updated: 07/20/24 0601     Magnesium 1.6 mg/dL     Comprehensive metabolic panel [[id number]]  (Abnormal) Collected: 07/20/24 0442    Lab Status: Final result Specimen: Blood from Hand, Left Updated: 07/20/24 0601     Sodium 138 mmol/L      Potassium 4.4 mmol/L      Chloride 108 mmol/L      CO2 22 mmol/L      ANION GAP 8 mmol/L      BUN 10 mg/dL      Creatinine 1.03 mg/dL      Glucose 142 mg/dL      Calcium 8.6 mg/dL      Corrected Calcium 9.2 mg/dL      AST 37 U/L      ALT 26 U/L      Alkaline Phosphatase 57 U/L      Total Protein 5.8 g/dL      Albumin 3.2 g/dL      Total Bilirubin 0.62 mg/dL      eGFR 77 ml/min/1.73sq m     Narrative:      National [employer] guidelines for Chronic Kidney Disease (CKD):     Stage 1 with normal or high GFR (GFR > 90 mL/min/1.73 square meters)    Stage 2 Mild CKD (GFR = 60-89 mL/min/1.73 square meters)     Stage 3A Moderate CKD (GFR = 45-59 mL/min/1.73 square meters)    Stage 3B Moderate CKD (GFR = 30-44 mL/min/1.73 square meters)    Stage 4 Severe CKD (GFR = 15-29 mL/min/1.73 square meters)    Stage 5 End Stage CKD (GFR <15 mL/min/1.73 square meters)  Note: GFR calculation is accurate only with a steady state creatinine    CBC (With Platelets) [[id number]]  (Abnormal) Collected: 07/20/24 0442    Lab Status: Final result Specimen: Blood from Hand, Left Updated: 07/20/24 0545     WBC 3.90 Thousand/uL      RBC 3.12 Million/uL      Hemoglobin 10.4 g/dL      Hematocrit 31.0 %      MCV 99 fL      MCH 33.3 pg      MCHC 33.5 g/dL      RDW 12.1 %      Platelets 98 Thousands/uL      MPV 9.6 fL     Hemoglobin A1C [[id number]]  (Abnormal) Collected: 07/19/24 1236    Lab Status: Final result Specimen: Blood from Arm, Right Updated: 07/19/24 1542     Hemoglobin A1C 6.6 %       mg/dl     Platelet count [[id number]]  (Abnormal) Collected: 07/19/24 1236    Lab Status: Final result Specimen: Blood from Arm, Right Updated: 07/19/24 1349     Platelets 121 Thousands/uL      MPV 9.5 fL     Basic metabolic panel [[id number]]  (Abnormal) Collected: 07/19/24 1236    Lab Status: Final result Specimen: Blood from Arm, Right Updated: 07/19/24 1303     Sodium 136 mmol/L      Potassium 4.8 mmol/L      Chloride 106 mmol/L      CO2 24 mmol/L      ANION GAP 6 mmol/L      BUN 14 mg/dL      Creatinine 1.17 mg/dL      Glucose 155 mg/dL      Calcium 8.6 mg/dL      eGFR 66 ml/min/1.73sq m     Narrative:      National Kid[employer] Disease Foundation guidelines for Chronic Kidney Disease (CKD):     Stage 1 with normal or high GFR (GFR > 90 mL/min/1.73 square meters)    Stage 2 Mild CKD (GFR = 60-89 mL/min/1.73 square meters)    Stage 3A Moderate CKD (GFR = 45-59 mL/min/1.73 square meters)    Stage 3B Moderate CKD (GFR = 30-44 mL/min/1.73 square meters)    Stage 4 Severe CKD (GFR = 15-29 mL/min/1.73 square meters)    Stage 5 End Stage CKD (GFR <15 mL/min/1.73  square meters)  Note: GFR calculation is accurate only with a steady state creatinine    Fingerstick Glucose (POCT) [[id number]]  (Abnormal) Collected: 07/19/24 1126    Lab Status: Final result Specimen: Blood Updated: 07/19/24 1128     POC Glucose 171 mg/dl     B-Type Natriuretic Peptide(BNP) [[id number]]  (Abnormal) Collected: 07/19/24 0443    Lab Status: Final result Specimen: Blood from Arm, Right Updated: 07/19/24 0833      pg/mL     Fingerstick Glucose (POCT) [[id number]]  (Abnormal) Collected: 07/19/24 0810    Lab Status: Final result Specimen: Blood Updated: 07/19/24 0810     POC Glucose 165 mg/dl     HS Troponin I 2hr [[id number]]  (Normal) Collected: 07/19/24 0712    Lab Status: Final result Specimen: Blood from Line, Venous Updated: 07/19/24 0740     hs TnI 2hr 10 ng/L      Delta 2hr hsTnI 3 ng/L     HS Troponin I 4hr [[id number]]     Lab Status: No result Specimen: Blood     Beta Hydroxybutyrate [[id number]]  (Abnormal) Collected: 07/19/24 0536    Lab Status: Final result Specimen: Blood from Arm, Right Updated: 07/19/24 0559     Beta- Hydroxybutyrate 0.71 mmol/L     Lactic acid, plasma (w/reflex if result > 2.0) [[id number]]  (Normal) Collected: 07/19/24 0536    Lab Status: Final result Specimen: Blood from Line, Venous Updated: 07/19/24 0559     LACTIC ACID 2.0 mmol/L     Narrative:      Result may be elevated if tourniquet was used during collection.    Blood gas, venous [[id number]]  (Abnormal) Collected: 07/19/24 0536    Lab Status: Final result Specimen: Blood from Line, Venous Updated: 07/19/24 0545     pH, Nasir 7.438     pCO2, Nasir 30.8 mm Hg      pO2, Nasir 33.7 mm Hg      HCO3, Nasir 20.4 mmol/L      Base Excess, Nasir -2.9 mmol/L      O2 Content, Nasir 10.9 ml/dL      O2 HGB, VENOUS 63.3 %     TSH, 3rd generation with Free T4 reflex [[id number]]  (Normal) Collected: 07/19/24 0443    Lab Status: Final result Specimen: Blood from Arm, Right Updated: 07/19/24 0535     TSH 3RD GENERATON 4.160 uIU/mL     HS  Troponin 0hr (reflex protocol) [[id number]]  (Normal) Collected: 07/19/24 0443    Lab Status: Final result Specimen: Blood from Arm, Right Updated: 07/19/24 0525     hs TnI 0hr 7 ng/L     D-Dimer [[id number]]  (Abnormal) Collected: 07/19/24 0443    Lab Status: Final result Specimen: Blood from Arm, Right Updated: 07/19/24 0522     D-Dimer, Quant 0.60 ug/ml FEU     Narrative:      In the evaluation for possible pulmonary embolism, in the appropriate (Well's Score of 4 or less) patient, the age adjusted d-dimer cutoff for this patient can be calculated as:    Age x 0.01 (in ug/mL) for Age-adjusted D-dimer exclusion threshold for a patient over 50 years.    Comprehensive metabolic panel [[id number]]  (Abnormal) Collected: 07/19/24 0443    Lab Status: Final result Specimen: Blood from Arm, Right Updated: 07/19/24 0519     Sodium 134 mmol/L      Potassium 4.7 mmol/L      Chloride 100 mmol/L      CO2 19 mmol/L      ANION GAP 15 mmol/L      BUN 14 mg/dL      Creatinine 1.41 mg/dL      Glucose 270 mg/dL      Calcium 10.0 mg/dL      AST 62 U/L      ALT 36 U/L      Alkaline Phosphatase 93 U/L      Total Protein 7.9 g/dL      Albumin 4.3 g/dL      Total Bilirubin 0.78 mg/dL      eGFR 53 ml/min/1.73sq m     Narrative:      National Kid[employer] Disease Foundation guidelines for Chronic Kidney Disease (CKD):     Stage 1 with normal or high GFR (GFR > 90 mL/min/1.73 square meters)    Stage 2 Mild CKD (GFR = 60-89 mL/min/1.73 square meters)    Stage 3A Moderate CKD (GFR = 45-59 mL/min/1.73 square meters)    Stage 3B Moderate CKD (GFR = 30-44 mL/min/1.73 square meters)    Stage 4 Severe CKD (GFR = 15-29 mL/min/1.73 square meters)    Stage 5 End Stage CKD (GFR <15 mL/min/1.73 square meters)  Note: GFR calculation is accurate only with a steady state creatinine    Lipase [[id number]]  (Normal) Collected: 07/19/24 0443    Lab Status: Final result Specimen: Blood from Arm, Right Updated: 07/19/24 0519     Lipase 46 u/L     Magnesium [[id number]]   (Abnormal) Collected: 07/19/24 0443    Lab Status: Final result Specimen: Blood from Arm, Right Updated: 07/19/24 0519     Magnesium 1.4 mg/dL     CBC and differential [[id number]]  (Abnormal) Collected: 07/19/24 0443    Lab Status: Final result Specimen: Blood from Arm, Right Updated: 07/19/24 0502     WBC 8.07 Thousand/uL      RBC 3.97 Million/uL      Hemoglobin 13.3 g/dL      Hematocrit 40.0 %       fL      MCH 33.5 pg      MCHC 33.3 g/dL      RDW 12.2 %      MPV 9.6 fL      Platelets 192 Thousands/uL      nRBC 0 /100 WBCs      Segmented % 48 %      Immature Grans % 1 %      Lymphocytes % 41 %      Monocytes % 8 %      Eosinophils Relative 1 %      Basophils Relative 1 %      Absolute Neutrophils 3.92 Thousands/µL      Absolute Immature Grans 0.04 Thousand/uL      Absolute Lymphocytes 3.31 Thousands/µL      Absolute Monocytes 0.64 Thousand/µL      Eosinophils Absolute 0.11 Thousand/µL      Basophils Absolute 0.05 Thousands/µL           Recent Labs     07/19/24 0443   *       Magnesium:   Results from last 7 days   Lab Units 07/20/24 0442 07/19/24 0443   MAGNESIUM mg/dL 1.6* 1.4*       Coags:       TSH:       Lipid Profile:             Cardiac testing:   No results found for this or any previous visit.    No results found for this or any previous visit.    No results found for this or any previous visit.    No results found for this or any previous visit.      Meds/Allergies   Current Facility-Administered Medications   Medication Dose Route Frequency Provider Last Rate    aspirin  81 mg Oral Daily MANNY Moser      enoxaparin  40 mg Subcutaneous Q24H Demetria Aguilera, DO      folic acid  1 mg Oral Daily MANNY Moser      insulin lispro  1-6 Units Subcutaneous 4x Daily (AC & HS) MANNY Moser      loratadine  5 mg Oral Daily MANNY Moser      LORazepam  2 mg Intravenous Q6H PRN Demetria Aguilera, DO      metoprolol  succinate  50 mg Oral BID Keven Roberts MD      multi-electrolyte  75 mL/hr Intravenous Continuous Cornell BurrisMANNY Maravilla 75 mL/hr (07/19/24 2218)    nicotine  1 patch Transdermal Daily Demetria Aguilera DO      pantoprazole  40 mg Oral Early Morning Cornell Rosenberg Marva, MANNY      prasugrel  10 mg Oral Daily Cornell LinMANNY Delgado      thiamine  100 mg Oral Daily Cornell GandhiMANNY Aguilar       multi-electrolyte, 75 mL/hr, Last Rate: 75 mL/hr (07/19/24 2218)        Medications Prior to Admission:     aspirin (ASPIRIN LOW DOSE) 81 MG tablet    cyanocobalamin 1,000 mcg/mL    fexofenadine (ALLEGRA) 60 MG tablet    folic acid (FOLVITE) 1 mg tablet    lisinopril (ZESTRIL) 20 mg tablet    metFORMIN (GLUCOPHAGE) 1000 MG tablet    metoprolol succinate (TOPROL-XL) 50 mg 24 hr tablet    prasugrel (EFFIENT) tablet    thiamine 100 MG tablet    pantoprazole (PROTONIX) 40 mg tablet    Assessment:  Principal Problem:    SVT (supraventricular tachycardia)  Active Problems:    Benign essential hypertension    Obstructive sleep apnea    Type 2 diabetes mellitus (HCC)    Alcohol dependence (HCC)    Vitamin B12 deficiency    Metabolic acidosis    Cardiac chest pain    Hypomagnesemia    Elevated serum creatinine    Tobacco chew use

## 2024-07-20 NOTE — ASSESSMENT & PLAN NOTE
Magnesium on admission decreased at 1.4.  Magnesium on discharge is 1.6.      Plan:  Given 2 g magnesium sulfate IV prior to discharge

## 2024-07-20 NOTE — DISCHARGE INSTR - AVS FIRST PAGE
Dear Aquilino Dowell,     It was our pleasure to care for you here at [facility].  It is our hope that we were always able to exceed the expected standards for your care during your stay.  You were hospitalized due to chest discomfort caused by SVT.  You were cared for on the third floor by Natalya Brink MD under the service of Wilmer Santana MD with the [facility] who covers for your primary care physician (PCP), Yeal Matute MD, while you were hospitalized.  If you have any questions or concerns related to this hospitalization, you may contact us at .  For follow up as well as any medication refills, we recommend that you follow up with your primary care physician.  A registered nurse will reach out to you by phone within a few days after your discharge to answer any additional questions that you may have after going home.  However, at this time we provide for you here, the most important instructions / recommendations at discharge:     Notable Medication Adjustments -   Stop Lisinopril 20 mg daily  Take Metoprolol 50 mg 2 times a day  Testing Required after Discharge -   Zio patch  ** Please contact your cardiologist's office to request testing orders for any of the testing recommended here **  Important follow up information -   Follow-up with your cardiologist, Dr. Grossman, regarding your Lisinopril and Zio patch  Follow-up with sleep medicine, Dr. Dumont  Other Instructions -   Please hold your Metoprolol if your BP is under 100 mmHg  Please hold your Metoprolol if your HR is under 50 bpm  Please review this entire after visit summary as additional general instructions including medication list, appointments, activity, diet, any pertinent wound care, and other additional recommendations from your care team that may be provided for you.      Sincerely,     Natalya Brink MD

## 2024-07-20 NOTE — ASSESSMENT & PLAN NOTE
Patient is a 62 y/o male w/ a pmhx of htn, dm type 2, s/p coronary stent 2013 due to 99% LAD blockage, and alcohol dependence use who presents w/ chief complaint of chest tightness, dizziness, SOB, and near syncope that started Thursday morning.  Episodes last for approx 3 hours. BP noted to be in the 80s systolic at that time. Repeat episodes prompted him to come to the ED.  In the ED, was found to be in SVT with a rate in the 200s.  Vagal maneuvers were unsuccessful.  Successfully converted back to sinus rhythm rhythm with 6 mg adenosine.  Orthostatics negative.  Echo not suggestive of valvular disease.    Likely precipitating cause for patient's symptoms.  Patient has multiple risk factors for arrhythmias which include: Alcohol use, ESTHER.    Plan:  Stable and cleared for discharge per cardiology  Continue metoprolol 50 mg twice a day  Hold lisinopril for now until appointment with Dr. Grossman  2-week Zio monitor outpatient  Ambulatory referral for sleep medicine placed for ESTHER evaluation  Discussed with patient about limiting alcohol intake as it is a risk factor for arrhythmias

## 2024-07-20 NOTE — DISCHARGE SUMMARY
[facility]  Discharge- Aquilino Dowell 1960, 63 y.o. male MRN: [id number]  Unit/Bed#: S -01 Encounter: [id number]  Primary Care Provider: Yael Matute MD   Date and time admitted to hospital: 7/19/2024  4:24 AM    * SVT (supraventricular tachycardia)  Assessment & Plan  Patient is a 64 y/o male w/ a pmhx of htn, dm type 2, s/p coronary stent 2013 due to 99% LAD blockage, and alcohol dependence use who presents w/ chief complaint of chest tightness, dizziness, SOB, and near syncope that started Thursday morning.  Episodes last for approx 3 hours. BP noted to be in the 80s systolic at that time. Repeat episodes prompted him to come to the ED.  In the ED, was found to be in SVT with a rate in the 200s.  Vagal maneuvers were unsuccessful.  Successfully converted back to sinus rhythm rhythm with 6 mg adenosine.  Orthostatics negative.  Echo not suggestive of valvular disease.    Likely precipitating cause for patient's symptoms.  Patient has multiple risk factors for arrhythmias which include: Alcohol use, ESTHER.    Plan:  Stable and cleared for discharge per cardiology  Continue metoprolol 50 mg twice a day  Hold lisinopril for now until appointment with Dr. Grossman  2-week Nikitao monitor outpatient  Ambulatory referral for sleep medicine placed for ESTHER evaluation  Discussed with patient about limiting alcohol intake as it is a risk factor for arrhythmias    Cardiac chest pain  Assessment & Plan  Patient presented with chief complaint of chest pain.  Was found to be in SVT with a rate in the 200s at the ED.  Successfully converted to normal sinus rhythm after 6 mg of adenosine.  Chest x-ray no acute cardiopulmonary disease.  EKG and troponins were negative for ACS.  BNP mildly elevated at 108.  Echo showed EF of 60%, no pericardial effusion.  Orthostatics negative.     Chest pain likely secondary to SVT episode    PLAN:  Continue metoprolol 50 mg twice a day  2-week Zio  monitor outpatient  Close follow-up with cardiologist, Dr. Grossman    Type 2 diabetes mellitus (HCC)  Assessment & Plan  Lab Results   Component Value Date    HGBA1C 6.6 (H) 07/19/2024       Recent Labs     07/19/24  1126 07/19/24  1526 07/19/24  2119 07/20/24  0736   POCGLU 171* 158* 240* 132         Blood Sugar Average: Last 72 hrs:  (P) 173.2    Plan:  Continue home medications    Obstructive sleep apnea  Assessment & Plan  Patient reports he used a CPAP many years ago but is not compliant with it at present.  Last sleep study was approximately 20 years ago.    Plan:  Counseled patient on the importance of CPAP use to prevent HTN, HF, and arrhythmias  Ambulatory referral for sleep medicine placed for ESTHER evaluation    Tobacco chew use  Assessment & Plan  Patient admits to using chew every day.  Nicotine patch ordered to reduce cravings   patient on tobacco cessation     Elevated serum creatinine  Assessment & Plan  Patient presented with elevated serum creatinine on admission.  Received IV fluids.  Resolved upon discharge    Hypomagnesemia  Assessment & Plan  Magnesium on admission decreased at 1.4.  Magnesium on discharge is 1.6.      Plan:  Given 2 g magnesium sulfate IV prior to discharge    Metabolic acidosis  Assessment & Plan  Patient presents w/ metabolic acidosis w/ elevated anion gap  Elevated b-hydroxybutyrate and acidotic ph   Last A1C 6.3% 9/2023  No lactate acidosis   Low suspicion for DKA due to lack of DKA like symptoms (no increased thirst, nausea/vomiting, etc)   Possibly due to alcoholic ketoacidosis (30+ drinks/week) v. Starvation ketoacidosis (poor po intake)    PLAN:  On isolyte maintenance fluids  Continue to trend CMP   Placed CHO controlled diet and encouraged patient to eat  Consult placed to case management due to alcohol use  A1C pending  Repeat BMP ordered to monitor acidosis    Vitamin B12 deficiency  Assessment & Plan  Patient has history of B12 deficiency on once weekly  shots  Last B12 in June 2024 1,621.  Continue weekly B12 shots    Alcohol dependence (HCC)  Assessment & Plan  AST elevated at 62.     CIWA protocol ordered  Case management consult placed for alcohol abuse  OT/PT consult placed due to complaints of difficulties walking  2 mg Ativan prn in case of seizure  Consider outpatient psychiatry evaluation for counseling or medication for depression      Medical Problems       Resolved Problems  Date Reviewed: 7/20/2024   None       Discharging Resident: Natalya Brink MD  Discharging Attending: Wilmer Santana MD  PCP: Yael Matute MD  Admission Date:   Admission Orders (From admission, onward)       Ordered        07/19/24 0726  INPATIENT ADMISSION  Once                          Discharge Date: 07/20/24    Consultations During Hospital Stay:  Cardiology    Procedures Performed:   Echo    Significant Findings / Test Results:   Echo: Left ventricular cavity size is normal. Wall thickness is normal. The left ventricular ejection fraction is 60%. Systolic function is normal. Wall motion is normal. Diastolic function is mildly abnormal, consistent with grade I (abnormal) relaxation. IVS: There is sigmoid appearance of the septum.     Incidental Findings:   None    Test Results Pending at Discharge (will require follow up):  None     Outpatient Tests Requested:  Zio patch per cardiology    Complications: None    Reason for Admission: Chest pain    Hospital Course:   Aquilino Dowell is a 63 y.o. male patient who originally presented to the hospital on 7/19/2024 due to chest tightness, dyspnea, near syncope, diaphoresis that started Thursday morning.  Patient had repeated episodes which prompted him to come to the ED.  Patient's wife reports when she took the patient's blood pressure at home he was found to have a systolic blood pressure of 80 mmHg. Patient denies any palpitations.    In the ED, patient was found to be in SVT with a rate of 200s.   Vagal maneuvers unsuccessful.  Successfully converted to sinus rhythm and 6 mg of adenosine.  Patient was persistently tachycardic with frequent PACs on the monitor.  Given 50 mg of metoprolol in the ED.  Admitted to inpatient for further cardiac monitoring and cardiology evaluation.  Patient had no further episodes of SVT or tachycardia overnight.  Patient is symptomatic, no acute complaints.  Orthostatics negative.  Echo done in the hospital showed EF of 60%, no valvular normalities.  EKG and troponins negative for ACS.  Patient was cleared by cardiology for discharge to home on metoprolol 50 mg twice a day with close outpatient follow-up with cardiologist, Dr. Grossmna, for Zio monitor placement and medication adjustments.    Please see above list of diagnoses and related plan for additional information.     Condition at Discharge: good    Discharge Day Visit / Exam:   Subjective: Patient was seen and examined at bedside.  Patient had no acute complaints.  No overnight events.  Patient denies chest pain, palpations, shortness of breath, lightheadedness.  Vitals: Blood Pressure: 158/87 (07/20/24 1045)  Pulse: 83 (07/20/24 1045)  Temperature: 98 °F (36.7 °C) (07/20/24 0802)  Temp Source: Oral (07/20/24 0802)  Respirations: 18 (07/20/24 0802)  Height: 6' (182.9 cm) (07/19/24 0900)  Weight - Scale: 85.5 kg (188 lb 9.6 oz) (07/20/24 0600)  SpO2: 98 % (07/20/24 0802)  Exam:   Physical Exam  Vitals reviewed.   Constitutional:       General: He is not in acute distress.     Appearance: Normal appearance.   HENT:      Head: Normocephalic and atraumatic.   Eyes:      Conjunctiva/sclera: Conjunctivae normal.   Cardiovascular:      Rate and Rhythm: Normal rate and regular rhythm.      Heart sounds: Normal heart sounds.   Pulmonary:      Effort: Pulmonary effort is normal. No respiratory distress.      Breath sounds: Normal breath sounds.   Abdominal:      General: There is no distension.      Palpations: Abdomen is soft.       Tenderness: There is no abdominal tenderness.   Musculoskeletal:         General: No swelling.   Neurological:      Mental Status: He is alert and oriented to person, place, and time.       Discussion with Family: Updated  (wife) at bedside.    Discharge instructions/Information to patient and family:   See after visit summary for information provided to patient and family.      Provisions for Follow-Up Care:  See after visit summary for information related to follow-up care and any pertinent home health orders.      Mobility at time of Discharge:   Basic Mobility Inpatient Raw Score: 21  -HLM Goal: 6: Walk 10 steps or more  JH-HLM Achieved: 3: Sit at edge of bed  HLM Goal NOT achieved. Continue to encourage mobility in post discharge setting.     Disposition:   Home    Planned Readmission: No    Discharge Medications:  See after visit summary for reconciled discharge medications provided to patient and/or family.      **Please Note: This note may have been constructed using a voice recognition system**

## 2024-07-20 NOTE — ASSESSMENT & PLAN NOTE
Patient reports he used a CPAP many years ago but is not compliant with it at present.  Last sleep study was approximately 20 years ago.    Plan:  Counseled patient on the importance of CPAP use to prevent HTN, HF, and arrhythmias  Ambulatory referral for sleep medicine placed for ESTHER evaluation

## 2024-07-21 LAB
ATRIAL RATE: 127 BPM
ATRIAL RATE: 129 BPM
ATRIAL RATE: 153 BPM
P AXIS: 63 DEGREES
P AXIS: 68 DEGREES
PR INTERVAL: 140 MS
PR INTERVAL: 146 MS
QRS AXIS: 57 DEGREES
QRS AXIS: 66 DEGREES
QRS AXIS: 68 DEGREES
QRSD INTERVAL: 58 MS
QRSD INTERVAL: 58 MS
QRSD INTERVAL: 60 MS
QT INTERVAL: 264 MS
QT INTERVAL: 282 MS
QT INTERVAL: 308 MS
QTC INTERVAL: 362 MS
QTC INTERVAL: 413 MS
QTC INTERVAL: 447 MS
T WAVE AXIS: 65 DEGREES
T WAVE AXIS: 78 DEGREES
T WAVE AXIS: 78 DEGREES
VENTRICULAR RATE: 113 BPM
VENTRICULAR RATE: 127 BPM
VENTRICULAR RATE: 129 BPM

## 2024-07-21 PROCEDURE — 93010 ELECTROCARDIOGRAM REPORT: CPT | Performed by: INTERNAL MEDICINE

## 2024-07-22 ENCOUNTER — NURSE TRIAGE (OUTPATIENT)
Age: 64
End: 2024-07-22

## 2024-07-22 NOTE — UTILIZATION REVIEW
Initial Clinical Review    Admission: Date/Time/Statement:   Admission Orders (From admission, onward)       Ordered        07/19/24 0726  INPATIENT ADMISSION  Once                          Orders Placed This Encounter   Procedures    INPATIENT ADMISSION     Standing Status:   Standing     Number of Occurrences:   1     Order Specific Question:   Level of Care     Answer:   Med Surg [16]     Order Specific Question:   Estimated length of stay     Answer:   More than 2 Midnights     Order Specific Question:   Certification     Answer:   I certify that inpatient services are medically necessary for this patient for a duration of greater than two midnights. See H&P and MD Progress Notes for additional information about the patient's course of treatment.     ED Arrival Information     Expected  -  Arrival  7/19/2024 04:23  Acuity  Immediate       Means of arrival  Walk-In  Escorted by  Self  Service  Hospitalist  Admission type  Emergency       Arrival complaint  sob - blurry vision - cp      Chief Complaint   Patient presents with    Chest Pain     Patient presents to ED with CP, SOB, blurred vision starting yesterday morning at 2AM. Reports systolic BP of 80s last night. Did not take any medications this morning. Cardiac stent placed in 2013       Initial Presentation: 63 y.o. male with a PMH of HTN, DM 2, s/p coronary stent 2023, alcohol dependence, tobacco use, b12 deficiency who presents with chest tightness, dysnpea, near syncope, and diphoresis that first began 2 nights ago and lasted for 3 hours. Patient states he was woken from the pain at 2 am two nights ago and was diphoretic, dizzy, and generally unwell. Patient reports he was able to drive to work at 3:45 am the first morning he awoke from the pain but did not feel generally well. The pain did not resolve until 5:00 am the same morning. States he had a repeat episode last night, which prompted him to come to the ED. Wife reports she took pt's bp and found  "SBP to be about 80. Describes pain as a \"crushing\" sensation that is nonradiating. Denies any palpitations. States he follows with cardiology and was just seen last month. Patient admits to drinking 30+ drinks per week. He did not drink more than usual the nights prior to these episodes. States he also has ESTHER and is not compliant with his CPAP. Denies any history of seizures, but does admit to several episodes where he was found on the ground confused after falling. Plan: Inpatient admission for evaluation and treatment of chest pain, SVT, hypomagnesemia, alcohol dependence, metabolic acidosis, HTN, chews tobacco, Vitamin B12 deficiency, elevated serum creatinine, DM: orthostatic BP, Echo, Cardiology consult, continue Protonix, trend Mg, CIWA protocol, OT/PT eval, IV fluids, trend VMP, CM consult, HgbA1c pending, hold lisinopril due to elevated creatinine, NRT, hold metformin, start SSI.     Cardiology consult: telemetry, increase Toprol to 50 mg bid, TTE, consider EP evaluation for SVT ablation- pt reluctant.     Anticipated Length of Stay/Certification Statement: Patient will be admitted on an inpatient basis with an anticipated length of stay of greater than 2 midnights secondary to inpatient cardiology treatment.     Date: 7/20   Day 2:     Cardiology: continue Toprol 50 mg bid, Zio patch as outpatient.     Internal medicine: Continue metoprolol 50 mg twice a day. Echo pending. Cardiology following. Continue Protonix. Continue holding metformin and lisinopril. Continue SSI, NRT. Trend Mg.     ED Triage Vitals   Temperature Pulse Respirations Blood Pressure SpO2 Pain Score   07/19/24 0615 07/19/24 0430 07/19/24 0430 07/19/24 0430 07/19/24 0430 07/19/24 1145   98.5 °F (36.9 °C) (S) (!) 209 20 118/66 98 % No Pain     Weight (last 2 days) before discharge       Date/Time Weight    07/20/24 0600 85.5 (188.6)    07/19/24 1145 86.5 (190.6)    07/19/24 1135 86.5 (190.6)    07/19/24 0900 84.8 (187)            Vital " Signs (last 3 days) before discharge       Date/Time Temp Pulse Resp BP MAP (mmHg) SpO2 O2 Device Patient Position - Orthostatic VS CIWA-Ar Total Pain    07/20/24 1045 -- 83 -- 158/87 111 -- -- Lying 0 --    07/20/24 0900 -- -- -- -- -- -- -- -- -- No Pain    07/20/24 08:02:58 98 °F (36.7 °C) 95 18 150/92 111 98 % None (Room air) Lying -- --    07/20/24 0645 -- -- -- -- -- -- -- -- 0 --    07/20/24 0530 -- -- -- -- -- -- -- -- 0 --    07/20/24 0245 -- -- -- -- -- -- -- -- 0 --    07/19/24 2245 -- -- -- -- -- -- -- -- 0 --    07/19/24 2130 -- -- -- -- -- -- -- -- -- No Pain    07/19/24 21:19:31 98.2 °F (36.8 °C) 95 -- 151/87 108 97 % -- -- -- --    07/19/24 20:07:47 -- 93 -- 137/80 99 96 % -- -- -- --    07/19/24 1845 -- -- -- -- -- -- -- -- 0 --    07/19/24 14:45:58 97.8 °F (36.6 °C) 88 18 157/94 115 99 % -- -- 0 --    07/19/24 1444 -- -- -- -- -- -- -- -- -- No Pain    07/19/24 1400 -- 91 20 147/75 105 97 % None (Room air) Lying -- --    07/19/24 1330 -- 91 20 160/82 113 98 % None (Room air) Lying -- --    07/19/24 1300 -- 87 20 177/85 122 99 % None (Room air) Standing -- --    07/19/24 1239 -- 86 -- 145/83 -- -- -- -- 1 --    07/19/24 1146 98 °F (36.7 °C) -- -- -- -- -- -- -- -- --    07/19/24 1145 98 °F (36.7 °C) 86 20 145/83 108 98 % None (Room air) Standing -- No Pain    07/19/24 1134 -- -- -- 145/83 -- -- -- Standing - Orthostatic VS -- --    07/19/24 1132 -- -- -- 148/87 -- -- -- Sitting - Orthostatic VS -- --    07/19/24 1130 -- -- -- 156/78 -- -- -- Lying - Orthostatic VS -- --    07/19/24 1100 -- 83 20 146/83 107 98 % None (Room air) Sitting -- --    07/19/24 1000 -- 86 -- 147/69 99 98 % -- -- -- --    07/19/24 0915 -- 100 -- 156/77 106 98 % -- -- -- --    07/19/24 0900 -- 89 -- 118/66 -- -- -- -- -- --    07/19/24 0745 -- 92 -- 126/68 90 100 % -- -- -- --    07/19/24 0730 -- 97 -- 145/81 110 100 % -- -- -- --    07/19/24 0715 -- 92 -- 139/72 101 100 % -- -- -- --    07/19/24 0615 98.5 °F (36.9 °C) 112 20  132/60 87 100 % None (Room air) Sitting -- --    07/19/24 0545 -- 115 20 138/79 103 99 % None (Room air) Sitting -- --    07/19/24 0544 -- 114 -- 138/79 -- -- -- -- -- --    07/19/24 0530 -- 116 20 149/70 100 100 % None (Room air) Sitting -- --    07/19/24 0500 -- 119 20 102/71 82 100 % None (Room air) Sitting -- --    07/19/24 0457 -- -- -- -- -- -- None (Room air) -- -- --    07/19/24 0445 -- 122 20 134/70 98 100 % None (Room air) Sitting -- --    07/19/24 0430 -- 209  20 118/66 83 98 % None (Room air) Sitting -- --           CIWA-Ar Score       Row Name 07/20/24 1045 07/20/24 0645 07/20/24 0530       CIWA-Ar    Nausea and Vomiting 0 0 0    Tactile Disturbances 0 0 0    Tremor 0 0 0    Auditory Disturbances 0 0 0    Paroxysmal Sweats 0 0 0    Visual Disturbances 0 0 0    Anxiety 0 0 0    Headache, Fullness in Head 0 0 0    Agitation 0 0 0    Orientation and Clouding of Sensorium 0 0 0    CIWA-Ar Total 0 0 0      Row Name 07/20/24 0245 07/19/24 2245 07/19/24 1845       CIWA-Ar    Nausea and Vomiting 0 0 0    Tactile Disturbances 0 0 0    Tremor 0 0 0    Auditory Disturbances 0 0 0    Paroxysmal Sweats 0 0 0    Visual Disturbances 0 0 0    Anxiety 0 0 0    Headache, Fullness in Head 0 0 0    Agitation 0 0 0    Orientation and Clouding of Sensorium 0 0 0    CIWA-Ar Total 0 0 0      Row Name 07/19/24 14:45:58 07/19/24 1239          CIWA-Ar    BP -- 145/83     Pulse -- 86     Nausea and Vomiting 0 0     Tactile Disturbances 0 1     Tremor 0 0     Auditory Disturbances 0 0     Paroxysmal Sweats 0 0     Visual Disturbances 0 0     Anxiety 0 0     Headache, Fullness in Head 0 0     Agitation 0 0     Orientation and Clouding of Sensorium 0 0     CIWA-Ar Total 0 1                    CIWA-Ar Score       Row Name 07/20/24 1045 07/20/24 0645 07/20/24 0530       CIWA-Ar    Nausea and Vomiting 0 0 0    Tactile Disturbances 0 0 0    Tremor 0 0 0    Auditory Disturbances 0 0 0    Paroxysmal Sweats 0 0 0    Visual Disturbances 0 0  0    Anxiety 0 0 0    Headache, Fullness in Head 0 0 0    Agitation 0 0 0    Orientation and Clouding of Sensorium 0 0 0    CIWA-Ar Total 0 0 0      Row Name 07/20/24 0245 07/19/24 2245 07/19/24 1845       CIWA-Ar    Nausea and Vomiting 0 0 0    Tactile Disturbances 0 0 0    Tremor 0 0 0    Auditory Disturbances 0 0 0    Paroxysmal Sweats 0 0 0    Visual Disturbances 0 0 0    Anxiety 0 0 0    Headache, Fullness in Head 0 0 0    Agitation 0 0 0    Orientation and Clouding of Sensorium 0 0 0    CIWA-Ar Total 0 0 0      Row Name 07/19/24 14:45:58 07/19/24 1239          CIWA-Ar    BP -- 145/83     Pulse -- 86     Nausea and Vomiting 0 0     Tactile Disturbances 0 1     Tremor 0 0     Auditory Disturbances 0 0     Paroxysmal Sweats 0 0     Visual Disturbances 0 0     Anxiety 0 0     Headache, Fullness in Head 0 0     Agitation 0 0     Orientation and Clouding of Sensorium 0 0     CIWA-Ar Total 0 1                    CIWA-Ar Score       Row Name 07/20/24 1045 07/20/24 0645 07/20/24 0530       CIWA-Ar    Nausea and Vomiting 0 0 0    Tactile Disturbances 0 0 0    Tremor 0 0 0    Auditory Disturbances 0 0 0    Paroxysmal Sweats 0 0 0    Visual Disturbances 0 0 0    Anxiety 0 0 0    Headache, Fullness in Head 0 0 0    Agitation 0 0 0    Orientation and Clouding of Sensorium 0 0 0    CIWA-Ar Total 0 0 0      Row Name 07/20/24 0245 07/19/24 2245 07/19/24 1845       CIWA-Ar    Nausea and Vomiting 0 0 0    Tactile Disturbances 0 0 0    Tremor 0 0 0    Auditory Disturbances 0 0 0    Paroxysmal Sweats 0 0 0    Visual Disturbances 0 0 0    Anxiety 0 0 0    Headache, Fullness in Head 0 0 0    Agitation 0 0 0    Orientation and Clouding of Sensorium 0 0 0    CIWA-Ar Total 0 0 0      Row Name 07/19/24 14:45:58 07/19/24 1239          CIWA-Ar    BP -- 145/83     Pulse -- 86     Nausea and Vomiting 0 0     Tactile Disturbances 0 1     Tremor 0 0     Auditory Disturbances 0 0     Paroxysmal Sweats 0 0     Visual Disturbances 0 0      Anxiety 0 0     Headache, Fullness in Head 0 0     Agitation 0 0     Orientation and Clouding of Sensorium 0 0     CIWA-Ar Total 0 1                    CIWA-Ar Score       Row Name 07/20/24 1045 07/20/24 0645 07/20/24 0530       CIWA-Ar    Nausea and Vomiting 0 0 0    Tactile Disturbances 0 0 0    Tremor 0 0 0    Auditory Disturbances 0 0 0    Paroxysmal Sweats 0 0 0    Visual Disturbances 0 0 0    Anxiety 0 0 0    Headache, Fullness in Head 0 0 0    Agitation 0 0 0    Orientation and Clouding of Sensorium 0 0 0    CIWA-Ar Total 0 0 0      Row Name 07/20/24 0245 07/19/24 2245 07/19/24 1845       CIWA-Ar    Nausea and Vomiting 0 0 0    Tactile Disturbances 0 0 0    Tremor 0 0 0    Auditory Disturbances 0 0 0    Paroxysmal Sweats 0 0 0    Visual Disturbances 0 0 0    Anxiety 0 0 0    Headache, Fullness in Head 0 0 0    Agitation 0 0 0    Orientation and Clouding of Sensorium 0 0 0    CIWA-Ar Total 0 0 0      Row Name 07/19/24 14:45:58 07/19/24 1239          CIWA-Ar    BP -- 145/83     Pulse -- 86     Nausea and Vomiting 0 0     Tactile Disturbances 0 1     Tremor 0 0     Auditory Disturbances 0 0     Paroxysmal Sweats 0 0     Visual Disturbances 0 0     Anxiety 0 0     Headache, Fullness in Head 0 0     Agitation 0 0     Orientation and Clouding of Sensorium 0 0     CIWA-Ar Total 0 1                     Pertinent Labs/Diagnostic Test Results:   Radiology:  XR chest 1 view portable   Final Interpretation by René Vaughan MD (07/19 0451)      No acute cardiopulmonary disease.      Findings are stable      Workstation performed: [id number]           Cardiology:  ECG 12 lead   Final Result by Keven Roberts MD (07/21 2110)   Sinus tachycardia with Premature supraventricular complexes and with    occasional Premature ventricular complexes   Otherwise normal ECG   When compared with ECG of 19-JUL-2024 04:42, (unconfirmed)   Premature ventricular complexes are now Present   Premature supraventricular complexes are  now Present   Confirmed by Keven Roberts ([id number]) on 7/21/2024 9:10:21 PM      ECG 12 lead   Final Result by Keven Roberts MD (07/21 2110)   Sinus tachycardia   Otherwise normal ECG   When compared with ECG of 19-JUL-2024 04:42, (unconfirmed)   Sinus rhythm has replaced Atrial fibrillation   Confirmed by Keven Roberts ([id number]) on 7/21/2024 9:10:15 PM      ECG 12 lead   Final Result by Keven Roberts MD (07/21 2110)   Atrial fibrillation with rapid ventricular response with premature    ventricular or aberrantly conducted complexes   Nonspecific ST abnormality   Abnormal ECG   When compared with ECG of 19-JUL-2024 04:33, (unconfirmed)   Atrial fibrillation has replaced Sinus rhythm   Vent. rate has decreased BY  89 BPM   ST no longer depressed in Inferior leads   Confirmed by Keven Roberts ([id number]) on 7/21/2024 9:10:08 PM        GI:  No orders to display           Results from last 7 days   Lab Units 07/20/24  0442 07/19/24  1236 07/19/24 0443   WBC Thousand/uL 3.90*  --  8.07   HEMOGLOBIN g/dL 10.4*  --  13.3   HEMATOCRIT % 31.0*  --  40.0   PLATELETS Thousands/uL 98* 121* 192   TOTAL NEUT ABS Thousands/µL  --   --  3.92         Results from last 7 days   Lab Units 07/20/24  0442 07/19/24  1236 07/19/24  0443   SODIUM mmol/L 138 136 134*   POTASSIUM mmol/L 4.4 4.8 4.7   CHLORIDE mmol/L 108 106 100   CO2 mmol/L 22 24 19*   ANION GAP mmol/L 8 6 15*   BUN mg/dL 10 14 14   CREATININE mg/dL 1.03 1.17 1.41*   EGFR ml/min/1.73sq m 77 66 53   CALCIUM mg/dL 8.6 8.6 10.0   MAGNESIUM mg/dL 1.6*  --  1.4*     Results from last 7 days   Lab Units 07/20/24  0442 07/19/24  0443   AST U/L 37 62*   ALT U/L 26 36   ALK PHOS U/L 57 93   TOTAL PROTEIN g/dL 5.8* 7.9   ALBUMIN g/dL 3.2* 4.3   TOTAL BILIRUBIN mg/dL 0.62 0.78     Results from last 7 days   Lab Units 07/20/24  1128 07/20/24  0736 07/19/24  2119 07/19/24  1526 07/19/24  1126 07/19/24  0810   POC GLUCOSE mg/dl 194* 132 240* 158* 171* 165*     Results from last 7  days   Lab Units 07/20/24  0442 07/19/24  1236 07/19/24  0443   GLUCOSE RANDOM mg/dL 142* 155* 270*         Results from last 7 days   Lab Units 07/19/24  1236   HEMOGLOBIN A1C % 6.6*   EAG mg/dl 143     Beta- Hydroxybutyrate   Date Value Ref Range Status   07/19/2024 0.71 (H) 0.02 - 0.27 mmol/L Final   06/04/2024 <0.05 0.02 - 0.27 mmol/L Final          Results from last 7 days   Lab Units 07/19/24  0536   PH MICHELLE  7.438*   PCO2 MICHELLE mm Hg 30.8*   PO2 MICHELLE mm Hg 33.7*   HCO3 MICHELLE mmol/L 20.4*   BASE EXC MICHELLE mmol/L -2.9   O2 CONTENT MICHELLE ml/dL 10.9   O2 HGB, VENOUS % 63.3             Results from last 7 days   Lab Units 07/19/24  0712 07/19/24  0443   HS TNI 0HR ng/L  --  7   HS TNI 2HR ng/L 10  --    HSTNI D2 ng/L 3  --      Results from last 7 days   Lab Units 07/19/24  0443   D-DIMER QUANTITATIVE ug/ml FEU 0.60*         Results from last 7 days   Lab Units 07/19/24  0443   TSH 3RD GENERATON uIU/mL 4.160         Results from last 7 days   Lab Units 07/19/24  0536   LACTIC ACID mmol/L 2.0             Results from last 7 days   Lab Units 07/19/24  0443   BNP pg/mL 108*         Results from last 7 days   Lab Units 07/19/24  0443   LIPASE u/L 46         ED Treatment-Medication Administration from 07/19/2024 0423 to 07/19/2024 1438        Date/Time  Order  Dose  Route  Action  07/19/2024 0441  adenosine (ADENOCARD) injection 6 mg  6 mg  Intravenous  Given  07/19/2024 0542  sodium chloride 0.9 % bolus 1,000 mL  1,000 mL  Intravenous  New Bag  07/19/2024 0542  magnesium sulfate 2 g/50 mL IVPB (premix) 2 g  2 g  Intravenous  New Bag  07/19/2024 0544  metoprolol succinate (TOPROL-XL) 24 hr tablet 50 mg  50 mg  Oral  Given  07/19/2024 0900  aspirin chewable tablet 81 mg  81 mg  Oral  Given  07/19/2024 0859  loratadine (CLARITIN) tablet 5 mg  5 mg  Oral  Given  07/19/2024 0900  folic acid (FOLVITE) tablet 1 mg  1 mg  Oral  Given  07/19/2024 0859  pantoprazole (PROTONIX) EC tablet 40 mg  40 mg  Oral  Given  07/19/2024  1000  prasugrel (EFFIENT) tablet 10 mg  10 mg  Oral  Given  07/19/2024 0900  thiamine tablet 100 mg  100 mg  Oral  Given  07/19/2024 0900  insulin lispro (HumALOG/ADMELOG) 100 units/mL subcutaneous injection 1-6 Units  1 Units  Subcutaneous  Given  07/19/2024 1142  insulin lispro (HumALOG/ADMELOG) 100 units/mL subcutaneous injection 1-6 Units  1 Units  Subcutaneous  Given  07/19/2024 0859  multi-electrolyte (PLASMALYTE-A/ISOLYTE-S PH 7.4) IV solution  75 mL/hr  Intravenous  New Bag  07/19/2024 1232  enoxaparin (LOVENOX) subcutaneous injection 40 mg  40 mg  Subcutaneous  Given        Past Medical History:   Diagnosis Date    Coronary artery disease     cath Oct 2013: 95% prox LAD, 40% mid LAD, EF 60%, MANUEL to prox LAD (Xience Rx 3.5x18mm)    Diabetes mellitus ([facility]) 2010    not sure on date    Difficulty walking     Hypertension 2013    medication    Memory loss     Neuropathy in diabetes ([facility])     Peripheral neuropathy     Sleep apnea      Present on Admission:   Benign essential hypertension   Obstructive sleep apnea   Vitamin B12 deficiency   Metabolic acidosis   Type 2 diabetes mellitus ([facility])      Admitting Diagnosis: Dehydration [E86.0]  Hypomagnesemia [E83.42]  SVT (supraventricular tachycardia) [I47.10]  Metabolic acidosis [E87.20]  Alcohol use [Z78.9]  JOSE ANGEL (acute kidney injury) ([facility]) [N17.9]  Age/Sex: 63 y.o. male  Admission Orders:  Scheduled Medications:  No current facility-administered medications for this encounter.    Continuous IV Infusions:  No current facility-administered medications for this encounter.    PRN Meds:  No current facility-administered medications for this encounter.      IP CONSULT TO CARDIOLOGY    Network Utilization Review Department  ATTENTION: Please call with any questions or concerns to [phone] and carefully listen to the prompts so that you are directed to the right person. All voicemails are confidential.   For Discharge needs, contact Care Management DC Support Team at  [phone] opt. 2  Send all requests for admission clinical reviews, approved or denied determinations and any other requests to dedicated fax number below belonging to the campus where the patient is receiving treatment. List of dedicated fax numbers for the Facilities:  FACILITY NAME  UR FAX NUMBER  ADMISSION DENIALS (Administrative/Medical Necessity)  [phone]  DISCHARGE SUPPORT TEAM (NETWORK)  [phone]  PARENT CHILD HEALTH (Maternity/NICU/Pediatrics)  [phone]  General acute hospital  [phone]  [facility]  [phone]  [facility]  [phone]  [facility]  [phone]  [facility]  [phone]  [facility]  [phone]  [facility]  [phone]  [facility]  [phone]  [facility]  [phone]  [facility]  [phone]  [facility]  [phone]  [facility]  [phone]

## 2024-07-22 NOTE — TELEPHONE ENCOUNTER
Spoke with pt's wife and made aware of Dr. Grossman' recommendations as well as the delivery of the zio patch.      Clerical:  Pt will need hospital follow up with Dr. Grossman (or AP if pt agreeable) in 4-6 weeks post zio patch.

## 2024-07-22 NOTE — TELEPHONE ENCOUNTER
LC assisting with breastfeeding. Using 24mm nipple shield in cross cradle position. Aj had 2 previous breast feeding sessions with large volumes. Appears sleepy. Instructions given and support for burping over the shoulder. 16 mL per scale. Will assist with setting up portable breast pump for home use.   Yes, I agree to continue his medications as prescribed by the hospital team.  I'm not sure what you want me to advise on the zio monitor.  If he's feeling well, there isn't an urgency to see him.

## 2024-07-22 NOTE — TELEPHONE ENCOUNTER
"  Reason for Disposition   Caller has NON-URGENT medicine question about med that PCP or specialist prescribed and triager unable to answer question    Answer Assessment - Initial Assessment Questions  1. NAME of MEDICATION: \"What medicine are you calling about?\"      Aquilino was in the ED for SVT's, and had been discharged,  Wife is calling concerned about his medicaitons. They stopped his lisinopril, and increased his metoprolol.  2. QUESTION: \"What is your question?\" (e.g., medication refill, side effect)      Asking about Heart monitor?   Also are you good with medicaiton switches? Also does pt need another appt now or can it be after monitor ?  3. PRESCRIBING HCP: \"Who prescribed it?\" Reason: if prescribed by specialist, call should be referred to that group.      Discontinued lisinopril and increased his metoprolol to 50 mg bid.     4. SYMPTOMS: \"Do you have any symptoms?\"      Wife denies any s/s.  5. SEVERITY: If symptoms are present, ask \"Are they mild, moderate or severe?\"      N/a  6. PREGNANCY:  \"Is there any chance that you are pregnant?\" \"When was your last menstrual period?\"      N/a    Calling cause he just saw Dr Grossman few weeks ago, and now his medicaitons are getting all switched around. Advised that they hospital works as a team and would continue medications that were changed at the hospital.     Pt was also ordered a zio monitor. Please advise    Also do you want to see him again, Last night /60 HR 89, arm cuff.   Pt is feeling well and no s/s.     Please advise    Protocols used: Medication Question Call-ADULT-[place]    "

## 2024-08-14 ENCOUNTER — CLINICAL SUPPORT (OUTPATIENT)
Dept: CARDIOLOGY CLINIC | Facility: CLINIC | Age: 64
End: 2024-08-14
Payer: COMMERCIAL

## 2024-08-14 DIAGNOSIS — I47.10 SVT (SUPRAVENTRICULAR TACHYCARDIA): ICD-10-CM

## 2024-08-14 PROCEDURE — 93248 EXT ECG>7D<15D REV&INTERPJ: CPT | Performed by: INTERNAL MEDICINE

## 2024-09-04 DIAGNOSIS — I47.10 SVT (SUPRAVENTRICULAR TACHYCARDIA): ICD-10-CM

## 2024-09-04 RX ORDER — METOPROLOL SUCCINATE 50 MG/1
50 TABLET, EXTENDED RELEASE ORAL 2 TIMES DAILY
Qty: 180 TABLET | Refills: 1 | Status: SHIPPED | OUTPATIENT
Start: 2024-09-04

## 2024-09-04 NOTE — TELEPHONE ENCOUNTER
Reason for call:   [x] Refill   [] Prior Auth  [] Other:     Office:   [] PCP/Provider -   [x] Specialty/Provider -     Medication: metoprolol succinate (TOPROL-XL) 50 mg 24 hr tablet     Dose/Frequency: Take 1 tablet (50 mg total) by mouth 2 (two) times a day     Quantity: 60 tablet     Pharmacy: [facility] - [address] [phone]     Does the patient have enough for 3 days?   [] Yes   [x] No - Send as HP to POD

## 2025-03-13 DIAGNOSIS — I47.10 SVT (SUPRAVENTRICULAR TACHYCARDIA) (HCC): ICD-10-CM

## 2025-03-14 RX ORDER — METOPROLOL SUCCINATE 50 MG/1
TABLET, EXTENDED RELEASE ORAL
Qty: 180 TABLET | Refills: 0 | Status: SHIPPED | OUTPATIENT
Start: 2025-03-14

## 2025-05-01 ENCOUNTER — TELEPHONE (OUTPATIENT)
Dept: PSYCHIATRY | Facility: CLINIC | Age: 65
End: 2025-05-01

## 2025-05-01 NOTE — TELEPHONE ENCOUNTER
A no records statement was sent by [facility] to [facility] for this patient. He did not attend any [facility] offices during time frame of 4/1/24 to 7/1/24 as requested.

## 2025-05-26 DIAGNOSIS — I47.10 SVT (SUPRAVENTRICULAR TACHYCARDIA) (HCC): ICD-10-CM

## 2025-05-27 RX ORDER — METOPROLOL SUCCINATE 50 MG/1
TABLET, EXTENDED RELEASE ORAL
Qty: 180 TABLET | Refills: 0 | Status: SHIPPED | OUTPATIENT
Start: 2025-05-27

## 2025-06-02 ENCOUNTER — TELEPHONE (OUTPATIENT)
Age: 65
End: 2025-06-02

## 2025-06-02 NOTE — TELEPHONE ENCOUNTER
Josesito Purdy called from [facility] for information about when the patient was seen last for disability claim information. Information provided.

## 2025-06-24 ENCOUNTER — APPOINTMENT (EMERGENCY)
Dept: CT IMAGING | Facility: HOSPITAL | Age: 65
DRG: 872 | End: 2025-06-24
Payer: COMMERCIAL

## 2025-06-24 ENCOUNTER — HOSPITAL ENCOUNTER (INPATIENT)
Facility: HOSPITAL | Age: 65
LOS: 4 days | DRG: 872 | End: 2025-06-28
Attending: SURGERY | Admitting: INTERNAL MEDICINE
Payer: COMMERCIAL

## 2025-06-24 ENCOUNTER — APPOINTMENT (EMERGENCY)
Dept: RADIOLOGY | Facility: HOSPITAL | Age: 65
DRG: 872 | End: 2025-06-24
Payer: COMMERCIAL

## 2025-06-24 ENCOUNTER — APPOINTMENT (OUTPATIENT)
Dept: RADIOLOGY | Facility: HOSPITAL | Age: 65
DRG: 872 | End: 2025-06-24
Payer: COMMERCIAL

## 2025-06-24 DIAGNOSIS — G89.29 CHRONIC BILATERAL LOW BACK PAIN WITH LEFT-SIDED SCIATICA: ICD-10-CM

## 2025-06-24 DIAGNOSIS — R78.81 BACTEREMIA: Primary | ICD-10-CM

## 2025-06-24 DIAGNOSIS — M25.512 LEFT SHOULDER PAIN: ICD-10-CM

## 2025-06-24 DIAGNOSIS — M54.42 CHRONIC BILATERAL LOW BACK PAIN WITH LEFT-SIDED SCIATICA: ICD-10-CM
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
## 2025-06-24 PROBLEM — E83.42 HYPOMAGNESEMIA: Status: ACTIVE | Noted: 2025-06-24

## 2025-06-24 PROBLEM — E11.9 DIABETES (HCC): Status: ACTIVE | Noted: 2025-06-24

## 2025-06-24 PROBLEM — E87.20 LACTIC ACIDOSIS: Status: ACTIVE | Noted: 2025-06-24

## 2025-06-24 PROBLEM — E83.39 HYPOPHOSPHATEMIA: Status: ACTIVE | Noted: 2025-06-24

## 2025-06-24 PROBLEM — E87.29 HIGH ANION GAP METABOLIC ACIDOSIS: Status: ACTIVE | Noted: 2025-06-24

## 2025-06-24 PROBLEM — F10.90 ALCOHOL USE DISORDER: Status: ACTIVE | Noted: 2025-06-24

## 2025-06-24 PROBLEM — Z72.0 TOBACCO ABUSE: Status: ACTIVE | Noted: 2025-06-24

## 2025-06-24 PROBLEM — J32.9 SINUSITIS: Status: ACTIVE | Noted: 2025-06-24

## 2025-06-24 PROBLEM — I25.10 CORONARY ARTERY DISEASE: Status: ACTIVE | Noted: 2025-06-24

## 2025-06-24 PROBLEM — I10 HYPERTENSION: Status: ACTIVE | Noted: 2025-06-24

## 2025-06-24 PROBLEM — E87.1 HYPONATREMIA: Status: ACTIVE | Noted: 2025-06-24

## 2025-06-24 LAB
2HR DELTA HS TROPONIN: -3 NG/L
ALBUMIN SERPL BCG-MCNC: 2.8 G/DL (ref 3.5–5)
ALP SERPL-CCNC: 180 U/L (ref 34–104)
ALT SERPL W P-5'-P-CCNC: 57 U/L (ref 7–52)
ANION GAP SERPL CALCULATED.3IONS-SCNC: 16 MMOL/L (ref 4–13)
AST SERPL W P-5'-P-CCNC: 114 U/L (ref 13–39)
ATRIAL RATE: 104 BPM
B-OH-BUTYR SERPL-MCNC: 0.37 MMOL/L (ref 0.2–0.6)
BASE EXCESS BLDA CALC-SCNC: -5 MMOL/L (ref -2–3)
BASOPHILS # BLD AUTO: 0.02 THOUSANDS/ÂΜL (ref 0–0.1)
BASOPHILS NFR BLD AUTO: 0 % (ref 0–1)
BILIRUB DIRECT SERPL-MCNC: 0.45 MG/DL (ref 0–0.2)
BILIRUB SERPL-MCNC: 1 MG/DL (ref 0.2–1)
BILIRUB UR QL STRIP: NEGATIVE
BUN SERPL-MCNC: 5 MG/DL (ref 5–25)
CA-I BLD-SCNC: 0.94 MMOL/L (ref 1.12–1.32)
CALCIUM SERPL-MCNC: 8.2 MG/DL (ref 8.4–10.2)
CARDIAC TROPONIN I PNL SERPL HS: 4 NG/L (ref ?–50)
CARDIAC TROPONIN I PNL SERPL HS: 7 NG/L (ref ?–50)
CHLORIDE SERPL-SCNC: 98 MMOL/L (ref 96–108)
CK SERPL-CCNC: 43 U/L (ref 39–192)
CLARITY UR: CLEAR
CO2 SERPL-SCNC: 18 MMOL/L (ref 21–32)
COLOR UR: ABNORMAL
CREAT SERPL-MCNC: 0.77 MG/DL (ref 0.6–1.3)
EOSINOPHIL # BLD AUTO: 0.04 THOUSAND/ÂΜL (ref 0–0.61)
EOSINOPHIL NFR BLD AUTO: 1 % (ref 0–6)
ERYTHROCYTE [DISTWIDTH] IN BLOOD BY AUTOMATED COUNT: 14.6 % (ref 11.6–15.1)
EST. AVERAGE GLUCOSE BLD GHB EST-MCNC: 183 MG/DL
EST. AVERAGE GLUCOSE BLD GHB EST-MCNC: 183 MG/DL
ETHANOL SERPL-MCNC: <10 MG/DL
GLUCOSE SERPL-MCNC: 174 MG/DL (ref 65–140)
GLUCOSE SERPL-MCNC: 225 MG/DL (ref 65–140)
GLUCOSE SERPL-MCNC: 229 MG/DL (ref 65–140)
GLUCOSE SERPL-MCNC: 232 MG/DL (ref 65–140)
GLUCOSE UR STRIP-MCNC: ABNORMAL MG/DL
HBA1C MFR BLD: 8 %
HBA1C MFR BLD: 8 %
HCO3 BLDA-SCNC: 18.6 MMOL/L (ref 24–30)
HCT VFR BLD AUTO: 39.6 % (ref 36.5–46.1)
HCT VFR BLD CALC: 39 % (ref 36.5–46.1)
HGB BLD-MCNC: 13 G/DL (ref 12–15.4)
HGB BLDA-MCNC: 13.3 G/DL (ref 12–15.4)
HGB UR QL STRIP.AUTO: NEGATIVE
IMM GRANULOCYTES # BLD AUTO: 0.06 THOUSAND/UL (ref 0–0.2)
IMM GRANULOCYTES NFR BLD AUTO: 1 % (ref 0–2)
KETONES UR STRIP-MCNC: ABNORMAL MG/DL
LACTATE SERPL-SCNC: 4.2 MMOL/L (ref 0.5–2)
LACTATE SERPL-SCNC: 5.9 MMOL/L (ref 0.5–2)
LACTATE SERPL-SCNC: 6 MMOL/L (ref 0.5–2)
LACTATE SERPL-SCNC: 6.4 MMOL/L (ref 0.5–2)
LEUKOCYTE ESTERASE UR QL STRIP: NEGATIVE
LYMPHOCYTES # BLD AUTO: 1.28 THOUSANDS/ÂΜL (ref 0.6–4.47)
LYMPHOCYTES NFR BLD AUTO: 26 % (ref 14–44)
MAGNESIUM SERPL-MCNC: 1.6 MG/DL (ref 1.9–2.7)
MCH RBC QN AUTO: 33.3 PG (ref 26.8–34.3)
MCHC RBC AUTO-ENTMCNC: 32.8 G/DL (ref 31.4–37.4)
MCV RBC AUTO: 102 FL (ref 82–98)
MONOCYTES # BLD AUTO: 0.81 THOUSAND/ÂΜL (ref 0.17–1.22)
MONOCYTES NFR BLD AUTO: 16 % (ref 4–12)
NEUTROPHILS # BLD AUTO: 2.79 THOUSANDS/ÂΜL (ref 1.85–7.62)
NEUTS SEG NFR BLD AUTO: 56 % (ref 43–75)
NITRITE UR QL STRIP: NEGATIVE
NRBC BLD AUTO-RTO: 0 /100 WBCS
P AXIS: 68 DEGREES
PCO2 BLD: 20 MMOL/L (ref 21–32)
PCO2 BLD: 31.3 MM HG (ref 42–50)
PH BLD: 7.38 [PH] (ref 7.3–7.4)
PH UR STRIP.AUTO: 5 [PH]
PHOSPHATE SERPL-MCNC: 2.2 MG/DL (ref 2.3–4.1)
PLATELET # BLD AUTO: 140 THOUSANDS/UL (ref 149–390)
PMV BLD AUTO: 10.3 FL (ref 8.9–12.7)
PO2 BLD: 23 MM HG (ref 35–45)
POTASSIUM BLD-SCNC: 4.7 MMOL/L (ref 3.5–5.3)
POTASSIUM SERPL-SCNC: 4.2 MMOL/L (ref 3.5–5.3)
PR INTERVAL: 154 MS
PROCALCITONIN SERPL-MCNC: 0.43 NG/ML
PROT SERPL-MCNC: 5.7 G/DL (ref 6.4–8.4)
PROT UR STRIP-MCNC: NEGATIVE MG/DL
QRS AXIS: 50 DEGREES
QRSD INTERVAL: 62 MS
QT INTERVAL: 328 MS
QTC INTERVAL: 431 MS
RBC # BLD AUTO: 3.9 MILLION/UL (ref 3.88–5.12)
SAO2 % BLD FROM PO2: 39 % (ref 60–85)
SODIUM BLD-SCNC: 134 MMOL/L (ref 136–145)
SODIUM SERPL-SCNC: 132 MMOL/L (ref 135–147)
SP GR UR STRIP.AUTO: 1.01 (ref 1–1.03)
SPECIMEN SOURCE: ABNORMAL
T WAVE AXIS: 61 DEGREES
TSH SERPL DL<=0.05 MIU/L-ACNC: 1.58 UIU/ML (ref 0.45–4.5)
UROBILINOGEN UR STRIP-ACNC: <2 MG/DL
VENTRICULAR RATE: 104 BPM
WBC # BLD AUTO: 5 THOUSAND/UL (ref 4.31–10.16)

## 2025-06-24 PROCEDURE — 84484 ASSAY OF TROPONIN QUANT: CPT | Performed by: SURGERY

## 2025-06-24 PROCEDURE — 74177 CT ABD & PELVIS W/CONTRAST: CPT

## 2025-06-24 PROCEDURE — 99223 1ST HOSP IP/OBS HIGH 75: CPT | Performed by: STUDENT IN AN ORGANIZED HEALTH CARE EDUCATION/TRAINING PROGRAM

## 2025-06-24 PROCEDURE — 76705 ECHO EXAM OF ABDOMEN: CPT | Performed by: PHYSICIAN ASSISTANT

## 2025-06-24 PROCEDURE — 85025 COMPLETE CBC W/AUTO DIFF WBC: CPT | Performed by: SURGERY

## 2025-06-24 PROCEDURE — 82947 ASSAY GLUCOSE BLOOD QUANT: CPT

## 2025-06-24 PROCEDURE — 96365 THER/PROPH/DIAG IV INF INIT: CPT

## 2025-06-24 PROCEDURE — 71260 CT THORAX DX C+: CPT

## 2025-06-24 PROCEDURE — 96375 TX/PRO/DX INJ NEW DRUG ADDON: CPT

## 2025-06-24 PROCEDURE — 87154 CUL TYP ID BLD PTHGN 6+ TRGT: CPT | Performed by: PHYSICIAN ASSISTANT

## 2025-06-24 PROCEDURE — 82948 REAGENT STRIP/BLOOD GLUCOSE: CPT

## 2025-06-24 PROCEDURE — 84132 ASSAY OF SERUM POTASSIUM: CPT

## 2025-06-24 PROCEDURE — 83605 ASSAY OF LACTIC ACID: CPT

## 2025-06-24 PROCEDURE — 85014 HEMATOCRIT: CPT

## 2025-06-24 PROCEDURE — 73090 X-RAY EXAM OF FOREARM: CPT

## 2025-06-24 PROCEDURE — 80076 HEPATIC FUNCTION PANEL: CPT

## 2025-06-24 PROCEDURE — 93005 ELECTROCARDIOGRAM TRACING: CPT

## 2025-06-24 PROCEDURE — 93308 TTE F-UP OR LMTD: CPT | Performed by: PHYSICIAN ASSISTANT

## 2025-06-24 PROCEDURE — 84100 ASSAY OF PHOSPHORUS: CPT | Performed by: SURGERY

## 2025-06-24 PROCEDURE — 96368 THER/DIAG CONCURRENT INF: CPT

## 2025-06-24 PROCEDURE — 87186 SC STD MICRODIL/AGAR DIL: CPT | Performed by: PHYSICIAN ASSISTANT

## 2025-06-24 PROCEDURE — 71045 X-RAY EXAM CHEST 1 VIEW: CPT

## 2025-06-24 PROCEDURE — 99285 EMERGENCY DEPT VISIT HI MDM: CPT

## 2025-06-24 PROCEDURE — 96367 TX/PROPH/DG ADDL SEQ IV INF: CPT

## 2025-06-24 PROCEDURE — 84295 ASSAY OF SERUM SODIUM: CPT

## 2025-06-24 PROCEDURE — 96366 THER/PROPH/DIAG IV INF ADDON: CPT

## 2025-06-24 PROCEDURE — 36415 COLL VENOUS BLD VENIPUNCTURE: CPT | Performed by: SURGERY

## 2025-06-24 PROCEDURE — 82330 ASSAY OF CALCIUM: CPT

## 2025-06-24 PROCEDURE — 82010 KETONE BODYS QUAN: CPT | Performed by: PHYSICIAN ASSISTANT

## 2025-06-24 PROCEDURE — 82077 ASSAY SPEC XCP UR&BREATH IA: CPT | Performed by: PHYSICIAN ASSISTANT

## 2025-06-24 PROCEDURE — 93010 ELECTROCARDIOGRAM REPORT: CPT | Performed by: STUDENT IN AN ORGANIZED HEALTH CARE EDUCATION/TRAINING PROGRAM

## 2025-06-24 PROCEDURE — 83605 ASSAY OF LACTIC ACID: CPT | Performed by: PHYSICIAN ASSISTANT

## 2025-06-24 PROCEDURE — 73060 X-RAY EXAM OF HUMERUS: CPT

## 2025-06-24 PROCEDURE — 83036 HEMOGLOBIN GLYCOSYLATED A1C: CPT

## 2025-06-24 PROCEDURE — 83036 HEMOGLOBIN GLYCOSYLATED A1C: CPT | Performed by: PHYSICIAN ASSISTANT

## 2025-06-24 PROCEDURE — 73030 X-RAY EXAM OF SHOULDER: CPT

## 2025-06-24 PROCEDURE — 84443 ASSAY THYROID STIM HORMONE: CPT

## 2025-06-24 PROCEDURE — 99223 1ST HOSP IP/OBS HIGH 75: CPT | Performed by: SURGERY

## 2025-06-24 PROCEDURE — 83735 ASSAY OF MAGNESIUM: CPT | Performed by: SURGERY

## 2025-06-24 PROCEDURE — 70450 CT HEAD/BRAIN W/O DYE: CPT

## 2025-06-24 PROCEDURE — 87040 BLOOD CULTURE FOR BACTERIA: CPT | Performed by: PHYSICIAN ASSISTANT

## 2025-06-24 PROCEDURE — 87077 CULTURE AEROBIC IDENTIFY: CPT | Performed by: PHYSICIAN ASSISTANT

## 2025-06-24 PROCEDURE — 72125 CT NECK SPINE W/O DYE: CPT

## 2025-06-24 PROCEDURE — 82803 BLOOD GASES ANY COMBINATION: CPT

## 2025-06-24 PROCEDURE — 84145 PROCALCITONIN (PCT): CPT | Performed by: PHYSICIAN ASSISTANT

## 2025-06-24 PROCEDURE — 80048 BASIC METABOLIC PNL TOTAL CA: CPT | Performed by: SURGERY

## 2025-06-24 PROCEDURE — 82550 ASSAY OF CK (CPK): CPT | Performed by: SURGERY

## 2025-06-24 RX ORDER — LANOLIN ALCOHOL/MO/W.PET/CERES
100 CREAM (GRAM) TOPICAL DAILY
Status: DISCONTINUED | OUTPATIENT
Start: 2025-06-25 | End: 2025-06-25

## 2025-06-24 RX ORDER — HYDROMORPHONE HCL/PF 1 MG/ML
1 SYRINGE (ML) INJECTION
Status: DISCONTINUED | OUTPATIENT
Start: 2025-06-24 | End: 2025-06-24

## 2025-06-24 RX ORDER — MAGNESIUM SULFATE HEPTAHYDRATE 40 MG/ML
4 INJECTION, SOLUTION INTRAVENOUS ONCE
Status: COMPLETED | OUTPATIENT
Start: 2025-06-24 | End: 2025-06-24

## 2025-06-24 RX ORDER — ATORVASTATIN CALCIUM 20 MG/1
20 TABLET, FILM COATED ORAL DAILY
Status: ON HOLD | COMMUNITY

## 2025-06-24 RX ORDER — LANOLIN ALCOHOL/MO/W.PET/CERES
1000 CREAM (GRAM) TOPICAL DAILY
Status: ON HOLD | COMMUNITY

## 2025-06-24 RX ORDER — METHOCARBAMOL 500 MG/1
500 TABLET, FILM COATED ORAL EVERY 8 HOURS PRN
Status: DISCONTINUED | OUTPATIENT
Start: 2025-06-24 | End: 2025-06-24

## 2025-06-24 RX ORDER — LORATADINE 10 MG/1
10 TABLET ORAL DAILY
Status: DISCONTINUED | OUTPATIENT
Start: 2025-06-25 | End: 2025-06-28 | Stop reason: HOSPADM

## 2025-06-24 RX ORDER — METOPROLOL TARTRATE 50 MG
50 TABLET ORAL EVERY 12 HOURS SCHEDULED
Status: ON HOLD | COMMUNITY

## 2025-06-24 RX ORDER — SODIUM CHLORIDE, SODIUM GLUCONATE, SODIUM ACETATE, POTASSIUM CHLORIDE, MAGNESIUM CHLORIDE, SODIUM PHOSPHATE, DIBASIC, AND POTASSIUM PHOSPHATE .53; .5; .37; .037; .03; .012; .00082 G/100ML; G/100ML; G/100ML; G/100ML; G/100ML; G/100ML; G/100ML
1000 INJECTION, SOLUTION INTRAVENOUS ONCE
Status: COMPLETED | OUTPATIENT
Start: 2025-06-24 | End: 2025-06-24

## 2025-06-24 RX ORDER — GABAPENTIN 100 MG/1
100 CAPSULE ORAL 3 TIMES DAILY
Status: DISCONTINUED | OUTPATIENT
Start: 2025-06-24 | End: 2025-06-27

## 2025-06-24 RX ORDER — INSULIN LISPRO 100 [IU]/ML
1-5 INJECTION, SOLUTION INTRAVENOUS; SUBCUTANEOUS
Status: DISCONTINUED | OUTPATIENT
Start: 2025-06-24 | End: 2025-06-27

## 2025-06-24 RX ORDER — INSULIN LISPRO 100 [IU]/ML
1-6 INJECTION, SOLUTION INTRAVENOUS; SUBCUTANEOUS
Status: DISCONTINUED | OUTPATIENT
Start: 2025-06-24 | End: 2025-06-25

## 2025-06-24 RX ORDER — ENOXAPARIN SODIUM 100 MG/ML
40 INJECTION SUBCUTANEOUS DAILY
Status: DISCONTINUED | OUTPATIENT
Start: 2025-06-25 | End: 2025-06-28 | Stop reason: HOSPADM

## 2025-06-24 RX ORDER — PRASUGREL 10 MG/1
10 TABLET, FILM COATED ORAL DAILY
Status: ON HOLD | COMMUNITY

## 2025-06-24 RX ORDER — INSULIN LISPRO 100 [IU]/ML
1-6 INJECTION, SOLUTION INTRAVENOUS; SUBCUTANEOUS
Status: DISCONTINUED | OUTPATIENT
Start: 2025-06-24 | End: 2025-06-28 | Stop reason: HOSPADM

## 2025-06-24 RX ORDER — DEXTROSE MONOHYDRATE AND SODIUM CHLORIDE 5; .9 G/100ML; G/100ML
75 INJECTION, SOLUTION INTRAVENOUS CONTINUOUS
Status: DISCONTINUED | OUTPATIENT
Start: 2025-06-24 | End: 2025-06-24

## 2025-06-24 RX ORDER — NICOTINE 21 MG/24HR
14 PATCH, TRANSDERMAL 24 HOURS TRANSDERMAL DAILY
Status: DISCONTINUED | OUTPATIENT
Start: 2025-06-25 | End: 2025-06-28 | Stop reason: HOSPADM

## 2025-06-24 RX ORDER — FLUTICASONE PROPIONATE 50 MCG
1 SPRAY, SUSPENSION (ML) NASAL DAILY
Status: DISCONTINUED | OUTPATIENT
Start: 2025-06-25 | End: 2025-06-25

## 2025-06-24 RX ORDER — LANOLIN ALCOHOL/MO/W.PET/CERES
100 CREAM (GRAM) TOPICAL DAILY
Status: ON HOLD | COMMUNITY

## 2025-06-24 RX ORDER — ACETAMINOPHEN 10 MG/ML
1000 INJECTION, SOLUTION INTRAVENOUS EVERY 8 HOURS
Status: DISCONTINUED | OUTPATIENT
Start: 2025-06-24 | End: 2025-06-25

## 2025-06-24 RX ORDER — METOPROLOL TARTRATE 50 MG
50 TABLET ORAL EVERY 12 HOURS SCHEDULED
Status: DISCONTINUED | OUTPATIENT
Start: 2025-06-24 | End: 2025-06-24

## 2025-06-24 RX ORDER — FOLIC ACID 1 MG/1
1 TABLET ORAL DAILY
Status: DISCONTINUED | OUTPATIENT
Start: 2025-06-25 | End: 2025-06-28 | Stop reason: HOSPADM

## 2025-06-24 RX ORDER — METHOCARBAMOL 500 MG/1
500 TABLET, FILM COATED ORAL EVERY 6 HOURS SCHEDULED
Status: DISCONTINUED | OUTPATIENT
Start: 2025-06-24 | End: 2025-06-25

## 2025-06-24 RX ORDER — ASPIRIN 81 MG/1
81 TABLET, CHEWABLE ORAL DAILY
Status: DISCONTINUED | OUTPATIENT
Start: 2025-06-25 | End: 2025-06-28 | Stop reason: HOSPADM

## 2025-06-24 RX ORDER — PRASUGREL 10 MG/1
10 TABLET, FILM COATED ORAL DAILY
Status: DISCONTINUED | OUTPATIENT
Start: 2025-06-25 | End: 2025-06-28 | Stop reason: HOSPADM

## 2025-06-24 RX ORDER — HYDROMORPHONE HCL/PF 1 MG/ML
0.5 SYRINGE (ML) INJECTION
Status: DISCONTINUED | OUTPATIENT
Start: 2025-06-24 | End: 2025-06-28 | Stop reason: HOSPADM

## 2025-06-24 RX ORDER — METOPROLOL TARTRATE 50 MG
50 TABLET ORAL EVERY 12 HOURS SCHEDULED
Status: DISCONTINUED | OUTPATIENT
Start: 2025-06-25 | End: 2025-06-28 | Stop reason: HOSPADM

## 2025-06-24 RX ORDER — HYDROMORPHONE HCL IN WATER/PF 6 MG/30 ML
0.2 PATIENT CONTROLLED ANALGESIA SYRINGE INTRAVENOUS
Status: DISCONTINUED | OUTPATIENT
Start: 2025-06-24 | End: 2025-06-24

## 2025-06-24 RX ORDER — ASPIRIN 81 MG/1
81 TABLET, CHEWABLE ORAL DAILY
Status: ON HOLD | COMMUNITY

## 2025-06-24 RX ORDER — OXYCODONE HYDROCHLORIDE 5 MG/1
5 TABLET ORAL ONCE
Refills: 0 | Status: COMPLETED | OUTPATIENT
Start: 2025-06-24 | End: 2025-06-24

## 2025-06-24 RX ORDER — FOLIC ACID 1 MG/1
1 TABLET ORAL DAILY
Status: ON HOLD | COMMUNITY

## 2025-06-24 RX ORDER — OXYCODONE HYDROCHLORIDE 5 MG/1
5 TABLET ORAL EVERY 4 HOURS PRN
Status: DISCONTINUED | OUTPATIENT
Start: 2025-06-24 | End: 2025-06-26

## 2025-06-24 RX ADMIN — SODIUM CHLORIDE, SODIUM GLUCONATE, SODIUM ACETATE, POTASSIUM CHLORIDE, MAGNESIUM CHLORIDE, SODIUM PHOSPHATE, DIBASIC, AND POTASSIUM PHOSPHATE 1000 ML: .53; .5; .37; .037; .03; .012; .00082 INJECTION, SOLUTION INTRAVENOUS at 13:17

## 2025-06-24 RX ADMIN — SODIUM CHLORIDE, SODIUM GLUCONATE, SODIUM ACETATE, POTASSIUM CHLORIDE, MAGNESIUM CHLORIDE, SODIUM PHOSPHATE, DIBASIC, AND POTASSIUM PHOSPHATE 1000 ML: .53; .5; .37; .037; .03; .012; .00082 INJECTION, SOLUTION INTRAVENOUS at 11:20

## 2025-06-24 RX ADMIN — METHOCARBAMOL 500 MG: 500 TABLET ORAL at 23:56

## 2025-06-24 RX ADMIN — IOHEXOL 100 ML: 350 INJECTION, SOLUTION INTRAVENOUS at 13:50

## 2025-06-24 RX ADMIN — GABAPENTIN 100 MG: 100 CAPSULE ORAL at 22:34

## 2025-06-24 RX ADMIN — ACETAMINOPHEN 1000 MG: 10 INJECTION, SOLUTION INTRAVENOUS at 11:07

## 2025-06-24 RX ADMIN — DEXTROSE AND SODIUM CHLORIDE 75 ML/HR: 5; .9 INJECTION, SOLUTION INTRAVENOUS at 16:57

## 2025-06-24 RX ADMIN — METOPROLOL TARTRATE 50 MG: 50 TABLET, FILM COATED ORAL at 19:18

## 2025-06-24 RX ADMIN — ACETAMINOPHEN 1000 MG: 10 INJECTION, SOLUTION INTRAVENOUS at 18:59

## 2025-06-24 RX ADMIN — METHOCARBAMOL 500 MG: 500 TABLET ORAL at 17:25

## 2025-06-24 RX ADMIN — VANCOMYCIN HYDROCHLORIDE 2000 MG: 5 INJECTION, POWDER, LYOPHILIZED, FOR SOLUTION INTRAVENOUS at 14:25

## 2025-06-24 RX ADMIN — SODIUM PHOSPHATE, MONOBASIC, MONOHYDRATE AND SODIUM PHOSPHATE, DIBASIC, ANHYDROUS 30 MMOL: 276; 142 INJECTION, SOLUTION INTRAVENOUS at 12:56

## 2025-06-24 RX ADMIN — SODIUM CHLORIDE, SODIUM GLUCONATE, SODIUM ACETATE, POTASSIUM CHLORIDE, MAGNESIUM CHLORIDE, SODIUM PHOSPHATE, DIBASIC, AND POTASSIUM PHOSPHATE 1000 ML: .53; .5; .37; .037; .03; .012; .00082 INJECTION, SOLUTION INTRAVENOUS at 14:27

## 2025-06-24 RX ADMIN — INSULIN LISPRO 2 UNITS: 100 INJECTION, SOLUTION INTRAVENOUS; SUBCUTANEOUS at 22:35

## 2025-06-24 RX ADMIN — HYDROMORPHONE HYDROCHLORIDE 0.2 MG: 0.2 INJECTION, SOLUTION INTRAMUSCULAR; INTRAVENOUS; SUBCUTANEOUS at 14:42

## 2025-06-24 RX ADMIN — MAGNESIUM SULFATE HEPTAHYDRATE 4 G: 40 INJECTION, SOLUTION INTRAVENOUS at 12:47

## 2025-06-24 RX ADMIN — SODIUM BICARBONATE 100 ML/HR: 84 INJECTION, SOLUTION INTRAVENOUS at 19:02

## 2025-06-24 RX ADMIN — OXYCODONE 5 MG: 5 TABLET ORAL at 12:46

## 2025-06-24 RX ADMIN — HYDROMORPHONE HYDROCHLORIDE 0.5 MG: 1 INJECTION, SOLUTION INTRAMUSCULAR; INTRAVENOUS; SUBCUTANEOUS at 18:58

## 2025-06-24 RX ADMIN — CEFEPIME 2000 MG: 2 INJECTION, POWDER, FOR SOLUTION INTRAVENOUS at 14:06

## 2025-06-24 NOTE — ASSESSMENT & PLAN NOTE
- elevated LA of 6 and urine with + ketones and glucose present in UA  - Received 3 liters of isolyte per sepsis protocol and broad spectrum antibiotics as well given SIRS criteria of tachypnea/reduced PCO2 and tachycardia in setting of elevated LA  - monitor hemodynamic status and trend LA  - IVF hydration with D5NS given concern for starvation ketosis  - SSI for hyperglycemia  - management otherwise per medical service

## 2025-06-24 NOTE — PROCEDURES
POC FAST US    Date/Time: 6/24/2025 10:36 AM    Performed by: Dorinda Noble PA-C  Authorized by: Dorinda Noble PA-C    Patient location:  Trauma  Procedure details:     Exam Type:  Diagnostic    Indications: blunt abdominal trauma and blunt chest trauma      Assess for:  Intra-abdominal fluid and pericardial effusion    Technique: FAST      Views obtained:  Heart - Pericardial sac, LUQ - Splenorenal space, Suprapubic - Pouch of Edson and RUQ - Fernandez's Pouch    Image quality: diagnostic      Image availability:  Images available in PACS and video obtained  FAST Findings:     RUQ (Hepatorenal) free fluid: absent      LUQ (Splenorenal) free fluid: absent      Suprapubic free fluid: absent      Cardiac wall motion: identified      Pericardial effusion: absent    Interpretation:     Impressions: negative

## 2025-06-24 NOTE — ED NOTES
Received report on patient.  Pt resting comfortably at this time.  Pt has multiple infusions running as documented by previous RN.  Pt reports continued pain in left shoulder.  Awaiting further orders at this time.      Mimi Kasper RN  06/24/25 1936

## 2025-06-24 NOTE — ED NOTES
Patient's HR noted to be 134.  Admitting Dr. Charito Zamudio messaged.  Metoprolol dose moved up and given now.  PT reports he did not take his metoprolol this AM.  EKG repeated and sent to admitting team.  EKG shows sinus tachycardia.  Plan at this time per admitting team to continue fluids and tele monitoring.     Mimi Kasper RN  06/24/25 0409

## 2025-06-24 NOTE — ASSESSMENT & PLAN NOTE
- unwitnessed fall last night  - sustained no acute traumatic injuries  - Admit to medicine service  - Trauma will f/u in AM to complete tertiary survey

## 2025-06-24 NOTE — ASSESSMENT & PLAN NOTE
Recent Labs     06/24/25  1120 06/24/25  1406 06/24/25  1653   LACTICACID 5.9* 6.0* 4.2*   Grade 1 diastolic dysfunction, seen by cardiology  S/p 3L Cefepime, Vancomycin  Beta hydroxybutyrate WNL, CK, troponin WNL, UA WNL.  CXR, CT cervical spine, CT head, XR of L shoulder, CT CAP showed no acute changes and no traumatic injury findings.    Plan:  Blood culture pending  Follow-up ethanol  IVF sodium bicarb 75 cc/ hour for 12 hours  DC'd D5  Hold Metformin  Monitor off antibiotics

## 2025-06-24 NOTE — H&P
H&P - Hospitalist   Name: Donavon Couch 64 y.o. adult I MRN: [id number]  Unit/Bed#: ED-40 I Date of Admission: 6/24/2025   Date of Service: 6/24/2025 I Hospital Day: 0     Assessment & Plan  Lactic acidosis  High anion gap metabolic acidosis  Recent Labs     06/24/25  1120 06/24/25  1406 06/24/25  1653   LACTICACID 5.9* 6.0* 4.2*   Grade 1 diastolic dysfunction, seen by cardiology  S/p 3L Cefepime, Vancomycin  Beta hydroxybutyrate WNL, CK, troponin WNL, UA WNL.  CXR, CT cervical spine, CT head, XR of L shoulder, CT CAP showed no acute changes and no traumatic injury findings.    Plan:  Blood culture pending  Follow-up ethanol  IVF sodium bicarb 75 cc/ hour for 12 hours  DC'd D5  Hold Metformin  Monitor off antibiotics  Fall  Patient has history of ambulatory dysfunction and falls. He uses a cane at home and has to grab hold of things near him to walk steadily. 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   During this fall, wife says the patient was getting out of bed and attempting to use the bathroom and fell. He denied any LOC, dizziness, tripping over anything, or urinary incontinence, but he felt too weak to get up on his own, prompting him to call for his wife.    Plan:  F/u orthostats  PT/OT  Continue to monitor on telemetry  Left shoulder pain  Acute on chronic shoulder pain, no acute osseous abnormality on x-ray.  Left deltoid is tender to palpation suspecting muscle related.    Plan:  Administer Robaxin 500 mg every 6 hours scheduled  Gabapentin 100 mg 3 times daily  Oxycodone 2.5 and 5 for moderate to severe pain  Dilaudid for breakthrough pain  Sinusitis  Patient endorsing increased postnasal drip and cough, evidence of sinusitis on imaging.  Treat with antibiotics for acute infectious sinusitis.    Plan:  Augmentin starting tomorrow, anticipate 7 to 10 days  clartin and flonase  ent referral on d/c   Will monitor symptoms  Coronary artery disease  Patient  has a history of CAD (cath Oct 2013: 95% prox LAD, 40% mid LAD, EF 60%, WILLY to prox LAD)    Plan:  Continue Prasugrel 10 mg daily and Aspirin 81 mg daily  Diabetes (HCC)  A1c: 6.6 11 months ago    Patient is on Metformin 1000mg daily.    Plan:  Hold metformin  Sliding scale insulin  Manage diabetic neuropathy with Gabapentin 100 mg  Hypertension  On metoprolol 50 twice daily.    Plan:  Continue Metoprolol 50 mg every 12 hours.    Hyponatremia  Recent Labs     06/24/25  1043   SODIUM 132*   Likely in setting of poor oral intake/hypovolemic hyponatremia.    Plan:  Follow-up morning sodium  Consider hyponatremia workup  IVF sodium bicarb 75 cc/ hour for 12 hours    Hypomagnesemia  Recent Labs     06/24/25  1043   MG 1.6*   S/p IV Mg sulfate.    Plan:  Monitor and replete prn    Hypophosphatemia  S/p IV Na phosphate.    Plan:  Monitor and replete prn  Tobacco abuse  Patient endorses use of chewing tobacco.    Plan:  NRT ordered  Alcohol use disorder  Patient's wife mentioned that patient drinks up to a bottle of wine a day.    Plan:  CIWA protocol  Thiamine  Folate check  B12 check  Follow-up hepatic function panel  Follow-up INR      VTE Pharmacologic Prophylaxis:   Moderate Risk (Score 3-4) - Pharmacological DVT Prophylaxis Ordered: enoxaparin (Lovenox).  Code Status: Level 1 - Full Code patient  Discussion with family: Updated  (wife) at bedside.    Anticipated Length of Stay: Patient will be admitted on an inpatient basis with an anticipated length of stay of greater than 2 midnights secondary to lactic acidosis.    History of Present Illness   Chief Complaint: Trauma    Donavon Couch is a 64 y.o. adult with a PMH of diabetes on metformin neuropathy, mild cognitive impairment, right eye blindness, lumbar radiculopathy chronic back pain who presents after a fall this morning.   He came in this morning as a trauma alert after having a fall at home sometime this morning. He called for his daughter  on monitoring camera she has set up and she found him this morning around 7 am. He takes ASA and Effient. No injuries on imaging. He was found to have an anion gap acidosis with LA of 6. Glucose 200, + Ketones and glucose in urine. Tachypneic and tachycardic so ordered BS abx, cultures and sepsis fluids. Per daughter his oral intake is quite poor.   Patient reportedly has ambulatory dysfunction and walks with a cane at baseline, this is due to his diabetic neuropathy.  Patient often feels unsteady on his feet and due to having right eye blindness is not able to see bifocal vision.  He stated that he did not lose consciousness when falling, and this was when he was ambulating to the bathroom.  Patient did not have a head strike and no loss of consciousness, no incontinence or bowel incontinence.  By our evaluation patient reported having left shoulder pain, no chest pain, no shortness of breath.  He stated he is very unsteady on his feet and this has been a ongoing problem for months.    Was pulled aside by wife to endorse that patient has approximately a bottle of wine a day, she believes he is increasingly depressed after not working and retired.  Review of Systems   Constitutional:  Negative for activity change and appetite change.   Eyes:  Positive for visual disturbance.   Respiratory:  Negative for choking, chest tightness and shortness of breath.    Cardiovascular:  Negative for chest pain and leg swelling.   Gastrointestinal:  Negative for abdominal distention and abdominal pain.   Musculoskeletal:  Positive for arthralgias, back pain and gait problem.   Neurological:  Negative for dizziness, facial asymmetry and speech difficulty.   Psychiatric/Behavioral:  Negative for agitation.        Historical Information   Past Medical History[1]  Past Surgical History[2]  Social History[3]  No existing history information found.  No existing history information found.    Social History:  Marital Status: /Civil  Union   Occupation: retired  Patient Pre-hospital Living Situation: Home  Patient Pre-hospital Level of Mobility: walks with cane  Patient Pre-hospital Diet Restrictions: none    Meds/Allergies   I have reviewed home medications using recent Epic encounter.  And confirmed with wife  Prior to Admission medications    Not on File   Toprol, pantoprazole, thiamine, folic acid, B12, prasugrel for CAD, aspirin metformin  Not on File    Objective :  Temp:  [97.6 °F (36.4 °C)-97.9 °F (36.6 °C)] 97.9 °F (36.6 °C)  HR:  [100-122] 122  BP: (115-144)/(65-84) 143/82  Resp:  [18-20] 18  SpO2:  [96 %-100 %] 97 %  O2 Device: None (Room air)    Physical Exam  Constitutional:       Appearance: She is obese. She is ill-appearing. She is not toxic-appearing or diaphoretic.   HENT:      Mouth/Throat:      Mouth: Mucous membranes are dry.     Eyes:      Comments: Right eye cannot see     Cardiovascular:      Rate and Rhythm: Regular rhythm. Tachycardia present.   Pulmonary:      Effort: No respiratory distress.      Breath sounds: No wheezing.   Abdominal:      General: There is no distension.      Palpations: There is no mass.     Musculoskeletal:         General: No swelling or tenderness.     Skin:     General: Skin is dry.     Neurological:      Mental Status: She is oriented to person, place, and time.              Lab Results: I have reviewed the following results:  Results from last 7 days   Lab Units 06/24/25  1043   WBC Thousand/uL 5.00   HEMOGLOBIN g/dL 13.0   HEMATOCRIT % 39.6   PLATELETS Thousands/uL 140*   SEGS PCT % 56   LYMPHO PCT % 26   MONO PCT % 16*   EOS PCT % 1     Results from last 7 days   Lab Units 06/24/25  1653 06/24/25  1043   SODIUM mmol/L  --  132*   POTASSIUM mmol/L  --  4.2   CHLORIDE mmol/L  --  98   CO2 mmol/L  --  18*   BUN mg/dL  --  5   CREATININE mg/dL  --  0.77   ANION GAP mmol/L  --  16*   CALCIUM mg/dL  --  8.2*   ALBUMIN g/dL 2.8*  --    TOTAL BILIRUBIN mg/dL 1.00  --    ALK PHOS U/L 180*  --   "  ALT U/L 57*  --    AST U/L 114*  --    GLUCOSE RANDOM mg/dL  --  232*         Results from last 7 days   Lab Units 06/24/25  1651   POC GLUCOSE mg/dl 174*     No results found for: \"HGBA1C\"  Results from last 7 days   Lab Units 06/24/25  1653 06/24/25  1406 06/24/25  1120 06/24/25  1043   LACTIC ACID mmol/L 4.2* 6.0* 5.9*  --    PROCALCITONIN ng/ml  --   --   --  0.43*     Lab Results   Component Value Date    HSTNI0 7 06/24/2025    HSTNI2 4 06/24/2025    HSTNID2 -3 06/24/2025      1.  No findings for acute traumatic injury in the chest, abdomen or pelvis.   2.  New 4 mm right upper lobe nodule. Based on current Fleischner Society 2017 Guidelines on incidental pulmonary nodule, no routine follow-up is needed if the patient is low risk. If the patient is high risk, optional follow-up chest CT at 12 months can    be considered.     Left shoulder x-ray no acute osseous abnormality.  Left humerus 2 mm foreign body suggested along the medial distal left arm left forearm, no acute osseous abnormality CT spine cervical without contrast no cervical spine fracture or malalignment, CT head no acute intracranial abnormality, chest x-ray negative for acute cardiopulmonary disease    Degenerative changes as described.         Administrative Statements       ** Please Note: This note has been constructed using a voice recognition system. **         [1] No past medical history on file.  [2] No past surgical history on file.  [3]      "

## 2025-06-24 NOTE — ASSESSMENT & PLAN NOTE
Patient endorsing increased postnasal drip and cough, evidence of sinusitis on imaging.  Treat with antibiotics for acute infectious sinusitis.    Plan:  Augmentin starting tomorrow, anticipate 7 to 10 days  clartin and flonase  ent referral on d/c   Will monitor symptoms

## 2025-06-24 NOTE — ASSESSMENT & PLAN NOTE
>>ASSESSMENT AND PLAN FOR ALCOHOL USE DISORDER WRITTEN ON 6/24/2025  6:35 PM BY ALEX SAPP MD    Patient's wife mentioned that patient drinks up to a bottle of wine a day.    Plan:  [facility] protocol  Thiamine  Folate check  B12 check  Follow-up hepatic function panel  Follow-up INR     >>ASSESSMENT AND PLAN FOR TOBACCO ABUSE AND ALCOHOL USE WRITTEN ON 6/24/2025  6:35 PM BY ALEX SAPP MD    Patient endorses use of chewing tobacco.    Plan:  NRT ordered

## 2025-06-24 NOTE — ASSESSMENT & PLAN NOTE
- chronic in nature due to arthritis  - XR left shoulder shows degenerative change  - multi modal pain regimen  - outpatient referral to orthopedics if ongoing pain  - PT/OT

## 2025-06-24 NOTE — ASSESSMENT & PLAN NOTE
A1c: 6.6 11 months ago    Patient is on Metformin 1000mg daily.    Plan:  Hold metformin  Sliding scale insulin  Manage diabetic neuropathy with Gabapentin 100 mg

## 2025-06-24 NOTE — H&P
H&P - Trauma   Name: Donavon Couch 64 y.o. adult I MRN: [id number]  Unit/Bed#: ED-40 I Date of Admission: 6/24/2025   Date of Service: 6/24/2025 I Hospital Day: 0     Assessment & Plan  Fall  - unwitnessed fall last night  - sustained no acute traumatic injuries  - Admit to medicine service  - Trauma will f/u in AM to complete tertiary survey  High anion gap metabolic acidosis  - elevated LA of 6 and urine with + ketones and glucose present in UA  - Received 3 liters of isolyte per sepsis protocol and broad spectrum antibiotics as well given SIRS criteria of tachypnea/reduced PCO2 and tachycardia in setting of elevated LA  - monitor hemodynamic status and trend LA  - IVF hydration with D5NS given concern for starvation ketosis  - SSI for hyperglycemia  - management otherwise per medical service  Left shoulder pain  - chronic in nature due to arthritis  - XR left shoulder shows degenerative change  - multi modal pain regimen  - outpatient referral to orthopedics if ongoing pain  - PT/OT    Trauma Alert: Level B   Model of Arrival: Ambulance    Trauma Team: Attending Sandhya and ALEXSANDRA Frutiger  Consultants:     None     History of Present Illness   Chief Complaint: Left shoulder pain  Mechanism:Fall     Donavon Couch is a 64 y.o. adult who presents s/p fall last night. He was found by the daughter this morning around 7 am on the ground. She is unsure what time he fell. He does not recall falling and is unsure why he fell. He has baseline confusion and was noted to be at his baseline. His chief complaint is left shoulder pain. He denies pain elsewhere. He takes ASA and Effient. He lives home alone.    Review of Systems   Constitutional: Negative.    HENT: Negative.     Eyes: Negative.    Respiratory: Negative.  Negative for chest tightness and shortness of breath.    Cardiovascular: Negative.    Gastrointestinal: Negative.  Negative for abdominal pain, nausea and vomiting.   Genitourinary: Negative.     Musculoskeletal:         + left shoulder pain   Skin: Negative.    Neurological: Negative.  Negative for dizziness, weakness, numbness and headaches.   Hematological: Negative.    Psychiatric/Behavioral: Negative.       Medical History Review: I have reviewed the patient's PMH, PSH, Social History, Family History, Meds, and Allergies     Last Tetanus: unknown         Objective :  Temp:  [97.6 °F (36.4 °C)] 97.6 °F (36.4 °C)  HR:  [100-109] 101  BP: (115-144)/(65-84) 135/70  Resp:  [18-20] 18  SpO2:  [98 %-100 %] 100 %  O2 Device: None (Room air)    Initial Vitals:   Temperature: 97.6 °F (36.4 °C) (06/24/25 1036)  Pulse: (!) 109 (06/24/25 1036)  Respirations: 18 (06/24/25 1036)  Blood Pressure: 144/84 (06/24/25 1036)    Primary Survey:   Airway:        Status: patent;        Pre-hospital Interventions: none        Hospital Interventions: none  Breathing:        Pre-hospital Interventions: none       Effort: normal       Right breath sounds: normal       Left breath sounds: normal  Circulation:        Rhythm: regular       Rate: regular   Right Pulses Left Pulses    R radial: 2+  R femoral: 2+  R pedal: 2+  R carotid: 2+  R popliteal: 2+ L radial: 2+  L femoral: 2+  L pedal: 2+  L carotid: 2+  L popliteal: 2+   Disability:        GCS: Eye: 4; Verbal: 5 Motor: 6 Total: 15       Right Pupil: round;  reactive         Left Pupil:  round;  reactive      R Motor Strength L Motor Strength    R : 5/5  R dorsiflex: 5/5  R plantarflex: 5/5 L : 5/5  L dorsiflex: 5/5  L plantarflex: 5/5        Sensory:  No sensory deficit  Exposure:       Completed: Yes      Secondary Survey:  Physical Exam  Constitutional:       Appearance: Normal appearance.   HENT:      Head: Normocephalic.      Nose: Nose normal.      Mouth/Throat:      Mouth: Mucous membranes are dry.     Eyes:      Pupils: Pupils are equal, round, and reactive to light.       Cardiovascular:      Rate and Rhythm: Normal rate and regular rhythm.      Pulses: Normal  pulses.   Pulmonary:      Effort: Pulmonary effort is normal. No respiratory distress.      Breath sounds: Normal breath sounds.   Abdominal:      General: Abdomen is flat.      Palpations: Abdomen is soft.      Tenderness: There is no abdominal tenderness.     Musculoskeletal:      Cervical back: Normal range of motion and neck supple. No tenderness.      Comments: + L shoulder tenderness, + ROM normal     Skin:     General: Skin is warm and dry.     Neurological:      General: No focal deficit present.      Mental Status: She is alert and oriented to person, place, and time.      Sensory: No sensory deficit.      Motor: No weakness.     Psychiatric:         Behavior: Behavior normal.             Lab Results: I have reviewed the following results:  Recent Labs     06/24/25  1042 06/24/25  1043 06/24/25  1120   WBC  --  5.00  --    HGB 13.3 13.0  --    HCT 39 39.6  --    PLT  --  140*  --    SODIUM  --  132*  --    K  --  4.2  --    CL  --  98  --    CO2 20* 18*  --    BUN  --  5  --    CREATININE  --  0.77  --    GLUC  --  232*  --    CAIONIZED 0.94*  --   --    MG  --  1.6*  --    PHOS  --  2.2*  --    HSTNI0  --  7  --    LACTICACID  --   --  5.9*       Imaging Results: I have personally reviewed pertinent images saved in PACS. CT scan findings (and other pertinent positive findings on images) were discussed with radiology. My interpretation of the images/reports are as follows:  FAST: Neg  CT chest abdomen pelvis w contrast  Result Date: 6/24/2025  Impression: 1.  No findings for acute traumatic injury in the chest, abdomen or pelvis. 2.  New 4 mm right upper lobe nodule. Based on current Fleischner Society 2017 Guidelines on incidental pulmonary nodule, no routine follow-up is needed if the patient is low risk. If the patient is high risk, optional follow-up chest CT at 12 months can  be considered. I personally discussed this study with Sampson Arthur on 6/24/2025 3:23 PM. Computerized Assisted Algorithm  (CAA) may have aided analysis of applicable images. Workstation performed: [id number]     XR Trauma multiple (SLB/SLRA trauma bay ONLY)  Result Date: 6/24/2025  Impression: No acute cardiopulmonary disease within limitations of supine imaging. Follow-up upright imaging may be considered if there is high clinical index of suspicion for injury based on mechanism. Workstation performed: [id number]     XR humerus left  Result Date: 6/24/2025  Impression: No acute osseous abnormality. 2 mm foreign body suggested along the medial distal left arm. Computerized Assisted Algorithm (CAA) may have been used to analyze all applicable images. Workstation performed: [id number]     XR forearm 2 vw left  Result Date: 6/24/2025  Impression: No acute osseous abnormality. Computerized Assisted Algorithm (CAA) may have been used to analyze all applicable images. Workstation performed: [id number]     XR shoulder 2+ vw left  Result Date: 6/24/2025  Impression: No acute osseous abnormality. Degenerative changes as described. Computerized Assisted Algorithm (CAA) may have been used to analyze all applicable images. Workstation performed: [id number]     TRAUMA - CT head wo contrast  Result Date: 6/24/2025  Impression: No acute intracranial abnormality. I personally discussed this study with SALLY RODRIGUEZ on 6/24/2025 11:01 AM. Workstation performed: [id number]     TRAUMA - CT spine cervical wo contrast  Result Date: 6/24/2025  Impression: No cervical spine fracture or traumatic malalignment. I personally discussed this study with SALLY RODRIGUEZ on 6/24/2025 11:01 AM. Workstation performed: [id number]     XR chest 1 view  Result Date: 6/24/2025  Impression: No acute cardiopulmonary disease within limitations of supine imaging. Follow-up upright imaging may be considered if there is high clinical index of suspicion for injury based on mechanism. Workstation performed: [id number]       Other Studies: Other Study  Results Review: EKG was reviewed.
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
## 2025-06-24 NOTE — ASSESSMENT & PLAN NOTE
Acute on chronic shoulder pain, no acute osseous abnormality on x-ray.  Left deltoid is tender to palpation suspecting muscle related.    Plan:  Administer Robaxin 500 mg every 6 hours scheduled  Gabapentin 100 mg 3 times daily  Oxycodone 2.5 and 5 for moderate to severe pain  Dilaudid for breakthrough pain

## 2025-06-24 NOTE — ASSESSMENT & PLAN NOTE
Recent Labs     06/24/25  1043   SODIUM 132*   Likely in setting of poor oral intake/hypovolemic hyponatremia.    Plan:  Follow-up morning sodium  Consider hyponatremia workup  IVF sodium bicarb 75 cc/ hour for 12 hours

## 2025-06-24 NOTE — CASE MANAGEMENT
Case Management ED Progress Note    Patient name Donavon Couch  Location ED-40/ED-40 MRN [id number]  : 1960 Date 2025        OBJECTIVE:    Chief Complaint: Unspecified multiple injuries, initial encounter   Patient Class: Emergency  Preferred Pharmacy: No Pharmacies Listed  Primary Care Provider: Chikis Marshall MD    Primary Insurance: BLUE CROSS  Secondary Insurance:     ED Progress Note:  CM responded to trauma alert. Patient was transported into trauma bay by [facility] for eval. Patient responsive.     Call made to wife, Che ([phone]), who states she is on her way to the hospital. Trauma AP aware.     No current identified CM needs. CM will follow and update screening, assessment, and discharge planning as appropriate.

## 2025-06-24 NOTE — ASSESSMENT & PLAN NOTE
Patient has history of ambulatory dysfunction and falls. He uses a cane at home and has to grab hold of things near him to walk steadily. 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   During this fall, wife says the patient was getting out of bed and attempting to use the bathroom and fell. He denied any LOC, dizziness, tripping over anything, or urinary incontinence, but he felt too weak to get up on his own, prompting him to call for his wife.    Plan:  F/u orthostats  PT/OT  Continue to monitor on telemetry

## 2025-06-25 ENCOUNTER — APPOINTMENT (INPATIENT)
Dept: MRI IMAGING | Facility: HOSPITAL | Age: 65
DRG: 872 | End: 2025-06-25
Payer: COMMERCIAL

## 2025-06-25 PROBLEM — E83.42 HYPOMAGNESEMIA: Status: RESOLVED | Noted: 2025-06-24 | Resolved: 2025-06-25

## 2025-06-25 PROBLEM — E83.39 HYPOPHOSPHATEMIA: Status: RESOLVED | Noted: 2025-06-24 | Resolved: 2025-06-25

## 2025-06-25 PROBLEM — R78.81 BACTEREMIA: Status: ACTIVE | Noted: 2025-06-25

## 2025-06-25 PROBLEM — A41.9 SEPSIS (HCC): Status: ACTIVE | Noted: 2025-06-25

## 2025-06-25 LAB
ALBUMIN SERPL BCG-MCNC: 2.7 G/DL (ref 3.5–5)
ALP SERPL-CCNC: 175 U/L (ref 34–104)
ALT SERPL W P-5'-P-CCNC: 51 U/L (ref 7–52)
ANION GAP SERPL CALCULATED.3IONS-SCNC: 14 MMOL/L (ref 4–13)
AST SERPL W P-5'-P-CCNC: 109 U/L (ref 13–39)
ATRIAL RATE: 136 BPM
BACTERIA UR QL AUTO: NORMAL /HPF
BASE EX.OXY STD BLDV CALC-SCNC: 87.8 % (ref 60–80)
BASE EXCESS BLDV CALC-SCNC: 3.6 MMOL/L
BILIRUB SERPL-MCNC: 1.34 MG/DL (ref 0.2–1)
BILIRUB UR QL STRIP: NEGATIVE
BUN SERPL-MCNC: 6 MG/DL (ref 5–25)
CALCIUM ALBUM COR SERPL-MCNC: 8.1 MG/DL (ref 8.3–10.1)
CALCIUM SERPL-MCNC: 7.1 MG/DL (ref 8.4–10.2)
CHLORIDE SERPL-SCNC: 94 MMOL/L (ref 96–108)
CLARITY UR: CLEAR
CO2 SERPL-SCNC: 24 MMOL/L (ref 21–32)
COLOR UR: YELLOW
CREAT SERPL-MCNC: 0.86 MG/DL (ref 0.6–1.3)
ERYTHROCYTE [DISTWIDTH] IN BLOOD BY AUTOMATED COUNT: 14.9 % (ref 11.6–15.1)
FOLATE SERPL-MCNC: 5.3 NG/ML
GLUCOSE SERPL-MCNC: 150 MG/DL (ref 65–140)
GLUCOSE SERPL-MCNC: 162 MG/DL (ref 65–140)
GLUCOSE SERPL-MCNC: 177 MG/DL (ref 65–140)
GLUCOSE SERPL-MCNC: 191 MG/DL (ref 65–140)
GLUCOSE SERPL-MCNC: 235 MG/DL (ref 65–140)
GLUCOSE UR STRIP-MCNC: ABNORMAL MG/DL
HCO3 BLDV-SCNC: 27.6 MMOL/L (ref 24–30)
HCT VFR BLD AUTO: 29.8 % (ref 36.5–46.1)
HGB BLD-MCNC: 10.4 G/DL (ref 12–15.4)
HGB UR QL STRIP.AUTO: NEGATIVE
HOLD SPECIMEN: NORMAL
KETONES UR STRIP-MCNC: ABNORMAL MG/DL
LACTATE SERPL-SCNC: 5.8 MMOL/L (ref 0.5–2)
LACTATE SERPL-SCNC: 5.9 MMOL/L (ref 0.5–2)
LACTATE SERPL-SCNC: 6.4 MMOL/L (ref 0.5–2)
LACTATE SERPL-SCNC: 6.6 MMOL/L (ref 0.5–2)
LACTATE SERPL-SCNC: 7 MMOL/L (ref 0.5–2)
LACTATE SERPL-SCNC: 7.2 MMOL/L (ref 0.5–2)
LEUKOCYTE ESTERASE UR QL STRIP: NEGATIVE
MAGNESIUM SERPL-MCNC: 2.1 MG/DL (ref 1.9–2.7)
MCH RBC QN AUTO: 33.9 PG (ref 26.8–34.3)
MCHC RBC AUTO-ENTMCNC: 34.9 G/DL (ref 31.4–37.4)
MCV RBC AUTO: 97 FL (ref 82–98)
NITRITE UR QL STRIP: NEGATIVE
NON-SQ EPI CELLS URNS QL MICRO: NORMAL /HPF
O2 CT BLDV-SCNC: 13.6 ML/DL
P AXIS: 41 DEGREES
PCO2 BLDV: 39.6 MM HG (ref 42–50)
PH BLDV: 7.46 [PH] (ref 7.3–7.4)
PH UR STRIP.AUTO: 7 [PH]
PHOSPHATE SERPL-MCNC: 2.6 MG/DL (ref 2.3–4.1)
PLATELET # BLD AUTO: 89 THOUSANDS/UL (ref 149–390)
PMV BLD AUTO: 10 FL (ref 8.9–12.7)
PO2 BLDV: 58.5 MM HG (ref 35–45)
POTASSIUM SERPL-SCNC: 3.9 MMOL/L (ref 3.5–5.3)
PR INTERVAL: 146 MS
PROT SERPL-MCNC: 5.5 G/DL (ref 6.4–8.4)
PROT UR STRIP-MCNC: ABNORMAL MG/DL
QRS AXIS: 23 DEGREES
QRSD INTERVAL: 58 MS
QT INTERVAL: 274 MS
QTC INTERVAL: 412 MS
RBC # BLD AUTO: 3.07 MILLION/UL (ref 3.88–5.12)
RBC #/AREA URNS AUTO: NORMAL /HPF
SODIUM SERPL-SCNC: 132 MMOL/L (ref 135–147)
SP GR UR STRIP.AUTO: 1.03 (ref 1–1.03)
T WAVE AXIS: 40 DEGREES
UROBILINOGEN UR STRIP-ACNC: 4 MG/DL
VENTRICULAR RATE: 136 BPM
VIT B12 SERPL-MCNC: 940 PG/ML (ref 180–914)
WBC # BLD AUTO: 3.2 THOUSAND/UL (ref 4.31–10.16)
WBC #/AREA URNS AUTO: NORMAL /HPF

## 2025-06-25 PROCEDURE — 90677 PCV20 VACCINE IM: CPT | Performed by: HOSPITALIST

## 2025-06-25 PROCEDURE — 82948 REAGENT STRIP/BLOOD GLUCOSE: CPT

## 2025-06-25 PROCEDURE — 80053 COMPREHEN METABOLIC PANEL: CPT

## 2025-06-25 PROCEDURE — G0545 PR INHERENT VISIT TO INPT: HCPCS | Performed by: INTERNAL MEDICINE

## 2025-06-25 PROCEDURE — 97163 PT EVAL HIGH COMPLEX 45 MIN: CPT

## 2025-06-25 PROCEDURE — 84100 ASSAY OF PHOSPHORUS: CPT

## 2025-06-25 PROCEDURE — 99233 SBSQ HOSP IP/OBS HIGH 50: CPT | Performed by: SURGERY

## 2025-06-25 PROCEDURE — 81001 URINALYSIS AUTO W/SCOPE: CPT

## 2025-06-25 PROCEDURE — 85027 COMPLETE CBC AUTOMATED: CPT

## 2025-06-25 PROCEDURE — 83735 ASSAY OF MAGNESIUM: CPT

## 2025-06-25 PROCEDURE — 82805 BLOOD GASES W/O2 SATURATION: CPT

## 2025-06-25 PROCEDURE — 82746 ASSAY OF FOLIC ACID SERUM: CPT

## 2025-06-25 PROCEDURE — 82607 VITAMIN B-12: CPT

## 2025-06-25 PROCEDURE — 72158 MRI LUMBAR SPINE W/O & W/DYE: CPT

## 2025-06-25 PROCEDURE — 93010 ELECTROCARDIOGRAM REPORT: CPT | Performed by: STUDENT IN AN ORGANIZED HEALTH CARE EDUCATION/TRAINING PROGRAM

## 2025-06-25 PROCEDURE — 99232 SBSQ HOSP IP/OBS MODERATE 35: CPT | Performed by: HOSPITALIST

## 2025-06-25 PROCEDURE — 90471 IMMUNIZATION ADMIN: CPT | Performed by: HOSPITALIST

## 2025-06-25 PROCEDURE — A9585 GADOBUTROL INJECTION: HCPCS | Performed by: HOSPITALIST

## 2025-06-25 PROCEDURE — 83605 ASSAY OF LACTIC ACID: CPT

## 2025-06-25 PROCEDURE — 84425 ASSAY OF VITAMIN B-1: CPT

## 2025-06-25 PROCEDURE — 97167 OT EVAL HIGH COMPLEX 60 MIN: CPT

## 2025-06-25 PROCEDURE — 99254 IP/OBS CNSLTJ NEW/EST MOD 60: CPT | Performed by: PHYSICIAN ASSISTANT

## 2025-06-25 PROCEDURE — 99223 1ST HOSP IP/OBS HIGH 75: CPT | Performed by: INTERNAL MEDICINE

## 2025-06-25 RX ORDER — SODIUM CHLORIDE, SODIUM LACTATE, POTASSIUM CHLORIDE, CALCIUM CHLORIDE 600; 310; 30; 20 MG/100ML; MG/100ML; MG/100ML; MG/100ML
100 INJECTION, SOLUTION INTRAVENOUS CONTINUOUS
Status: DISPENSED | OUTPATIENT
Start: 2025-06-25 | End: 2025-06-26

## 2025-06-25 RX ORDER — FLUTICASONE PROPIONATE 50 MCG
2 SPRAY, SUSPENSION (ML) NASAL 2 TIMES DAILY
Status: DISCONTINUED | OUTPATIENT
Start: 2025-06-25 | End: 2025-06-28 | Stop reason: HOSPADM

## 2025-06-25 RX ORDER — LANOLIN ALCOHOL/MO/W.PET/CERES
100 CREAM (GRAM) TOPICAL DAILY
Status: DISCONTINUED | OUTPATIENT
Start: 2025-06-25 | End: 2025-06-25

## 2025-06-25 RX ORDER — ACETAMINOPHEN 325 MG/1
650 TABLET ORAL EVERY 4 HOURS PRN
Status: DISCONTINUED | OUTPATIENT
Start: 2025-06-25 | End: 2025-06-28 | Stop reason: HOSPADM

## 2025-06-25 RX ORDER — GADOBUTROL 604.72 MG/ML
9 INJECTION INTRAVENOUS
Status: COMPLETED | OUTPATIENT
Start: 2025-06-25 | End: 2025-06-25

## 2025-06-25 RX ORDER — VANCOMYCIN HYDROCHLORIDE 1 G/200ML
1000 INJECTION, SOLUTION INTRAVENOUS EVERY 12 HOURS
Status: DISCONTINUED | OUTPATIENT
Start: 2025-06-26 | End: 2025-06-25

## 2025-06-25 RX ORDER — METHOCARBAMOL 500 MG/1
500 TABLET, FILM COATED ORAL EVERY 6 HOURS PRN
Status: DISCONTINUED | OUTPATIENT
Start: 2025-06-25 | End: 2025-06-28 | Stop reason: HOSPADM

## 2025-06-25 RX ADMIN — THIAMINE HYDROCHLORIDE 100 MG: 100 INJECTION, SOLUTION INTRAMUSCULAR; INTRAVENOUS at 11:58

## 2025-06-25 RX ADMIN — FOLIC ACID 1 MG: 1 TABLET ORAL at 08:12

## 2025-06-25 RX ADMIN — METHOCARBAMOL 500 MG: 500 TABLET ORAL at 21:23

## 2025-06-25 RX ADMIN — ACETAMINOPHEN 1000 MG: 10 INJECTION, SOLUTION INTRAVENOUS at 02:50

## 2025-06-25 RX ADMIN — CEFEPIME 2000 MG: 2 INJECTION, POWDER, FOR SOLUTION INTRAVENOUS at 09:53

## 2025-06-25 RX ADMIN — AMOXICILLIN AND CLAVULANATE POTASSIUM 1 TABLET: 875; 125 TABLET, FILM COATED ORAL at 08:12

## 2025-06-25 RX ADMIN — FLUTICASONE PROPIONATE 2 SPRAY: 50 SPRAY, METERED NASAL at 17:10

## 2025-06-25 RX ADMIN — GADOBUTROL 9 ML: 604.72 INJECTION INTRAVENOUS at 19:31

## 2025-06-25 RX ADMIN — LORATADINE 10 MG: 10 TABLET ORAL at 08:12

## 2025-06-25 RX ADMIN — FLUTICASONE PROPIONATE 1 SPRAY: 50 SPRAY, METERED NASAL at 08:10

## 2025-06-25 RX ADMIN — GABAPENTIN 100 MG: 100 CAPSULE ORAL at 21:23

## 2025-06-25 RX ADMIN — THIAMINE HYDROCHLORIDE 100 MG: 100 INJECTION, SOLUTION INTRAMUSCULAR; INTRAVENOUS at 21:31

## 2025-06-25 RX ADMIN — METHOCARBAMOL 500 MG: 500 TABLET ORAL at 05:42

## 2025-06-25 RX ADMIN — OXYCODONE 5 MG: 5 TABLET ORAL at 20:20

## 2025-06-25 RX ADMIN — THIAMINE HYDROCHLORIDE 100 MG: 100 INJECTION, SOLUTION INTRAMUSCULAR; INTRAVENOUS at 16:14

## 2025-06-25 RX ADMIN — SODIUM CHLORIDE, SODIUM LACTATE, POTASSIUM CHLORIDE, AND CALCIUM CHLORIDE 100 ML/HR: .6; .31; .03; .02 INJECTION, SOLUTION INTRAVENOUS at 17:25

## 2025-06-25 RX ADMIN — METOPROLOL TARTRATE 50 MG: 50 TABLET, FILM COATED ORAL at 08:12

## 2025-06-25 RX ADMIN — CEFEPIME 2000 MG: 2 INJECTION, POWDER, FOR SOLUTION INTRAVENOUS at 19:41

## 2025-06-25 RX ADMIN — INSULIN LISPRO 1 UNITS: 100 INJECTION, SOLUTION INTRAVENOUS; SUBCUTANEOUS at 08:11

## 2025-06-25 RX ADMIN — GABAPENTIN 100 MG: 100 CAPSULE ORAL at 17:10

## 2025-06-25 RX ADMIN — CYANOCOBALAMIN TAB 500 MCG 1000 MCG: 500 TAB at 08:12

## 2025-06-25 RX ADMIN — METHOCARBAMOL 500 MG: 500 TABLET ORAL at 14:05

## 2025-06-25 RX ADMIN — INSULIN LISPRO 1 UNITS: 100 INJECTION, SOLUTION INTRAVENOUS; SUBCUTANEOUS at 21:25

## 2025-06-25 RX ADMIN — INSULIN LISPRO 3 UNITS: 100 INJECTION, SOLUTION INTRAVENOUS; SUBCUTANEOUS at 11:47

## 2025-06-25 RX ADMIN — VANCOMYCIN HYDROCHLORIDE 2000 MG: 1 INJECTION, POWDER, LYOPHILIZED, FOR SOLUTION INTRAVENOUS at 13:55

## 2025-06-25 RX ADMIN — ASPIRIN 81 MG: 81 TABLET, CHEWABLE ORAL at 08:12

## 2025-06-25 RX ADMIN — ACETAMINOPHEN 650 MG: 325 TABLET ORAL at 17:19

## 2025-06-25 RX ADMIN — GABAPENTIN 100 MG: 100 CAPSULE ORAL at 08:12

## 2025-06-25 RX ADMIN — Medication 2.5 MG: at 08:29

## 2025-06-25 RX ADMIN — PRASUGREL 10 MG: 10 TABLET, FILM COATED ORAL at 10:08

## 2025-06-25 RX ADMIN — METOPROLOL TARTRATE 50 MG: 50 TABLET, FILM COATED ORAL at 21:23

## 2025-06-25 RX ADMIN — ENOXAPARIN SODIUM 40 MG: 40 INJECTION SUBCUTANEOUS at 08:12

## 2025-06-25 RX ADMIN — THIAMINE HYDROCHLORIDE 100 MG: 100 INJECTION, SOLUTION INTRAMUSCULAR; INTRAVENOUS at 04:24

## 2025-06-25 RX ADMIN — SODIUM BICARBONATE 100 ML/HR: 84 INJECTION, SOLUTION INTRAVENOUS at 04:24

## 2025-06-25 RX ADMIN — ACETAMINOPHEN 650 MG: 325 TABLET ORAL at 10:55

## 2025-06-25 RX ADMIN — INSULIN LISPRO 1 UNITS: 100 INJECTION, SOLUTION INTRAVENOUS; SUBCUTANEOUS at 16:05

## 2025-06-25 RX ADMIN — PNEUMOCOCCAL 20-VALENT CONJUGATE VACCINE 0.5 ML
2.2; 2.2; 2.2; 2.2; 2.2; 2.2; 2.2; 2.2; 2.2; 2.2; 2.2; 2.2; 2.2; 2.2; 2.2; 2.2; 4.4; 2.2; 2.2; 2.2 INJECTION, SUSPENSION INTRAMUSCULAR at 09:54

## 2025-06-25 RX ADMIN — FOLIC ACID 1 MG: 5 INJECTION, SOLUTION INTRAMUSCULAR; INTRAVENOUS; SUBCUTANEOUS at 11:49

## 2025-06-25 NOTE — ASSESSMENT & PLAN NOTE
>>ASSESSMENT AND PLAN FOR ALCOHOL USE DISORDER WRITTEN ON 6/25/2025 12:02 PM BY ALEX SAPP MD    Patient's wife mentioned that patient drinks up to a bottle of wine a day.  Patient's lactic acidosis could be in the setting of thiamine deficiency.    Plan:  Start IV thiamine 100 mg TID  CIWA protocol  Pending results for  Thiamine  Folate check  B12 check  Follow-up hepatic function panel  Follow-up INR     >>ASSESSMENT AND PLAN FOR TOBACCO ABUSE AND ALCOHOL USE WRITTEN ON 6/25/2025 12:02 PM BY ALEX SAPP MD    Patient endorses use of chewing tobacco.    Plan:  NRT ordered

## 2025-06-25 NOTE — CONSULTS
Consultation - Orthopedics   Name: Donavon Couch 64 y.o. male I MRN: [id number]  Unit/Bed#: S -01 I Date of Admission: 6/24/2025   Date of Service: 6/25/2025 I Hospital Day: 1   Inpatient consult to Orthopedic Surgery  Consult performed by: Gillian Yeung PA-C  Consult ordered by: Charito Zamudio MD        Physician Requesting Evaluation: Marco A Gandhi MD   Reason for Evaluation / Principal Problem: acute on chronic left shoulder pain     Assessment & Plan  Left shoulder pain  Acute on chronic left shoulder pain.   Xrays negative for acute osseous abnormality.   Differential is large, includes arthritis flare vs rotator cuff arthropathy vs other.   Weight bearing as tolerated to the left upper extremity.  Patient would benefit from outpatient MRI for further evaluation. Defer on inpatient MRI at this time. Discussed at length with patient and his family, who are in agreement with plan.   Further evaluation of shoulder pain as outpatient, at which time pending results of MRI, can consider further intervention.   PT/OT.   Pain control per primary team.   Medical management per primary team.   Rest of care per primary team.   Sepsis (HCC)  Management per primary team  Fall    Lactic acidosis    Coronary artery disease    Diabetes (HCC)  Lab Results   Component Value Date    HGBA1C 8.0 (H) 06/24/2025       Recent Labs     06/24/25  1651 06/24/25  2031 06/25/25  0757 06/25/25  1109   POCGLU 174* 229* 150* 235*       Blood Sugar Average: Last 72 hrs:  (P) 197    Hyponatremia    Tobacco abuse    Alcohol use disorder    Hypertension    Sinusitis    Bacteremia    I have discussed the above management plan in detail with the primary service.   Orthopedics service signing off.  Please contact the SecureChat role for the Orthopedics service with any questions/concerns.    History of Present Illness   HPI: Donavon Couch is a 64 y.o. year old male who ambulates with walker at baseline with history of  hypertension, EtOH use disorder, arthritis, DM, CAD on effient and aspirin, who presents s/p fall. He was admitted 6/24 after being found on the ground by his daughter. The patient/daughter are unaware of the time of his fall.  Per record review he has baseline confusion.   He was evaluated by the trauma team upon arrival, and found to have no traumatic injuries.   He is currently under management by the medicine team for high anion gap metabolic acidosis/sepsis/bacteremia of unknown etiology. ID has been consulted.   He has been complaining of continued left shoulder pain for which orthopedics has been engaged.   At time of consult, patient endorses longstanding chronic left shoulder pain that has been present for many months. He notes a slow, insidious onset without traumatic event. He reports pain has typically been worse at night, when laying on his side, or when laying on his back. He has had some difficulty with range of motion of the arm. He has not sought care. Since his fall, feels that the pain is worse than baseline. Since admission, pain has improved to some degree.      Review of Systems significant for findings described in the HPI.  Historical Information   Past Medical History[1]  Past Surgical History[2]  Social History[3]  E-Cigarette/Vaping    E-Cigarette Use Never User      E-Cigarette/Vaping Substances     Family history non-contributory    Objective :  Temp:  [97.9 °F (36.6 °C)-100.5 °F (38.1 °C)] 98.2 °F (36.8 °C)  HR:  [] 89  BP: (107-188)/(66-91) 131/77  Resp:  [16-20] 18  SpO2:  [94 %-99 %] 97 %  O2 Device: None (Room air)  Physical ExamOrtho Exam   Musculoskeletal: Left upper extremity  Skin intact . No erythema or ecchymosis. No edema or signs of trauma to the left shoulder or upper extremity.   No significant pain to palpation about the left shoulder.   No pain over left clavicle, upper arm, elbow, forearm or wrist.   Active ROM about the shoulder 0-90 degrees forward flexion and  "abduction. Notes that his arm feels \"weak\", and is unable to lift arm any further in flexion or abduction.    Passive ROM 0-110 forward flexion and 0-110 abduction.   Patient notes pain within the shoulder at any attempts at further flexion or abduction, described as \"deep within the shoulder\".   No noted mechanical block.   No micro motion ttp.  Patient with some difficulty with \"empty can\" testing with some weakness noted.   Sensation intact to median/radial/ulnar nerve distribution   Motor intact anterior interosseous nerve/posterior interosseous nerve/median/radial/ulnar nerve distributions  2+ radial pulse      Lab Results: I have reviewed the following results:   Recent Labs     06/24/25  1042 06/24/25  1043 06/24/25  1043 06/25/25  0350 06/25/25  0547   WBC  --  5.00  --   --  3.20*   HGB 13.3 13.0  --   --  10.4*   HCT 39 39.6  --   --  29.8*   PLT  --  140*   < >  --  89*   BUN  --  5  --  6  --    CREATININE  --  0.77  --  0.86  --     < > = values in this interval not displayed.     Blood Culture:   Lab Results   Component Value Date    BLOODCX Received in Microbiology Lab. Culture in Progress. 06/24/2025     Wound Culture: No results found for: \"WOUNDCULT\"    Imaging Results Review: I personally reviewed the following image studies in PACS and associated radiology reports: XRAY left forearm, left humerus and left shoulder. My interpretation of the radiology images/reports is: glenohumeral arthritis, otherwise no acute osseous abnormality.  Other Study Results Review: No additional pertinent studies reviewed.         [1] No past medical history on file.  [2] No past surgical history on file.  [3]   Social History  Tobacco Use    Smoking status: Former     Types: Cigarettes    Smokeless tobacco: Current     Types: Chew   Vaping Use    Vaping status: Never Used   Substance and Sexual Activity    Alcohol use: Yes     Alcohol/week: 5.0 standard drinks of alcohol     Types: 5 Glasses of wine per week    Drug " use: Never

## 2025-06-25 NOTE — QUICK NOTE
Repeat LA 5.9  Patient is off of Sodium Bicarbonate ggt  Previous Echocardiogram EF 60% no valvular abnormalities   No CKD     Plan   Ordered  ml/hr   Will continue to follow lactic acid to see if downtrending

## 2025-06-25 NOTE — ASSESSMENT & PLAN NOTE
Lab Results   Component Value Date    HGBA1C 8.0 (H) 06/24/2025       Recent Labs     06/24/25  1651 06/24/25  2031 06/25/25  0757 06/25/25  1109   POCGLU 174* 229* 150* 235*       Blood Sugar Average: Last 72 hrs:  (P) 197

## 2025-06-25 NOTE — ASSESSMENT & PLAN NOTE
Patient has a history of CAD (cath Oct 2013: 95% prox LAD, 40% mid LAD, EF 60%, WILLY to prox LAD)    Plan:  Continue Prasugrel 10 mg daily and Aspirin 81 mg daily

## 2025-06-25 NOTE — ASSESSMENT & PLAN NOTE
>>ASSESSMENT AND PLAN FOR ALCOHOL USE DISORDER WRITTEN ON 6/25/2025  2:08 PM BY JOE CALLEJAS MD    Chronic alcohol dependence. Patient hesitated to quantify consumption on evaluation.     >>ASSESSMENT AND PLAN FOR TOBACCO ABUSE AND ALCOHOL USE WRITTEN ON 6/25/2025  1:50 PM BY JOE CALLEJAS MD    Daily chew tobacco abuse. No oral ulcers noted. Poor dentition with broken right-sided molar.

## 2025-06-25 NOTE — PROGRESS NOTES
Donavon Couch is a 64 y.o. male who is currently ordered Vancomycin IV with management by the Pharmacy Consult service.  Relevant clinical data and objective / subjective history reviewed.  Vancomycin Assessment:  Indication and Goal AUC/Trough: Bacteremia (goal -600, trough >10)  Clinical Status: new  Micro:     Renal Function:  SCr: 0.86 mg/dL  CrCl: 96 mL/min  Renal replacement: Not on dialysis  Days of Therapy: 2  Current Dose: 2000 mg IV loading dose given  Vancomycin Plan:  New Dosin mg IV every 12 hours  Estimated AUC: 456 mcg*hr/mL  Estimated Trough: 12.7 mcg/mL  Next Level:  @ 0900 (to avoid 0200 dose)  Renal Function Monitoring: Daily BMP and UOP  Pharmacy will continue to follow closely for s/sx of nephrotoxicity, infusion reactions and appropriateness of therapy.  BMP and CBC will be ordered per protocol. We will continue to follow the patient’s culture results and clinical progress daily.    Joleen Kelsey, Pharmacist

## 2025-06-25 NOTE — ASSESSMENT & PLAN NOTE
Patient meeting sepsis criteria with tachypnea and tachycardia on presentation, blood culture BC ID with Klebsiella aerogenes, Staphylococcus.  S/p cefepime and Vanco in the ER on admission.  Hemodynamically stable, low-grade fever to about 100.5    Source has been unidentified, potentially translocation of gut bacteria, left shoulder, treating with IV antibiotics    Plan:  Infectious disease consulted  2 g cefepime every 8  Vancomycin  Day 2 antibiotics  AM CBC, fever curve

## 2025-06-25 NOTE — PHYSICAL THERAPY NOTE
Physical Therapy Evaluation    Patient's Name: Donavon Couch    Admitting Diagnosis  Chronic bilateral low back pain with left-sided sciatica [G89.29, M54.42]  Unspecified multiple injuries, initial encounter [T07.XXXA]    Problem List  Problem List[1]    Past Medical History  Past Medical History[2]    Past Surgical History  Past Surgical History[3]       25 0951   PT Last Visit   PT Visit Date 25   Note Type   Note type Evaluation   Pain Assessment   Pain Assessment Tool 0-10   Pain Score 5   Pain Location/Orientation Orientation: Lower;Location: Back   Effect of Pain on Daily Activities limits ambulation, limits activity tolerance   Hospital Pain Intervention(s) Repositioned;Ambulation/increased activity  (towel roll for lumbar support with improvement in symptoms)   Restrictions/Precautions   Weight Bearing Precautions Per Order No   Other Precautions Chair Alarm;Bed Alarm;Visual impairment;Fall Risk;Pain  (blind in R eye)   Home Living   Type of Home House   Home Layout Stairs to enter with rails;Performs ADLs on one level;Able to live on main level with bedroom/bathroom  (3 MOSHE)   Bathroom Shower/Tub Tub/shower unit   Bathroom Toilet Standard   Bathroom Equipment Grab bars in shower   Home Equipment Cane   Prior Function   Lives With Spouse   Receives Help From Family   Falls in the last 6 months 1 to 4  (x1 reason for admission)   Comments independent for ambulation within home, utilizes surfaces/walls for ambulation, SPC in community   General   Family/Caregiver Present Yes  (wife)   Cognition   Orientation Level Oriented X4   Following Commands Follows one step commands with increased time or repetition   Comments pt ID by wristband, name and    Subjective   Subjective pt agreeable to PT eval and OOB   RLE Assessment   RLE Assessment X  (grossly assessed to at least 3+/5 with mobility)   LLE Assessment   LLE Assessment X  (grossly assessed to at least 3+/5 with mobility)    Vision-Basic Assessment   Visual History   (blind in R eye, diabetic retinopathy L)   Bed Mobility   Supine to Sit 3  Moderate assistance   Additional items Assist x 1;Increased time required;Verbal cues;LE management  (trunk management)   Additional Comments sits EOB with close supervision   Transfers   Sit to Stand 4  Minimal assistance   Additional items Assist x 1;Increased time required;Verbal cues   Stand to Sit 4  Minimal assistance   Additional items Assist x 1;Increased time required;Verbal cues   Additional Comments no AD for inital STS from EOB, SPC for steadying once standing   Ambulation/Elevation   Gait pattern Decreased foot clearance;Short stride;Step to;Ataxia   Gait Assistance 4  Minimal assist   Additional items Assist x 1;Verbal cues;Tactile cues   Assistive Device SPC;Rolling walker   Distance 3'x1(SPC), 12'x1(RW), 15'x1(RW)   Ambulation/Elevation Additional Comments distances limited by low back pain, improved with uprighjt posture and slight lumbar extgrossly unsteady, attempting to utilize contralateral UE for object support when attempting to ambulate with SPC. with RW, overall stability improves, however very short stride, requires assistance with RW management during turn and obstacle negotiation. requires visual target/cues for ambulation   Balance   Static Sitting Fair -   Dynamic Sitting Poor +   Static Standing Poor +  (RW)   Dynamic Standing Poor   Ambulatory Poor  (RW)   Activity Tolerance   Activity Tolerance Patient limited by fatigue;Patient limited by pain   Medical Staff Made Aware spoke with CM, care coordinated with OT due to deviation from functional baseline   Nurse Made Aware RN pre/post   Assessment   Prognosis Fair   Problem List Decreased strength;Decreased endurance;Impaired balance;Decreased mobility;Obesity;Decreased skin integrity;Pain   Assessment Pt is a 64 y.o. male seen for PT evaluation s/p admit to [facility] on 6/24/2025. Pt was admitted with a  primary dx of: sepsis.  PT now consulted for assessment of mobility and d/c needs. Pt with Activity as tolerated orders.  Pts current comorbidities and personal factors effecting treatment include: Bmi, Htn, DM II, peripheral neuropathy, PVD. Pts current clinical presentation is Unstable/Unpredictable (high complexity) due to Ongoing medical management for primary dx, Increased reliance on more restrictive AD compared to baseline, Decreased activity tolerance compared to baseline, Fall risk, Increased assistance needed from caregiver at current time. Prior to admission, pt was independent with use of SC. Upon evaluation, pt currently is requiring ModA for bed mobility; Helder for transfers and Helder for ambulation 15 ft w/ RW. Pt presents at PT eval functioning below baseline and currently w/ overall mobility deficits 2* to: BLE weakness, impaired balance, gait deviations, pain, decreased activity tolerance compared to baseline, decreased functional mobility tolerance compared to baseline, fall risk. Pt currently at a fall risk 2* to impairments listed above.  Pt will continue to benefit from skilled acute PT interventions to address stated impairments; to maximize functional mobility; for ongoing pt/ family training; and DME needs. At conclusion of PT session chair alarm engaged, all needs in reach, RN notified of session findings/recommendations, and pt returned back in recliner chair with phone and call bell within reach. Pt denies any further questions at this time. Recommend Level I (Maximum Resource Intensity)  upon hospital D/C.   Goals   Patient Goals to get better   STG Expiration Date 07/05/25   Short Term Goal #1 In 10 days pt will be able to: 1. Demonstrate ability to perform all aspects of bed mobility independently to improve functional safety.  2. Perform functional transfers with LRAD independently to facilitate safe return to previous living environment.  3.  Ambulate 150 ft with LRAD independently  with stable vitals to improve safety with household distances and reduce fall risk.  4. Improve LE strength grades by 1 to increase ease of functional mobility with transfers and gait. 5. Pt will demonstrate improved balance by one grade in order to decrease risk of falls. 6. Climb at least 3 steps with unilateral HR with LRAD independently to simulate entrance to home.   PT Treatment Day 0   Plan   Treatment/Interventions LE strengthening/ROM;Functional transfer training;Elevations;Therapeutic exercise;Patient/family training;Equipment eval/education;Bed mobility;Gait training;Spoke to nursing;Spoke to case management;OT;Endurance training   PT Frequency 3-5x/wk   Discharge Recommendation   Rehab Resource Intensity Level, PT I (Maximum Resource Intensity)   Equipment Recommended Walker   Walker Package Recommended Wheeled walker   Change/add to Walker Package? No   AM-PAC Basic Mobility Inpatient   Turning in Flat Bed Without Bedrails 2   Lying on Back to Sitting on Edge of Flat Bed Without Bedrails 2   Moving Bed to Chair 2   Standing Up From Chair Using Arms 3   Walk in Room 3   Climb 3-5 Stairs With Railing 1   Basic Mobility Inpatient Raw Score 13   Basic Mobility Standardized Score 33.99   [facility] Highest Level Of Mobility   -[facility] Goal 4: Move to chair/commode   -[facility] Achieved 6: Walk 10 steps or more   End of Consult   Patient Position at End of Consult Bedside chair;Bed/Chair alarm activated;All needs within reach   The patient's AM-PAC Basic Mobility Inpatient Short Form Raw Score is 13. A Raw score of less than or equal to 16 suggests the patient may benefit from discharge to post-acute rehabilitation services. Please also refer to the recommendation of the Physical Therapist for safe discharge planning.    Jarad Vides, PT             [1]   Patient Active Problem List  Diagnosis    Fall    Left shoulder pain    Lactic acidosis    Coronary artery disease    Diabetes (HCC)    Hyponatremia     Tobacco abuse    Alcohol use disorder    Hypertension    Sinusitis    Sepsis (HCC)    Bacteremia   [2] No past medical history on file.  [3] No past surgical history on file.

## 2025-06-25 NOTE — UTILIZATION REVIEW
Initial Clinical Review    Admission: Date/Time/Statement:   Admission Orders (From admission, onward)       Ordered        06/24/25 1621  INPATIENT ADMISSION  Once                          Orders Placed This Encounter   Procedures    INPATIENT ADMISSION     Standing Status:   Standing     Number of Occurrences:   1     Level of Care:   Med Surg [16]     Bed Type:   Trauma [7]     Estimated length of stay:   More than 2 Midnights     Certification:   I certify that inpatient services are medically necessary for this patient for a duration of greater than two midnights. See H&P and MD Progress Notes for additional information about the patient's course of treatment.     ED Arrival Information       Expected   -    Arrival   6/24/2025 10:34    Acuity   Emergent              Means of arrival   Ambulance    Escorted by   [facility]    Service   Hospitalist    Admission type   Emergency              Arrival complaint   TRAUMA B             Chief Complaint   Patient presents with    Fall     +thinners        Initial Presentation: 64 y.o. male to ED by EMS presents s/p Fall; PMH diabetes on metformin neuropathy, mild cognitive impairment, right eye blindness, lumbar radiculopathy chronic back pain, alcohol use bottle of wine a day, ambulatory dysfunction @ baseline uses cane attempted to ambulate to BR w fall wo head strike or LOC, reports L shoulder pain, sinus pressure with on productive cough. Summoned Daughter on monitoring camera finding him this morning around 7 am. Takes ASA and Effient.   Inpatient admission due to elevated lactic acidosis- Sepsis, fall, hyponatremia, hypomagnesemia, alcohol abuse    EXAM  Tachypneic and tachycardic   labs  elevated anion gap acidosis with LA of 6. Thrombocytopenia, hyponatremia @ 132, elevated LFTs, Glucose 200, + Ketones and glucose in urine.   No injuries on imaging. Given 3 L IVF, IV D5W IVF,  IV MAG bolus, IV Cefepime/ Vanco, IV Dilaudid x2, PO Oxycodone.   IVF  hydration with D5 NS, SSI, repeat LA, AM labs, Start Augmentin 875-125 mg bid, Claritin & Flonase.. Multimodal pain regimen, PT/OT, CIWA, MV, thiamine, folic acid   Anticipated Length of Stay/Certification Statement: Patient will be admitted on an inpatient basis with an anticipated length of stay of greater than 2 midnights secondary to lactic acidosis.  Date: 6/25/2025   Day 2:   Blood cultures  positive in 1 out of 2 cultures with Klebsiella aerogenes and coag negative staph. When blood cultures returned positive antibiotics, infectious disease consulted.   Elevated lactate out of proportion to clinical syndrome. Patient's elevated lactate is due to a metabolic issue rather than poor perfusion or worsening sepsis.   Thiamine level currently pending but treating empirically for thiamine deficiency. Follow-up lactate, Orthopedic Surgery consulted. Recommended outpatient MRI   ID  Bacteremia   Continue cefepime 2g q8h. Anticipate a total 7 day course of antibiotics.   Follow-up blood culture sensitivities  No indication for repeat blood cultures  Monitor fever curve and vitals  ORTHO  Acute on chronic left shoulder pain.   Xrays negative for acute osseous abnormality.   Differential is large, includes arthritis flare vs rotator cuff arthropathy vs other.   Weight bearing as tolerated, rec outpatient MRI for further evaluation, multimodal pain regimen, PT/OT  ED Treatment-Medication Administration from 06/24/2025 1026 to 06/24/2025 2008         Date/Time Order Dose Route Action     06/24/2025 1120 multi-electrolyte (Plasmalyte-A/Isolyte-S PH 7.4/Normosol-R) IV bolus 1,000 mL 1,000 mL Intravenous New Bag     06/24/2025 1107 acetaminophen (Ofirmev) injection 1,000 mg 1,000 mg Intravenous New Bag     06/24/2025 1859 acetaminophen (Ofirmev) injection 1,000 mg 1,000 mg Intravenous New Bag     06/24/2025 1246 oxyCODONE (ROXICODONE) IR tablet 5 mg 5 mg Oral Given     06/24/2025 1247 magnesium sulfate 4 g/100 mL IVPB  (premix) 4 g 4 g Intravenous New Bag     06/24/2025 1256 sodium phosphate 30 mmol in dextrose 5 % 250 mL Infusion 30 mmol Intravenous New Bag     06/24/2025 1317 multi-electrolyte (Plasmalyte-A/Isolyte-S PH 7.4/Normosol-R) IV bolus 1,000 mL 1,000 mL Intravenous New Bag     06/24/2025 1427 multi-electrolyte (Plasmalyte-A/Isolyte-S PH 7.4/Normosol-R) IV bolus 1,000 mL 1,000 mL Intravenous New Bag     06/24/2025 1425 vancomycin (VANCOCIN) 2,000 mg in sodium chloride 0.9 % 500 mL IVPB 2,000 mg Intravenous New Bag     06/24/2025 1406 cefepime (MAXIPIME) 2 g/50 mL dextrose IVPB 2,000 mg Intravenous New Bag     06/24/2025 1350 iohexol (OMNIPAQUE) 350 MG/ML injection (MULTI-DOSE) 100 mL 100 mL Intravenous Given     06/24/2025 1442 HYDROmorphone HCl (DILAUDID) injection 0.2 mg 0.2 mg Intravenous Given     06/24/2025 1725 methocarbamol (ROBAXIN) tablet 500 mg 500 mg Oral Given     06/24/2025 1657 dextrose 5 % and sodium chloride 0.9 % infusion 75 mL/hr Intravenous New Bag     06/24/2025 1902 sodium bicarbonate 150 mEq in dextrose 5 % 1,000 mL infusion 100 mL/hr Intravenous New Bag     06/24/2025 1858 HYDROmorphone (DILAUDID) injection 0.5 mg 0.5 mg Intravenous Given     06/24/2025 1918 metoprolol tartrate (LOPRESSOR) tablet 50 mg 50 mg Oral Given            Scheduled Medications:  aspirin, 81 mg, Oral, Daily  cefepime, 2,000 mg, Intravenous, Q8H  vitamin B-12, 1,000 mcg, Oral, Daily  enoxaparin, 40 mg, Subcutaneous, Daily  fluticasone, 2 spray, Each Nare, BID  folic acid, 1 mg, Oral, Daily  gabapentin, 100 mg, Oral, TID  insulin lispro, 1-5 Units, Subcutaneous, HS  insulin lispro, 1-6 Units, Subcutaneous, TID AC  loratadine, 10 mg, Oral, Daily  metoprolol tartrate, 50 mg, Oral, Q12H DACIA  nicotine, 14 mg, Transdermal, Daily  prasugrel, 10 mg, Oral, Daily  thiamine, 100 mg, Intravenous, TID  vancomycin, 25 mg/kg (Adjusted), Intravenous, Once  [START ON 6/26/2025] vancomycin, 1,000 mg, Intravenous, Q12H      Continuous IV  Infusions:  Medications 06/24 06/25   dextrose 5 % and sodium chloride 0.9 % infusion  Rate: 75 mL/hr Dose: 75 mL/hr  Freq: Continuous Route: IV  Last Dose: Stopped (06/24/25 1825)  Start: 06/24/25 1545 End: 06/24/25 1753    1657     1753-D/C'd  1825         sodium bicarbonate 150 mEq in dextrose 5 % 1,000 mL infusion  Rate: 100 mL/hr Dose: 100 mL/hr  Freq: Continuous Route: IV  Last Dose: Stopped (06/25/25 0702)  Start: 06/24/25 1800 End: 06/25/25 0701   Admin Instructions:       1902      0424 [C]     0701-D/C'd  0702 [C]           PRN Meds:  acetaminophen, 650 mg, Oral, Q4H PRN  HYDROmorphone, 0.5 mg, Intravenous, Q3H PRN  methocarbamol, 500 mg, Oral, Q6H PRN  oxyCODONE, 2.5 mg, Oral, Q4H PRN   Or  oxyCODONE, 5 mg, Oral, Q4H PRN      ED Triage Vitals   Temperature Pulse Respirations Blood Pressure SpO2 Pain Score   06/24/25 1036 06/24/25 1036 06/24/25 1036 06/24/25 1036 06/24/25 1036 06/24/25 1442   97.6 °F (36.4 °C) (!) 109 18 144/84 99 % 9     Weight (last 2 days)       Date/Time Weight    06/24/25 20:17:27 87.5 (192.9)    06/24/25 10:36:39 89.2 (196.65)            Vital Signs (last 3 days)       Date/Time Temp Pulse Resp BP MAP (mmHg) SpO2 O2 Device Patient Position - Orthostatic VS [place] Coma Scale Score CIWA-Ar Total Pain    06/25/25 1200 -- 88 -- 128/77 -- -- -- -- -- 4 --    06/25/25 1055 -- -- -- -- -- -- -- -- -- -- 5    06/25/25 0951 -- -- -- -- -- -- -- -- -- -- 5    06/25/25 0927 -- -- -- -- -- -- -- -- -- -- 5 06/25/25 0829 -- -- -- -- -- -- -- -- -- -- 5 06/25/25 07:57:53 98.2 °F (36.8 °C) 89 18 131/77 95 97 % -- -- -- 6 --    06/25/25 0715 -- -- -- -- -- -- -- -- 15 -- --    06/25/25 0552 -- -- -- -- -- -- -- -- 15 -- --    06/25/25 03:41:20 99.6 °F (37.6 °C) 92 -- -- -- 96 % -- -- -- -- --    06/25/25 02:43:45 100.5 °F (38.1 °C) 91 16 140/78 99 97 % -- -- -- 0 --    06/25/25 0000 -- -- -- -- -- 98 % None (Room air) -- 15 0 --    06/24/25 22:32:15 98.5 °F (36.9 °C) 82 -- 107/67 80 97 % --  -- -- -- --    06/24/25 2200 -- -- -- -- -- 94 % None (Room air) -- 15 -- No Pain    06/24/25 20:17:27 98.6 °F (37 °C) 117 20 146/91 109 97 % None (Room air) -- -- 0 --    06/24/25 1925 -- -- -- -- -- -- -- -- -- -- 9 06/24/25 1909 -- 134 18 154/74 -- 99 % None (Room air) -- -- -- --    06/24/25 1858 -- -- -- -- -- -- -- -- -- -- 9 06/24/25 1830 -- 128 18 -- -- 96 % None (Room air) -- 15 -- --    06/24/25 1806 -- -- -- -- -- 95 % None (Room air) -- -- -- --    06/24/25 1730 -- 124 18 188/72 -- 98 % None (Room air) -- 15 -- --    06/24/25 1727 -- 122 18 143/82 -- 97 % None (Room air) -- -- -- --    06/24/25 1630 -- 111 18 125/66 -- 99 % None (Room air) -- 15 -- --    06/24/25 1530 -- 107 18 136/70 -- 96 % None (Room air) -- 15 -- 9 06/24/25 1500 97.9 °F (36.6 °C) 107 18 119/70 -- 98 % None (Room air) -- 15 -- 9 06/24/25 1442 -- -- -- -- -- -- -- -- -- -- 9 06/24/25 1400 -- 108 18 131/72 -- 99 % None (Room air) -- 15 -- --    06/24/25 1330 -- 112 18 136/74 -- 97 % None (Room air) -- 15 -- --    06/24/25 1300 -- -- -- -- -- -- -- -- 15 -- --    06/24/25 1230 -- 105 18 134/72 -- 100 % None (Room air) -- 15 -- --    06/24/25 1200 -- 101 18 135/70 -- 100 % None (Room air) -- 15 -- --    06/24/25 1145 -- 101 20 138/75 -- 100 % None (Room air) -- 15 -- --    06/24/25 1130 -- 100 20 115/65 -- 100 % None (Room air) Lying 15 -- --    06/24/25 1100 -- 102 20 139/77 -- 100 % None (Room air) Lying 15 -- --    06/24/25 10:45:13 -- 108 18 142/81 -- 98 % None (Room air) Lying 15 -- --    06/24/25 10:36:39 97.6 °F (36.4 °C) 109 18 144/84 -- 99 % None (Room air) Lying 15 -- --           CIWA-Ar Score       Row Name 06/25/25 1200 06/25/25 07:57:53 06/25/25 02:43:45       CIWA-Ar    /77 -- --    Pulse 88 -- --    Nausea and Vomiting 0 0 0    Tactile Disturbances 0 0 0    Tremor 2 3 0    Auditory Disturbances 0 0 0    Paroxysmal Sweats 2 0 0    Visual Disturbances 0 0 0    Anxiety 0 0 0    Headache, Fullness in Head  0 0 0    Agitation 0 3 0    Orientation and Clouding of Sensorium 0 0 0    CIWA-Ar Total 4 6 0      Row Name 06/25/25 0000 06/24/25 20:17:27          CIWA-Ar    Nausea and Vomiting 0 0     Tactile Disturbances 0 0     Tremor 0 0     Auditory Disturbances 0 0     Paroxysmal Sweats 0 0     Visual Disturbances 0 0     Anxiety 0 0     Headache, Fullness in Head 0 0     Agitation 0 0     Orientation and Clouding of Sensorium 0 0     CIWA-Ar Total 0 0                     Pertinent Labs/Diagnostic Test Results:   Radiology:  CT chest abdomen pelvis w contrast   Final Interpretation by Israel Mazariegos MD (06/24 1522)      1.  No findings for acute traumatic injury in the chest, abdomen or pelvis.   2.  New 4 mm right upper lobe nodule. Based on current Fleischner Society 2017 Guidelines on incidental pulmonary nodule, no routine follow-up is needed if the patient is low risk. If the patient is high risk, optional follow-up chest CT at 12 months can    be considered.            XR shoulder 2+ vw left   Final Interpretation by Jeff Brooke DO (06/24 1131)      No acute osseous abnormality.      Degenerative changes as described.         XR humerus left   Final Interpretation by Jeff Brooke DO (06/24 1137)      No acute osseous abnormality.      2 mm foreign body suggested along the medial distal left arm.      Computerized Assisted Algorithm (CAA) may have been used to analyze all applicable images.         Workstation performed: [id number]         XR forearm 2 vw left   Final Interpretation by Jeff Brooke DO (06/24 1134)      No acute osseous abnormality.         TRAUMA - CT spine cervical wo contrast   Final Interpretation by oDe Parr MD (06/24 1101)      No cervical spine fracture or traumatic malalignment.            TRAUMA - CT head wo contrast   Final Interpretation by Doe Parr MD (06/24 1101)      No acute intracranial abnormality.            XR Trauma multiple (SLB/SLRA  trauma bay ONLY)   Preliminary Result by Lynn Toribio (06/24 1332)      No acute cardiopulmonary disease within limitations of supine imaging.      Follow-up upright imaging may be considered if there is high clinical index of suspicion for injury based on mechanism.         XR chest 1 view   Final Interpretation by Lynn Toribio (06/24 1332)      No acute cardiopulmonary disease within limitations of supine imaging.      Follow-up upright imaging may be considered if there is high clinical index of suspicion for injury based on mechanism.         MRI inpatient order    (Results Pending)     Cardiology:  ECG 12 lead   Final Result by Amber Sandhu MD (06/25 0753)   Sinus tachycardia   Otherwise normal ECG   When compared with ECG of 24-Jun-2025 11:06,   No significant change was found   Confirmed by Amber Sandhu ([id number]) on 6/25/2025 7:53:05 AM      ECG 12 lead   Final Result by Amber Sandhu MD (06/24 5978)   Sinus tachycardia   Low voltage QRS   Borderline ECG   No previous ECGs available   Confirmed by Amber Sandhu ([id number]) on 6/24/2025 6:58:54 PM        GI:  No orders to display           Results from last 7 days   Lab Units 06/25/25  0547 06/24/25  1043 06/24/25  1042   WBC Thousand/uL 3.20* 5.00  --    HEMOGLOBIN g/dL 10.4* 13.0  --    I STAT HEMOGLOBIN g/dl  --   --  13.3   HEMATOCRIT % 29.8* 39.6  --    HEMATOCRIT, ISTAT %  --   --  39   PLATELETS Thousands/uL 89* 140*  --    TOTAL NEUT ABS Thousands/µL  --  2.79  --          Results from last 7 days   Lab Units 06/25/25  0547 06/25/25  0350 06/24/25  1043 06/24/25  1042   SODIUM mmol/L  --  132* 132*  --    POTASSIUM mmol/L  --  3.9 4.2  --    CHLORIDE mmol/L  --  94* 98  --    CO2 mmol/L  --  24 18*  --    CO2, I-STAT mmol/L  --   --   --  20*   ANION GAP mmol/L  --  14* 16*  --    BUN mg/dL  --  6 5  --    CREATININE mg/dL  --  0.86 0.77  --    CALCIUM mg/dL  --  7.1* 8.2*  --    CALCIUM, IONIZED, ISTAT mmol/L  --   --   --  0.94*   MAGNESIUM  mg/dL 2.1  --  1.6*  --    PHOSPHORUS mg/dL 2.6  --  2.2*  --      Results from last 7 days   Lab Units 06/25/25  0350 06/24/25  1653   AST U/L 109* 114*   ALT U/L 51 57*   ALK PHOS U/L 175* 180*   TOTAL PROTEIN g/dL 5.5* 5.7*   ALBUMIN g/dL 2.7* 2.8*   TOTAL BILIRUBIN mg/dL 1.34* 1.00   BILIRUBIN DIRECT mg/dL  --  0.45*     Results from last 7 days   Lab Units 06/25/25  1109 06/25/25  0757 06/24/25  2031 06/24/25  1651   POC GLUCOSE mg/dl 235* 150* 229* 174*     Results from last 7 days   Lab Units 06/25/25  0350 06/24/25  1043   GLUCOSE RANDOM mg/dL 177* 232*         Results from last 7 days   Lab Units 06/24/25  1653 06/24/25  1043   HEMOGLOBIN A1C % 8.0* 8.0*   EAG mg/dl 183 183     Beta- Hydroxybutyrate   Date Value Ref Range Status   06/24/2025 0.37 0.20 - 0.60 mmol/L Final          Results from last 7 days   Lab Units 06/25/25  0355   PH BIMAL  7.461*   PCO2 BIMAL mm Hg 39.6*   PO2 BIMAL mm Hg 58.5*   HCO3 BIMAL mmol/L 27.6   BASE EXC BIMAL mmol/L 3.6   O2 CONTENT BIMAL ml/dL 13.6   O2 HGB, VENOUS % 87.8*     Results from last 7 days   Lab Units 06/24/25  1042   PH, BIMAL I-STAT  7.382   PCO2, BIMAL ISTAT mm HG 31.3*   PO2, BIMAL ISTAT mm HG 23.0*   HCO3, BIMAL ISTAT mmol/L 18.6*   I STAT BASE EXC mmol/L -5*   I STAT O2 SAT % 39*     Results from last 7 days   Lab Units 06/24/25  1043   CK TOTAL U/L 43     Results from last 7 days   Lab Units 06/24/25  1320 06/24/25  1043   HS TNI 0HR ng/L  --  7   HS TNI 2HR ng/L 4  --    HSTNI D2 ng/L -3  --              Results from last 7 days   Lab Units 06/24/25  1653   TSH 3RD GENERATON uIU/mL 1.578     Results from last 7 days   Lab Units 06/24/25  1043   PROCALCITONIN ng/ml 0.43*     Results from last 7 days   Lab Units 06/25/25  0821 06/25/25  0547 06/25/25  0240 06/24/25  2356 06/24/25  1950 06/24/25  1653 06/24/25  1406   LACTIC ACID mmol/L 6.4* 7.2* 7.0* 6.6* 6.4* 4.2* 6.0*               Results from last 7 days   Lab Units 06/25/25  1303 06/24/25  1322   CLARITY UA  Clear Clear    COLOR UA  Yellow Light Yellow   SPEC GRAV UA  1.030 1.011   PH UA  7.0 5.0   GLUCOSE UA mg/dl 300 (3/10%)* 1000 (1%)*   KETONES UA mg/dl Trace* 10 (1+)*   BLOOD UA  Negative Negative   PROTEIN UA mg/dl Trace* Negative   NITRITE UA  Negative Negative   BILIRUBIN UA  Negative Negative   UROBILINOGEN UA (BE) mg/dl 4.0* <2.0   LEUKOCYTES UA  Negative Negative   WBC UA /hpf None Seen  --    RBC UA /hpf None Seen  --    BACTERIA UA /hpf None Seen  --    EPITHELIAL CELLS WET PREP /hpf Occasional  --              Results from last 7 days   Lab Units 06/24/25  1653   ETHANOL LVL mg/dL <10                 Results from last 7 days   Lab Units 06/24/25  1310   BLOOD CULTURE  No Growth at 24 hrs.   GRAM STAIN RESULT  Gram negative rods*                   Past Medical History[1]  Present on Admission:  **None**      Admitting Diagnosis: Chronic bilateral low back pain with left-sided sciatica [G89.29, M54.42]  Unspecified multiple injuries, initial encounter [T07.XXXA]  Age/Sex: 64 y.o. male    Network Utilization Review Department  ATTENTION: Please call with any questions or concerns to [phone] and carefully listen to the prompts so that you are directed to the right person. All voicemails are confidential.   For Discharge needs, contact Care Management DC Support Team at [phone] opt. [phone]  Send all requests for admission clinical reviews, approved or denied determinations and any other requests to dedicated fax number below belonging to the campus where the patient is receiving treatment. List of dedicated fax numbers for the Facilities:  FACILITY NAME UR FAX NUMBER   ADMISSION DENIALS (Administrative/Medical Necessity) [phone]   DISCHARGE SUPPORT TEAM (NETWORK) [phone]   PARENT CHILD HEALTH (Maternity/NICU/Pediatrics) [phone]   [facility] [phone]   [facility] [phone]   [facility] [phone]   [facility]  [facility] [phone]   [facility] [phone]   [facility] [phone]   [facility] [phone]   [facility] [phone]   [facility] [phone]   [facility] [phone]   [facility] [phone]   [facility] [phone]              [1] No past medical history on file.

## 2025-06-25 NOTE — ASSESSMENT & PLAN NOTE
Patient has history of ambulatory dysfunction and falls. He uses a cane at home and has to grab hold of things near him to walk steadily. 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   During this fall, wife says the patient was getting out of bed and attempting to use the bathroom and fell. He denied any LOC, dizziness, tripping over anything, or urinary incontinence, but he felt too weak to get up on his own, prompting him to call for his wife.    Plan:  F/u orthostats  PT/OT

## 2025-06-25 NOTE — PROGRESS NOTES
Progress Note - Trauma   Name: Donavon Couch 64 y.o. adult I MRN: [id number]  Unit/Bed#: S -01 I Date of Admission: 6/24/2025   Date of Service: 6/25/2025 I Hospital Day: 1     Assessment & Plan  Fall  - unwitnessed fall last night  - sustained no acute traumatic injuries  - Admit to medicine service  - Trauma will f/u in AM to complete tertiary survey  High anion gap metabolic acidosis  - elevated LA of 6 and urine with + ketones and glucose present in UA  - Received 3 liters of isolyte per sepsis protocol and broad spectrum antibiotics as well given SIRS criteria of tachypnea/reduced PCO2 and tachycardia in setting of elevated LA  - monitor hemodynamic status and trend LA  - IVF hydration with D5NS given concern for starvation ketosis  - SSI for hyperglycemia  - management otherwise per medical service  Left shoulder pain  - chronic in nature due to arthritis  - XR left shoulder shows degenerative change  - multi modal pain regimen  - outpatient referral to orthopedics if ongoing pain  - PT/OT  Diabetes (HCC)  Lab Results   Component Value Date    HGBA1C 8.0 (H) 06/24/2025       Recent Labs     06/24/25  1651 06/24/25 2031   POCGLU 174* 229*       Blood Sugar Average: Last 72 hrs:  (P) 201.5  Management per Primary Team    VTE Prophylaxis: Enoxaparin (Lovenox)     Disposition: Per primary team     TRAUMA TERTIARY SURVEY  Summary of Diagnosed Injuries: No identified traumatic injuries    Mechanism of Injury:Fall     Chief Complaint: No acute complaints, feels well at this time    24 Hour Events : Patient treated for significant lactic acidosis by primary team  Subjective : Patient states that he is doing well, denies any acute pain at this time.  Patient notes that he feels ready to go home however understands that his labs were abnormal.  Patient notes that he has previously had evaluations for abnormal lab work but continues to express interest in going home.  Patient has been able to tolerate  p.o., has not had a bowel movement however states that he has been passing gas.  Patient denies any headache, chest pain, difficulty breathing, abdominal pain, nausea, vomiting, change in urination, or any other acute complaint at this time.    Objective :  Temp:  [97.6 °F (36.4 °C)-100.5 °F (38.1 °C)] 99.6 °F (37.6 °C)  HR:  [] 92  BP: (107-188)/(65-91) 140/78  Resp:  [16-20] 16  SpO2:  [94 %-100 %] 96 %  O2 Device: None (Room air)    I/O         06/23 0701 06/24 0700 06/24 0701 06/25 0700    P.O.  240    I.V. (mL/kg)  1110 (12.7)    IV Piggyback  1040    Total Intake(mL/kg)  2390 (27.3)    Urine (mL/kg/hr)  250    Total Output  250    Net  +2140                  Physical Exam  Vitals and nursing note reviewed.   Constitutional:       General: She is not in acute distress.     Appearance: Normal appearance.   HENT:      Head: Normocephalic and atraumatic.      Right Ear: External ear normal.      Left Ear: External ear normal.      Nose: Nose normal.      Mouth/Throat:      Mouth: Mucous membranes are moist.     Eyes:      Conjunctiva/sclera: Conjunctivae normal.       Cardiovascular:      Rate and Rhythm: Normal rate and regular rhythm.   Pulmonary:      Effort: Pulmonary effort is normal. No respiratory distress.      Breath sounds: Normal breath sounds.   Abdominal:      General: Abdomen is flat. Bowel sounds are normal.      Tenderness: There is no abdominal tenderness. There is no guarding or rebound.     Musculoskeletal:         General: Normal range of motion.      Cervical back: Normal range of motion.     Skin:     General: Skin is warm and dry.      Capillary Refill: Capillary refill takes less than 2 seconds.     Neurological:      Mental Status: She is alert. Mental status is at baseline.     Psychiatric:         Mood and Affect: Mood normal.                 Lab Results: I have reviewed the following results:  Recent Labs       0000 06/24/25  1042 06/24/25  1043 06/24/25  1120 06/24/25  1320  06/24/25  1406 06/25/25  0240 06/25/25  0350   WBC  --   --  5.00  --   --   --   --   --    HGB  --  13.3 13.0  --   --   --   --   --    HCT  --  39 39.6  --   --   --   --   --    PLT  --   --  140*  --   --   --   --   --    SODIUM   < >  --  132*  --   --   --   --  132*   K   < >  --  4.2  --   --   --   --  3.9   CL   < >  --  98  --   --   --   --  94*   CO2   < > 20* 18*  --   --   --   --  24   BUN   < >  --  5  --   --   --   --  6   CREATININE   < >  --  0.77  --   --   --   --  0.86   GLUC   < >  --  232*  --   --   --   --  177*   CAIONIZED  --  0.94*  --   --   --   --   --   --    MG  --   --  1.6*  --   --   --   --   --    PHOS  --   --  2.2*  --   --   --   --   --    AST  --   --   --   --   --    < >  --  109*   ALT  --   --   --   --   --    < >  --  51   ALB  --   --   --   --   --    < >  --  2.7*   TBILI  --   --   --   --   --    < >  --  1.34*   ALKPHOS  --   --   --   --   --    < >  --  175*   HSTNI0  --   --  7  --   --   --   --   --    HSTNI2  --   --   --   --  4  --   --   --    LACTICACID  --   --   --    < >  --    < > 7.0*  --     < > = values in this interval not displayed.       Imaging Results Review: I reviewed radiology reports from this admission including: chest xray, CT chest, CT abdomen/pelvis, CT head, CT C-spine, and xray(s).

## 2025-06-25 NOTE — ASSESSMENT & PLAN NOTE
Acute on chronic left shoulder pain.   Xrays negative for acute osseous abnormality.   Differential is large, includes arthritis flare vs rotator cuff arthropathy vs other.   Weight bearing as tolerated to the left upper extremity.  Patient would benefit from outpatient MRI for further evaluation. Defer on inpatient MRI at this time. Discussed at length with patient and his family, who are in agreement with plan.   Further evaluation of shoulder pain as outpatient, at which time pending results of MRI, can consider further intervention.   PT/OT.   Pain control per primary team.   Medical management per primary team.   Rest of care per primary team.

## 2025-06-25 NOTE — DISCHARGE INSTR - AVS FIRST PAGE
Discharge Instructions - Orthopedics  Donavon Couch 64 y.o. male MRN: [id number]  Unit/Bed#: S -01    Weight Bearing Status:                                           Weight bearing as tolerated to the left upper extremity.    Pain:  Continue analgesics as directed    Dressing Instructions:   Please keep clean, dry and intact until follow up     Appt Instructions:   If you do not have your appointment, please call the clinic at [phone] to schedule with shoulder specialst (either Dr. Otero, Dr. Ramirez, or Dr. Cheung)  Otherwise follow up as scheduled.    Contact the office sooner if you experience any increased numbness/tingling in the extremities.

## 2025-06-25 NOTE — PROGRESS NOTES
Progress Note - Hospitalist   Name: Donavon Couch 64 y.o. male I MRN: [id number]  Unit/Bed#: S -01 I Date of Admission: 6/24/2025   Date of Service: 6/25/2025 I Hospital Day: 1    Assessment & Plan  Sepsis (HCC)  Bacteremia  Patient meeting sepsis criteria with tachypnea and tachycardia on presentation, blood culture BC ID with Klebsiella aerogenes, Staphylococcus.  S/p cefepime and Vanco in the ER on admission.  Hemodynamically stable, low-grade fever to about 100.5    Source has been unidentified, potentially translocation of gut bacteria, left shoulder, treating with IV antibiotics    Plan:  Infectious disease consulted  2 g cefepime every 8  Vancomycin  Day 2 antibiotics  AM CBC, fever curve  Lactic acidosis  Recent Labs     06/25/25  0240 06/25/25  0547 06/25/25  0821   LACTICACID 7.0* 7.2* 6.4*   Grade 1 diastolic dysfunction, follows with cardiology as outpatient  S/p 3L fluids, cefepime and vancomycin in the ER  Beta hydroxybutyrate WNL, CK, troponin WNL, UA WNL.  CXR, CT cervical spine, CT head, XR of L shoulder, CT CAP showed no acute changes and no traumatic injury findings.  Lactate still elevated at 6.4 this AM  Blood culture 1/2 showed growth of gram - rods     Plan:  See plan under sepsis  Follow-up ethanol  Follow-up thiamine level  Thiamine 100 mg IV 3 times daily  Repeat lactic acid at 3 PM    Fall  Patient has history of ambulatory dysfunction and falls. He uses a cane at home and has to grab hold of things near him to walk steadily. 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   During this fall, wife says the patient was getting out of bed and attempting to use the bathroom and fell. He denied any LOC, dizziness, tripping over anything, or urinary incontinence, but he felt too weak to get up on his own, prompting him to call for his wife.    Plan:  F/u orthostats  PT/OT  Left shoulder pain  Acute on chronic shoulder pain, no acute osseous  abnormality on x-ray.  Left deltoid was tender to palpation suspecting musculoskeletal related on 6/24.   Left shoulder x-ray demonstrated osteoarthritis of the glenohumeral joint.  Patient still complaining of pain    Plan:  Administer Robaxin 500 mg every 6 hours as needed  Gabapentin 100 mg 3 times daily  Oxycodone 2.5 and 5 for moderate to severe pain  Dilaudid for breakthrough pain  Orthopedic surgery consultation  Sinusitis  Patient endorsing increased postnasal drip and cough, evidence of sinusitis on imaging.  Treat with antibiotics for acute infectious sinusitis.  Patient still endorses a PND cough this AM without any congestion, fever, SOB, or chills.    Plan:  Stop Augmentin in setting of potential gram - mary sepsis  Claritin and Flonase  ENT referral on d/c   Will monitor symptoms  Coronary artery disease  Patient has a history of CAD (cath Oct 2013: 95% prox LAD, 40% mid LAD, EF 60%, WILLY to prox LAD)    Plan:  Continue Prasugrel 10 mg daily and Aspirin 81 mg daily  Diabetes ([facility])  A1c: 6.6 11 months ago    Patient is on Metformin 1000mg daily.    Plan:  Hold metformin  Sliding scale insulin  Manage diabetic neuropathy with Gabapentin 100 mg  Hypertension  On metoprolol 50 twice daily.    Plan:  Continue Metoprolol 50 mg every 12 hours.    Hyponatremia  Recent Labs     06/24/25  1043 06/25/25  0350   SODIUM 132* 132*   Likely in setting of poor oral intake/hypovolemic hyponatremia.  Na still low on 6/25 at 132.    Plan:  Follow-up sodium  Consider hyponatremia workup  IVF sodium bicarb 75 cc/ hour for 12 hours    Hypomagnesemia (Resolved: 6/25/2025)  Recent Labs     06/24/25  1043 06/25/25  0547   MG 1.6* 2.1   S/p IV Mg sulfate.  Mg normalized 6/25.    Plan:  Monitor and replete prn    Hypophosphatemia (Resolved: 6/25/2025)  S/p IV Na phosphate.  Ph normalized 6/25.    Plan:  Monitor and replete prn  Tobacco abuse  Patient endorses use of chewing tobacco.    Plan:  NRT ordered  Alcohol use  disorder  Patient's wife mentioned that patient drinks up to a bottle of wine a day.  Patient's lactic acidosis could be in the setting of thiamine deficiency.    Plan:  Start IV thiamine 100 mg TID  CIWA protocol  Pending results for  Thiamine  Folate check  B12 check  Follow-up hepatic function panel  Follow-up INR    VTE Pharmacologic Prophylaxis:   Moderate Risk (Score 3-4) - Pharmacological DVT Prophylaxis Ordered: enoxaparin (Lovenox).    Mobility:   Basic Mobility Inpatient Raw Score: 13  JH-HLM Goal: 4: Move to chair/commode  JH-HLM Achieved: 4: Move to chair/commode  JH-HLM Goal achieved. Continue to encourage appropriate mobility.    Patient Centered Rounds: I performed bedside rounds with nursing staff today.   Discussions with Specialists or Other Care Team Provider: Infectious disease    Education and Discussions with Family / Patient: Updated  (daughter) via phone.    Current Length of Stay: 1 day(s)  Current Patient Status: Inpatient   Certification Statement: The patient, who was admitted as an inpatient, is being discharged sooner than originally anticipated due to bacteremia  Discharge Plan: Anticipate discharge in 48-72 hrs to discharge location to be determined pending rehab evaluations.    Code Status: Level 1 - Full Code    Subjective   Patient was seen this AM and endorsed some mild left shoulder pain but that the pain was mostly resolved with the Robaxin. He also endorsed a post nasal drip cough that was worse when laying down at night causing him to sleep upright. He also endorsed urinary frequency without dysuria. He denied fever, chills, abdominal pain, N/V, diarrhea, or constipation.    Objective :  Temp:  [97.9 °F (36.6 °C)-100.5 °F (38.1 °C)] 98.2 °F (36.8 °C)  HR:  [] 89  BP: (107-188)/(66-91) 131/77  Resp:  [16-20] 18  SpO2:  [94 %-99 %] 97 %  O2 Device: None (Room air)    Body mass index is 26.16 kg/m².     Input and Output Summary (last 24 hours):      Intake/Output Summary (Last 24 hours) at 6/25/2025 1246  Last data filed at 6/25/2025 0953  Gross per 24 hour   Intake 3110 ml   Output 1050 ml   Net 2060 ml       Physical Exam  Constitutional:       General: He is not in acute distress.     Appearance: Normal appearance.     Cardiovascular:      Rate and Rhythm: Normal rate and regular rhythm.      Heart sounds: Normal heart sounds.     Musculoskeletal:      Comments: L shoulder is no longer tender to palpation.     Neurological:      Mental Status: He is alert. Mental status is at baseline.           Telemetry:  Telemetry Orders (From admission, onward)               24 Hour Telemetry Monitoring  Continuous x 24 Hours (Telem)        Question:  Reason for 24 Hour Telemetry  Answer:  Syncope suspected to be cardiac in origin                     Telemetry Reviewed: Normal Sinus Rhythm  Indication for Continued Telemetry Use: No indication for continued use. Will discontinue.                Lab Results: I have reviewed the following results:   Results from last 7 days   Lab Units 06/25/25  0547 06/24/25  1043   WBC Thousand/uL 3.20* 5.00   HEMOGLOBIN g/dL 10.4* 13.0   HEMATOCRIT % 29.8* 39.6   PLATELETS Thousands/uL 89* 140*   SEGS PCT %  --  56   LYMPHO PCT %  --  26   MONO PCT %  --  16*   EOS PCT %  --  1     Results from last 7 days   Lab Units 06/25/25  0350   SODIUM mmol/L 132*   POTASSIUM mmol/L 3.9   CHLORIDE mmol/L 94*   CO2 mmol/L 24   BUN mg/dL 6   CREATININE mg/dL 0.86   ANION GAP mmol/L 14*   CALCIUM mg/dL 7.1*   ALBUMIN g/dL 2.7*   TOTAL BILIRUBIN mg/dL 1.34*   ALK PHOS U/L 175*   ALT U/L 51   AST U/L 109*   GLUCOSE RANDOM mg/dL 177*         Results from last 7 days   Lab Units 06/25/25  1109 06/25/25  0757 06/24/25  2031 06/24/25  1651   POC GLUCOSE mg/dl 235* 150* 229* 174*     Results from last 7 days   Lab Units 06/24/25  1653 06/24/25  1043   HEMOGLOBIN A1C % 8.0* 8.0*     Results from last 7 days   Lab Units 06/25/25  0821 06/25/25  0547  06/25/25  0240 06/24/25  2356 06/24/25  1120 06/24/25  1043   LACTIC ACID mmol/L 6.4* 7.2* 7.0* 6.6*   < >  --    PROCALCITONIN ng/ml  --   --   --   --   --  0.43*    < > = values in this interval not displayed.       Recent Cultures (last 7 days):   Results from last 7 days   Lab Units 06/24/25  1310   BLOOD CULTURE  Received in Microbiology Lab. Culture in Progress.   GRAM STAIN RESULT  Gram negative rods*             Last 24 Hours Medication List:     Current Facility-Administered Medications:     acetaminophen (TYLENOL) tablet 650 mg, Q4H PRN    aspirin chewable tablet 81 mg, Daily    ceFEPime (MAXIPIME) 2,000 mg in dextrose 5 % 50 mL IVPB, Q8H, Last Rate: 2,000 mg (06/25/25 0953)    cyanocobalamin (VITAMIN B-12) tablet 1,000 mcg, Daily    enoxaparin (LOVENOX) subcutaneous injection 40 mg, Daily    fluticasone (FLONASE) 50 mcg/act nasal spray 1 spray, Daily    folic acid (FOLVITE) tablet 1 mg, Daily    gabapentin (NEURONTIN) capsule 100 mg, TID    HYDROmorphone (DILAUDID) injection 0.5 mg, Q3H PRN    insulin lispro (HumALOG/ADMELOG) 100 units/mL subcutaneous injection 1-5 Units, HS    insulin lispro (HumALOG/ADMELOG) 100 units/mL subcutaneous injection 1-6 Units, TID AC **AND** Fingerstick Glucose (POCT), TID AC    loratadine (CLARITIN) tablet 10 mg, Daily    methocarbamol (ROBAXIN) tablet 500 mg, Q6H PRN    metoprolol tartrate (LOPRESSOR) tablet 50 mg, Q12H DACIA    nicotine (NICODERM CQ) 14 mg/24hr TD 24 hr patch 14 mg, Daily    oxyCODONE (ROXICODONE) split tablet 2.5 mg, Q4H PRN **OR** oxyCODONE (ROXICODONE) IR tablet 5 mg, Q4H PRN    prasugrel (EFFIENT) tablet 10 mg, Daily    thiamine (VITAMIN B1) 100 mg in dextrose 5 % 100 mL IVPB, TID, Last Rate: 100 mg (06/25/25 1158)    vancomycin (VANCOCIN) 2,000 mg in sodium chloride 0.9 % 500 mL IVPB, Once    Administrative Statements   Today, Patient Was Seen By: Charito Zamudio MD      **Please Note: This note may have been constructed using a voice recognition  system.**

## 2025-06-25 NOTE — ASSESSMENT & PLAN NOTE
Recent Labs     06/24/25  2356 06/25/25  0240 06/25/25  0547   LACTICACID 6.6* 7.0* 7.2*   Grade 1 diastolic dysfunction, seen by cardiology  S/p 3L Cefepime, Vancomycin  Beta hydroxybutyrate WNL, CK, troponin WNL, UA WNL.  CXR, CT cervical spine, CT head, XR of L shoulder, CT CAP showed no acute changes and no traumatic injury findings.  Lactate still elevated at 6.4 this AM  Blood culture 1/2 showed growth of gram - rods     Plan:  Consult ID for gram - sepsis?  Start Cefepime 2 g every 8 hours  Continue maintenance fluids  Follow-up ethanol  IVF sodium bicarb 75 cc/ hour for 12 hours  DC'd D5  Hold Metformin

## 2025-06-25 NOTE — ASSESSMENT & PLAN NOTE
Lab Results   Component Value Date    HGBA1C 8.0 (H) 06/24/2025       Recent Labs     06/24/25  1651 06/24/25  2031 06/25/25  0757 06/25/25  1109   POCGLU 174* 229* 150* 235*       Blood Sugar Average: Last 72 hrs:  (P) 197  Poorly controlled   -Management per primary team

## 2025-06-25 NOTE — ASSESSMENT & PLAN NOTE
Acute on chronic shoulder pain, no acute osseous abnormality on x-ray.  Left deltoid was tender to palpation suspecting musculoskeletal related on 6/24.   Left shoulder x-ray demonstrated osteoarthritis of the glenohumeral joint.  Patient still complaining of pain    Plan:  Administer Robaxin 500 mg every 6 hours as needed  Gabapentin 100 mg 3 times daily  Oxycodone 2.5 and 5 for moderate to severe pain  Dilaudid for breakthrough pain  Orthopedic surgery consultation

## 2025-06-25 NOTE — ASSESSMENT & PLAN NOTE
Recent Labs     06/25/25  0240 06/25/25  0547 06/25/25  0821   LACTICACID 7.0* 7.2* 6.4*   Grade 1 diastolic dysfunction, follows with cardiology as outpatient  S/p 3L fluids, cefepime and vancomycin in the ER  Beta hydroxybutyrate WNL, CK, troponin WNL, UA WNL.  CXR, CT cervical spine, CT head, XR of L shoulder, CT CAP showed no acute changes and no traumatic injury findings.  Lactate still elevated at 6.4 this AM  Blood culture 1/2 showed growth of gram - rods     Plan:  See plan under sepsis  Follow-up ethanol  Follow-up thiamine level  Thiamine 100 mg IV 3 times daily  Repeat lactic acid at 3 PM

## 2025-06-25 NOTE — OCCUPATIONAL THERAPY NOTE
"    Occupational Therapy Evaluation     Patient Name: Donavon Couch  Today's Date: 6/25/2025  Problem List  Principal Problem:    Lactic acidosis  Active Problems:    Fall    Left shoulder pain    Coronary artery disease    Diabetes (HCC)    Hyponatremia    Tobacco abuse    Alcohol use disorder    Hypertension    Sinusitis    Past Medical History  Past Medical History[1]  Past Surgical History  Past Surgical History[2]        06/25/25 0941   OT Last Visit   OT Visit Date 06/25/25   Note Type   Note type Evaluation   Pain Assessment   Pain Assessment Tool 0-10   Pain Score 5  (0/10 at rest)   Pain Location/Orientation Orientation: Lower;Location: Back   Pain Radiating Towards non radiating per patient report   Pain Onset/Description Onset: Gradual;Descriptor: Sore;Descriptor: Discomfort   Effect of Pain on Daily Activities limits posture aligment, activity tolerance, ease of ADL and fnxl mobility   Patient's Stated Pain Goal No pain   Hospital Pain Intervention(s) Repositioned;Ambulation/increased activity;Emotional support  (lumbar support provided)   Multiple Pain Sites No   Restrictions/Precautions   Weight Bearing Precautions Per Order No   Other Precautions Visual impairment;Chair Alarm;Bed Alarm;Multiple lines;Fall Risk;Pain  (Blind in R eye (can identify \"big things\"))   Home Living   Type of Home House   Home Layout Stairs to enter with rails;One level;Performs ADLs on one level;Able to live on main level with bedroom/bathroom  (3 MOSEH)   Bathroom Shower/Tub Tub/shower unit   Bathroom Toilet Standard   Bathroom Equipment Grab bars in shower   Bathroom Accessibility Accessible   Home Equipment Grab bars;Cane   Additional Comments No use of AD in the home - furniture walks d/t visual impairment, SPC in the community + tactile cues from family/friends d/t visual impairment   Prior Function   Level of [place] Independent with ADLs;Independent with functional mobility;Needs assistance with IADLS  (SBA " "for shower transfers, light assist for drying off)   Lives With Spouse   Receives Help From Family   IADLs Family/Friend/Other provides transportation;Family/Friend/Other provides meals;Family/Friend/Other provides medication management  (spouse assists with IADLs)   Falls in the last 6 months 1 to 4  (x1, reason for admission)   Vocational On disability  ( for 42 years)   Comments Spouse present at bedside reports patient has had \"good\" and \"bad\" days leading up to this hospitalization. Since retiring from his job in Feb both him and his spouse reports he has \"given up\"   Lifestyle   Autonomy Pt lives w/ spouse. Light (A) with ADLs, (A) with IADLs and ARTURO for fnxl mobility w/ intermittent use of AD. (-) driving and (+) falls   Reciprocal Relationships Supportive spouse and family   Service to Others Retired   Intrinsic Gratification An upcoming family trip to [place]   General   Additional Pertinent History Pt admit s/p an unwitnessed all. No acute traumatic injuries sustained. Dx: lactic acidosis. PMHx: diabetes on metformin neuropathy, mild cognitive impairment, right eye blindness, lumbar radiculopathy chronic back pain   Family/Caregiver Present Yes  (supportive spouse at bedside)   Subjective   Subjective \"I guess I have given up\"   ADL   Eating Assistance 5  Supervision/Setup   Eating Deficit Setup;Supervision/safety;Verbal cueing;Beverage management  ((A) for completion of approach to targeted surface, limited by weakness and visual impairment)   Grooming Assistance 4  Minimal Assistance   UB Bathing Assistance 4  Minimal Assistance   LB Bathing Assistance 3  Moderate Assistance   UB Dressing Assistance 4  Minimal Assistance   LB Dressing Assistance 3  Moderate Assistance   LB Dressing Deficit Don/doff R sock;Don/doff L sock  (performed sitting at the EOB. Increased (A) to complete d/t decreased fnxl reach, pain and trunk instability)   Toileting Assistance  3  Moderate Assistance "   Additional Comments The above levels of assistance are anticipated based on functional performance deficits with use of clinical judgement.   Bed Mobility   Supine to Sit 3  Moderate assistance   Additional items Assist x 1;Increased time required;HOB elevated;Bedrails;Verbal cues;LE management  (trunk managment)   Sit to Supine   (NT: OOB to recliner chair)   Additional Comments Increased time, effort and assist to acheive optimal sitting position at the EOB. Fair - static sitting balance, poor + dynamic sitting balance w/ participatin in fnxl ADL tasks. Pt denies lightheadedness/dizziness.   Transfers   Sit to Stand 4  Minimal assistance   Additional items Assist x 1;Increased time required;Verbal cues  (No use of AD)   Stand to Sit 4  Minimal assistance   Additional items Assist x 1;Armrests;Increased time required;Verbal cues  (w/ RW)   Stand pivot 4  Minimal assistance   Additional items Assist x 1;Increased time required;Verbal cues  (use of RW: tactile / verbal /  visual cues for completion of approach to targeted surface)   Additional Comments VC for optimal hand placement   Functional Mobility   Functional Mobility 4  Minimal assistance   Additional Comments Ax1 with SPC short distance, pt reaching out to furniture for increased support / stability - provided w/ RW, able to ambulate household distance within the room. Inc time to complete. Visual target provided to improve accuracy and independence in an unfamiliar enviornment   Additional items Rolling walker   Balance   Static Sitting Fair -   Dynamic Sitting Poor +   Static Standing Poor +   Dynamic Standing Poor   Activity Tolerance   Activity Tolerance Patient limited by fatigue;Patient limited by pain   Medical Staff Made Aware Spoke with CM, PT   Nurse Made Aware Spoke with RN pre/post   RUE Assessment   RUE Assessment WFL   RUE Strength   RUE Overall Strength Within Functional Limits - able to perform ADL tasks with strength  (observed  "functionally)   LUE Assessment   LUE Assessment WFL   LUE Strength   LUE Overall Strength Within Functional Limits - able to perform ADL tasks with strength  (limited d/t pain s/p fall, no acute injuries noted on imaging)   Sensation   Light Touch Severe deficits in the RLE;Severe deficits in the LLE  (peripheral neuropathy from BL knees down to feet)   Vision-Basic Assessment   Visual History   (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 Reports being able to see \"big things\")   Vision - Complex Assessment   Tracking Intact  (appropriately tracks therapist with increased time)   Acuity Able to read clock/calendar on wall without difficulty  (from a far distance, sitting in recliner chair)   Cognition   Overall Cognitive Status WFL   Arousal/Participation Responsive;Cooperative   Attention Within functional limits   Orientation Level Oriented X4   Memory Within functional limits   Following Commands Follows one step commands with increased time or repetition   Comments Pt ID via wristband, name and . Cognition WFL conversationally, questionable higher level cognition. Will continue to assess.   Assessment   Limitation Decreased ADL status;Decreased endurance;Decreased self-care trans;Decreased high-level ADLs   Prognosis Good   Assessment Patient is a 64 y.o. male seen for OT evaluation following admission on 2025  s/p Lactic acidosis. Please see above for comprehensive list of comorbidities and significant PMHx impacting functional performance. Upon initial evaluation, pt appears to be performing below baseline functional status. Pt requires BENJI for UB ADLs, MODA for LB ADLs, MODA for bed mobility, BENJI for transfers and BENJI for functional mobility household distance with RW. The AM-PAC & Barthel Index outcome tools were used to assist in determining pt safety w/self care /mobility and appropriate d/c recommendations, see above for score. Occupational performance is affected by the " following deficits:  decreased muscular strength , decreased balance , decreased standing tolerance for self care tasks , decreased dynamic balance impacting functional reach, decreased visual acuity, impaired visual field , decreased functional reach , decreased trunk control , decreased activity tolerance , and (+) pain . Personal/Environmental factors impacting D/C include: (+) Hx of falls , Assistance needed for ADL/IADLs, Assistance needed for ADLs and functional mobility, and High fall risk . Supporting factors include: able to maintain FFSU, support system available, and attitude towards recovery . Patient would benefit from OT services within the acute care setting to maximize level of functional independence in the following areas fall prevention , self-care transfers, bed mobility , functional mobility, and ADLs.  From OT standpoint, recommendation at time of D/C would be Level I (Maximum Resource Intensity) .   Goals   Patient Goals to get stronger   LTG Time Frame 10-14   Long Term Goal See below   Plan   Treatment Interventions ADL retraining;Functional transfer training;Cognitive reorientation;Patient/family training;Equipment evaluation/education;Compensatory technique education;Energy conservation;Activityengagement   Goal Expiration Date 07/05/25   OT Treatment Day 0   OT Frequency 3-5x/wk   Discharge Recommendation   Rehab Resource Intensity Level, OT I (Maximum Resource Intensity)   Equipment Recommended Hip Kit ($);Bedside commode;Shower transfer bench ($$)   Commode Type Standard   AM-PAC Daily Activity Inpatient   Lower Body Dressing 2   Bathing 2   Toileting 3   Upper Body Dressing 3   Grooming 3   Eating 3   Daily Activity Raw Score 16   Daily Activity Standardized Score (Calc for Raw Score >=11) 35.96   AM-PAC Applied Cognition Inpatient   Following a Speech/Presentation 4   Understanding Ordinary Conversation 4   Taking Medications 3   Remembering Where Things Are Placed or Put Away 4    Remembering List of 4-5 Errands 3   Taking Care of Complicated Tasks 3   Applied Cognition Raw Score 21   Applied Cognition Standardized Score 44.3   Barthel Index   Feeding 5   Bathing 0   Grooming Score 0   Dressing Score 5   Bladder Score 10   Bowels Score 10   Toilet Use Score 5   Transfers (Bed/Chair) Score 10   Mobility (Level Surface) Score 0   Stairs Score 0   Barthel Index Score 45   End of Consult   Education Provided Yes;Family or social support of family present for education by provider   Patient Position at End of Consult Bedside chair;Bed/Chair alarm activated;All needs within reach   Nurse Communication Nurse aware of consult       GOALS:      -Patient will perform grooming tasks standing at sink with overall Mod I in order to increase overall independence     -Patient will be Mod I with UB dressing using AE and AD as needed in order to increase (I) with ADLs     -Patient will be Mod I with UB bathing using AE and AD as needed in order to increase (I) with ADLs     -Patient will be supervision with LB dressing with use of AE and AD as needed in order to increase (I) with ADLs     -Patient will be supervision with LB bathing with use of AE and AD as needed in order to increase (I) with ADLs    -Patient will complete toileting w/ supervision w/ G hygiene/thoroughness in order to reduce caregiver burden     -Patient will demonstrate BENJI with bed mobility for ability to manage own comfort and initiate OOB tasks.      -Patient will perform functional transfers with supervision to/from all surfaces using DME as needed in order to increase (I) with functional tasks     -Patient will be supervision with functional mobility to/from bathroom for increased independence with toileting tasks     -Patient will tolerate therapeutic activities for greater than 30 min, in order to increase tolerance for functional activities.      -Patient will engage in ongoing cognitive assessment in order to assist with safe  discharge planning/recommendations.     -Patient will demonstrate standing for 3 min in order to increase active participation in functional activities    -Patient will independently identify and utilize 2-3 positive coping strategies to enhance overall wellbeing and engagement in valued occupations.    -Patient will demonstrate compensatory techniques for low vision with minimal verbal cues to increase safety awareness, increase independence with ADLS and decrease fall risk.     The patient's raw score on the AM-PAC Daily Activity Inpatient Short Form is 16. A raw score of less than 19 suggests the patient may benefit from discharge to post-acute rehabilitation services. Please refer to the recommendation of the Occupational Therapist for safe discharge planning.    This session, pt required and most appropriately benefited from skilled OT/PT co-eval due to extensive physical assistance of SKILLED therapists, significant regression from functional baseline, and decreased activity tolerance. OT and PT goals were addressed separately as seen in documentation    Teena Bauer MS OTR/L   [place] Licensure# [id number]              [1] No past medical history on file.  [2] No past surgical history on file.

## 2025-06-25 NOTE — CONSULTS
Consultation - Infectious Disease   Name: Donavon Couch 64 y.o. male I MRN: [id number]  Unit/Bed#: S -01 I Date of Admission: 6/24/2025   Date of Service: 6/25/2025 I Hospital Day: 1       Inpatient consult to Infectious Diseases     Date/Time  6/25/2025 1:44 PM     Performed by  Ifeoma Sequeira MD   Authorized by  Charito Zamudio MD           Physician Requesting Evaluation: Marco A Gandhi MD   Reason for Evaluation / Principal Problem: Bacteremia    Assessment & Plan  Sepsis (HCC)  Patient presented on 6/24 s/p fall complaining of worsening chronic left shoulder pain. On admission, patient met sepsis criteria with tachycardia, tachypnea, and lactic acidosis. Blood cultures were obtained and Vancomycin/cefepime were administered. One out of two cultures (collected on 6/24) with GNR on Gram stain. BCID detected Klebsiella aerogenes and CoNS. Sepsis secondary to Klebsiella aerogenes bacteremia. Unclear etiology of bacteremia. Consider oropharynx as potential source given poor dentition. Unclear if left shoulder involvement; there is no tenderness to palpation of joint, swelling, or erythema.   -Final recommendations pending discussion with the attending  -Continue cefepime 2g q8h. Anticipate a total 7 day course of antibiotics.   -Follow-up blood culture sensitivities   -No indication for repeat blood cultures   -Monitor fever curve and vitals   -Additional supportive care per primary team  Bacteremia  One out of two cultures (collected on 6/24) with GNR on Gram stain. BCID detected Klebsiella aerogenes and coagulase negative Staphylococcus. CoNS suspected to be contaminant.   -Continue cefepime 2g q8h   -Follow-up blood culture sensitivities  Left shoulder pain  Patient with chronic left shoulder pain presented s/p fall with complaint of worsening pain. X-ray left shoulder showed mild glenohumeral osteoarthritis, but no acute pathology. X-ray left humerus revealed a 2 mm foreign body along the medial distal  left arm.   -Orthopedic Surgery consulted. Recommended outpatient MRI  Sinusitis  Patient with complaint of postnasal drip and cough. CT head reveals polypoid mucosal thickening of the right maxillary sinus is similar to prior study.   -Additional supportive care per primary team  Tobacco abuse  Daily chew tobacco abuse. No oral ulcers noted. Poor dentition with broken right-sided molar.   Alcohol use disorder  Chronic alcohol dependence. Patient hesitated to quantify consumption on evaluation.  Diabetes (HCC)  Lab Results   Component Value Date    HGBA1C 8.0 (H) 06/24/2025       Recent Labs     06/24/25  1651 06/24/25  2031 06/25/25  0757 06/25/25  1109   POCGLU 174* 229* 150* 235*       Blood Sugar Average: Last 72 hrs:  (P) 197  Poorly controlled   -Management per primary team      Antibiotics:  Cefepime    History of Present Illness   Donavon Couch is a 64 y.o. year old male with PMH of alcohol dependence, chew tobacco use, CAD s/p stent, HTN, T2DM, diabetic neuropathy, right eye blindness, and ambulatory dysfunction who initially presented as a trauma alert on 6/24 after a fall with a complaint of left shoulder pain. Trauma work-up was negative. X-ray left shoulder showed mild glenohumeral osteoarthritis, but no acute pathology; X-ray left humerus revealed a 2 mm foreign body along the medial distal left arm. CT C/A/P with contrast was negative for acute pathology and only showed a new incidental pulmonary nodule. On admission, patient was afebrile but tachycardic and tachypneic. Initial labs revealed high anion gap acidosis with elevated lactic acid, mild procalcitonin elevation, mild AST/ALT/ALP elevation. Due to concern for sepsis, blood cultures were obtained and Vancomycin/cefepime were administered by Trauma service, and patient was admitted to [facility]. Lactic acidosis was initially thought to be secondary to metformin use. Gram stain of 1 out of 2 cultures showed gram negative rods with BCID  detecting Klebsiella aerogenes. BCID also detected coagulase negative Staphylococcus. ID is now consulted for antibiotic guidance for bacteremia given unclear source of infection.    Patient was seen and examined. He was seated comfortably in the chair. He cannot quite recall the events that led up to his fall. He was found on the floor by his wife yesterday morning after he had called her for assistance. He denies recent fever, chills, night sweats, shortness of breath, vomiting, abdominal pain, diarrhea, dysuria, urinary frequency, rashes/wounds/ulcers. Wife has noted intermittent poor appetite in recent months, but denies any dysphagia or aspiration episodes. His left shoulder pain has been chronic for several months and was not associated with any trauma/injury.  The pain has been limiting his range of motion, but he has not otherwise sought care for it. He also reports a molar tooth that has been broken for several years. He endorses alcohol use but declines quantifying it at this time, daily chew tobacco use, but no IV drug use. He has 2 house cats. No other complaints at this time.    A complete review of systems is negative other than that noted in the HPI.    Historical Information   Past Medical History[1]  Past Surgical History[2]  Social History[3]  E-Cigarette/Vaping    E-Cigarette Use Never User      E-Cigarette/Vaping Substances         Objective :  Temp:  [97.9 °F (36.6 °C)-100.5 °F (38.1 °C)] 98.2 °F (36.8 °C)  HR:  [] 89  BP: (107-188)/(66-91) 131/77  Resp:  [16-20] 18  SpO2:  [94 %-99 %] 97 %  O2 Device: None (Room air)    Physical Exam  Vitals and nursing note reviewed.   Constitutional:       General: He is not in acute distress.     Appearance: He is well-developed.   HENT:      Head: Normocephalic and atraumatic.      Mouth/Throat:      Comments: Poor dentition. Inferior right molar broken    Eyes:      Comments: Right eyelid shut, chronically blind.      Cardiovascular:      Rate and  Rhythm: Normal rate and regular rhythm.      Heart sounds: No murmur heard.  Pulmonary:      Effort: Pulmonary effort is normal. No respiratory distress.      Breath sounds: Normal breath sounds.   Abdominal:      Palpations: Abdomen is soft.      Tenderness: There is no abdominal tenderness.     Musculoskeletal:         General: No swelling.      Cervical back: Neck supple.      Right lower leg: Edema (pedal) present.      Left lower leg: Edema (pedal) present.      Comments: Left shoulder external rotation limited by pain. No significant pain reproduced on palpation, abduction, internal rotation.     Skin:     General: Skin is warm and dry.     Neurological:      Mental Status: He is alert.     Psychiatric:         Mood and Affect: Mood normal.            Lab Results: I have reviewed the following results:  Results from last 7 days   Lab Units 06/25/25  0547 06/24/25  1043 06/24/25  1042   WBC Thousand/uL 3.20* 5.00  --    HEMOGLOBIN g/dL 10.4* 13.0  --    I STAT HEMOGLOBIN g/dl  --   --  13.3   PLATELETS Thousands/uL 89* 140*  --      Results from last 7 days   Lab Units 06/25/25  0350 06/24/25  1653 06/24/25  1653 06/24/25  1043 06/24/25  1042   0000   SODIUM mmol/L 132*  --   --  132*  --   --    POTASSIUM mmol/L 3.9  --   --  4.2  --    < >   CHLORIDE mmol/L 94*  --   --  98  --    < >   CO2 mmol/L 24  --   --  18*  --    < >   CO2, I-STAT mmol/L  --   --   --   --  20*  --    BUN mg/dL 6  --   --  5  --    < >   CREATININE mg/dL 0.86  --   --  0.77  --    < >   GLUCOSE, ISTAT mg/dl  --   --   --   --  225*  --    CALCIUM mg/dL 7.1*  --   --  8.2*  --    < >   AST U/L 109*  --  114*  --   --   --    ALT U/L 51   < > 57*  --   --   --    ALK PHOS U/L 175*   < > 180*  --   --   --    ALBUMIN g/dL 2.7*   < > 2.8*  --   --   --     < > = values in this interval not displayed.     Results from last 7 days   Lab Units 06/24/25  1310   BLOOD CULTURE  Received in Microbiology Lab. Culture in Progress.   GRAM STAIN  RESULT  Gram negative rods*     Results from last 7 days   Lab Units 06/24/25  1043   PROCALCITONIN ng/ml 0.43*                   Imaging Results Review: I reviewed radiology reports from this admission including: CT chest, CT abdomen/pelvis, and xray(s).               [1] No past medical history on file.  [2] No past surgical history on file.  [3]   Social History  Tobacco Use    Smoking status: Former     Types: Cigarettes    Smokeless tobacco: Current     Types: Chew   Vaping Use    Vaping status: Never Used   Substance and Sexual Activity    Alcohol use: Yes     Alcohol/week: 5.0 standard drinks of alcohol     Types: 5 Glasses of wine per week    Drug use: Never

## 2025-06-25 NOTE — ASSESSMENT & PLAN NOTE
Lab Results   Component Value Date    HGBA1C 8.0 (H) 06/24/2025       Recent Labs     06/24/25  1651 06/24/25 2031   POCGLU 174* 229*       Blood Sugar Average: Last 72 hrs:  (P) 201.5  Management per Primary Team

## 2025-06-25 NOTE — ASSESSMENT & PLAN NOTE
One out of two cultures (collected on 6/24) with GNR on Gram stain. BCID detected Klebsiella aerogenes and coagulase negative Staphylococcus. CoNS suspected to be contaminant.   -Continue cefepime 2g q8h   -Follow-up blood culture sensitivities

## 2025-06-25 NOTE — ASSESSMENT & PLAN NOTE
Recent Labs     06/24/25  1043 06/25/25  0350   SODIUM 132* 132*   Likely in setting of poor oral intake/hypovolemic hyponatremia.  Na still low on 6/25 at 132.    Plan:  Follow-up sodium  Consider hyponatremia workup  IVF sodium bicarb 75 cc/ hour for 12 hours

## 2025-06-25 NOTE — ASSESSMENT & PLAN NOTE
Patient with chronic left shoulder pain presented s/p fall with complaint of worsening pain. X-ray left shoulder showed mild glenohumeral osteoarthritis, but no acute pathology. X-ray left humerus revealed a 2 mm foreign body along the medial distal left arm.   -Orthopedic Surgery consulted. Recommended outpatient MRI

## 2025-06-25 NOTE — PLAN OF CARE
Problem: PHYSICAL THERAPY ADULT  Goal: Performs mobility at highest level of function for planned discharge setting.  See evaluation for individualized goals.  Description: Treatment/Interventions: LE strengthening/ROM, Functional transfer training, Elevations, Therapeutic exercise, Patient/family training, Equipment eval/education, Bed mobility, Gait training, Spoke to nursing, Spoke to case management, OT, Endurance training  Equipment Recommended: Walker       See flowsheet documentation for full assessment, interventions and recommendations.  Note: Prognosis: Fair  Problem List: Decreased strength, Decreased endurance, Impaired balance, Decreased mobility, Obesity, Decreased skin integrity, Pain  Assessment: Pt is a 64 y.o. male seen for PT evaluation s/p admit to [facility] on 6/24/2025. Pt was admitted with a primary dx of: sepsis.  PT now consulted for assessment of mobility and d/c needs. Pt with Activity as tolerated orders.  Pts current comorbidities and personal factors effecting treatment include: Bmi, Htn, DM II, peripheral neuropathy, PVD. Pts current clinical presentation is Unstable/Unpredictable (high complexity) due to Ongoing medical management for primary dx, Increased reliance on more restrictive AD compared to baseline, Decreased activity tolerance compared to baseline, Fall risk, Increased assistance needed from caregiver at current time. Prior to admission, pt was independent with use of SC. Upon evaluation, pt currently is requiring ModA for bed mobility; Helder for transfers and Helder for ambulation 15 ft w/ RW. Pt presents at PT eval functioning below baseline and currently w/ overall mobility deficits 2* to: BLE weakness, impaired balance, gait deviations, pain, decreased activity tolerance compared to baseline, decreased functional mobility tolerance compared to baseline, fall risk. Pt currently at a fall risk 2* to impairments listed above.  Pt will continue to benefit from  skilled acute PT interventions to address stated impairments; to maximize functional mobility; for ongoing pt/ family training; and DME needs. At conclusion of PT session chair alarm engaged, all needs in reach, RN notified of session findings/recommendations, and pt returned back in recliner chair with phone and call bell within reach. Pt denies any further questions at this time. Recommend Level I (Maximum Resource Intensity)  upon hospital D/C.        Rehab Resource Intensity Level, PT: I (Maximum Resource Intensity)    See flowsheet documentation for full assessment.

## 2025-06-25 NOTE — PLAN OF CARE
Problem: PAIN - ADULT  Goal: Verbalizes/displays adequate comfort level or baseline comfort level  Description: Interventions:  - Encourage patient to monitor pain and request assistance  - Assess pain using appropriate pain scale  - Administer analgesics as ordered based on type and severity of pain and evaluate response  - Implement non-pharmacological measures as appropriate and evaluate response  - Consider cultural and social influences on pain and pain management  - Notify physician/advanced practitioner if interventions unsuccessful or patient reports new pain  - Educate patient/family on pain management process including their role and importance of  reporting pain   - Provide non-pharmacologic/complimentary pain relief interventions  Outcome: Progressing     Problem: INFECTION - ADULT  Goal: Absence or prevention of progression during hospitalization  Description: INTERVENTIONS:  - Assess and monitor for signs and symptoms of infection  - Monitor lab/diagnostic results  - Monitor all insertion sites, i.e. indwelling lines, tubes, and drains  - Monitor endotracheal if appropriate and nasal secretions for changes in amount and color  - [place] appropriate cooling/warming therapies per order  - Administer medications as ordered  - Instruct and encourage patient and family to use good hand hygiene technique  - Identify and instruct in appropriate isolation precautions for identified infection/condition  Outcome: Progressing  Goal: Absence of fever/infection during neutropenic period  Description: INTERVENTIONS:  - Monitor WBC  - Perform strict hand hygiene  - Limit to healthy visitors only  - No plants, dried, fresh or silk flowers with mcgill in patient room  Outcome: Progressing     Problem: SAFETY ADULT  Goal: Patient will remain free of falls  Description: INTERVENTIONS:  - Educate patient/family on patient safety including physical limitations  - Instruct patient to call for assistance with activity   -  Consider consulting OT/PT to assist with strengthening/mobility based on AM PAC & JH-HLM score  - Consult OT/PT to assist with strengthening/mobility   - Keep Call bell within reach  - Keep bed low and locked with side rails adjusted as appropriate  - Keep care items and personal belongings within reach  - Initiate and maintain comfort rounds  - Make Fall Risk Sign visible to staff  - Offer Toileting every 2 Hours, in advance of need  - Initiate/Maintain bed alarm  - Obtain necessary fall risk management equipment:   - Apply yellow socks and bracelet for high fall risk patients  - Consider moving patient to room near nurses station  Outcome: Progressing  Goal: Maintain or return to baseline ADL function  Description: INTERVENTIONS:  -  Assess patient's ability to carry out ADLs; assess patient's baseline for ADL function and identify physical deficits which impact ability to perform ADLs (bathing, care of mouth/teeth, toileting, grooming, dressing, etc.)  - Assess/evaluate cause of self-care deficits   - Assess range of motion  - Assess patient's mobility; develop plan if impaired  - Assess patient's need for assistive devices and provide as appropriate  - Encourage maximum independence but intervene and supervise when necessary  - Involve family in performance of ADLs  - Assess for home care needs following discharge   - Consider OT consult to assist with ADL evaluation and planning for discharge  - Provide patient education as appropriate  - Monitor functional capacity and physical performance, use of AM PAC & JH-HLM   - Monitor gait, balance and fatigue with ambulation    Outcome: Progressing  Goal: Maintains/Returns to pre admission functional level  Description: INTERVENTIONS:  - Perform AM-PAC 6 Click Basic Mobility/ Daily Activity assessment daily.  - Set and communicate daily mobility goal to care team and patient/family/caregiver.   - Collaborate with rehabilitation services on mobility goals if  consulted  - Perform Range of Motion 3 times a day.  - Reposition patient every 2 hours.  - Dangle patient 3 times a day  - Stand patient 3 times a day  - Ambulate patient 3 times a day  - Out of bed to chair 3 times a day   - Out of bed for meals 3 times a day  - Out of bed for toileting  - Record patient progress and toleration of activity level   Outcome: Progressing     Problem: DISCHARGE PLANNING  Goal: Discharge to home or other facility with appropriate resources  Description: INTERVENTIONS:  - Identify barriers to discharge w/patient and caregiver  - Arrange for needed discharge resources and transportation as appropriate  - Identify discharge learning needs (meds, wound care, etc.)  - Arrange for interpretive services to assist at discharge as needed  - Refer to Case Management Department for coordinating discharge planning if the patient needs post-hospital services based on physician/advanced practitioner order or complex needs related to functional status, cognitive ability, or social support system  Outcome: Progressing     Problem: Knowledge Deficit  Goal: Patient/family/caregiver demonstrates understanding of disease process, treatment plan, medications, and discharge instructions  Description: Complete learning assessment and assess knowledge base.  Interventions:  - Provide teaching at level of understanding  - Provide teaching via preferred learning methods  Outcome: Progressing

## 2025-06-25 NOTE — ASSESSMENT & PLAN NOTE
Patient presented on 6/24 s/p fall complaining of worsening chronic left shoulder pain. On admission, patient met sepsis criteria with tachycardia, tachypnea, and lactic acidosis. Blood cultures were obtained and Vancomycin/cefepime were administered. One out of two cultures (collected on 6/24) with GNR on Gram stain. BCID detected Klebsiella aerogenes and CoNS. Sepsis secondary to Klebsiella aerogenes bacteremia. Unclear etiology of bacteremia. Consider oropharynx as potential source given poor dentition. Unclear if left shoulder involvement; there is no tenderness to palpation of joint, swelling, or erythema.   -Final recommendations pending discussion with the attending  -Continue cefepime 2g q8h. Anticipate a total 7 day course of antibiotics.   -Follow-up blood culture sensitivities   -No indication for repeat blood cultures   -Monitor fever curve and vitals   -Additional supportive care per primary team

## 2025-06-25 NOTE — PLAN OF CARE
Problem: OCCUPATIONAL THERAPY ADULT  Goal: Performs self-care activities at highest level of function for planned discharge setting.  See evaluation for individualized goals.  Description: Treatment Interventions: ADL retraining, Functional transfer training, Cognitive reorientation, Patient/family training, Equipment evaluation/education, Compensatory technique education, Energy conservation, Activityengagement  Equipment Recommended: Hip Kit ($), Bedside commode, Shower transfer bench ($$)       See flowsheet documentation for full assessment, interventions and recommendations.   Note: Limitation: Decreased ADL status, Decreased endurance, Decreased self-care trans, Decreased high-level ADLs  Prognosis: Good  Assessment: Patient is a 64 y.o. male seen for OT evaluation following admission on 6/24/2025  s/p Lactic acidosis. Please see above for comprehensive list of comorbidities and significant PMHx impacting functional performance. Upon initial evaluation, pt appears to be performing below baseline functional status. Pt requires BENJI for UB ADLs, MODA for LB ADLs, MODA for bed mobility, BENJI for transfers and BENJI for functional mobility household distance with RW. The AM-PAC & Barthel Index outcome tools were used to assist in determining pt safety w/self care /mobility and appropriate d/c recommendations, see above for score. Occupational performance is affected by the following deficits:  decreased muscular strength , decreased balance , decreased standing tolerance for self care tasks , decreased dynamic balance impacting functional reach, decreased visual acuity, impaired visual field , decreased functional reach , decreased trunk control , decreased activity tolerance , and (+) pain . Personal/Environmental factors impacting D/C include: (+) Hx of falls , Assistance needed for ADL/IADLs, Assistance needed for ADLs and functional mobility, and High fall risk . Supporting factors include: able to maintain  FFSU, support system available, and attitude towards recovery . Patient would benefit from OT services within the acute care setting to maximize level of functional independence in the following areas fall prevention , self-care transfers, bed mobility , functional mobility, and ADLs.  From OT standpoint, recommendation at time of D/C would be Level I (Maximum Resource Intensity) .     Rehab Resource Intensity Level, OT: I (Maximum Resource Intensity)     Teena Bauer MS OTR/L   [place] Licensure# [id number]

## 2025-06-25 NOTE — ASSESSMENT & PLAN NOTE
Patient endorsing increased postnasal drip and cough, evidence of sinusitis on imaging.  Treat with antibiotics for acute infectious sinusitis.  Patient still endorses a PND cough this AM without any congestion, fever, SOB, or chills.    Plan:  Stop Augmentin in setting of potential gram - mary sepsis  Claritin and Flonase  ENT referral on d/c   Will monitor symptoms

## 2025-06-25 NOTE — ASSESSMENT & PLAN NOTE
Recent Labs     06/24/25  1043 06/25/25  0547   MG 1.6* 2.1   S/p IV Mg sulfate.  Mg normalized 6/25.    Plan:  Monitor and replete prn

## 2025-06-25 NOTE — ASSESSMENT & PLAN NOTE
Patient with complaint of postnasal drip and cough. CT head reveals polypoid mucosal thickening of the right maxillary sinus is similar to prior study.   -Additional supportive care per primary team

## 2025-06-26 ENCOUNTER — APPOINTMENT (INPATIENT)
Dept: ULTRASOUND IMAGING | Facility: HOSPITAL | Age: 65
DRG: 872 | End: 2025-06-26
Payer: COMMERCIAL

## 2025-06-26 PROBLEM — R17 ELEVATED BILIRUBIN: Status: ACTIVE | Noted: 2025-06-26

## 2025-06-26 PROBLEM — M54.9 BACK PAIN: Status: ACTIVE | Noted: 2025-06-26

## 2025-06-26 LAB
ALBUMIN SERPL BCG-MCNC: 2.7 G/DL (ref 3.5–5)
ALP SERPL-CCNC: 175 U/L (ref 34–104)
ALT SERPL W P-5'-P-CCNC: 50 U/L (ref 7–52)
ANION GAP SERPL CALCULATED.3IONS-SCNC: 7 MMOL/L (ref 4–13)
AST SERPL W P-5'-P-CCNC: 113 U/L (ref 13–39)
BILIRUB DIRECT SERPL-MCNC: 0.97 MG/DL (ref 0–0.2)
BILIRUB SERPL-MCNC: 1.82 MG/DL (ref 0.2–1)
BUN SERPL-MCNC: 5 MG/DL (ref 5–25)
CALCIUM ALBUM COR SERPL-MCNC: 8.7 MG/DL (ref 8.3–10.1)
CALCIUM SERPL-MCNC: 7.7 MG/DL (ref 8.4–10.2)
CHLORIDE SERPL-SCNC: 98 MMOL/L (ref 96–108)
CO2 SERPL-SCNC: 31 MMOL/L (ref 21–32)
CREAT SERPL-MCNC: 0.82 MG/DL (ref 0.6–1.3)
ERYTHROCYTE [DISTWIDTH] IN BLOOD BY AUTOMATED COUNT: 14.9 % (ref 11.6–15.1)
GFR SERPL CREATININE-BSD FRML MDRD: 94 ML/MIN/1.73SQ M
GLUCOSE SERPL-MCNC: 157 MG/DL (ref 65–140)
GLUCOSE SERPL-MCNC: 157 MG/DL (ref 65–140)
GLUCOSE SERPL-MCNC: 174 MG/DL (ref 65–140)
GLUCOSE SERPL-MCNC: 200 MG/DL (ref 65–140)
GLUCOSE SERPL-MCNC: 215 MG/DL (ref 65–140)
HCT VFR BLD AUTO: 32 % (ref 36.5–49.3)
HGB BLD-MCNC: 10.7 G/DL (ref 12–17)
LACTATE SERPL-SCNC: 2.3 MMOL/L (ref 0.5–2)
LACTATE SERPL-SCNC: 2.6 MMOL/L (ref 0.5–2)
MCH RBC QN AUTO: 33.1 PG (ref 26.8–34.3)
MCHC RBC AUTO-ENTMCNC: 33.4 G/DL (ref 31.4–37.4)
MCV RBC AUTO: 99 FL (ref 82–98)
PLATELET # BLD AUTO: 84 THOUSANDS/UL (ref 149–390)
PMV BLD AUTO: 10 FL (ref 8.9–12.7)
POTASSIUM SERPL-SCNC: 3.7 MMOL/L (ref 3.5–5.3)
PROT SERPL-MCNC: 5.6 G/DL (ref 6.4–8.4)
RBC # BLD AUTO: 3.23 MILLION/UL (ref 3.88–5.62)
SODIUM SERPL-SCNC: 136 MMOL/L (ref 135–147)
WBC # BLD AUTO: 2.94 THOUSAND/UL (ref 4.31–10.16)

## 2025-06-26 PROCEDURE — 85027 COMPLETE CBC AUTOMATED: CPT | Performed by: HOSPITALIST

## 2025-06-26 PROCEDURE — 83605 ASSAY OF LACTIC ACID: CPT

## 2025-06-26 PROCEDURE — 99233 SBSQ HOSP IP/OBS HIGH 50: CPT | Performed by: INTERNAL MEDICINE

## 2025-06-26 PROCEDURE — 82248 BILIRUBIN DIRECT: CPT

## 2025-06-26 PROCEDURE — 99232 SBSQ HOSP IP/OBS MODERATE 35: CPT | Performed by: HOSPITALIST

## 2025-06-26 PROCEDURE — 76705 ECHO EXAM OF ABDOMEN: CPT

## 2025-06-26 PROCEDURE — G0545 PR INHERENT VISIT TO INPT: HCPCS | Performed by: INTERNAL MEDICINE

## 2025-06-26 PROCEDURE — 82948 REAGENT STRIP/BLOOD GLUCOSE: CPT

## 2025-06-26 PROCEDURE — 80053 COMPREHEN METABOLIC PANEL: CPT | Performed by: HOSPITALIST

## 2025-06-26 RX ORDER — BENZONATATE 100 MG/1
200 CAPSULE ORAL ONCE
Status: COMPLETED | OUTPATIENT
Start: 2025-06-26 | End: 2025-06-26

## 2025-06-26 RX ORDER — OXYCODONE HYDROCHLORIDE 10 MG/1
10 TABLET ORAL EVERY 4 HOURS PRN
Status: DISCONTINUED | OUTPATIENT
Start: 2025-06-26 | End: 2025-06-28 | Stop reason: HOSPADM

## 2025-06-26 RX ORDER — SENNOSIDES 8.6 MG
2 TABLET ORAL DAILY
Status: DISCONTINUED | OUTPATIENT
Start: 2025-06-26 | End: 2025-06-27

## 2025-06-26 RX ORDER — POLYETHYLENE GLYCOL 3350 17 G/17G
17 POWDER, FOR SOLUTION ORAL DAILY
Status: DISCONTINUED | OUTPATIENT
Start: 2025-06-26 | End: 2025-06-28 | Stop reason: HOSPADM

## 2025-06-26 RX ORDER — OXYCODONE HYDROCHLORIDE 5 MG/1
5 TABLET ORAL EVERY 4 HOURS PRN
Status: DISCONTINUED | OUTPATIENT
Start: 2025-06-26 | End: 2025-06-28 | Stop reason: HOSPADM

## 2025-06-26 RX ADMIN — BENZONATATE 200 MG: 100 CAPSULE ORAL at 03:58

## 2025-06-26 RX ADMIN — METHOCARBAMOL 500 MG: 500 TABLET ORAL at 10:44

## 2025-06-26 RX ADMIN — FLUTICASONE PROPIONATE 2 SPRAY: 50 SPRAY, METERED NASAL at 17:42

## 2025-06-26 RX ADMIN — ACETAMINOPHEN 650 MG: 325 TABLET ORAL at 09:46

## 2025-06-26 RX ADMIN — METOPROLOL TARTRATE 50 MG: 50 TABLET, FILM COATED ORAL at 09:34

## 2025-06-26 RX ADMIN — THIAMINE HYDROCHLORIDE 100 MG: 100 INJECTION, SOLUTION INTRAMUSCULAR; INTRAVENOUS at 16:24

## 2025-06-26 RX ADMIN — INSULIN LISPRO 2 UNITS: 100 INJECTION, SOLUTION INTRAVENOUS; SUBCUTANEOUS at 16:46

## 2025-06-26 RX ADMIN — PRASUGREL 10 MG: 10 TABLET, FILM COATED ORAL at 10:45

## 2025-06-26 RX ADMIN — THIAMINE HYDROCHLORIDE 100 MG: 100 INJECTION, SOLUTION INTRAMUSCULAR; INTRAVENOUS at 09:33

## 2025-06-26 RX ADMIN — CEFEPIME 2000 MG: 2 INJECTION, POWDER, FOR SOLUTION INTRAVENOUS at 01:54

## 2025-06-26 RX ADMIN — ACETAMINOPHEN 650 MG: 325 TABLET ORAL at 04:04

## 2025-06-26 RX ADMIN — LORATADINE 10 MG: 10 TABLET ORAL at 09:34

## 2025-06-26 RX ADMIN — ASPIRIN 81 MG: 81 TABLET, CHEWABLE ORAL at 09:34

## 2025-06-26 RX ADMIN — CYANOCOBALAMIN TAB 500 MCG 1000 MCG: 500 TAB at 09:34

## 2025-06-26 RX ADMIN — CEFEPIME 2000 MG: 2 INJECTION, POWDER, FOR SOLUTION INTRAVENOUS at 10:44

## 2025-06-26 RX ADMIN — INSULIN LISPRO 1 UNITS: 100 INJECTION, SOLUTION INTRAVENOUS; SUBCUTANEOUS at 11:45

## 2025-06-26 RX ADMIN — SENNOSIDES 17.2 MG: 8.6 TABLET, FILM COATED ORAL at 09:34

## 2025-06-26 RX ADMIN — INSULIN LISPRO 1 UNITS: 100 INJECTION, SOLUTION INTRAVENOUS; SUBCUTANEOUS at 21:32

## 2025-06-26 RX ADMIN — OXYCODONE 5 MG: 5 TABLET ORAL at 06:35

## 2025-06-26 RX ADMIN — CEFEPIME 2000 MG: 2 INJECTION, POWDER, FOR SOLUTION INTRAVENOUS at 17:42

## 2025-06-26 RX ADMIN — FLUTICASONE PROPIONATE 2 SPRAY: 50 SPRAY, METERED NASAL at 09:36

## 2025-06-26 RX ADMIN — METOPROLOL TARTRATE 50 MG: 50 TABLET, FILM COATED ORAL at 21:31

## 2025-06-26 RX ADMIN — OXYCODONE 5 MG: 5 TABLET ORAL at 21:42

## 2025-06-26 RX ADMIN — FOLIC ACID 1 MG: 1 TABLET ORAL at 09:34

## 2025-06-26 RX ADMIN — GABAPENTIN 100 MG: 100 CAPSULE ORAL at 17:42

## 2025-06-26 RX ADMIN — INSULIN LISPRO 1 UNITS: 100 INJECTION, SOLUTION INTRAVENOUS; SUBCUTANEOUS at 07:38

## 2025-06-26 RX ADMIN — THIAMINE HYDROCHLORIDE 100 MG: 100 INJECTION, SOLUTION INTRAMUSCULAR; INTRAVENOUS at 21:31

## 2025-06-26 RX ADMIN — OXYCODONE 5 MG: 5 TABLET ORAL at 17:42

## 2025-06-26 RX ADMIN — ENOXAPARIN SODIUM 40 MG: 40 INJECTION SUBCUTANEOUS at 09:36

## 2025-06-26 RX ADMIN — POLYETHYLENE GLYCOL 3350 17 G: 17 POWDER, FOR SOLUTION ORAL at 09:34

## 2025-06-26 RX ADMIN — GABAPENTIN 100 MG: 100 CAPSULE ORAL at 09:34

## 2025-06-26 NOTE — ASSESSMENT & PLAN NOTE
Patient presented on 6/24 s/p fall complaining of worsening chronic left shoulder pain. On admission, patient met sepsis criteria with tachycardia, tachypnea, and lactic acidosis. Blood cultures were obtained and Vancomycin/cefepime were administered. One out of two cultures (collected on 6/24) with GNR on Gram stain. BCID detected Klebsiella aerogenes and CoNS. CoNS is likely a contaminant. Possibly sepsis secondary to Klebsiella aerogenes bacteremia. Unclear etiology of bacteremia. Consider oropharynx as potential source given poor dentition. Lower suspicion for left shoulder joint infection; there was no tenderness to palpation of joint, swelling, erythema, or warmth on exam. MRI lumbar spine was obtained to query low back pain and revealed no infectious pathology.    -Final recommendations pending discussion with the attending  -Continue cefepime 2g q8h (Day 2). Anticipate a total 7 day course of antibiotics.   -Follow-up blood culture susceptibilities   -No indication for repeat blood cultures   -Monitor fever curve and vitals   -Additional supportive care per primary team

## 2025-06-26 NOTE — PROGRESS NOTES
Progress Note - Hospitalist   Name: Donavon Couch 64 y.o. male I MRN: [id number]  Unit/Bed#: S -01 I Date of Admission: 6/24/2025   Date of Service: 6/26/2025 I Hospital Day: 2    Assessment & Plan  Sepsis (HCC)  Bacteremia  Patient meeting sepsis criteria with tachypnea and tachycardia on presentation, blood culture BC ID with Klebsiella aerogenes, Staphylococcus.  S/p cefepime and Vanco in the ER on admission.  Hemodynamically stable, low-grade fevers to about 100.5    Unclear etiology of bacteremia    Plan:  Infectious disease consulted  2 g cefepime every 8h  Day 3 antibiotics, anticipate 7 total days if source identified  AM CBC, fever curve  Lactic acidosis  Recent Labs     06/25/25  2136 06/26/25  0403 06/26/25  0627   LACTICACID 5.8* 2.6* 2.3*   Grade 1 diastolic dysfunction, follows with cardiology as outpatient  S/p 3L fluids, cefepime and vancomycin in the ER  Beta hydroxybutyrate WNL, CK, troponin WNL, UA WNL.    improving    Plan:  See plan under sepsis  Follow-up thiamine level  Thiamine 100 mg IV 3 times daily    Fall  Patient has history of ambulatory dysfunction and falls. He uses a cane at home and has to grab hold of things near him to walk steadily. 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   During this fall, wife says the patient was getting out of bed and attempting to use the bathroom and fell. He denied any LOC, dizziness, tripping over anything, or urinary incontinence, but he felt too weak to get up on his own, prompting him to call for his wife.    Plan:  F/u orthostats  PT/OT  Left shoulder pain  Back pain  Chronic Back and left shoulder pain.  Left deltoid was tender to palpation suspecting musculoskeletal related on 6/24.   Left shoulder x-ray demonstrated osteoarthritis of the glenohumeral joint.  Patient still complaining of pain    Differential is large, includes arthritis flare vs rotator cuff arthropathy     Plan:  Administer  Robaxin 500 mg every 6 hours as needed  Gabapentin 100 mg 3 times daily  Oxycodone 5 and 10 for moderate to severe pain  Dilaudid for breakthrough pain  Orthopedic surgery consultation  Will provide referral on DC  Sinusitis  Patient endorsing increased postnasal drip and cough, evidence of sinusitis on imaging.  Treat with antibiotics for acute infectious sinusitis.    Plan:  Stop Augmentin in setting of potential gram - mary sepsis  Claritin and Flonase  ENT referral on d/c   Will monitor symptoms  Coronary artery disease  Patient has a history of CAD (cath Oct 2013: 95% prox LAD, 40% mid LAD, EF 60%, WILLY to prox LAD)    Plan:  Continue Prasugrel 10 mg daily and Aspirin 81 mg daily  Diabetes ([facility])  A1c: 6.6 11 months ago    Patient is on Metformin 1000mg daily.    Plan:  Hold metformin  Sliding scale insulin  Manage diabetic neuropathy with Gabapentin 100 mg TID  Hypertension  On metoprolol 50 twice daily. Continue PTA med  Hyponatremia  Recent Labs     06/24/25  1043 06/25/25  0350 06/26/25  0403   SODIUM 132* 132* 136   Likely in setting of poor oral intake/hypovolemic hyponatremia.  Na still low on 6/25 at 132.  Resolved.    Plan:  AM BMPs    Tobacco abuse  Patient endorses use of chewing tobacco.    Plan:  NRT ordered  Alcohol use disorder  Patient's wife mentioned that patient drinks up to a bottle of wine a day.  Patient's lactic acidosis could be in the setting of thiamine deficiency.  B12, B9 WNL    Plan:  Start IV thiamine 100 mg TID  CIWA protocol  Pending results for  Thiamine  Elevated bilirubin  Recent Labs     06/24/25  1653 06/25/25  0350 06/26/25  0403   * 109* 113*   ALT 57* 51 50   ALKPHOS 180* 175* 175*   TBILI 1.00 1.34* 1.82*   BILIDIR 0.45*  --  0.97*    Trending up. Patient not having abdominal pain.    F/U RUQ US    VTE Pharmacologic Prophylaxis:   Moderate Risk (Score 3-4) - Pharmacological DVT Prophylaxis Ordered: enoxaparin (Lovenox).    Mobility:   Basic Mobility Inpatient Raw  Score: 13  JH-HLM Goal: 4: Move to chair/commode  JH-HLM Achieved: 4: Move to chair/commode  JH-HLM Goal achieved. Continue to encourage appropriate mobility.    Patient Centered Rounds: I performed bedside rounds with nursing staff today.   Discussions with Specialists or Other Care Team Provider: Infectious disease    Education and Discussions with Family / Patient: Updated  (daughter) via phone.    Current Length of Stay: 2 day(s)  Current Patient Status: Inpatient   Certification Statement: The patient, who was admitted as an inpatient, is being discharged sooner than originally anticipated due to bacteremia  Discharge Plan: Anticipate discharge in 48-72 hrs to discharge location to be determined pending rehab evaluations.    Code Status: Level 1 - Full Code    Subjective   Patient was seen this AM and endorsed some mild left shoulder pain but that the pain was mostly resolved with the Robaxin. He also endorsed a post nasal drip cough that was worse when laying down at night causing him to sleep upright. He also endorsed urinary frequency without dysuria. He denied fever, chills, abdominal pain, N/V, diarrhea, or constipation.    Objective :  Temp:  [97.8 °F (36.6 °C)-98.5 °F (36.9 °C)] 97.8 °F (36.6 °C)  HR:  [86-88] 87  BP: (118-146)/(76-91) 146/91  Resp:  [16] 16  SpO2:  [91 %-97 %] 97 %    Body mass index is 26.16 kg/m².     Input and Output Summary (last 24 hours):     Intake/Output Summary (Last 24 hours) at 6/26/2025 0929  Last data filed at 6/26/2025 0717  Gross per 24 hour   Intake 960 ml   Output 1275 ml   Net -315 ml       Physical Exam  Constitutional:       General: He is not in acute distress.     Appearance: Normal appearance. He is obese.   HENT:      Mouth/Throat:      Mouth: Mucous membranes are dry.      Comments: Poor dentition    Cardiovascular:      Rate and Rhythm: Normal rate and regular rhythm.      Heart sounds: Normal heart sounds.   Abdominal:      General: There is  distension.      Tenderness: There is no abdominal tenderness.      Hernia: No hernia is present.     Musculoskeletal:         General: Tenderness present.      Comments: L shoulder     Neurological:      Mental Status: He is alert. Mental status is at baseline.             Lab Results: I have reviewed the following results:   Results from last 7 days   Lab Units 06/26/25  0403 06/25/25  0547 06/24/25  1043   WBC Thousand/uL 2.94*   < > 5.00   HEMOGLOBIN g/dL 10.7*   < > 13.0   HEMATOCRIT % 32.0*   < > 39.6   PLATELETS Thousands/uL 84*   < > 140*   SEGS PCT %  --   --  56   LYMPHO PCT %  --   --  26   MONO PCT %  --   --  16*   EOS PCT %  --   --  1    < > = values in this interval not displayed.     Results from last 7 days   Lab Units 06/26/25  0403   SODIUM mmol/L 136   POTASSIUM mmol/L 3.7   CHLORIDE mmol/L 98   CO2 mmol/L 31   BUN mg/dL 5   CREATININE mg/dL 0.82   ANION GAP mmol/L 7   CALCIUM mg/dL 7.7*   ALBUMIN g/dL 2.7*   TOTAL BILIRUBIN mg/dL 1.82*   ALK PHOS U/L 175*   ALT U/L 50   AST U/L 113*   GLUCOSE RANDOM mg/dL 157*         Results from last 7 days   Lab Units 06/26/25  0717 06/25/25  2115 06/25/25  1550 06/25/25  1109 06/25/25  0757 06/24/25  2031 06/24/25  1651   POC GLUCOSE mg/dl 157* 191* 162* 235* 150* 229* 174*     Results from last 7 days   Lab Units 06/24/25  1653 06/24/25  1043   HEMOGLOBIN A1C % 8.0* 8.0*     Results from last 7 days   Lab Units 06/26/25  0627 06/26/25  0403 06/25/25  2136 06/25/25  1529 06/24/25  1120 06/24/25  1043   LACTIC ACID mmol/L 2.3* 2.6* 5.8* 5.9*   < >  --    PROCALCITONIN ng/ml  --   --   --   --   --  0.43*    < > = values in this interval not displayed.       Recent Cultures (last 7 days):   Results from last 7 days   Lab Units 06/24/25  1310   BLOOD CULTURE  No Growth at 24 hrs.   GRAM STAIN RESULT  Gram negative rods*         CXR, CT cervical spine, CT head, XR of L shoulder, CT CAP showed no acute changes and no traumatic injury findings. MRI with Small  focal areas of enhancement in the L1-L2 and L2-L3 supraspinous ligament regions, suspicious for enthesitis.       Last 24 Hours Medication List:     Current Facility-Administered Medications:     acetaminophen (TYLENOL) tablet 650 mg, Q4H PRN    aspirin chewable tablet 81 mg, Daily    ceFEPime (MAXIPIME) 2,000 mg in dextrose 5 % 50 mL IVPB, Q8H, Last Rate: 2,000 mg (06/26/25 0154)    cyanocobalamin (VITAMIN B-12) tablet 1,000 mcg, Daily    enoxaparin (LOVENOX) subcutaneous injection 40 mg, Daily    fluticasone (FLONASE) 50 mcg/act nasal spray 2 spray, BID    folic acid (FOLVITE) tablet 1 mg, Daily    gabapentin (NEURONTIN) capsule 100 mg, TID    HYDROmorphone (DILAUDID) injection 0.5 mg, Q3H PRN    insulin lispro (HumALOG/ADMELOG) 100 units/mL subcutaneous injection 1-5 Units, HS    insulin lispro (HumALOG/ADMELOG) 100 units/mL subcutaneous injection 1-6 Units, TID AC **AND** Fingerstick Glucose (POCT), TID AC    loratadine (CLARITIN) tablet 10 mg, Daily    methocarbamol (ROBAXIN) tablet 500 mg, Q6H PRN    metoprolol tartrate (LOPRESSOR) tablet 50 mg, Q12H DACIA    nicotine (NICODERM CQ) 14 mg/24hr TD 24 hr patch 14 mg, Daily    oxyCODONE (ROXICODONE) IR tablet 5 mg, Q4H PRN **OR** oxyCODONE (ROXICODONE) immediate release tablet 10 mg, Q4H PRN    polyethylene glycol (MIRALAX) packet 17 g, Daily    prasugrel (EFFIENT) tablet 10 mg, Daily    senna (SENOKOT) tablet 17.2 mg, Daily    thiamine (VITAMIN B1) 100 mg in dextrose 5 % 100 mL IVPB, TID, Last Rate: 100 mg (06/25/25 2131)    Administrative Statements   Today, Patient Was Seen By: Charito Zamudio MD      **Please Note: This note may have been constructed using a voice recognition system.**

## 2025-06-26 NOTE — ASSESSMENT & PLAN NOTE
Recent Labs     06/25/25  2136 06/26/25  0403 06/26/25  0627   LACTICACID 5.8* 2.6* 2.3*   Grade 1 diastolic dysfunction, follows with cardiology as outpatient  S/p 3L fluids, cefepime and vancomycin in the ER  Beta hydroxybutyrate WNL, CK, troponin WNL, UA WNL.    improving    Plan:  See plan under sepsis  Follow-up thiamine level  Thiamine 100 mg IV 3 times daily

## 2025-06-26 NOTE — ASSESSMENT & PLAN NOTE
>>ASSESSMENT AND PLAN FOR ALCOHOL USE DISORDER WRITTEN ON 6/26/2025  8:46 AM BY JOE CALLEJAS MD    Chronic alcohol dependence. Patient hesitated to quantify consumption on evaluation.     >>ASSESSMENT AND PLAN FOR TOBACCO ABUSE AND ALCOHOL USE WRITTEN ON 6/26/2025  8:46 AM BY JOE CALLEJAS MD    Daily chew tobacco abuse. No oral ulcers noted. Poor dentition with broken right-sided molar.

## 2025-06-26 NOTE — ASSESSMENT & PLAN NOTE
Recent Labs     06/24/25  1043 06/25/25  0350 06/26/25  0403   SODIUM 132* 132* 136   Likely in setting of poor oral intake/hypovolemic hyponatremia.  Na still low on 6/25 at 132.  Resolved.    Plan:  GAETANO Hedrick

## 2025-06-26 NOTE — ASSESSMENT & PLAN NOTE
Patient with chronic left shoulder pain presented s/p fall with complaint of worsening pain. X-ray left shoulder showed mild glenohumeral osteoarthritis, but no acute pathology. X-ray left humerus revealed a 2 mm foreign body along the medial distal left arm. Low suspicion for septic arthritis as a source pf bacteremia.   -Orthopedic Surgery was consulted. Recommended outpatient MRI left shoulder

## 2025-06-26 NOTE — ASSESSMENT & PLAN NOTE
Recent Labs     06/24/25  1653 06/25/25  0350 06/26/25  0403   * 109* 113*   ALT 57* 51 50   ALKPHOS 180* 175* 175*   TBILI 1.00 1.34* 1.82*   BILIDIR 0.45*  --  0.97*    Trending up. Patient not having abdominal pain.    F/U RUQ US

## 2025-06-26 NOTE — PROGRESS NOTES
Progress Note - Infectious Disease   Name: Donavon Couch 64 y.o. male I MRN: [id number]  Unit/Bed#: S -01 I Date of Admission: 6/24/2025   Date of Service: 6/26/2025 I Hospital Day: 2    Assessment & Plan  Sepsis (HCC)  Patient presented on 6/24 s/p fall complaining of worsening chronic left shoulder pain. On admission, patient met sepsis criteria with tachycardia, tachypnea, and lactic acidosis. Blood cultures were obtained and Vancomycin/cefepime were administered. One out of two cultures (collected on 6/24) with GNR on Gram stain. BCID detected Klebsiella aerogenes and CoNS. CoNS is likely a contaminant. Possibly sepsis secondary to Klebsiella aerogenes bacteremia. Unclear etiology of bacteremia. Consider oropharynx as potential source given poor dentition. Lower suspicion for left shoulder joint infection; there was no tenderness to palpation of joint, swelling, erythema, or warmth on exam. MRI lumbar spine was obtained to query low back pain and revealed no infectious pathology.    -Final recommendations pending discussion with the attending  -Continue cefepime 2g q8h (Day 2). Anticipate a total 7 day course of antibiotics.   -Follow-up blood culture susceptibilities   -No indication for repeat blood cultures   -Monitor fever curve and vitals   -Additional supportive care per primary team  Bacteremia  One out of two cultures (collected on 6/24) with GNR on Gram stain. BCID detected Klebsiella aerogenes and coagulase negative Staphylococcus. CoNS suspected to be contaminant.   -Continue cefepime 2g q8h   -Follow-up blood culture sensitivities  Left shoulder pain  Patient with chronic left shoulder pain presented s/p fall with complaint of worsening pain. X-ray left shoulder showed mild glenohumeral osteoarthritis, but no acute pathology. X-ray left humerus revealed a 2 mm foreign body along the medial distal left arm. Low suspicion for septic arthritis as a source pf bacteremia.   -Orthopedic  Surgery was consulted. Recommended outpatient MRI left shoulder  Sinusitis  Patient with complaint of postnasal drip and cough. CT head reveals polypoid mucosal thickening of the right maxillary sinus is similar to prior study.   -Additional supportive care per primary team  Tobacco abuse  Daily chew tobacco abuse. No oral ulcers noted. Poor dentition with broken right-sided molar.   Alcohol use disorder  Chronic alcohol dependence. Patient hesitated to quantify consumption on evaluation.  Diabetes (HCC)  Lab Results   Component Value Date    HGBA1C 8.0 (H) 06/24/2025       Recent Labs     06/25/25  1109 06/25/25  1550 06/25/25  2115 06/26/25  0717   POCGLU 235* 162* 191* 157*       Blood Sugar Average: Last 72 hrs:  (P) 185.3571629314217109  Poorly controlled   -Management per primary team        Antibiotics:  Cefepime Day 2    Subjective   Patient was seen and examined at bedside. He continues to report left shoulder pain that is really only improved with analgesics. Denied fever, chills, cough, shortness of breath, nausea, vomiting, abdominal pain, lower urinary tract symptoms. No other complaints at this time.    Objective :  Temp:  [97.8 °F (36.6 °C)-98.5 °F (36.9 °C)] 97.8 °F (36.6 °C)  HR:  [86-88] 87  BP: (118-146)/(76-91) 146/91  Resp:  [16] 16  SpO2:  [91 %-97 %] 97 %    Physical Exam  Vitals and nursing note reviewed.   Constitutional:       General: He is not in acute distress.     Appearance: He is well-developed.   HENT:      Head: Normocephalic and atraumatic.     Eyes:      Comments: Right eyelid shut, chronically blind.      Cardiovascular:      Rate and Rhythm: Normal rate and regular rhythm.      Heart sounds: No murmur heard.  Pulmonary:      Effort: Pulmonary effort is normal. No respiratory distress.      Breath sounds: Normal breath sounds.   Abdominal:      Palpations: Abdomen is soft.      Tenderness: There is no abdominal tenderness.     Musculoskeletal:         General: No swelling.       Cervical back: Neck supple.      Right lower leg: Edema (pedal) present.      Left lower leg: Edema (pedal) present.     Skin:     General: Skin is warm and dry.     Neurological:      Mental Status: He is alert.     Psychiatric:         Mood and Affect: Mood normal.            Lab Results: I have reviewed the following results:  Results from last 7 days   Lab Units 06/26/25  0403 06/25/25  0547 06/24/25  1043   WBC Thousand/uL 2.94* 3.20* 5.00   HEMOGLOBIN g/dL 10.7* 10.4* 13.0   PLATELETS Thousands/uL 84* 89* 140*     Results from last 7 days   Lab Units 06/26/25  0403 06/25/25  0350 06/24/25  1653 06/24/25  1653 06/24/25  1043 06/24/25  1042   0000   SODIUM mmol/L 136 132*  --   --  132*  --   --    POTASSIUM mmol/L 3.7 3.9  --   --  4.2  --    < >   CHLORIDE mmol/L 98 94*  --   --  98  --    < >   CO2 mmol/L 31 24  --   --  18*  --    < >   CO2, I-STAT mmol/L  --   --   --   --   --  20*  --    BUN mg/dL 5 6  --   --  5  --    < >   CREATININE mg/dL 0.82 0.86  --   --  0.77  --    < >   EGFR ml/min/1.73sq m 94  --   --   --   --   --   --    GLUCOSE, ISTAT mg/dl  --   --   --   --   --  225*  --    CALCIUM mg/dL 7.7* 7.1*  --   --  8.2*  --    < >   AST U/L 113* 109*  --  114*  --   --   --    ALT U/L 50 51   < > 57*  --   --   --    ALK PHOS U/L 175* 175*   < > 180*  --   --   --    ALBUMIN g/dL 2.7* 2.7*   < > 2.8*  --   --   --     < > = values in this interval not displayed.     Results from last 7 days   Lab Units 06/24/25  1310   BLOOD CULTURE  No Growth at 24 hrs.   GRAM STAIN RESULT  Gram negative rods*     Results from last 7 days   Lab Units 06/24/25  1043   PROCALCITONIN ng/ml 0.43*                 Imaging Results Review: I reviewed radiology reports from this admission including: MRI spine.

## 2025-06-26 NOTE — ASSESSMENT & PLAN NOTE
Lab Results   Component Value Date    HGBA1C 8.0 (H) 06/24/2025       Recent Labs     06/25/25  1109 06/25/25  1550 06/25/25  2115 06/26/25  0717   POCGLU 235* 162* 191* 157*       Blood Sugar Average: Last 72 hrs:  (P) 185.0064837331703953  Poorly controlled   -Management per primary team

## 2025-06-26 NOTE — ASSESSMENT & PLAN NOTE
>>ASSESSMENT AND PLAN FOR ALCOHOL USE DISORDER WRITTEN ON 6/26/2025  9:47 AM BY ALEX SAPP MD    Patient's wife mentioned that patient drinks up to a bottle of wine a day.  Patient's lactic acidosis could be in the setting of thiamine deficiency.  B12, B9 WNL    Plan:  Start IV thiamine 100 mg TID  CIWA protocol  Pending results for  Thiamine     >>ASSESSMENT AND PLAN FOR TOBACCO ABUSE AND ALCOHOL USE WRITTEN ON 6/26/2025  6:40 AM BY ALEX SAPP MD    Patient endorses use of chewing tobacco.    Plan:  NRT ordered

## 2025-06-26 NOTE — HOSPITAL COURSE
Patient is a 64 year old M with PMH HTN, T2DM, AUD, CAD, PVD, ambulatory dysfunction, and KT who presented to the ED as a trauma alert after an unwitnessed fall without prolonged down time. On arrival to the ED he met SIRS criteria with tachycardia and tachypnea. Blood cultures were obtained and fluids and Cefepime and Vancomycin were started. Imaging (CXR, CT cervical spine, CT head, XR shoulder) was negative for traumatic injury. He also had an increased anion gap metabolic acidosis with an elevated lactate level.     His blood cultures grew gram negative rods, Klebsiella aerogenes, and coagulase negative Staph. ID was consulted and given the patient's lower back pain, MRI LS was indicated to rule that out as a source of infection. MRI LS was negative for infection and just showed L1-L3 supraspinous ligament enthesitis. Coagulase negative Staph was suspicious for contaminant. Patient was continued on Cefepime and d/c Vancomycin and will likely complete a 7 day course of antibiotics given MRI negative for infection.    Patient's elevated lactate was concerning for B1 deficiency or Metformin adverse effect. Patient's lactate level decreased to 2.3 after administration of IV thiamine. Pending results of admission Vitamin B1 levels.    Patient also complained of L shoulder pain that was worse with movement and better with rest. Xrays were negative for acute osseous abnormality. Ortho was consulted and outpatient MRI was recommended. Pain was controlled with Tylenol and Oxycodone.    Patient also had elevated total bilirubin, direct bilirubin, and alk phos levels. He denied any RUQ pain but given these levels, RUQ US was performed and was negative for infection (just showed evidence of hepatomegaly with steatosis). Vitamin D, INR, and hemolysis panel are pending in the setting of the hyperbilirubinemia and elevated alk phos without evidence of infection.

## 2025-06-26 NOTE — ASSESSMENT & PLAN NOTE
Patient endorsing increased postnasal drip and cough, evidence of sinusitis on imaging.  Treat with antibiotics for acute infectious sinusitis.    Plan:  Stop Augmentin in setting of potential gram - mary sepsis  Claritin and Flonase  ENT referral on d/c   Will monitor symptoms

## 2025-06-26 NOTE — ASSESSMENT & PLAN NOTE
Patient meeting sepsis criteria with tachypnea and tachycardia on presentation, blood culture BC ID with Klebsiella aerogenes, Staphylococcus.  S/p cefepime and Vanco in the ER on admission.  Hemodynamically stable, low-grade fevers to about 100.5    Unclear etiology of bacteremia    Plan:  Infectious disease consulted  2 g cefepime every 8h  Day 3 antibiotics, anticipate 7 total days if source identified  AM CBC, fever curve

## 2025-06-26 NOTE — ASSESSMENT & PLAN NOTE
A1c: 6.6 11 months ago    Patient is on Metformin 1000mg daily.    Plan:  Hold metformin  Sliding scale insulin  Manage diabetic neuropathy with Gabapentin 100 mg TID

## 2025-06-26 NOTE — ASSESSMENT & PLAN NOTE
Chronic Back and left shoulder pain.  Left deltoid was tender to palpation suspecting musculoskeletal related on 6/24.   Left shoulder x-ray demonstrated osteoarthritis of the glenohumeral joint.  Patient still complaining of pain    Differential is large, includes arthritis flare vs rotator cuff arthropathy     Plan:  Administer Robaxin 500 mg every 6 hours as needed  Gabapentin 100 mg 3 times daily  Oxycodone 5 and 10 for moderate to severe pain  Dilaudid for breakthrough pain  Orthopedic surgery consultation  Will provide referral on DC

## 2025-06-26 NOTE — CASE MANAGEMENT
Case Management Assessment & Discharge Planning Note    Patient name Donavon Couch  Location S /S -01 MRN [id number]  : 1960 Date 2025       Current Admission Date: 2025  Current Admission Diagnosis:Sepsis (HCC)   Patient Active Problem List    Diagnosis Date Noted    Back pain 2025    Elevated bilirubin 2025    Sepsis (HCC) 2025    Bacteremia 2025    Fall 2025    Left shoulder pain 2025    Lactic acidosis 2025    Coronary artery disease 2025    Diabetes (HCC) 2025    Hyponatremia 2025    Tobacco abuse 2025    Alcohol use disorder 2025    Hypertension 2025    Sinusitis 2025      LOS (days): 2  Geometric Mean LOS (GMLOS) (days):   Days to GMLOS:     OBJECTIVE:    Risk of Unplanned Readmission Score: 16.1         Current admission status: Inpatient       Preferred Pharmacy:   [facility] [address] [place] [id number]  Phone: [phone] Fax: [phone]    Primary Care Provider: Chikis Marshall MD    Primary Insurance: BLUE CROSS  Secondary Insurance:     ASSESSMENT:  Active Health Care Proxies    There are no active Health Care Proxies on file.                 Readmission Root Cause  30 Day Readmission: No    Patient Information  Admitted from:: Home  Mental Status: Alert  During Assessment patient was accompanied by: Not accompanied during assessment  Assessment information provided by:: Patient  Primary Caregiver: Self  Support Systems: Self, Spouse/significant other  County of Residence: [place]  What city do you live in?: [place]  Home entry access options. Select all that apply.: Stairs  Number of steps to enter home.: 3  Type of Current Residence: [facility]  Living Arrangements: Lives w/ Spouse/significant other  Is patient a ?: No    Activities of Daily Living Prior to Admission  Functional Status:  Independent  Completes ADLs independently?: Yes  Ambulates independently?: Yes  Does patient use assisted devices?: Yes  Assisted Devices (DME) used: Straight Cane  Does patient currently own DME?: Yes  What DME does the patient currently own?: Straight Cane  Does patient have a history of Outpatient Therapy (PT/OT)?: No  Does the patient have a history of Short-Term Rehab?: No  Does patient have a history of HHC?: No  Does patient currently have HHC?: No         Patient Information Continued  Income Source: SSI/SSD  Does patient have prescription coverage?: Yes  Can the patient afford their medications and any related supplies (such as glucometers or test strips)?: Yes  Does patient receive dialysis treatments?: No  Does patient have a history of substance abuse?: Currently using  Current substance use preference: Alcohol/ETOH  History of Withdrawal Symptoms: Denies past symptoms  Is patient currently in treatment for substance abuse?: No. Patient declined treatment information.  Does patient have a history of Mental Health Diagnosis?: No         Means of Transportation  Means of Transport to [facility]:: Family transport          DISCHARGE DETAILS:    Discharge planning discussed with:: Patient at bedside and pt's spouse via phone call.  Freedom of Choice: Yes  Comments - Freedom of Choice: Level 1 rec. Pt and spouse agreeable to referrals being sent to [facility] and [facility]. [facility] preference and if no bed, [facility].   contacted family/caregiver?: Yes  Were Treatment Team discharge recommendations reviewed with patient/caregiver?: Yes  Did patient/caregiver verbalize understanding of patient care needs?: N/A- going to facility  Were patient/caregiver advised of the risks associated with not following Treatment Team discharge recommendations?: Yes    Contacts  Patient Contacts: Che  Relationship to Patient:: Family  Contact Method: Phone  Phone Number: [phone]  Reason/Outcome: Continuity of Care, Discharge Planning,  "Emergency Contact    Requested Home Health Care         Is the patient interested in HHC at discharge?: No    DME Referral Provided  Referral made for DME?: No    Other Referral/Resources/Interventions Provided:  Referral Comments: Acute rehab referrals made to both  [facility] and [facility].    Treatment Team Recommendation: Acute Rehab  Expected Discharge Disposition: Inpatient Rehab Facility     Additional Comments: CM met with pt at bedside and introduced self and role. Pt resides with his wife, Che, in a 1-story home with 3STE. Pt IPTA with ADL's and ambulation. Pt uses straight cane at baseline for ambulation. Pt denied hx of STR, HHC, and OPPT. Pt denied hx of MH. Pt reports he does drink wine daily and then stated \"I don't want to discuss this anymore\" when CM asked how much pt drinks daily. CM discussed Level 1 rehab rec and pt was agreeable, but requested CM call his wife and discuss same. CM spoke with pt's wife, Che, via phone call and discussed above. Che reports [facility] as preference, but if there are not beds at [facility] they would like [facility]. CM sent referrals at this time. Pt will need insurance auth submitted when appropriate.                    "

## 2025-06-26 NOTE — PROGRESS NOTES
Vancomycin IV Pharmacy-to-Dose Consultation     Vancomycin has been discontinued.  Pharmacy will sign off.  Please contact or re-consult with questions.    Kady Pedroza, Pharmacist

## 2025-06-27 PROBLEM — E80.6 HYPERBILIRUBINEMIA: Status: ACTIVE | Noted: 2025-06-26

## 2025-06-27 PROBLEM — K59.00 CONSTIPATION: Status: ACTIVE | Noted: 2025-06-27

## 2025-06-27 PROBLEM — E87.20 LACTIC ACIDOSIS: Status: RESOLVED | Noted: 2025-06-24 | Resolved: 2025-06-27

## 2025-06-27 PROBLEM — R65.10 SIRS (SYSTEMIC INFLAMMATORY RESPONSE SYNDROME) (HCC): Status: ACTIVE | Noted: 2025-06-25

## 2025-06-27 LAB
25(OH)D3 SERPL-MCNC: 12.8 NG/ML (ref 30–100)
ALBUMIN SERPL BCG-MCNC: 2.8 G/DL (ref 3.5–5)
ALP SERPL-CCNC: 191 U/L (ref 34–104)
ALT SERPL W P-5'-P-CCNC: 46 U/L (ref 7–52)
ANION GAP SERPL CALCULATED.3IONS-SCNC: 6 MMOL/L (ref 4–13)
AST SERPL W P-5'-P-CCNC: 113 U/L (ref 13–39)
BACTERIA BLD CULT: ABNORMAL
BACTERIA BLD CULT: ABNORMAL
BASOPHILS # BLD AUTO: 0.01 THOUSANDS/ÂΜL (ref 0–0.1)
BASOPHILS NFR BLD AUTO: 0 % (ref 0–1)
BILIRUB SERPL-MCNC: 2.24 MG/DL (ref 0.2–1)
BLD SMEAR INTERP: NORMAL
BUN SERPL-MCNC: 7 MG/DL (ref 5–25)
CALCIUM ALBUM COR SERPL-MCNC: 8.8 MG/DL (ref 8.3–10.1)
CALCIUM SERPL-MCNC: 7.8 MG/DL (ref 8.4–10.2)
CHLORIDE SERPL-SCNC: 99 MMOL/L (ref 96–108)
CO2 SERPL-SCNC: 29 MMOL/L (ref 21–32)
CREAT SERPL-MCNC: 0.84 MG/DL (ref 0.6–1.3)
EOSINOPHIL # BLD AUTO: 0.06 THOUSAND/ÂΜL (ref 0–0.61)
EOSINOPHIL NFR BLD AUTO: 2 % (ref 0–6)
ERYTHROCYTE [DISTWIDTH] IN BLOOD BY AUTOMATED COUNT: 14.9 % (ref 11.6–15.1)
GFR SERPL CREATININE-BSD FRML MDRD: 93 ML/MIN/1.73SQ M
GLUCOSE SERPL-MCNC: 126 MG/DL (ref 65–140)
GLUCOSE SERPL-MCNC: 131 MG/DL (ref 65–140)
GLUCOSE SERPL-MCNC: 152 MG/DL (ref 65–140)
GLUCOSE SERPL-MCNC: 161 MG/DL (ref 65–140)
GLUCOSE SERPL-MCNC: 222 MG/DL (ref 65–140)
GRAM STN SPEC: ABNORMAL
HCT VFR BLD AUTO: 35.4 % (ref 36.5–49.3)
HGB BLD-MCNC: 11.8 G/DL (ref 12–17)
IMM GRANULOCYTES # BLD AUTO: 0.03 THOUSAND/UL (ref 0–0.2)
IMM GRANULOCYTES NFR BLD AUTO: 1 % (ref 0–2)
INR PPP: 1.01 (ref 0.85–1.19)
KLEBSIELLA SP DNA BLD POS QL NAA+NON-PRB: DETECTED
LACTATE SERPL-SCNC: 1.3 MMOL/L (ref 0.5–2)
LDH SERPL-CCNC: 289 U/L (ref 140–271)
LYMPHOCYTES # BLD AUTO: 1.01 THOUSANDS/ÂΜL (ref 0.6–4.47)
LYMPHOCYTES NFR BLD AUTO: 30 % (ref 14–44)
MCH RBC QN AUTO: 33.3 PG (ref 26.8–34.3)
MCHC RBC AUTO-ENTMCNC: 33.3 G/DL (ref 31.4–37.4)
MCV RBC AUTO: 100 FL (ref 82–98)
MONOCYTES # BLD AUTO: 0.4 THOUSAND/ÂΜL (ref 0.17–1.22)
MONOCYTES NFR BLD AUTO: 12 % (ref 4–12)
NEUTROPHILS # BLD AUTO: 1.9 THOUSANDS/ÂΜL (ref 1.85–7.62)
NEUTS SEG NFR BLD AUTO: 55 % (ref 43–75)
NRBC BLD AUTO-RTO: 0 /100 WBCS
PLATELET # BLD AUTO: 90 THOUSANDS/UL (ref 149–390)
PMV BLD AUTO: 10.1 FL (ref 8.9–12.7)
POTASSIUM SERPL-SCNC: 4.7 MMOL/L (ref 3.5–5.3)
PROT SERPL-MCNC: 6.2 G/DL (ref 6.4–8.4)
PROTHROMBIN TIME: 14 SECONDS (ref 12.3–15)
RBC # BLD AUTO: 3.54 MILLION/UL (ref 3.88–5.62)
RETICS # AUTO: ABNORMAL 10*3/UL (ref 14356–105094)
RETICS # CALC: 4.28 % (ref 0.37–1.87)
S AUREUS+CONS DNA BLD POS NAA+NON-PROBE: DETECTED
SODIUM SERPL-SCNC: 134 MMOL/L (ref 135–147)
WBC # BLD AUTO: 3.41 THOUSAND/UL (ref 4.31–10.16)

## 2025-06-27 PROCEDURE — 99232 SBSQ HOSP IP/OBS MODERATE 35: CPT | Performed by: HOSPITALIST

## 2025-06-27 PROCEDURE — 85025 COMPLETE CBC W/AUTO DIFF WBC: CPT

## 2025-06-27 PROCEDURE — 83605 ASSAY OF LACTIC ACID: CPT

## 2025-06-27 PROCEDURE — 82306 VITAMIN D 25 HYDROXY: CPT

## 2025-06-27 PROCEDURE — G0545 PR INHERENT VISIT TO INPT: HCPCS | Performed by: INTERNAL MEDICINE

## 2025-06-27 PROCEDURE — 99232 SBSQ HOSP IP/OBS MODERATE 35: CPT | Performed by: INTERNAL MEDICINE

## 2025-06-27 PROCEDURE — 85610 PROTHROMBIN TIME: CPT

## 2025-06-27 PROCEDURE — 80053 COMPREHEN METABOLIC PANEL: CPT

## 2025-06-27 PROCEDURE — 83615 LACTATE (LD) (LDH) ENZYME: CPT

## 2025-06-27 PROCEDURE — 82948 REAGENT STRIP/BLOOD GLUCOSE: CPT

## 2025-06-27 PROCEDURE — 85045 AUTOMATED RETICULOCYTE COUNT: CPT

## 2025-06-27 PROCEDURE — 83010 ASSAY OF HAPTOGLOBIN QUANT: CPT

## 2025-06-27 RX ORDER — FLUTICASONE PROPIONATE 50 MCG
2 SPRAY, SUSPENSION (ML) NASAL 2 TIMES DAILY
Status: CANCELLED | OUTPATIENT
Start: 2025-06-27

## 2025-06-27 RX ORDER — ASPIRIN 81 MG/1
81 TABLET, CHEWABLE ORAL DAILY
Status: CANCELLED | OUTPATIENT
Start: 2025-06-28

## 2025-06-27 RX ORDER — HYDROMORPHONE HCL/PF 1 MG/ML
0.5 SYRINGE (ML) INJECTION
Refills: 0 | Status: CANCELLED | OUTPATIENT
Start: 2025-06-27

## 2025-06-27 RX ORDER — POLYETHYLENE GLYCOL 3350 17 G/17G
17 POWDER, FOR SOLUTION ORAL DAILY
Status: CANCELLED | OUTPATIENT
Start: 2025-06-28

## 2025-06-27 RX ORDER — SENNOSIDES 8.6 MG
2 TABLET ORAL 2 TIMES DAILY
Status: CANCELLED | OUTPATIENT
Start: 2025-06-27

## 2025-06-27 RX ORDER — FOLIC ACID 1 MG/1
1 TABLET ORAL DAILY
Status: CANCELLED | OUTPATIENT
Start: 2025-06-28

## 2025-06-27 RX ORDER — ENOXAPARIN SODIUM 100 MG/ML
40 INJECTION SUBCUTANEOUS DAILY
Status: CANCELLED | OUTPATIENT
Start: 2025-06-28

## 2025-06-27 RX ORDER — GABAPENTIN 100 MG/1
100 CAPSULE ORAL
Status: CANCELLED | OUTPATIENT
Start: 2025-06-27

## 2025-06-27 RX ORDER — METHOCARBAMOL 500 MG/1
500 TABLET, FILM COATED ORAL EVERY 6 HOURS PRN
Status: CANCELLED | OUTPATIENT
Start: 2025-06-27

## 2025-06-27 RX ORDER — INSULIN LISPRO 100 [IU]/ML
1-6 INJECTION, SOLUTION INTRAVENOUS; SUBCUTANEOUS
Status: CANCELLED | OUTPATIENT
Start: 2025-06-27

## 2025-06-27 RX ORDER — SENNOSIDES 8.6 MG
2 TABLET ORAL 2 TIMES DAILY
Status: DISCONTINUED | OUTPATIENT
Start: 2025-06-27 | End: 2025-06-28 | Stop reason: HOSPADM

## 2025-06-27 RX ORDER — BISACODYL 10 MG
10 SUPPOSITORY, RECTAL RECTAL DAILY PRN
Status: CANCELLED | OUTPATIENT
Start: 2025-06-27

## 2025-06-27 RX ORDER — GABAPENTIN 100 MG/1
100 CAPSULE ORAL
Status: DISCONTINUED | OUTPATIENT
Start: 2025-06-27 | End: 2025-06-28 | Stop reason: HOSPADM

## 2025-06-27 RX ORDER — INSULIN GLARGINE 100 [IU]/ML
6 INJECTION, SOLUTION SUBCUTANEOUS
Status: CANCELLED | OUTPATIENT
Start: 2025-06-27

## 2025-06-27 RX ORDER — OXYCODONE HYDROCHLORIDE 5 MG/1
5 TABLET ORAL EVERY 4 HOURS PRN
Refills: 0 | Status: CANCELLED | OUTPATIENT
Start: 2025-06-27

## 2025-06-27 RX ORDER — OXYCODONE HYDROCHLORIDE 10 MG/1
10 TABLET ORAL EVERY 4 HOURS PRN
Refills: 0 | Status: CANCELLED | OUTPATIENT
Start: 2025-06-27

## 2025-06-27 RX ORDER — METOPROLOL TARTRATE 50 MG
50 TABLET ORAL EVERY 12 HOURS SCHEDULED
Status: CANCELLED | OUTPATIENT
Start: 2025-06-27

## 2025-06-27 RX ORDER — PRASUGREL 10 MG/1
10 TABLET, FILM COATED ORAL DAILY
Status: CANCELLED | OUTPATIENT
Start: 2025-06-28

## 2025-06-27 RX ORDER — INSULIN GLARGINE 100 [IU]/ML
6 INJECTION, SOLUTION SUBCUTANEOUS
Status: DISCONTINUED | OUTPATIENT
Start: 2025-06-27 | End: 2025-06-28 | Stop reason: HOSPADM

## 2025-06-27 RX ORDER — BISACODYL 10 MG
10 SUPPOSITORY, RECTAL RECTAL DAILY PRN
Status: DISCONTINUED | OUTPATIENT
Start: 2025-06-27 | End: 2025-06-28 | Stop reason: HOSPADM

## 2025-06-27 RX ORDER — ACETAMINOPHEN 325 MG/1
650 TABLET ORAL EVERY 4 HOURS PRN
Status: CANCELLED | OUTPATIENT
Start: 2025-06-27

## 2025-06-27 RX ORDER — LORATADINE 10 MG/1
10 TABLET ORAL DAILY
Status: CANCELLED | OUTPATIENT
Start: 2025-06-28

## 2025-06-27 RX ADMIN — LORATADINE 10 MG: 10 TABLET ORAL at 08:45

## 2025-06-27 RX ADMIN — THIAMINE HYDROCHLORIDE 100 MG: 100 INJECTION, SOLUTION INTRAMUSCULAR; INTRAVENOUS at 20:38

## 2025-06-27 RX ADMIN — GABAPENTIN 100 MG: 100 CAPSULE ORAL at 21:02

## 2025-06-27 RX ADMIN — METOPROLOL TARTRATE 50 MG: 50 TABLET, FILM COATED ORAL at 20:38

## 2025-06-27 RX ADMIN — OXYCODONE 5 MG: 5 TABLET ORAL at 06:02

## 2025-06-27 RX ADMIN — CYANOCOBALAMIN TAB 500 MCG 1000 MCG: 500 TAB at 08:45

## 2025-06-27 RX ADMIN — FOLIC ACID 1 MG: 1 TABLET ORAL at 08:45

## 2025-06-27 RX ADMIN — SENNOSIDES 17.2 MG: 8.6 TABLET, FILM COATED ORAL at 18:13

## 2025-06-27 RX ADMIN — ENOXAPARIN SODIUM 40 MG: 40 INJECTION SUBCUTANEOUS at 08:46

## 2025-06-27 RX ADMIN — GABAPENTIN 100 MG: 100 CAPSULE ORAL at 08:45

## 2025-06-27 RX ADMIN — FLUTICASONE PROPIONATE 2 SPRAY: 50 SPRAY, METERED NASAL at 18:13

## 2025-06-27 RX ADMIN — THIAMINE HYDROCHLORIDE 100 MG: 100 INJECTION, SOLUTION INTRAMUSCULAR; INTRAVENOUS at 09:34

## 2025-06-27 RX ADMIN — CEFEPIME 2000 MG: 2 INJECTION, POWDER, FOR SOLUTION INTRAVENOUS at 02:42

## 2025-06-27 RX ADMIN — METHOCARBAMOL 500 MG: 500 TABLET ORAL at 20:44

## 2025-06-27 RX ADMIN — ASPIRIN 81 MG: 81 TABLET, CHEWABLE ORAL at 08:45

## 2025-06-27 RX ADMIN — CEFEPIME 2000 MG: 2 INJECTION, POWDER, FOR SOLUTION INTRAVENOUS at 18:16

## 2025-06-27 RX ADMIN — OXYCODONE 5 MG: 5 TABLET ORAL at 13:08

## 2025-06-27 RX ADMIN — CEFEPIME 2000 MG: 2 INJECTION, POWDER, FOR SOLUTION INTRAVENOUS at 09:33

## 2025-06-27 RX ADMIN — INSULIN LISPRO 2 UNITS: 100 INJECTION, SOLUTION INTRAVENOUS; SUBCUTANEOUS at 13:09

## 2025-06-27 RX ADMIN — FLUTICASONE PROPIONATE 2 SPRAY: 50 SPRAY, METERED NASAL at 08:47

## 2025-06-27 RX ADMIN — INSULIN GLARGINE 6 UNITS: 100 INJECTION, SOLUTION SUBCUTANEOUS at 21:02

## 2025-06-27 RX ADMIN — PRASUGREL 10 MG: 10 TABLET, FILM COATED ORAL at 09:41

## 2025-06-27 RX ADMIN — THIAMINE HYDROCHLORIDE 100 MG: 100 INJECTION, SOLUTION INTRAMUSCULAR; INTRAVENOUS at 18:13

## 2025-06-27 RX ADMIN — METHOCARBAMOL 500 MG: 500 TABLET ORAL at 13:08

## 2025-06-27 RX ADMIN — METOPROLOL TARTRATE 50 MG: 50 TABLET, FILM COATED ORAL at 08:45

## 2025-06-27 RX ADMIN — SENNOSIDES 17.2 MG: 8.6 TABLET, FILM COATED ORAL at 08:45

## 2025-06-27 RX ADMIN — POLYETHYLENE GLYCOL 3350 17 G: 17 POWDER, FOR SOLUTION ORAL at 08:46

## 2025-06-27 NOTE — ASSESSMENT & PLAN NOTE
Patient's second chart reviewed and in the past he seemed to have had a previous type B lactic acidosis potentially related to this issue.  Current lactate downtrending.  He has also had previously low cell counts which I suspect is from this issue.  Lactate monitoring per primary  Trend fever curve/vitals  Will monitor CBC  Transfusional support per primary

## 2025-06-27 NOTE — ASSESSMENT & PLAN NOTE
Initial source of presentation. This is an outpatient second episode of fall in the last year. Ongoing supportive measures per primary. Trauma evaluation as noted.  Patient has history of ambulatory dysfunction and falls. He uses a cane at home and has to grab hold of things near him to walk steadily. 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   During this fall, wife says the patient was getting out of bed and attempting to use the bathroom and fell. He denied any LOC, dizziness, tripping over anything, or urinary incontinence, but he felt too weak to get up on his own, prompting him to call for his wife.    Plan:  F/u orthostats  PT/OT recommending level 1, patient wants rehab

## 2025-06-27 NOTE — PLAN OF CARE
Problem: INFECTION - ADULT  Goal: Absence or prevention of progression during hospitalization  Description: INTERVENTIONS:  - Assess and monitor for signs and symptoms of infection  - Monitor lab/diagnostic results  - Monitor all insertion sites, i.e. indwelling lines, tubes, and drains  - Monitor endotracheal if appropriate and nasal secretions for changes in amount and color  - [place] appropriate cooling/warming therapies per order  - Administer medications as ordered  - Instruct and encourage patient and family to use good hand hygiene technique  - Identify and instruct in appropriate isolation precautions for identified infection/condition  Outcome: Progressing     Problem: PAIN - ADULT  Goal: Verbalizes/displays adequate comfort level or baseline comfort level  Description: Interventions:  - Encourage patient to monitor pain and request assistance  - Assess pain using appropriate pain scale  - Administer analgesics as ordered based on type and severity of pain and evaluate response  - Implement non-pharmacological measures as appropriate and evaluate response  - Consider cultural and social influences on pain and pain management  - Notify physician/advanced practitioner if interventions unsuccessful or patient reports new pain  - Educate patient/family on pain management process including their role and importance of  reporting pain   - Provide non-pharmacologic/complimentary pain relief interventions  Outcome: Progressing

## 2025-06-27 NOTE — ASSESSMENT & PLAN NOTE
1 out of 2 blood cultures from admission isolated coagulase-negative staph along with Klebsiella.  The former is likely contaminant.  The latter is difficult to classify as a contaminant but it may be given patient's frail skin and easy bleeding/bruising.  It may also be transient in the setting of his fall.  Other consideration would be for occult source given #3.  He otherwise has no other localizing complaints.  CT imaging of the abdomen and pelvis unremarkable.  Ortho evaluation appreciated and no plans for imaging of shoulder based on exam.  MRI L-spine obtained which was negative.  Continue on cefepime 2 g every 8 hours  Continue to trend fever curve/vitals  Repeat labs tomorrow  Tentative plan for 7 days of therapy for this issue through 6/30  Tentatively last doses of IV antibiotics ordered  Will follow-up susceptibilities  Will adjust therapy based on susceptibilities  Additional care per primary.

## 2025-06-27 NOTE — ASSESSMENT & PLAN NOTE
Patient endorsing increased postnasal drip and cough, evidence of sinusitis on imaging.  Treat with antibiotics for acute infectious sinusitis.    Plan:  Claritin and Flonase  ENT referral on d/c   Will monitor symptoms

## 2025-06-27 NOTE — ASSESSMENT & PLAN NOTE
Patient has history of ambulatory dysfunction and falls. He uses a cane at home and has to grab hold of things near him to walk steadily. 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   During this fall, wife says the patient was getting out of bed and attempting to use the bathroom and fell. He denied any LOC, dizziness, tripping over anything, or urinary incontinence, but he felt too weak to get up on his own, prompting him to call for his wife.    Plan:  F/u orthostats  PT/OT recommending level 1, patient wants rehab

## 2025-06-27 NOTE — ASSESSMENT & PLAN NOTE
>>ASSESSMENT AND PLAN FOR TOBACCO ABUSE AND ALCOHOL USE WRITTEN ON 6/27/2025  9:53 AM BY JEANIE COLLINS MD    Patient's second chart reviewed and in the past he seemed to have had a previous type B lactic acidosis potentially related to this issue.  Current lactate downtrending.  He has also had previously low cell counts which I suspect is from this issue.  Lactate monitoring per primary  Trend fever curve/vitals  Will monitor CBC  Transfusional support per primary

## 2025-06-27 NOTE — PROGRESS NOTES
Progress Note - Hospitalist   Name: Donavon Couch 64 y.o. male I MRN: [id number]  Unit/Bed#: S -01 I Date of Admission: 6/24/2025   Date of Service: 6/27/2025 I Hospital Day: 3    Assessment & Plan  SIRS (systemic inflammatory response syndrome) (HCC)  Polymicrobial bacteremia  Patient meeting sepsis criteria with tachypnea and tachycardia on presentation, blood culture BC ID with Klebsiella aerogenes, Staphylococcus.  S/p cefepime and Vanco in the ER on admission.  Hemodynamically stable, low-grade fevers to about 100.5    Unclear etiology of bacteremia, workup unrevealing    Plan:  Infectious disease consulted  2 g cefepime every 8h  Day 4 antibiotics, anticipate 7 total days if source identified  AM CBC, fever curve  pending blood culture susceptibilities   Lactic acidosis (Resolved: 6/27/2025)  Recent Labs     06/26/25  0403 06/26/25  0627 06/27/25  0900   LACTICACID 2.6* 2.3* 1.3   Grade 1 diastolic dysfunction, follows with cardiology as outpatient  S/p 3L fluids, cefepime and vancomycin in the ER  Beta hydroxybutyrate WNL, CK, troponin WNL, UA WNL.    Plan:  Improving  Follow-up a.m. recheck for lactic acid    Plan:  See plan under sepsis  Follow-up thiamine level  Thiamine 100 mg IV 3 times daily  Hyperbilirubinemia  Recent Labs     06/24/25  1653 06/25/25  0350 06/26/25  0403 06/27/25  0542   * 109* 113* 113*   ALT 57* 51 50 46   ALKPHOS 180* 175* 175* 191*   TBILI 1.00 1.34* 1.82* 2.24*   BILIDIR 0.45*  --  0.97*  --     Trending up. Patient not having abdominal pain.  Right upper quadrant ultrasound remarkable only for hepatic steatosis. Consider cirrhosis,    Plan:  Monitor a.m. CMP  Follow-up hemolysis panel   f/u INR  F/u vitamin D  Fall  Patient has history of ambulatory dysfunction and falls. He uses a cane at home and has to grab hold of things near him to walk steadily. 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   During this fall, wife  says the patient was getting out of bed and attempting to use the bathroom and fell. He denied any LOC, dizziness, tripping over anything, or urinary incontinence, but he felt too weak to get up on his own, prompting him to call for his wife.    Plan:  F/u orthostats  PT/OT recommending level 1, patient wants rehab  Left shoulder pain and low back pain  Back pain  Chronic Back and left shoulder pain.  Left deltoid was tender to palpation suspecting musculoskeletal related on 6/24.   Left shoulder x-ray demonstrated osteoarthritis of the glenohumeral joint.  Patient still complaining of pain    Differential is large, includes arthritis flare vs rotator cuff arthropathy     Plan:  Administer Robaxin 500 mg every 6 hours as needed  Gabapentin 100 mg 3 times daily  Oxycodone 5 and 10 for moderate to severe pain  Dilaudid for breakthrough pain  Orthopedic surgery consultation  Will provide referral on DC  Sinusitis  Patient endorsing increased postnasal drip and cough, evidence of sinusitis on imaging.  Treat with antibiotics for acute infectious sinusitis.    Plan:  Claritin and Flonase  ENT referral on d/c   Will monitor symptoms  Coronary artery disease  Patient has a history of CAD (cath Oct 2013: 95% prox LAD, 40% mid LAD, EF 60%, WILLY to prox LAD)    Plan:  Continue Prasugrel 10 mg daily and Aspirin 81 mg daily  Diabetes ([facility])  A1c: 6.6 11 months ago    Patient is on Metformin 1000mg daily.    Plan:  Hold metformin  Sliding scale insulin  Manage diabetic neuropathy with Gabapentin 100 mg TID  Hypertension  On metoprolol 50 twice daily. Continue PTA med  Hyponatremia  Recent Labs     06/25/25  0350 06/26/25  0403 06/27/25  0542   SODIUM 132* 136 134*   Likely in setting of poor oral intake/hypovolemic hyponatremia.  Na still low on 6/25 at 132.  Resolved.    Plan:  AM CMPs    Tobacco abuse and alcohol use  Patient endorses use of chewing tobacco.    Plan:  NRT ordered  Alcohol use disorder  Patient's wife mentioned  that patient drinks up to a bottle of wine a day.  Patient's lactic acidosis could be in the setting of thiamine deficiency.  B12, B9 WNL    Plan:  Start IV thiamine 100 mg TID  CIWA protocol  Pending results for  Thiamine  Constipation  Monitor BMs, as patient is on opioids.  MiraLAX, senna, MOM.  Dulcolax suppository if needed    VTE Pharmacologic Prophylaxis:   Moderate Risk (Score 3-4) - Pharmacological DVT Prophylaxis Ordered: enoxaparin (Lovenox).    Mobility:   Basic Mobility Inpatient Raw Score: 13  JH-HLM Goal: 4: Move to chair/commode  JH-HLM Achieved: 2: Bed activities/Dependent transfer  JH-HLM Goal achieved. Continue to encourage appropriate mobility.    Patient Centered Rounds: I performed bedside rounds with nursing staff today.   Discussions with Specialists or Other Care Team Provider: Infectious disease    Education and Discussions with Family / Patient: Updated  (wife) via phone.    Current Length of Stay: 3 day(s)  Current Patient Status: Inpatient   Certification Statement: The patient will continue to require additional inpatient hospital stay due to bacteremia on IV antibiotics  Discharge Plan: Anticipate discharge in 24-48 hrs to rehab facility.    Code Status: Level 1 - Full Code    Subjective   No acute events overnight, no bowel movement since admission.  Patient open to trying suppository if needed.  Back and shoulder pain is controlled.  No fevers or chills    Objective :  Temp:  [97.5 °F (36.4 °C)-99.2 °F (37.3 °C)] 99.2 °F (37.3 °C)  HR:  [76-91] 85  BP: (120-158)/(74-86) 158/86  Resp:  [15-23] 19  SpO2:  [93 %-98 %] 93 %  O2 Device: None (Room air)    Body mass index is 26.16 kg/m².     Input and Output Summary (last 24 hours):     Intake/Output Summary (Last 24 hours) at 6/27/2025 0974  Last data filed at 6/27/2025 0601  Gross per 24 hour   Intake 30 ml   Output 600 ml   Net -570 ml       Physical Exam  Constitutional:       General: He is not in acute distress.      Appearance: Normal appearance. He is obese.   HENT:      Mouth/Throat:      Comments: Poor dentition    Cardiovascular:      Rate and Rhythm: Normal rate and regular rhythm.      Heart sounds: Normal heart sounds.   Abdominal:      General: There is distension.      Tenderness: There is no abdominal tenderness.      Hernia: No hernia is present.     Musculoskeletal:         General: Tenderness present.      Comments: L shoulder     Neurological:      Mental Status: He is alert. Mental status is at baseline.             Lab Results: I have reviewed the following results:   Results from last 7 days   Lab Units 06/27/25  0542   WBC Thousand/uL 3.41*   HEMOGLOBIN g/dL 11.8*   HEMATOCRIT % 35.4*   PLATELETS Thousands/uL 90*   SEGS PCT % 55   LYMPHO PCT % 30   MONO PCT % 12   EOS PCT % 2     Results from last 7 days   Lab Units 06/27/25  0542   SODIUM mmol/L 134*   POTASSIUM mmol/L 4.7   CHLORIDE mmol/L 99   CO2 mmol/L 29   BUN mg/dL 7   CREATININE mg/dL 0.84   ANION GAP mmol/L 6   CALCIUM mg/dL 7.8*   ALBUMIN g/dL 2.8*   TOTAL BILIRUBIN mg/dL 2.24*   ALK PHOS U/L 191*   ALT U/L 46   AST U/L 113*   GLUCOSE RANDOM mg/dL 126         Results from last 7 days   Lab Units 06/27/25  0718 06/26/25  2129 06/26/25  1638 06/26/25  1135 06/26/25  0717 06/25/25  2115 06/25/25  1550 06/25/25  1109 06/25/25  0757 06/24/25  2031 06/24/25  1651   POC GLUCOSE mg/dl 131 200* 215* 174* 157* 191* 162* 235* 150* 229* 174*     Results from last 7 days   Lab Units 06/24/25  1653 06/24/25  1043   HEMOGLOBIN A1C % 8.0* 8.0*     Results from last 7 days   Lab Units 06/27/25  0900 06/26/25  0627 06/26/25  0403 06/25/25  2136 06/24/25  1120 06/24/25  1043   LACTIC ACID mmol/L 1.3 2.3* 2.6* 5.8*   < >  --    PROCALCITONIN ng/ml  --   --   --   --   --  0.43*    < > = values in this interval not displayed.       Recent Cultures (last 7 days):   Results from last 7 days   Lab Units 06/24/25  1310   BLOOD CULTURE  Klebsiella aerogenes*  No Growth at 48  hrs.   GRAM STAIN RESULT  Gram negative rods*         CXR, CT cervical spine, CT head, XR of L shoulder, CT CAP showed no acute changes and no traumatic injury findings. MRI with Small focal areas of enhancement in the L1-L2 and L2-L3 supraspinous ligament regions, suspicious for enthesitis.       Last 24 Hours Medication List:     Current Facility-Administered Medications:     acetaminophen (TYLENOL) tablet 650 mg, Q4H PRN    aspirin chewable tablet 81 mg, Daily    bisacodyl (DULCOLAX) rectal suppository 10 mg, Daily PRN    ceFEPime (MAXIPIME) 2,000 mg in dextrose 5 % 50 mL IVPB, Q8H, Last Rate: 2,000 mg (06/27/25 0933)    cyanocobalamin (VITAMIN B-12) tablet 1,000 mcg, Daily    enoxaparin (LOVENOX) subcutaneous injection 40 mg, Daily    fluticasone (FLONASE) 50 mcg/act nasal spray 2 spray, BID    folic acid (FOLVITE) tablet 1 mg, Daily    gabapentin (NEURONTIN) capsule 100 mg, TID    HYDROmorphone (DILAUDID) injection 0.5 mg, Q3H PRN    insulin lispro (HumALOG/ADMELOG) 100 units/mL subcutaneous injection 1-5 Units, HS    insulin lispro (HumALOG/ADMELOG) 100 units/mL subcutaneous injection 1-6 Units, TID AC **AND** Fingerstick Glucose (POCT), TID AC    loratadine (CLARITIN) tablet 10 mg, Daily    magnesium hydroxide (MILK OF MAGNESIA) oral suspension 30 mL, Once    methocarbamol (ROBAXIN) tablet 500 mg, Q6H PRN    metoprolol tartrate (LOPRESSOR) tablet 50 mg, Q12H DACIA    nicotine (NICODERM CQ) 14 mg/24hr TD 24 hr patch 14 mg, Daily    oxyCODONE (ROXICODONE) IR tablet 5 mg, Q4H PRN **OR** oxyCODONE (ROXICODONE) immediate release tablet 10 mg, Q4H PRN    polyethylene glycol (MIRALAX) packet 17 g, Daily    prasugrel (EFFIENT) tablet 10 mg, Daily    senna (SENOKOT) tablet 17.2 mg, Daily    thiamine (VITAMIN B1) 100 mg in dextrose 5 % 100 mL IVPB, TID, Last Rate: 100 mg (06/27/25 0934)    Administrative Statements   Today, Patient Was Seen By: Charito Zamudio MD      **Please Note: This note may have been constructed using a  voice recognition system.**

## 2025-06-27 NOTE — ASSESSMENT & PLAN NOTE
>>ASSESSMENT AND PLAN FOR ALCOHOL USE DISORDER WRITTEN ON 6/27/2025  8:46 AM BY ALEX SAPP MD    Patient's wife mentioned that patient drinks up to a bottle of wine a day.  Patient's lactic acidosis could be in the setting of thiamine deficiency.  B12, B9 WNL    Plan:  Start IV thiamine 100 mg TID  CIWA protocol  Pending results for  Thiamine     >>ASSESSMENT AND PLAN FOR TOBACCO ABUSE AND ALCOHOL USE WRITTEN ON 6/27/2025  8:46 AM BY ALEX SAPP MD    Patient endorses use of chewing tobacco.    Plan:  NRT ordered

## 2025-06-27 NOTE — ASSESSMENT & PLAN NOTE
Recent Labs     06/26/25  0403 06/26/25  0627 06/27/25  0900   LACTICACID 2.6* 2.3* 1.3   Grade 1 diastolic dysfunction, follows with cardiology as outpatient  S/p 3L fluids, cefepime and vancomycin in the ER  Beta hydroxybutyrate WNL, CK, troponin WNL, UA WNL.    Plan:  Improving  Follow-up a.m. recheck for lactic acid    Plan:  See plan under sepsis  Follow-up thiamine level  Thiamine 100 mg IV 3 times daily

## 2025-06-27 NOTE — PROGRESS NOTES
Progress Note - Infectious Disease   Name: Donavon Couch 64 y.o. male I MRN: [id number]  Unit/Bed#: S -01 I Date of Admission: 6/24/2025   Date of Service: 6/27/2025 I Hospital Day: 3     Assessment & Plan  SIRS (systemic inflammatory response syndrome) (HCC)  And possible sepsis.  Patient initially presented for an episode of fall at home where he was found down.  He had complaints related to #3.  Early on admission he had some tachycardia and tachypnea for which blood cultures were sent.  1 out of 2 blood cultures positive as below.  Clinical parameters otherwise stable now.  Culture significance remains unclear.  Noted to have leukopenia and thrombocytopenia likely related to #5 in addition to patient's type B lactic acidosis seen earlier.  Continue antibiotic as below  Continue to trend fever curve/vitals  Repeat CBC/chemistry tomorrow  Follow-up pending blood culture susceptibilities  Orthopedic evaluation appreciated  Monitor exam for new/developing symptoms  Additional supportive care per primary  Additional interventions pending clinical course  Polymicrobial bacteremia  1 out of 2 blood cultures from admission isolated coagulase-negative staph along with Klebsiella.  The former is likely contaminant.  The latter is difficult to classify as a contaminant but it may be given patient's frail skin and easy bleeding/bruising.  It may also be transient in the setting of his fall.  Other consideration would be for occult source given #3.  He otherwise has no other localizing complaints.  CT imaging of the abdomen and pelvis unremarkable.  Ortho evaluation appreciated and no plans for imaging of shoulder based on exam.  MRI L-spine obtained which was negative.  Continue on cefepime 2 g every 8 hours  Continue to trend fever curve/vitals  Repeat labs tomorrow  Tentative plan for 7 days of therapy for this issue through 6/30  Tentatively last doses of IV antibiotics ordered  Will follow-up  susceptibilities  Will adjust therapy based on susceptibilities  Additional care per primary.  Left shoulder pain and low back pain  Patient has had chronic issues of both of the sites for at least the last 5 months.  Patient evaluated by orthopedics and low suspicion for infected joint.  Patient has mild discomfort on palpation of the very low lumbar back.  MRI L-spine obtained which was negative.  Orthopedic evaluation appreciated  Continue current antibiotics  Monitor exam for new symptoms  Diabetes (HCC)  Hemoglobin A1c of 8.0. Ongoing glucose management per primary.     Tobacco abuse and alcohol use  Patient's second chart reviewed and in the past he seemed to have had a previous type B lactic acidosis potentially related to this issue.  Current lactate downtrending.  He has also had previously low cell counts which I suspect is from this issue.  Lactate monitoring per primary  Trend fever curve/vitals  Will monitor CBC  Transfusional support per primary  Fall  Initial source of presentation. This is an outpatient second episode of fall in the last year. Ongoing supportive measures per primary. Trauma evaluation as noted.      Above plan discussed in detail with the patient and with primary service in terms of following up susceptibilities and adjusting antibiotics or tentative duration of IV therapy if needed    ID consult service will reevaluate this patient again on Monday, unless new issues in the interim.  Please contact ID attending on call if questions.    Antibiotics:  Cefepime #3    24 Hour events:  Yesterday and overnight notes reviewed and no acute events noted    Subjective:  Patient seen at bedside this morning and he denied having any nausea, vomiting, chest pain.  Tolerating current antibiotics without issue.  He is still considering acute rehab.    Objective:  Vitals:  Temp:  [97.5 °F (36.4 °C)-99.2 °F (37.3 °C)] 99.2 °F (37.3 °C)  HR:  [76-91] 85  Resp:  [15-23] 19  BP: (120-158)/(74-86)  158/86  SpO2:  [93 %-98 %] 93 %  Temp (24hrs), Av.6 °F (37 °C), Min:97.5 °F (36.4 °C), Max:99.2 °F (37.3 °C)  Current: Temperature: 99.2 °F (37.3 °C)    Physical Exam:   General Appearance:  Alert, interactive, nontoxic, no acute distress.   Throat: Oropharynx moist without lesions.    Lungs:   Clear to auscultation bilaterally; no wheezes, rhonchi or rales; respirations unlabored on room air   Heart:  RRR; no murmur, rub or gallop noted   Abdomen:   Soft, non-tender, non-distended, positive bowel sounds.     Extremities: No clubbing, cyanosis or edema; still with pain with above 90 degree range of motion at the shoulder.  Patient frustrated by ongoing issues at the site.   Skin: No new rashes or lesions. No new draining wounds noted.       Labs, Imaging, & Other studies:   All pertinent labs and imaging studies in PACS were personally reviewed as below.  Results from last 7 days   Lab Units 25  0542 25  0403 25  0547   WBC Thousand/uL 3.41* 2.94* 3.20*   HEMOGLOBIN g/dL 11.8* 10.7* 10.4*   PLATELETS Thousands/uL 90* 84* 89*     Results from last 7 days   Lab Units 25  0542 25  0403 25  0350 25  0350 25  1043 25  1042   POTASSIUM mmol/L 4.7 3.7  --  3.9   < >  --    CHLORIDE mmol/L 99 98  --  94*   < >  --    CO2 mmol/L 29 31  --  24   < >  --    CO2, I-STAT mmol/L  --   --   --   --   --  20*   BUN mg/dL 7 5  --  6   < >  --    CREATININE mg/dL 0.84 0.82  --  0.86   < >  --    EGFR ml/min/1.73sq m 93 94   < >  --   --   --    GLUCOSE, ISTAT mg/dl  --   --   --   --   --  225*   CALCIUM mg/dL 7.8* 7.7*  --  7.1*   < >  --    AST U/L 113* 113*  --  109*   < >  --    ALT U/L 46 50  --  51   < >  --    ALK PHOS U/L 191* 175*  --  175*   < >  --     < > = values in this interval not displayed.     Results from last 7 days   Lab Units 25  1310   BLOOD CULTURE  Klebsiella aerogenes*  No Growth at 48 hrs.   GRAM STAIN RESULT  Gram negative rods*       Lab  interpretation/comments: Leukopenia slightly improved.  No neutropenia on differential.  Platelets stable.    Imaging interpretation/comments: Right upper quadrant ultrasound done due to LFTs and patient has hepatomegaly with steatosis    Culture data: Culture susceptibilities pending    External notes: None

## 2025-06-27 NOTE — ASSESSMENT & PLAN NOTE
And possible sepsis.  Patient initially presented for an episode of fall at home where he was found down.  He had complaints related to #3.  Early on admission he had some tachycardia and tachypnea for which blood cultures were sent.  1 out of 2 blood cultures positive as below.  Clinical parameters otherwise stable now.  Culture significance remains unclear.  Noted to have leukopenia and thrombocytopenia likely related to #5 in addition to patient's type B lactic acidosis seen earlier.  Continue antibiotic as below  Continue to trend fever curve/vitals  Repeat CBC/chemistry tomorrow  Follow-up pending blood culture susceptibilities  Orthopedic evaluation appreciated  Monitor exam for new/developing symptoms  Additional supportive care per primary  Additional interventions pending clinical course

## 2025-06-27 NOTE — ASSESSMENT & PLAN NOTE
Initial source of presentation. This is an outpatient second episode of fall in the last year. Ongoing supportive measures per primary. Trauma evaluation as noted.

## 2025-06-27 NOTE — ASSESSMENT & PLAN NOTE
Patient has had chronic issues of both of the sites for at least the last 5 months.  Patient evaluated by orthopedics and low suspicion for infected joint.  Patient has mild discomfort on palpation of the very low lumbar back.  MRI L-spine obtained which was negative.  Orthopedic evaluation appreciated  Continue current antibiotics  Monitor exam for new symptoms

## 2025-06-27 NOTE — ASSESSMENT & PLAN NOTE
Recent Labs     06/24/25  1653 06/25/25  0350 06/26/25  0403 06/27/25  0542   * 109* 113* 113*   ALT 57* 51 50 46   ALKPHOS 180* 175* 175* 191*   TBILI 1.00 1.34* 1.82* 2.24*   BILIDIR 0.45*  --  0.97*  --     Trending up. Patient not having abdominal pain.  Right upper quadrant ultrasound remarkable only for hepatic steatosis. Consider cirrhosis,    Plan:  Monitor a.m. CMP  Follow-up hemolysis panel   f/u INR  F/u vitamin D

## 2025-06-27 NOTE — ASSESSMENT & PLAN NOTE
Patient meeting sepsis criteria with tachypnea and tachycardia on presentation, blood culture BC ID with Klebsiella aerogenes, Staphylococcus.  S/p cefepime and Vanco in the ER on admission.  Hemodynamically stable, low-grade fevers to about 100.5    Unclear etiology of bacteremia, workup unrevealing    Plan:  Infectious disease consulted  2 g cefepime every 8h  Day 4 antibiotics, anticipate 7 total days if source identified  AM CBC, fever curve  pending blood culture susceptibilities

## 2025-06-28 ENCOUNTER — HOSPITAL ENCOUNTER (INPATIENT)
Facility: HOSPITAL | Age: 65
LOS: 4 days | End: 2025-07-02
Attending: HOSPITALIST | Admitting: HOSPITALIST
Payer: COMMERCIAL

## 2025-06-28 DIAGNOSIS — M54.9 BACK PAIN: ICD-10-CM
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
## 2025-06-28 DIAGNOSIS — R78.81 BACTEREMIA: ICD-10-CM

## 2025-06-28 DIAGNOSIS — K76.0 HEPATIC STEATOSIS: ICD-10-CM

## 2025-06-28 LAB
ALBUMIN SERPL BCG-MCNC: 2.5 G/DL (ref 3.5–5)
ALBUMIN SERPL BCG-MCNC: 2.5 G/DL (ref 3.5–5)
ALP SERPL-CCNC: 181 U/L (ref 34–104)
ALP SERPL-CCNC: 181 U/L (ref 34–104)
ALT SERPL W P-5'-P-CCNC: 34 U/L (ref 7–52)
ALT SERPL W P-5'-P-CCNC: 34 U/L (ref 7–52)
ANION GAP SERPL CALCULATED.3IONS-SCNC: 6 MMOL/L (ref 4–13)
ANION GAP SERPL CALCULATED.3IONS-SCNC: 6 MMOL/L (ref 4–13)
AST SERPL W P-5'-P-CCNC: 84 U/L (ref 13–39)
AST SERPL W P-5'-P-CCNC: 84 U/L (ref 13–39)
BILIRUB SERPL-MCNC: 2.52 MG/DL (ref 0.2–1)
BILIRUB SERPL-MCNC: 2.52 MG/DL (ref 0.2–1)
BUN SERPL-MCNC: 6 MG/DL (ref 5–25)
BUN SERPL-MCNC: 6 MG/DL (ref 5–25)
CALCIUM ALBUM COR SERPL-MCNC: 9.1 MG/DL (ref 8.3–10.1)
CALCIUM ALBUM COR SERPL-MCNC: 9.1 MG/DL (ref 8.3–10.1)
CALCIUM SERPL-MCNC: 7.9 MG/DL (ref 8.4–10.2)
CALCIUM SERPL-MCNC: 7.9 MG/DL (ref 8.4–10.2)
CHLORIDE SERPL-SCNC: 99 MMOL/L (ref 96–108)
CHLORIDE SERPL-SCNC: 99 MMOL/L (ref 96–108)
CO2 SERPL-SCNC: 27 MMOL/L (ref 21–32)
CO2 SERPL-SCNC: 27 MMOL/L (ref 21–32)
CREAT SERPL-MCNC: 0.65 MG/DL (ref 0.6–1.3)
CREAT SERPL-MCNC: 0.65 MG/DL (ref 0.6–1.3)
ERYTHROCYTE [DISTWIDTH] IN BLOOD BY AUTOMATED COUNT: 14.9 % (ref 11.6–15.1)
ERYTHROCYTE [DISTWIDTH] IN BLOOD BY AUTOMATED COUNT: 14.9 % (ref 11.6–15.1)
GFR SERPL CREATININE-BSD FRML MDRD: 103 ML/MIN/1.73SQ M
GFR SERPL CREATININE-BSD FRML MDRD: 103 ML/MIN/1.73SQ M
GLUCOSE SERPL-MCNC: 148 MG/DL (ref 65–140)
GLUCOSE SERPL-MCNC: 148 MG/DL (ref 65–140)
GLUCOSE SERPL-MCNC: 154 MG/DL (ref 65–140)
GLUCOSE SERPL-MCNC: 154 MG/DL (ref 65–140)
GLUCOSE SERPL-MCNC: 160 MG/DL (ref 65–140)
GLUCOSE SERPL-MCNC: 160 MG/DL (ref 65–140)
GLUCOSE SERPL-MCNC: 171 MG/DL (ref 65–140)
GLUCOSE SERPL-MCNC: 171 MG/DL (ref 65–140)
GLUCOSE SERPL-MCNC: 185 MG/DL (ref 65–140)
GLUCOSE SERPL-MCNC: 185 MG/DL (ref 65–140)
HAPTOGLOB SERPL-MCNC: 102 MG/DL (ref 32–363)
HCT VFR BLD AUTO: 32.5 % (ref 36.5–49.3)
HCT VFR BLD AUTO: 32.5 % (ref 36.5–49.3)
HGB BLD-MCNC: 11 G/DL (ref 12–17)
HGB BLD-MCNC: 11 G/DL (ref 12–17)
MCH RBC QN AUTO: 33.7 PG (ref 26.8–34.3)
MCH RBC QN AUTO: 33.7 PG (ref 26.8–34.3)
MCHC RBC AUTO-ENTMCNC: 33.8 G/DL (ref 31.4–37.4)
MCHC RBC AUTO-ENTMCNC: 33.8 G/DL (ref 31.4–37.4)
MCV RBC AUTO: 100 FL (ref 82–98)
MCV RBC AUTO: 100 FL (ref 82–98)
PLATELET # BLD AUTO: 92 THOUSANDS/UL (ref 149–390)
PLATELET # BLD AUTO: 92 THOUSANDS/UL (ref 149–390)
PMV BLD AUTO: 10 FL (ref 8.9–12.7)
PMV BLD AUTO: 10 FL (ref 8.9–12.7)
POTASSIUM SERPL-SCNC: 3.8 MMOL/L (ref 3.5–5.3)
POTASSIUM SERPL-SCNC: 3.8 MMOL/L (ref 3.5–5.3)
PROT SERPL-MCNC: 5.7 G/DL (ref 6.4–8.4)
PROT SERPL-MCNC: 5.7 G/DL (ref 6.4–8.4)
RBC # BLD AUTO: 3.26 MILLION/UL (ref 3.88–5.62)
RBC # BLD AUTO: 3.26 MILLION/UL (ref 3.88–5.62)
SODIUM SERPL-SCNC: 132 MMOL/L (ref 135–147)
SODIUM SERPL-SCNC: 132 MMOL/L (ref 135–147)
WBC # BLD AUTO: 3.81 THOUSAND/UL (ref 4.31–10.16)
WBC # BLD AUTO: 3.81 THOUSAND/UL (ref 4.31–10.16)

## 2025-06-28 PROCEDURE — 99232 SBSQ HOSP IP/OBS MODERATE 35: CPT | Performed by: HOSPITALIST

## 2025-06-28 PROCEDURE — 80053 COMPREHEN METABOLIC PANEL: CPT

## 2025-06-28 PROCEDURE — 85027 COMPLETE CBC AUTOMATED: CPT

## 2025-06-28 PROCEDURE — 82948 REAGENT STRIP/BLOOD GLUCOSE: CPT

## 2025-06-28 RX ORDER — SENNOSIDES 8.6 MG
2 TABLET ORAL 2 TIMES DAILY
Status: DISCONTINUED | OUTPATIENT
Start: 2025-06-28 | End: 2025-07-02 | Stop reason: HOSPADM

## 2025-06-28 RX ORDER — BISACODYL 10 MG
10 SUPPOSITORY, RECTAL RECTAL DAILY PRN
Status: DISCONTINUED | OUTPATIENT
Start: 2025-06-28 | End: 2025-07-02 | Stop reason: HOSPADM

## 2025-06-28 RX ORDER — OXYCODONE HYDROCHLORIDE 5 MG/1
5 TABLET ORAL EVERY 4 HOURS PRN
Status: DISCONTINUED | OUTPATIENT
Start: 2025-06-28 | End: 2025-07-02 | Stop reason: HOSPADM

## 2025-06-28 RX ORDER — INSULIN LISPRO 100 [IU]/ML
1-6 INJECTION, SOLUTION INTRAVENOUS; SUBCUTANEOUS
Status: DISCONTINUED | OUTPATIENT
Start: 2025-06-28 | End: 2025-07-02 | Stop reason: HOSPADM

## 2025-06-28 RX ORDER — ENOXAPARIN SODIUM 100 MG/ML
40 INJECTION SUBCUTANEOUS DAILY
Status: DISCONTINUED | OUTPATIENT
Start: 2025-06-28 | End: 2025-07-02 | Stop reason: HOSPADM

## 2025-06-28 RX ORDER — POLYETHYLENE GLYCOL 3350 17 G/17G
17 POWDER, FOR SOLUTION ORAL DAILY
Status: DISCONTINUED | OUTPATIENT
Start: 2025-06-28 | End: 2025-07-02 | Stop reason: HOSPADM

## 2025-06-28 RX ORDER — OXYCODONE HYDROCHLORIDE 10 MG/1
10 TABLET ORAL EVERY 4 HOURS PRN
Status: DISCONTINUED | OUTPATIENT
Start: 2025-06-28 | End: 2025-07-02 | Stop reason: HOSPADM

## 2025-06-28 RX ORDER — GABAPENTIN 100 MG/1
100 CAPSULE ORAL
Status: DISCONTINUED | OUTPATIENT
Start: 2025-06-28 | End: 2025-07-02 | Stop reason: HOSPADM

## 2025-06-28 RX ORDER — METHOCARBAMOL 500 MG/1
500 TABLET, FILM COATED ORAL EVERY 6 HOURS PRN
Status: DISCONTINUED | OUTPATIENT
Start: 2025-06-28 | End: 2025-07-02 | Stop reason: HOSPADM

## 2025-06-28 RX ORDER — FLUTICASONE PROPIONATE 50 MCG
2 SPRAY, SUSPENSION (ML) NASAL 2 TIMES DAILY
Status: DISCONTINUED | OUTPATIENT
Start: 2025-06-28 | End: 2025-07-02 | Stop reason: HOSPADM

## 2025-06-28 RX ORDER — LORATADINE 10 MG/1
10 TABLET ORAL DAILY
Status: DISCONTINUED | OUTPATIENT
Start: 2025-06-28 | End: 2025-07-02 | Stop reason: HOSPADM

## 2025-06-28 RX ORDER — PRASUGREL 10 MG/1
10 TABLET, FILM COATED ORAL DAILY
Status: DISCONTINUED | OUTPATIENT
Start: 2025-06-28 | End: 2025-07-02 | Stop reason: HOSPADM

## 2025-06-28 RX ORDER — ATORVASTATIN CALCIUM 20 MG/1
20 TABLET, FILM COATED ORAL DAILY
Status: DISCONTINUED | OUTPATIENT
Start: 2025-06-28 | End: 2025-07-02 | Stop reason: HOSPADM

## 2025-06-28 RX ORDER — INSULIN GLARGINE 100 [IU]/ML
6 INJECTION, SOLUTION SUBCUTANEOUS
Status: DISCONTINUED | OUTPATIENT
Start: 2025-06-28 | End: 2025-07-02 | Stop reason: HOSPADM

## 2025-06-28 RX ORDER — FOLIC ACID 1 MG/1
1 TABLET ORAL DAILY
Status: DISCONTINUED | OUTPATIENT
Start: 2025-06-28 | End: 2025-07-02 | Stop reason: HOSPADM

## 2025-06-28 RX ORDER — METOPROLOL TARTRATE 50 MG
50 TABLET ORAL EVERY 12 HOURS SCHEDULED
Status: DISCONTINUED | OUTPATIENT
Start: 2025-06-28 | End: 2025-07-02 | Stop reason: HOSPADM

## 2025-06-28 RX ORDER — BENZONATATE 100 MG/1
200 CAPSULE ORAL 3 TIMES DAILY PRN
Status: DISCONTINUED | OUTPATIENT
Start: 2025-06-28 | End: 2025-07-02 | Stop reason: HOSPADM

## 2025-06-28 RX ORDER — ACETAMINOPHEN 325 MG/1
650 TABLET ORAL EVERY 4 HOURS PRN
Status: DISCONTINUED | OUTPATIENT
Start: 2025-06-28 | End: 2025-07-02 | Stop reason: HOSPADM

## 2025-06-28 RX ORDER — HYDROMORPHONE HCL/PF 1 MG/ML
0.5 SYRINGE (ML) INJECTION
Status: DISCONTINUED | OUTPATIENT
Start: 2025-06-28 | End: 2025-07-02 | Stop reason: HOSPADM

## 2025-06-28 RX ORDER — ASPIRIN 81 MG/1
81 TABLET, CHEWABLE ORAL DAILY
Status: DISCONTINUED | OUTPATIENT
Start: 2025-06-28 | End: 2025-07-02 | Stop reason: HOSPADM

## 2025-06-28 RX ADMIN — PRASUGREL 10 MG: 10 TABLET, FILM COATED ORAL at 08:28

## 2025-06-28 RX ADMIN — METOPROLOL TARTRATE 50 MG: 50 TABLET, FILM COATED ORAL at 22:12

## 2025-06-28 RX ADMIN — OXYCODONE 5 MG: 5 TABLET ORAL at 22:19

## 2025-06-28 RX ADMIN — FOLIC ACID 1 MG: 1 TABLET ORAL at 08:25

## 2025-06-28 RX ADMIN — CEFEPIME 2000 MG: 2 INJECTION, POWDER, FOR SOLUTION INTRAVENOUS at 17:11

## 2025-06-28 RX ADMIN — SENNOSIDES 17.2 MG: 8.6 TABLET, FILM COATED ORAL at 17:11

## 2025-06-28 RX ADMIN — OXYCODONE 5 MG: 5 TABLET ORAL at 03:48

## 2025-06-28 RX ADMIN — METHOCARBAMOL 500 MG: 500 TABLET ORAL at 10:55

## 2025-06-28 RX ADMIN — FLUTICASONE PROPIONATE 2 SPRAY: 50 SPRAY, METERED NASAL at 08:26

## 2025-06-28 RX ADMIN — INSULIN LISPRO 1 UNITS: 100 INJECTION, SOLUTION INTRAVENOUS; SUBCUTANEOUS at 10:55

## 2025-06-28 RX ADMIN — THIAMINE HYDROCHLORIDE 100 MG: 100 INJECTION, SOLUTION INTRAMUSCULAR; INTRAVENOUS at 17:11

## 2025-06-28 RX ADMIN — ENOXAPARIN SODIUM 40 MG: 40 INJECTION SUBCUTANEOUS at 08:24

## 2025-06-28 RX ADMIN — ASPIRIN 81 MG: 81 TABLET, CHEWABLE ORAL at 08:25

## 2025-06-28 RX ADMIN — THIAMINE HYDROCHLORIDE 100 MG: 100 INJECTION, SOLUTION INTRAMUSCULAR; INTRAVENOUS at 08:26

## 2025-06-28 RX ADMIN — CEFEPIME 2000 MG: 2 INJECTION, POWDER, FOR SOLUTION INTRAVENOUS at 08:26

## 2025-06-28 RX ADMIN — GABAPENTIN 100 MG: 100 CAPSULE ORAL at 22:12

## 2025-06-28 RX ADMIN — INSULIN GLARGINE 6 UNITS: 100 INJECTION, SOLUTION SUBCUTANEOUS at 22:12

## 2025-06-28 RX ADMIN — METOPROLOL TARTRATE 50 MG: 50 TABLET, FILM COATED ORAL at 08:25

## 2025-06-28 RX ADMIN — POLYETHYLENE GLYCOL 3350 17 G: 17 POWDER, FOR SOLUTION ORAL at 08:24

## 2025-06-28 RX ADMIN — ATORVASTATIN CALCIUM 20 MG: 20 TABLET, FILM COATED ORAL at 08:25

## 2025-06-28 RX ADMIN — METHOCARBAMOL 500 MG: 500 TABLET ORAL at 03:48

## 2025-06-28 RX ADMIN — INSULIN LISPRO 1 UNITS: 100 INJECTION, SOLUTION INTRAVENOUS; SUBCUTANEOUS at 17:11

## 2025-06-28 RX ADMIN — BENZONATATE 200 MG: 100 CAPSULE ORAL at 17:11

## 2025-06-28 RX ADMIN — LORATADINE 10 MG: 10 TABLET ORAL at 08:25

## 2025-06-28 RX ADMIN — FLUTICASONE PROPIONATE 2 SPRAY: 50 SPRAY, METERED NASAL at 17:11

## 2025-06-28 RX ADMIN — METHOCARBAMOL 500 MG: 500 TABLET ORAL at 17:13

## 2025-06-28 RX ADMIN — OXYCODONE 5 MG: 5 TABLET ORAL at 10:55

## 2025-06-28 RX ADMIN — CEFEPIME 2000 MG: 2 INJECTION, POWDER, FOR SOLUTION INTRAVENOUS at 04:12

## 2025-06-28 RX ADMIN — NICOTINE 7 MG: 7 PATCH, EXTENDED RELEASE TRANSDERMAL at 08:24

## 2025-06-28 RX ADMIN — Medication 1000 MCG: at 08:25

## 2025-06-28 RX ADMIN — THIAMINE HYDROCHLORIDE 100 MG: 100 INJECTION, SOLUTION INTRAMUSCULAR; INTRAVENOUS at 22:12

## 2025-06-28 RX ADMIN — SENNOSIDES 17.2 MG: 8.6 TABLET, FILM COATED ORAL at 08:24

## 2025-06-28 NOTE — UTILIZATION REVIEW
NOTIFICATION OF INPATIENT ADMISSION   AUTHORIZATION REQUEST   SERVICING FACILITY:   [place] [address]  Tax ID: [id number]  NPI: [id number]   ATTENDING PROVIDER:  Attending Name and NPI#: Alex Cevallos Md [[id number]]  Address: [address] [place] [address]  Phone: [phone]     ADMISSION INFORMATION:  Place of Service: Inpatient [facility]  Place of Service Code: 21  Inpatient Admission Date/Time: 6/28/25 12:05 AM  Discharge Date/Time: No discharge date for patient encounter.  Admitting Diagnosis Code/Description:  SIRS (systemic inflammatory response syndrome) (HCC) [R65.10]     UTILIZATION REVIEW CONTACT:  Phylicia Jenkins Utilization   Network Utilization Review Department  Phone: [phone]  Fax: [phone]  Email: [email or web address]  Contact for approvals/pending authorizations, clinical reviews, and discharge.     PHYSICIAN ADVISORY SERVICES:  Medical Necessity Denial & Oddr-yl-Bbon Review  Phone: [phone]  Fax: [phone]  Email: [email or web address]     DISCHARGE SUPPORT TEAM:  For Patients Discharge Needs & Updates  Phone: [phone] opt. 2 Fax: [phone]  Email: [email or web address]

## 2025-06-28 NOTE — [facility] ADMISSION
[facility]  spoke with Pt's spouse, Ene via phone. Introduced self, explained role, reviewed [facility] program, services offered, acute rehab criteria, review of referral by [facility] Medical Director, insurance authorization process, three [facility] locations and anticipated rehab length of stay. [facility] Rehab folder was emailed to spouse.  All questions were answered. Spouse's only location choice is [facility]. Spouse was made aware AR[facility] Reviewer will communicate with their Care Manager who will keep patient updated on referral status.

## 2025-06-28 NOTE — ASSESSMENT & PLAN NOTE
Recent Labs     06/26/25  0403 06/27/25  0542 06/28/25  0526   * 113* 84*   ALT 50 46 34   ALKPHOS 175* 191* 181*   TBILI 1.82* 2.24* 2.52*   BILIDIR 0.97*  --   --     Trending up. Patient not having abdominal pain.  Right upper quadrant ultrasound remarkable only for hepatic steatosis. Hemolysis panel remarkable for elevated reticulocytes, no schistosytes. INR WNL. Consider cirrhosis, alcohol abuse    Plan:  Monitor a.m. CMP  F/u haptoglobin   f/u INR  F/u vitamin D

## 2025-06-28 NOTE — PROGRESS NOTES
Progress Note - Hospitalist   Name: Aquilino Dowell 64 y.o. male I MRN: [id number]  Unit/Bed#: S -01 I Date of Admission: 6/28/2025   Date of Service: 6/28/2025 I Hospital Day: 0    Assessment & Plan  Polymicrobial bacteremia  Sepsis (HCC)  Patient meeting sepsis criteria with tachypnea and tachycardia on presentation, blood culture BC ID with Klebsiella aerogenes, Staphylococcus Pan susceptible.  S/p cefepime and Vanco in the ER on admission.  Hemodynamically stable, low-grade fevers to about 100.5    Unclear etiology of bacteremia, workup unrevealing    Plan:  Infectious disease consulted  2 g cefepime every 8h, day 5/7 total abx  Tentative plan for 7 days of therapy for this issue through 6/30   AM CBC, fever curve  Fall  Initial source of presentation. This is an outpatient second episode of fall in the last year. Ongoing supportive measures per primary. Trauma evaluation as noted.  Patient has history of ambulatory dysfunction and falls. He uses a cane at home and has to grab hold of things near him to walk steadily. 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   During this fall, wife says the patient was getting out of bed and attempting to use the bathroom and fell. He denied any LOC, dizziness, tripping over anything, or urinary incontinence, but he felt too weak to get up on his own, prompting him to call for his wife.    Plan:  PT/OT recommending level 1, patient wants rehab, anticipate DC to [facility]  Left shoulder pain and low back pain  Chronic Back and left shoulder pain.  Left deltoid was tender to palpation suspecting musculoskeletal related on 6/24.   Left shoulder x-ray demonstrated osteoarthritis of the glenohumeral joint.  Patient still complaining of pain    Differential is large, includes arthritis flare vs rotator cuff arthropathy     Plan:  Administer Robaxin 500 mg every 6 hours as needed  Gabapentin 100 mg 3 times daily  Oxycodone 5 and 10 for  moderate to severe pain  Dilaudid for breakthrough pain  Orthopedic surgery consultation  Will provide referral on DC  Coronary artery disease  Patient has a history of CAD (cath Oct 2013: 95% prox LAD, 40% mid LAD, EF 60%, MANUEL to prox LAD)    Plan:  Continue Prasugrel 10 mg daily and Aspirin 81 mg daily  Diabetes (HCC)  A1c: 6.6 11 months ago    Patient is on Metformin 1000mg daily.    Plan:  Hold metformin  Sliding scale insulin  Manage diabetic neuropathy with Gabapentin 100 mg TID  Hyponatremia  Recent Labs     06/26/25  0403 06/27/25  0542 06/28/25  0526   SODIUM 136 134* 132*   Likely in setting of poor oral intake/hypovolemic hyponatremia.  Na still low on 6/25 at 132.  Resolved.    Plan:  AM CMPs  Alcohol use disorder   >>ASSESSMENT AND PLAN FOR ALCOHOL USE DISORDER WRITTEN ON 6/28/2025  8:26 AM BY ANITHA BRITTON MD    Patient's wife mentioned that patient drinks up to a bottle of wine a day. Patient endorses use of chewing tobacco.  Patient's lactic acidosis could be in the setting of thiamine deficiency vs alcohol abuse.  B12, B9 WNL    Plan:  Start IV thiamine 100 mg TID  CIWA protocol  Pending results for  Thiamine  NRT ordered  Hypertension  On metoprolol 50 twice daily. Continue PTA med  Sinusitis  Patient endorsing increased postnasal drip and cough, evidence of sinusitis on imaging.  Treat with antibiotics for acute infectious sinusitis.    Plan:  Claritin and Flonase  ENT referral on d/c   Will monitor symptoms  Hyperbilirubinemia  Recent Labs     06/26/25  0403 06/27/25  0542 06/28/25  0526   * 113* 84*   ALT 50 46 34   ALKPHOS 175* 191* 181*   TBILI 1.82* 2.24* 2.52*   BILIDIR 0.97*  --   --     Trending up. Patient not having abdominal pain.  Right upper quadrant ultrasound remarkable only for hepatic steatosis. Hemolysis panel remarkable for elevated reticulocytes, no schistosytes. INR WNL. Consider cirrhosis, alcohol abuse    Plan:  Monitor a.m. CMP  F/u haptoglobin   f/u INR  F/u vitamin  D  Constipation  Monitor BMs, as patient is on opioids.  MiraLAX, senna, MOM.  Dulcolax suppository if needed    VTE Pharmacologic Prophylaxis:   Moderate Risk (Score 3-4) - Pharmacological DVT Prophylaxis Ordered: enoxaparin (Lovenox).    Mobility:   JH-HLM Achieved: 4: Move to chair/commode  -HLM Goal achieved. Continue to encourage appropriate mobility.    Patient Centered Rounds: I performed bedside rounds with nursing staff today.   Discussions with Specialists or Other Care Team Provider: Infectious disease    Education and Discussions with Family / Patient: Patient declined call to .     Current Length of Stay: 0 day(s)  Current Patient Status: Inpatient   Certification Statement: The patient will continue to require additional inpatient hospital stay due to bacteremia on IV antibiotics  Discharge Plan: Anticipate discharge in 24-48 hrs to rehab facility.  Monday to [facility]    Code Status: Level 1 - Full Code    Subjective   No acute events overnight, no bowel movement since admission.  Patient open to trying suppository if needed.  Back and shoulder pain is controlled.  No fevers or chills    Objective :  Temp:  [98.3 °F (36.8 °C)-98.5 °F (36.9 °C)] 98.3 °F (36.8 °C)  HR:  [73-78] 73  BP: (120-146)/(72-83) 146/83  Resp:  [15-18] 18  SpO2:  [94 %-97 %] 97 %    Body mass index is 26.16 kg/m².     Input and Output Summary (last 24 hours):     Intake/Output Summary (Last 24 hours) at 6/28/2025 1017  Last data filed at 6/28/2025 0800  Gross per 24 hour   Intake 150 ml   Output 1025 ml   Net -875 ml       Physical Exam  Constitutional:       General: He is not in acute distress.     Appearance: Normal appearance. He is obese.   HENT:      Mouth/Throat:      Comments: Poor dentition    Cardiovascular:      Rate and Rhythm: Normal rate and regular rhythm.      Heart sounds: Normal heart sounds.   Abdominal:      General: There is distension.      Tenderness: There is no abdominal tenderness.      Hernia:  No hernia is present.     Musculoskeletal:         General: Tenderness present.      Comments: L shoulder     Neurological:      Mental Status: He is alert. Mental status is at baseline.             Lab Results: I have reviewed the following results:   Results from last 7 days   Lab Units 06/28/25  0526 06/27/25  0542   WBC Thousand/uL 3.81* 3.41*   HEMOGLOBIN g/dL 11.0* 11.8*   HEMATOCRIT % 32.5* 35.4*   PLATELETS Thousands/uL 92* 90*   SEGS PCT %  --  55   LYMPHO PCT %  --  30   MONO PCT %  --  12   EOS PCT %  --  2     Results from last 7 days   Lab Units 06/28/25  0526   SODIUM mmol/L 132*   POTASSIUM mmol/L 3.8   CHLORIDE mmol/L 99   CO2 mmol/L 27   BUN mg/dL 6   CREATININE mg/dL 0.65   ANION GAP mmol/L 6   CALCIUM mg/dL 7.9*   ALBUMIN g/dL 2.5*   TOTAL BILIRUBIN mg/dL 2.52*   ALK PHOS U/L 181*   ALT U/L 34   AST U/L 84*   GLUCOSE RANDOM mg/dL 154*     Results from last 7 days   Lab Units 06/27/25  0930   INR  1.01     Results from last 7 days   Lab Units 06/28/25  0700 06/27/25  2109 06/27/25  1616 06/27/25  1103 06/27/25  0718 06/26/25  2129 06/26/25  1638 06/26/25  1135 06/26/25  0717 06/25/25  2115 06/25/25  1550 06/25/25  1109   POC GLUCOSE mg/dl 148* 161* 152* 222* 131 200* 215* 174* 157* 191* 162* 235*     Results from last 7 days   Lab Units 06/24/25  1653 06/24/25  1043   HEMOGLOBIN A1C % 8.0* 8.0*     Results from last 7 days   Lab Units 06/27/25  0900 06/26/25  0627 06/26/25  0403 06/25/25  2136 06/24/25  1120 06/24/25  1043   LACTIC ACID mmol/L 1.3 2.3* 2.6* 5.8*   < >  --    PROCALCITONIN ng/ml  --   --   --   --   --  0.43*    < > = values in this interval not displayed.       Recent Cultures (last 7 days):   Results from last 7 days   Lab Units 06/24/25  1310   BLOOD CULTURE  No Growth at 72 hrs.  Klebsiella aerogenes*  Staphylococcus hominis*   GRAM STAIN RESULT  Gram negative rods*         CXR, CT cervical spine, CT head, XR of L shoulder, CT CAP showed no acute changes and no traumatic  injury findings. MRI with Small focal areas of enhancement in the L1-L2 and L2-L3 supraspinous ligament regions, suspicious for enthesitis.       Last 24 Hours Medication List:     Current Facility-Administered Medications:     acetaminophen (TYLENOL) tablet 650 mg, Q4H PRN    aspirin chewable tablet 81 mg, Daily    atorvastatin (LIPITOR) tablet 20 mg, Daily    bisacodyl (DULCOLAX) rectal suppository 10 mg, Daily PRN    ceFEPime (MAXIPIME) 2,000 mg in dextrose 5 % 50 mL IVPB, Q8H, Last Rate: 2,000 mg (06/28/25 0826)    cyanocobalamin (VITAMIN B-12) tablet 1,000 mcg, Daily    enoxaparin (LOVENOX) subcutaneous injection 40 mg, Daily    fluticasone (FLONASE) 50 mcg/act nasal spray 2 spray, BID    folic acid (FOLVITE) tablet 1 mg, Daily    gabapentin (NEURONTIN) capsule 100 mg, HS    HYDROmorphone (DILAUDID) injection 0.5 mg, Q3H PRN    insulin glargine (LANTUS) subcutaneous injection 6 Units 0.06 mL, HS    insulin lispro (HumALOG/ADMELOG) 100 units/mL subcutaneous injection 1-6 Units, TID AC **AND** Fingerstick Glucose (POCT), TID AC    loratadine (CLARITIN) tablet 10 mg, Daily    methocarbamol (ROBAXIN) tablet 500 mg, Q6H PRN    metoprolol tartrate (LOPRESSOR) tablet 50 mg, Q12H TERRENCE    nicotine (NICODERM CQ) 7 mg/24hr TD 24 hr patch 7 mg, Daily PRN    oxyCODONE (ROXICODONE) IR tablet 5 mg, Q4H PRN **OR** oxyCODONE (ROXICODONE) immediate release tablet 10 mg, Q4H PRN    polyethylene glycol (MIRALAX) packet 17 g, Daily    prasugrel (EFFIENT) tablet 10 mg, Daily    senna (SENOKOT) tablet 17.2 mg, BID    thiamine (VITAMIN B1) 100 mg in dextrose 5 % 100 mL IVPB, TID, Last Rate: 100 mg (06/28/25 0826)    Administrative Statements   Today, Patient Was Seen By: She Jurado MD      **Please Note: This note may have been constructed using a voice recognition system.**

## 2025-06-28 NOTE — ASSESSMENT & PLAN NOTE
Patient has a history of CAD (cath Oct 2013: 95% prox LAD, 40% mid LAD, EF 60%, MANUEL to prox LAD)    Plan:  Continue Prasugrel 10 mg daily and Aspirin 81 mg daily

## 2025-06-28 NOTE — UTILIZATION REVIEW
Effective 6/28/25, [facility] has become a new legal entity. TAX ID # is now [id number] and NPI # is now [id number]. In order to facilitate the change in Tax ID and NPI, this patient had to be discharged and readmitted in our [facility] system. This patient did not physically relocate to another campus and remains in the same bed/unit as the previous encounter.

## 2025-06-28 NOTE — ASSESSMENT & PLAN NOTE
Initial source of presentation. This is an outpatient second episode of fall in the last year. Ongoing supportive measures per primary. Trauma evaluation as noted.  Patient has history of ambulatory dysfunction and falls. He uses a cane at home and has to grab hold of things near him to walk steadily. 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   During this fall, wife says the patient was getting out of bed and attempting to use the bathroom and fell. He denied any LOC, dizziness, tripping over anything, or urinary incontinence, but he felt too weak to get up on his own, prompting him to call for his wife.    Plan:  PT/OT recommending level 1, patient wants rehab, anticipate DC to [facility]

## 2025-06-28 NOTE — ASSESSMENT & PLAN NOTE
Patient meeting sepsis criteria with tachypnea and tachycardia on presentation, blood culture BC ID with Klebsiella aerogenes, Staphylococcus Pan susceptible.  S/p cefepime and Vanco in the ER on admission.  Hemodynamically stable, low-grade fevers to about 100.5    Unclear etiology of bacteremia, workup unrevealing    Plan:  Infectious disease consulted  2 g cefepime every 8h, day 5/7 total abx  Tentative plan for 7 days of therapy for this issue through 6/30   AM CBC, fever curve

## 2025-06-28 NOTE — ASSESSMENT & PLAN NOTE
Recent Labs     06/26/25  0403 06/27/25  0542 06/28/25  0526   SODIUM 136 134* 132*   Likely in setting of poor oral intake/hypovolemic hyponatremia.  Na still low on 6/25 at 132.  Resolved.    Plan:  HALIMA CMPs

## 2025-06-28 NOTE — ASSESSMENT & PLAN NOTE
>>ASSESSMENT AND PLAN FOR ALCOHOL USE DISORDER WRITTEN ON 6/28/2025  8:26 AM BY ANITHA BRITTON MD    Patient's wife mentioned that patient drinks up to a bottle of wine a day. Patient endorses use of chewing tobacco.  Patient's lactic acidosis could be in the setting of thiamine deficiency vs alcohol abuse.  B12, B9 WNL    Plan:  Start IV thiamine 100 mg TID  CIWA protocol  Pending results for  Thiamine  NRT ordered

## 2025-06-28 NOTE — H&P
This H+P note is for administrative and documentation purposes associated with the TIN cutover. For clinical information, refer to the daily progress note with the new encounter.

## 2025-06-29 VITALS
SYSTOLIC BLOOD PRESSURE: 133 MMHG | BODY MASS INDEX: 26.13 KG/M2 | HEART RATE: 82 BPM | HEIGHT: 72 IN | DIASTOLIC BLOOD PRESSURE: 73 MMHG | RESPIRATION RATE: 15 BRPM | WEIGHT: 192.9 LBS | TEMPERATURE: 99.9 F | OXYGEN SATURATION: 96 %

## 2025-06-29 PROBLEM — E55.9 VITAMIN D DEFICIENCY: Status: ACTIVE | Noted: 2025-06-29

## 2025-06-29 LAB
ALBUMIN SERPL BCG-MCNC: 2.5 G/DL (ref 3.5–5)
ALBUMIN SERPL BCG-MCNC: 2.5 G/DL (ref 3.5–5)
ALP SERPL-CCNC: 195 U/L (ref 34–104)
ALP SERPL-CCNC: 195 U/L (ref 34–104)
ALT SERPL W P-5'-P-CCNC: 30 U/L (ref 7–52)
ALT SERPL W P-5'-P-CCNC: 30 U/L (ref 7–52)
ANION GAP SERPL CALCULATED.3IONS-SCNC: 6 MMOL/L (ref 4–13)
ANION GAP SERPL CALCULATED.3IONS-SCNC: 6 MMOL/L (ref 4–13)
AST SERPL W P-5'-P-CCNC: 94 U/L (ref 13–39)
AST SERPL W P-5'-P-CCNC: 94 U/L (ref 13–39)
BACTERIA BLD CULT: NORMAL
BILIRUB SERPL-MCNC: 1.92 MG/DL (ref 0.2–1)
BILIRUB SERPL-MCNC: 1.92 MG/DL (ref 0.2–1)
BUN SERPL-MCNC: 8 MG/DL (ref 5–25)
BUN SERPL-MCNC: 8 MG/DL (ref 5–25)
CALCIUM ALBUM COR SERPL-MCNC: 9.1 MG/DL (ref 8.3–10.1)
CALCIUM ALBUM COR SERPL-MCNC: 9.1 MG/DL (ref 8.3–10.1)
CALCIUM SERPL-MCNC: 7.9 MG/DL (ref 8.4–10.2)
CALCIUM SERPL-MCNC: 7.9 MG/DL (ref 8.4–10.2)
CHLORIDE SERPL-SCNC: 103 MMOL/L (ref 96–108)
CHLORIDE SERPL-SCNC: 103 MMOL/L (ref 96–108)
CO2 SERPL-SCNC: 26 MMOL/L (ref 21–32)
CO2 SERPL-SCNC: 26 MMOL/L (ref 21–32)
CREAT SERPL-MCNC: 0.88 MG/DL (ref 0.6–1.3)
CREAT SERPL-MCNC: 0.88 MG/DL (ref 0.6–1.3)
ERYTHROCYTE [DISTWIDTH] IN BLOOD BY AUTOMATED COUNT: 15.7 % (ref 11.6–15.1)
ERYTHROCYTE [DISTWIDTH] IN BLOOD BY AUTOMATED COUNT: 15.7 % (ref 11.6–15.1)
GFR SERPL CREATININE-BSD FRML MDRD: 91 ML/MIN/1.73SQ M
GFR SERPL CREATININE-BSD FRML MDRD: 91 ML/MIN/1.73SQ M
GLUCOSE SERPL-MCNC: 137 MG/DL (ref 65–140)
GLUCOSE SERPL-MCNC: 137 MG/DL (ref 65–140)
GLUCOSE SERPL-MCNC: 152 MG/DL (ref 65–140)
GLUCOSE SERPL-MCNC: 152 MG/DL (ref 65–140)
GLUCOSE SERPL-MCNC: 162 MG/DL (ref 65–140)
GLUCOSE SERPL-MCNC: 162 MG/DL (ref 65–140)
GLUCOSE SERPL-MCNC: 165 MG/DL (ref 65–140)
GLUCOSE SERPL-MCNC: 165 MG/DL (ref 65–140)
GLUCOSE SERPL-MCNC: 212 MG/DL (ref 65–140)
GLUCOSE SERPL-MCNC: 212 MG/DL (ref 65–140)
HCT VFR BLD AUTO: 34.6 % (ref 36.5–49.3)
HCT VFR BLD AUTO: 34.6 % (ref 36.5–49.3)
HGB BLD-MCNC: 11.1 G/DL (ref 12–17)
HGB BLD-MCNC: 11.1 G/DL (ref 12–17)
MCH RBC QN AUTO: 33.5 PG (ref 26.8–34.3)
MCH RBC QN AUTO: 33.5 PG (ref 26.8–34.3)
MCHC RBC AUTO-ENTMCNC: 32.1 G/DL (ref 31.4–37.4)
MCHC RBC AUTO-ENTMCNC: 32.1 G/DL (ref 31.4–37.4)
MCV RBC AUTO: 105 FL (ref 82–98)
MCV RBC AUTO: 105 FL (ref 82–98)
PLATELET # BLD AUTO: 90 THOUSANDS/UL (ref 149–390)
PLATELET # BLD AUTO: 90 THOUSANDS/UL (ref 149–390)
PMV BLD AUTO: 10.3 FL (ref 8.9–12.7)
PMV BLD AUTO: 10.3 FL (ref 8.9–12.7)
POTASSIUM SERPL-SCNC: 3.8 MMOL/L (ref 3.5–5.3)
POTASSIUM SERPL-SCNC: 3.8 MMOL/L (ref 3.5–5.3)
PROT SERPL-MCNC: 5.8 G/DL (ref 6.4–8.4)
PROT SERPL-MCNC: 5.8 G/DL (ref 6.4–8.4)
RBC # BLD AUTO: 3.31 MILLION/UL (ref 3.88–5.62)
RBC # BLD AUTO: 3.31 MILLION/UL (ref 3.88–5.62)
SODIUM SERPL-SCNC: 135 MMOL/L (ref 135–147)
SODIUM SERPL-SCNC: 135 MMOL/L (ref 135–147)
WBC # BLD AUTO: 4 THOUSAND/UL (ref 4.31–10.16)
WBC # BLD AUTO: 4 THOUSAND/UL (ref 4.31–10.16)

## 2025-06-29 PROCEDURE — 85027 COMPLETE CBC AUTOMATED: CPT

## 2025-06-29 PROCEDURE — 80053 COMPREHEN METABOLIC PANEL: CPT

## 2025-06-29 PROCEDURE — 99232 SBSQ HOSP IP/OBS MODERATE 35: CPT | Performed by: HOSPITALIST

## 2025-06-29 PROCEDURE — 82948 REAGENT STRIP/BLOOD GLUCOSE: CPT

## 2025-06-29 PROCEDURE — 97116 GAIT TRAINING THERAPY: CPT

## 2025-06-29 RX ORDER — ERGOCALCIFEROL 1.25 MG/1
50000 CAPSULE, LIQUID FILLED ORAL WEEKLY
Status: DISCONTINUED | OUTPATIENT
Start: 2025-06-29 | End: 2025-07-02 | Stop reason: HOSPADM

## 2025-06-29 RX ADMIN — CEFEPIME 2000 MG: 2 INJECTION, POWDER, FOR SOLUTION INTRAVENOUS at 08:00

## 2025-06-29 RX ADMIN — METHOCARBAMOL 500 MG: 500 TABLET ORAL at 08:02

## 2025-06-29 RX ADMIN — ENOXAPARIN SODIUM 40 MG: 40 INJECTION SUBCUTANEOUS at 08:01

## 2025-06-29 RX ADMIN — POLYETHYLENE GLYCOL 3350 17 G: 17 POWDER, FOR SOLUTION ORAL at 08:00

## 2025-06-29 RX ADMIN — METHOCARBAMOL 500 MG: 500 TABLET ORAL at 15:52

## 2025-06-29 RX ADMIN — BENZONATATE 200 MG: 100 CAPSULE ORAL at 08:00

## 2025-06-29 RX ADMIN — INSULIN LISPRO 1 UNITS: 100 INJECTION, SOLUTION INTRAVENOUS; SUBCUTANEOUS at 08:02

## 2025-06-29 RX ADMIN — GABAPENTIN 100 MG: 100 CAPSULE ORAL at 21:52

## 2025-06-29 RX ADMIN — THIAMINE HYDROCHLORIDE 100 MG: 100 INJECTION, SOLUTION INTRAMUSCULAR; INTRAVENOUS at 08:00

## 2025-06-29 RX ADMIN — FLUTICASONE PROPIONATE 2 SPRAY: 50 SPRAY, METERED NASAL at 08:02

## 2025-06-29 RX ADMIN — THIAMINE HYDROCHLORIDE 100 MG: 100 INJECTION, SOLUTION INTRAMUSCULAR; INTRAVENOUS at 16:32

## 2025-06-29 RX ADMIN — FLUTICASONE PROPIONATE 2 SPRAY: 50 SPRAY, METERED NASAL at 16:32

## 2025-06-29 RX ADMIN — THIAMINE HYDROCHLORIDE 100 MG: 100 INJECTION, SOLUTION INTRAMUSCULAR; INTRAVENOUS at 21:52

## 2025-06-29 RX ADMIN — METOPROLOL TARTRATE 50 MG: 50 TABLET, FILM COATED ORAL at 08:00

## 2025-06-29 RX ADMIN — METOPROLOL TARTRATE 50 MG: 50 TABLET, FILM COATED ORAL at 21:52

## 2025-06-29 RX ADMIN — INSULIN LISPRO 2 UNITS: 100 INJECTION, SOLUTION INTRAVENOUS; SUBCUTANEOUS at 11:48

## 2025-06-29 RX ADMIN — INSULIN LISPRO 1 UNITS: 100 INJECTION, SOLUTION INTRAVENOUS; SUBCUTANEOUS at 16:31

## 2025-06-29 RX ADMIN — OXYCODONE 5 MG: 5 TABLET ORAL at 10:32

## 2025-06-29 RX ADMIN — FOLIC ACID 1 MG: 1 TABLET ORAL at 08:01

## 2025-06-29 RX ADMIN — ERGOCALCIFEROL 50000 UNITS: 1.25 CAPSULE, LIQUID FILLED ORAL at 12:49

## 2025-06-29 RX ADMIN — Medication 1000 MCG: at 08:00

## 2025-06-29 RX ADMIN — ATORVASTATIN CALCIUM 20 MG: 20 TABLET, FILM COATED ORAL at 08:01

## 2025-06-29 RX ADMIN — LORATADINE 10 MG: 10 TABLET ORAL at 08:00

## 2025-06-29 RX ADMIN — CEFEPIME 2000 MG: 2 INJECTION, POWDER, FOR SOLUTION INTRAVENOUS at 02:14

## 2025-06-29 RX ADMIN — PRASUGREL 10 MG: 10 TABLET, FILM COATED ORAL at 08:03

## 2025-06-29 RX ADMIN — SENNOSIDES 17.2 MG: 8.6 TABLET, FILM COATED ORAL at 08:00

## 2025-06-29 RX ADMIN — CEFEPIME 2000 MG: 2 INJECTION, POWDER, FOR SOLUTION INTRAVENOUS at 16:32

## 2025-06-29 RX ADMIN — ASPIRIN 81 MG: 81 TABLET, CHEWABLE ORAL at 08:01

## 2025-06-29 RX ADMIN — INSULIN GLARGINE 6 UNITS: 100 INJECTION, SOLUTION SUBCUTANEOUS at 21:52

## 2025-06-29 NOTE — ASSESSMENT & PLAN NOTE
Recent Labs     06/27/25  0542 06/28/25  0526 06/29/25  0538   SODIUM 134* 132* 135   Likely in setting of poor oral intake/hypovolemic hyponatremia.  Resolved.    Plan:  HALIMA Gillis

## 2025-06-29 NOTE — PROGRESS NOTES
Progress Note - Hospitalist   Name: Aquilino Dowell 64 y.o. male I MRN: [id number]  Unit/Bed#: S -01 I Date of Admission: 6/28/2025   Date of Service: 6/29/2025 I Hospital Day: 1    Assessment & Plan  Polymicrobial bacteremia  Sepsis (HCC)  Patient meeting sepsis criteria with tachypnea and tachycardia on presentation, blood culture BC ID with Klebsiella aerogenes, Staphylococcus Pan susceptible.  S/p cefepime and Vanco in the ER on admission.  Hemodynamically stable, low-grade fevers to about 100.5    Unclear etiology of bacteremia, workup unrevealing    Plan:  Infectious disease consulted  2 g cefepime every 8h, day 6/7 total abx  Tentative plan for 7 days of therapy for this issue through 6/30   AM CBC, fever curve  Patient meeting sepsis criteria with tachypnea and tachycardia on presentation, blood culture BC ID with Klebsiella aerogenes, Staphylococcus Pan susceptible.  S/p cefepime and Vanco in the ER on admission.  Hemodynamically stable, low-grade fevers to about 100.5    Unclear etiology of bacteremia, workup unrevealing    Plan:  Infectious disease consulted  2 g cefepime every 8h, day 6/7 total abx  Tentative plan for 7 days of therapy for this issue through 6/30   AM CBC, fever curve  Fall  Initial source of presentation. This is an outpatient second episode of fall in the last year. Ongoing supportive measures per primary. Trauma evaluation as noted.  Patient has history of ambulatory dysfunction and falls. He uses a cane at home and has to grab hold of things near him to walk steadily. 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   During this fall, wife says the patient was getting out of bed and attempting to use the bathroom and fell. He denied any LOC, dizziness, tripping over anything, or urinary incontinence, but he felt too weak to get up on his own, prompting him to call for his wife.    Plan:  PT/OT recommending level 1, patient wants rehab,  anticipate DC to [facility]  Left shoulder pain and low back pain  Chronic Back and left shoulder pain.  Left deltoid was tender to palpation suspecting musculoskeletal related on 6/24.   Left shoulder x-ray demonstrated osteoarthritis of the glenohumeral joint.  Patient still complaining of pain    Differential is large, includes arthritis flare vs rotator cuff arthropathy     Plan:  Administer Robaxin 500 mg every 6 hours as needed  Gabapentin 100 mg qhs  Oxycodone 5 and 10 for moderate to severe pain  Dilaudid for breakthrough pain  Orthopedic surgery consultation  Will provide referral on DC  Coronary artery disease  Patient has a history of CAD (cath Oct 2013: 95% prox LAD, 40% mid LAD, EF 60%, MANUEL to prox LAD)    Plan:  Continue Prasugrel 10 mg daily and Aspirin 81 mg daily  Diabetes ([facility])  A1c: 6.6 11 months ago    Patient is on Metformin 1000mg daily.    Plan:  Hold metformin  Sliding scale insulin  Lantus 6U qhs  Manage diabetic neuropathy with Gabapentin 100 mg qhs  Hyponatremia  Recent Labs     06/27/25  0542 06/28/25  0526 06/29/25  0538   SODIUM 134* 132* 135   Likely in setting of poor oral intake/hypovolemic hyponatremia.  Resolved.    Plan:  AM CMPs  Alcohol use disorder   >>ASSESSMENT AND PLAN FOR ALCOHOL USE DISORDER WRITTEN ON 6/28/2025  8:26 AM BY ANITHA BRITTON MD    Patient's wife mentioned that patient drinks up to a bottle of wine a day. Patient endorses use of chewing tobacco.  Patient's lactic acidosis could be in the setting of thiamine deficiency vs alcohol abuse.  B12, B9 WNL    Plan:  Start IV thiamine 100 mg TID  CIWA protocol  Pending results for  Thiamine  NRT ordered  Hypertension  On metoprolol tartrate 50 twice daily. Continue PTA med  Sinusitis  Patient endorsing increased postnasal drip and cough, evidence of sinusitis on imaging.  Treat with antibiotics for acute infectious sinusitis.    Plan:  Claritin and Flonase  ENT referral on d/c   Will monitor symptoms  Hyperbilirubinemia  Recent  Labs     06/27/25  0542 06/28/25  0526 06/29/25  0538   * 84* 94*   ALT 46 34 30   ALKPHOS 191* 181* 195*   TBILI 2.24* 2.52* 1.92*    Trending up. Patient not having abdominal pain.  Right upper quadrant ultrasound remarkable only for hepatic steatosis. Hemolysis panel remarkable for elevated reticulocytes, no schistosytes, normal  haptoglobin. INR WNL. Consider cirrhosis, alcohol abuse    Plan:  Monitor a.m. CMP  F/u vitamin D  Constipation  Monitor BMs, as patient is on opioids.  MiraLAX, senna, MOM.  Dulcolax suppository if needed  Vitamin D deficiency  Vit D down at 12.8 on 6/27/29    Plan:  50k units weekly for 6 weeks.     VTE Pharmacologic Prophylaxis:   Moderate Risk (Score 3-4) - Pharmacological DVT Prophylaxis Ordered: enoxaparin (Lovenox).    Mobility:   Basic Mobility Inpatient Raw Score: 18  JH-HLM Goal: 6: Walk 10 steps or more  JH-HLM Achieved: 7: Walk 25 feet or more  JH-HLM Goal achieved. Continue to encourage appropriate mobility.    Patient Centered Rounds: I performed bedside rounds with nursing staff today.   Discussions with Specialists or Other Care Team Provider: None    Education and Discussions with Family / Patient: Patient declined call to .     Current Length of Stay: 1 day(s)  Current Patient Status: Inpatient   Certification Statement: The patient will continue to require additional inpatient hospital stay due to bacteremia on IV antibiotics  Discharge Plan: Anticipate discharge in 24-48 hrs to rehab facility.  Monday to [facility]    Code Status: Level 1 - Full Code    Subjective   Patient seen at bedside today. No Acute events overnight. Patient is continuing to complain of left shoulder pain and hip pain. Otherwise, doesn't have any other acute concerns or complaints at this time.     Objective :  Temp:  [97.4 °F (36.3 °C)-99.9 °F (37.7 °C)] 99.9 °F (37.7 °C)  HR:  [75-89] 82  BP: (108-133)/(69-74) 133/73  SpO2:  [89 %-96 %] 96 %    Body mass index is 26.16 kg/m².      Input and Output Summary (last 24 hours):     Intake/Output Summary (Last 24 hours) at 6/29/2025 1207  Last data filed at 6/29/2025 0653  Gross per 24 hour   Intake --   Output 200 ml   Net -200 ml       Physical Exam  Constitutional:       General: He is not in acute distress.     Appearance: Normal appearance. He is obese.   HENT:      Mouth/Throat:      Comments: Poor dentition    Cardiovascular:      Rate and Rhythm: Normal rate and regular rhythm.      Heart sounds: Normal heart sounds.   Abdominal:      General: There is distension.      Tenderness: There is no abdominal tenderness.      Hernia: No hernia is present.     Musculoskeletal:         General: Tenderness present.      Comments: L shoulder     Neurological:      Mental Status: He is alert. Mental status is at baseline.             Lab Results: I have reviewed the following results:   Results from last 7 days   Lab Units 06/29/25  0538 06/28/25  0526 06/27/25  0542   WBC Thousand/uL 4.00*   < > 3.41*   HEMOGLOBIN g/dL 11.1*   < > 11.8*   HEMATOCRIT % 34.6*   < > 35.4*   PLATELETS Thousands/uL 90*   < > 90*   SEGS PCT %  --   --  55   LYMPHO PCT %  --   --  30   MONO PCT %  --   --  12   EOS PCT %  --   --  2    < > = values in this interval not displayed.     Results from last 7 days   Lab Units 06/29/25  0538   SODIUM mmol/L 135   POTASSIUM mmol/L 3.8   CHLORIDE mmol/L 103   CO2 mmol/L 26   BUN mg/dL 8   CREATININE mg/dL 0.88   ANION GAP mmol/L 6   CALCIUM mg/dL 7.9*   ALBUMIN g/dL 2.5*   TOTAL BILIRUBIN mg/dL 1.92*   ALK PHOS U/L 195*   ALT U/L 30   AST U/L 94*   GLUCOSE RANDOM mg/dL 137     Results from last 7 days   Lab Units 06/27/25  0930   INR  1.01     Results from last 7 days   Lab Units 06/29/25  1135 06/29/25  0650 06/28/25  2106 06/28/25  1633 06/28/25  1054 06/28/25  0700 06/27/25  2109 06/27/25  1616 06/27/25  1103 06/27/25  0718 06/26/25  2129 06/26/25  1638   POC GLUCOSE mg/dl 212* 162* 171* 185* 160* 148* 161* 152* 222* 131 200*  215*     Results from last 7 days   Lab Units 06/24/25  1653 06/24/25  1043   HEMOGLOBIN A1C % 8.0* 8.0*     Results from last 7 days   Lab Units 06/27/25  0900 06/26/25  0627 06/26/25  0403 06/25/25  2136 06/24/25  1120 06/24/25  1043   LACTIC ACID mmol/L 1.3 2.3* 2.6* 5.8*   < >  --    PROCALCITONIN ng/ml  --   --   --   --   --  0.43*    < > = values in this interval not displayed.       Recent Cultures (last 7 days):   Results from last 7 days   Lab Units 06/24/25  1310   BLOOD CULTURE  No Growth After 4 Days.  Klebsiella aerogenes*  Staphylococcus hominis*   GRAM STAIN RESULT  Gram negative rods*         CXR, CT cervical spine, CT head, XR of L shoulder, CT CAP showed no acute changes and no traumatic injury findings. MRI with Small focal areas of enhancement in the L1-L2 and L2-L3 supraspinous ligament regions, suspicious for enthesitis.       Last 24 Hours Medication List:     Current Facility-Administered Medications:     acetaminophen (TYLENOL) tablet 650 mg, Q4H PRN    aspirin chewable tablet 81 mg, Daily    atorvastatin (LIPITOR) tablet 20 mg, Daily    benzonatate (TESSALON PERLES) capsule 200 mg, TID PRN    bisacodyl (DULCOLAX) rectal suppository 10 mg, Daily PRN    ceFEPime (MAXIPIME) 2,000 mg in dextrose 5 % 50 mL IVPB, Q8H, Last Rate: 2,000 mg (06/29/25 0800)    cyanocobalamin (VITAMIN B-12) tablet 1,000 mcg, Daily    enoxaparin (LOVENOX) subcutaneous injection 40 mg, Daily    fluticasone (FLONASE) 50 mcg/act nasal spray 2 spray, BID    folic acid (FOLVITE) tablet 1 mg, Daily    gabapentin (NEURONTIN) capsule 100 mg, HS    HYDROmorphone (DILAUDID) injection 0.5 mg, Q3H PRN    insulin glargine (LANTUS) subcutaneous injection 6 Units 0.06 mL, HS    insulin lispro (HumALOG/ADMELOG) 100 units/mL subcutaneous injection 1-6 Units, TID AC **AND** Fingerstick Glucose (POCT), TID AC    loratadine (CLARITIN) tablet 10 mg, Daily    methocarbamol (ROBAXIN) tablet 500 mg, Q6H PRN    metoprolol tartrate  (LOPRESSOR) tablet 50 mg, Q12H TERRENCE    nicotine (NICODERM CQ) 7 mg/24hr TD 24 hr patch 7 mg, Daily PRN    oxyCODONE (ROXICODONE) IR tablet 5 mg, Q4H PRN **OR** oxyCODONE (ROXICODONE) immediate release tablet 10 mg, Q4H PRN    polyethylene glycol (MIRALAX) packet 17 g, Daily    prasugrel (EFFIENT) tablet 10 mg, Daily    senna (SENOKOT) tablet 17.2 mg, BID    thiamine (VITAMIN B1) 100 mg in dextrose 5 % 100 mL IVPB, TID, Last Rate: 100 mg (06/29/25 0800)    Administrative Statements   Today, Patient Was Seen By: Poncho Hanson DO      **Please Note: This note may have been constructed using a voice recognition system.**

## 2025-06-29 NOTE — PHYSICAL THERAPY NOTE
"                                                                                  PHYSICAL THERAPY NOTE          Patient Name: Aquilino Dowell  Today's Date: 6/29/2025 06/29/25 0920   PT Last Visit   PT Visit Date 06/29/25   Note Type   Note Type Treatment   Pain Assessment   Pain Assessment Tool 0-10   Pain Score 5   Pain Location/Orientation Location: Hip;Orientation: Left   Restrictions/Precautions   Weight Bearing Precautions Per Order No   Other Precautions Chair Alarm;Bed Alarm;Visual impairment;Fall Risk;Pain   General   Chart Reviewed Yes   Family/Caregiver Present No   Cognition   Overall Cognitive Status WFL   Arousal/Participation Responsive;Cooperative   Attention Within functional limits   Orientation Level Oriented X4   Memory Within functional limits   Following Commands Follows one step commands with increased time or repetition   Subjective   Subjective \"yeah id like to get up today, I havent moved much being here.\"   Bed Mobility   Rolling R 5  Supervision   Additional items Assist x 1;Bedrails   Rolling L 5  Supervision   Additional items Assist x 1;Bedrails   Supine to Sit 4  Minimal assistance   Additional items Assist x 1;Bedrails;Increased time required;Verbal cues  (trunk managment)   Sit to Supine Unable to assess   Additional Comments Pt seated OOB to end session   Transfers   Sit to Stand 5  Supervision   Additional items Assist x 1;Increased time required  (RW)   Stand to Sit 5  Supervision   Additional items Assist x 1;Increased time required  (RW)   Stand pivot 5  Supervision   Additional items Assist x 1;Increased time required  (RW)   Ambulation/Elevation   Gait pattern Improper Weight shift;Narrow TAMEKA;Forward Flexion;Decreased foot clearance;Step through pattern   Gait Assistance 5  Supervision  (CS)   Additional items Assist x 1   Assistive Device Rolling walker   Distance 25'x2   Balance   Static Sitting Fair -   Dynamic Sitting Fair -   Static Standing Fair -   Dynamic " "Standing Fair -   Ambulatory Fair -   Activity Tolerance   Activity Tolerance Patient limited by fatigue   Nurse Made Aware RN Jose Cruz   Assessment   Prognosis Fair   Problem List Decreased strength;Decreased endurance;Impaired balance;Decreased mobility;Obesity;Decreased skin integrity;Pain   Assessment Pt seated in bed to start session, pleasant and agreeable. Pt offers complaints of R hip pain and shoulder pain. Pt stated this had been on going and takes the muscle relaxer for it. Pt catrachita to complete all activity with CS, min A for trunk from supine to sit. Pt able to amb 25'x2 with RW and CS. Declined further distance due to \"Heavy feet\". Pt seated in recliner chair to end session, call bell in reach, alarms activated and all needs met. Pt will benefit from continued skilled PT to improve over all strength and endurnece.   Goals   Patient Goals to go home   STG Expiration Date 07/05/25   Short Term Goal #1 In 10 days pt will be able to: 1. Demonstrate ability to perform all aspects of bed mobility independently to improve functional safety. 2. Perform functional transfers with LRAD independently to facilitate safe return to previous living environment. 3. Ambulate 150 ft with LRAD independently with stable vitals to improve safety with household distances and reduce fall risk. 4. Improve LE strength grades by 1 to increase ease of functional mobility with transfers and gait. 5. Pt will demonstrate improved balance by one grade in order to decrease risk of falls. 6. Climb at least 3 steps with unilateral HR with LRAD independently to simulate entrance to home.   PT Treatment Day 1   Plan   Treatment/Interventions Functional transfer training;LE strengthening/ROM;Elevations;Endurance training;Therapeutic exercise;Bed mobility;Gait training   Progress Progressing toward goals   PT Frequency 3-5x/wk   Discharge Recommendation   Rehab Resource Intensity Level, PT I (Maximum Resource Intensity)   AM-PAC Basic Mobility " Inpatient   Turning in Flat Bed Without Bedrails 3   Lying on Back to Sitting on Edge of Flat Bed Without Bedrails 3   Moving Bed to Chair 3   Standing Up From Chair Using Arms 3   Walk in Room 3   Climb 3-5 Stairs With Railing 3   Basic Mobility Inpatient Raw Score 18   Basic Mobility Standardized Score 41.05   [facility] Highest Level Of Mobility   JH-HLM Goal 6: Walk 10 steps or more   JH-HLM Achieved 7: Walk 25 feet or more   End of Consult   Patient Position at End of Consult Bedside chair;Bed/Chair alarm activated;All needs within reach   The patient's AM-PAC Basic Mobility Inpatient Short Form Raw Score is 18, Standardized Score is 41.05. A standardized score greater than 38.32 (raw score of 16) suggests the patient may benefit from discharge to home which may not coincide with above PT recommendations. However please refer to therapist recommendation for discharge planning given other factors that may influence destination    Laila Saunders, PTA

## 2025-06-29 NOTE — ASSESSMENT & PLAN NOTE
Chronic Back and left shoulder pain.  Left deltoid was tender to palpation suspecting musculoskeletal related on 6/24.   Left shoulder x-ray demonstrated osteoarthritis of the glenohumeral joint.  Patient still complaining of pain    Differential is large, includes arthritis flare vs rotator cuff arthropathy     Plan:  Administer Robaxin 500 mg every 6 hours as needed  Gabapentin 100 mg qhs  Oxycodone 5 and 10 for moderate to severe pain  Dilaudid for breakthrough pain  Orthopedic surgery consultation  Will provide referral on DC

## 2025-06-29 NOTE — ASSESSMENT & PLAN NOTE
A1c: 6.6 11 months ago    Patient is on Metformin 1000mg daily.    Plan:  Hold metformin  Sliding scale insulin  Lantus 6U qhs  Manage diabetic neuropathy with Gabapentin 100 mg qhs

## 2025-06-29 NOTE — ASSESSMENT & PLAN NOTE
Patient meeting sepsis criteria with tachypnea and tachycardia on presentation, blood culture BC ID with Klebsiella aerogenes, Staphylococcus Pan susceptible.  S/p cefepime and Vanco in the ER on admission.  Hemodynamically stable, low-grade fevers to about 100.5    Unclear etiology of bacteremia, workup unrevealing    Plan:  Infectious disease consulted  2 g cefepime every 8h, day 6/7 total abx  Tentative plan for 7 days of therapy for this issue through 6/30   AM CBC, fever curve  Patient meeting sepsis criteria with tachypnea and tachycardia on presentation, blood culture BC ID with Klebsiella aerogenes, Staphylococcus Pan susceptible.  S/p cefepime and Vanco in the ER on admission.  Hemodynamically stable, low-grade fevers to about 100.5    Unclear etiology of bacteremia, workup unrevealing    Plan:  Infectious disease consulted  2 g cefepime every 8h, day 6/7 total abx  Tentative plan for 7 days of therapy for this issue through 6/30   AM CBC, fever curve

## 2025-06-29 NOTE — ASSESSMENT & PLAN NOTE
Recent Labs     06/27/25  0542 06/28/25  0526 06/29/25  0538   * 84* 94*   ALT 46 34 30   ALKPHOS 191* 181* 195*   TBILI 2.24* 2.52* 1.92*    Trending up. Patient not having abdominal pain.  Right upper quadrant ultrasound remarkable only for hepatic steatosis. Hemolysis panel remarkable for elevated reticulocytes, no schistosytes, normal  haptoglobin. INR WNL. Consider cirrhosis, alcohol abuse    Plan:  Monitor a.m. CMP  F/u vitamin D

## 2025-06-30 LAB
ALBUMIN SERPL BCG-MCNC: 2.6 G/DL (ref 3.5–5)
ALBUMIN SERPL BCG-MCNC: 2.6 G/DL (ref 3.5–5)
ALP SERPL-CCNC: 184 U/L (ref 34–104)
ALP SERPL-CCNC: 184 U/L (ref 34–104)
ALT SERPL W P-5'-P-CCNC: 27 U/L (ref 7–52)
ALT SERPL W P-5'-P-CCNC: 27 U/L (ref 7–52)
ANION GAP SERPL CALCULATED.3IONS-SCNC: 6 MMOL/L (ref 4–13)
ANION GAP SERPL CALCULATED.3IONS-SCNC: 6 MMOL/L (ref 4–13)
AST SERPL W P-5'-P-CCNC: 88 U/L (ref 13–39)
AST SERPL W P-5'-P-CCNC: 88 U/L (ref 13–39)
BILIRUB SERPL-MCNC: 2.18 MG/DL (ref 0.2–1)
BILIRUB SERPL-MCNC: 2.18 MG/DL (ref 0.2–1)
BUN SERPL-MCNC: 5 MG/DL (ref 5–25)
BUN SERPL-MCNC: 5 MG/DL (ref 5–25)
CALCIUM ALBUM COR SERPL-MCNC: 9 MG/DL (ref 8.3–10.1)
CALCIUM ALBUM COR SERPL-MCNC: 9 MG/DL (ref 8.3–10.1)
CALCIUM SERPL-MCNC: 7.9 MG/DL (ref 8.4–10.2)
CALCIUM SERPL-MCNC: 7.9 MG/DL (ref 8.4–10.2)
CHLORIDE SERPL-SCNC: 105 MMOL/L (ref 96–108)
CHLORIDE SERPL-SCNC: 105 MMOL/L (ref 96–108)
CO2 SERPL-SCNC: 26 MMOL/L (ref 21–32)
CO2 SERPL-SCNC: 26 MMOL/L (ref 21–32)
CREAT SERPL-MCNC: 0.76 MG/DL (ref 0.6–1.3)
CREAT SERPL-MCNC: 0.76 MG/DL (ref 0.6–1.3)
ERYTHROCYTE [DISTWIDTH] IN BLOOD BY AUTOMATED COUNT: 15.3 % (ref 11.6–15.1)
ERYTHROCYTE [DISTWIDTH] IN BLOOD BY AUTOMATED COUNT: 15.3 % (ref 11.6–15.1)
GFR SERPL CREATININE-BSD FRML MDRD: 97 ML/MIN/1.73SQ M
GFR SERPL CREATININE-BSD FRML MDRD: 97 ML/MIN/1.73SQ M
GLUCOSE SERPL-MCNC: 148 MG/DL (ref 65–140)
GLUCOSE SERPL-MCNC: 148 MG/DL (ref 65–140)
GLUCOSE SERPL-MCNC: 160 MG/DL (ref 65–140)
GLUCOSE SERPL-MCNC: 160 MG/DL (ref 65–140)
GLUCOSE SERPL-MCNC: 173 MG/DL (ref 65–140)
GLUCOSE SERPL-MCNC: 173 MG/DL (ref 65–140)
GLUCOSE SERPL-MCNC: 187 MG/DL (ref 65–140)
GLUCOSE SERPL-MCNC: 187 MG/DL (ref 65–140)
GLUCOSE SERPL-MCNC: 198 MG/DL (ref 65–140)
GLUCOSE SERPL-MCNC: 198 MG/DL (ref 65–140)
HCT VFR BLD AUTO: 34.7 % (ref 36.5–49.3)
HCT VFR BLD AUTO: 34.7 % (ref 36.5–49.3)
HGB BLD-MCNC: 11.3 G/DL (ref 12–17)
HGB BLD-MCNC: 11.3 G/DL (ref 12–17)
MCH RBC QN AUTO: 33.3 PG (ref 26.8–34.3)
MCH RBC QN AUTO: 33.3 PG (ref 26.8–34.3)
MCHC RBC AUTO-ENTMCNC: 32.6 G/DL (ref 31.4–37.4)
MCHC RBC AUTO-ENTMCNC: 32.6 G/DL (ref 31.4–37.4)
MCV RBC AUTO: 102 FL (ref 82–98)
MCV RBC AUTO: 102 FL (ref 82–98)
PLATELET # BLD AUTO: 103 THOUSANDS/UL (ref 149–390)
PLATELET # BLD AUTO: 103 THOUSANDS/UL (ref 149–390)
PMV BLD AUTO: 10 FL (ref 8.9–12.7)
PMV BLD AUTO: 10 FL (ref 8.9–12.7)
POTASSIUM SERPL-SCNC: 3.5 MMOL/L (ref 3.5–5.3)
POTASSIUM SERPL-SCNC: 3.5 MMOL/L (ref 3.5–5.3)
PROT SERPL-MCNC: 5.9 G/DL (ref 6.4–8.4)
PROT SERPL-MCNC: 5.9 G/DL (ref 6.4–8.4)
RBC # BLD AUTO: 3.39 MILLION/UL (ref 3.88–5.62)
RBC # BLD AUTO: 3.39 MILLION/UL (ref 3.88–5.62)
SODIUM SERPL-SCNC: 137 MMOL/L (ref 135–147)
SODIUM SERPL-SCNC: 137 MMOL/L (ref 135–147)
WBC # BLD AUTO: 4.25 THOUSAND/UL (ref 4.31–10.16)
WBC # BLD AUTO: 4.25 THOUSAND/UL (ref 4.31–10.16)

## 2025-06-30 PROCEDURE — 99232 SBSQ HOSP IP/OBS MODERATE 35: CPT | Performed by: HOSPITALIST

## 2025-06-30 PROCEDURE — 97530 THERAPEUTIC ACTIVITIES: CPT

## 2025-06-30 PROCEDURE — 99232 SBSQ HOSP IP/OBS MODERATE 35: CPT | Performed by: STUDENT IN AN ORGANIZED HEALTH CARE EDUCATION/TRAINING PROGRAM

## 2025-06-30 PROCEDURE — 85027 COMPLETE CBC AUTOMATED: CPT

## 2025-06-30 PROCEDURE — 97535 SELF CARE MNGMENT TRAINING: CPT

## 2025-06-30 PROCEDURE — 97116 GAIT TRAINING THERAPY: CPT

## 2025-06-30 PROCEDURE — G0545 PR INHERENT VISIT TO INPT: HCPCS | Performed by: STUDENT IN AN ORGANIZED HEALTH CARE EDUCATION/TRAINING PROGRAM

## 2025-06-30 PROCEDURE — 82948 REAGENT STRIP/BLOOD GLUCOSE: CPT

## 2025-06-30 PROCEDURE — 80053 COMPREHEN METABOLIC PANEL: CPT

## 2025-06-30 RX ADMIN — OXYCODONE HYDROCHLORIDE 10 MG: 10 TABLET ORAL at 08:43

## 2025-06-30 RX ADMIN — THIAMINE HYDROCHLORIDE 100 MG: 100 INJECTION, SOLUTION INTRAMUSCULAR; INTRAVENOUS at 08:45

## 2025-06-30 RX ADMIN — LORATADINE 10 MG: 10 TABLET ORAL at 08:44

## 2025-06-30 RX ADMIN — BENZONATATE 200 MG: 100 CAPSULE ORAL at 11:24

## 2025-06-30 RX ADMIN — FLUTICASONE PROPIONATE 2 SPRAY: 50 SPRAY, METERED NASAL at 17:25

## 2025-06-30 RX ADMIN — Medication 1000 MCG: at 08:44

## 2025-06-30 RX ADMIN — FLUTICASONE PROPIONATE 2 SPRAY: 50 SPRAY, METERED NASAL at 08:42

## 2025-06-30 RX ADMIN — INSULIN LISPRO 2 UNITS: 100 INJECTION, SOLUTION INTRAVENOUS; SUBCUTANEOUS at 16:32

## 2025-06-30 RX ADMIN — THIAMINE HYDROCHLORIDE 100 MG: 100 INJECTION, SOLUTION INTRAMUSCULAR; INTRAVENOUS at 21:02

## 2025-06-30 RX ADMIN — CEFEPIME 2000 MG: 2 INJECTION, POWDER, FOR SOLUTION INTRAVENOUS at 17:25

## 2025-06-30 RX ADMIN — ASPIRIN 81 MG: 81 TABLET, CHEWABLE ORAL at 08:44

## 2025-06-30 RX ADMIN — METHOCARBAMOL 500 MG: 500 TABLET ORAL at 17:25

## 2025-06-30 RX ADMIN — METOPROLOL TARTRATE 50 MG: 50 TABLET, FILM COATED ORAL at 21:02

## 2025-06-30 RX ADMIN — PRASUGREL 10 MG: 10 TABLET, FILM COATED ORAL at 08:44

## 2025-06-30 RX ADMIN — INSULIN GLARGINE 6 UNITS: 100 INJECTION, SOLUTION SUBCUTANEOUS at 21:02

## 2025-06-30 RX ADMIN — THIAMINE HYDROCHLORIDE 100 MG: 100 INJECTION, SOLUTION INTRAMUSCULAR; INTRAVENOUS at 16:32

## 2025-06-30 RX ADMIN — ENOXAPARIN SODIUM 40 MG: 40 INJECTION SUBCUTANEOUS at 08:45

## 2025-06-30 RX ADMIN — INSULIN LISPRO 1 UNITS: 100 INJECTION, SOLUTION INTRAVENOUS; SUBCUTANEOUS at 11:20

## 2025-06-30 RX ADMIN — GABAPENTIN 100 MG: 100 CAPSULE ORAL at 21:02

## 2025-06-30 RX ADMIN — METHOCARBAMOL 500 MG: 500 TABLET ORAL at 11:24

## 2025-06-30 RX ADMIN — FOLIC ACID 1 MG: 1 TABLET ORAL at 08:44

## 2025-06-30 RX ADMIN — ATORVASTATIN CALCIUM 20 MG: 20 TABLET, FILM COATED ORAL at 08:44

## 2025-06-30 RX ADMIN — CEFEPIME 2000 MG: 2 INJECTION, POWDER, FOR SOLUTION INTRAVENOUS at 09:28

## 2025-06-30 RX ADMIN — CEFEPIME 2000 MG: 2 INJECTION, POWDER, FOR SOLUTION INTRAVENOUS at 02:11

## 2025-06-30 RX ADMIN — METOPROLOL TARTRATE 50 MG: 50 TABLET, FILM COATED ORAL at 08:44

## 2025-06-30 NOTE — ASSESSMENT & PLAN NOTE
Patient initially presented for an episode of fall at home where he was found down.  He had complaints related to #3.  Early on admission he had some tachycardia and tachypnea for which blood cultures were sent.  1 out of 2 blood cultures positive as below.  Clinical parameters otherwise improved and now stable. Culture significance remains unclear.    Continue antibiotic as below  Continue to trend fever curve/vitals  Monitor exam for new/developing symptoms  Additional supportive care per primary

## 2025-06-30 NOTE — ASSESSMENT & PLAN NOTE
Patient's second chart reviewed and in the past he seemed to have had a previous type B lactic acidosis potentially related to this issue.  Lactate normalized.  He has also had previously low cell counts which I suspect is from this issue.

## 2025-06-30 NOTE — HOSPITAL COURSE
64-year-old male with history of diabetes and diabetic neuropathy, tobacco and alcohol use disorder.  Who initially presented after a fall at home met sepsis criteria admission with tachypnea and tachycardia. Was initially admitted under trauma and underwent CT imaging of the chest abdomen and pelvis without clear source of infection. Urinalysis also unremarkable. Blood cultures obtained on admission eventually returned positive in 1 out of 2 cultures with Klebsiella aerogenes and coag negative staph.  Infectious disease was consulted and patient was started on IV antibiotics.  Overall infectious workup was negative including right upper quadrant ultrasound, patient received lumbar MRI to rule out discitis.  Patient complained of chronic pains throughout admission, including left shoulder and lower back.  Orthopedics was consulted and patient to follow-up with them as an outpatient.  Patient continues to improve each day. Feels less pain in the shoulder and is trying to limit oral opioids as able.  Patient initially presenting with very high lactic acidosis on admission as well, this cleared with the administration of IV thiamine and patient's outpatient metformin was held.  Was suspecting lactic acidosis was more in setting of alcohol use disorder/metformin/thiamine deficiency rather than severe sepsis.  Patient completed 7-day course of IV antibiotics during this admission, was deemed as maximum intensity needs, discharged to [facility].

## 2025-06-30 NOTE — ASSESSMENT & PLAN NOTE
Patient meeting sepsis criteria with tachypnea and tachycardia on presentation, blood culture BC ID with Klebsiella aerogenes, Staphylococcus Pan susceptible.  S/p cefepime and Vanco in the ER on admission.  Hemodynamically stable, low-grade fevers to about 100.5     Patient meeting sepsis criteria with tachypnea and tachycardia on presentation, blood culture BC ID with Klebsiella aerogenes, Staphylococcus Pan susceptible.  S/p cefepime and Vanco in the ER on admission.  Hemodynamically stable, low-grade fevers to about 100.5    Unclear etiology of bacteremia, workup unrevealing.    Patient is afebrile and feels well without signs of infection.     Plan:  Infectious disease consulted  Completed 7-day course of cefepime on 6/30/2025.  Also completed course of vancomycin  Discharge to [facility]

## 2025-06-30 NOTE — ASSESSMENT & PLAN NOTE
Patient meeting sepsis criteria with tachypnea and tachycardia on presentation, blood culture BC ID with Klebsiella aerogenes, Staphylococcus Pan susceptible.  S/p cefepime and Vanco in the ER on admission.  Hemodynamically stable, low-grade fevers to about 100.5    Unclear etiology of bacteremia, workup unrevealing.  However patient will complete 7-day course of total antibiotics as of today.  Medically stable for discharge to [facility]    Plan:  Infectious disease consulted  2 g cefepime every 8h, day 7/7 total abx  Tentative plan for 7 days of therapy for this issue through 6/30

## 2025-06-30 NOTE — ASSESSMENT & PLAN NOTE
Initial source of presentation. This is an outpatient second episode of fall in the last year. Ongoing supportive measures per primary. Trauma evaluation as noted.  Patient has history of ambulatory dysfunction and falls. He uses a cane at home and has to grab hold of things near him to walk steadily. 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   During this fall, wife says the patient was getting out of bed and attempting to use the bathroom and fell. He denied any LOC, dizziness, tripping over anything, or urinary incontinence, but he felt too weak to get up on his own, prompting him to call for his wife.    Plan:  Discharge to [facility]

## 2025-06-30 NOTE — ASSESSMENT & PLAN NOTE
Monitor BMs, as patient is on opioids.      Plan:  MiraLAX, senna, MOM.  Dulcolax suppository if needed

## 2025-06-30 NOTE — PROGRESS NOTES
Progress Note - Infectious Disease   Name: Aquilino Dowell 64 y.o. male I MRN: [id number]  Unit/Bed#: S -01 I Date of Admission: 6/28/2025   Date of Service: 6/30/2025 I Hospital Day: 2    Assessment & Plan  Sepsis (HCC)  Patient initially presented for an episode of fall at home where he was found down.  He had complaints related to #3.  Early on admission he had some tachycardia and tachypnea for which blood cultures were sent.  1 out of 2 blood cultures positive as below.  Clinical parameters otherwise improved and now stable. Culture significance remains unclear.    Continue antibiotic as below  Continue to trend fever curve/vitals  Monitor exam for new/developing symptoms  Additional supportive care per primary  Polymicrobial bacteremia  1 out of 2 blood cultures from admission isolated coagulase-negative staph along with Klebsiella.  The former is likely contaminant.  The latter is difficult to classify as a contaminant but it may be given patient's frail skin and easy bleeding/bruising.  It may also be transient in the setting of his fall.  He otherwise has no other localizing complaints.  CT imaging of the abdomen and pelvis unremarkable.  Ortho evaluation appreciated and no plans for imaging of shoulder based on exam.  MRI L-spine obtained which was negative.  Continue on cefepime 2 g every 8 hours through today to complete 7 days total  Continue to trend fever curve/vitals  Fall  Initial source of presentation. This is an outpatient second episode of fall in the last year. Ongoing supportive measures per primary. Trauma evaluation as noted.    Left shoulder pain and low back pain  Patient has had chronic issues of both of the sites for at least the last 5 months.  Patient evaluated by orthopedics and low suspicion for infected joint.  Patient has mild discomfort on palpation of the very low lumbar back.  MRI L-spine obtained which was negative.  Orthopedic evaluation appreciated  Monitor exam  for new symptoms  Diabetes (HCC)  Lab Results   Component Value Date    HGBA1C 8.0 (H) 06/24/2025   Ongoing glucose management per primary team    Alcohol use disorder  Patient's second chart reviewed and in the past he seemed to have had a previous type B lactic acidosis potentially related to this issue.  Lactate normalized.  He has also had previously low cell counts which I suspect is from this issue.    Above management plan to complete antibiotic today discussed with Dr. Hanson. Discussed with the patient and wife at bedside. ID will sign off, please call with questions.    Antibiotics:  Cefepime  Antibiotics day 7    Subjective   The patient is feeling okay today, main complete is shoulder pain which he says everyone is ignoring. The pain has been present since before his fall and was made worse by the fall. He denies fever, chills, sweats, diarrhea.    Objective :  Temp:  [98 °F (36.7 °C)-98.6 °F (37 °C)] 98 °F (36.7 °C)  HR:  [78-99] 94  BP: (119-156)/(79-89) 119/79  Resp:  [15-16] 15  SpO2:  [93 %-98 %] 93 %    General:  Chronically ill appearing but no acute distress  Psychiatric:  Awake and alert  Cardiac: RRR  Pulmonary:  Normal respiratory excursion without accessory muscle use  Abdomen:  Soft, nontender  Extremities:  No edema  Skin:  No rashes      Lab Results: I have reviewed the following results:  Results from last 7 days   Lab Units 06/30/25  0536 06/29/25  0538 06/28/25  0526   WBC Thousand/uL 4.25* 4.00* 3.81*   HEMOGLOBIN g/dL 11.3* 11.1* 11.0*   PLATELETS Thousands/uL 103* 90* 92*     Results from last 7 days   Lab Units 06/30/25  0536 06/29/25  0538 06/28/25  0526 06/24/25  1043 06/24/25  1042   SODIUM mmol/L 137 135 132*   < >  --    POTASSIUM mmol/L 3.5 3.8 3.8   < >  --    CHLORIDE mmol/L 105 103 99   < >  --    CO2 mmol/L 26 26 27   < >  --    CO2, I-STAT mmol/L  --   --   --   --  20*   BUN mg/dL 5 8 6   < >  --    CREATININE mg/dL 0.76 0.88 0.65   < >  --    EGFR ml/min/1.73sq m 97 91  103   < >  --    GLUCOSE, ISTAT mg/dl  --   --   --   --  225*   CALCIUM mg/dL 7.9* 7.9* 7.9*   < >  --    AST U/L 88* 94* 84*   < >  --    ALT U/L 27 30 34   < >  --    ALK PHOS U/L 184* 195* 181*   < >  --    ALBUMIN g/dL 2.6* 2.5* 2.5*   < >  --     < > = values in this interval not displayed.     Results from last 7 days   Lab Units 06/24/25  1310   BLOOD CULTURE  No Growth After 5 Days.  Klebsiella aerogenes*  Staphylococcus hominis*   GRAM STAIN RESULT  Gram negative rods*     Results from last 7 days   Lab Units 06/24/25  1043   PROCALCITONIN ng/ml 0.43*

## 2025-06-30 NOTE — ASSESSMENT & PLAN NOTE
Recent Labs     06/28/25  0526 06/29/25  0538 06/30/25  0536   SODIUM 132* 135 137   Likely in setting of poor oral intake/hypovolemic hyponatremia.  Resolved.

## 2025-06-30 NOTE — PROGRESS NOTES
Patient:    MRN:  [id number]    Kris Request ID:  [id number]    Level of care reserved:  Inpatient Rehab Facility    Partner Reserved:  [facility] - ([place] [place] [address] [phone]    Clinical needs requested:    Geography searched:  20 miles around [id number]    Start of Service:    Request sent:  1:08pm EDT on 6/26/2025 by Qing Perez    Partner reserved:  9:24am EDT on 6/30/2025 by Jessica Arevalo    Choice list shared:  9:24am EDT on 6/30/2025 by Jessica Arevalo

## 2025-06-30 NOTE — DISCHARGE SUMMARY
Discharge Summary - Hospitalist   Name: Aquilino Dowell 64 y.o. male I MRN: [id number]  Unit/Bed#: S -01 I Date of Admission: 6/28/2025   Date of Service: 7/1/2025 I Hospital Day: 3     Assessment & Plan  Polymicrobial bacteremia  Sepsis (HCC)  Patient meeting sepsis criteria with tachypnea and tachycardia on presentation, blood culture BC ID with Klebsiella aerogenes, Staphylococcus Pan susceptible.  S/p cefepime and Vanco in the ER on admission.  Hemodynamically stable, low-grade fevers to about 100.5     Patient meeting sepsis criteria with tachypnea and tachycardia on presentation, blood culture BC ID with Klebsiella aerogenes, Staphylococcus Pan susceptible.  S/p cefepime and Vanco in the ER on admission.  Hemodynamically stable, low-grade fevers to about 100.5    Unclear etiology of bacteremia, workup unrevealing.    Patient is afebrile and feels well without signs of infection.     Plan:  Infectious disease consulted  Completed 7-day course of cefepime on 6/30/2025.  Also completed course of vancomycin  Discharge to [facility]    Fall  Initial source of presentation. This is an outpatient second episode of fall in the last year. Ongoing supportive measures per primary. Trauma evaluation as noted.  Patient has history of ambulatory dysfunction and falls. He uses a cane at home and has to grab hold of things near him to walk steadily. 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   During this fall, wife says the patient was getting out of bed and attempting to use the bathroom and fell. He denied any LOC, dizziness, tripping over anything, or urinary incontinence, but he felt too weak to get up on his own, prompting him to call for his wife.    Plan:  Discharge to [facility]  Left shoulder pain and low back pain  Chronic Back and left shoulder pain.  Left deltoid was tender to palpation suspecting musculoskeletal related on 6/24.   Left shoulder x-ray demonstrated  osteoarthritis of the glenohumeral joint.  Patient still complaining of pain that has been managed with Tylenol and Oxycodone.    Differential is large, includes arthritis flare vs rotator cuff arthropathy.    Plan:  Continue Robaxin 500 mg every 6 hours as needed  Was started on gabapentin 100 mg qhs on discharge  Stop Oxycodone - patient does not need oxycodone any longer  Coronary artery disease  Patient has a history of CAD (cath Oct 2013: 95% prox LAD, 40% mid LAD, EF 60%, MANUEL to prox LAD)    Plan:  Continue Prasugrel 10 mg daily and Aspirin 81 mg daily  Diabetes ([facility])  A1c: 6.6 11 months ago    Patient is on Metformin 1000mg daily.    Plan:  Resume metformin 1000 mg daily  Discontinue insulin  Manage diabetic neuropathy with Gabapentin 100 mg qhs  Alcohol use disorder   >>ASSESSMENT AND PLAN FOR ALCOHOL USE DISORDER WRITTEN ON 6/28/2025  8:26 AM BY ANITHA BRITTON MD    Patient's wife mentioned that patient drinks up to a bottle of wine a day. Patient endorses use of chewing tobacco.  Patient's lactic acidosis could be in the setting of thiamine deficiency vs alcohol abuse.  B12, B9 WNL    Plan:  Patient received IV thiamine 100 mg 3 times daily during hospital course.  To continue oral thiamine 100 mg tablet at home  Patient was followed by [facility] protocol while in hospital    Hyponatremia (Resolved: 7/1/2025)  Recent Labs     06/28/25  0526 06/29/25  0538 06/30/25  0536   SODIUM 132* 135 137   Likely in setting of poor oral intake/hypovolemic hyponatremia.  Resolved.    Plan:  Resolved  Hypertension  On metoprolol tartrate 50 twice daily.     Plan:  Continue PTA med  Sinusitis  Patient endorsing increased postnasal drip and cough, evidence of sinusitis on imaging. Patient has not complained of worsened symptoms during admission.    Plan:  Patient given Claritin and Flonase during hospital course with adequate symptom control  Hyperbilirubinemia  Recent Labs     06/29/25  0538 06/30/25  0536   AST 94* 88*   ALT 30  27   ALKPHOS 195* 184*   TBILI 1.92* 2.18*    Trending up. Patient not having abdominal pain.  Right upper quadrant ultrasound remarkable only for hepatic steatosis. Hemolysis panel remarkable for elevated reticulocytes, no schistosytes, normal  haptoglobin. INR WNL. Consider cirrhosis, alcohol abuse    Plan:  Possibly due to cirrhosis or steatosis  Outpatient GI follow up with hepatology  Constipation  Monitor BMs, as patient is on opioids.      Plan:  MiraLAX, senna, MOM.  Dulcolax suppository if needed  Vitamin D deficiency  Vit D down at 12.8 on 6/27/29    Plan:  50k units weekly for 6 weeks.      Medical Problems       Resolved Problems  Date Reviewed: 6/28/2025          Resolved    Hyponatremia 7/1/2025     Resolved by  Jagruti Munguia        Discharging Physician / Practitioner: Jagruti Munguia  PCP: Yael Matute MD  Admission Date:   Admission Orders (From admission, onward)       Ordered        06/28/25 0101  INPATIENT ADMISSION  Once                          Discharge Date: 07/01/25    Next Steps for Physician/AP Assuming Care:  [facility] placement  Follow up with GI outpatient for hepatology  Continue Vitamin D and B1  Continue home medications  Continue Tylenol gabapentin and Robaxin for pain management    Test Results Pending at Discharge (will require follow up):  None    Medication Changes for Discharge & Rationale:   See after visit summary for reconciled discharge medications provided to patient and/or family.     Consultations During Hospital Stay:  Infectious Disease     Procedures Performed:   None    Significant Findings / Test Results:   Hemolysis panel (elevated reticulocytes) and elevated bilirubin and alk phos concerning for liver disease  Elevated lactic acid resolved with administration of thiamine  INR WNL    Incidental Findings:   RUQ US showed hepatomegaly with steatosis - to follow-up with GI  4 mm right upper lobe nodule seen on CT chest based on current Fleischner Society 2017  guidelines incidental pulmonary nodule with no routine follow-up for low risk.  But since patient is high risk due to past history of smoking follow-up chest CT at 12 months.  I reviewed the above mentioned incidental findings with the patient and/or family and they expressed understanding.    Hospital Course:   Aquilino Dowell is a 64-year-old male with history of diabetes and diabetic neuropathy, tobacco and alcohol use disorder, mild cognitive impairment, lumbar radiculopathy, left shoulder pain, CAD, and hypertension, who initially presented after a fall at home. He met sepsis criteria during admission with tachypnea and tachycardia and was initially admitted under trauma and underwent CT imaging of the chest abdomen and pelvis without clear source of infection. Urinalysis also unremarkable. Blood cultures obtained on admission eventually returned positive in 1 out of 2 cultures with Klebsiella aerogenes and coag negative staph. Infectious disease was consulted and patient was started on IV antibiotics cefepime and vancomycin.  Overall infectious workup was negative including right upper quadrant ultrasound, patient received lumbar MRI to rule out discitis which was negative.  Patient complained of chronic pains throughout admission, including left shoulder and lower back.  Orthopedics was consulted and patient to follow-up with them as an outpatient.  Patient continues to improve each day. Feels less pain in the shoulder and is trying to limit oral opioids as able.  Patient initially presenting with very high lactic acidosis on admission as well, this cleared with the administration of IV thiamine and patient's outpatient metformin was held.  Blood glucose control was achieved with insulin which was discontinued prior to discharge. Was suspecting lactic acidosis was more in setting of alcohol use disorder/metformin/thiamine deficiency rather than severe sepsis.  Patient completed 7-day course of IV  antibiotics during this admission on 06/30/2025, was deemed as maximum intensity needs, discharged to [facility].    Please see above list of diagnoses and related plan for additional information.     Discharge Day Visit / Exam:   Subjective:  Patient felt well this morning and said he felt stronger than on admission.  He reported 1 episode of loose stools. He denied fever, chills, nausea, vomiting, abdominal pain, dizziness or lightheadedness. He said that his left shoulder pain is resolved with the pain medication but he continues to have restricted range of motion with limited overhead abduction of his left shoulder.  He did not take his evening dose of senna yesterday because he had a bowel movement in the morning.    Vitals: Blood Pressure: 151/88 (07/01/25 0732)  Pulse: 80 (07/01/25 0732)  Temperature: 97.5 °F (36.4 °C) (07/01/25 0732)  Temp Source: Oral (06/30/25 0630)  Respirations: 21 (07/01/25 0732)  Height: 6' (182.9 cm) (06/28/25 0145)  Weight - Scale: 87.5 kg (192 lb 14.4 oz) (06/28/25 0145)  SpO2: 94 % (07/01/25 0732)  Physical Exam  Vitals and nursing note reviewed.   Constitutional:       General: He is not in acute distress.     Appearance: Normal appearance. He is well-developed.   HENT:      Head: Normocephalic and atraumatic.     Eyes:      Conjunctiva/sclera: Conjunctivae normal.       Cardiovascular:      Rate and Rhythm: Normal rate and regular rhythm.      Heart sounds: No murmur heard.  Pulmonary:      Effort: Pulmonary effort is normal. No respiratory distress.      Breath sounds: Normal breath sounds.   Abdominal:      Palpations: Abdomen is soft.      Tenderness: There is no abdominal tenderness.     Musculoskeletal:         General: No swelling.      Cervical back: Neck supple.     Skin:     General: Skin is warm and dry.      Capillary Refill: Capillary refill takes less than 2 seconds.     Neurological:      Mental Status: He is alert and oriented to person, place, and time.      Cranial  Nerves: No cranial nerve deficit.      Motor: No weakness.     Psychiatric:         Mood and Affect: Mood normal.          Discussion with Family: Updated  (wife) at bedside.    Discharge instructions/Information to patient and family:   See after visit summary for information provided to patient and family.      Provisions for Follow-Up Care:  See after visit summary for information related to follow-up care and any pertinent home health orders.      Mobility at time of Discharge:   Basic Mobility Inpatient Raw Score: 18  JH-HLM Goal: 6: Walk 10 steps or more  JH-HLM Achieved: 3: Sit at edge of bed  HLM Goal achieved. Continue to encourage appropriate mobility.     Disposition:   Acute Rehab at [facility]    Planned Readmission:     Administrative Statements   Discharge Statement:  I have spent a total time of 20 minutes in caring for this patient on the day of the visit/encounter. .    **Please Note: This note may have been constructed using a voice recognition system**

## 2025-06-30 NOTE — PLAN OF CARE
Problem: OCCUPATIONAL THERAPY ADULT  Goal: Performs self-care activities at highest level of function for planned discharge setting.  See evaluation for individualized goals.  Description: Treatment Interventions: ADL retraining, Functional transfer training, Endurance training, Patient/family training, Equipment evaluation/education, Compensatory technique education, Continued evaluation, Energy conservation, Activityengagement          See flowsheet documentation for full assessment, interventions and recommendations.   Note:       Assessment: Patient participated in Skilled OT session this date with interventions consisting of ADL re training with the use of correct body mechnaics, Energy Conservation techniques, safety awareness and fall prevention techniques,  therapeutic activities to: increase activity tolerance, increase dynamic sit/ stand balance during functional activity , and increase OOB/ sitting tolerance .Upon arrival patient was found supine in bed. Pt demonstrated the following tasks: S to roll, sup to sit transfer, STS, and fnxl mobility with RW. Pt performed grooming tasks with S standing at sink, S for toileting, and S for LBD. Patient continues to be functioning below baseline level, occupational performance remains limited secondary to factors listed above and increased risk for falls and injury.   From OT standpoint, recommendation at time of d/c would be STR vs home with skilled therapy -pending progress.  Patient to benefit from continued Occupational Therapy treatment while in the hospital to address deficits as defined above and maximize level of functional independence with ADLs and functional mobility. Pt was left after session with alarm on and all current needs met. The patient's raw score on the -PAC Daily Activity Inpatient Short Form is 19. A raw score of greater than or equal to 19 suggests the patient may benefit from discharge to home. Please refer to the recommendation of  the Occupational Therapist for safe discharge planning.     Rehab Resource Intensity Level, OT: I (Maximum Resource Intensity) (vs level III - pending progress)

## 2025-06-30 NOTE — ASSESSMENT & PLAN NOTE
A1c: 6.6 11 months ago    Patient is on Metformin 1000mg daily.    Plan:  Resume metformin 1000 mg daily  Discontinue insulin  Manage diabetic neuropathy with Gabapentin 100 mg qhs

## 2025-06-30 NOTE — ASSESSMENT & PLAN NOTE
Patient endorsing increased postnasal drip and cough, evidence of sinusitis on imaging. Patient has not complained of worsened symptoms during admission.    Plan:  Patient given Claritin and Flonase during hospital course with adequate symptom control

## 2025-06-30 NOTE — ASSESSMENT & PLAN NOTE
Recent Labs     06/29/25  0538 06/30/25  0536   AST 94* 88*   ALT 30 27   ALKPHOS 195* 184*   TBILI 1.92* 2.18*    Trending up. Patient not having abdominal pain.  Right upper quadrant ultrasound remarkable only for hepatic steatosis. Hemolysis panel remarkable for elevated reticulocytes, no schistosytes, normal  haptoglobin. INR WNL. Consider cirrhosis, alcohol abuse    Plan:  Possibly due to cirrhosis or steatosis  Outpatient GI follow up with hepatology

## 2025-06-30 NOTE — OCCUPATIONAL THERAPY NOTE
Occupational Therapy Progress Note     Patient Name: Aquilino Dowell  Today's Date: 6/30/2025  Problem List  Principal Problem:    Polymicrobial bacteremia  Active Problems:    Fall    Left shoulder pain and low back pain    Coronary artery disease    Diabetes (HCC)    Hyponatremia    Alcohol use disorder    Hypertension    Sinusitis    Sepsis (HCC)    Back pain    Hyperbilirubinemia    Constipation    Vitamin D deficiency        06/30/25 1032   OT Last Visit   OT Visit Date 06/30/25   Note Type   Note Type Treatment for insurance authorization   new OT orders noted as of 6/28, acknowledged. Pt already on OT caseload as of 6/25.   Pain Assessment   Pain Assessment Tool 0-10   Pain Score 2   Pain Location/Orientation Orientation: Left;Location: Shoulder   Hospital Pain Intervention(s) Repositioned;Ambulation/increased activity   Restrictions/Precautions   Weight Bearing Precautions Per Order No   Other Precautions Chair Alarm;Bed Alarm;Multiple lines;Fall Risk;Pain   Lifestyle   Autonomy Pt lives w/ spouse. Light (A) with ADLs, (A) with IADLs and CHANELLE for fnxl mobility w/ intermittent use of AD. (-) driving and (+) falls   Reciprocal Relationships Supportive spouse and family   Service to Others Retired   Intrinsic Gratification An upcoming family trip to [place]   ADL   Grooming Assistance 5  Supervision/Setup   Grooming Deficit Brushing hair;Teeth care  (performed standing at sink with RW)   Grooming Comments S to wash hair with shampoo cap at EOB   LB Dressing Assistance 5  Supervision/Setup   LB Dressing Deficit Don/doff R sock;Don/doff L sock   Toileting Assistance  5  Supervision/Setup   Toileting Comments urinated standing at toilet with RW for support PRN   Bed Mobility   Rolling R 5  Supervision   Additional items Bedrails   Supine to Sit 5  Supervision   Additional items Bedrails;HOB elevated;Increased time required   Sit to Supine Unable to assess   Transfers   Sit to Stand 5  Supervision  "  Additional items Increased time required   Stand to Sit 5  Supervision   Additional items Increased time required   Additional Comments transfers with RW   Functional Mobility   Functional Mobility 5  Supervision   Additional items Rolling walker   Subjective   Subjective \"I'll do whatever I need to do\"   Cognition   Overall Cognitive Status WFL   Arousal/Participation Responsive;Cooperative   Attention Attends with cues to redirect   Orientation Level Oriented X4   Memory Within functional limits   Following Commands Follows one step commands without difficulty   Comments Pt cooperative t/o session   Activity Tolerance   Activity Tolerance Patient tolerated treatment well   Medical Staff Made Aware RN clearance for session   Assessment   Assessment Patient participated in Skilled OT session this date with interventions consisting of ADL re training with the use of correct body mechnaics, Energy Conservation techniques, safety awareness and fall prevention techniques,  therapeutic activities to: increase activity tolerance, increase dynamic sit/ stand balance during functional activity , and increase OOB/ sitting tolerance .Upon arrival patient was found supine in bed. Pt demonstrated the following tasks: S to roll, sup to sit transfer, STS, and fnxl mobility with RW. Pt performed grooming tasks with S standing at sink, S for toileting, and S for LBD. Patient continues to be functioning below baseline level, occupational performance remains limited secondary to factors listed above and increased risk for falls and injury.   From OT standpoint, recommendation at time of d/c would be STR vs home with skilled therapy -pending progress.  Patient to benefit from continued Occupational Therapy treatment while in the hospital to address deficits as defined above and maximize level of functional independence with ADLs and functional mobility. Pt was left after session with alarm on and all current needs met. The patient's " raw score on the AM-PAC Daily Activity Inpatient Short Form is 19. A raw score of greater than or equal to 19 suggests the patient may benefit from discharge to home. Please refer to the recommendation of the Occupational Therapist for safe discharge planning.   Plan   Treatment Interventions ADL retraining;Functional transfer training;Endurance training;Patient/family training;Equipment evaluation/education;Compensatory technique education;Continued evaluation;Energy conservation;Activityengagement   Goal Expiration Date 07/05/25   OT Treatment Day 1   OT Frequency 3-5x/wk   Discharge Recommendation   Rehab Resource Intensity Level, OT I (Maximum Resource Intensity)  (vs level III - pending progress)   AM-PAC Daily Activity Inpatient   Lower Body Dressing 3   Bathing 3   Toileting 3   Upper Body Dressing 3   Grooming 3   Eating 4   Daily Activity Raw Score 19   Daily Activity Standardized Score (Calc for Raw Score >=11) 40.22   AM-PAC Applied Cognition Inpatient   Following a Speech/Presentation 4   Understanding Ordinary Conversation 4   Taking Medications 3   Remembering Where Things Are Placed or Put Away 4   Remembering List of 4-5 Errands 3   Taking Care of Complicated Tasks 3   Applied Cognition Raw Score 21   Applied Cognition Standardized Score 44.3     Christine Jimenez MS, OTR/L

## 2025-06-30 NOTE — ASSESSMENT & PLAN NOTE
1 out of 2 blood cultures from admission isolated coagulase-negative staph along with Klebsiella.  The former is likely contaminant.  The latter is difficult to classify as a contaminant but it may be given patient's frail skin and easy bleeding/bruising.  It may also be transient in the setting of his fall.  He otherwise has no other localizing complaints.  CT imaging of the abdomen and pelvis unremarkable.  Ortho evaluation appreciated and no plans for imaging of shoulder based on exam.  MRI L-spine obtained which was negative.  Continue on cefepime 2 g every 8 hours through today to complete 7 days total  Continue to trend fever curve/vitals

## 2025-06-30 NOTE — ASSESSMENT & PLAN NOTE
Recent Labs     06/28/25  0526 06/29/25  0538 06/30/25  0536   SODIUM 132* 135 137   Likely in setting of poor oral intake/hypovolemic hyponatremia.  Resolved.    Plan:  Resolved

## 2025-06-30 NOTE — ASSESSMENT & PLAN NOTE
Patient meeting sepsis criteria with tachypnea and tachycardia on presentation, blood culture BC ID with Klebsiella aerogenes, Staphylococcus Pan susceptible.  S/p cefepime and Vanco in the ER on admission.  Hemodynamically stable, low-grade fevers to about 100.5    Unclear etiology of bacteremia, workup unrevealing.  However patient will complete 7-day course of total antibiotics as of today.  Medically stable for discharge to [facility]    Plan:  Infectious disease consulted  2 g cefepime every 8h, day 7/7 total abx  Tentative plan for 7 days of therapy for this issue through 6/30

## 2025-06-30 NOTE — DISCHARGE SUPPORT
Case Management Assessment & Discharge Planning Note    Patient name Aquilino Dowell  Location S /S -01 MRN [id number]  : 1960 Date 2025       Current Admission Date: 2025  Current Admission Diagnosis:Polymicrobial bacteremia   Patient Active Problem List    Diagnosis Date Noted    Vitamin D deficiency 2025    Constipation 2025    Back pain 2025    Hyperbilirubinemia 2025    Sepsis (HCC) 2025    Polymicrobial bacteremia 2025    Fall 2025    Left shoulder pain and low back pain 2025    Coronary artery disease 2025    Diabetes (HCC) 2025    Hyponatremia 2025    Alcohol use disorder 2025    Hypertension 2025    Sinusitis 2025      LOS (days): 2  Geometric Mean LOS (GMLOS) (days):   Days to GMLOS:   Facility Authorization Approved  [facility] received approved auth for: Acute Rehab  Insurance: [facility]  Name/Phone # of Rep who called in determination: Imelda  Facility Name: [facility]  Approved Authorization Number: [id number]  Start of Care Date: 25  Next Review Date: 25  Submit Next Review to: [id number]   notified: bobby nix     Updates to authorization status will be noted in chart.    Please reach out to CM for updates on any clinical information.  
     Case Management Assessment & Discharge Planning Note    Patient name Aquilino Dowell  Location S /S -01 MRN [id number]  : 1960 Date 2025       Current Admission Date: 2025  Current Admission Diagnosis:Polymicrobial bacteremia   Patient Active Problem List    Diagnosis Date Noted    Vitamin D deficiency 2025    Constipation 2025    Back pain 2025    Hyperbilirubinemia 2025    Sepsis (HCC) 2025    Polymicrobial bacteremia 2025    Fall 2025    Left shoulder pain and low back pain 2025    Coronary artery disease 2025    Diabetes (HCC) 2025    Hyponatremia 2025    Alcohol use disorder 2025    Hypertension 2025    Sinusitis 2025      LOS (days): 2  Geometric Mean LOS (GMLOS) (days):   Days to GMLOS:   Facility Authorization Initiated  [facility] received request for auth from : Jessica CREWS  Authorization Request Submitted for: Acute Rehab  Requested Start of Care Date: 25  Facility Name: [facility]  Facility NPI: [id number]  Facility MD: Ashley Depadua  [facility] MD NPI: [id number]  Authorization initiated by contacting insurance: [facility]  Via: [employer]  Clinicals submitted via: Portal Attachment  Pending reference #: [id number]   notified: Jessica crews     Updates to authorization status will be noted in chart.    Please reach out to CM for updates on any clinical information.  
No

## 2025-06-30 NOTE — PHYSICAL THERAPY NOTE
PHYSICAL THERAPY NOTE          Patient Name: Aquilino Dowell  Today's Date: 25 0830   PT Last Visit   PT Visit Date 25   Note Type   Note Type Treatment   Pain Assessment   Pain Assessment Tool 0-10   Pain Score 8   Pain Location/Orientation Orientation: Left;Location: Shoulder   Pain Onset/Description Onset: Ongoing   Effect of Pain on Daily Activities limits ambulation distance, comfort and activity tolerance   Patient's Stated Pain Goal No pain   Hospital Pain Intervention(s) Repositioned;Ambulation/increased activity;Rest   Multiple Pain Sites No   Restrictions/Precautions   Weight Bearing Precautions Per Order No   Other Precautions Chair Alarm;Bed Alarm;Visual impairment;Fall Risk;Pain   General   Chart Reviewed Yes   Response to Previous Treatment Patient with no complaints from previous session.   Family/Caregiver Present No   Cognition   Overall Cognitive Status Unable to assess   Arousal/Participation Alert;Responsive;Cooperative   Attention Within functional limits   Orientation Level Oriented X4   Memory Within functional limits   Following Commands Follows one step commands with increased time or repetition   Comments pt ID by name and  on hospital wrist band   Subjective   Subjective pt reports 8 /10 L shoulder pain   Bed Mobility   Rolling R 5  Supervision   Additional items Assist x 1;HOB elevated;Bedrails;Increased time required;Verbal cues   Rolling L 5  Supervision   Additional items Assist x 1;HOB elevated;Bedrails;Increased time required;Verbal cues   Supine to Sit 5  Supervision   Additional items Assist x 1;HOB elevated;Bedrails;Increased time required;Verbal cues   Sit to Supine 5  Supervision   Additional items Assist x 1;HOB elevated;Bedrails;Increased time required;Verbal cues   Additional Comments pt was able to demonstrate the ability to roll and reposition in the bed  with / /s and completing a supine<>sit EOB transfer with use of bed rail with /s.   Transfers   Sit to Stand 5  Supervision   Additional items Assist x 1;Increased time required;Verbal cues   Stand to Sit 5  Supervision   Additional items Assist x 1;Increased time required;Verbal cues   Stand pivot Unable to assess   Additional Comments pt completed STS w/ and w/o and AD with close /s, no LOB   Ambulation/Elevation   Gait pattern Decreased foot clearance;Narrow TAMEKA;Improper Weight shift;Decreased hip extension;Short stride   Gait Assistance 4  Minimal assist   Additional items Assist x 1;Verbal cues   Assistive Device Rolling walker   Distance 55'x2 RW   Stair Management Assistance 4  Minimal assist   Additional items Assist x 1;Verbal cues;Increased time required   Stair Management Technique Two rails;Step to pattern;Foreward;Backward   Number of Stairs 2   Ambulation/Elevation Additional Comments pt limited with ambulation distance and steps completed due to fatigue as pt required seated therapeutic rest breaks in todays tx session   Balance   Static Sitting Fair -   Dynamic Sitting Fair -   Static Standing Fair -   Dynamic Standing Fair -   Ambulatory Poor +  (2 slight LOB with RW ambulation)   Endurance Deficit   Endurance Deficit Yes   Endurance Deficit Description limited activity tolerance, ambulation distanec and steps completed in todays tx session   Activity Tolerance   Activity Tolerance Patient limited by fatigue   Nurse Made Aware Spoke top RN Elo   Exercises   Knee AROM Long Arc Quad Sitting;10 reps;AROM;Bilateral   Ankle Pumps Sitting;15 reps;AROM;Bilateral   Assessment   Prognosis Fair   Problem List Decreased strength;Decreased endurance;Impaired balance;Decreased mobility;Obesity;Decreased skin integrity;Pain   Assessment pt began tx session lying supine in the bed as pt was agreeable to participate in APT intervention. In comparison to previous tx sessions pt continues to demonstrate the ability  to complete all bed mobility and functional transfers with and w/o an AD with close /s. pt did utilized bed rail to complete a supine<>sit EOB transfer and VC's for proper hand placement while completing functional transfers from seated EOB. pt slightly increased his ambulation duistance to 55'x2 RW but required min Ax1 due to 2 slight LOB during ambulation trial. pt participate in soem TE activities while seated EOB w/O LOB for 4 minutes. pt was educated with verbal/visual demonstration on all safe stair trial strategies prior to inititing steps. pt completed 2 steps in todays tx session with bilateral hand rails and min Ax1 for safety and balance. Additional was not possible due to fatigue. Post tx pt in the bed with call bell, chair alarm activated and all pt needs met. pt cotninues to be limited with activity tolerance, ambulation distance and steps complete din todays tx session as pt would benefit from continued skilled pT intervention in order to address deficits listed above. Continue to recommend DC w/ level 1 maximal rehab resource intensity when medically cleared   Goals   Patient Goals to get back to bed   STG Expiration Date 07/05/25   PT Treatment Day 2   Plan   Treatment/Interventions Functional transfer training;LE strengthening/ROM;Therapeutic exercise;Elevations;Endurance training;Patient/family training;Equipment eval/education;Bed mobility;Gait training;Spoke to nursing   Progress Slow progress, decreased activity tolerance   PT Frequency 3-5x/wk   Discharge Recommendation   Rehab Resource Intensity Level, PT I (Maximum Resource Intensity)   Equipment Recommended Walker   Walker Package Recommended Wheeled walker   Change/add to Walker Package? No   AM-PAC Basic Mobility Inpatient   Turning in Flat Bed Without Bedrails 3   Lying on Back to Sitting on Edge of Flat Bed Without Bedrails 3   Moving Bed to Chair 3   Standing Up From Chair Using Arms 3   Walk in Room 3   Climb 3-5 Stairs With Railing 3    Basic Mobility Inpatient Raw Score 18   Basic Mobility Standardized Score 41.05   [facility] Highest Level Of Mobility   -HLM Goal 6: Walk 10 steps or more   -HLM Achieved 7: Walk 25 feet or more   Education   Education Provided Mobility training;Assistive device;Other  (TE activities and stair trials)   Patient Reinforcement needed   End of Consult   Patient Position at End of Consult Supine;Bed/Chair alarm activated;All needs within reach   The patient's AM-PAC Basic Mobility Inpatient Short Form Raw Score is 18. A Raw score of greater than 16 suggests the patient may benefit from discharge to home. Please also refer to the recommendation of the Physical Therapist for safe discharge planning.    Pt continues to limited with activity tolerance, ambulation distance, steps completed in todays tx session.  pt would benefit from continued skilled PT interventions in order to address pt deficits listed above  Mustapha Purdy

## 2025-06-30 NOTE — ASSESSMENT & PLAN NOTE
Lab Results   Component Value Date    HGBA1C 8.0 (H) 06/24/2025   Ongoing glucose management per primary team

## 2025-06-30 NOTE — ASSESSMENT & PLAN NOTE
Chronic Back and left shoulder pain.  Left deltoid was tender to palpation suspecting musculoskeletal related on 6/24.   Left shoulder x-ray demonstrated osteoarthritis of the glenohumeral joint.  Patient still complaining of pain that has been managed with Tylenol and Oxycodone.    Differential is large, includes arthritis flare vs rotator cuff arthropathy.    Plan:  Continue Robaxin 500 mg every 6 hours as needed  Was started on gabapentin 100 mg qhs on discharge  Stop Oxycodone - patient does not need oxycodone any longer

## 2025-06-30 NOTE — PROGRESS NOTES
Progress Note - Hospitalist   Name: Aquilino Dowell 64 y.o. male I MRN: [id number]  Unit/Bed#: S -01 I Date of Admission: 6/28/2025   Date of Service: 6/30/2025 I Hospital Day: 2    Assessment & Plan  Polymicrobial bacteremia  Sepsis (HCC)  Patient meeting sepsis criteria with tachypnea and tachycardia on presentation, blood culture BC ID with Klebsiella aerogenes, Staphylococcus Pan susceptible.  S/p cefepime and Vanco in the ER on admission.  Hemodynamically stable, low-grade fevers to about 100.5    Unclear etiology of bacteremia, workup unrevealing.  However patient will complete 7-day course of total antibiotics as of today.  Medically stable for discharge to [facility]    Plan:  Infectious disease consulted  2 g cefepime every 8h, day 7/7 total abx  Tentative plan for 7 days of therapy for this issue through 6/30   Fall  Initial source of presentation. This is an outpatient second episode of fall in the last year. Ongoing supportive measures per primary. Trauma evaluation as noted.  Patient has history of ambulatory dysfunction and falls. He uses a cane at home and has to grab hold of things near him to walk steadily. 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   During this fall, wife says the patient was getting out of bed and attempting to use the bathroom and fell. He denied any LOC, dizziness, tripping over anything, or urinary incontinence, but he felt too weak to get up on his own, prompting him to call for his wife.    Plan:  PT/OT recommending level 1, patient wants rehab, anticipate DC to [facility]  Left shoulder pain and low back pain  Chronic Back and left shoulder pain.  Left deltoid was tender to palpation suspecting musculoskeletal related on 6/24.   Left shoulder x-ray demonstrated osteoarthritis of the glenohumeral joint.  Patient still complaining of pain    Differential is large, includes arthritis flare vs rotator cuff arthropathy     Plan:  Administer  Robaxin 500 mg every 6 hours as needed  Gabapentin 100 mg qhs  Oxycodone 5 and 10 for moderate to severe pain  Dilaudid for breakthrough pain  Orthopedic surgery consultation  Will provide referral on DC  Coronary artery disease  Patient has a history of CAD (cath Oct 2013: 95% prox LAD, 40% mid LAD, EF 60%, MANUEL to prox LAD)    Plan:  Continue Prasugrel 10 mg daily and Aspirin 81 mg daily  Diabetes (HCC)  A1c: 6.6 11 months ago    Patient is on Metformin 1000mg daily.    Plan:  Hold metformin  Sliding scale insulin  Lantus 6U qhs  Manage diabetic neuropathy with Gabapentin 100 mg qhs  Hyponatremia  Recent Labs     06/28/25  0526 06/29/25  0538 06/30/25  0536   SODIUM 132* 135 137   Likely in setting of poor oral intake/hypovolemic hyponatremia.  Resolved.  Alcohol use disorder   >>ASSESSMENT AND PLAN FOR ALCOHOL USE DISORDER WRITTEN ON 6/28/2025  8:26 AM BY ANITHA BRITTON MD    Patient's wife mentioned that patient drinks up to a bottle of wine a day. Patient endorses use of chewing tobacco.  Patient's lactic acidosis could be in the setting of thiamine deficiency vs alcohol abuse.  B12, B9 WNL    Plan:  Start IV thiamine 100 mg TID  CIWA protocol  Pending results for  Thiamine  NRT ordered  Hypertension  On metoprolol tartrate 50 twice daily. Continue PTA med  Sinusitis  Patient endorsing increased postnasal drip and cough, evidence of sinusitis on imaging.  Treat with antibiotics for acute infectious sinusitis.    Plan:  Claritin and Flonase  ENT referral on d/c   Will monitor symptoms  Hyperbilirubinemia  Recent Labs     06/28/25  0526 06/29/25  0538 06/30/25  0536   AST 84* 94* 88*   ALT 34 30 27   ALKPHOS 181* 195* 184*   TBILI 2.52* 1.92* 2.18*    Trending up. Patient not having abdominal pain.  Right upper quadrant ultrasound remarkable only for hepatic steatosis. Hemolysis panel remarkable for elevated reticulocytes, no schistosytes, normal  haptoglobin. INR WNL. Consider cirrhosis, alcohol  abuse.    Plan:  Monitor a.m. CMP  Constipation  Monitor BMs, as patient is on opioids.  MiraLAX, senna, MOM.  Dulcolax suppository if needed  Vitamin D deficiency  Vit D down at 12.8 on 6/27/29    Plan:  50k units weekly for 6 weeks.     VTE Pharmacologic Prophylaxis:   Moderate Risk (Score 3-4) - Pharmacological DVT Prophylaxis Ordered: enoxaparin (Lovenox).    Mobility:   Basic Mobility Inpatient Raw Score: 18  JH-HLM Goal: 6: Walk 10 steps or more  JH-HLM Achieved: 7: Walk 25 feet or more  JH-HLM Goal achieved. Continue to encourage appropriate mobility.    Patient Centered Rounds: I performed bedside rounds with nursing staff today.   Discussions with Specialists or Other Care Team Provider: None    Education and Discussions with Family / Patient: Patient declined call to .     Current Length of Stay: 2 day(s)  Current Patient Status: Inpatient   Certification Statement: The patient will continue to require additional inpatient hospital stay due to bacteremia on IV antibiotics  Discharge Plan: Anticipate discharge later today or tomorrow to rehab facility.  [facility]    Code Status: Level 1 - Full Code    Subjective   Patient seen at bedside today. No Acute events overnight. Patient is continuing to complain of left shoulder pain and hip pain. Otherwise, doesn't have any other acute concerns or complaints at this time.  Patient is having bowel movements.  No other complaints    Objective :  Temp:  [98 °F (36.7 °C)-98.6 °F (37 °C)] 98 °F (36.7 °C)  HR:  [78-99] 94  BP: (119-156)/(79-89) 119/79  Resp:  [15-16] 15  SpO2:  [93 %-98 %] 93 %    Body mass index is 26.16 kg/m².     Input and Output Summary (last 24 hours):     Intake/Output Summary (Last 24 hours) at 6/30/2025 1107  Last data filed at 6/30/2025 0928  Gross per 24 hour   Intake 240 ml   Output 1500 ml   Net -1260 ml       Physical Exam  Constitutional:       General: He is not in acute distress.     Appearance: Normal appearance. He is obese.    HENT:      Mouth/Throat:      Comments: Poor dentition    Cardiovascular:      Rate and Rhythm: Normal rate and regular rhythm.      Heart sounds: Normal heart sounds.   Abdominal:      General: There is distension.      Tenderness: There is no abdominal tenderness.      Hernia: No hernia is present.     Musculoskeletal:         General: Tenderness present.      Comments: L shoulder     Neurological:      Mental Status: He is alert. Mental status is at baseline.             Lab Results: I have reviewed the following results:   Results from last 7 days   Lab Units 06/30/25  0536 06/28/25  0526 06/27/25  0542   WBC Thousand/uL 4.25*   < > 3.41*   HEMOGLOBIN g/dL 11.3*   < > 11.8*   HEMATOCRIT % 34.7*   < > 35.4*   PLATELETS Thousands/uL 103*   < > 90*   SEGS PCT %  --   --  55   LYMPHO PCT %  --   --  30   MONO PCT %  --   --  12   EOS PCT %  --   --  2    < > = values in this interval not displayed.     Results from last 7 days   Lab Units 06/30/25  0536   SODIUM mmol/L 137   POTASSIUM mmol/L 3.5   CHLORIDE mmol/L 105   CO2 mmol/L 26   BUN mg/dL 5   CREATININE mg/dL 0.76   ANION GAP mmol/L 6   CALCIUM mg/dL 7.9*   ALBUMIN g/dL 2.6*   TOTAL BILIRUBIN mg/dL 2.18*   ALK PHOS U/L 184*   ALT U/L 27   AST U/L 88*   GLUCOSE RANDOM mg/dL 160*     Results from last 7 days   Lab Units 06/27/25  0930   INR  1.01     Results from last 7 days   Lab Units 06/30/25  1031 06/30/25  0707 06/29/25  2123 06/29/25  1628 06/29/25  1135 06/29/25  0650 06/28/25  2106 06/28/25  1633 06/28/25  1054 06/28/25  0700 06/27/25  2109 06/27/25  1616   POC GLUCOSE mg/dl 187* 148* 165* 152* 212* 162* 171* 185* 160* 148* 161* 152*     Results from last 7 days   Lab Units 06/24/25  1653 06/24/25  1043   HEMOGLOBIN A1C % 8.0* 8.0*     Results from last 7 days   Lab Units 06/27/25  0900 06/26/25  0627 06/26/25  0403 06/25/25  2136 06/24/25  1120 06/24/25  1043   LACTIC ACID mmol/L 1.3 2.3* 2.6* 5.8*   < >  --    PROCALCITONIN ng/ml  --   --   --   --    --  0.43*    < > = values in this interval not displayed.       Recent Cultures (last 7 days):   Results from last 7 days   Lab Units 06/24/25  1310   BLOOD CULTURE  No Growth After 5 Days.  Klebsiella aerogenes*  Staphylococcus hominis*   GRAM STAIN RESULT  Gram negative rods*         CXR, CT cervical spine, CT head, XR of L shoulder, CT CAP showed no acute changes and no traumatic injury findings. MRI with Small focal areas of enhancement in the L1-L2 and L2-L3 supraspinous ligament regions, suspicious for enthesitis.       Last 24 Hours Medication List:     Current Facility-Administered Medications:     acetaminophen (TYLENOL) tablet 650 mg, Q4H PRN    aspirin chewable tablet 81 mg, Daily    atorvastatin (LIPITOR) tablet 20 mg, Daily    benzonatate (TESSALON PERLES) capsule 200 mg, TID PRN    bisacodyl (DULCOLAX) rectal suppository 10 mg, Daily PRN    ceFEPime (MAXIPIME) 2,000 mg in dextrose 5 % 50 mL IVPB, Q8H, Last Rate: 2,000 mg (06/30/25 0928)    cyanocobalamin (VITAMIN B-12) tablet 1,000 mcg, Daily    enoxaparin (LOVENOX) subcutaneous injection 40 mg, Daily    ergocalciferol (VITAMIN D2) capsule 50,000 Units, Weekly    fluticasone (FLONASE) 50 mcg/act nasal spray 2 spray, BID    folic acid (FOLVITE) tablet 1 mg, Daily    gabapentin (NEURONTIN) capsule 100 mg, HS    HYDROmorphone (DILAUDID) injection 0.5 mg, Q3H PRN    insulin glargine (LANTUS) subcutaneous injection 6 Units 0.06 mL, HS    insulin lispro (HumALOG/ADMELOG) 100 units/mL subcutaneous injection 1-6 Units, TID AC **AND** Fingerstick Glucose (POCT), TID AC    loratadine (CLARITIN) tablet 10 mg, Daily    methocarbamol (ROBAXIN) tablet 500 mg, Q6H PRN    metoprolol tartrate (LOPRESSOR) tablet 50 mg, Q12H TERRENCE    nicotine (NICODERM CQ) 7 mg/24hr TD 24 hr patch 7 mg, Daily PRN    oxyCODONE (ROXICODONE) IR tablet 5 mg, Q4H PRN **OR** oxyCODONE (ROXICODONE) immediate release tablet 10 mg, Q4H PRN    polyethylene glycol (MIRALAX) packet 17 g, Daily     prasugrel (EFFIENT) tablet 10 mg, Daily    senna (SENOKOT) tablet 17.2 mg, BID    thiamine (VITAMIN B1) 100 mg in dextrose 5 % 100 mL IVPB, TID, Last Rate: 100 mg (06/30/25 0845)    Administrative Statements   Today, Patient Was Seen By: She Jurado MD      **Please Note: This note may have been constructed using a voice recognition system.**

## 2025-06-30 NOTE — ASSESSMENT & PLAN NOTE
Patient meeting sepsis criteria with tachypnea and tachycardia on presentation, blood culture BC ID with Klebsiella aerogenes, Staphylococcus Pan susceptible.  S/p cefepime and Vanco in the ER on admission.  Hemodynamically stable, low-grade fevers to about 100.5     Patient meeting sepsis criteria with tachypnea and tachycardia on presentation, blood culture BC ID with Klebsiella aerogenes, Staphylococcus Pan susceptible.  S/p cefepime and Vanco in the ER on admission.  Hemodynamically stable, low-grade fevers to about 100.5    Unclear etiology of bacteremia, workup unrevealing.    Patient is afebrile and feels well without signs of infection.     Plan:  Infectious disease consulted  Completed 7-day course of cefepime on 6/30/2025.  Also completed course of vancomycin  Discharge to [facility]

## 2025-06-30 NOTE — UTILIZATION REVIEW
NOTIFICATION OF ADMISSION DISCHARGE   This is a Notification of Discharge from [facility]. Please be advised that this patient has been discharge from our facility. Below you will find the admission and discharge date and time including the patient’s disposition.   UTILIZATION REVIEW CONTACT:  Utilization Review Assistants  Network Utilization Review Department  Phone: [phone] x carefully listen to the prompts. All voicemails are confidential.  Email: [email or web address]     ADMISSION INFORMATION  PRESENTATION DATE: 6/24/2025 10:34 AM  OBERVATION ADMISSION DATE: N/A  INPATIENT ADMISSION DATE: 6/24/25  4:21 PM   DISCHARGE DATE: 6/28/2025 12:01 AM   DISPOSITION:[facility] Utilization Review Department  ATTENTION: Please call with any questions or concerns to [phone] and carefully listen to the prompts so that you are directed to the right person. All voicemails are confidential.   For Discharge needs, contact Care Management DC Support Team at [phone] opt. [phone]  Send all requests for admission clinical reviews, approved or denied determinations and any other requests to dedicated fax number below belonging to the campus where the patient is receiving treatment. List of dedicated fax numbers for the Facilities:  FACILITY NAME UR FAX NUMBER   ADMISSION DENIALS (Administrative/Medical Necessity) [phone]   DISCHARGE SUPPORT TEAM ([facility]) [phone]   PARENT CHILD HEALTH (Maternity/NICU/Pediatrics) [phone]   [facility] [phone]   [facility] [phone]   [facility] [phone]   [facility] [phone]   Critical access hospital [phone]   [facility] [phone]   [facility] [phone]   [facility] [phone]   [facility]  [facility] [phone]   [facility] [phone]   [facility] [phone]   [facility] [phone]

## 2025-06-30 NOTE — ASSESSMENT & PLAN NOTE
Patient has had chronic issues of both of the sites for at least the last 5 months.  Patient evaluated by orthopedics and low suspicion for infected joint.  Patient has mild discomfort on palpation of the very low lumbar back.  MRI L-spine obtained which was negative.  Orthopedic evaluation appreciated  Monitor exam for new symptoms

## 2025-06-30 NOTE — ASSESSMENT & PLAN NOTE
>>ASSESSMENT AND PLAN FOR ALCOHOL USE DISORDER WRITTEN ON 6/28/2025  8:26 AM BY ANITHA BRITTON MD    Patient's wife mentioned that patient drinks up to a bottle of wine a day. Patient endorses use of chewing tobacco.  Patient's lactic acidosis could be in the setting of thiamine deficiency vs alcohol abuse.  B12, B9 WNL    Plan:  Patient received IV thiamine 100 mg 3 times daily during hospital course.  To continue oral thiamine 100 mg tablet at home  Patient was followed by [place] protocol while in hospital

## 2025-06-30 NOTE — ASSESSMENT & PLAN NOTE
Recent Labs     06/28/25  0526 06/29/25  0538 06/30/25  0536   AST 84* 94* 88*   ALT 34 30 27   ALKPHOS 181* 195* 184*   TBILI 2.52* 1.92* 2.18*    Trending up. Patient not having abdominal pain.  Right upper quadrant ultrasound remarkable only for hepatic steatosis. Hemolysis panel remarkable for elevated reticulocytes, no schistosytes, normal  haptoglobin. INR WNL. Consider cirrhosis, alcohol abuse.    Plan:  Monitor a.m. CMP

## 2025-07-01 PROBLEM — E87.1 HYPONATREMIA: Status: RESOLVED | Noted: 2025-06-24 | Resolved: 2025-07-01

## 2025-07-01 LAB
GLUCOSE SERPL-MCNC: 157 MG/DL (ref 65–140)
GLUCOSE SERPL-MCNC: 160 MG/DL (ref 65–140)
GLUCOSE SERPL-MCNC: 160 MG/DL (ref 65–140)
GLUCOSE SERPL-MCNC: 172 MG/DL (ref 65–140)
GLUCOSE SERPL-MCNC: 172 MG/DL (ref 65–140)
VIT B1 BLD-SCNC: 56.2 NMOL/L (ref 66.5–200)

## 2025-07-01 PROCEDURE — 82948 REAGENT STRIP/BLOOD GLUCOSE: CPT

## 2025-07-01 PROCEDURE — 97535 SELF CARE MNGMENT TRAINING: CPT

## 2025-07-01 RX ADMIN — INSULIN LISPRO 1 UNITS: 100 INJECTION, SOLUTION INTRAVENOUS; SUBCUTANEOUS at 08:07

## 2025-07-01 RX ADMIN — ENOXAPARIN SODIUM 40 MG: 40 INJECTION SUBCUTANEOUS at 08:07

## 2025-07-01 RX ADMIN — PRASUGREL 10 MG: 10 TABLET, FILM COATED ORAL at 08:08

## 2025-07-01 RX ADMIN — METHOCARBAMOL 500 MG: 500 TABLET ORAL at 04:20

## 2025-07-01 RX ADMIN — FLUTICASONE PROPIONATE 2 SPRAY: 50 SPRAY, METERED NASAL at 16:09

## 2025-07-01 RX ADMIN — ATORVASTATIN CALCIUM 20 MG: 20 TABLET, FILM COATED ORAL at 08:07

## 2025-07-01 RX ADMIN — METOPROLOL TARTRATE 50 MG: 50 TABLET, FILM COATED ORAL at 21:49

## 2025-07-01 RX ADMIN — THIAMINE HYDROCHLORIDE 100 MG: 100 INJECTION, SOLUTION INTRAMUSCULAR; INTRAVENOUS at 21:49

## 2025-07-01 RX ADMIN — THIAMINE HYDROCHLORIDE 100 MG: 100 INJECTION, SOLUTION INTRAMUSCULAR; INTRAVENOUS at 08:08

## 2025-07-01 RX ADMIN — SENNOSIDES 17.2 MG: 8.6 TABLET, FILM COATED ORAL at 08:07

## 2025-07-01 RX ADMIN — INSULIN LISPRO 1 UNITS: 100 INJECTION, SOLUTION INTRAVENOUS; SUBCUTANEOUS at 16:09

## 2025-07-01 RX ADMIN — FLUTICASONE PROPIONATE 2 SPRAY: 50 SPRAY, METERED NASAL at 08:08

## 2025-07-01 RX ADMIN — Medication 1000 MCG: at 08:07

## 2025-07-01 RX ADMIN — THIAMINE HYDROCHLORIDE 100 MG: 100 INJECTION, SOLUTION INTRAMUSCULAR; INTRAVENOUS at 16:09

## 2025-07-01 RX ADMIN — ASPIRIN 81 MG: 81 TABLET, CHEWABLE ORAL at 08:07

## 2025-07-01 RX ADMIN — LORATADINE 10 MG: 10 TABLET ORAL at 08:08

## 2025-07-01 RX ADMIN — METOPROLOL TARTRATE 50 MG: 50 TABLET, FILM COATED ORAL at 08:07

## 2025-07-01 RX ADMIN — OXYCODONE HYDROCHLORIDE 10 MG: 10 TABLET ORAL at 08:32

## 2025-07-01 RX ADMIN — POLYETHYLENE GLYCOL 3350 17 G: 17 POWDER, FOR SOLUTION ORAL at 08:07

## 2025-07-01 RX ADMIN — FOLIC ACID 1 MG: 1 TABLET ORAL at 08:07

## 2025-07-01 RX ADMIN — INSULIN GLARGINE 6 UNITS: 100 INJECTION, SOLUTION SUBCUTANEOUS at 21:49

## 2025-07-01 RX ADMIN — INSULIN LISPRO 1 UNITS: 100 INJECTION, SOLUTION INTRAVENOUS; SUBCUTANEOUS at 11:13

## 2025-07-01 RX ADMIN — GABAPENTIN 100 MG: 100 CAPSULE ORAL at 21:49

## 2025-07-01 NOTE — PLAN OF CARE
Problem: OCCUPATIONAL THERAPY ADULT  Goal: Performs self-care activities at highest level of function for planned discharge setting.  See evaluation for individualized goals.  Description: Treatment Interventions: ADL retraining, Functional transfer training, Endurance training, Patient/family training, Equipment evaluation/education, Compensatory technique education, Continued evaluation, Energy conservation, Activityengagement          See flowsheet documentation for full assessment, interventions and recommendations.   Outcome: Progressing  Note:       Assessment: Pt is a 65 yo male who actively participated in skilled OT session on 7/1/2025. Treatment focused to improve functional transfers with fall prevention strategies, static/dynamic balance, postural/trunk control, proper body mechanics, functional use of b/l UE's, higher level cognitive functions, safety awareness, and overall increased activity tolerance in ADL/IADL/leisure tasks. Upon arrival, pt found lying supine in bed and was agreeable to OT session. Pt performing bed mobility tasks @ supervision level and completed all functional transfers @ supervision level w/ RW w/ increased time. Pt completed household functional mobility distances <> bathroom with close supervision/CGA w/ RW for safety and support and completed all toileting hygiene tasks @ supervision level w/ RW for steadying/support. Pt completed additional household functional mobility distances w/ CGA/close supervision w/ RW to simulate common ADL/IADL routine distances. At the end of the session, pt was left lying supine in bed with all functional needs in reach. Pt demonstrates gradual functional improvements towards OT goals however continues to be functioning below occupational baseline and is still limited by the following limitations/impairments which were addressed through skilled instruction: visual deficits, generalized weakness, balance, endurance/activity tolerance,  postural/trunk control, strength, pain, and safety awareness. At this time, recommend discharge w/ Level 1 resources when medically stable.   The patient's raw score on the -PAC Daily Activity Inpatient Short Form is 19. A raw score of greater than or equal to 19 suggests the patient may benefit from discharge to post-acute rehabilitation services. Please refer to the recommendation of the Occupational Therapist for safe discharge planning.  Established OT goals will be continued 3-5x/wk to address acute care needs and underlying performance skills to maximize occupational performance and safety to return to PLOF.     Rehab Resource Intensity Level, OT: I (Maximum Resource Intensity)

## 2025-07-01 NOTE — ASSESSMENT & PLAN NOTE
Monitor BMs, as patient is on opioids.  MiraLAX, senna, MOM.  Dulcolax suppository if needed    Plan:  Miralax and Senokot

## 2025-07-01 NOTE — CASE MANAGEMENT
Case Management Progress Note    Patient name Aquilino Dowell  Location S /S -01 MRN [id number]  : 1960 Date 2025       LOS (days): 3  Geometric Mean LOS (GMLOS) (days):   Days to GMLOS:        OBJECTIVE:        Current admission status: Inpatient  Preferred Pharmacy:   [facility] [address] [place] [id number]  Phone: [phone] Fax: [phone]    Primary Care Provider: Yael Matute MD    Primary Insurance: BLUE CROSS  Secondary Insurance:     PROGRESS NOTE:    CC informed by [facility] liaison that patient's bed should be available tomorrow. CC awaiting finalized time to arrange transport. Family is aware that bed should be available. CM will revisit for confirmed transport time.

## 2025-07-01 NOTE — ASSESSMENT & PLAN NOTE
Patient meeting sepsis criteria with tachypnea and tachycardia on presentation, blood culture BC ID with Klebsiella aerogenes, Staphylococcus Pan susceptible.  S/p cefepime and Vanco in the ER on admission.  Hemodynamically stable, low-grade fevers to about 100.5    Unclear etiology of bacteremia, workup unrevealing.  However patient will complete 7-day course of total antibiotics as of today.  Medically stable for discharge to [facility]    Plan:  Infectious disease consulted  2 g cefepime every 8h, day 7/7 total abx, patient is s/p 7 days of antibiotics   Tentative plan for 7 days of therapy for this issue through 6/30

## 2025-07-01 NOTE — UTILIZATION REVIEW
Continued Stay Review    Date: 7/1                          Current Patient Class: Inpatient  Current Level of Care: Med/surg    HPI:64 y.o. male initially admitted on 6/28   Current Diagnosis:      Assessment/Plan:  S/P IV abx.  PT OT recommending post acute rehab.  Case management to assist with safe dc plan.     Medications:   Scheduled Medications:  aspirin, 81 mg, Oral, Daily  atorvastatin, 20 mg, Oral, Daily  vitamin B-12, 1,000 mcg, Oral, Daily  enoxaparin, 40 mg, Subcutaneous, Daily  ergocalciferol, 50,000 Units, Oral, Weekly  fluticasone, 2 spray, Each Nare, BID  folic acid, 1 mg, Oral, Daily  gabapentin, 100 mg, Oral, HS  insulin glargine, 6 Units, Subcutaneous, HS  insulin lispro, 1-6 Units, Subcutaneous, TID AC  loratadine, 10 mg, Oral, Daily  metoprolol tartrate, 50 mg, Oral, Q12H TERRENCE  polyethylene glycol, 17 g, Oral, Daily  prasugrel, 10 mg, Oral, Daily  senna, 2 tablet, Oral, BID  thiamine, 100 mg, Intravenous, TID      Continuous IV Infusions:     PRN Meds:  acetaminophen, 650 mg, Oral, Q4H PRN  benzonatate, 200 mg, Oral, TID PRN  bisacodyl, 10 mg, Rectal, Daily PRN  HYDROmorphone, 0.5 mg, Intravenous, Q3H PRN  methocarbamol, 500 mg, Oral, Q6H PRN  nicotine, 7 mg, Transdermal, Daily PRN  oxyCODONE, 5 mg, Oral, Q4H PRN   Or  oxyCODONE, 10 mg, Oral, Q4H PRN      Discharge Plan: TBD    Vital Signs (last 3 days)       Date/Time Temp Pulse Resp BP MAP (mmHg) SpO2 O2 Device Patient Position - Orthostatic VS Mehul Coma Scale Score Pain    07/01/25 1348 -- -- -- -- -- -- -- -- -- No Pain    07/01/25 0832 -- -- -- -- -- -- -- -- -- 8    07/01/25 0800 -- -- -- -- -- -- -- -- 15 --    07/01/25 07:32:35 97.5 °F (36.4 °C) 80 21 151/88 109 94 % -- -- -- --    06/30/25 21:53:31 98.4 °F (36.9 °C) 78 16 131/80 97 96 % -- -- -- --    06/30/25 21:01:10 97.9 °F (36.6 °C) 77 16 143/82 102 97 % None (Room air) -- -- --    06/30/25 2000 -- -- -- -- -- -- -- -- 15 No Pain    06/30/25 15:23:55 98.1 °F (36.7 °C) 73 18  148/85 106 97 % -- -- -- --    06/30/25 1032 -- -- -- -- -- -- -- -- -- 2 06/30/25 0843 -- -- -- -- -- -- -- -- -- 8 06/30/25 08:42:13 -- 94 -- 119/79 92 93 % -- -- -- --    06/30/25 0830 -- -- -- -- -- -- -- -- -- 8 06/30/25 0800 -- -- -- -- -- -- -- -- 15 --    06/30/25 0630 98 °F (36.7 °C) 78 15 137/88 -- 94 % -- Lying -- --    06/29/25 2300 -- -- -- -- -- -- -- -- -- 4 06/29/25 22:02:30 98.6 °F (37 °C) 99 15 156/89 111 98 % -- -- -- --    06/29/25 21:51:38 -- 98 -- 156/89 111 93 % -- -- -- --    06/29/25 2000 -- -- -- -- -- -- -- -- 15 --    06/29/25 14:45:01 98.3 °F (36.8 °C) 83 16 132/82 99 95 % -- -- -- --    06/29/25 1032 -- -- -- -- -- -- -- -- -- 5 06/29/25 0920 -- -- -- -- -- -- -- -- -- 5 06/29/25 0800 -- -- -- -- -- -- -- -- 15 No Pain    06/28/25 2219 -- -- -- -- -- -- -- -- -- 4 06/28/25 2212 -- 81 -- 130/74 -- -- -- -- -- --    06/28/25 2000 -- -- -- -- -- -- -- -- 15 --    06/28/25 1512 97.4 °F (36.3 °C) -- -- -- -- -- -- -- -- --    06/28/25 1055 -- -- -- -- -- -- -- -- -- 5    06/28/25 0800 -- -- -- -- -- -- -- -- 15 No Pain    06/28/25 07:49:33 98.3 °F (36.8 °C) 73 18 146/83 104 97 % -- -- -- --    06/28/25 0348 -- -- -- -- -- -- -- -- -- 3          Weight (last 2 days)       None            Pertinent Labs/Diagnostic Results:   Radiology:  No orders to display     Cardiology:  No orders to display     GI:  No orders to display           Results from last 7 days   Lab Units 06/30/25  0536 06/29/25  0538 06/28/25  0526 06/27/25  0542 06/26/25  0403   WBC Thousand/uL 4.25* 4.00* 3.81* 3.41* 2.94*   HEMOGLOBIN g/dL 11.3* 11.1* 11.0* 11.8* 10.7*   HEMATOCRIT % 34.7* 34.6* 32.5* 35.4* 32.0*   PLATELETS Thousands/uL 103* 90* 92* 90* 84*   TOTAL NEUT ABS Thousands/µL  --   --   --  1.90  --      Results from last 7 days   Lab Units 06/27/25  0930   RETIC CT ABS  144,200*   RETIC CT PCT % 4.28*     Results from last 7 days   Lab Units 06/30/25 0536 06/29/25  0538 06/28/25  0526  06/27/25  0542 06/26/25  0403 06/25/25  0547   SODIUM mmol/L 137 135 132* 134* 136  --    POTASSIUM mmol/L 3.5 3.8 3.8 4.7 3.7  --    CHLORIDE mmol/L 105 103 99 99 98  --    CO2 mmol/L 26 26 27 29 31  --    ANION GAP mmol/L 6 6 6 6 7  --    BUN mg/dL 5 8 6 7 5  --    CREATININE mg/dL 0.76 0.88 0.65 0.84 0.82  --    EGFR ml/min/1.73sq m 97 91 103 93 94  --    CALCIUM mg/dL 7.9* 7.9* 7.9* 7.8* 7.7*  --    MAGNESIUM mg/dL  --   --   --   --   --  2.1   PHOSPHORUS mg/dL  --   --   --   --   --  2.6     Results from last 7 days   Lab Units 06/30/25  0536 06/29/25  0538 06/28/25  0526 06/27/25  0542 06/26/25  0403 06/25/25  0350 06/24/25  1653   AST U/L 88* 94* 84* 113* 113*   < > 114*   ALT U/L 27 30 34 46 50   < > 57*   ALK PHOS U/L 184* 195* 181* 191* 175*   < > 180*   TOTAL PROTEIN g/dL 5.9* 5.8* 5.7* 6.2* 5.6*   < > 5.7*   ALBUMIN g/dL 2.6* 2.5* 2.5* 2.8* 2.7*   < > 2.8*   TOTAL BILIRUBIN mg/dL 2.18* 1.92* 2.52* 2.24* 1.82*   < > 1.00   BILIRUBIN DIRECT mg/dL  --   --   --   --  0.97*  --  0.45*    < > = values in this interval not displayed.     Results from last 7 days   Lab Units 07/01/25  1559 07/01/25  1051 07/01/25  0731 06/30/25  2049 06/30/25  1606 06/30/25  1031 06/30/25  0707 06/29/25  2123 06/29/25  1628 06/29/25  1135 06/29/25  0650 06/28/25  2106   POC GLUCOSE mg/dl 172* 160* 157* 173* 198* 187* 148* 165* 152* 212* 162* 171*     Results from last 7 days   Lab Units 06/30/25  0536 06/29/25  0538 06/28/25  0526 06/27/25  0542 06/26/25  0403 06/25/25  0350   GLUCOSE RANDOM mg/dL 160* 137 154* 126 157* 177*         Results from last 7 days   Lab Units 06/24/25  1653   HEMOGLOBIN A1C % 8.0*   EAG mg/dl 183     Beta- Hydroxybutyrate   Date Value Ref Range Status   06/24/2025 0.37 0.20 - 0.60 mmol/L Final          Results from last 7 days   Lab Units 06/25/25  0355   PH MICHELLE  7.461*   PCO2 MICHELLE mm Hg 39.6*   PO2 MICHELLE mm Hg 58.5*   HCO3 MICHELLE mmol/L 27.6   BASE EXC MICHELLE mmol/L 3.6   O2 CONTENT MICHELLE ml/dL 13.6   O2 HGB,  VENOUS % 87.8*                     Results from last 7 days   Lab Units 06/27/25  0930   PROTIME seconds 14.0   INR  1.01     Results from last 7 days   Lab Units 06/24/25  1653   TSH 3RD GENERATON uIU/mL 1.578         Results from last 7 days   Lab Units 06/27/25  0900 06/26/25  0627 06/26/25  0403 06/25/25  2136 06/25/25  1529 06/25/25  0821 06/25/25  0547   LACTIC ACID mmol/L 1.3 2.3* 2.6* 5.8* 5.9* 6.4* 7.2*         Results from last 7 days   Lab Units 06/25/25  1303   CLARITY UA  Clear   COLOR UA  Yellow   SPEC GRAV UA  1.030   PH UA  7.0   GLUCOSE UA mg/dl 300 (3/10%)*   KETONES UA mg/dl Trace*   BLOOD UA  Negative   PROTEIN UA mg/dl Trace*   NITRITE UA  Negative   BILIRUBIN UA  Negative   UROBILINOGEN UA (BE) mg/dl 4.0*   LEUKOCYTES UA  Negative   WBC UA /hpf None Seen   RBC UA /hpf None Seen   BACTERIA UA /hpf None Seen   EPITHELIAL CELLS WET PREP /hpf Occasional                 Results from last 7 days   Lab Units 06/24/25  1653   ETHANOL LVL mg/dL <10       Network Utilization Review Department  ATTENTION: Please call with any questions or concerns to [phone] and carefully listen to the prompts so that you are directed to the right person. All voicemails are confidential.   For Discharge needs, contact Care Management DC Support Team at [phone] opt. 2  Send all requests for admission clinical reviews, approved or denied determinations and any other requests to dedicated fax number below belonging to the campus where the patient is receiving treatment. List of dedicated fax numbers for the Facilities:  FACILITY NAME UR FAX NUMBER   ADMISSION DENIALS (Administrative/Medical Necessity) [phone]   DISCHARGE SUPPORT TEAM (NETWORK) [phone]   PARENT CHILD HEALTH (Maternity/NICU/Pediatrics) [phone]   [facility] [phone]   [facility] [phone]   [facility] [phone]   [facility]  [place] [phone]   [facility] [phone]   [facility] [phone]   [facility] [phone]   [facility] [phone]   [facility] [phone]   Critical access hospital [phone]   [facility] [phone]   [facility] [phone]

## 2025-07-01 NOTE — ASSESSMENT & PLAN NOTE
Patient meeting sepsis criteria with tachypnea and tachycardia on presentation, blood culture BC ID with Klebsiella aerogenes, Staphylococcus Pan susceptible.  S/p cefepime and Vanco in the ER on admission.  Hemodynamically stable, low-grade fevers to about 100.5    Unclear etiology of bacteremia, workup unrevealing.  However patient will complete 7-day course of total antibiotics as of today.  Medically stable for discharge to [facility]    Plan:  Infectious disease consulted  2 g cefepime every 8h, day 7/7 total abx, patient is s/p 7 days of antibiotics   Tentative plan for 7 days of therapy for this issue through 6/30

## 2025-07-01 NOTE — ASSESSMENT & PLAN NOTE
Patient endorsing increased postnasal drip and cough, evidence of sinusitis on imaging.  Treat with antibiotics for acute infectious sinusitis. Patient denies any worsening symptoms 7/1.     Plan:  Claritin and Flonase  ENT referral on d/c   Will monitor symptoms

## 2025-07-01 NOTE — ASSESSMENT & PLAN NOTE
Initial source of presentation. This is an outpatient second episode of fall in the last year. Ongoing supportive measures per primary. Trauma evaluation as noted.  Patient has history of ambulatory dysfunction and falls. He uses a cane at home and has to grab hold of things near him to walk steadily. 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   During this fall, wife says the patient was getting out of bed and attempting to use the bathroom and fell. He denied any LOC, dizziness, tripping over anything, or urinary incontinence, but he felt too weak to get up on his own, prompting him to call for his wife.    Plan:  PT/OT recommending level 1, patient wants rehab, anticipate DC to [facility] once bed becomes available

## 2025-07-01 NOTE — OCCUPATIONAL THERAPY NOTE
Occupational Therapy Progress Note     Patient Name: Aquilino Dowell  Today's Date: 7/1/2025  Problem List  Principal Problem:    Polymicrobial bacteremia  Active Problems:    Fall    Left shoulder pain and low back pain    Coronary artery disease    Diabetes (HCC)    Alcohol use disorder    Hypertension    Sinusitis    Sepsis (HCC)    Back pain    Hyperbilirubinemia    Constipation    Vitamin D deficiency            07/01/25 1348   OT Last Visit   OT Visit Date 07/01/25   Note Type   Note Type Treatment   Pain Assessment   Pain Assessment Tool 0-10   Pain Score No Pain   Hospital Pain Intervention(s) Repositioned;Ambulation/increased activity;Emotional support   Restrictions/Precautions   Weight Bearing Precautions Per Order No   Other Precautions Multiple lines;Telemetry;Fall Risk;Visual impairment   Lifestyle   Autonomy Pt lives w/ spouse. Light (A) with ADLs, (A) with IADLs and CHANELLE for fnxl mobility w/ intermittent use of AD. (-) driving and (+) falls   Reciprocal Relationships Supportive spouse and family   Service to Others Retired   Intrinsic Gratification An upcoming family trip to [place]   ADL   Where Assessed Edge of bed   Eating Assistance 5  Supervision/Setup   Eating Deficit Setup   Grooming Assistance 5  Supervision/Setup   Grooming Deficit Setup   UB Dressing Assistance 5  Supervision/Setup   UB Dressing Deficit Setup;Steadying;Pull around back   LB Dressing Assistance 4  Minimal Assistance   Toileting Assistance  5  Supervision/Setup   Toileting Deficit Clothing management up;Clothing management down;Perineal hygiene   Bed Mobility   Supine to Sit 5  Supervision   Additional items HOB elevated;Bedrails;Increased time required;Verbal cues   Sit to Supine 5  Supervision   Additional items HOB elevated;Bedrails;Increased time required;Verbal cues   Additional Comments At end of session, pt requesting to return to supine position, left with all functional needs in reach with igor present in  "room   Transfers   Sit to Stand 5  Supervision   Additional items Increased time required;Verbal cues   Stand to Sit 5  Supervision   Additional items Increased time required;Verbal cues   Additional Comments w/ RW for safety and support   Functional Mobility   Functional Mobility 5  Supervision  (Close supervision/CGA)   Additional Comments Pt completed household functional mobility distances <> bathroom + additional household functional mobility distances with close supervision/CGA level w/ RW for safety and support with verbal cues for safety and environmental awareness 2* visual deficit   Additional items Rolling walker   Toilet Transfers   Toilet Transfers Comments Pt completing all toileting hygiene/tasks standing at toilet   Subjective   Subjective \"I can work with you\"   Cognition   Overall Cognitive Status WFL   Arousal/Participation Alert;Responsive;Arousable;Cooperative   Attention Within functional limits   Orientation Level Oriented X4   Memory Within functional limits   Following Commands Follows one step commands without difficulty   Comments Pt pleasant and cooperative; flat affect; anxious @ times when discussing rehab/recovery however agreeable and receptive to all education provided; significant increased education provided regarding typical rehab stay/expectations in which all pt and wife's questions were answered and they were both receptive of all education provided   Activity Tolerance   Activity Tolerance Patient tolerated treatment well   Assessment   Assessment Pt is a 63 yo male who actively participated in skilled OT session on 7/1/2025. Treatment focused to improve functional transfers with fall prevention strategies, static/dynamic balance, postural/trunk control, proper body mechanics, functional use of b/l UE's, higher level cognitive functions, safety awareness, and overall increased activity tolerance in ADL/IADL/leisure tasks. Upon arrival, pt found lying supine in bed and was " agreeable to OT session. Pt performing bed mobility tasks @ supervision level and completed all functional transfers @ supervision level w/ RW w/ increased time. Pt completed household functional mobility distances <> bathroom with close supervision/CGA w/ RW for safety and support and completed all toileting hygiene tasks @ supervision level w/ RW for steadying/support. Pt completed additional household functional mobility distances w/ CGA/close supervision w/ RW to simulate common ADL/IADL routine distances. At the end of the session, pt was left lying supine in bed with all functional needs in reach. Pt demonstrates gradual functional improvements towards OT goals however continues to be functioning below occupational baseline and is still limited by the following limitations/impairments which were addressed through skilled instruction: visual deficits, generalized weakness, balance, endurance/activity tolerance, postural/trunk control, strength, pain, and safety awareness. At this time, recommend discharge w/ Level 1 resources when medically stable.   The patient's raw score on the -PAC Daily Activity Inpatient Short Form is 19. A raw score of greater than or equal to 19 suggests the patient may benefit from discharge to post-acute rehabilitation services. Please refer to the recommendation of the Occupational Therapist for safe discharge planning.  Established OT goals will be continued 3-5x/wk to address acute care needs and underlying performance skills to maximize occupational performance and safety to return to [facility].   Plan   Treatment Interventions ADL retraining;Visual perceptual retraining;Functional transfer training;Endurance training;Cognitive reorientation;Patient/family training;Equipment evaluation/education;Compensatory technique education;Continued evaluation;Energy conservation;Activityengagement   Goal Expiration Date 07/05/25   OT Treatment Day 2   OT Frequency 3-5x/wk   Discharge  Recommendation   Rehab Resource Intensity Level, OT I (Maximum Resource Intensity)   AM-PAC Daily Activity Inpatient   Lower Body Dressing 3   Bathing 3   Toileting 3   Upper Body Dressing 3   Grooming 3   Eating 4   Daily Activity Raw Score 19   Daily Activity Standardized Score (Calc for Raw Score >=11) 40.22   AM-PAC Applied Cognition Inpatient   Following a Speech/Presentation 3   Understanding Ordinary Conversation 4   Taking Medications 3   Remembering Where Things Are Placed or Put Away 4   Remembering List of 4-5 Errands 4   Taking Care of Complicated Tasks 3   Applied Cognition Raw Score 21   Applied Cognition Standardized Score 44.3   End of Consult   Education Provided Yes;Family or social support of family present for education by provider   Patient Position at End of Consult Supine;All needs within reach   Nurse Communication Nurse aware of consult       Bhavana Benites MS, OTR/L

## 2025-07-01 NOTE — ASSESSMENT & PLAN NOTE
>>ASSESSMENT AND PLAN FOR ALCOHOL USE DISORDER WRITTEN ON 6/28/2025  8:26 AM BY ANITHA BRITTON MD    Patient's wife mentioned that patient drinks up to a bottle of wine a day. Patient endorses use of chewing tobacco.  Patient's lactic acidosis could be in the setting of thiamine deficiency vs alcohol abuse.  B12, B9 WNL    Plan:  Start IV thiamine 100 mg TID (this resolved elevated lactate level)  [facility] protocol  Pending results for  Thiamine  NRT ordered

## 2025-07-01 NOTE — ASSESSMENT & PLAN NOTE
Recent Labs     06/29/25  0538 06/30/25  0536   AST 94* 88*   ALT 30 27   ALKPHOS 195* 184*   TBILI 1.92* 2.18*    Trending up. Patient not having abdominal pain.  Right upper quadrant ultrasound remarkable only for hepatic steatosis. Hemolysis panel remarkable for elevated reticulocytes, no schistosytes, normal  haptoglobin. INR WNL. Consider cirrhosis, alcohol abuse.    Plan:  Monitor a.m. CMP  GI referral for outpatient on discharge

## 2025-07-01 NOTE — PROGRESS NOTES
Progress Note - Hospitalist   Name: Aquilino Dowell 64 y.o. male I MRN: [id number]  Unit/Bed#: S -01 I Date of Admission: 6/28/2025   Date of Service: 7/1/2025 I Hospital Day: 3  { ?Quick Links I Problem List CHAD KOVACS I Billing Tip:44960}  Assessment & Plan  Polymicrobial bacteremia  Sepsis (HCC)  Patient meeting sepsis criteria with tachypnea and tachycardia on presentation, blood culture BC ID with Klebsiella aerogenes, Staphylococcus Pan susceptible.  S/p cefepime and Vanco in the ER on admission.  Hemodynamically stable, low-grade fevers to about 100.5    Unclear etiology of bacteremia, workup unrevealing.  However patient will complete 7-day course of total antibiotics as of today.  Medically stable for discharge to [facility]    Plan:  Infectious disease consulted  2 g cefepime every 8h, day 7/7 total abx, patient is s/p 7 days of antibiotics   Tentative plan for 7 days of therapy for this issue through 6/30         Fall  Initial source of presentation. This is an outpatient second episode of fall in the last year. Ongoing supportive measures per primary. Trauma evaluation as noted.  Patient has history of ambulatory dysfunction and falls. He uses a cane at home and has to grab hold of things near him to walk steadily. 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   During this fall, wife says the patient was getting out of bed and attempting to use the bathroom and fell. He denied any LOC, dizziness, tripping over anything, or urinary incontinence, but he felt too weak to get up on his own, prompting him to call for his wife.    Plan:  PT/OT recommending level 1, patient wants rehab, anticipate DC to [facility] once bed becomes available         Left shoulder pain and low back pain  Chronic Back and left shoulder pain.  Left deltoid was tender to palpation suspecting musculoskeletal related on 6/24.   Left shoulder x-ray demonstrated osteoarthritis of the glenohumeral joint.  Patient still complaining of pain but that the pain is controlled with Tylenol.    Differential is large, includes arthritis flare vs rotator cuff arthropathy     Plan:  Administer Robaxin 500 mg every 6 hours as needed  Gabapentin 100 mg qhs  Oxycodone 5 and 10 for moderate to severe pain  Dilaudid for breakthrough pain  Orthopedic surgery consultation  Will provide referral on DC  Coronary artery disease  Patient has a history of CAD (cath Oct 2013: 95% prox LAD, 40% mid LAD, EF 60%, MANUEL to prox LAD)    Plan:  Continue Prasugrel 10 mg daily and Aspirin 81 mg daily  Diabetes (HCC)  A1c: 6.6 11 months ago    Patient is on Metformin 1000mg daily.    Plan:  Hold metformin  Sliding scale insulin  Lantus 6U qhs  Manage diabetic neuropathy with Gabapentin 100 mg qhs  Alcohol use disorder   >>ASSESSMENT AND PLAN FOR ALCOHOL USE DISORDER WRITTEN ON 6/28/2025  8:26 AM BY ANITHA BRITTON MD    Patient's wife mentioned that patient drinks up to a bottle of wine a day. Patient endorses use of chewing tobacco.  Patient's lactic acidosis could be in the setting of thiamine deficiency vs alcohol abuse.  B12, B9 WNL    Plan:  Start IV thiamine 100 mg TID (this resolved elevated lactate level)  [facility] protocol  Pending results for  Thiamine  NRT ordered  Hypertension  On metoprolol tartrate 50 twice daily. Continue PTA med  Sinusitis  Patient endorsing increased postnasal drip and cough, evidence of sinusitis on imaging.  Treat with antibiotics for acute infectious sinusitis. Patient denies any worsening symptoms 7/1.     Plan:  Claritin and Flonase  ENT referral on d/c   Will monitor symptoms  Hyperbilirubinemia  Recent Labs     06/29/25  0538 06/30/25  0536   AST 94* 88*   ALT 30 27   ALKPHOS 195* 184*   TBILI 1.92* 2.18*    Trending up. Patient not having abdominal pain.  Right upper quadrant ultrasound remarkable only for hepatic steatosis. Hemolysis panel remarkable for elevated reticulocytes, no schistosytes, normal  haptoglobin.  INR WNL. Consider cirrhosis, alcohol abuse.    Plan:  Monitor a.m. CMP  GI referral for outpatient on discharge   Constipation  Monitor BMs, as patient is on opioids.  MiraLAX, senna, MOM.  Dulcolax suppository if needed    Plan:  Miralax and Senokot  Vitamin D deficiency  Vit D down at 12.8 on 6/27/29    Plan:  50k units weekly for 6 weeks.     VTE Pharmacologic Prophylaxis:   Moderate Risk (Score 3-4) - Pharmacological DVT Prophylaxis Ordered: enoxaparin (Lovenox).    Mobility:   Basic Mobility Inpatient Raw Score: 18  JH-HLM Goal: 6: Walk 10 steps or more  JH-HLM Achieved: 3: Sit at edge of bed  JH-HLM Goal achieved. Continue to encourage appropriate mobility.    Patient Centered Rounds: I performed bedside rounds with nursing staff today.   Discussions with Specialists or Other Care Team Provider: N/A    Education and Discussions with Family / Patient: Updated  (wife) at bedside.    Current Length of Stay: 3 day(s)  Current Patient Status: Inpatient   Certification Statement: The patient will continue to require additional inpatient hospital stay due to pending available bed at [facility]  Discharge Plan: Anticipate discharge tomorrow to rehab facility.    Code Status: Level 1 - Full Code    Subjective   Patient felt well this morning and said he felt stronger than on admission. He denied fever, chills, N/V/D, dizziness or lightheadedness. He said that his shoulder pain is resolved with the pain medication. He reported feeling anxious about going to rehab due to him feeling like he is not strong enough for it.     Objective :{?Quick Links I ICU Summary I Vitals I I/Os I LDAs I Mobility (PT/OT) I Code Status / ACP   ?Quick Links I Active Meds I Pain Meds I Antibiotics I Anticoagulants:99087}  Temp:  [97.5 °F (36.4 °C)-98.4 °F (36.9 °C)] 97.5 °F (36.4 °C)  HR:  [73-80] 80  BP: (131-151)/(80-88) 151/88  Resp:  [16-21] 21  SpO2:  [94 %-97 %] 94 %  O2 Device: None (Room air)    Body mass index is 26.16 kg/m².      Input and Output Summary (last 24 hours):     Intake/Output Summary (Last 24 hours) at 7/1/2025 1406  Last data filed at 7/1/2025 0601  Gross per 24 hour   Intake 360 ml   Output --   Net 360 ml       Physical Exam  Constitutional:       Appearance: Normal appearance.     Cardiovascular:      Rate and Rhythm: Normal rate and regular rhythm.   Pulmonary:      Effort: Pulmonary effort is normal.      Breath sounds: Normal breath sounds.   Abdominal:      Palpations: Abdomen is soft.      Tenderness: There is no abdominal tenderness.     Neurological:      Mental Status: He is alert. Mental status is at baseline.       Lines/Drains:            {?Quick Links I Lab Review I Micro Results I Radiology I Cardiology:54666}  Lab Results: I have reviewed the following results:   Results from last 7 days   Lab Units 06/30/25  0536 06/28/25  0526 06/27/25  0542   WBC Thousand/uL 4.25*   < > 3.41*   HEMOGLOBIN g/dL 11.3*   < > 11.8*   HEMATOCRIT % 34.7*   < > 35.4*   PLATELETS Thousands/uL 103*   < > 90*   SEGS PCT %  --   --  55   LYMPHO PCT %  --   --  30   MONO PCT %  --   --  12   EOS PCT %  --   --  2    < > = values in this interval not displayed.     Results from last 7 days   Lab Units 06/30/25  0536   SODIUM mmol/L 137   POTASSIUM mmol/L 3.5   CHLORIDE mmol/L 105   CO2 mmol/L 26   BUN mg/dL 5   CREATININE mg/dL 0.76   ANION GAP mmol/L 6   CALCIUM mg/dL 7.9*   ALBUMIN g/dL 2.6*   TOTAL BILIRUBIN mg/dL 2.18*   ALK PHOS U/L 184*   ALT U/L 27   AST U/L 88*   GLUCOSE RANDOM mg/dL 160*     Results from last 7 days   Lab Units 06/27/25  0930   INR  1.01     Results from last 7 days   Lab Units 07/01/25  1051 07/01/25  0731 06/30/25  2049 06/30/25  1606 06/30/25  1031 06/30/25  0707 06/29/25  2123 06/29/25  1628 06/29/25  1135 06/29/25  0650 06/28/25  2106 06/28/25  1633   POC GLUCOSE mg/dl 160* 157* 173* 198* 187* 148* 165* 152* 212* 162* 171* 185*     Results from last 7 days   Lab Units 06/24/25  1653   HEMOGLOBIN A1C %  "8.0*     Results from last 7 days   Lab Units 06/27/25  0900 06/26/25  0627 06/26/25  0403 06/25/25  2136   LACTIC ACID mmol/L 1.3 2.3* 2.6* 5.8*       Recent Cultures (last 7 days):         {Imaging Results Review:30158::\"No pertinent imaging studies reviewed.\"}  {Other Study Results Review:86529::\"No additional pertinent studies reviewed.\"}    Last 24 Hours Medication List:     Current Facility-Administered Medications:     acetaminophen (TYLENOL) tablet 650 mg, Q4H PRN    aspirin chewable tablet 81 mg, Daily    atorvastatin (LIPITOR) tablet 20 mg, Daily    benzonatate (TESSALON PERLES) capsule 200 mg, TID PRN    bisacodyl (DULCOLAX) rectal suppository 10 mg, Daily PRN    cyanocobalamin (VITAMIN B-12) tablet 1,000 mcg, Daily    enoxaparin (LOVENOX) subcutaneous injection 40 mg, Daily    ergocalciferol (VITAMIN D2) capsule 50,000 Units, Weekly    fluticasone (FLONASE) 50 mcg/act nasal spray 2 spray, BID    folic acid (FOLVITE) tablet 1 mg, Daily    gabapentin (NEURONTIN) capsule 100 mg, HS    HYDROmorphone (DILAUDID) injection 0.5 mg, Q3H PRN    insulin glargine (LANTUS) subcutaneous injection 6 Units 0.06 mL, HS    insulin lispro (HumALOG/ADMELOG) 100 units/mL subcutaneous injection 1-6 Units, TID AC **AND** Fingerstick Glucose (POCT), TID AC    loratadine (CLARITIN) tablet 10 mg, Daily    methocarbamol (ROBAXIN) tablet 500 mg, Q6H PRN    metoprolol tartrate (LOPRESSOR) tablet 50 mg, Q12H TERRENCE    nicotine (NICODERM CQ) 7 mg/24hr TD 24 hr patch 7 mg, Daily PRN    oxyCODONE (ROXICODONE) IR tablet 5 mg, Q4H PRN **OR** oxyCODONE (ROXICODONE) immediate release tablet 10 mg, Q4H PRN    polyethylene glycol (MIRALAX) packet 17 g, Daily    prasugrel (EFFIENT) tablet 10 mg, Daily    senna (SENOKOT) tablet 17.2 mg, BID    thiamine (VITAMIN B1) 100 mg in dextrose 5 % 100 mL IVPB, TID, Last Rate: 100 mg (07/01/25 0808)    Administrative Statements   Today, Patient Was Seen By: Jagruti Munguia  {Time Spent " (Optional):58991}    **Please Note: This note may have been constructed using a voice recognition system.**

## 2025-07-01 NOTE — ASSESSMENT & PLAN NOTE
Chronic Back and left shoulder pain.  Left deltoid was tender to palpation suspecting musculoskeletal related on 6/24.   Left shoulder x-ray demonstrated osteoarthritis of the glenohumeral joint. Patient still complaining of pain but that the pain is controlled with Tylenol.    Differential is large, includes arthritis flare vs rotator cuff arthropathy     Plan:  Administer Robaxin 500 mg every 6 hours as needed  Gabapentin 100 mg qhs  Oxycodone 5 and 10 for moderate to severe pain  Dilaudid for breakthrough pain  Orthopedic surgery consultation  Will provide referral on DC

## 2025-07-01 NOTE — PROGRESS NOTES
PHYSICAL MEDICINE AND REHABILITATION   PREADMISSION ASSESSMENT     Projected IGC and Rehabilitation Diagnoses:  Impairment of mobility, safety and Activities of Daily Living (ADLs) due to Debility:  16  Debility (Non-cardiac/Non-pulmonary)  Etiologic: Debility from Sepsis  Date of Onset: 6/24/25      PATIENT INFORMATION  Name: Aquilino Holly Phone #: [phone] (home)   Address: [address] [place] [id number]  YOB: 1960 Age: 64 y.o. SS#   Marital Status: /Civil Union  Ethnicity: White  Employment Status: on disability  Extended Emergency Contact Information  Primary Emergency Contact: CARLOS ALBERTO HOLLY  Mobile Phone: [phone]  Relation: Spouse  Secondary Emergency Contact: SHARON GÓMEZ  Mobile Phone: [phone]  Relation: Daughter  Advance Directive: Level 1 Full Code (no ACP docs)     INSURANCE/COVERAGE:     Primary Payor: BLUE CROSS / Plan: [employer] PLAN 361 / Product Type: Blue Fee for Service /   Secondary Payer:<NONE>   Payer Contact:  Payer Contact:   Contact Phone:  Contact Phone:     [facility] received approved auth for: Acute Rehab  Insurance: [facility]  Name/Phone # of Rep who called in determination: Imelda  Facility Name: [facility]  Approved Authorization Number: [id number]  Start of Care Date: 06/30/25  Next Review Date: 07/07/25  Submit Next Review to: [phone]  Medical Record #: [id number]    REFERRAL SOURCE:   Referring provider: Oksana Wren MD  Referring facility: [facility]  Room: [facility] 212/S -01  PCP: Yael Matute MD PCP phone number: [phone]    MEDICAL INFORMATION  HPI: Pt is a 64 year old male with PMH of alcohol dependence, chew tobacco use, CAD s/p stent, HTN, T2DM, diabetic neuropathy, right eye blindness, and ambulatory dysfunction who initially presented to [facility] as a trauma alert on 6/24 after a fall with a complaint of left shoulder pain. Trauma  work-up was negative. X-ray left humerus revealed a 2 mm foreign body along the medial distal left arm. Low suspicion for septic arthritis as a source pf bacteremia. Orthopedic Surgery was consulted. Recommended outpatient MRI left shoulder. CT C/A/P with contrast was negative for acute pathology and only showed a new incidental pulmonary nodule. On admission, patient was afebrile but tachycardic and tachypneic. Initial labs revealed high anion gap acidosis with elevated lactic acid, mild procalcitonin elevation, mild AST/ALT/ALP elevation. Due to concern for sepsis, blood cultures were obtained and Vancomycin/cefepime were administered by Trauma service, and patient was admitted to [facility]. Lactic acidosis was initially thought to be secondary to metformin use. ID consulted for 1 out of 2 blood cultures from admission isolated coagulase-negative staph along with Klebsiella. The former is likely contaminant. The latter is difficult to classify as a contaminant but it may be given patient's frail skin and easy bleeding/bruising. It may also be transient in the setting of his fall. He otherwise has no other localizing complaints. CT imaging of the abdomen and pelvis unremarkable. Ortho evaluation appreciated and no plans for imaging of shoulder based on exam. MRI L-spine obtained which was negative. Pt given cefepime 2 g every 8 hours through 6/30 to complete 7 days total.     PT and OT have been consulted and are recommending post-acute rehab services.   Patient's case has been reviewed and patient meets medical criteria for acute rehab and has demonstrated the ability to tolerate three or more hours of therapy per day. Patient is medically stable and ready for discharge to [facility].      Past Medical History:   Past Surgical History:   Allergies:     Past Medical History[1] Past Surgical History[2]  No Known Allergies      Medical/functional conditions requiring inpatient rehabilitation: Sepsis, impaired self care and  mobility, decreased strength/endurance    Risk for medical/clinical complications: risk for falls, risk for skin breakdown secondary to decreased mobility, risk for pain, risk for hypertensive episodes    Comorbidities:  Polymicrobial bacteremia   Left shoulder and low back pain  CAD  DM  Hyponatremia  Alcohol use disorder  HTN  Sinusitis  Hyperbilirubinemia  Constipation  Vitamin D deficiency    Surgeries in the last 100 days: n/a    CURRENT VITAL SIGNS:   Temp:  [97.4 °F (36.3 °C)-98.6 °F (37 °C)] 98.6 °F (37 °C)  HR:  [73-81] 79  BP: (131-148)/(81-84) 137/83  Resp:  [18] 18  SpO2:  [95 %-98 %] 95 %   Intake/Output Summary (Last 24 hours) at 7/2/2025 0921  Last data filed at 7/1/2025 2041  Gross per 24 hour   Intake --   Output 375 ml   Net -375 ml        LABORATORY RESULTS:      Lab Results   Component Value Date    HGB 11.5 (L) 07/02/2025    HCT 34.5 (L) 07/02/2025    WBC 6.19 07/02/2025     Lab Results   Component Value Date    BUN 4 (L) 07/02/2025    K 3.9 07/02/2025     07/02/2025    GLUCOSE 225 (H) 06/24/2025    CREATININE 0.68 07/02/2025     Lab Results   Component Value Date    PROTIME 14.0 06/27/2025    INR 1.01 06/27/2025        DIAGNOSTIC STUDIES:  No results found.    PRECAUTIONS/SPECIAL NEEDS:  Anticoagulation:  aspirin 81 mg orally every day, Blood Sugar Management: per MD orders, Edema Management, Safety Concerns, Pain Management, Dietary Restrictions: Silver/CHO controlled, Language Preference: English, and Fall precautions    MEDICATIONS:   Current Medications[3]    SKIN INTEGRITY:   no rashes, no erythema, no peripheral edema    PRIOR LEVEL OF FUNCTION:  He lives in a(n) single family home  Aquilino Dowell is  and lives with their spouse.  Self Care: Needed some help, Indoor Mobility: Independent, Stairs (in/outdoor): Independent, and Cognition: Independent    FALLS IN THE LAST 6 MONTHS: 1-4    HOME ENVIRONMENT:  The living area: can live on one level  There are 3 steps to enter  the home.    The patient will not have 24 hour supervision/physical assistance available upon discharge.    PREVIOUS DME:  Equipment in home (previous DME): Grab Bars and Single Point Cane    FUNCTIONAL STATUS:  Physical Therapy Occupational Therapy Speech Therapy   25 0830    PT Last Visit   PT Visit Date 25   Note Type   Note Type Treatment   Pain Assessment   Pain Assessment Tool 0-10   Pain Score 8   Pain Location/Orientation Orientation: Left;Location: Shoulder   Pain Onset/Description Onset: Ongoing   Effect of Pain on Daily Activities limits ambulation distance, comfort and activity tolerance   Patient's Stated Pain Goal No pain   Hospital Pain Intervention(s) Repositioned;Ambulation/increased activity;Rest   Multiple Pain Sites No   Restrictions/Precautions   Weight Bearing Precautions Per Order No   Other Precautions Chair Alarm;Bed Alarm;Visual impairment;Fall Risk;Pain   General   Chart Reviewed Yes   Response to Previous Treatment Patient with no complaints from previous session.   Family/Caregiver Present No   Cognition   Overall Cognitive Status Unable to assess   Arousal/Participation Alert;Responsive;Cooperative   Attention Within functional limits   Orientation Level Oriented X4   Memory Within functional limits   Following Commands Follows one step commands with increased time or repetition   Comments pt ID by name and  on hospital wrist band   Subjective   Subjective pt reports 8 /10 L shoulder pain   Bed Mobility   Rolling R 5  Supervision   Additional items Assist x 1;HOB elevated;Bedrails;Increased time required;Verbal cues   Rolling L 5  Supervision   Additional items Assist x 1;HOB elevated;Bedrails;Increased time required;Verbal cues   Supine to Sit 5  Supervision   Additional items Assist x 1;HOB elevated;Bedrails;Increased time required;Verbal cues   Sit to Supine 5  Supervision   Additional items Assist x 1;HOB elevated;Bedrails;Increased time required;Verbal cues    Additional Comments pt was able to demonstrate the ability to roll and reposition in the bed with / /s and completing a supine<>sit EOB transfer with use of bed rail with /s.   Transfers   Sit to Stand 5  Supervision   Additional items Assist x 1;Increased time required;Verbal cues   Stand to Sit 5  Supervision   Additional items Assist x 1;Increased time required;Verbal cues   Stand pivot Unable to assess   Additional Comments pt completed STS w/ and w/o and AD with close /s, no LOB   Ambulation/Elevation   Gait pattern Decreased foot clearance;Narrow TAMEKA;Improper Weight shift;Decreased hip extension;Short stride   Gait Assistance 4  Minimal assist   Additional items Assist x 1;Verbal cues   Assistive Device Rolling walker   Distance 55'x2 RW   Stair Management Assistance 4  Minimal assist   Additional items Assist x 1;Verbal cues;Increased time required   Stair Management Technique Two rails;Step to pattern;Foreward;Backward   Number of Stairs 2   Ambulation/Elevation Additional Comments pt limited with ambulation distance and steps completed due to fatigue as pt required seated therapeutic rest breaks in todays tx session   Balance   Static Sitting Fair -   Dynamic Sitting Fair -   Static Standing Fair -   Dynamic Standing Fair -   Ambulatory Poor +  (2 slight LOB with RW ambulation)   Endurance Deficit   Endurance Deficit Yes   Endurance Deficit Description limited activity tolerance, ambulation distanec and steps completed in todays tx session   Activity Tolerance   Activity Tolerance Patient limited by fatigue   Nurse Made Aware Spoke top ALTAF [place]   Knee AROM Long Arc Quad Sitting;10 reps;AROM;Bilateral   Ankle Pumps Sitting;15 reps;AROM;Bilateral   Assessment   Prognosis Fair   Problem List Decreased strength;Decreased endurance;Impaired balance;Decreased mobility;Obesity;Decreased skin integrity;Pain   Assessment pt began tx session lying supine in the bed as pt was agreeable to participate in  APT intervention. In comparison to previous tx sessions pt continues to demonstrate the ability to complete all bed mobility and functional transfers with and w/o an AD with close /s. pt did utilized bed rail to complete a supine<>sit EOB transfer and VC's for proper hand placement while completing functional transfers from seated EOB. pt slightly increased his ambulation duistance to 55'x2 RW but required min Ax1 due to 2 slight LOB during ambulation trial. pt participate in soem TE activities while seated EOB w/O LOB for 4 minutes. pt was educated with verbal/visual demonstration on all safe stair trial strategies prior to inititing steps. pt completed 2 steps in todays tx session with bilateral hand rails and min Ax1 for safety and balance. Additional was not possible due to fatigue. Post tx pt in the bed with call bell, chair alarm activated and all pt needs met. pt cotninues to be limited with activity tolerance, ambulation distance and steps complete din todays tx session as pt would benefit from continued skilled pT intervention in order to address deficits listed above. Continue to recommend DC w/ level 1 maximal rehab resource intensity when medically cleared    07/01/25 1348    OT Last Visit   OT Visit Date 07/01/25   Note Type   Note Type Treatment   Pain Assessment   Pain Assessment Tool 0-10   Pain Score No Pain   Hospital Pain Intervention(s) Repositioned;Ambulation/increased activity;Emotional support   Restrictions/Precautions   Weight Bearing Precautions Per Order No   Other Precautions Multiple lines;Telemetry;Fall Risk;Visual impairment   Lifestyle   Autonomy Pt lives w/ spouse. Light (A) with ADLs, (A) with IADLs and CHANELLE for fnxl mobility w/ intermittent use of AD. (-) driving and (+) falls   Reciprocal Relationships Supportive spouse and family   Service to Others Retired   Intrinsic Gratification An upcoming family trip to [place]   ADL   Where Assessed Edge of bed   Eating Assistance 5   "Supervision/Setup   Eating Deficit Setup   Grooming Assistance 5  Supervision/Setup   Grooming Deficit Setup   UB Dressing Assistance 5  Supervision/Setup   UB Dressing Deficit Setup;Steadying;Pull around back   LB Dressing Assistance 4  Minimal Assistance   Toileting Assistance  5  Supervision/Setup   Toileting Deficit Clothing management up;Clothing management down;Perineal hygiene   Bed Mobility   Supine to Sit 5  Supervision   Additional items HOB elevated;Bedrails;Increased time required;Verbal cues   Sit to Supine 5  Supervision   Additional items HOB elevated;Bedrails;Increased time required;Verbal cues   Additional Comments At end of session, pt requesting to return to supine position, left with all functional needs in reach with faimly present in room   Transfers   Sit to Stand 5  Supervision   Additional items Increased time required;Verbal cues   Stand to Sit 5  Supervision   Additional items Increased time required;Verbal cues   Additional Comments w/ RW for safety and support   Functional Mobility   Functional Mobility 5  Supervision  (Close supervision/CGA)   Additional Comments Pt completed household functional mobility distances <> bathroom + additional household functional mobility distances with close supervision/CGA level w/ RW for safety and support with verbal cues for safety and environmental awareness 2* visual deficit   Additional items Rolling walker   Toilet Transfers   Toilet Transfers Comments Pt completing all toileting hygiene/tasks standing at toilet   Subjective   Subjective \"I can work with you\"   Cognition   Overall Cognitive Status WFL   Arousal/Participation Alert;Responsive;Arousable;Cooperative   Attention Within functional limits   Orientation Level Oriented X4   Memory Within functional limits   Following Commands Follows one step commands without difficulty   Comments Pt pleasant and cooperative; flat affect; anxious @ times when discussing rehab/recovery however agreeable " and receptive to all education provided; significant increased education provided regarding typical rehab stay/expectations in which all pt and wife's questions were answered and they were both receptive of all education provided   Activity Tolerance   Activity Tolerance Patient tolerated treatment well   Assessment   Assessment Pt is a 65 yo male who actively participated in skilled OT session on 7/1/2025. Treatment focused to improve functional transfers with fall prevention strategies, static/dynamic balance, postural/trunk control, proper body mechanics, functional use of b/l UE's, higher level cognitive functions, safety awareness, and overall increased activity tolerance in ADL/IADL/leisure tasks. Upon arrival, pt found lying supine in bed and was agreeable to OT session. Pt performing bed mobility tasks @ supervision level and completed all functional transfers @ supervision level w/ RW w/ increased time. Pt completed household functional mobility distances <> bathroom with close supervision/CGA w/ RW for safety and support and completed all toileting hygiene tasks @ supervision level w/ RW for steadying/support. Pt completed additional household functional mobility distances w/ CGA/close supervision w/ RW to simulate common ADL/IADL routine distances. At the end of the session, pt was left lying supine in bed with all functional needs in reach. Pt demonstrates gradual functional improvements towards OT goals however continues to be functioning below occupational baseline and is still limited by the following limitations/impairments which were addressed through skilled instruction: visual deficits, generalized weakness, balance, endurance/activity tolerance, postural/trunk control, strength, pain, and safety awareness. At this time, recommend discharge w/ Level 1 resources when medically stable.   The patient's raw score on the AM-PAC Daily Activity Inpatient Short Form is 19. A raw score of greater than or  equal to 19 suggests the patient may benefit from discharge to post-acute rehabilitation services. Please refer to the recommendation of the Occupational Therapist for safe discharge planning.  Established OT goals will be continued 3-5x/wk to address acute care needs and underlying performance skills to maximize occupational performance and safety to return to [facility].         CARE SCORES:  Self Care:  Eatin: Supervision or touching  assistance  Groomin: Supervision or touching  assistance  Shower/bathing self: 03: Partial/moderate assistance  Upper body dressin: Supervision or touching  assistance  Lower body dressin: Partial/moderate assistance  Putting on/taking off footwear: 03: Partial/moderate assistance  Toilet hygiene: 04: Supervision or touching  assistance   Transfers:  Roll left and right: 04: Supervision or touching  assistance  Sit to lyin: Supervision or touching  assistance  Lying to sitting on side of bed: 04: Supervision or touching  assistance  Sit to stand: 04: Supervision or touching  assistance  Chair/bed to chair transfer: 04: Supervision or touching  assistance  Toilet transfer: 09: Not applicable  Mobility:  Walk 10 ft: 03: Partial/moderate assistance  Walk 50 ft with two turns: 03: Partial/moderate assistance  Walk 150ft: 88: Not attempted due to medical conditions or safety concerns    CURRENT GAP IN FUNCTION  Prior to Admission:  Functional Status: Patient was independent with mobility/ambulation, transfers, and needed min A for ADLs.    Expected functional outcomes: It is expected that with skilled acute rehabilitation services the patient will progress to Minimal Assistance for self care and Independent for mobility     Estimated length of stay: 10 to 14 days    Anticipated Post-Discharge Disposition/Treatment  Disposition: Return to previous home/apartment.  Outpatient Services: Physical Therapy (PT) and Occupational Therapy (OT)    BARRIERS TO  DISCHARGE  Weakness, Pain, Balance Difficulty, Fatigue, Home Accessibility, Caregiver Accessibility, and Equipment Needs    INTERVENTIONS FOR DISCHARGE  Adaptive equipment, Patient/Family/Caregiver Education, Community Resources, Arrange DME needs, Therapy exercises, and Energy conservation education     REQUIRED THERAPY:  Patient will require PT and OT 90 minutes each per day, five days per week to achieve rehab goals.     REQUIRED FUNCTIONAL AND MEDICAL MANAGEMENT FOR INPATIENT REHABILITATION:  Skin:  monitor skin for breakdown, Pain Management: Overall pain is moderately controlled, Deep Vein Thrombosis (DVT) Prophylaxis:  per MD orders, Diabetes Management: continue sliding scale insulin, patient to do finger sticks as ordered, SLIM to continue to manage diabetes, further internal medicine management of additional medical conditions while on ARC, PT/OT intervention, patient/family education and training, and any needed consults PRN.    RECOMMENDED LEVEL OF CARE:    Pt is a 64 year old male with PMH of alcohol dependence, chew tobacco use, CAD s/p stent, HTN, T2DM, diabetic neuropathy, right eye blindness, and ambulatory dysfunction who initially presented to [facility] as a trauma alert on 6/24 after a fall with a complaint of left shoulder pain. Trauma work-up was negative. X-ray left humerus revealed a 2 mm foreign body along the medial distal left arm. Low suspicion for septic arthritis as a source pf bacteremia. Orthopedic Surgery was consulted. Recommended outpatient MRI left shoulder. CT C/A/P with contrast was negative for acute pathology and only showed a new incidental pulmonary nodule. On admission, patient was afebrile but tachycardic and tachypneic. Initial labs revealed high anion gap acidosis with elevated lactic acid, mild procalcitonin elevation, mild AST/ALT/ALP elevation. Due to concern for sepsis, blood cultures were obtained and Vancomycin/cefepime were administered by Trauma service, and  patient was admitted to [facility]. Lactic acidosis was initially thought to be secondary to metformin use. ID consulted for 1 out of 2 blood cultures from admission isolated coagulase-negative staph along with Klebsiella. The former is likely contaminant. The latter is difficult to classify as a contaminant but it may be given patient's frail skin and easy bleeding/bruising. It may also be transient in the setting of his fall. He otherwise has no other localizing complaints. CT imaging of the abdomen and pelvis unremarkable. Ortho evaluation appreciated and no plans for imaging of shoulder based on exam. MRI L-spine obtained which was negative. Pt given cefepime 2 g every 8 hours through 6/30 to complete 7 days total.    Pt was independent PTA with transfers, amb, and Min A for ADLs. Pt is currently functioning below baseline needing up to Min A for ADLs and Min A for transfers/amb. Pt would benefit from [facility] admission to have close medical management while participating in 3 hours of therapy per day that will include physical and occupational therapy.  PM&R to maximize function and provide medical oversight. The MD will monitor patient co-morbidities while on the unit as well as order any additional tests, labs, and consults needed. The [facility] specialized interdisciplinary team will meet weekly to discuss patient overall medical status and rehab goals in preparation for D/C home. Inpatient acute rehab is recommended for patient to maximize overall strength, endurance, self care, and mobility for a safe and timely transition back home.              [1] No past medical history on file.  [2] No past surgical history on file.  [3]   Current Facility-Administered Medications:     acetaminophen (TYLENOL) tablet 650 mg, 650 mg, Oral, Q4H PRN, She Jurado MD    aspirin chewable tablet 81 mg, 81 mg, Oral, Daily, She Jurado MD, 81 mg at 07/02/25 0826    atorvastatin (LIPITOR) tablet 20 mg, 20 mg, Oral, Daily, She Jurado MD, 20 mg at  07/02/25 0826    benzonatate (TESSALON PERLES) capsule 200 mg, 200 mg, Oral, TID PRN, She Jurado MD, 200 mg at 06/30/25 1124    bisacodyl (DULCOLAX) rectal suppository 10 mg, 10 mg, Rectal, Daily PRN, She Jurado MD    cyanocobalamin (VITAMIN B-12) tablet 1,000 mcg, 1,000 mcg, Oral, Daily, She Jurado MD, 1,000 mcg at 07/02/25 0826    enoxaparin (LOVENOX) subcutaneous injection 40 mg, 40 mg, Subcutaneous, Daily, She Jurado MD, 40 mg at 07/02/25 0825    ergocalciferol (VITAMIN D2) capsule 50,000 Units, 50,000 Units, Oral, Weekly, Poncho Hanson, DO, 50,000 Units at 06/29/25 1249    fluticasone (FLONASE) 50 mcg/act nasal spray 2 spray, 2 spray, Each Nare, BID, She Jurado MD, 2 spray at 07/02/25 0828    folic acid (FOLVITE) tablet 1 mg, 1 mg, Oral, Daily, She Jurado MD, 1 mg at 07/02/25 0825    gabapentin (NEURONTIN) capsule 100 mg, 100 mg, Oral, HS, She Jurado MD, 100 mg at 07/01/25 2149    HYDROmorphone (DILAUDID) injection 0.5 mg, 0.5 mg, Intravenous, Q3H PRN, She Jurado MD    insulin glargine (LANTUS) subcutaneous injection 6 Units 0.06 mL, 6 Units, Subcutaneous, HS, She Jurado MD, 6 Units at 07/01/25 2149    insulin lispro (HumALOG/ADMELOG) 100 units/mL subcutaneous injection 1-6 Units, 1-6 Units, Subcutaneous, TID AC, 1 Units at 07/01/25 1609 **AND** Fingerstick Glucose (POCT), , , TID AC, She Jurado MD    loratadine (CLARITIN) tablet 10 mg, 10 mg, Oral, Daily, She Jurado MD, 10 mg at 07/02/25 0826    methocarbamol (ROBAXIN) tablet 500 mg, 500 mg, Oral, Q6H PRN, She Jurado MD, 500 mg at 07/01/25 0420    metoprolol tartrate (LOPRESSOR) tablet 50 mg, 50 mg, Oral, Q12H TERRENCE, She Jurado MD, 50 mg at 07/02/25 0826    nicotine (NICODERM CQ) 7 mg/24hr TD 24 hr patch 7 mg, 7 mg, Transdermal, Daily PRN, She Jurado MD, 7 mg at 06/28/25 0824    oxyCODONE (ROXICODONE) IR tablet 5 mg, 5 mg, Oral, Q4H PRN, 5 mg at 06/29/25 1032 **OR** oxyCODONE (ROXICODONE) immediate release tablet 10 mg, 10 mg, Oral, Q4H PRN, She Jurado MD, 10 mg  at 07/01/25 0832    polyethylene glycol (MIRALAX) packet 17 g, 17 g, Oral, Daily, She Jurado MD, 17 g at 07/01/25 0807    prasugrel (EFFIENT) tablet 10 mg, 10 mg, Oral, Daily, She Jurado MD, 10 mg at 07/02/25 0828    senna (SENOKOT) tablet 17.2 mg, 2 tablet, Oral, BID, She Jurado MD, 17.2 mg at 07/01/25 0807    thiamine (VITAMIN B1) 100 mg in dextrose 5 % 100 mL IVPB, 100 mg, Intravenous, TID, She Jurado MD, Last Rate: 200 mL/hr at 07/02/25 0828, 100 mg at 07/02/25 0828

## 2025-07-01 NOTE — QUICK NOTE
"[facility] Service Attending Physician Attestation Note - Progress Note    I have seen and examined Aquilino Dowell personally and have reviewed the medical record independently.  I have reviewed the case with the resident physician including all assessments and the plan of care for each.  I agree with the resident physician and offer the following addendum to the below statements by the resident physician:     Date Evaluated: July 1st 2025  Time Evaluated: morning.     Subjective / HPI:   This is a 4-year-old male with polymicrobial bacteremia, completed antibiotics.  Currently waiting for rehab.  Discussed with case management and patient is planned for transfer to [facility] when there is a bed available.    Exam:   Patient is nontoxic-appearing  Awake and alert   States he is anxious about moving to rehab. \"Does not know what to expect\" . Re assured him and explained regarding DC destination.   No edema .  Abdomen SNT, BS present.       Patient Centered Rounds: I have performed bedside rounds with nursing staff today.    Discussions with Specialists or Other Care Team Provider: Discussed with CM.     Education and Discussions with Family / Patient: Discussed with wife at bedside.     Time Spent for Care: 45 minutes.  More than 50% of total time spent on counseling and coordination of care as described above.    Current Length of Stay: 3 day(s)    Current Patient Status: Inpatient   Certification Statement: The patient will continue to require additional inpatient hospital stay due to awaiting rehab.     Discharge Plan / Estimated Discharge Date: Medically stable for DC.     For detailed history, assessment, and plan of care, please review the statements below by the resident physician .    Oksana Wren MD    "

## 2025-07-02 ENCOUNTER — HOSPITAL ENCOUNTER (INPATIENT)
Facility: HOSPITAL | Age: 65
LOS: 13 days | Discharge: HOME | DRG: 949 | End: 2025-07-15
Attending: INTERNAL MEDICINE | Admitting: FAMILY MEDICINE
Payer: COMMERCIAL

## 2025-07-02 VITALS
RESPIRATION RATE: 21 BRPM | OXYGEN SATURATION: 97 % | HEART RATE: 78 BPM | TEMPERATURE: 97.5 F | BODY MASS INDEX: 26.13 KG/M2 | TEMPERATURE: 97.5 F | HEART RATE: 78 BPM | HEIGHT: 72 IN | BODY MASS INDEX: 26.13 KG/M2 | WEIGHT: 192.9 LBS | HEIGHT: 72 IN | SYSTOLIC BLOOD PRESSURE: 137 MMHG | DIASTOLIC BLOOD PRESSURE: 83 MMHG | RESPIRATION RATE: 21 BRPM | DIASTOLIC BLOOD PRESSURE: 83 MMHG | WEIGHT: 192.9 LBS | OXYGEN SATURATION: 97 % | SYSTOLIC BLOOD PRESSURE: 137 MMHG

## 2025-07-02 DIAGNOSIS — K76.0 STEATOSIS OF LIVER: ICD-10-CM
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
## 2025-07-02 DIAGNOSIS — R11.2 NAUSEA AND VOMITING, UNSPECIFIED VOMITING TYPE: ICD-10-CM

## 2025-07-02 DIAGNOSIS — A41.9 SEPSIS (HCC): ICD-10-CM

## 2025-07-02 DIAGNOSIS — D64.9 ANEMIA, UNSPECIFIED TYPE: ICD-10-CM

## 2025-07-02 DIAGNOSIS — E83.42 HYPOMAGNESEMIA: ICD-10-CM

## 2025-07-02 DIAGNOSIS — M54.9 BACK PAIN: ICD-10-CM

## 2025-07-02 DIAGNOSIS — G62.9 NEUROPATHY: Primary | ICD-10-CM

## 2025-07-02 DIAGNOSIS — I50.42 CHRONIC COMBINED SYSTOLIC AND DIASTOLIC HEART FAILURE (HCC): ICD-10-CM

## 2025-07-02 DIAGNOSIS — K59.00 CONSTIPATION: ICD-10-CM

## 2025-07-02 DIAGNOSIS — R74.8 ABNORMAL LIVER ENZYMES: ICD-10-CM

## 2025-07-02 DIAGNOSIS — E80.6 HYPERBILIRUBINEMIA: ICD-10-CM

## 2025-07-02 DIAGNOSIS — E11.9 DIABETES (HCC): ICD-10-CM

## 2025-07-02 DIAGNOSIS — M25.512 LEFT SHOULDER PAIN: ICD-10-CM

## 2025-07-02 DIAGNOSIS — B35.1 ONYCHOMYCOSIS: ICD-10-CM

## 2025-07-02 DIAGNOSIS — F10.90 ALCOHOL USE DISORDER: ICD-10-CM

## 2025-07-02 DIAGNOSIS — K21.9 GERD (GASTROESOPHAGEAL REFLUX DISEASE): ICD-10-CM

## 2025-07-02 DIAGNOSIS — R00.0 TACHYCARDIA: ICD-10-CM

## 2025-07-02 DIAGNOSIS — E55.9 VITAMIN D DEFICIENCY: ICD-10-CM

## 2025-07-02 PROBLEM — E11.319 DIABETIC RETINOPATHY (HCC): Status: ACTIVE | Noted: 2025-07-02

## 2025-07-02 PROBLEM — R91.1 LUNG NODULE: Status: ACTIVE | Noted: 2025-07-02

## 2025-07-02 PROBLEM — F32.A DEPRESSION: Status: ACTIVE | Noted: 2025-07-02

## 2025-07-02 PROBLEM — F17.200 TOBACCO USE DISORDER: Status: ACTIVE | Noted: 2025-07-02

## 2025-07-02 PROBLEM — Z91.89 AT RISK FOR VENOUS THROMBOEMBOLISM (VTE): Status: ACTIVE | Noted: 2025-07-02

## 2025-07-02 LAB
ANION GAP SERPL CALCULATED.3IONS-SCNC: 7 MMOL/L (ref 4–13)
ANION GAP SERPL CALCULATED.3IONS-SCNC: 7 MMOL/L (ref 4–13)
BUN SERPL-MCNC: 4 MG/DL (ref 5–25)
BUN SERPL-MCNC: 4 MG/DL (ref 5–25)
CALCIUM SERPL-MCNC: 8 MG/DL (ref 8.4–10.2)
CALCIUM SERPL-MCNC: 8 MG/DL (ref 8.4–10.2)
CHLORIDE SERPL-SCNC: 102 MMOL/L (ref 96–108)
CHLORIDE SERPL-SCNC: 102 MMOL/L (ref 96–108)
CO2 SERPL-SCNC: 25 MMOL/L (ref 21–32)
CO2 SERPL-SCNC: 25 MMOL/L (ref 21–32)
CREAT SERPL-MCNC: 0.68 MG/DL (ref 0.6–1.3)
CREAT SERPL-MCNC: 0.68 MG/DL (ref 0.6–1.3)
ERYTHROCYTE [DISTWIDTH] IN BLOOD BY AUTOMATED COUNT: 15.6 % (ref 11.6–15.1)
ERYTHROCYTE [DISTWIDTH] IN BLOOD BY AUTOMATED COUNT: 15.6 % (ref 11.6–15.1)
GFR SERPL CREATININE-BSD FRML MDRD: 101 ML/MIN/1.73SQ M
GFR SERPL CREATININE-BSD FRML MDRD: 101 ML/MIN/1.73SQ M
GLUCOSE SERPL-MCNC: 143 MG/DL (ref 65–140)
GLUCOSE SERPL-MCNC: 143 MG/DL (ref 65–140)
GLUCOSE SERPL-MCNC: 144 MG/DL (ref 65–140)
GLUCOSE SERPL-MCNC: 144 MG/DL (ref 65–140)
GLUCOSE SERPL-MCNC: 148 MG/DL (ref 65–140)
GLUCOSE SERPL-MCNC: 148 MG/DL (ref 65–140)
GLUCOSE SERPL-MCNC: 188 MG/DL (ref 65–140)
GLUCOSE SERPL-MCNC: 188 MG/DL (ref 65–140)
GLUCOSE SERPL-MCNC: 220 MG/DL (ref 65–140)
GLUCOSE SERPL-MCNC: 220 MG/DL (ref 65–140)
HCT VFR BLD AUTO: 34.5 % (ref 36.5–49.3)
HCT VFR BLD AUTO: 34.5 % (ref 36.5–49.3)
HGB BLD-MCNC: 11.5 G/DL (ref 12–17)
HGB BLD-MCNC: 11.5 G/DL (ref 12–17)
MCH RBC QN AUTO: 33.3 PG (ref 26.8–34.3)
MCH RBC QN AUTO: 33.3 PG (ref 26.8–34.3)
MCHC RBC AUTO-ENTMCNC: 33.3 G/DL (ref 31.4–37.4)
MCHC RBC AUTO-ENTMCNC: 33.3 G/DL (ref 31.4–37.4)
MCV RBC AUTO: 100 FL (ref 82–98)
MCV RBC AUTO: 100 FL (ref 82–98)
PLATELET # BLD AUTO: 137 THOUSANDS/UL (ref 149–390)
PLATELET # BLD AUTO: 137 THOUSANDS/UL (ref 149–390)
PMV BLD AUTO: 10.1 FL (ref 8.9–12.7)
PMV BLD AUTO: 10.1 FL (ref 8.9–12.7)
POTASSIUM SERPL-SCNC: 3.9 MMOL/L (ref 3.5–5.3)
POTASSIUM SERPL-SCNC: 3.9 MMOL/L (ref 3.5–5.3)
RBC # BLD AUTO: 3.45 MILLION/UL (ref 3.88–5.62)
RBC # BLD AUTO: 3.45 MILLION/UL (ref 3.88–5.62)
SODIUM SERPL-SCNC: 134 MMOL/L (ref 135–147)
SODIUM SERPL-SCNC: 134 MMOL/L (ref 135–147)
WBC # BLD AUTO: 6.19 THOUSAND/UL (ref 4.31–10.16)
WBC # BLD AUTO: 6.19 THOUSAND/UL (ref 4.31–10.16)

## 2025-07-02 PROCEDURE — 82948 REAGENT STRIP/BLOOD GLUCOSE: CPT

## 2025-07-02 PROCEDURE — 99239 HOSP IP/OBS DSCHRG MGMT >30: CPT | Performed by: INTERNAL MEDICINE

## 2025-07-02 PROCEDURE — NC001 PR NO CHARGE: Performed by: PHYSICAL MEDICINE & REHABILITATION

## 2025-07-02 PROCEDURE — 80048 BASIC METABOLIC PNL TOTAL CA: CPT

## 2025-07-02 PROCEDURE — 99223 1ST HOSP IP/OBS HIGH 75: CPT | Performed by: INTERNAL MEDICINE

## 2025-07-02 PROCEDURE — 85027 COMPLETE CBC AUTOMATED: CPT

## 2025-07-02 PROCEDURE — 99255 IP/OBS CONSLTJ NEW/EST HI 80: CPT | Performed by: PHYSICAL MEDICINE & REHABILITATION

## 2025-07-02 RX ORDER — ACETAMINOPHEN 325 MG/1
650 TABLET ORAL EVERY 4 HOURS PRN
Start: 2025-07-02 | End: 2025-07-15

## 2025-07-02 RX ORDER — ACETAMINOPHEN 325 MG/1
650 TABLET ORAL EVERY 4 HOURS PRN
Status: DISCONTINUED | OUTPATIENT
Start: 2025-07-02 | End: 2025-07-02

## 2025-07-02 RX ORDER — GABAPENTIN 100 MG/1
100 CAPSULE ORAL
Status: ON HOLD
Start: 2025-07-02 | End: 2025-07-14

## 2025-07-02 RX ORDER — LORATADINE 10 MG/1
10 TABLET ORAL DAILY
Status: DISCONTINUED | OUTPATIENT
Start: 2025-07-03 | End: 2025-07-15 | Stop reason: HOSPADM

## 2025-07-02 RX ORDER — ENOXAPARIN SODIUM 100 MG/ML
40 INJECTION SUBCUTANEOUS DAILY
Status: DISCONTINUED | OUTPATIENT
Start: 2025-07-03 | End: 2025-07-15 | Stop reason: HOSPADM

## 2025-07-02 RX ORDER — SENNOSIDES 8.6 MG
2 TABLET ORAL 2 TIMES DAILY
Status: DISCONTINUED | OUTPATIENT
Start: 2025-07-02 | End: 2025-07-07

## 2025-07-02 RX ORDER — INSULIN LISPRO 100 [IU]/ML
1-5 INJECTION, SOLUTION INTRAVENOUS; SUBCUTANEOUS
Status: DISCONTINUED | OUTPATIENT
Start: 2025-07-02 | End: 2025-07-15 | Stop reason: HOSPADM

## 2025-07-02 RX ORDER — ACETAMINOPHEN 325 MG/1
650 TABLET ORAL EVERY 8 HOURS PRN
Status: DISCONTINUED | OUTPATIENT
Start: 2025-07-02 | End: 2025-07-15 | Stop reason: HOSPADM

## 2025-07-02 RX ORDER — LANOLIN ALCOHOL/MO/W.PET/CERES
100 CREAM (GRAM) TOPICAL DAILY
Status: DISCONTINUED | OUTPATIENT
Start: 2025-07-03 | End: 2025-07-15 | Stop reason: HOSPADM

## 2025-07-02 RX ORDER — BENZONATATE 100 MG/1
200 CAPSULE ORAL 3 TIMES DAILY PRN
Status: DISCONTINUED | OUTPATIENT
Start: 2025-07-02 | End: 2025-07-15 | Stop reason: HOSPADM

## 2025-07-02 RX ORDER — MUSCLE RUB CREAM 100; 150 MG/G; MG/G
CREAM TOPICAL 4 TIMES DAILY
Status: DISCONTINUED | OUTPATIENT
Start: 2025-07-02 | End: 2025-07-15 | Stop reason: HOSPADM

## 2025-07-02 RX ORDER — PRASUGREL 10 MG/1
10 TABLET, FILM COATED ORAL DAILY
Status: DISCONTINUED | OUTPATIENT
Start: 2025-07-03 | End: 2025-07-15 | Stop reason: HOSPADM

## 2025-07-02 RX ORDER — ATORVASTATIN CALCIUM 20 MG/1
20 TABLET, FILM COATED ORAL DAILY
Status: DISCONTINUED | OUTPATIENT
Start: 2025-07-03 | End: 2025-07-08

## 2025-07-02 RX ORDER — BISACODYL 10 MG
10 SUPPOSITORY, RECTAL RECTAL DAILY PRN
Status: DISCONTINUED | OUTPATIENT
Start: 2025-07-02 | End: 2025-07-07

## 2025-07-02 RX ORDER — INSULIN LISPRO 100 [IU]/ML
1-6 INJECTION, SOLUTION INTRAVENOUS; SUBCUTANEOUS
Status: DISCONTINUED | OUTPATIENT
Start: 2025-07-02 | End: 2025-07-15 | Stop reason: HOSPADM

## 2025-07-02 RX ORDER — OXYCODONE HYDROCHLORIDE 10 MG/1
10 TABLET ORAL EVERY 4 HOURS PRN
Refills: 0 | Status: DISCONTINUED | OUTPATIENT
Start: 2025-07-02 | End: 2025-07-02

## 2025-07-02 RX ORDER — ERGOCALCIFEROL 1.25 MG/1
50000 CAPSULE, LIQUID FILLED ORAL WEEKLY
Status: DISCONTINUED | OUTPATIENT
Start: 2025-07-06 | End: 2025-07-15 | Stop reason: HOSPADM

## 2025-07-02 RX ORDER — ASPIRIN 81 MG/1
81 TABLET, CHEWABLE ORAL DAILY
Status: DISCONTINUED | OUTPATIENT
Start: 2025-07-03 | End: 2025-07-15 | Stop reason: HOSPADM

## 2025-07-02 RX ORDER — OXYCODONE HYDROCHLORIDE 5 MG/1
5 TABLET ORAL EVERY 4 HOURS PRN
Refills: 0 | Status: DISCONTINUED | OUTPATIENT
Start: 2025-07-02 | End: 2025-07-14

## 2025-07-02 RX ORDER — FOLIC ACID 1 MG/1
1 TABLET ORAL DAILY
Status: DISCONTINUED | OUTPATIENT
Start: 2025-07-03 | End: 2025-07-15 | Stop reason: HOSPADM

## 2025-07-02 RX ORDER — METHOCARBAMOL 500 MG/1
500 TABLET, FILM COATED ORAL EVERY 6 HOURS PRN
Status: DISCONTINUED | OUTPATIENT
Start: 2025-07-02 | End: 2025-07-15 | Stop reason: HOSPADM

## 2025-07-02 RX ORDER — FLUTICASONE PROPIONATE 50 MCG
2 SPRAY, SUSPENSION (ML) NASAL 2 TIMES DAILY
Status: DISCONTINUED | OUTPATIENT
Start: 2025-07-02 | End: 2025-07-15 | Stop reason: HOSPADM

## 2025-07-02 RX ORDER — GABAPENTIN 100 MG/1
100 CAPSULE ORAL
Status: DISCONTINUED | OUTPATIENT
Start: 2025-07-02 | End: 2025-07-15 | Stop reason: HOSPADM

## 2025-07-02 RX ORDER — POLYETHYLENE GLYCOL 3350 17 G/17G
17 POWDER, FOR SOLUTION ORAL DAILY
Status: DISCONTINUED | OUTPATIENT
Start: 2025-07-03 | End: 2025-07-05

## 2025-07-02 RX ORDER — METOPROLOL TARTRATE 50 MG
50 TABLET ORAL EVERY 12 HOURS SCHEDULED
Status: DISCONTINUED | OUTPATIENT
Start: 2025-07-02 | End: 2025-07-15 | Stop reason: HOSPADM

## 2025-07-02 RX ORDER — INSULIN GLARGINE 100 [IU]/ML
6 INJECTION, SOLUTION SUBCUTANEOUS
Status: DISCONTINUED | OUTPATIENT
Start: 2025-07-02 | End: 2025-07-03

## 2025-07-02 RX ORDER — OXYCODONE HYDROCHLORIDE 5 MG/1
5 TABLET ORAL EVERY 4 HOURS PRN
Refills: 0 | Status: DISCONTINUED | OUTPATIENT
Start: 2025-07-02 | End: 2025-07-02

## 2025-07-02 RX ORDER — METHOCARBAMOL 500 MG/1
500 TABLET, FILM COATED ORAL EVERY 6 HOURS PRN
Status: ON HOLD
Start: 2025-07-02 | End: 2025-07-14

## 2025-07-02 RX ADMIN — FLUTICASONE PROPIONATE 2 SPRAY: 50 SPRAY, METERED NASAL at 08:28

## 2025-07-02 RX ADMIN — ASPIRIN 81 MG: 81 TABLET, CHEWABLE ORAL at 08:26

## 2025-07-02 RX ADMIN — METOPROLOL TARTRATE 50 MG: 50 TABLET, FILM COATED ORAL at 08:26

## 2025-07-02 RX ADMIN — THIAMINE HYDROCHLORIDE 100 MG: 100 INJECTION, SOLUTION INTRAMUSCULAR; INTRAVENOUS at 08:28

## 2025-07-02 RX ADMIN — Medication 1000 MCG: at 08:26

## 2025-07-02 RX ADMIN — LORATADINE 10 MG: 10 TABLET ORAL at 08:26

## 2025-07-02 RX ADMIN — GABAPENTIN 100 MG: 100 CAPSULE ORAL at 22:01

## 2025-07-02 RX ADMIN — INSULIN GLARGINE 6 UNITS: 100 INJECTION, SOLUTION SUBCUTANEOUS at 22:01

## 2025-07-02 RX ADMIN — INSULIN LISPRO 1 UNITS: 100 INJECTION, SOLUTION INTRAVENOUS; SUBCUTANEOUS at 12:15

## 2025-07-02 RX ADMIN — FOLIC ACID 1 MG: 1 TABLET ORAL at 08:25

## 2025-07-02 RX ADMIN — MUSCLE RUB CREAM 1 APPLICATION: 100; 150 CREAM TOPICAL at 22:02

## 2025-07-02 RX ADMIN — INSULIN LISPRO 2 UNITS: 100 INJECTION, SOLUTION INTRAVENOUS; SUBCUTANEOUS at 17:17

## 2025-07-02 RX ADMIN — ATORVASTATIN CALCIUM 20 MG: 20 TABLET, FILM COATED ORAL at 08:26

## 2025-07-02 RX ADMIN — MUSCLE RUB CREAM 1 APPLICATION: 100; 150 CREAM TOPICAL at 17:19

## 2025-07-02 RX ADMIN — FLUTICASONE PROPIONATE 2 SPRAY: 50 SPRAY, METERED NASAL at 17:19

## 2025-07-02 RX ADMIN — METHOCARBAMOL 500 MG: 500 TABLET ORAL at 19:53

## 2025-07-02 RX ADMIN — ENOXAPARIN SODIUM 40 MG: 40 INJECTION SUBCUTANEOUS at 08:25

## 2025-07-02 RX ADMIN — PRASUGREL 10 MG: 10 TABLET, FILM COATED ORAL at 08:28

## 2025-07-02 RX ADMIN — METOPROLOL TARTRATE 50 MG: 50 TABLET, FILM COATED ORAL at 22:00

## 2025-07-02 NOTE — CONSULTS
PHYSICAL MEDICINE AND REHABILITATION [facility]  Aquilino Dowell 64 y.o. male MRN: [id number]  [facility]Bed#: [facility] Encounter: [id number]    Assessment & Plan  Peripheral neuropathyFall  - Has been increasingly off balance at home  - CT C-Spine without significant degenerative diseaes  - CTH with only chronic microangiopathic change  - Previous EMG showed axonal and demyelinating neuropathy + tremor, Neuro was concerned for anti-MAG neuropathy back in 2023   - They had discussed getting demyelinating neuropathy panel, but not completed as he did not f/u.   - Suspect component also related to T2DM  - Has impaired vision as well  - Has had a history of Vitamin B12 deficiency previously contributing as well (6/25 level 940)  - Also has history of alcohol use  disorder  - Unclear etiology of fall per patient.   > Ambulatory dysfunction I suspect is driven by progression of his neuropathy in addition to worsening vision impacting his ability to compensate  > PT/OT 3-5 hours/day, 5-7 days/week.  > Also has a component of deconditioning and worsening proximal weakness related to increasingly sedentary lifestyle (this is 2/2 I suspect to some depression since losing his job in November).   > Gabapentin 100mg QHS for shooting pains (higher doses made him too lethargic)  > Alcohol cessation as that can progress neuropathy  > Continue B12 supplement, optimize diabetes management.   > Check orthostatics here   > Recommend f/u with Neuromuscular Neurology   Sepsis (HCC)  Largely resolved   Found to have occult bacteremia.  S/p 7 day abx  Hemodynamically stable. Monitor.   Left shoulder pain  Examines like bicipital tendinosis with anterior pain, positive speeds, yergason.   Could also have other rotator cuff involvement with positive neers, mild external rotation weakness.  Subscapularis/internal rotators not implicated on exam  Family reports chronic L shoulder issues but worse since the fall.   6/24 XR  without acute osseous abnormality   > Plan for outpatient MRI  > Considerations in therapy   > Bengay QID  Constipation  Last BM 7/1  Continue miralax daily and senna BID  Has suppository PRN  Monitor and adjust as appropriate  At risk for venous thromboembolism (VTE)  Lovenox, SCDs, ambulation  Will not go home on lovenox.  Monitor platelet count closely.   Depression  Has lost interests he previously had, sleep has been poor, energy levels/motivation poor, feelings of guilt and frustration.   Appetite worse  All since he lost his job in November.  Wife has noticed a sharp decline  Denies suicidal ideation/self-harm.  > Discussed trial of SNRI (cymbalta may also help with pain)  > He will think about it.  > Referral to Psych at discharge   Back pain  Denies any back pain for me today.   Findings on Mri L-Spine would not explain LE symptoms  Monitor   Continue current pain management   Diabetic retinopathy (HCC)  R eye with fully impaired vision/blind  L eye with retinopathy and gets injections.   - Next due 7/14  This likely is contributing to his imbalance in the setting of his peripheral neuropathy  Coronary artery disease  S/p stent/cath 2013  Home: Prasugrel 10mg daily, ASA 81mg daily   Here: Same   Diabetes (HCC)  Lab Results   Component Value Date    HGBA1C 8.0 (H) 06/24/2025       Recent Labs     07/01/25  1559 07/01/25  2058 07/02/25  0749 07/02/25  1131   POCGLU 172* 157* 144* 188*       Blood Sugar Average: Last 72 hrs:  Home: Metformin 1000mg daily  Here: Lantus 6 units QHS, CDI/Accuchecks  Goal to transition back to home regimen.   Unclear if family checks BG at home or if he has a kit.   Diabetic Diet  Management as per IM  Alcohol use disorder  Denies history of withdrawal  Cessation counseling  Hypertension  Home: Toprol 50mg BID  Here: Same  Monitor and adjust as per IM  Sinusitis  Flonase, claritin  Hyperbilirubinemia  And chronic transaminitis  In setting of alcohol use disorder  RUQ US with  steatosis and hepatomegaly  > Counseled on cessation  > minimize hepatotoxic meds  > Outpatient f/u with GI recommended  Vitamin D deficiency  6/27 12.8  Ergocalciferol weekly for 12 weeks  Repeat with PCP   Lung nodule  Noted incidentally on imaging  Recommended for f/u chest CT in 12 months  Tobacco use disorder  Nicotine patch  Quit smoking in the 90s  Now chewing tobacco.  Cessation counseling.        Health Maintenance  #Delirium/Sleep: At risk. Optimize sleep/wake, pain, bowel, bladder management. Avoid deliriogenic meds and physical restraint. Environmental/behavioral interventions.   #Bladder: Voiding and Continent  #Skin/Pressure Injury Prevention: Turn Q2hr in bed, with weight shifts L93-50fbo in wheelchair. Float heels in bed.  #GI Prophylaxis: Not indicated  #Code Status: Full Code  #FEN: Diabetic Diet   #Dispo: ELOS 7-10 days with goals to discharge home at a Sup-mod Ind level. With support from his wife. They have a trip to [place] planned on August 6th.   > F/U PCP, Neuromuscular, Orthopedics     80 minutes (23384) or greater spent for this encounter which included a combination of face-to-face time with the patient and non-face-to-face time. Face-to-face time included extended discussion with patient regarding current condition, medical history, mood, medical/rehabilitation management, and disposition.  Non face-to-face time included coordination of care with patient's co-managing AP and/or physician(s) thru communication and review of their recent documentation as well as reviewing vitals, bowel/bladder function, recent labs, diagnostic imaging, and notes from therapy, CM, and nursing.      Rehab Diagnosis: Impairment of mobility, safety and Activities of Daily Living (ADLs) due to Neurologic Conditions:  03.8  Neuromuscular Disorders  Etiologic Diagnosis: Axonal/Demyelinating peripheral neuropathy     History of Present Illness:   Aquilino Dowell is a 64 y.o. male with medical history of  osteoarthritis, T2DM, Alcohol and tobacco use disorder, MCI, CAD s/p stent, peripheral neuropathy, R eye blindness who presented to the [facility] on 6/24 after an unwitnessed fall the night prior. Found by his daughter on the ground. On admission L shoulder without acute injury, noted to have anion gap acidosis, tachypnea, and tachycardia. Worked up for SIRS criteria/concern for sepsis but work-up was unrevealing for source One blood culture grew Klebsiella and coag-neg staph. Ortho was consulted for shoulder pain - rotator cuff vs. Arthritis flare. Recommended outpatient MRI of L shoulder. MRI Spine negative for infectious source, RUQ US unrevealing - checked for elevated LFTs and increasing direct bilirubinemia.CT CAP and UA were bland. He completed 7 day course of abx for bacteremia. He was started on IV thiamine 100mg TID, and transitioned to oral. He stabilized and was admitted to the [facility] on 7/2.     Subjective: Seems down. Bowel/bladder continent. No issues. No lightheadedness, dizziness. He does not remember how he fell .Was found face up, head toward his bathroom next to his bed. States he has been having trouble balancing at home. Furniture surfaces, even in the shower, can't close his eyes while standing, without holding onto a grab bar. Gets pains in his feet mostly (moreso than his hands), has some difficulty with buttoning shirts (but gets it done), Finds his feet feel extremely heavy and difficult to move at times. Wife notices a sharp decline in activity, function and mood coinciding with him losing his job in November. His sleep has been poor, energy, appetite levels are poor. He has become more sedentary. He expresses frustration with his declining function. L shoulder hurts, wife note it's been worse since the fall. Pain mostly anterior. No new CP, SOB. No new neck pain or back pain. No new fevers, chills. They are not sure the source of his bacteremia either.  His grandson is a big motivator for him. He denies suicidal ideation/self-harm.     Review of Systems: A 10-point review of systems was performed. Negative except as listed above.      Drug regimen reviewed, all potential adverse effects identified and addressed:    Scheduled Meds:  Current Facility-Administered Medications   Medication Dose Route Frequency Provider Last Rate    acetaminophen  650 mg Oral Q4H PRN Nadiya Torogrprincees, PA-C      [START ON 7/3/2025] aspirin  81 mg Oral Daily Nadiya Garberes, PA-C      [START ON 7/3/2025] atorvastatin  20 mg Oral Daily Nadiya Seagrprincees, PA-C      benzonatate  200 mg Oral TID PRN Nadiyapawel Garberes, PA-C      bisacodyl  10 mg Rectal Daily PRN Nadiya Garberes, PA-C      [START ON 7/3/2025] vitamin B-12  1,000 mcg Oral Daily Nadiya Garberes, PA-C      [START ON 7/3/2025] enoxaparin  40 mg Subcutaneous Daily Nadiya Shah, PA-C      [START ON 7/6/2025] ergocalciferol  50,000 Units Oral Weekly Nadiyapawel Shah, PA-C      fluticasone  2 spray Each Nare BID Nadiya Shah, PA-C      [START ON 7/3/2025] folic acid  1 mg Oral Daily Nadiya Jcarloses, PA-C      gabapentin  100 mg Oral HS Nadiya Shah, PA-C      insulin glargine  6 Units Subcutaneous HS Nadiya Shah, PA-C      insulin lispro  1-5 Units Subcutaneous HS Nadiya Garberes, PA-C      insulin lispro  1-6 Units Subcutaneous TID AC Nadiya Garberes, PA-C      [START ON 7/3/2025] loratadine  10 mg Oral Daily Nadiya Jcarloses, PA-C      menthol-methyl salicylate   Apply externally 4x Daily Nadiyapawel Garberes, PA-C      [Held by provider] metFORMIN  1,000 mg Oral Daily With Breakfast Nadiya Shah, PA-C      methocarbamol  500 mg Oral Q6H PRN Nadiya Garberes, PA-C      metoprolol tartrate  50 mg Oral Q12H TERRENCE Nadiya Garberes, PA-C      nicotine  7 mg Transdermal Daily Nadiya Shah PA-C      oxyCODONE  5 mg Oral Q4H PRN Nadiya Shah PA-C      Or    oxyCODONE  10 mg Oral Q4H PRN Nadiya Shah PA-C      [START ON 7/3/2025]  polyethylene glycol  17 g Oral Daily Nadiya ShahZACH      [START ON 7/3/2025] prasugrel  10 mg Oral Daily Nadiya ShahZACH      senna  2 tablet Oral BID Nadiya ShahZACH      [START ON 7/3/2025] thiamine  100 mg Oral Daily Nadiya ShahZACH          Restrictions include:   Fall precautions      Functional History/Home Set-up - Prior to Admission:    Lives with his spouse (she is a teacher, off for the summer)  1 SH with 3 FARTUN   Was previously independent for ambulation within the home (but furniture surfs)  SPC in community    Functional Status Upon Admission to [facility]:  Mobility: Sup bed mobility, Sup transfers, Petra 55' with RW   Transfers: Petra transfers  ADLs: Sup with ADLs ,except LB dressing Petra      Physical Exam:  Temp:  [97.4 °F (36.3 °C)-98.6 °F (37 °C)] 97.4 °F (36.3 °C)  HR:  [73-85] 85  Resp:  [18] 18  BP: (128-148)/(75-84) 128/75  SpO2:  [95 %-98 %] 98 %    Gen: No acute distress, disheveled   HEENT: Moist mucus membranes, Normocephalic/Atraumatic  Cardiovascular: Regular rate, rhythm, S1/S2. Distal pulses palpable  Heme/Extr: No edema  Pulmonary: Non-labored breathing. Lungs CTAB  : No dolan  GI: Soft, non-tender, non-distended.   MSK:   L shoulder - functional ROM  Pain with Neers toward end range.  No pain with ayon  5/5 without pain with internal rotation  4/5 without pain with external rotation  Pain with yergason's  Pain with speeds  Pain with palpating over coracoid.   No pain with lift off or bear hug or apley scarf.    Integumentary: Skin is warm, dry.   Neuro: AAOx3, CN 2-12 intact except R eye impaired vision and EOM grossly. L eye with impaired acuity but EOMI. Stocking > glove distribution sensory impairment Speech is intact. Appropriate to questioning. Tone is normal. Absent yovana/babinski. No nereida dysmetria/ataxia. Has some dysdiadochokinesia bilaterally.   MMT:   Strength:   Right  Left  Site  Right  Left  Site    4 4*  S Ab: Shoulder Abductors  3+ 3+  HF: Hip  Flexors    5 5  EF: Elbow Flexors  4  4 KF: Knee Flexors    5  5  EE: Elbow Extensors  4  4  KE: Knee Extensors    5  5  WE: Wrist Extensors  5  5  DR: Dorsi Flexors    4  4  FF: Finger Flexors  5  5  PF: Plantar Flexors    4 4  HI: Hand Intrinsics  3+  3+  EHL: Extensor Hallucis Longus   Psych: Depressed mood and affect.       Laboratory:    Results from last 7 days   Lab Units 07/02/25  0825 06/30/25  0536 06/29/25  0538   HEMOGLOBIN g/dL 11.5* 11.3* 11.1*   HEMATOCRIT % 34.5* 34.7* 34.6*   WBC Thousand/uL 6.19 4.25* 4.00*     Results from last 7 days   Lab Units 07/02/25  0825 06/30/25  0536 06/29/25  0538 06/28/25  0526   BUN mg/dL 4* 5 8 6   SODIUM mmol/L 134* 137 135 132*   POTASSIUM mmol/L 3.9 3.5 3.8 3.8   CHLORIDE mmol/L 102 105 103 99   CREATININE mg/dL 0.68 0.76 0.88 0.65   AST U/L  --  88* 94* 84*   ALT U/L  --  27 30 34     Results from last 7 days   Lab Units 06/27/25  0930   PROTIME seconds 14.0   INR  1.01        Wt Readings from Last 1 Encounters:   07/02/25 87.2 kg (192 lb 3.2 oz)     Estimated body mass index is 26.07 kg/m² as calculated from the following:    Height as of this encounter: 6' (1.829 m).    Weight as of this encounter: 87.2 kg (192 lb 3.2 oz).    Imaging: reviewed   6/24 CT C-Spine:  No cervical spine fracture or traumatic malalignment.     6/24 CTH:  No acute intracranial abnormality.     6/24 XR L Shoulder:  No acute osseous abnormality.     Degenerative changes as described.    6/24 XR L humerus:  No acute osseous abnormality.     2 mm foreign body suggested along the medial distal left arm.    6/24 L forearm:  No acute osseous abnormality.     6/24 CT CAP:  1.  No findings for acute traumatic injury in the chest, abdomen or pelvis.  2.  New 4 mm right upper lobe nodule. Based on current Fleischner Society 2017 Guidelines on incidental pulmonary nodule, no routine follow-up is needed if the patient is low risk. If the patient is high risk, optional follow-up chest CT at 12 months  can   be considered.     6/25 MRI L-Spine:  No discitis/osteomyelitis in lumbar spine, as clinically questioned.     Small focal areas of enhancement in the L1-L2 and L2-L3 supraspinous ligament regions, suspicious for enthesitis.     Mild multilevel degenerative changes of lumbar spine, as detailed above. No significant canal stenosis or foraminal narrowing.    6/26 RUQ US:  Hepatomegaly with steatosis      Rehabilitation Prognosis: fair    Tolerance for three hours of therapy a day: good     Family/Patient Goals:  Patient/family's goals: Return to previous home/apartment.    Patient will receive PT and OT 90 minutes each per day, five days per week to achieve rehab goals or participate in 900 minutes of therapy within a 7 day week period.    Mobility Goals: Supervision / Standby Assist  Transfer Goals: Supervision / Standby Assist  Activities of Daily Living (ADLs) Goals: Supervision / Standby Assist    Discharge Planning:  Rehabilitation and discharge goals discussed with the patient and/or family.    Case Managment and Social Work to review patient/family resources and to coordinate Discharge Planning.    Estimated length of stay: 7 to 10 days    Patient and Family Education and Training:  Rehabilitation and discharge goals discussed with the patient and/or family.  Patient/family education/training needs to be discussed in weekly team meeting.    Equipment/DME needs: Therapy teams to assess and evaluate for additional equipment/DME needs throughout rehabilitation stay    Past Medical History:   Past Surgical History:   Family History:   Social history:   Past Medical History[1] Past Surgical History[2]  Family History[3]   Social History[4]       Current Medical Diagnosis Allergies   Problem List[5] Allergies[6]        Medical Necessity Criteria for [facility] Admission: He will need 24-hour physiatry and nursing to implement plan of care and comanagement with Internal Medicine to maintain medical stability. He is of  High Risk due to the following comorbid conditions: T2DM, CAD s/p stenting, tobacco and alcohol use disorder, diabetic neuropathy, fragile skin/high risk of skin breakdown, hyponatremia, HTN, hyperbilirubinemia, constipation, vitamin D deficiency, risk of VTE, risk of delirium, anemia. . In addition, the preadmission screen, post-admission physical evaluation, overall plan of care and admissions order demonstrate a reasonable expectation that the following criteria were met at the time of admission to the [facility].  The patient requires active and ongoing therapeutic intervention of multiple therapy disciplines (physical therapy, occupational therapy, speech-language pathology, or prosthetics/orthotics), one of which is physical or occupational therapy.    Patient requires an intensive rehabilitation therapy program, as defined in Chapter 1, section 110.2.2 of the CMS Medicare Policy Manual. This intensive rehabilitation therapy program will consist of at least 3 hours of therapy per day at least 5 days per week or at least 15 hours of intensive rehabilitation therapy within a 7 consecutive day period, beginning with the date of admission to the [facility].    The patient is reasonably expected to actively participate in, and benefit significantly from, the intensive rehabilitation therapy program as defined in Chapter 1, section 110.2.2 of the CMS Medicare Policy Manual at this time of admission to the [facility]. He can reasonably be expected to make measurable improvement (that will be of practical value to improve the patient’s functional capacity or adaptation to impairments) as a result of the rehabilitation treatment, as defined in section 110.3, and such improvement can be expected to be made within the prescribed period of time. As noted in the CMS Medicare Policy Manual, the patient need not be expected to achieve complete independence in the domain of self-care nor be expected to return to his or her prior level of  functioning in order to meet this standard.  The patient must require physician supervision by a rehabilitation physician. As such, a rehabilitation physician will conduct face-to-face visits with the patient at least 3 days per week throughout the patient’s stay in the [facility] to assess the patient both medically and functionally, as well as to modify the course of treatment as needed to maximize the patient’s capacity to benefit from the rehabilitation process.  The patient requires an intensive and coordinated interdisciplinary approach to providing rehabilitation, as defined in Chapter 1, section 110.2.5 of the CMS Medicare Policy Manual. This will be achieved through periodic team conferences, conducted at least once in a 7-day period, and comprising of an interdisciplinary team of medical professionals consisting of: a rehabilitation physician, registered nurse,  and/or , and a licensed/certified therapist from each therapy discipline involved in treating the patient.       ** Please Note: Fluency Direct voice to text software may have been used in the creation of this document. **           [1] No past medical history on file.  [2] No past surgical history on file.  [3] No family history on file.  [4]   Social History  Socioeconomic History    Marital status: /Civil Union   Tobacco Use    Smoking status: Former     Types: Cigarettes    Smokeless tobacco: Current     Types: Chew   Vaping Use    Vaping status: Never Used   Substance and Sexual Activity    Alcohol use: Yes     Alcohol/week: 5.0 standard drinks of alcohol     Types: 5 Glasses of wine per week    Drug use: Never     Social Drivers of Health     Food Insecurity: No Food Insecurity (7/2/2025)    Nursing - Inadequate Food Risk Classification     Ran Out of Food in the Last Year: Never true   Transportation Needs: No Transportation Needs (7/2/2025)    Nursing - Transportation Risk Classification     Lack of Transportation:  No   Intimate Partner Violence: Unknown (7/2/2025)    Nursing IPS     Physically Hurt by Someone: No     Hurt or Threatened by Someone: No   Housing Stability: Unknown (7/2/2025)    Nursing: Inadequate Housing Risk Classification     Unable to Pay for Housing in the Last Year: No     Has Housing: No   [5]   Patient Active Problem List  Diagnosis    Fall    Left shoulder pain    Coronary artery disease    Diabetes (HCC)    Alcohol use disorder    Hypertension    Sinusitis    Sepsis (HCC)    Polymicrobial bacteremia    Back pain    Hyperbilirubinemia    Constipation    Vitamin D deficiency    Lung nodule    Neuropathy   [6] No Known Allergies

## 2025-07-02 NOTE — ASSESSMENT & PLAN NOTE
Presented to ED after fall at home, found to have tachycardia and tachypnea  Blood cultures:    Klebsiella aerogenes Abnormal    Avoid ceftriaxone, lower generation cephalosporins, penicillins and aztreonam even when susceptible, as organism may develop resistance on therapy   Staphylococcus hominis Abnormal    Probable contamination      Completed 7 days of cefepime on 6/30  Now being admitted to [facility] for rehab

## 2025-07-02 NOTE — ASSESSMENT & PLAN NOTE
Patient meeting sepsis criteria with tachypnea and tachycardia on presentation, blood culture BC ID with Klebsiella aerogenes, Staphylococcus Gutierrez susceptible s/p cefepime and Vanco in the ER on admission. Hemodynamically stable, low-grade fevers to about 100.5    Unclear etiology of bacteremia, workup unrevealing.  However patient has completed 7-day course of total antibiotics as of 06/30/25. Patient medically stable, afebrile and discharged to [facility].    Plan:  Patient completed 7 day course of Cefepime on 06/30/2025

## 2025-07-02 NOTE — ASSESSMENT & PLAN NOTE
MRI lumbar spine:  No discitis/osteomyelitis in lumbar spine, as clinically questioned.  Small focal areas of enhancement in the L1-L2 and L2-L3 supraspinous ligament regions, suspicious for enthesitis.  Mild multilevel degenerative changes of lumbar spine, as detailed above. No significant canal stenosis or foraminal narrowing    Pain meds/therapy per PMR

## 2025-07-02 NOTE — ASSESSMENT & PLAN NOTE
S/p fall at home ambulating to the bathroom  Uses a cane at baseline due to LE neuropathy and vision issues

## 2025-07-02 NOTE — INCIDENTAL FINDINGS
The following findings require follow up:  Radiographic finding   Finding: New 4 mm right upper lobe nodule    Follow up required: CT Chest   Follow up should be done within 12 month(s)    Please notify the following clinician to assist with the follow up:   Dr. Matute    Incidental finding results were discussed with the Patient by Leonel Adams MD on 07/02/25.   They expressed understanding and all questions answered.

## 2025-07-02 NOTE — ASSESSMENT & PLAN NOTE
R eye with fully impaired vision/blind  L eye with retinopathy and gets injections.   - Next due 7/14  This likely is contributing to his imbalance in the setting of his peripheral neuropathy

## 2025-07-02 NOTE — ASSESSMENT & PLAN NOTE
Patient endorsing increased postnasal drip and cough, evidence of sinusitis on imaging.  Treat with antibiotics for acute infectious sinusitis.    Plan:  Patient was given Claritin and Flonase as needed.

## 2025-07-02 NOTE — TREATMENT PLAN
Individualized Plan of Care - [facility] [place]  Aquilino Dowell 64 y.o. male MRN: [id number]  Unit/Bed#: -01 Encounter: [id number]     PATIENT INFORMATION  ADMISSION DATE: 7/2/2025  2:41 PM RICCO CATEGORY:Neurologic Conditions:  03.8  Neuromuscular Disorders   ADMISSION DIAGNOSIS: Sepsis (HCC) [A41.9]  EXPECTED LOS: 7 to 10 days     MEDICAL/FUNCTIONAL PROGNOSIS  Based on my assessment of the patient's medical conditions and current functional status, the prognosis for attaining medical and functional goals or the IRF stay is:  Fair    Medical Goals: Patient will be able to manage medical conditions and comorbid conditions with medications and follow up upon completion of rehab program.    1. Adequate pain control   - Neuropathic pain from progressive peripheral neuropathy  2. Prevention of VTE  3. Adequate secretion management, and monitoring of respiratory status  4. Bowel/bladder management   - Goals are continence and regularity   5. Maintain appropriate nutrition and hydration for healing and hemodynamic stability  6. Emotional adaptation to impairment   - In setting of I suspect undiagnosed depression since November now further impacting function   - Optimize mood and participation  7. Monitor for polypharmacy  8. Fall prevention  9. Patient and family caregiver training/education  10. Skin protection and prevention of pressure injury  11. Appropriate management of new and chronic comorbidites and sequelae of her acute hospitalization.    - Poorly controlled T2DM, Neuropathic pain, axonal/demyelinating neuropathy (progressive)   - Close monitoring of neuro exam with additional work-up as appropriate   12. Prevention of delirium  13. Arrange appropriate outpatient f/u    ANTICIPATED DISCHARGE DISPOSITION AND SERVICES  COMMUNITY SETTING: Home - with supervision    ANTICIPATED FOLLOW-UP SERVICE:   Outpatient Therapy Services: PT and OT          DISCIPLINE SPECIFIC PLANS:  Required  Disciplines & Services: Rehabillitation Nursing    REQUIRED THERAPY:  Therapy Hours per Day Days per Week   Physical Therapy 1-2 5-6   Occupational Therapy 1-2 5-6   NOTE: Additional therapy time(s) or changes to allocation of therapies as appropriate to meet patient needs and to achieve functional goals.      Patient will participate in above therapy regimen consisting of PT and OT due to the following medical procedure/condition:Neurologic Conditions:  03.8  Neuromuscular Disorders    ANTICIPATED FUNCTIONAL OUTCOMES:  ADL:   Sup- mod ind    Bladder/Bowel:  mod Ind   Transfers:  Sup-mod Ind   Locomotion:  Sup-mod Ind   Cognitive:   Sup-mod Ind      DISCHARGE PLANNING NEEDS  Equipment needs: Discharge needs to be reviewed with team      REHAB ANTICIPATED PARTICIPATION RESTRICTIONS:  Impaired vision  Furniture surfing at home already.

## 2025-07-02 NOTE — ASSESSMENT & PLAN NOTE
Chronic Back and left shoulder pain.  Left deltoid was tender to palpation suspecting musculoskeletal related on 06/24/25.   Left shoulder x-ray demonstrated osteoarthritis of the glenohumeral joint.  Patient still complaining of pain as of time of discharge.    Differential is large, includes arthritis flare vs rotator cuff arthropathy     Plan:  Patient should continue Robaxin 500mg every 6 hours  Patient should continue Gabapentin 100 mg qhs  Patient will stop Oxycodone

## 2025-07-02 NOTE — ASSESSMENT & PLAN NOTE
A1c: 6.6 11 months ago    Patient is on Metformin 1000mg daily. Patient was switched to Insulin and Metformin was held during course in hospital.    Plan:  Resume metformin  Manage diabetic neuropathy with Gabapentin 100 mg qhs

## 2025-07-02 NOTE — ASSESSMENT & PLAN NOTE
Initial source of presentation. This is an outpatient second episode of fall in the last year. Ongoing supportive measures per primary. Trauma evaluation as noted.  Patient has history of ambulatory dysfunction and falls. He uses a cane at home and has to grab hold of things near him to walk steadily. 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   During this fall, wife says the patient was getting out of bed and attempting to use the bathroom and fell. He denied any LOC, dizziness, tripping over anything, or urinary incontinence, but he felt too weak to get up on his own, prompting him to call for his wife.    Plan:  Patient DC to [facility]

## 2025-07-02 NOTE — H&P
H&P - Hospitalist   Name: Aquilino Dowell 64 y.o. male I MRN: [id number]  Unit/Bed#: [facility] 971-01 I Date of Admission: 7/2/2025   Date of Service: 7/2/2025 I Hospital Day: 0     Assessment & Plan  Sepsis (HCC)  Presented to ED after fall at home, found to have tachycardia and tachypnea  Blood cultures:    Klebsiella aerogenes Abnormal    Avoid ceftriaxone, lower generation cephalosporins, penicillins and aztreonam even when susceptible, as organism may develop resistance on therapy   Staphylococcus hominis Abnormal    Probable contamination      Completed 7 days of cefepime on 6/30  Now being admitted to [facility] for rehab    Fall  S/p fall at home ambulating to the bathroom  Uses a cane at baseline due to LE neuropathy and vision issues  Left shoulder pain  Xray done- showed glenohumeral DJD, no fracture  Bengay ordered per PMR  Coronary artery disease  S/p cardiac cath 2013  Continue home prasugrel, ASA, statin  Diabetes ([facility])  Lab Results   Component Value Date    HGBA1C 8.0 (H) 06/24/2025       Recent Labs     07/01/25  1559 07/01/25  2058 07/02/25  0749 07/02/25  1131   POCGLU 172* 157* 144* 188*     Home: metformin 1000mg daily  Here: lantus 6U qHS and SSI  Plan to transition back to home metformin when able although his A1c on admission was high at 8.0 so may need higher dose of metformin        Alcohol use disorder  Per hospital chart, wife reported up to 1 bottle of wine per day  ETOH on admit 6/24 was normal  Was on CIWA protocol  Continue B12, folic acid  Hypertension  Home: metoprolol 50 mg BID  Here: same  Sinusitis  Seen on CT head done in ED  Continue flonase, claritin  Back pain  MRI lumbar spine:  No discitis/osteomyelitis in lumbar spine, as clinically questioned.  Small focal areas of enhancement in the L1-L2 and L2-L3 supraspinous ligament regions, suspicious for enthesitis.  Mild multilevel degenerative changes of lumbar spine, as detailed above. No significant canal stenosis or foraminal  narrowing    Pain meds/therapy per PMR  Hyperbilirubinemia  Peaked at 2.52, currently 2.18  Right UQ US- Hepatomegaly with steatosis, no liver mass  Recommend outpatient GI follow up  Vitamin D deficiency  Continue supplement  Lung nodule  Found incidentally on CT scan  4mm right upper lobe nodule which is new  Will need repeat CT in 12 months  Neuropathy  Continue gabapentin    History of Present Illness   Aquilino Dowell is a 64 y.o. male with a PMH of DM, HTN, CAD who presented to the [facility] after a fall at home. He was found to have sepsis/bacteremia on admission. He is now being admitted to [facility] for rehab     Review of Systems   Constitutional: Negative.    HENT:  Positive for postnasal drip.    Eyes:  Positive for visual disturbance.   Respiratory: Negative.     Cardiovascular: Negative.    Gastrointestinal: Negative.    Endocrine: Negative.    Genitourinary: Negative.    Musculoskeletal:  Positive for arthralgias, back pain and gait problem.   Neurological:         Decreased sensation in feet   Hematological: Negative.    Psychiatric/Behavioral: Negative.       Historical Information   Past Medical History[1]  Past Surgical History[2]  Social History[3]  E-Cigarette/Vaping    E-Cigarette Use Never User      E-Cigarette/Vaping Substances         Objective :  Temp:  [97.4 °F (36.3 °C)-98.6 °F (37 °C)] 97.4 °F (36.3 °C)  HR:  [73-85] 85  BP: (128-148)/(75-84) 128/75  Resp:  [18] 18  SpO2:  [95 %-98 %] 98 %  O2 Device: None (Room air)    Wt Readings from Last 1 Encounters:   07/02/25 87.2 kg (192 lb 3.2 oz)     Estimated body mass index is 26.07 kg/m² as calculated from the following:    Height as of this encounter: 6' (1.829 m).    Weight as of this encounter: 87.2 kg (192 lb 3.2 oz).    Physical Exam  Vitals reviewed.   Constitutional:       General: He is not in acute distress.     Appearance: He is not ill-appearing or diaphoretic.   HENT:      Head: Normocephalic and  atraumatic.      Nose: Nose normal.      Mouth/Throat:      Pharynx: Oropharynx is clear.     Cardiovascular:      Rate and Rhythm: Normal rate.   Pulmonary:      Effort: Pulmonary effort is normal. No respiratory distress.   Abdominal:      General: There is no distension.      Palpations: Abdomen is soft.      Tenderness: There is no abdominal tenderness.     Musculoskeletal:         General: No deformity.     Skin:     General: Skin is warm and dry.     Neurological:      Mental Status: He is alert and oriented to person, place, and time.           Lab Results: I have reviewed the following results:  Results from last 7 days   Lab Units 07/02/25  0825 06/30/25  0536 06/29/25  0538   HEMOGLOBIN g/dL 11.5* 11.3* 11.1*   HEMATOCRIT % 34.5* 34.7* 34.6*   WBC Thousand/uL 6.19 4.25* 4.00*     Results from last 7 days   Lab Units 07/02/25  0825 06/30/25  0536 06/29/25  0538 06/28/25  0526   BUN mg/dL 4* 5 8 6   SODIUM mmol/L 134* 137 135 132*   POTASSIUM mmol/L 3.9 3.5 3.8 3.8   CHLORIDE mmol/L 102 105 103 99   CREATININE mg/dL 0.68 0.76 0.88 0.65   AST U/L  --  88* 94* 84*   ALT U/L  --  27 30 34     Results from last 7 days   Lab Units 06/27/25  0930   PROTIME seconds 14.0   INR  1.01        Imaging Results Review: I reviewed radiology reports from this admission including: CT chest, CT head, MRI spine, and Ultrasound(s).      Review of Scheduled Meds:  Current Facility-Administered Medications   Medication Dose Route Frequency Provider Last Rate    acetaminophen  650 mg Oral Q4H PRN Nadiya Shah PA-C      [START ON 7/3/2025] aspirin  81 mg Oral Daily PEARL Bahena-MARS      [START ON 7/3/2025] atorvastatin  20 mg Oral Daily PEARL Bahena-MARS      benzonatate  200 mg Oral TID PRN PEARL Bahena-MARS      bisacodyl  10 mg Rectal Daily PRN PEARL Bahena-MARS      [START ON 7/3/2025] vitamin B-12  1,000 mcg Oral Daily Nadiya Shah PA-C      [START ON 7/3/2025] enoxaparin  40 mg Subcutaneous Daily Nadiya  Alyssa, PA-C      [START ON 7/6/2025] ergocalciferol  50,000 Units Oral Weekly Nadiya Shah, PA-C      fluticasone  2 spray Each Nare BID Nadiya Shah, PA-C      [START ON 7/3/2025] folic acid  1 mg Oral Daily Nadiya Shah, PA-C      gabapentin  100 mg Oral HS Nadiya Shah, PA-C      insulin glargine  6 Units Subcutaneous HS Nadiya Shah, PA-C      insulin lispro  1-5 Units Subcutaneous HS Nadiya Shah, PA-C      insulin lispro  1-6 Units Subcutaneous TID AC Nadiya Shah, PA-C      [START ON 7/3/2025] loratadine  10 mg Oral Daily Nadiya Shah, PA-C      menthol-methyl salicylate   Apply externally 4x Daily Nadiya Shah, PA-C      [Held by provider] metFORMIN  1,000 mg Oral Daily With Breakfast Nadiya Shah, PA-C      methocarbamol  500 mg Oral Q6H PRN Nadiya Shah, PA-C      metoprolol tartrate  50 mg Oral Q12H TERRENCE Nadiya Shah, PA-C      nicotine  7 mg Transdermal Daily Nadiya Shah, PA-C      oxyCODONE  5 mg Oral Q4H PRN Nadiya Shah, PA-C      Or    oxyCODONE  10 mg Oral Q4H PRN Nadiya Shah, PA-C      [START ON 7/3/2025] polyethylene glycol  17 g Oral Daily Nadiya Shah, PA-C      [START ON 7/3/2025] prasugrel  10 mg Oral Daily Nadiya Garberes, PA-C      senna  2 tablet Oral BID Nadiya Shah, PA-C      [START ON 7/3/2025] thiamine  100 mg Oral Daily Nadiya Shah, PA-C         Code Status: Level 1 - Full Code    Administrative Statements     ** Please Note:  voice to text software may have been used in the creation of this document. Although proof errors in transcription or interpretation are a potential of such software**         [1] No past medical history on file.  [2] No past surgical history on file.  [3]   Social History  Tobacco Use    Smoking status: Former     Types: Cigarettes    Smokeless tobacco: Current     Types: Chew   Vaping Use    Vaping status: Never Used   Substance and Sexual Activity    Alcohol use: Yes     Alcohol/week: 5.0 standard  drinks of alcohol     Types: 5 Glasses of wine per week    Drug use: Never

## 2025-07-02 NOTE — DISCHARGE INSTR - AVS FIRST PAGE
Dear Aquilino Dowell,     It was our pleasure to care for you here at [facility].  It is our hope that we were always able to exceed the expected standards for your care during your stay.  You were hospitalized due to sepsis.  You were cared for on the 2nd floor by Leonel Adams MD under the service of Oksana Wren MD with the [facility] who covers for your primary care physician (PCP), Yael Matute MD, while you were hospitalized.  If you have any questions or concerns related to this hospitalization, you may contact us at .  For follow up as well as any medication refills, we recommend that you follow up with your primary care physician.  A registered nurse will reach out to you by phone within a few days after your discharge to answer any additional questions that you may have after going home.  However, at this time we provide for you here, the most important instructions / recommendations at discharge:     Notable Medication Adjustments -   Please start Tylenol 325mg - take 2 tablets by mouth every 4 hours  Please start Gabapentin 100mg - take 1 capsule by mouth daily at bedtime  Please start Methocarbamol (Robaxin) 500mg - take 1 tablet by mouth every 6 hours as needed for muscle spasms  Please resume Atorvastatin (Lipitor) 20mg - take 1 tablet by mouth daily  No other medication changes made at this time  Testing Required after Discharge -   CT Chest in 1 year  ** Please contact your PCP to request testing orders for any of the testing recommended here **  Important follow up information -   Please follow up with Primary Care doctor within 1 week of discharge  Please follow up with Gastroenterology; the referral has been made for you  Other Instructions -   If you have recurrence of fever, light headedness, episodes of loss of consciousness, lethargy or altered mental status please return to the Emergency  Department.  Please review this entire after visit summary as additional general instructions including medication list, appointments, activity, diet, any pertinent wound care, and other additional recommendations from your care team that may be provided for you.      Sincerely,     Leonel Adams MD

## 2025-07-02 NOTE — ASSESSMENT & PLAN NOTE
Lab Results   Component Value Date    HGBA1C 8.0 (H) 06/24/2025       Recent Labs     07/01/25  1559 07/01/25 2058 07/02/25  0749 07/02/25  1131   POCGLU 172* 157* 144* 188*     Home: metformin 1000mg daily  Here: lantus 6U qHS and SSI  Plan to transition back to home metformin when able although his A1c on admission was high at 8.0 so may need higher dose of metformin

## 2025-07-02 NOTE — PROGRESS NOTES
Progress Note - Hospitalist   Name: Aquilino Dowell 64 y.o. male I MRN: [id number]  Unit/Bed#: [facility] 971-01 I Date of Admission: 7/2/2025   Date of Service: 7/2/2025 I Hospital Day: 0    Assessment & Plan  Sepsis (HCC)  Presented to ED after fall at home, found to have tachycardia and tachypnea  Blood cultures:    Klebsiella aerogenes Abnormal    Avoid ceftriaxone, lower generation cephalosporins, penicillins and aztreonam even when susceptible, as organism may develop resistance on therapy   Staphylococcus hominis Abnormal    Probable contamination      Completed 7 days of cefepime on 6/30  Now being admitted to [facility] for rehab    Fall  S/p fall at home ambulating to the bathroom  Uses a cane at baseline due to LE neuropathy and vision issues  Left shoulder pain  Xray done- showed glenohumeral DJD, no fracture  Bengay ordered per PMR  Coronary artery disease  S/p cardiac cath 2013  Continue home prasugrel, ASA, statin  Diabetes (HCC)  Lab Results   Component Value Date    HGBA1C 8.0 (H) 06/24/2025       Recent Labs     07/01/25  2058 07/02/25  0749 07/02/25  1131 07/02/25  1553   POCGLU 157* 144* 188* 220*     Home: metformin 1000mg daily  Here: lantus 6U qHS and SSI  Plan to transition back to home metformin when able although his A1c on admission was high at 8.0 so may need higher dose of metformin    (P) 220    Alcohol use disorder  Per hospital chart, wife reported up to 1 bottle of wine per day  ETOH on admit 6/24 was normal  Was on CIWA protocol  Continue B12, folic acid  Hypertension  Home: metoprolol 50 mg BID  Here: same  Sinusitis  Seen on CT head done in ED  Continue flonase, claritin  Back pain  MRI lumbar spine:  No discitis/osteomyelitis in lumbar spine, as clinically questioned.  Small focal areas of enhancement in the L1-L2 and L2-L3 supraspinous ligament regions, suspicious for enthesitis.  Mild multilevel degenerative changes of lumbar spine, as detailed above. No significant canal stenosis or  foraminal narrowing    Pain meds/therapy per PMR  Hyperbilirubinemia  Peaked at 2.52, currently 2.18  Right UQ US- Hepatomegaly with steatosis, no liver mass  Recommend outpatient GI follow up  Vitamin D deficiency  Continue supplement  Lung nodule  Found incidentally on CT scan  4mm right upper lobe nodule which is new  Will need repeat CT in 12 months  Neuropathy  Continue gabapentin    The above assessment and plan was reviewed and updated as determined by my evaluation of the patient on 7/2/2025.      Review of Systems   Constitutional:  Negative for chills and fever.   HENT:  Negative for ear pain and sore throat.    Eyes:  Negative for pain and visual disturbance.   Respiratory:  Negative for cough and shortness of breath.    Cardiovascular:  Negative for chest pain and palpitations.   Gastrointestinal:  Negative for abdominal pain and vomiting.   Genitourinary:  Negative for dysuria and hematuria.   Musculoskeletal:  Negative for arthralgias and back pain.   Skin:  Negative for color change and rash.   Neurological:  Negative for seizures and syncope.   All other systems reviewed and are negative.      Objective :  Temp:  [97.4 °F (36.3 °C)-98.6 °F (37 °C)] 97.4 °F (36.3 °C)  HR:  [76-85] 85  BP: (128-148)/(75-84) 128/75  Resp:  [18] 18  SpO2:  [95 %-98 %] 98 %  O2 Device: None (Room air)    Invasive Devices       Peripheral Intravenous Line  Duration             Peripheral IV 06/28/25 Left;Ventral (anterior) Wrist 4 days    Peripheral IV 06/28/25 Right;Ventral (anterior) Wrist 4 days                    Physical Exam  Vitals and nursing note reviewed.   Constitutional:       General: He is not in acute distress.     Appearance: He is well-developed.   HENT:      Head: Normocephalic and atraumatic.     Eyes:      Conjunctiva/sclera: Conjunctivae normal.       Cardiovascular:      Rate and Rhythm: Normal rate and regular rhythm.      Heart sounds: No murmur heard.  Pulmonary:      Effort: Pulmonary effort is  normal. No respiratory distress.      Breath sounds: Normal breath sounds.   Abdominal:      Palpations: Abdomen is soft.      Tenderness: There is no abdominal tenderness.     Musculoskeletal:         General: No swelling.      Cervical back: Neck supple.     Skin:     General: Skin is warm and dry.      Capillary Refill: Capillary refill takes less than 2 seconds.     Neurological:      Mental Status: He is alert.     Psychiatric:         Mood and Affect: Mood normal.             Lab Results: I have reviewed the following results:  Results from last 7 days   Lab Units 07/02/25  0825 06/30/25  0536   WBC Thousand/uL 6.19 4.25*   HEMOGLOBIN g/dL 11.5* 11.3*   HEMATOCRIT % 34.5* 34.7*   PLATELETS Thousands/uL 137* 103*     Results from last 7 days   Lab Units 07/02/25  0825 06/30/25  0536   SODIUM mmol/L 134* 137   POTASSIUM mmol/L 3.9 3.5   CHLORIDE mmol/L 102 105   CO2 mmol/L 25 26   BUN mg/dL 4* 5   CREATININE mg/dL 0.68 0.76   CALCIUM mg/dL 8.0* 7.9*         Results from last 7 days   Lab Units 06/27/25  0930   INR  1.01     Results from last 7 days   Lab Units 07/02/25  1553 07/02/25  1131 07/02/25  0749   POC GLUCOSE mg/dl 220* 188* 144*       Imaging Results Review: No pertinent imaging studies reviewed.  Other Study Results Review: No additional pertinent studies reviewed.    Review of Scheduled Meds: Medications  reviewed and reconciled as needed  Current Facility-Administered Medications   Medication Dose Route Frequency Provider Last Rate    acetaminophen  650 mg Oral Q4H PRN Nadiya Shah PA-C      [START ON 7/3/2025] aspirin  81 mg Oral Daily Nadiya Shah PA-C      [START ON 7/3/2025] atorvastatin  20 mg Oral Daily Nadiya Shah PA-C      benzonatate  200 mg Oral TID PRN Nadiya Shah PA-C      bisacodyl  10 mg Rectal Daily PRN Nadiya Shah PA-C      [START ON 7/3/2025] vitamin B-12  1,000 mcg Oral Daily Nadiya Shah PA-C      [START ON 7/3/2025] enoxaparin  40 mg Subcutaneous Daily  Nadiya ToroZACH keenan      [START ON 7/6/2025] ergocalciferol  50,000 Units Oral Weekly Nadiya GarberPEARL larios-MARS      fluticasone  2 spray Each Nare BID Nadiya ZACH Shah      [START ON 7/3/2025] folic acid  1 mg Oral Daily Nadiya DoranPEARL peter-MARS      gabapentin  100 mg Oral HS Nadiya Shah, PA-C      insulin glargine  6 Units Subcutaneous HS Nadiya Shah, PA-C      insulin lispro  1-5 Units Subcutaneous HS Nadiya Shah, PA-C      insulin lispro  1-6 Units Subcutaneous TID AC Nadiya ZACH Shah      [START ON 7/3/2025] loratadine  10 mg Oral Daily Nadiya GarberPEARL larios-MRAS      menthol-methyl salicylate   Apply externally 4x Daily Nadiyapawel GarberZACH larios      [Held by provider] metFORMIN  1,000 mg Oral Daily With Breakfast Nadiya ZACH Shah      methocarbamol  500 mg Oral Q6H PRN Nadiya CortezthaoPEARL larios-C      metoprolol tartrate  50 mg Oral Q12H TERRENCE Nadiya CortezevelynPEARL peter-MARS      nicotine  7 mg Transdermal Daily Nadiya GarberZACH larios      oxyCODONE  2.5 mg Oral Q4H PRN Ashley Depadua, MD      Or    oxyCODONE  5 mg Oral Q4H PRN Ashley Depadua, MD      [START ON 7/3/2025] polyethylene glycol  17 g Oral Daily Nadiya ToroevelynprinceZACH larios      [START ON 7/3/2025] prasugrel  10 mg Oral Daily Nadiya GarberPEARL larios-MARS      senna  2 tablet Oral BID Nadiya GarberZACH larios      [START ON 7/3/2025] thiamine  100 mg Oral Daily Nadiya Shah PA-C

## 2025-07-02 NOTE — CASE MANAGEMENT
Case Management Discharge Planning Note    Patient name Aquilino Dowell  Location S /S -01 MRN [id number]  : 1960 Date 2025       Current Admission Date: 2025  Current Admission Diagnosis:Polymicrobial bacteremia   Patient Active Problem List    Diagnosis Date Noted    Vitamin D deficiency 2025    Constipation 2025    Back pain 2025    Hyperbilirubinemia 2025    Sepsis (HCC) 2025    Polymicrobial bacteremia 2025    Fall 2025    Left shoulder pain and low back pain 2025    Coronary artery disease 2025    Diabetes (HCC) 2025    Alcohol use disorder 2025    Hypertension 2025    Sinusitis 2025      LOS (days): 4  Geometric Mean LOS (GMLOS) (days):   Days to GMLOS:     OBJECTIVE:  Risk of Unplanned Readmission Score: 14.32         Current admission status: Inpatient   Preferred Pharmacy:   [facility] [address] [place] [address]  Phone: [phone] Fax: [phone]    Primary Care Provider: Yael Matute MD    Primary Insurance: BLUE CROSS  Secondary Insurance:     DISCHARGE DETAILS:    Discharge planning discussed with:: Patient, Ene-spouse, Slime-RN, Ana BEEBE     Comments - Freedom of Choice: CM received notification from [facility] representative Rene of bed availability for the Pt's admission today. CM notified all the above mentioned individuals of the Pt's d/c to [facility] today. Roundtr referral made for a wheelchair van.         Contacts  Patient Contacts: Ene  Relationship to Patient:: Family  Contact Method: Phone  Phone Number: [phone]  Reason/Outcome: Discharge Planning        Other Referral/Resources/Interventions Provided:  Interventions: Acute Rehab         Transport at Discharge : Wheelchair van     Number/Name of Dispatcher: Bibiana [id number]  Transported by (Company and Unit #): [facility]  ETA of Transport (Date):  07/02/25            Accepting Facility Name, City & State : [facility]  Receiving Facility/Agency Phone Number: [phone]  ROOM [id number]  Facility/Agency Fax Number: NO FAX IS REQUIRED

## 2025-07-02 NOTE — ASSESSMENT & PLAN NOTE
Denies any back pain for me today.   Findings on Mri L-Spine would not explain LE symptoms  Monitor   Continue current pain management

## 2025-07-02 NOTE — ASSESSMENT & PLAN NOTE
Largely resolved   Found to have occult bacteremia.  S/p 7 day abx  Hemodynamically stable. Monitor.

## 2025-07-02 NOTE — ASSESSMENT & PLAN NOTE
Peaked at 2.52, currently 2.18  Right UQ US- Hepatomegaly with steatosis, no liver mass  Recommend outpatient GI follow up

## 2025-07-02 NOTE — ASSESSMENT & PLAN NOTE
Examines like bicipital tendinosis with anterior pain, positive don clancy.   Could also have other rotator cuff involvement with positive neers, mild external rotation weakness.  Subscapularis/internal rotators not implicated on exam  Family reports chronic L shoulder issues but worse since the fall.   6/24 XR without acute osseous abnormality   > Plan for outpatient MRI  > Considerations in therapy   > Bengay QID

## 2025-07-02 NOTE — ASSESSMENT & PLAN NOTE
Found incidentally on CT scan  4mm right upper lobe nodule which is new  Will need repeat CT in 12 months

## 2025-07-02 NOTE — ASSESSMENT & PLAN NOTE
Last BM 7/1  Continue miralax daily and senna BID  Has suppository PRN  Monitor and adjust as appropriate

## 2025-07-02 NOTE — ASSESSMENT & PLAN NOTE
Patient meeting sepsis criteria with tachypnea and tachycardia on presentation, blood culture BC ID with Klebsiella aerogenes, Staphylococcus Gutierrez susceptible s/p cefepime and Vanco in the ER on admission. Hemodynamically stable, low-grade fevers to about 100.5    Unclear etiology of bacteremia, workup unrevealing.  However patient has completed 7-day course of total antibiotics as of 06/30/25. Patient medically stable, afebrile and discharged to [facility].    Plan:  Patient completed 7 day course of Cefepime on 06/30/2025

## 2025-07-02 NOTE — ASSESSMENT & PLAN NOTE
Presented to ED after fall at home, found to have tachycardia and tachypnea  Blood cultures:    Klebsiella aerogenes Abnormal    Avoid ceftriaxone, lower generation cephalosporins, penicillins and aztreonam even when susceptible, as organism may develop resistance on therapy   Staphylococcus hominis Abnormal    Probable contamination      Completed 7 days of cefepime on 6/30  Now being admitted to [facility] for rehab

## 2025-07-02 NOTE — ASSESSMENT & PLAN NOTE
Per hospital chart, wife reported up to 1 bottle of wine per day  ETOH on admit 6/24 was normal  Was on CIWA protocol  Continue B12, folic acid

## 2025-07-02 NOTE — ASSESSMENT & PLAN NOTE
Lab Results   Component Value Date    HGBA1C 8.0 (H) 06/24/2025       Recent Labs     07/01/25 2058 07/02/25  0749 07/02/25  1131 07/02/25  1553   POCGLU 157* 144* 188* 220*     Home: metformin 1000mg daily  Here: lantus 6U qHS and SSI  Plan to transition back to home metformin when able although his A1c on admission was high at 8.0 so may need higher dose of metformin    (P) 220

## 2025-07-02 NOTE — ASSESSMENT & PLAN NOTE
Recent Labs     06/30/25  0536   AST 88*   ALT 27   ALKPHOS 184*   TBILI 2.18*    Trending up. Patient not having abdominal pain.  Right upper quadrant ultrasound remarkable only for hepatic steatosis. Hemolysis panel remarkable for elevated reticulocytes, no schistosytes, normal  haptoglobin. INR WNL. Consider cirrhosis, alcohol abuse.    Plan:  Patient referred to Gastroenterology for possible cirrhosis/fibrosis.

## 2025-07-02 NOTE — ASSESSMENT & PLAN NOTE
Has lost interests he previously had, sleep has been poor, energy levels/motivation poor, feelings of guilt and frustration.   Appetite worse  All since he lost his job in November.  Wife has noticed a sharp decline  Denies suicidal ideation/self-harm.  > Discussed trial of SNRI (cymbalta may also help with pain)  > He will think about it.  > Referral to Psych at discharge

## 2025-07-02 NOTE — ASSESSMENT & PLAN NOTE
Lab Results   Component Value Date    HGBA1C 8.0 (H) 06/24/2025       Recent Labs     07/01/25  1559 07/01/25 2058 07/02/25  0749 07/02/25  1131   POCGLU 172* 157* 144* 188*       Blood Sugar Average: Last 72 hrs:  Home: Metformin 1000mg daily  Here: Lantus 6 units QHS, CDI/Accuchecks  Goal to transition back to home regimen.   Unclear if family checks BG at home or if he has a kit.   Diabetic Diet  Management as per IM

## 2025-07-02 NOTE — ASSESSMENT & PLAN NOTE
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   - Suspect component also related to T2DM  - Has impaired vision as well  - Has had a history of Vitamin B12 deficiency previously contributing as well (6/25 level 940)  - Also has history of alcohol use  disorder  - Unclear etiology of fall per patient.   > Ambulatory dysfunction I suspect is driven by progression of his neuropathy in addition to worsening vision impacting his ability to compensate  > PT/OT 3-5 hours/day, 5-7 days/week.  > Also has a component of deconditioning and worsening proximal weakness related to increasingly sedentary lifestyle (this is 2/2 I suspect to some depression since losing his job in November).   > Gabapentin 100mg QHS for shooting pains (higher doses made him too lethargic)  > Alcohol cessation as that can progress neuropathy  > Continue B12 supplement, optimize diabetes management.   > Check orthostatics here   > Recommend f/u with Neuromuscular Neurology

## 2025-07-02 NOTE — ASSESSMENT & PLAN NOTE
Presented to ED after fall at home, found to have tachycardia and tachypnea  Blood cultures:    Klebsiella aerogenes Abnormal    Avoid ceftriaxone, lower generation cephalosporins, penicillins and aztreonam even when susceptible, as organism may develop resistance on therapy   Staphylococcus hominis Abnormal    Probable contamination      Completed 7 days of cefepime on 6/30  Now being admitted to [facility] for rehab

## 2025-07-02 NOTE — ASSESSMENT & PLAN NOTE
>>ASSESSMENT AND PLAN FOR ALCOHOL USE DISORDER WRITTEN ON 6/28/2025  8:26 AM BY ANITHA BRITTON MD    Patient's wife mentioned that patient drinks up to a bottle of wine a day. Patient endorses use of chewing tobacco.  Patient's lactic acidosis could be in the setting of thiamine deficiency vs alcohol abuse.  B12, B9 WNL    Plan:  Patient was given Thiamine 100mg daily during hospital stay

## 2025-07-02 NOTE — PLAN OF CARE
Problem: PAIN - ADULT  Goal: Verbalizes/displays adequate comfort level or baseline comfort level  Description: Interventions:  - Encourage patient to monitor pain and request assistance  - Assess pain using appropriate pain scale  - Administer analgesics as ordered based on type and severity of pain and evaluate response  - Implement non-pharmacological measures as appropriate and evaluate response  - Consider cultural and social influences on pain and pain management  - Notify physician/advanced practitioner if interventions unsuccessful or patient reports new pain  - Educate patient/family on pain management process including their role and importance of  reporting pain   - Provide non-pharmacologic/complimentary pain relief interventions  Outcome: Progressing     Problem: INFECTION - ADULT  Goal: Absence or prevention of progression during hospitalization  Description: INTERVENTIONS:  - Assess and monitor for signs and symptoms of infection  - Monitor lab/diagnostic results  - Monitor all insertion sites, i.e. indwelling lines, tubes, and drains  - Monitor endotracheal if appropriate and nasal secretions for changes in amount and color  - [place] appropriate cooling/warming therapies per order  - Administer medications as ordered  - Instruct and encourage patient and family to use good hand hygiene technique  - Identify and instruct in appropriate isolation precautions for identified infection/condition  Outcome: Progressing  Goal: Absence of fever/infection during neutropenic period  Description: INTERVENTIONS:  - Monitor WBC  - Perform strict hand hygiene  - Limit to healthy visitors only  - No plants, dried, fresh or silk flowers with chadwick in patient room  Outcome: Progressing     Problem: SAFETY ADULT  Goal: Patient will remain free of falls  Description: INTERVENTIONS:  - Educate patient/family on patient safety including physical limitations  - Instruct patient to call for assistance with activity   -  Consider consulting OT/PT to assist with strengthening/mobility based on AM PAC & JH-HLM score  - Consult OT/PT to assist with strengthening/mobility   - Keep Call bell within reach  - Keep bed low and locked with side rails adjusted as appropriate  - Keep care items and personal belongings within reach  - Initiate and maintain comfort rounds  - Make Fall Risk Sign visible to staff  - Apply yellow socks and bracelet for high fall risk patients  - Consider moving patient to room near nurses station  Outcome: Progressing  Goal: Maintain or return to baseline ADL function  Description: INTERVENTIONS:  -  Assess patient's ability to carry out ADLs; assess patient's baseline for ADL function and identify physical deficits which impact ability to perform ADLs (bathing, care of mouth/teeth, toileting, grooming, dressing, etc.)  - Assess/evaluate cause of self-care deficits   - Assess range of motion  - Assess patient's mobility; develop plan if impaired  - Assess patient's need for assistive devices and provide as appropriate  - Encourage maximum independence but intervene and supervise when necessary  - Involve family in performance of ADLs  - Assess for home care needs following discharge   - Consider OT consult to assist with ADL evaluation and planning for discharge  - Provide patient education as appropriate  - Monitor functional capacity and physical performance, use of AM PAC & JH-HLM   - Monitor gait, balance and fatigue with ambulation    Outcome: Progressing  Goal: Maintains/Returns to pre admission functional level  Description: INTERVENTIONS:  - Perform AM-PAC 6 Click Basic Mobility/ Daily Activity assessment daily.  - Set and communicate daily mobility goal to care team and patient/family/caregiver.   - Collaborate with rehabilitation services on mobility goals if consulted  - Out of bed for toileting  - Record patient progress and toleration of activity level   Outcome: Progressing     Problem: DISCHARGE  PLANNING  Goal: Discharge to home or other facility with appropriate resources  Description: INTERVENTIONS:  - Identify barriers to discharge w/patient and caregiver  - Arrange for needed discharge resources and transportation as appropriate  - Identify discharge learning needs (meds, wound care, etc.)  - Arrange for interpretive services to assist at discharge as needed  - Refer to Case Management Department for coordinating discharge planning if the patient needs post-hospital services based on physician/advanced practitioner order or complex needs related to functional status, cognitive ability, or social support system  Outcome: Progressing     Problem: Knowledge Deficit  Goal: Patient/family/caregiver demonstrates understanding of disease process, treatment plan, medications, and discharge instructions  Description: Complete learning assessment and assess knowledge base.  Interventions:  - Provide teaching at level of understanding  - Provide teaching via preferred learning methods  Outcome: Progressing     Problem: Prexisting or High Potential for Compromised Skin Integrity  Goal: Skin integrity is maintained or improved  Description: INTERVENTIONS:  - Identify patients at risk for skin breakdown  - Assess and monitor skin integrity including under and around medical devices   - Assess and monitor nutrition and hydration status  - Monitor labs  - Assess for incontinence   - Turn and reposition patient  - Assist with mobility/ambulation  - Relieve pressure over surinder prominences   - Avoid friction and shearing  - Provide appropriate hygiene as needed including keeping skin clean and dry  - Evaluate need for skin moisturizer/barrier cream  - Collaborate with interdisciplinary team  - Patient/family teaching  - Consider wound care consult    Assess:  - Review Yosef scale daily  - Inspect skin when repositioning, toileting, and assisting with ADLS  - Assess extremities for adequate circulation and sensation      Bed Management:  - Have minimal linens on bed & keep smooth, unwrinkled  - Change linens as needed when moist or perspiring  - Toileting:  - Offer bedside commode      Activity:  - Encourage activity and walks on unit  - Encourage or provide ROM exercises   - Use appropriate equipment to lift or move patient in bed    Skin Care:  - Avoid use of baby powder, tape, friction and shearing, hot water or constrictive clothing  - Do not massage red bony areas    Outcome: Progressing     Problem: Nutrition/Hydration-ADULT  Goal: Nutrient/Hydration intake appropriate for improving, restoring or maintaining nutritional needs  Description: Monitor and assess patient's nutrition/hydration status for malnutrition. Collaborate with interdisciplinary team and initiate plan and interventions as ordered.  Monitor patient's weight and dietary intake as ordered or per policy. Utilize nutrition screening tool and intervene as necessary. Determine patient's food preferences and provide high-protein, high-caloric foods as appropriate.     INTERVENTIONS:  - Monitor oral intake, urinary output, labs, and treatment plans  - Assess nutrition and hydration status and recommend course of action  - Evaluate amount of meals eaten  - Assist patient with eating if necessary   - Allow adequate time for meals  - Recommend/ encourage appropriate diets, oral nutritional supplements, and vitamin/mineral supplements  - Order, calculate, and assess calorie counts as needed  - Recommend, monitor, and adjust tube feedings and TPN/PPN based on assessed needs  - Assess need for intravenous fluids  - Provide specific nutrition/hydration education as appropriate  - Include patient/family/caregiver in decisions related to nutrition  Outcome: Progressing

## 2025-07-02 NOTE — ASSESSMENT & PLAN NOTE
And chronic transaminitis  In setting of alcohol use disorder  RUQ US with steatosis and hepatomegaly  > Counseled on cessation  > minimize hepatotoxic meds  > Outpatient f/u with GI recommended

## 2025-07-03 ENCOUNTER — APPOINTMENT (INPATIENT)
Dept: RADIOLOGY | Facility: HOSPITAL | Age: 65
DRG: 949 | End: 2025-07-03
Payer: COMMERCIAL

## 2025-07-03 LAB
ALBUMIN SERPL BCG-MCNC: 2.8 G/DL (ref 3.5–5)
ALBUMIN SERPL BCG-MCNC: 2.8 G/DL (ref 3.5–5)
ALP SERPL-CCNC: 205 U/L (ref 34–104)
ALP SERPL-CCNC: 205 U/L (ref 34–104)
ALT SERPL W P-5'-P-CCNC: 27 U/L (ref 7–52)
ALT SERPL W P-5'-P-CCNC: 27 U/L (ref 7–52)
ANION GAP SERPL CALCULATED.3IONS-SCNC: 12 MMOL/L (ref 4–13)
ANION GAP SERPL CALCULATED.3IONS-SCNC: 12 MMOL/L (ref 4–13)
AST SERPL W P-5'-P-CCNC: 123 U/L (ref 13–39)
AST SERPL W P-5'-P-CCNC: 123 U/L (ref 13–39)
BILIRUB DIRECT SERPL-MCNC: 1.39 MG/DL (ref 0–0.2)
BILIRUB DIRECT SERPL-MCNC: 1.39 MG/DL (ref 0–0.2)
BILIRUB SERPL-MCNC: 2.8 MG/DL (ref 0.2–1)
BILIRUB SERPL-MCNC: 2.8 MG/DL (ref 0.2–1)
BUN SERPL-MCNC: 8 MG/DL (ref 5–25)
BUN SERPL-MCNC: 8 MG/DL (ref 5–25)
CALCIUM ALBUM COR SERPL-MCNC: 8.9 MG/DL (ref 8.3–10.1)
CALCIUM ALBUM COR SERPL-MCNC: 8.9 MG/DL (ref 8.3–10.1)
CALCIUM SERPL-MCNC: 7.9 MG/DL (ref 8.4–10.2)
CALCIUM SERPL-MCNC: 7.9 MG/DL (ref 8.4–10.2)
CHLORIDE SERPL-SCNC: 101 MMOL/L (ref 96–108)
CHLORIDE SERPL-SCNC: 101 MMOL/L (ref 96–108)
CO2 SERPL-SCNC: 21 MMOL/L (ref 21–32)
CO2 SERPL-SCNC: 21 MMOL/L (ref 21–32)
CREAT SERPL-MCNC: 0.88 MG/DL (ref 0.6–1.3)
CREAT SERPL-MCNC: 0.88 MG/DL (ref 0.6–1.3)
ERYTHROCYTE [DISTWIDTH] IN BLOOD BY AUTOMATED COUNT: 15.2 % (ref 11.6–15.1)
ERYTHROCYTE [DISTWIDTH] IN BLOOD BY AUTOMATED COUNT: 15.2 % (ref 11.6–15.1)
GFR SERPL CREATININE-BSD FRML MDRD: 91 ML/MIN/1.73SQ M
GFR SERPL CREATININE-BSD FRML MDRD: 91 ML/MIN/1.73SQ M
GLUCOSE SERPL-MCNC: 128 MG/DL (ref 65–140)
GLUCOSE SERPL-MCNC: 128 MG/DL (ref 65–140)
GLUCOSE SERPL-MCNC: 175 MG/DL (ref 65–140)
GLUCOSE SERPL-MCNC: 175 MG/DL (ref 65–140)
GLUCOSE SERPL-MCNC: 213 MG/DL (ref 65–140)
GLUCOSE SERPL-MCNC: 213 MG/DL (ref 65–140)
GLUCOSE SERPL-MCNC: 230 MG/DL (ref 65–140)
GLUCOSE SERPL-MCNC: 230 MG/DL (ref 65–140)
GLUCOSE SERPL-MCNC: 234 MG/DL (ref 65–140)
GLUCOSE SERPL-MCNC: 234 MG/DL (ref 65–140)
HCT VFR BLD AUTO: 37 % (ref 36.5–49.3)
HCT VFR BLD AUTO: 37 % (ref 36.5–49.3)
HGB BLD-MCNC: 11.8 G/DL (ref 12–17)
HGB BLD-MCNC: 11.8 G/DL (ref 12–17)
MAGNESIUM SERPL-MCNC: 1.8 MG/DL (ref 1.9–2.7)
MAGNESIUM SERPL-MCNC: 1.8 MG/DL (ref 1.9–2.7)
MCH RBC QN AUTO: 32.4 PG (ref 26.8–34.3)
MCH RBC QN AUTO: 32.4 PG (ref 26.8–34.3)
MCHC RBC AUTO-ENTMCNC: 31.9 G/DL (ref 31.4–37.4)
MCHC RBC AUTO-ENTMCNC: 31.9 G/DL (ref 31.4–37.4)
MCV RBC AUTO: 102 FL (ref 82–98)
MCV RBC AUTO: 102 FL (ref 82–98)
PLATELET # BLD AUTO: 176 THOUSANDS/UL (ref 149–390)
PLATELET # BLD AUTO: 176 THOUSANDS/UL (ref 149–390)
PMV BLD AUTO: 10 FL (ref 8.9–12.7)
PMV BLD AUTO: 10 FL (ref 8.9–12.7)
POTASSIUM SERPL-SCNC: 3.5 MMOL/L (ref 3.5–5.3)
POTASSIUM SERPL-SCNC: 3.5 MMOL/L (ref 3.5–5.3)
PROT SERPL-MCNC: 6.9 G/DL (ref 6.4–8.4)
PROT SERPL-MCNC: 6.9 G/DL (ref 6.4–8.4)
RBC # BLD AUTO: 3.64 MILLION/UL (ref 3.88–5.62)
RBC # BLD AUTO: 3.64 MILLION/UL (ref 3.88–5.62)
SODIUM SERPL-SCNC: 134 MMOL/L (ref 135–147)
SODIUM SERPL-SCNC: 134 MMOL/L (ref 135–147)
WBC # BLD AUTO: 6.91 THOUSAND/UL (ref 4.31–10.16)
WBC # BLD AUTO: 6.91 THOUSAND/UL (ref 4.31–10.16)

## 2025-07-03 PROCEDURE — 97167 OT EVAL HIGH COMPLEX 60 MIN: CPT

## 2025-07-03 PROCEDURE — 80053 COMPREHEN METABOLIC PANEL: CPT | Performed by: INTERNAL MEDICINE

## 2025-07-03 PROCEDURE — 70450 CT HEAD/BRAIN W/O DYE: CPT

## 2025-07-03 PROCEDURE — 97161 PT EVAL LOW COMPLEX 20 MIN: CPT

## 2025-07-03 PROCEDURE — 82248 BILIRUBIN DIRECT: CPT | Performed by: INTERNAL MEDICINE

## 2025-07-03 PROCEDURE — 82948 REAGENT STRIP/BLOOD GLUCOSE: CPT

## 2025-07-03 PROCEDURE — 97110 THERAPEUTIC EXERCISES: CPT

## 2025-07-03 PROCEDURE — 99232 SBSQ HOSP IP/OBS MODERATE 35: CPT | Performed by: PHYSICIAN ASSISTANT

## 2025-07-03 PROCEDURE — 83735 ASSAY OF MAGNESIUM: CPT | Performed by: INTERNAL MEDICINE

## 2025-07-03 PROCEDURE — 97530 THERAPEUTIC ACTIVITIES: CPT

## 2025-07-03 PROCEDURE — 97535 SELF CARE MNGMENT TRAINING: CPT

## 2025-07-03 PROCEDURE — 93005 ELECTROCARDIOGRAM TRACING: CPT

## 2025-07-03 PROCEDURE — 99232 SBSQ HOSP IP/OBS MODERATE 35: CPT | Performed by: PHYSICAL MEDICINE & REHABILITATION

## 2025-07-03 PROCEDURE — 85027 COMPLETE CBC AUTOMATED: CPT | Performed by: INTERNAL MEDICINE

## 2025-07-03 RX ORDER — INSULIN GLARGINE 100 [IU]/ML
6 INJECTION, SOLUTION SUBCUTANEOUS
Status: COMPLETED | OUTPATIENT
Start: 2025-07-03 | End: 2025-07-03

## 2025-07-03 RX ORDER — ONDANSETRON 4 MG/1
4 TABLET, ORALLY DISINTEGRATING ORAL EVERY 6 HOURS PRN
Status: DISCONTINUED | OUTPATIENT
Start: 2025-07-03 | End: 2025-07-15 | Stop reason: HOSPADM

## 2025-07-03 RX ORDER — POTASSIUM CHLORIDE 1500 MG/1
40 TABLET, EXTENDED RELEASE ORAL ONCE
Status: COMPLETED | OUTPATIENT
Start: 2025-07-03 | End: 2025-07-03

## 2025-07-03 RX ORDER — ONDANSETRON 4 MG/1
4 TABLET, ORALLY DISINTEGRATING ORAL ONCE
Status: DISCONTINUED | OUTPATIENT
Start: 2025-07-03 | End: 2025-07-15 | Stop reason: HOSPADM

## 2025-07-03 RX ORDER — LANOLIN ALCOHOL/MO/W.PET/CERES
400 CREAM (GRAM) TOPICAL 2 TIMES DAILY
Status: COMPLETED | OUTPATIENT
Start: 2025-07-03 | End: 2025-07-04

## 2025-07-03 RX ADMIN — SODIUM CHLORIDE 500 ML: 0.9 INJECTION, SOLUTION INTRAVENOUS at 20:45

## 2025-07-03 RX ADMIN — MUSCLE RUB CREAM: 100; 150 CREAM TOPICAL at 20:54

## 2025-07-03 RX ADMIN — LORATADINE 10 MG: 10 TABLET ORAL at 08:44

## 2025-07-03 RX ADMIN — FLUTICASONE PROPIONATE 2 SPRAY: 50 SPRAY, METERED NASAL at 08:45

## 2025-07-03 RX ADMIN — ONDANSETRON 4 MG: 4 TABLET, ORALLY DISINTEGRATING ORAL at 17:16

## 2025-07-03 RX ADMIN — INSULIN GLARGINE 6 UNITS: 100 INJECTION, SOLUTION SUBCUTANEOUS at 20:51

## 2025-07-03 RX ADMIN — FOLIC ACID 1 MG: 1 TABLET ORAL at 08:44

## 2025-07-03 RX ADMIN — ATORVASTATIN CALCIUM 20 MG: 20 TABLET, FILM COATED ORAL at 08:44

## 2025-07-03 RX ADMIN — THIAMINE HCL TAB 100 MG 100 MG: 100 TAB at 08:43

## 2025-07-03 RX ADMIN — POTASSIUM CHLORIDE 40 MEQ: 1500 TABLET, EXTENDED RELEASE ORAL at 20:50

## 2025-07-03 RX ADMIN — MUSCLE RUB CREAM 1 APPLICATION: 100; 150 CREAM TOPICAL at 08:45

## 2025-07-03 RX ADMIN — ENOXAPARIN SODIUM 40 MG: 40 INJECTION SUBCUTANEOUS at 08:42

## 2025-07-03 RX ADMIN — SENNOSIDES 17.2 MG: 8.6 TABLET, FILM COATED ORAL at 08:42

## 2025-07-03 RX ADMIN — ASPIRIN 81 MG CHEWABLE TABLET 81 MG: 81 TABLET CHEWABLE at 08:43

## 2025-07-03 RX ADMIN — PRASUGREL 10 MG: 10 TABLET, FILM COATED ORAL at 08:46

## 2025-07-03 RX ADMIN — INSULIN LISPRO 2 UNITS: 100 INJECTION, SOLUTION INTRAVENOUS; SUBCUTANEOUS at 20:52

## 2025-07-03 RX ADMIN — CYANOCOBALAMIN TAB 500 MCG 1000 MCG: 500 TAB at 08:43

## 2025-07-03 RX ADMIN — OXYCODONE HYDROCHLORIDE 5 MG: 5 TABLET ORAL at 08:54

## 2025-07-03 RX ADMIN — GABAPENTIN 100 MG: 100 CAPSULE ORAL at 20:50

## 2025-07-03 RX ADMIN — OXYCODONE HYDROCHLORIDE 5 MG: 5 TABLET ORAL at 17:15

## 2025-07-03 RX ADMIN — METOPROLOL TARTRATE 50 MG: 50 TABLET, FILM COATED ORAL at 17:36

## 2025-07-03 RX ADMIN — INSULIN LISPRO 3 UNITS: 100 INJECTION, SOLUTION INTRAVENOUS; SUBCUTANEOUS at 11:58

## 2025-07-03 RX ADMIN — INSULIN LISPRO 1 UNITS: 100 INJECTION, SOLUTION INTRAVENOUS; SUBCUTANEOUS at 17:31

## 2025-07-03 RX ADMIN — MAGNESIUM OXIDE TAB 400 MG (241.3 MG ELEMENTAL MG) 400 MG: 400 (241.3 MG) TAB at 20:51

## 2025-07-03 NOTE — ASSESSMENT & PLAN NOTE
Lab Results   Component Value Date    HGBA1C 8.0 (H) 06/24/2025       Recent Labs     07/02/25  1553 07/02/25  2201 07/03/25  0621 07/03/25  1031   POCGLU 220* 148* 128 234*     Home: metformin 1000mg daily  Here: lantus 6U qHS and SSI  Will restart metformin in the AM.  Will monitor blood sugars and may need to increase the dosage of metformin or add an additional medication.    (P) 182.5

## 2025-07-03 NOTE — PCC NURSING
Aquilino Dowell is a 64 y.o. male with a PMH of DM, HTN, CAD who presented to the [facility] after a fall at home. He was found to have sepsis/bacteremia on admission. He is now being admitted to [facility] for rehab.    This week we will work on increasing independence with ADL's, monitor labs and vital signs, and educate patient on prevention of skin breakdown. We will perform skin checks and monitor patient for constipation. We will monitor pain and educate patient on pain management. We will increase safety awareness and keep patient free from falls.    7/8: Alarmed for safety. Baseline confusion. Metformin on hold due to elevated LFT's & ETOH use. Blood sugars have been running high since off of metformin. Remain on lantus 5 units at night - plan to restart metformin. CMP ordered for 7/8. Hemoglobin stable at 9.8. Complains of L shoulder pain - maintained with scheduled bengay & PRN robaxin and oxycodone. Continent of bowel and bladder. Last bowel movement was 7/6. Min Ax1 with RW.    7/10: Alarmed for safety. QID blood sugar - resume metformin. Likely need to discontinue Lantus prior to discharge - consider addition of SGLT such as Jardiance for tighter glycemic control in the patient with a history of CAD - Continue to monitor LFTs closely and if there is any concern their elevation is due to Metformin, recommend endocrine consult for treatment recommendations. BPs have been low but rates are high. Continue lopressor. Lipitor was discontinued. While working with PT/OT 7/9 - patient became dizzy when sitting on edge of bed and standing.  BPs (+) for orthostasis.  Give 500 cc bolus NS now and monitor.  Patient with little PO intake. Continent of bowel and bladder. Last bowel movement was 7/6 - refused miralax. Family training was on 7/9. LFA IV due 7/13. Min Ax1 with RW. D/C planned for 7/14.

## 2025-07-03 NOTE — CASE MANAGEMENT
Attempted to meet pt but is currently having a stat EKG for elevated HR.     Per chart review pt resides with wife in a ranch home with 3 cleo. Pt reports no prior experience with hhc, outpt therapy or dme. They use [facility] for rx needs. Pt has [employer] with review date 7/7. It is documented pt is on disability but he is insurance carrier. Cm to follow up with pt and wife to review rehab routine, cm role and team mtg process.

## 2025-07-03 NOTE — ASSESSMENT & PLAN NOTE
S/p cardiac cath 2013    95% prox LAD, 40% mid LAD, EF 60%, MANUEL to prox LAD (Xience Rx 3.5x18mm   Continue home Prasugrel, ASA, statin

## 2025-07-03 NOTE — ASSESSMENT & PLAN NOTE
Lab Results   Component Value Date    HGBA1C 8.0 (H) 06/24/2025       Recent Labs     07/02/25  1553 07/02/25  2201 07/03/25  0621 07/03/25  1031   POCGLU 220* 148* 128 234*       Blood Sugar Average: Last 72 hrs:  (P) 182.5Home: Metformin 1000mg daily  -Unclear if family checks BG at home or if he has a kit.   >Here: Lantus 6 units QHS, CDI/Accuchecks  >Goal to transition back to home regimen.   >Diabetic Diet  >Management as per IM

## 2025-07-03 NOTE — ASSESSMENT & PLAN NOTE
Lab Results   Component Value Date    HGBA1C 8.0 (H) 06/24/2025       Recent Labs     07/03/25  1031 07/03/25  1547 07/03/25 2033 07/04/25  0634   POCGLU 234* 175* 230* 140     Home: Metformin 1000mg daily  Here: Lantus 6U qHS/Metformin 1000 mg qam (start 7/4)  Will monitor blood sugars and may need to increase the dosage of metformin or add an additional medication.  For some reason Metformin was held AM 7/3 and was given 7/4  Will stop Metformin since has elevated TB/DB, AST, AP (although improving) and ETOH use.  Will possibly need alternative PO medication = ?Januvia but if LFTs normalize then restart.  It appears he has been on this for quite a while   [FreeTextEntry5] : deferred, virtual format [Cooperative] : cooperative [Euthymic] : euthymic [Full] : full [Clear] : clear [Linear/Goal Directed] : linear/goal directed [Average] : average [WNL] : within normal limits

## 2025-07-03 NOTE — ASSESSMENT & PLAN NOTE
-And chronic transaminitis  -In setting of alcohol use disorder  -RUQ US with steatosis and hepatomegaly  > Counseled on cessation  > minimize hepatotoxic meds  > Outpatient f/u with GI recommended

## 2025-07-03 NOTE — PROGRESS NOTES
Progress Note - PMR   Name: Aquilino Dowell 64 y.o. male I MRN: [id number]  Unit/Bed#: -01 I Date of Admission: 7/2/2025   Date of Service: 7/3/2025 I Hospital Day: 1     Assessment & Plan  Peripheral neuropathy  Fall  - Has been increasingly off balance at home  - CT C-Spine without significant degenerative diseaes  - CTH with only chronic microangiopathic change  - Previous EMG showed axonal and demyelinating neuropathy + tremor, Neuro was concerned for anti-MAG neuropathy back in 2023   - They had discussed getting demyelinating neuropathy panel, but not completed as he did not f/u.   - Suspect component also related to T2DM  - Has impaired vision as well  - Has had a history of Vitamin B12 deficiency previously contributing as well (6/25 level 940)  - Also has history of alcohol use  disorder  - Unclear etiology of fall per patient.   > Ambulatory dysfunction I suspect is driven by progression of his neuropathy in addition to worsening vision impacting his ability to compensate  > PT/OT 3-5 hours/day, 5-7 days/week.  > Also has a component of deconditioning and worsening proximal weakness related to increasingly sedentary lifestyle (this is 2/2 I suspect to some depression since losing his job in November).   > Gabapentin 100mg QHS for shooting pains (higher doses made him too lethargic)  > Alcohol cessation as that can progress neuropathy  > Continue B12 supplement, optimize diabetes management.   > Check orthostatics here   > Recommend f/u with Neuromuscular Neurology   Sepsis (HCC)  Largely resolved   Found to have occult bacteremia.  S/p 7 day abx  Hemodynamically stable. Monitor.   Left shoulder pain  Examines like bicipital tendinosis with anterior pain, positive speeds, yergason.   Could also have other rotator cuff involvement with positive neers, mild external rotation weakness.  Subscapularis/internal rotators not implicated on exam  Family reports chronic L shoulder issues but worse since  the fall.   6/24 XR without acute osseous abnormality   > Plan for outpatient MRI  > Considerations in therapy   > Bengay QID  Constipation  Last BM 7/1  >Continue miralax daily and senna BID, suppository PRN  >Monitor and adjust as appropriate  At risk for venous thromboembolism (VTE)  >Lovenox, SCDs, ambulation  >Will not go home on lovenox.  >Monitor platelet count closely.   Depression  -Has lost interests he previously had, sleep has been poor, energy levels/motivation poor, feelings of guilt and frustration.   -Appetite worse  -All since he lost his job in November.  -Wife has noticed a sharp decline  -Denies suicidal ideation/self-harm.  > Discussed trial of SNRI (cymbalta may also help with pain)  > He will think about it.  > Referral to Psych at discharge   Back pain  -Denies any back pain for me today.   -Findings on Mri L-Spine would not explain LE symptoms  >Monitor   >Continue current pain management   Diabetic retinopathy (HCC)  -R eye with fully impaired vision/blind  -L eye with retinopathy and gets injections.   - Next due 7/14  -This likely is contributing to his imbalance in the setting of his peripheral neuropathy  Coronary artery disease  -S/p stent/cath 2013  -Home: Prasugrel 10mg daily, ASA 81mg daily   >Here: Same   Diabetes ([facility])  Lab Results   Component Value Date    HGBA1C 8.0 (H) 06/24/2025       Recent Labs     07/02/25  1553 07/02/25  2201 07/03/25  0621 07/03/25  1031   POCGLU 220* 148* 128 234*       Blood Sugar Average: Last 72 hrs:  (P) 182.5Home: Metformin 1000mg daily  -Unclear if family checks BG at home or if he has a kit.   >Here: Lantus 6 units QHS, CDI/Accuchecks  >Goal to transition back to home regimen.   >Diabetic Diet  >Management as per IM  Alcohol use disorder  -Denies history of withdrawal  >Cessation counseling  Hypertension  -Home: Toprol 50mg BID  >Here: Same  >Monitor and adjust as per IM  Sinusitis  >Flonase, claritin  Hyperbilirubinemia  -And chronic  transaminitis  -In setting of alcohol use disorder  -RUQ US with steatosis and hepatomegaly  > Counseled on cessation  > minimize hepatotoxic meds  > Outpatient f/u with GI recommended  Vitamin D deficiency  -6/27 12.8  >Ergocalciferol weekly for 12 weeks  >Repeat with PCP   Lung nodule  -Noted incidentally on imaging  >Recommended for f/u chest CT in 12 months  Tobacco use disorder  -Quit smoking in the 90s  -Now chewing tobacco.  >Nicotine patch  >Cessation counseling.     Health Maintenance  #Delirium/Sleep: At risk. Optimize sleep/wake, pain, bowel, bladder management. Avoid deliriogenic meds and physical restraint. Environmental/behavioral interventions.   #Bladder: Voiding and Continent  #Skin/Pressure Injury Prevention: Turn Q2hr in bed, with weight shifts [id number] in wheelchair. Float heels in bed.  #GI Prophylaxis: Not indicated  #Code Status: Full Code  #FEN: Diabetic Diet   #Dispo: ELOS 7-10 days with goals to discharge home at a Sup-mod Ind level. With support from his wife. They have a trip to [place] planned on August 6th.                 > F/U PCP, Neuromuscular, Orthopedics    Chief Complaint: Left shoulder hurts    Interval History/Subjective:  Patient seen and examined in recliner at bedside. No acute events overnight. Vital signs reviewed and were stable overnight.  Patient reports pain in left shoulder, and says he has not yet gotten his Bengay.  Otherwise tolerated a.m. eval's. patient reports no other acute issues or concerns overnight, including fevers, chills, chest pain, shortness of breath. Adequate urine output overnight w/ continent voids. Last BM Date: 07/02/25, continent.     Functional Update: Pending  PT  OT  SLP    Objective     Temp:  [97.4 °F (36.3 °C)-98.8 °F (37.1 °C)] 98.3 °F (36.8 °C)  HR:  [80-91] 89  Resp:  [18-20] 18  BP: (100-140)/(60-76) 100/60  SpO2:  [92 %-98 %] 96 %    GENERAL: alert, no apparent distress, cooperative, and comfortable  HEENT:  Normocephalic, no lesions,  without obvious abnormality  CARDIAC:  regular rate and rhythm, S1, S2 normal, no murmur, click, rub or gallop  LUNGS:  no abnormal respiratory pattern, no retractions noted, non-labored breathing   ABDOMEN:  soft, non-tender, non-distended   EXTREMITIES:  extremities normal, warm and well-perfused; no edema  NEURO:  clear speech, following all commands, oriented x3  PSYCH: Passive affect      Physical examination is otherwise unchanged from previous encounter, except as noted above.    Scheduled Meds:  Current Facility-Administered Medications   Medication Dose Route Frequency Provider Last Rate    acetaminophen  650 mg Oral Q8H PRN Ashley Depadua, MD      aspirin  81 mg Oral Daily Nadiya Shah, PA-C      atorvastatin  20 mg Oral Daily Nadiya Shah, PA-C      benzonatate  200 mg Oral TID PRN Nadiya Shah, PA-C      bisacodyl  10 mg Rectal Daily PRN Nadiya Shah, PA-C      vitamin B-12  1,000 mcg Oral Daily Nadiya Shah, PA-C      enoxaparin  40 mg Subcutaneous Daily Nadiya Garberharriet, PA-C      [START ON 7/6/2025] ergocalciferol  50,000 Units Oral Weekly Nadiya Shah, PA-C      fluticasone  2 spray Each Nare BID Nadiya Shah, PA-C      folic acid  1 mg Oral Daily Nadiya Shah, PA-C      gabapentin  100 mg Oral HS Nadiya Shah, PA-C      insulin glargine  6 Units Subcutaneous HS Nadiya Shah, PA-C      insulin lispro  1-5 Units Subcutaneous HS Nadiya Shah, PA-C      insulin lispro  1-6 Units Subcutaneous TID AC Nadiya Shah, PA-C      loratadine  10 mg Oral Daily Nadiya Shah, PA-C      menthol-methyl salicylate   Apply externally 4x Daily Nadiya Shah, PA-C      [Held by provider] metFORMIN  1,000 mg Oral Daily With Breakfast Nadiya Shah, PA-C      methocarbamol  500 mg Oral Q6H PRN Nadiya Garberes, PA-C      metoprolol tartrate  50 mg Oral Q12H TERRENCE Nadiya Shah, PA-C      nicotine  7 mg Transdermal Daily Nadiya Shah, PA-C      oxyCODONE  2.5 mg Oral Q4H PRN  Ashley Depadua, MD      Or    oxyCODONE  5 mg Oral Q4H PRN Ashley Depadua, MD      polyethylene glycol  17 g Oral Daily Nadiya Shah, ZACH      prasugrel  10 mg Oral Daily Nadiya Shah, ZACH      senna  2 tablet Oral BID Nadiya Shah, ZACH      thiamine  100 mg Oral Daily Nadiyapawel ShahZACH           Lab Results: I have reviewed the following results:  Results from last 7 days   Lab Units 07/02/25  0825 06/30/25  0536 06/29/25  0538   HEMOGLOBIN g/dL 11.5* 11.3* 11.1*   HEMATOCRIT % 34.5* 34.7* 34.6*   WBC Thousand/uL 6.19 4.25* 4.00*   PLATELETS Thousands/uL 137* 103* 90*     Results from last 7 days   Lab Units 07/02/25  0825 06/30/25  0536 06/29/25  0538 06/28/25  0526   BUN mg/dL 4* 5 8 6   SODIUM mmol/L 134* 137 135 132*   POTASSIUM mmol/L 3.9 3.5 3.8 3.8   CHLORIDE mmol/L 102 105 103 99   CREATININE mg/dL 0.68 0.76 0.88 0.65   AST U/L  --  88* 94* 84*   ALT U/L  --  27 30 34       Results from last 7 days   Lab Units 06/27/25  0930   PROTIME seconds 14.0   INR  1.01        0658}      ** Please Note: Fluency Direct voice to text software may have been used in the creation of this document. **

## 2025-07-03 NOTE — PROGRESS NOTES
"  PT [facility] Eval     07/03/25 1300   Patient Data   Rehab Impairment debility   Etiologic Diagnosis Debility from Sepsis   Date of Onset 06/24/25   Home Setup   Type of Home Single Level   Method of Entry Hand Rail Right   Number of Stairs 3   Number of Stairs in Home 0   First Floor Bathroom Tub;Shower;Combo;Grab Bars   Available Equipment Single Point Cane;Shower Chair   Baseline Information   Transportation Family/friends drive   Prior Device(s) Used Single Point Cane   Prior Level of Function   Self-Care 3. Independent - Patient completed the activities by him/herself, with or without an assistive device, with no assistance from a helper.   Indoor-Mobility (Ambulation) 3. Independent - Patient completed the activities by him/herself, with or without an assistive device, with no assistance from a helper.   Stairs 3. Independent - Patient completed the activities by him/herself, with or without an assistive device, with no assistance from a helper.   Functional Cognition 3. Independent - Patient completed the activities by him/herself, with or without an assistive device, with no assistance from a helper.   Prior Device Used Z. None of the above  (SPC for community ambulation, No AD within home reports would \"furniture surf\")   Falls in the Last Year   Number of falls in the past 12 months 2   Type of Injury Associated with Fall Injury  (found down by wife for undetermined amount of time.)   Psychosocial   Psychosocial (WDL) WDL   Patient Behaviors/Mood Flat affect   Restrictions/Precautions   Precautions Fall Risk;Bed/chair alarms;Supervision on toilet/commode;Visual deficit   Pain Assessment   Pain Assessment Tool 0-10   Pain Score No Pain   Transfer Bed/Chair/Wheelchair   Limitations Noted In Balance;Endurance;LE Strength;Vision   Adaptive Equipment Cane   Type of Assistance Needed Physical assistance   Physical Assistance Level 26%-50%   Comment MinAx1 w/ SPC. Pt had anterior LOB that was corrected w/ PT " assistance. Vcs provided for pt to gain balance before transitioning from stand   Chair/Bed-to-Chair Transfer CARE Score 3   Roll Left and Right   Type of Assistance Needed Supervision   Physical Assistance Level No physical assistance   Comment Close supervision, Pt sleeps in standard bed at home   Roll Left and Right CARE Score 4   Sit to Lying   Type of Assistance Needed Supervision   Physical Assistance Level No physical assistance   Comment Close Supervision   Sit to Lying CARE Score 4   Lying to Sitting on Side of Bed   Type of Assistance Needed Supervision   Physical Assistance Level No physical assistance   Comment Close Supervision   Lying to Sitting on Side of Bed CARE Score 4   Sit to Stand   Type of Assistance Needed Physical assistance   Physical Assistance Level 25% or less   Comment CGA-minAx1   Sit to Stand CARE Score 3   Picking Up Object   Type of Assistance Needed Physical assistance   Physical Assistance Level 25% or less   Comment MinAx1 utilizing reacher. Pt has reacher at home. Assistance required due to posterior lean   Picking Up Object CARE Score 3   Car Transfer   Type of Assistance Needed Physical assistance   Physical Assistance Level 25% or less   Comment CGAx1 to enter car, minAx1 to exit car. Vcs provided for sequencing   Car Transfer CARE Score 3   Ambulation   Primary Mode of Locomotion Prior to Admission Walk   Distance Walked (feet) 118 ft   Assist Device Cane   Gait Pattern Lateral deviation;Inconsistant Vaishali;Slow Vaishali;Forward Flexion;Wide TAMEKA;Step through   Limitations Noted In Balance;Coordination;Endurance;Speed;Strength;Swing   Provided Assistance with: Balance   Walk Assist Level Minimum Assist   Findings (S)  Pt ambulated 118' w/ SPC. Pt required CGA-minAx1 with assistance for balance. Pt has lateral trunk lean & often reaches out for wall for additional support. Trial w/ RW vs SPC next session   Walk 10 Feet   Type of Assistance Needed Physical assistance   Physical  Assistance Level 25% or less   Comment CGA-minAx1 w/ SPC   Walk 10 Feet CARE Score 3   Walk 50 Feet with Two Turns   Type of Assistance Needed Physical assistance   Physical Assistance Level 25% or less   Comment CGA-minAx1 w/ SPC   Walk 50 Feet with Two Turns CARE Score 3   Walk 150 Feet   Comment Pt fatigues easily   Reason if not Attempted Safety concerns   Walk 150 Feet CARE Score 88   Walking 10 Feet on Uneven Surfaces   Type of Assistance Needed Physical assistance   Physical Assistance Level 25% or less   Comment CGA-minAx1 w/ SPC   Walking 10 Feet on Uneven Surfaces CARE Score 3   Wheel 50 Feet with Two Turns   Reason if not Attempted Activity not applicable   Wheel 50 Feet with Two Turns CARE Score 9   Wheel 150 Feet   Reason if not Attempted Activity not applicable   Wheel 150 Feet CARE Score 9   Curb or Single Stair   Style negotiated Curb   Type of Assistance Needed Physical assistance   Physical Assistance Level 25% or less   Comment MinAx1 w/ SPC. Vcs for sequencing   1 Step (Curb) CARE Score 3   4 Steps   Type of Assistance Needed Physical assistance   Physical Assistance Level 25% or less   Comment MinAx1 w/ BHRs to ascend & descend 4 6in  steps   4 Steps CARE Score 3   12 Steps   Reason if not Attempted Activity not applicable   12 Steps CARE Score 9   Stairs   Type [place] Steps   # of Steps 4   Weight Bearing Precautions Fall Risk   Assist Devices Bilateral Rail   Comprehension   QI: Comprehension 4. Undestands: Clear comprehension without cues or repetitions   Expression   QI: Expression 4. Express complex messages without difficulty and with speech that is clear and easy to understan   Strength RLE   R Hip Flexion 3+/5   R Knee Flexion 4/5   R Knee Extension 4/5   R Ankle Dorsiflexion 4+/5   R Ankle Plantar Flexion 4+/5   Strength LLE   L Hip Flexion 3+/5   L Knee Flexion 4/5   L Knee Extension 4/5   L Ankle Dorsiflexion 4+/5   L Ankle Plantar Flexion 4+/5   Sensation   Light Touch Severe  deficits in the LLE;Severe deficits in the RLE   Cognition   Overall Cognitive Status WFL   Arousal/Participation Alert;Responsive;Arousable;Cooperative   Attention Within functional limits   Orientation Level Oriented X4   Memory Within functional limits   Following Commands Follows one step commands without difficulty   Vision   Vision Comments Blind in R eye. Macular Degeneration in L eye   Objective Measure   PT Measure(s) TUG 2 attempts: Avg score 39.5s, Modified CTSIB: Pt 30s EO static balance, Pt 8s EC static balance   PT Findings Pt is at a higher risk for falls based upon outcome measures   Therapeutic Exercise   Therapeutic Exercise/Activity LE TE: Ball ADD squeeze 3x10, Seated hip ABD w/ yellow band 3x10, Hamstring curls w/ yellow band 2x10   Discharge Information   Patient's Discharge Plan Return home w/ family   Patient's Rehab Expectations Improve balance, walking, and stairs   Barriers to Discharge Home Decreased Endurance;Limited Family Support;Safety Considerations;Decreased Strength   Impressions Pt is a 65 y/o male seen for PT evaluation following admission due to sepsis s/p fall at home. Prior to admission pt lives in 1STH+3STE and was independent utilizing [facility] for community ambulation. Pt reported spouse would be able to provide assistance at time of distance if necessary. Pt is currently limited by impaired balance and righting reactions, LE strength & motor control, sensory deficits, as well as as decreased endurance and activity tolerance. Pt is currently functioning below baseline requiring supervision for bed mobility, CGA-minAx1 for STS, SPTs, ambulation, and minAx1 for stair and curb negotiation. Pt demonstrated poor endurance, and reported 9/10 RPE 30 minutes into the session, in the future may be beneficial to break up sessions throughout the day. Pt is a good candidate for skilled PT services, and demonstrates good rehab potential to meet LTGs of independent-supervision level goals  utilizing LRAD. Anticipate current length of stay around 10 days. Physical therapy plan of care to focus on functional mobility, transfers, stair negotiation, static and dynamic balance, endurance, DME training, family education, and overall safety awareness.   PT Therapy Minutes   PT Time In 1300   PT Time Out 1430   PT Total Time (minutes) 90   PT Mode of treatment - Individual (minutes) 90   PT Mode of treatment - Concurrent (minutes) 0   PT Mode of treatment - Group (minutes) 0   PT Mode of treatment - Co-treat (minutes) 0   PT Mode of Treatment - Total time(minutes) 90 minutes   PT Cumulative Minutes 90   Cumulative Minutes   Cumulative therapy minutes 180     Marin Hector, SPT

## 2025-07-03 NOTE — PROGRESS NOTES
Progress Note - Hospitalist   Name: Aquilino Dowell 64 y.o. male I MRN: [id number]  Unit/Bed#: -01 I Date of Admission: 7/2/2025   Date of Service: 7/3/2025 I Hospital Day: 1    Assessment & Plan  Sepsis (HCC)  Presented to ED after fall at home, found to have tachycardia and tachypnea  Blood cultures:    Klebsiella aerogenes Abnormal    Avoid ceftriaxone, lower generation cephalosporins, penicillins and aztreonam even when susceptible, as organism may develop resistance on therapy   Staphylococcus hominis Abnormal    Probable contamination      Completed 7 days of cefepime on 6/30  Therapy per PMR.    Fall  S/p fall at home ambulating to the bathroom  Uses a cane at baseline due to LE neuropathy and vision issues  Left shoulder pain  Xray done- showed glenohumeral DJD, no fracture  Bengay ordered per PMR  Coronary artery disease  S/p cardiac cath 2013  Continue home prasugrel, ASA, statin  Diabetes ([facility])  Lab Results   Component Value Date    HGBA1C 8.0 (H) 06/24/2025       Recent Labs     07/02/25  1553 07/02/25  2201 07/03/25  0621 07/03/25  1031   POCGLU 220* 148* 128 234*     Home: metformin 1000mg daily  Here: lantus 6U qHS and SSI  Will restart metformin in the AM.  Will monitor blood sugars and may need to increase the dosage of metformin or add an additional medication.    (P) 182.5    Alcohol use disorder  Per hospital chart, wife reported up to 1 bottle of wine per day  ETOH on admit 6/24 was normal  Was on CIWA protocol  Continue B12, folic acid  Hypertension  Home: metoprolol 50 mg BID  Here: same  Sinusitis  Seen on CT head done in ED  Continue flonase, claritin  Back pain  MRI lumbar spine:  No discitis/osteomyelitis in lumbar spine, as clinically questioned.  Small focal areas of enhancement in the L1-L2 and L2-L3 supraspinous ligament regions, suspicious for enthesitis.  Mild multilevel degenerative changes of lumbar spine, as detailed above. No significant canal stenosis or foraminal  narrowing  Pain meds/therapy per PMR  Hyperbilirubinemia  Peaked at 2.52, currently 2.18  Right UQ US- Hepatomegaly with steatosis, no liver mass  Recommend outpatient GI follow up  Vitamin D deficiency  Continue supplement  Lung nodule  Found incidentally on CT scan  4mm right upper lobe nodule which is new  Will need repeat CT in 12 months  Peripheral neuropathy  Continue gabapentin  Tobacco use disorder  Encourage cessation at discharge.  Depression  Pt does not want to start a medication for mood.    The above assessment and plan was reviewed and updated as determined by my evaluation of the patient on 7/3/2025.    History of Present Illness   Patient seen and examined. Patients overnight issues or events were reviewed with nursing staff. New or overnight issues include the following:     Pt seen in PT. Discussed the plan to add back metformin and stop Lantus with pt. He does not believe that the metformin will control his blood sugars adequately. He denies any other complaints.    A 10 point review of systems was negative except for what is noted in the HPI.    Objective :  Temp:  [97.4 °F (36.3 °C)-98.8 °F (37.1 °C)] 98.3 °F (36.8 °C)  HR:  [80-91] 89  BP: (100-140)/(60-76) 100/60  Resp:  [18-20] 18  SpO2:  [92 %-98 %] 96 %  O2 Device: None (Room air)    Invasive Devices       Peripheral Intravenous Line  Duration             Peripheral IV 06/28/25 Left;Ventral (anterior) Wrist 5 days    Peripheral IV 06/28/25 Right;Ventral (anterior) Wrist 5 days                    Physical Exam  General Appearance: NAD; pleasant  HEENT: PERRLA, conjuctiva normal; mucous membranes moist; face symmetrical  Neck:  Supple  Lungs: clear bilaterally, normal respiratory effort, no retractions, expiratory effort normal, on room air  CV: regular rate and rhythm, no murmurs rubs or gallops noted   ABD: soft non tender, +BS x4  EXT: no edema  Skin: normal turgor, normal texture, no rash  Psych: depressed  Neuro: AAOx3    The above  physical exam was reviewed and updated as determined by my evaluation of the patient on 7/3/2025.      Lab Results: I have reviewed the following results:  Results from last 7 days   Lab Units 07/02/25  0825 06/30/25  0536   WBC Thousand/uL 6.19 4.25*   HEMOGLOBIN g/dL 11.5* 11.3*   HEMATOCRIT % 34.5* 34.7*   PLATELETS Thousands/uL 137* 103*     Results from last 7 days   Lab Units 07/02/25  0825 06/30/25  0536   SODIUM mmol/L 134* 137   POTASSIUM mmol/L 3.9 3.5   CHLORIDE mmol/L 102 105   CO2 mmol/L 25 26   BUN mg/dL 4* 5   CREATININE mg/dL 0.68 0.76   CALCIUM mg/dL 8.0* 7.9*         Results from last 7 days   Lab Units 06/27/25  0930   INR  1.01     Results from last 7 days   Lab Units 07/03/25  1031 07/03/25  0621 07/02/25  2201   POC GLUCOSE mg/dl 234* 128 148*       Imaging Results Review: No pertinent imaging studies reviewed.  Other Study Results Review: Other studies reviewed include: blood sugars    Review of Scheduled Meds: Medications  reviewed and reconciled as needed  Current Facility-Administered Medications   Medication Dose Route Frequency Provider Last Rate    acetaminophen  650 mg Oral Q8H PRN Ashley Depadua, MD      aspirin  81 mg Oral Daily Nadiya Shah, PA-C      atorvastatin  20 mg Oral Daily Nadiya Jcarloses, PA-C      benzonatate  200 mg Oral TID PRN Nadiyapawel Garberharriet, PA-C      bisacodyl  10 mg Rectal Daily PRN Nadiyapawel Doranbrittani, PA-C      vitamin B-12  1,000 mcg Oral Daily Nadiya Seagrprincees, PA-C      enoxaparin  40 mg Subcutaneous Daily Nadiya Shah, PA-C      [START ON 7/6/2025] ergocalciferol  50,000 Units Oral Weekly Nadiyapawel Garberes, PA-C      fluticasone  2 spray Each Nare BID Nadiya Doranprinceharriet, PA-C      folic acid  1 mg Oral Daily Nadiya Seagrprincees, PA-C      gabapentin  100 mg Oral HS Nadiya Seagrprincees, PA-C      insulin glargine  6 Units Subcutaneous HS Nadiya Cortezgrprincees, PA-C      insulin lispro  1-5 Units Subcutaneous HS Nadiya Shah PA-C      insulin lispro  1-6 Units Subcutaneous  TID AC Nadiya Shah, PA-C      loratadine  10 mg Oral Daily Nadiya Shah, PA-C      menthol-methyl salicylate   Apply externally 4x Daily Nadiya Shah, PA-C      [Held by provider] metFORMIN  1,000 mg Oral Daily With Breakfast Nadiya Shah, PA-C      methocarbamol  500 mg Oral Q6H PRN Nadiya Shah, PA-C      metoprolol tartrate  50 mg Oral Q12H TERRENCE Nadiya Shah, PA-C      nicotine  7 mg Transdermal Daily Nadiya Shah, PA-C      oxyCODONE  2.5 mg Oral Q4H PRN Ashley Depadua, MD      Or    oxyCODONE  5 mg Oral Q4H PRN Ashley Depadua, MD      polyethylene glycol  17 g Oral Daily Nadiya Shah, PA-C      prasugrel  10 mg Oral Daily Nadiya Shah, PA-C      senna  2 tablet Oral BID Nadiya Shah, PA-C      thiamine  100 mg Oral Daily Nadiya Shah, PA-C         VTE Pharmacologic Prophylaxis: Lovenox  Code Status: Level 1 - Full Code  Current Length of Stay: 1 day(s)    Administrative Statements   I have spent a total time of 35 minutes in caring for this patient on the day of the visit/encounter including Diagnostic results, Prognosis, Risks and benefits of tx options, Instructions for management, Patient and family education, Importance of tx compliance, Risk factor reductions, Impressions, Counseling / Coordination of care, Documenting in the medical record, Reviewing/placing orders in the medical record (including tests, medications, and/or procedures), Obtaining or reviewing history  , and Communicating with other healthcare professionals .  ** Please Note:  voice to text software may have been used in the creation of this document. Although proof errors in transcription or interpretation are a potential of such software**

## 2025-07-03 NOTE — PROGRESS NOTES
Pt c/o nausea, emesis x2, tachy  with regular rhythm, strength and pulses intact, A&Ox4 at time of exam with slow responses. Internal medicine notified, PRN zofran administered and ecg done, metoprolol administered per MD order, pt will be sent to CT of head. Left wrist IV placed, flushing with no difficulties. Blood work obtained. Spouse at bedside.

## 2025-07-03 NOTE — ASSESSMENT & PLAN NOTE
-Has lost interests he previously had, sleep has been poor, energy levels/motivation poor, feelings of guilt and frustration.   -Appetite worse  -All since he lost his job in November.  -Wife has noticed a sharp decline  -Denies suicidal ideation/self-harm.  > Discussed trial of SNRI (cymbalta may also help with pain)  > He will think about it.  > Referral to Psych at discharge

## 2025-07-03 NOTE — PLAN OF CARE
Problem: PAIN - ADULT  Goal: Verbalizes/displays adequate comfort level or baseline comfort level  Description: Interventions:  - Encourage patient to monitor pain and request assistance  - Assess pain using appropriate pain scale  - Administer analgesics as ordered based on type and severity of pain and evaluate response  - Implement non-pharmacological measures as appropriate and evaluate response  - Consider cultural and social influences on pain and pain management  - Notify physician/advanced practitioner if interventions unsuccessful or patient reports new pain  - Educate patient/family on pain management process including their role and importance of  reporting pain   - Provide non-pharmacologic/complimentary pain relief interventions  Outcome: Progressing     Problem: INFECTION - ADULT  Goal: Absence or prevention of progression during hospitalization  Description: INTERVENTIONS:  - Assess and monitor for signs and symptoms of infection  - Monitor lab/diagnostic results  - Monitor all insertion sites, i.e. indwelling lines, tubes, and drains  - Monitor endotracheal if appropriate and nasal secretions for changes in amount and color  - [place] appropriate cooling/warming therapies per order  - Administer medications as ordered  - Instruct and encourage patient and family to use good hand hygiene technique  - Identify and instruct in appropriate isolation precautions for identified infection/condition  Outcome: Progressing  Goal: Absence of fever/infection during neutropenic period  Description: INTERVENTIONS:  - Monitor WBC  - Perform strict hand hygiene  - Limit to healthy visitors only  - No plants, dried, fresh or silk flowers with chadwick in patient room  Outcome: Progressing     Problem: SAFETY ADULT  Goal: Patient will remain free of falls  Description: INTERVENTIONS:  - Educate patient/family on patient safety including physical limitations  - Instruct patient to call for assistance with activity   -  Consider consulting OT/PT to assist with strengthening/mobility based on AM PAC & JH-HLM score  - Consult OT/PT to assist with strengthening/mobility   - Keep Call bell within reach  - Keep bed low and locked with side rails adjusted as appropriate  - Keep care items and personal belongings within reach  - Initiate and maintain comfort rounds  - Make Fall Risk Sign visible to staff  - Offer Toileting every 2 Hours, in advance of need  - Initiate/Maintain bed/chair alarm  - Obtain necessary fall risk management equipment: nonskid socks  - Apply yellow socks and bracelet for high fall risk patients  - Consider moving patient to room near nurses station  Outcome: Progressing  Goal: Maintain or return to baseline ADL function  Description: INTERVENTIONS:  -  Assess patient's ability to carry out ADLs; assess patient's baseline for ADL function and identify physical deficits which impact ability to perform ADLs (bathing, care of mouth/teeth, toileting, grooming, dressing, etc.)  - Assess/evaluate cause of self-care deficits   - Assess range of motion  - Assess patient's mobility; develop plan if impaired  - Assess patient's need for assistive devices and provide as appropriate  - Encourage maximum independence but intervene and supervise when necessary  - Involve family in performance of ADLs  - Assess for home care needs following discharge   - Consider OT consult to assist with ADL evaluation and planning for discharge  - Provide patient education as appropriate  - Monitor functional capacity and physical performance, use of AM PAC & JH-HLM   - Monitor gait, balance and fatigue with ambulation    Outcome: Progressing  Goal: Maintains/Returns to pre admission functional level  Description: INTERVENTIONS:  - Perform AM-PAC 6 Click Basic Mobility/ Daily Activity assessment daily.  - Set and communicate daily mobility goal to care team and patient/family/caregiver.   - Collaborate with rehabilitation services on mobility  goals if consulted  - Perform Range of Motion 3 times a day.  - Reposition patient every 2 hours.  - Dangle patient 3 times a day  - Stand patient 3 times a day  - Ambulate patient 3 times a day  - Out of bed to chair 3 times a day   - Out of bed for meals 3 times a day  - Out of bed for toileting  - Record patient progress and toleration of activity level   Outcome: Progressing     Problem: Prexisting or High Potential for Compromised Skin Integrity  Goal: Skin integrity is maintained or improved  Description: INTERVENTIONS:  - Identify patients at risk for skin breakdown  - Assess and monitor skin integrity including under and around medical devices   - Assess and monitor nutrition and hydration status  - Monitor labs  - Assess for incontinence   - Turn and reposition patient  - Assist with mobility/ambulation  - Relieve pressure over surinder prominences   - Avoid friction and shearing  - Provide appropriate hygiene as needed including keeping skin clean and dry  - Evaluate need for skin moisturizer/barrier cream  - Collaborate with interdisciplinary team  - Patient/family teaching  - Consider wound care consult    Assess:  - Review Yosef scale daily  - Clean and moisturize skin every shift  - Inspect skin when repositioning, toileting, and assisting with ADLS  - Assess extremities for adequate circulation and sensation     Bed Management:  - Have minimal linens on bed & keep smooth, unwrinkled  - Change linens as needed when moist or perspiring  - Avoid sitting or lying in one position for more than 2hrs hours while in bed?Keep HOB at 30 degrees   - Toileting:  - Offer bedside commode  - Assess for incontinence every nonskid socks    Activity:  - Mobilize patient 3 times a day  - Encourage activity and walks on unit  - Encourage or provide ROM exercises   - Turn and reposition patient every 2 Hours  - Use appropriate equipment to lift or move patient in bed  - Instruct/ Assist with weight shifting every shift when  out of bed in chair  - Consider limitation of chair time 2 hour intervals    Skin Care:  - Avoid use of baby powder, tape, friction and shearing, hot water or constrictive clothing  - Relieve pressure over bony prominences using offloading cushion  - Do not massage red bony areas    Outcome: Progressing     Problem: INFECTION - ADULT  Goal: Absence or prevention of progression during hospitalization  Description: INTERVENTIONS:  - Assess and monitor for signs and symptoms of infection  - Monitor lab/diagnostic results  - Monitor all insertion sites, i.e. indwelling lines, tubes, and drains  - Monitor endotracheal if appropriate and nasal secretions for changes in amount and color  - [place] appropriate cooling/warming therapies per order  - Administer medications as ordered  - Instruct and encourage patient and family to use good hand hygiene technique  - Identify and instruct in appropriate isolation precautions for identified infection/condition  Outcome: Progressing  Goal: Absence of fever/infection during neutropenic period  Description: INTERVENTIONS:  - Monitor WBC  - Perform strict hand hygiene  - Limit to healthy visitors only  - No plants, dried, fresh or silk flowers with chadwick in patient room  Outcome: Progressing     Problem: SAFETY ADULT  Goal: Patient will remain free of falls  Description: INTERVENTIONS:  - Educate patient/family on patient safety including physical limitations  - Instruct patient to call for assistance with activity   - Consider consulting OT/PT to assist with strengthening/mobility based on AM PAC & JH-HLM score  - Consult OT/PT to assist with strengthening/mobility   - Keep Call bell within reach  - Keep bed low and locked with side rails adjusted as appropriate  - Keep care items and personal belongings within reach  - Initiate and maintain comfort rounds  - Make Fall Risk Sign visible to staff  - Offer Toileting every 2 Hours, in advance of need  - Initiate/Maintain bed/chair  alarm  - Obtain necessary fall risk management equipment: nonskid socks  - Apply yellow socks and bracelet for high fall risk patients  - Consider moving patient to room near nurses station  Outcome: Progressing  Goal: Maintain or return to baseline ADL function  Description: INTERVENTIONS:  -  Assess patient's ability to carry out ADLs; assess patient's baseline for ADL function and identify physical deficits which impact ability to perform ADLs (bathing, care of mouth/teeth, toileting, grooming, dressing, etc.)  - Assess/evaluate cause of self-care deficits   - Assess range of motion  - Assess patient's mobility; develop plan if impaired  - Assess patient's need for assistive devices and provide as appropriate  - Encourage maximum independence but intervene and supervise when necessary  - Involve family in performance of ADLs  - Assess for home care needs following discharge   - Consider OT consult to assist with ADL evaluation and planning for discharge  - Provide patient education as appropriate  - Monitor functional capacity and physical performance, use of AM PAC & JH-HLM   - Monitor gait, balance and fatigue with ambulation    Outcome: Progressing  Goal: Maintains/Returns to pre admission functional level  Description: INTERVENTIONS:  - Perform AM-PAC 6 Click Basic Mobility/ Daily Activity assessment daily.  - Set and communicate daily mobility goal to care team and patient/family/caregiver.   - Collaborate with rehabilitation services on mobility goals if consulted  - Perform Range of Motion 3 times a day.  - Reposition patient every 2 hours.  - Dangle patient 3 times a day  - Stand patient 3 times a day  - Ambulate patient 3 times a day  - Out of bed to chair 3 times a day   - Out of bed for meals 3 times a day  - Out of bed for toileting  - Record patient progress and toleration of activity level   Outcome: Progressing     Problem: DISCHARGE PLANNING  Goal: Discharge to home or other facility with  appropriate resources  Description: INTERVENTIONS:  - Identify barriers to discharge w/patient and caregiver  - Arrange for needed discharge resources and transportation as appropriate  - Identify discharge learning needs (meds, wound care, etc.)  - Arrange for interpretive services to assist at discharge as needed  - Refer to Case Management Department for coordinating discharge planning if the patient needs post-hospital services based on physician/advanced practitioner order or complex needs related to functional status, cognitive ability, or social support system  Outcome: Progressing

## 2025-07-03 NOTE — ASSESSMENT & PLAN NOTE
S/p fall at home ambulating to the bathroom  Uses a cane at baseline due to LE neuropathy and vision issues  PT/OT

## 2025-07-03 NOTE — PROGRESS NOTES
Occupational Therapy Long Term Goals     07/03/25 0700   Rehab Team Goals   ADL Team Goal Patient will be independent with ADLs with least restrictive device upon completion of rehab program   Rehab Team Interventions   OT Interventions Self Care   Eating Goal   Eating Goal 06. Independent - Patient completes the activity by him/herself with no assistance from a helper.   Status Ongoing;Target goal - one week;Target goal - two weeks   Grooming Goal   Oral Hygiene Goal 06. Independent - Patient completes the activity by him/herself with no assistance from a helper.   Status Ongoing;Target goal - one week;Target goal - two weeks   Tub/Shower Transfer Goal   Method Tub Shower  (SUP with grab bar shower chair)   Status Ongoing;Target goal - one week;Target goal - two weeks   Bathing Goal   Shower/bathe self Goal 06. Independent - Patient completes the activity by him/herself with no assistance from a helper.   Status Ongoing;Target goal - one week;Target goal - two weeks   Upper Body Dressing Goal   Upper body dressing Goal 06. Independent - Patient completes the activity by him/herself with no assistance from a helper.   Status Ongoing;Target goal - one week;Target goal - two weeks   Lower Body Dressing Goal   Lower body dressing Goal 06. Independent - Patient completes the activity by him/herself with no assistance from a helper.   Putting on/taking off footwear Goal 06. Independent - Patient completes the activity by him/herself with no assistance from a helper.   Status Ongoing;Target goal - one week;Target goal - two weeks   Toileting Transfer Goal   Toilet transfer Goal 06. Independent - Patient completes the activity by him/herself with no assistance from a helper.   Status Ongoing;Target goal - one week;Target goal - two weeks   Toileting Goal   Toileting hygiene Goal 06. Independent - Patient completes the activity by him/herself with no assistance from a helper.   Status Ongoing;Target goal - one week;Target  goal - two weeks   Comprehension Goal   Comprehension Assist Level Independant   Status Ongoing;Target goal - one week;Target goal - two weeks   Expression Goal   Expression Assist Level Independant   Status Ongoing;Target goal - two weeks;Target goal - one week   Meal Prep and Kitchen Mobility   Assist Level Supervision  (kitchen mob)   Status Ongoing;Target goal - one week;Target goal - two weeks   Medication Management   Assist Level Supervision   Status Ongoing;Target goal - one week;Target goal - two weeks

## 2025-07-03 NOTE — UTILIZATION REVIEW
NOTIFICATION OF ADMISSION DISCHARGE   This is a Notification of Discharge from [facility]. Please be advised that this patient has been discharge from our facility. Below you will find the admission and discharge date and time including the patient’s disposition.   UTILIZATION REVIEW CONTACT:  Utilization Review Assistants  Network Utilization Review Department  Phone: [phone] x carefully listen to the prompts. All voicemails are confidential.  Email: [email or web address]     ADMISSION INFORMATION  PRESENTATION DATE: 6/28/2025 12:05 AM  OBERVATION ADMISSION DATE: N/A  INPATIENT ADMISSION DATE: 6/28/25 12:05 AM   DISCHARGE DATE: 7/2/2025  2:11 PM   DISPOSITION:[facility]    Network Utilization Review Department  ATTENTION: Please call with any questions or concerns to [phone] and carefully listen to the prompts so that you are directed to the right person. All voicemails are confidential.   For Discharge needs, contact Care Management DC Support Team at [phone] opt. 2  Send all requests for admission clinical reviews, approved or denied determinations and any other requests to dedicated fax number below belonging to the campus where the patient is receiving treatment. List of dedicated fax numbers for the Facilities:  FACILITY NAME UR FAX NUMBER   ADMISSION DENIALS (Administrative/Medical Necessity) [phone]   DISCHARGE SUPPORT TEAM ([facility]) [phone]   PARENT CHILD HEALTH (Maternity/NICU/Pediatrics) [phone]   [facility] [phone]   [facility] [phone]   [facility] [phone]   [facility] [phone]   [facility] [phone]   [facility] [phone]   [facility] [phone]   [facility] [phone]   [facility]  [facility] [phone]   [facility] [phone]   [facility] [phone]   [facility] [phone]

## 2025-07-03 NOTE — PROGRESS NOTES
Progress Note - Hospitalist   Name: Aquilino Dowell 64 y.o. male I MRN: [id number]  Unit/Bed#: -01 I Date of Admission: 7/2/2025   Date of Service: 7/4/2025 I Hospital Day: 2    Assessment & Plan  Peripheral neuropathy  Continue gabapentin  Sepsis (HCC)  Presented to ED after fall at home, found to have tachycardia and tachypnea  Blood cultures:    Klebsiella aerogenes Abnormal    Avoid ceftriaxone, lower generation cephalosporins, penicillins and aztreonam even when susceptible, as organism may develop resistance on therapy   Staphylococcus hominis Abnormal    Probable contamination      Completed 7 days of cefepime on 6/30  Unclear etiology of bacteremia, workup was unrevealing.     Fall  S/p fall at home ambulating to the bathroom  Uses a cane at baseline due to LE neuropathy and vision issues  PT/OT  Left shoulder pain  Xray done on 6/24/25 = + glenohumeral DJD, no fracture  Bengay ordered per PMR  Coronary artery disease  S/p cardiac cath 2013    95% prox LAD, 40% mid LAD, EF 60%, MANUEL to prox LAD (Xience Rx 3.5x18mm   Continue home Prasugrel, ASA, statin  Diabetes (HCC)  Lab Results   Component Value Date    HGBA1C 8.0 (H) 06/24/2025       Recent Labs     07/03/25  1031 07/03/25  1547 07/03/25  2033 07/04/25  0634   POCGLU 234* 175* 230* 140     Home: Metformin 1000mg daily  Here: Lantus 6U qHS/Metformin 1000 mg qam (start 7/4)  Will monitor blood sugars and may need to increase the dosage of metformin or add an additional medication.  For some reason Metformin was held AM 7/3 and was given 7/4  Will stop Metformin since has elevated TB/DB, AST, AP (although improving) and ETOH use.  Will possibly need alternative PO medication = ?Januvia but if LFTs normalize then restart.  It appears he has been on this for quite a while  Alcohol use disorder  Per hospital chart, wife reported up to 1 bottle of wine per day  ETOH on admit 6/24 was normal  Was on CIWA protocol  Continue B12, folic  acid  Hypertension  Home: Lopressor 50 mg BID  Here: same  No changes today 7/4/25  Sinusitis  Seen on CT head done in ED and last evening's CTH 7/3/25  Continue flonase, claritin  Back pain  MRI lumbar spine:  No discitis/osteomyelitis in lumbar spine, as clinically questioned.  Small focal areas of enhancement in the L1-L2 and L2-L3 supraspinous ligament regions, suspicious for enthesitis.  Mild multilevel degenerative changes of lumbar spine, as detailed above. No significant canal stenosis or foraminal narrowing  Pain meds/therapy per PMR  Hyperbilirubinemia  Peaked at 2.52, currently 2.26  Right UQ US- Hepatomegaly with steatosis, no liver mass  Recommend outpatient GI follow up  Vitamin D deficiency  Continue supplement  Lung nodule  Found incidentally on CT scan  4mm right upper lobe nodule which is new  Will need repeat CT in 12 months  Tobacco use disorder  Encourage cessation at discharge.  Depression  Pt does not want to start a medication for mood.  Nausea & vomiting  Occurred last evening 7/3/25  CTH was negative, labs w/o acute changes except hemoglobin down a bit likely 2/2 hydration/dilutional  Feels ok today  He felt the N/V was from too much in therapy  Anemia  May be dilutional from IVF last night (500ml NSS)  Will recheck in AM 7/5/25    The above assessment and plan was reviewed and updated as determined by my evaluation of the patient on 7/4/2025.    History of Present Illness   Patient seen and examined. Patients overnight issues or events were reviewed with nursing staff. New or overnight issues include the following:   No new or overnight issues.  Offers no complaints    Review of Systems   All other systems reviewed and are negative.      Objective :  Temp:  [97.9 °F (36.6 °C)-98.7 °F (37.1 °C)] 98.1 °F (36.7 °C)  HR:  [] 107  BP: (111-133)/(67-84) 111/67  Resp:  [17-18] 17  SpO2:  [95 %-100 %] 95 %  O2 Device: None (Room air)    Invasive Devices       Peripheral Intravenous Line   Duration             Peripheral IV 07/03/25 Distal;Dorsal (posterior);Left Forearm <1 day                    Physical Exam  General Appearance: no distress, non toxic appearing  HEENT: PERRLA, conjuctiva normal; oropharynx clear; mucous membranes moist   Neck:  Supple, normal ROM  Lungs: CTA, normal respiratory effort, no retractions, expiratory effort normal  CV: regular rate and rhythm; no rubs/murmurs/gallops, PMI normal   ABD: soft; ND/NT; +BS  EXT: no edema  Skin: normal turgor, normal texture  Psych: affect normal, mood normal  Neuro: AA        The above physical exam was reviewed and updated as determined by my evaluation of the patient on 7/4/2025.      Lab Results: I have reviewed the following results:  Results from last 7 days   Lab Units 07/04/25  0558 07/03/25  1820   WBC Thousand/uL 5.84 6.91   HEMOGLOBIN g/dL 9.8* 11.8*   HEMATOCRIT % 30.7* 37.0   PLATELETS Thousands/uL 143* 176     Results from last 7 days   Lab Units 07/04/25  0558 07/03/25  1820   SODIUM mmol/L 137 134*   POTASSIUM mmol/L 4.1 3.5   CHLORIDE mmol/L 106 101   CO2 mmol/L 26 21   BUN mg/dL 7 8   CREATININE mg/dL 0.80 0.88   CALCIUM mg/dL 7.6* 7.9*         Results from last 7 days   Lab Units 07/04/25  0558   INR  1.02     Results from last 7 days   Lab Units 07/04/25  0634 07/03/25  2033 07/03/25  1547   POC GLUCOSE mg/dl 140 230* 175*       Imaging Results Review: No pertinent imaging studies reviewed.  Other Study Results Review: No additional pertinent studies reviewed.    Review of Scheduled Meds: Medications  reviewed and reconciled as needed  Current Facility-Administered Medications   Medication Dose Route Frequency Provider Last Rate    acetaminophen  650 mg Oral Q8H PRN Ashley Depadua, MD      aspirin  81 mg Oral Daily Nadiya Shah PA-C      atorvastatin  20 mg Oral Daily Nadiya Shah PA-C      benzonatate  200 mg Oral TID PRN Nadiya Shah PA-C      bisacodyl  10 mg Rectal Daily PRN Nadiya Shah PA-C       vitamin B-12  1,000 mcg Oral Daily Nadiya Shah, PA-C      enoxaparin  40 mg Subcutaneous Daily Nadiya Shah, PA-C      [START ON 7/6/2025] ergocalciferol  50,000 Units Oral Weekly Nadiya Shah, PA-C      fluticasone  2 spray Each Nare BID Nadiya Shah, PA-C      folic acid  1 mg Oral Daily Nadiya Garberes, PA-C      gabapentin  100 mg Oral HS Nadiya Garberes, PA-C      insulin lispro  1-5 Units Subcutaneous HS Nadiya Cortezgrprincees, PA-C      insulin lispro  1-6 Units Subcutaneous TID AC Nadiya Shah, PA-C      loratadine  10 mg Oral Daily Nadiya Shah, PA-C      menthol-methyl salicylate   Apply externally 4x Daily Nadiya Shah, PA-C      metFORMIN  1,000 mg Oral Daily With Breakfast PEARL Collins-MARS      methocarbamol  500 mg Oral Q6H PRN Nadiya Shah, PA-C      metoprolol tartrate  50 mg Oral Q12H TERRENCE Nadiya Shah, PA-C      ondansetron  4 mg Oral Q6H PRN Rayne Da Silva, PA-MARS      ondansetron  4 mg Oral Once Aroldo Wyatt MD      oxyCODONE  2.5 mg Oral Q4H PRN Ashley Depadua, MD      Or    oxyCODONE  5 mg Oral Q4H PRN Ashley Depadua, MD      polyethylene glycol  17 g Oral Daily Nadiya Shah, PA-C      prasugrel  10 mg Oral Daily Nadiya Garberes, PA-C      senna  2 tablet Oral BID Nadiya Shah, PA-C      thiamine  100 mg Oral Daily Nadiya Shah, PA-C         VTE Pharmacologic Prophylaxis: Lovenox  Code Status: Level 1 - Full Code  Current Length of Stay: 2 day(s)    Administrative Statements     ** Please Note:  voice to text software may have been used in the creation of this document. Although proof errors in transcription or interpretation are a potential of such software**

## 2025-07-03 NOTE — ASSESSMENT & PLAN NOTE
Last BM 7/1  >Continue miralax daily and senna BID, suppository PRN  >Monitor and adjust as appropriate

## 2025-07-03 NOTE — ASSESSMENT & PLAN NOTE
-Denies any back pain for me today.   -Findings on Mri L-Spine would not explain LE symptoms  >Monitor   >Continue current pain management

## 2025-07-03 NOTE — PROGRESS NOTES
07/03/25 1300   Rehab Team Goals   Transfer Team Goal Patient will be independent with transfers with least restrictive device upon completion of rehab program   Locomotion Team Goal Patient will require supervision with locomotion with least restrictive device upon completion of rehab program   Rehab Team Interventions   PT Interventions Gait Training;Neuromuscualr Reeducation;Therapeutic Exercise;Transfer Training;Bed Mobility;Patient/Family Education   PT Transfer Goal   Roll left and right Goal 06. Independent - Patient completes the activity by him/herself with no assistance from a helper.   Sit to lying Goal 06. Independent - Patient completes the activity by him/herself with no assistance from a helper.   Lying to sitting on side of bed Goal 06. Independent - Patient completes the activity by him/herself with no assistance from a helper.   Sit to stand Goal 06. Independent - Patient completes the activity by him/herself with no assistance from a helper.   Chair/bed-to-chair transfer Goal 06. Independent - Patient completes the activity by him/herself with no assistance from a helper.   Car Transfer Goal 04. Supervision or touching assistance- [place] provides VERBAL CUES or supervision throughout activity.   Assistive Device Single Point Cane   Status Target goal - two weeks;Target goal - one week  (10 days)   Locomotion Goal   Primary discharge mode of locomotion Walking   Target Walk Distance 200 ft   Assist Device Other  (LRAD)   Walk 10 feet Goal 06. Independent - Patient completes the activity by him/herself with no assistance from a helper.   Walk 50 feet with 2 turns Goal 04. Supervision or touching assistance- [place] provides VERBAL CUES or supervision throughout activity.   Walk 150 feet Goal 04. Supervision or touching assistance- [place] provides VERBAL CUES or supervision throughout activity.   Walking 10 feet on uneven surface 04. Supervision or touching assistance- [place] provides VERBAL CUES  or supervision throughout activity.   Walking Goal Status Target goal - two weeks  (10 days)   Wheel 50 feet with 2 turns Goal 09. Not applicable   Wheel 150 feet Goal 09. Not applicable   Stairs Goal   1 step or curb goal 04. Supervision or touching assistance- [place] provides VERBAL CUES or supervision throughout activity.   4 steps Goal 04. Supervision or touching assistance- [place] provides VERBAL CUES or supervision throughout activity.   12 steps Goal 09. Not applicable   Assist Level Supervision   Number of Stairs 4   Technique Reciprocal;Non-reciprocal   Hand Rail Bilateral   Status Target goal - two weeks  (10 days)   Object Retrieval Goal   Picking up object Goal 06. Independent - Patient completes the activity by him/herself with no assistance from a helper.   Assistive Device  Reacher   Small Object Picked Up Marker     Marin Hector, SPT

## 2025-07-03 NOTE — ASSESSMENT & PLAN NOTE
-R eye with fully impaired vision/blind  -L eye with retinopathy and gets injections.   - Next due 7/14  -This likely is contributing to his imbalance in the setting of his peripheral neuropathy

## 2025-07-03 NOTE — ASSESSMENT & PLAN NOTE
Presented to ED after fall at home, found to have tachycardia and tachypnea  Blood cultures:    Klebsiella aerogenes Abnormal    Avoid ceftriaxone, lower generation cephalosporins, penicillins and aztreonam even when susceptible, as organism may develop resistance on therapy   Staphylococcus hominis Abnormal    Probable contamination      Completed 7 days of cefepime on 6/30  Unclear etiology of bacteremia, workup was unrevealing.

## 2025-07-03 NOTE — PROGRESS NOTES
07/03/25 0700   Patient Data   Rehab Impairment debility   Etiologic Diagnosis Debility from Sepsis   Date of Onset 06/24/25   Support System   Name Pt reports residing with his supportive wife, Ene. she is a teacher and has the summer off. Pt reports that he does not do much at home since losing his job in NOV . 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 pt reports that used to enjoy racing cars when he was younger. he enjoys spending time with his 3 yo grandson.   Home Setup   Type of Home Single Level   First Floor Bathroom Tub;Shower;Combo;Grab Bars  (2 baths 1full and 1 half.)   Available Equipment Shower Chair;Single Point Cane   Baseline Information   Transportation Family/friends drive   Prior IADL Participation   Money Management   (shared)   Meal Preparation   (wife)   Laundry   (wife)   Home Cleaning   (wife)   Prior Level of Function   Self-Care 3. Independent - Patient completed the activities by him/herself, with or without an assistive device, with no assistance from a helper.   Indoor-Mobility (Ambulation) 3. Independent - Patient completed the activities by him/herself, with or without an assistive device, with no assistance from a helper.   Stairs 3. Independent - Patient completed the activities by him/herself, with or without an assistive device, with no assistance from a helper.   Functional Cognition 3. Independent - Patient completed the activities by him/herself, with or without an assistive device, with no assistance from a helper.   Falls in the Last Year   Number of falls in the past 12 months 1   Type of Injury Associated with Fall Injury  (found down by wife for undetermined amount of time.)   Restrictions/Precautions   Precautions Fall Risk;Bed/chair alarms;Supervision on toilet/commode;Visual deficit   Pain Assessment   Pain Assessment Tool 0-10   Pain Score 7   Pain Location/Orientation Orientation: Left;Location: Shoulder    Effect of Pain on Daily Activities limited AROM and pain with dressing/bathing tasks   Eating Assessment   Type of Assistance Needed Set-up / clean-up   Comment does have intention tremors noted, but able to complete with inc time   Eating CARE Score 5   Oral Hygiene   Type of Assistance Needed Incidental touching   Comment CGA in stance at sink   Oral Hygiene CARE Score 4   Shower/Bathe Self   Type of Assistance Needed Physical assistance   Physical Assistance Level 25% or less   Comment assistance for balance in stance at sink with CGA. seated for bathing feet with cross leg tech. pt able to reach perineal areas in stance   Shower/Bathe Self CARE Score 3   Dressing/Undressing Clothing   Type of Assistance Needed Physical assistance   Physical Assistance Level 25% or less   Comment assist over trunk. does have difficulty orienting shirt 2* dec vision. does have L shoudler pain with dressing.   Upper Body Dressing CARE Score 3   Type of Assistance Needed Physical assistance   Physical Assistance Level 25% or less   Comment difficulty orienting pants 2* dec vision. able to thread and don over hips with CGA   Lower Body Dressing CARE Score 3   Putting On/Taking Off Footwear   Type of Assistance Needed Physical assistance   Physical Assistance Level 26%-50%   Comment did req assistance for donning socks to start. states they feel like its too tight to sdon. ddoes have swelling in b/l LE.   Putting On/Taking Off Footwear CARE Score 3   Toileting Hygiene   Type of Assistance Needed Physical assistance   Physical Assistance Level 25% or less   Comment CGA in stance for clothing,.   Toileting Hygiene CARE Score 3   Toilet Transfer   Surface Assessed Standard Toilet   Type of Assistance Needed Physical assistance   Physical Assistance Level 25% or less   Toilet Transfer CARE Score 3   Transfer Bed/Chair/Wheelchair   Type of Assistance Needed Physical assistance   Physical Assistance Level 26%-50%   Comment HHA . impaired  balance and righting reactions with severe sensory deficits in b/l feet. states that he does use a SPC at times, but normally. states that he does use furniture to walk around at home.   Chair/Bed-to-Chair Transfer CARE Score 3   Roll Left and Right   Type of Assistance Needed Supervision   Roll Left and Right CARE Score 4   Sit to Lying   Type of Assistance Needed Supervision   Sit to Lying CARE Score 4   Lying to Sitting on Side of Bed   Type of Assistance Needed Supervision   Lying to Sitting on Side of Bed CARE Score 4   Sit to Stand   Type of Assistance Needed Incidental touching   Sit to Stand CARE Score 4   Comprehension   QI: Comprehension 4. Undestands: Clear comprehension without cues or repetitions   Expression   QI: Expression 4. Express complex messages without difficulty and with speech that is clear and easy to understan   RUE Assessment   RUE Assessment WFL   LUE Assessment   LUE Assessment WFL  (reports hx of L shoudler pain chronic but worse since fall.)   AROM - LUE   L Shoulder Flexion <1/2 range with pain   L Shoulder Extension <1/2 range with pain   L Shoulder ABduction <1/2 range with pain   L Shoulder ADduction <1/2 range with pain   L Shoulder Internal Rotation <1/2 range with pain   L Shoulder External Rotation <1/2 range with pain   L Elbow Flexion WFL   L Elbow Extension WFL   L Wrist Flexion WFL   L Wrist Extension WFL   L Wrist ABduction WFL   L Wrist ADduction WFL   L Digit Flexion WFL   L Digit Extension WFL   PROM - LUE   L Shoulder Flexion >1/2 range until pain   L Shoulder External Rotation <1/2 range   Coordination   Movements are Fluid and Coordinated 0   Coordination and Movement Description   (B/L UE distal tremors with activity.)   Sensation   Light Touch Severe deficits in the LLE;Severe deficits in the RLE   Sharp/Dull Severe deficits in the LLE;Severe deficits in the RLE   Propioception Severe deficits in the LLE;Severe deficits in the RLE   Cognition   Overall Cognitive  "Status WF   Vision   Vision Comments shot in L eye every 8 weeks for Macular deg. has R prosthetic eye since he was 15. pt reports since covid dx in 2020 his vision became severly worse.   Objective Measure   OT Findings \"I want to get better at doing steps\"   Discharge Information   Impressions Pt is a 64 y.o. male seen for OT evaluation following admission to [facility] for  Debility from Sepsis s/p fall at home  . OT evaluation and assessment of strength, ADL function, and functional transfers completed. Prior to admission Pt was completing ADLs at independent with use of SPC. Pt does not complete IADls at home, wife completes. Pt lives with lives with their spouse and is able to provide physical assistance at time of discharge. Pt reports 1  recent falls in the last 6 months. Personal factors affecting the patient at this time include: co morbidities/Other health conditions, Coping abilities, Habits and past and current behavioral patterns, and Pain. Pt is currently limited due to the following deficits impacting occupational performance, Higher level cognitive functions  (Judgment, executive functions, praxis, cognitive flexibility, insight), Memory , Impaired standing balance, Impaired righting reactions, impaired Gross motor control, impaired Fine motor control, Impaired UE AROM, Impaired UE strength, limited activity tolerance, Pain, and severe sensory deficits in b/l LE . The patient has shown a decline from prior level of function. The patient participates in identification of personal goals to address in Occupational Therapy. Recommend skilled OT services for I/ADL retraining to support the ability to safely participate in daily occupations safely and independently in the most least restrictive way. Pt demonstrates Good rehab potential to meet LTG's of independent with assistive device with use of rolling walker. Anticipate  10day(s) length of stay. Occupational Therapy plan of care  will address the following " areas: ADL re-training, LHAE training, fxnl xfers, standing tolerance, standing balance, UE strengthening, UE endurance, FMC/GMC, FMS/GMS, DME training/education, family training/education, EC techniques/education, healthy coping education, leisure pursuits, and body awareness   OT Therapy Minutes   OT Time In 0700   OT Time Out 0830   OT Total Time (minutes) 90   OT Mode of treatment - Individual (minutes) 90   OT Mode of treatment - Concurrent (minutes) 0   OT Mode of treatment - Group (minutes) 0   OT Mode of treatment - Co-treat (minutes) 0   OT Mode of Treatment - Total time(minutes) 90 minutes   OT Cumulative Minutes 90   Cumulative Minutes   Cumulative therapy minutes 90

## 2025-07-03 NOTE — ASSESSMENT & PLAN NOTE
Presented to ED after fall at home, found to have tachycardia and tachypnea  Blood cultures:    Klebsiella aerogenes Abnormal    Avoid ceftriaxone, lower generation cephalosporins, penicillins and aztreonam even when susceptible, as organism may develop resistance on therapy   Staphylococcus hominis Abnormal    Probable contamination      Completed 7 days of cefepime on 6/30  Therapy per PMR.

## 2025-07-03 NOTE — ASSESSMENT & PLAN NOTE
Peaked at 2.52, currently 2.26  Right UQ US- Hepatomegaly with steatosis, no liver mass  Recommend outpatient GI follow up

## 2025-07-04 PROBLEM — D64.9 ANEMIA: Status: ACTIVE | Noted: 2025-07-04

## 2025-07-04 PROBLEM — R11.2 NAUSEA & VOMITING: Status: ACTIVE | Noted: 2025-07-04

## 2025-07-04 LAB
ALBUMIN SERPL BCG-MCNC: 2.4 G/DL (ref 3.5–5)
ALBUMIN SERPL BCG-MCNC: 2.4 G/DL (ref 3.5–5)
ALP SERPL-CCNC: 161 U/L (ref 34–104)
ALP SERPL-CCNC: 161 U/L (ref 34–104)
ALT SERPL W P-5'-P-CCNC: 24 U/L (ref 7–52)
ALT SERPL W P-5'-P-CCNC: 24 U/L (ref 7–52)
AMMONIA PLAS-SCNC: 61 UMOL/L (ref 18–72)
AMMONIA PLAS-SCNC: 61 UMOL/L (ref 18–72)
ANION GAP SERPL CALCULATED.3IONS-SCNC: 5 MMOL/L (ref 4–13)
ANION GAP SERPL CALCULATED.3IONS-SCNC: 5 MMOL/L (ref 4–13)
AST SERPL W P-5'-P-CCNC: 113 U/L (ref 13–39)
AST SERPL W P-5'-P-CCNC: 113 U/L (ref 13–39)
BILIRUB SERPL-MCNC: 2.26 MG/DL (ref 0.2–1)
BILIRUB SERPL-MCNC: 2.26 MG/DL (ref 0.2–1)
BUN SERPL-MCNC: 7 MG/DL (ref 5–25)
BUN SERPL-MCNC: 7 MG/DL (ref 5–25)
CALCIUM ALBUM COR SERPL-MCNC: 8.9 MG/DL (ref 8.3–10.1)
CALCIUM ALBUM COR SERPL-MCNC: 8.9 MG/DL (ref 8.3–10.1)
CALCIUM SERPL-MCNC: 7.6 MG/DL (ref 8.4–10.2)
CALCIUM SERPL-MCNC: 7.6 MG/DL (ref 8.4–10.2)
CHLORIDE SERPL-SCNC: 106 MMOL/L (ref 96–108)
CHLORIDE SERPL-SCNC: 106 MMOL/L (ref 96–108)
CO2 SERPL-SCNC: 26 MMOL/L (ref 21–32)
CO2 SERPL-SCNC: 26 MMOL/L (ref 21–32)
CREAT SERPL-MCNC: 0.8 MG/DL (ref 0.6–1.3)
CREAT SERPL-MCNC: 0.8 MG/DL (ref 0.6–1.3)
ERYTHROCYTE [DISTWIDTH] IN BLOOD BY AUTOMATED COUNT: 15.1 % (ref 11.6–15.1)
ERYTHROCYTE [DISTWIDTH] IN BLOOD BY AUTOMATED COUNT: 15.1 % (ref 11.6–15.1)
GFR SERPL CREATININE-BSD FRML MDRD: 95 ML/MIN/1.73SQ M
GFR SERPL CREATININE-BSD FRML MDRD: 95 ML/MIN/1.73SQ M
GLUCOSE P FAST SERPL-MCNC: 144 MG/DL (ref 65–99)
GLUCOSE P FAST SERPL-MCNC: 144 MG/DL (ref 65–99)
GLUCOSE SERPL-MCNC: 133 MG/DL (ref 65–140)
GLUCOSE SERPL-MCNC: 133 MG/DL (ref 65–140)
GLUCOSE SERPL-MCNC: 140 MG/DL (ref 65–140)
GLUCOSE SERPL-MCNC: 140 MG/DL (ref 65–140)
GLUCOSE SERPL-MCNC: 141 MG/DL (ref 65–140)
GLUCOSE SERPL-MCNC: 141 MG/DL (ref 65–140)
GLUCOSE SERPL-MCNC: 144 MG/DL (ref 65–140)
GLUCOSE SERPL-MCNC: 144 MG/DL (ref 65–140)
GLUCOSE SERPL-MCNC: 211 MG/DL (ref 65–140)
GLUCOSE SERPL-MCNC: 211 MG/DL (ref 65–140)
HCT VFR BLD AUTO: 30.7 % (ref 36.5–49.3)
HCT VFR BLD AUTO: 30.7 % (ref 36.5–49.3)
HGB BLD-MCNC: 9.8 G/DL (ref 12–17)
HGB BLD-MCNC: 9.8 G/DL (ref 12–17)
INR PPP: 1.02 (ref 0.85–1.19)
INR PPP: 1.02 (ref 0.85–1.19)
MCH RBC QN AUTO: 32.6 PG (ref 26.8–34.3)
MCH RBC QN AUTO: 32.6 PG (ref 26.8–34.3)
MCHC RBC AUTO-ENTMCNC: 31.9 G/DL (ref 31.4–37.4)
MCHC RBC AUTO-ENTMCNC: 31.9 G/DL (ref 31.4–37.4)
MCV RBC AUTO: 102 FL (ref 82–98)
MCV RBC AUTO: 102 FL (ref 82–98)
PLATELET # BLD AUTO: 143 THOUSANDS/UL (ref 149–390)
PLATELET # BLD AUTO: 143 THOUSANDS/UL (ref 149–390)
PMV BLD AUTO: 10.2 FL (ref 8.9–12.7)
PMV BLD AUTO: 10.2 FL (ref 8.9–12.7)
POTASSIUM SERPL-SCNC: 4.1 MMOL/L (ref 3.5–5.3)
POTASSIUM SERPL-SCNC: 4.1 MMOL/L (ref 3.5–5.3)
PROT SERPL-MCNC: 6 G/DL (ref 6.4–8.4)
PROT SERPL-MCNC: 6 G/DL (ref 6.4–8.4)
PROTHROMBIN TIME: 13.7 SECONDS (ref 12.3–15)
PROTHROMBIN TIME: 13.7 SECONDS (ref 12.3–15)
RBC # BLD AUTO: 3.01 MILLION/UL (ref 3.88–5.62)
RBC # BLD AUTO: 3.01 MILLION/UL (ref 3.88–5.62)
SODIUM SERPL-SCNC: 137 MMOL/L (ref 135–147)
SODIUM SERPL-SCNC: 137 MMOL/L (ref 135–147)
WBC # BLD AUTO: 5.84 THOUSAND/UL (ref 4.31–10.16)
WBC # BLD AUTO: 5.84 THOUSAND/UL (ref 4.31–10.16)

## 2025-07-04 PROCEDURE — 97530 THERAPEUTIC ACTIVITIES: CPT

## 2025-07-04 PROCEDURE — 85027 COMPLETE CBC AUTOMATED: CPT | Performed by: INTERNAL MEDICINE

## 2025-07-04 PROCEDURE — 82948 REAGENT STRIP/BLOOD GLUCOSE: CPT

## 2025-07-04 PROCEDURE — 80053 COMPREHEN METABOLIC PANEL: CPT | Performed by: INTERNAL MEDICINE

## 2025-07-04 PROCEDURE — 99232 SBSQ HOSP IP/OBS MODERATE 35: CPT | Performed by: NURSE PRACTITIONER

## 2025-07-04 PROCEDURE — 97116 GAIT TRAINING THERAPY: CPT

## 2025-07-04 PROCEDURE — 82140 ASSAY OF AMMONIA: CPT | Performed by: INTERNAL MEDICINE

## 2025-07-04 PROCEDURE — 97535 SELF CARE MNGMENT TRAINING: CPT

## 2025-07-04 PROCEDURE — 85610 PROTHROMBIN TIME: CPT | Performed by: INTERNAL MEDICINE

## 2025-07-04 PROCEDURE — 97110 THERAPEUTIC EXERCISES: CPT

## 2025-07-04 PROCEDURE — 99232 SBSQ HOSP IP/OBS MODERATE 35: CPT | Performed by: PHYSICAL MEDICINE & REHABILITATION

## 2025-07-04 RX ADMIN — FLUTICASONE PROPIONATE 2 SPRAY: 50 SPRAY, METERED NASAL at 08:18

## 2025-07-04 RX ADMIN — MUSCLE RUB CREAM 1 APPLICATION: 100; 150 CREAM TOPICAL at 21:25

## 2025-07-04 RX ADMIN — ATORVASTATIN CALCIUM 20 MG: 20 TABLET, FILM COATED ORAL at 08:20

## 2025-07-04 RX ADMIN — PRASUGREL 10 MG: 10 TABLET, FILM COATED ORAL at 08:20

## 2025-07-04 RX ADMIN — ENOXAPARIN SODIUM 40 MG: 40 INJECTION SUBCUTANEOUS at 07:55

## 2025-07-04 RX ADMIN — METFORMIN HYDROCHLORIDE 1000 MG: 500 TABLET ORAL at 08:20

## 2025-07-04 RX ADMIN — GABAPENTIN 100 MG: 100 CAPSULE ORAL at 21:24

## 2025-07-04 RX ADMIN — THIAMINE HCL TAB 100 MG 100 MG: 100 TAB at 08:20

## 2025-07-04 RX ADMIN — MUSCLE RUB CREAM: 100; 150 CREAM TOPICAL at 08:19

## 2025-07-04 RX ADMIN — METOPROLOL TARTRATE 50 MG: 50 TABLET, FILM COATED ORAL at 21:24

## 2025-07-04 RX ADMIN — MUSCLE RUB CREAM: 100; 150 CREAM TOPICAL at 11:12

## 2025-07-04 RX ADMIN — FOLIC ACID 1 MG: 1 TABLET ORAL at 08:20

## 2025-07-04 RX ADMIN — METOPROLOL TARTRATE 50 MG: 50 TABLET, FILM COATED ORAL at 08:20

## 2025-07-04 RX ADMIN — INSULIN LISPRO 2 UNITS: 100 INJECTION, SOLUTION INTRAVENOUS; SUBCUTANEOUS at 11:12

## 2025-07-04 RX ADMIN — METHOCARBAMOL 500 MG: 500 TABLET ORAL at 14:19

## 2025-07-04 RX ADMIN — FLUTICASONE PROPIONATE 2 SPRAY: 50 SPRAY, METERED NASAL at 17:13

## 2025-07-04 RX ADMIN — LORATADINE 10 MG: 10 TABLET ORAL at 08:20

## 2025-07-04 RX ADMIN — ASPIRIN 81 MG CHEWABLE TABLET 81 MG: 81 TABLET CHEWABLE at 08:20

## 2025-07-04 RX ADMIN — MAGNESIUM OXIDE TAB 400 MG (241.3 MG ELEMENTAL MG) 400 MG: 400 (241.3 MG) TAB at 08:20

## 2025-07-04 RX ADMIN — MUSCLE RUB CREAM: 100; 150 CREAM TOPICAL at 17:14

## 2025-07-04 RX ADMIN — CYANOCOBALAMIN TAB 500 MCG 1000 MCG: 500 TAB at 08:20

## 2025-07-04 NOTE — PLAN OF CARE
Problem: PAIN - ADULT  Goal: Verbalizes/displays adequate comfort level or baseline comfort level  Description: Interventions:  - Encourage patient to monitor pain and request assistance  - Assess pain using appropriate pain scale  - Administer analgesics as ordered based on type and severity of pain and evaluate response  - Implement non-pharmacological measures as appropriate and evaluate response  - Consider cultural and social influences on pain and pain management  - Notify physician/advanced practitioner if interventions unsuccessful or patient reports new pain  - Educate patient/family on pain management process including their role and importance of  reporting pain   - Provide non-pharmacologic/complimentary pain relief interventions  7/3/2025 [id number] by Meredith Levy RN  Outcome: Progressing
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
## 2025-07-04 NOTE — QUICK NOTE
Patient seen and examined due to reports of nausea and vomiting with tachycardia, EKG shows sinus tachycardia in the 130s. Patient denies CP, SOB, Abdominal pain. He had transient AMS where he wasn't sure where he is with slow response but recovered quickly. No focal deficit on exam. He appears to be missing his am dose metoprolol. Will give him dose of metoprolol now. Also will be given zofran. Gentle IVF bolus. Get CBC/CMP/mg. Obtain CTH.     Re-evaluated later in the day and was feeling better. No more confusion.K and Mg supplements given. HR improved. Noted elevated LFTs since his recent hospitalization possibly 2/2 ETOH use and hepatic steatosis. Recent RUQ US reviewed. Recommend getting ammonia for any recurrence of confusion. Consider GI eval if LFTs continue to worsen significantly.

## 2025-07-04 NOTE — ASSESSMENT & PLAN NOTE
Occurred last evening 7/3/25  CTH was negative, labs w/o acute changes except hemoglobin down a bit likely 2/2 hydration/dilutional  Feels ok today  He felt the N/V was from too much in therapy

## 2025-07-04 NOTE — ASSESSMENT & PLAN NOTE
-Has lost interests he previously had, sleep has been poor, energy levels/motivation poor, feelings of guilt and frustration.   -Appetite worse  -All since he lost his job in November.  -Wife has noticed a sharp decline  -Denies suicidal ideation/self-harm.  > Discussed trial of SNRI (cymbalta may also help with pain)  > He would like to defer for now.   > Referral to Psych at discharge

## 2025-07-04 NOTE — ASSESSMENT & PLAN NOTE
Last BM 7/3  >Continue miralax daily and senna BID, suppository PRN  >Monitor and adjust as appropriate

## 2025-07-04 NOTE — PROGRESS NOTES
Progress Note - PMR   Name: Aquilino Dowell 64 y.o. 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   - Suspect component also related to T2DM  - Has impaired vision as well  - Has had a history of Vitamin B12 deficiency previously contributing as well (6/25 level 940)  - Also has history of alcohol use disorder  - Unclear etiology of fall per patient.   > Ambulatory dysfunction I suspect is driven by progression of his neuropathy in addition to worsening vision impacting his ability to compensate  > PT/OT 3-5 hours/day, 5-7 days/week.  > Also has a component of deconditioning and worsening proximal weakness related to increasingly sedentary lifestyle (this is 2/2 I suspect to some depression since losing his job in November).   > Gabapentin 100mg QHS for shooting pains (higher doses made him too lethargic)  > Alcohol cessation as that can progress neuropathy  > Continue B12 supplement, optimize diabetes management.   > Check orthostatics here   > Recommend f/u with Neuromuscular Neurology   Sepsis (HCC)  Largely resolved   Found to have occult bacteremia.  S/p 7 day abx  Hemodynamically stable. Monitor.   Left shoulder pain  Examines like bicipital tendinosis with anterior pain, positive speeds, yergason.   Could also have other rotator cuff involvement with positive neers, mild external rotation weakness.  Subscapularis/internal rotators not implicated on exam  Family reports chronic L shoulder issues but worse since  the fall.   6/24 XR without acute osseous abnormality   > Plan for outpatient MRI  > Considerations in therapy   > Bengay QID  Constipation  Last BM 7/3  >Continue miralax daily and senna BID, suppository PRN  >Monitor and adjust as appropriate  At risk for venous thromboembolism (VTE)  >Lovenox, SCDs, ambulation  >Will not go home on lovenox.  >Monitor platelet count closely.   Depression  -Has lost interests he previously had, sleep has been poor, energy levels/motivation poor, feelings of guilt and frustration.   -Appetite worse  -All since he lost his job in November.  -Wife has noticed a sharp decline  -Denies suicidal ideation/self-harm.  > Discussed trial of SNRI (cymbalta may also help with pain)  > He would like to defer for now.   > Referral to Psych at discharge   Back pain  -Denies any back pain for me  -Findings on Mri L-Spine would not explain LE symptoms  >Monitor   >Continue current pain management   Nausea & vomiting  7/3 with episode of N/V late in the day with sinus tachycardia to the 130s  - Transient AMS  - Had large BM in the AM  - AM dose of metoprolol had not been giving  > Got metoprolo and zofran  > Given a gentle IVF bolus for any dehydration  > Repaet CMP today unremarkable with improved LFTs. Ammonia 61  > CBC with WBC WNL, but hgb down to 9.8 (after getting IVF)    - Recheck in the AM  > Mag WNL  > CTH unremarkable, save for some possible sinusitis  > Monitor for now.   Diabetic retinopathy (HCC)  -R eye with fully impaired vision/blind  -L eye with retinopathy and gets injections.   - Next due 7/14  -This likely is contributing to his imbalance in the setting of his peripheral neuropathy  Coronary artery disease  -S/p stent/cath 2013  -Home: Prasugrel 10mg daily, ASA 81mg daily   >Here: Same   Diabetes (HCC)  Lab Results   Component Value Date    HGBA1C 8.0 (H) 06/24/2025       Recent Labs     07/03/25  1547 07/03/25  2033 07/04/25  0634 07/04/25  1036   POCGLU 175* 230* 140 211*        Blood Sugar Average: Last 72 hrs:  (P) [id number]: Metformin 1000mg daily  -Unclear if family checks BG at home or if he has a kit.   >Here: Lantus 6 units QHS, CDI/Accuchecks  >Goal to transition back to home regimen.   >Diabetic Diet  >Management as per IM  Alcohol use disorder  -Denies history of withdrawal  >Cessation counseling  Hypertension  -Home: Toprol 50mg BID  >Here: Same  >Monitor and adjust as per IM  Sinusitis  >Flonase, claritin  Hyperbilirubinemia  -And chronic transaminitis  -In setting of alcohol use disorder  -RUQ US with steatosis and hepatomegaly  > Counseled on cessation  > minimize hepatotoxic meds  > Outpatient f/u with GI recommended  Vitamin D deficiency  -6/27 12.8  >Ergocalciferol weekly for 12 weeks  >Repeat with PCP   Lung nodule  -Noted incidentally on imaging  >Recommended for f/u chest CT in 12 months  Tobacco use disorder  -Quit smoking in the 90s  -Now chewing tobacco.  >Nicotine patch  >Cessation counseling.     Health Maintenance  #Delirium/Sleep: At risk. Optimize sleep/wake, pain, bowel, bladder management. Avoid deliriogenic meds and physical restraint. Environmental/behavioral interventions.   #Bladder: Voiding and Continent  #Skin/Pressure Injury Prevention: Turn Q2hr in bed, with weight shifts [id number] in wheelchair. Float heels in bed.  #GI Prophylaxis: Not indicated  #Code Status: Full Code  #FEN: Diabetic Diet   #Dispo: ELOS 7-10 days with goals to discharge home at a Sup-mod Ind level. With support from his wife. They have a trip to [place] planned on August 6th.                 > F/U PCP, Neuromuscular, Orthopedics    Chief Complaint: Feeling better    Interval History/Subjective:  Overnight had episode of N/V and tachycardia. Had missed AM dose of metoprolol which was given. Had had large BM in the AM. Concern for dehydration and given IVF, zofran, and got metoprolol. This AM feeling better. No lightheadedness, dizziness, CP, SOB, fevers, chills,  N/V, abdominal pain, dysuria. He reports he feels better this AM. He will stay on top of his hydration. Last BM 7/3. Continent of bowel/bladder, although yesterday reports he did not make much urine.     Functional Update:   PT: Petra transfers, Sup bed mobility, CGA car transfers, Petra ambulation 118'  OT: Petra-CGA with ADLs    Objective     Temp:  [97.9 °F (36.6 °C)-98.7 °F (37.1 °C)] 98.1 °F (36.7 °C)  HR:  [] 107  Resp:  [17-18] 17  BP: (100-133)/(60-84) 111/67  SpO2:  [95 %-100 %] 95 %      Gen: No acute distress, Well-nourished, well-appearing.  HEENT: Moist mucus membranes, Normocephalic/Atraumatic. R eye blind, L eye decreased visual acuity   Cardiovascular: Regular rate, rhythm, S1/S2. Distal pulses palpable  Heme/Extr: No edema/clubbing  Pulmonary: Non-labored breathing. Lungs CTAB  : No dolan  GI: Soft, non-tender, non-distended. BS+  Integumentary: Skin is warm, dry.  Neuro: AAOx3, Speech is intact. Appropriate to questioning.  Psych: Normal mood and affect.     Physical examination is otherwise unchanged from previous encounter, except as noted above.    Scheduled Meds:  Current Facility-Administered Medications   Medication Dose Route Frequency Provider Last Rate    acetaminophen  650 mg Oral Q8H PRN Ashley Depadua, MD      aspirin  81 mg Oral Daily PEARL Bahena-MARS      atorvastatin  20 mg Oral Daily PEARL Bahena-C      benzonatate  200 mg Oral TID PRN PEARL Bahena-MARS      bisacodyl  10 mg Rectal Daily PRN PEARL Bahena-MARS      vitamin B-12  1,000 mcg Oral Daily PEARL Bahena-MARS      enoxaparin  40 mg Subcutaneous Daily PEARL Bahena-MARS      [START ON 7/6/2025] ergocalciferol  50,000 Units Oral Weekly PEARL Bahena-C      fluticasone  2 spray Each Nare BID PEARL BahenaLEANN      folic acid  1 mg Oral Daily Nadiya Shah PA-C      gabapentin  100 mg Oral HS Nadiya Shah PA-C      insulin lispro  1-5 Units Subcutaneous HS Nadiya Shah PA-C       insulin lispro  1-6 Units Subcutaneous TID AC Nadiya Shah, PA-C      loratadine  10 mg Oral Daily Nadiya Shah, PA-C      menthol-methyl salicylate   Apply externally 4x Daily Nadiya Shah, PA-C      metFORMIN  1,000 mg Oral Daily With Breakfast Raynereagan Da Silva, PA-MARS      methocarbamol  500 mg Oral Q6H PRN Nadiya Shah, PA-C      metoprolol tartrate  50 mg Oral Q12H TERRENCE Nadiya Shah, PA-C      ondansetron  4 mg Oral Q6H PRN Rayne Da Silva, PA-C      ondansetron  4 mg Oral Once Aroldo Wyatt MD      oxyCODONE  2.5 mg Oral Q4H PRN Ashley Depadua, MD      Or    oxyCODONE  5 mg Oral Q4H PRN Ashley Depadua, MD      polyethylene glycol  17 g Oral Daily Nadiya Shah, PA-C      prasugrel  10 mg Oral Daily Nadiya Shah, PA-C      senna  2 tablet Oral BID Nadiya Shah, PA-C      thiamine  100 mg Oral Daily Nadiya Shah, PA-MARS       Imaging: CTH with no acute intracranial pathology, but possibly L maxillary sinusitis.       Lab Results: I have reviewed the following results:  Results from last 7 days   Lab Units 07/04/25 0558 07/03/25 1820 07/02/25  0825   HEMOGLOBIN g/dL 9.8* 11.8* 11.5*   HEMATOCRIT % 30.7* 37.0 34.5*   WBC Thousand/uL 5.84 6.91 6.19   PLATELETS Thousands/uL 143* 176 137*     Results from last 7 days   Lab Units 07/04/25  0558 07/03/25  1820 07/02/25  0825 06/30/25  0536   BUN mg/dL 7 8 4* 5   SODIUM mmol/L 137 134* 134* 137   POTASSIUM mmol/L 4.1 3.5 3.9 3.5   CHLORIDE mmol/L 106 101 102 105   CREATININE mg/dL 0.80 0.88 0.68 0.76   AST U/L 113* 123*  --  88*   ALT U/L 24 27  --  27       Results from last 7 days   Lab Units 07/04/25  0558 06/27/25  0930   PROTIME seconds 13.7 14.0   INR  1.02 1.01        0658}      ** Please Note: Fluency Direct voice to text software may have been used in the creation of this document. **

## 2025-07-04 NOTE — ASSESSMENT & PLAN NOTE
Lab Results   Component Value Date    HGBA1C 8.0 (H) 06/24/2025       Recent Labs     07/03/25  1547 07/03/25  2033 07/04/25  0634 07/04/25  1036   POCGLU 175* 230* 140 211*       Blood Sugar Average: Last 72 hrs:  (P) [id number]: Metformin 1000mg daily  -Unclear if family checks BG at home or if he has a kit.   >Here: Lantus 6 units QHS, CDI/Accuchecks  >Goal to transition back to home regimen.   >Diabetic Diet  >Management as per IM

## 2025-07-04 NOTE — ASSESSMENT & PLAN NOTE
-Denies any back pain for me  -Findings on Mri L-Spine would not explain LE symptoms  >Monitor   >Continue current pain management

## 2025-07-05 LAB
BASOPHILS # BLD AUTO: 0.03 THOUSANDS/ÂΜL (ref 0–0.1)
BASOPHILS # BLD AUTO: 0.03 THOUSANDS/ÂΜL (ref 0–0.1)
BASOPHILS NFR BLD AUTO: 1 % (ref 0–1)
BASOPHILS NFR BLD AUTO: 1 % (ref 0–1)
EOSINOPHIL # BLD AUTO: 0.07 THOUSAND/ÂΜL (ref 0–0.61)
EOSINOPHIL # BLD AUTO: 0.07 THOUSAND/ÂΜL (ref 0–0.61)
EOSINOPHIL NFR BLD AUTO: 1 % (ref 0–6)
EOSINOPHIL NFR BLD AUTO: 1 % (ref 0–6)
ERYTHROCYTE [DISTWIDTH] IN BLOOD BY AUTOMATED COUNT: 15 % (ref 11.6–15.1)
ERYTHROCYTE [DISTWIDTH] IN BLOOD BY AUTOMATED COUNT: 15 % (ref 11.6–15.1)
GLUCOSE SERPL-MCNC: 121 MG/DL (ref 65–140)
GLUCOSE SERPL-MCNC: 121 MG/DL (ref 65–140)
GLUCOSE SERPL-MCNC: 157 MG/DL (ref 65–140)
GLUCOSE SERPL-MCNC: 157 MG/DL (ref 65–140)
GLUCOSE SERPL-MCNC: 203 MG/DL (ref 65–140)
GLUCOSE SERPL-MCNC: 203 MG/DL (ref 65–140)
GLUCOSE SERPL-MCNC: 221 MG/DL (ref 65–140)
GLUCOSE SERPL-MCNC: 221 MG/DL (ref 65–140)
HCT VFR BLD AUTO: 31.1 % (ref 36.5–49.3)
HCT VFR BLD AUTO: 31.1 % (ref 36.5–49.3)
HGB BLD-MCNC: 9.7 G/DL (ref 12–17)
HGB BLD-MCNC: 9.7 G/DL (ref 12–17)
IMM GRANULOCYTES # BLD AUTO: 0.03 THOUSAND/UL (ref 0–0.2)
IMM GRANULOCYTES # BLD AUTO: 0.03 THOUSAND/UL (ref 0–0.2)
IMM GRANULOCYTES NFR BLD AUTO: 1 % (ref 0–2)
IMM GRANULOCYTES NFR BLD AUTO: 1 % (ref 0–2)
LYMPHOCYTES # BLD AUTO: 1.42 THOUSANDS/ÂΜL (ref 0.6–4.47)
LYMPHOCYTES # BLD AUTO: 1.42 THOUSANDS/ÂΜL (ref 0.6–4.47)
LYMPHOCYTES NFR BLD AUTO: 27 % (ref 14–44)
LYMPHOCYTES NFR BLD AUTO: 27 % (ref 14–44)
MCH RBC QN AUTO: 32.1 PG (ref 26.8–34.3)
MCH RBC QN AUTO: 32.1 PG (ref 26.8–34.3)
MCHC RBC AUTO-ENTMCNC: 31.2 G/DL (ref 31.4–37.4)
MCHC RBC AUTO-ENTMCNC: 31.2 G/DL (ref 31.4–37.4)
MCV RBC AUTO: 103 FL (ref 82–98)
MCV RBC AUTO: 103 FL (ref 82–98)
MONOCYTES # BLD AUTO: 0.42 THOUSAND/ÂΜL (ref 0.17–1.22)
MONOCYTES # BLD AUTO: 0.42 THOUSAND/ÂΜL (ref 0.17–1.22)
MONOCYTES NFR BLD AUTO: 8 % (ref 4–12)
MONOCYTES NFR BLD AUTO: 8 % (ref 4–12)
NEUTROPHILS # BLD AUTO: 3.31 THOUSANDS/ÂΜL (ref 1.85–7.62)
NEUTROPHILS # BLD AUTO: 3.31 THOUSANDS/ÂΜL (ref 1.85–7.62)
NEUTS SEG NFR BLD AUTO: 62 % (ref 43–75)
NEUTS SEG NFR BLD AUTO: 62 % (ref 43–75)
NRBC BLD AUTO-RTO: 0 /100 WBCS
NRBC BLD AUTO-RTO: 0 /100 WBCS
PLATELET # BLD AUTO: 131 THOUSANDS/UL (ref 149–390)
PLATELET # BLD AUTO: 131 THOUSANDS/UL (ref 149–390)
PMV BLD AUTO: 10.1 FL (ref 8.9–12.7)
PMV BLD AUTO: 10.1 FL (ref 8.9–12.7)
RBC # BLD AUTO: 3.02 MILLION/UL (ref 3.88–5.62)
RBC # BLD AUTO: 3.02 MILLION/UL (ref 3.88–5.62)
WBC # BLD AUTO: 5.28 THOUSAND/UL (ref 4.31–10.16)
WBC # BLD AUTO: 5.28 THOUSAND/UL (ref 4.31–10.16)

## 2025-07-05 PROCEDURE — 97116 GAIT TRAINING THERAPY: CPT

## 2025-07-05 PROCEDURE — 99232 SBSQ HOSP IP/OBS MODERATE 35: CPT | Performed by: PHYSICIAN ASSISTANT

## 2025-07-05 PROCEDURE — 85025 COMPLETE CBC W/AUTO DIFF WBC: CPT | Performed by: NURSE PRACTITIONER

## 2025-07-05 PROCEDURE — 82948 REAGENT STRIP/BLOOD GLUCOSE: CPT

## 2025-07-05 PROCEDURE — 97110 THERAPEUTIC EXERCISES: CPT

## 2025-07-05 PROCEDURE — 97530 THERAPEUTIC ACTIVITIES: CPT

## 2025-07-05 RX ORDER — POLYETHYLENE GLYCOL 3350 17 G/17G
17 POWDER, FOR SOLUTION ORAL DAILY PRN
Status: DISCONTINUED | OUTPATIENT
Start: 2025-07-05 | End: 2025-07-14

## 2025-07-05 RX ADMIN — CYANOCOBALAMIN TAB 500 MCG 1000 MCG: 500 TAB at 08:31

## 2025-07-05 RX ADMIN — FLUTICASONE PROPIONATE 2 SPRAY: 50 SPRAY, METERED NASAL at 17:16

## 2025-07-05 RX ADMIN — INSULIN LISPRO 1 UNITS: 100 INJECTION, SOLUTION INTRAVENOUS; SUBCUTANEOUS at 21:20

## 2025-07-05 RX ADMIN — INSULIN LISPRO 2 UNITS: 100 INJECTION, SOLUTION INTRAVENOUS; SUBCUTANEOUS at 11:49

## 2025-07-05 RX ADMIN — ASPIRIN 81 MG CHEWABLE TABLET 81 MG: 81 TABLET CHEWABLE at 08:31

## 2025-07-05 RX ADMIN — METHOCARBAMOL 500 MG: 500 TABLET ORAL at 01:30

## 2025-07-05 RX ADMIN — METOPROLOL TARTRATE 50 MG: 50 TABLET, FILM COATED ORAL at 08:31

## 2025-07-05 RX ADMIN — ENOXAPARIN SODIUM 40 MG: 40 INJECTION SUBCUTANEOUS at 08:33

## 2025-07-05 RX ADMIN — PRASUGREL 10 MG: 10 TABLET, FILM COATED ORAL at 08:34

## 2025-07-05 RX ADMIN — MUSCLE RUB CREAM: 100; 150 CREAM TOPICAL at 11:48

## 2025-07-05 RX ADMIN — METHOCARBAMOL 500 MG: 500 TABLET ORAL at 17:25

## 2025-07-05 RX ADMIN — METOPROLOL TARTRATE 50 MG: 50 TABLET, FILM COATED ORAL at 21:21

## 2025-07-05 RX ADMIN — INSULIN LISPRO 1 UNITS: 100 INJECTION, SOLUTION INTRAVENOUS; SUBCUTANEOUS at 17:18

## 2025-07-05 RX ADMIN — FLUTICASONE PROPIONATE 2 SPRAY: 50 SPRAY, METERED NASAL at 08:33

## 2025-07-05 RX ADMIN — MUSCLE RUB CREAM 1 APPLICATION: 100; 150 CREAM TOPICAL at 17:18

## 2025-07-05 RX ADMIN — ONDANSETRON 4 MG: 4 TABLET, ORALLY DISINTEGRATING ORAL at 18:43

## 2025-07-05 RX ADMIN — LORATADINE 10 MG: 10 TABLET ORAL at 08:31

## 2025-07-05 RX ADMIN — MUSCLE RUB CREAM 1 APPLICATION: 100; 150 CREAM TOPICAL at 21:20

## 2025-07-05 RX ADMIN — GABAPENTIN 100 MG: 100 CAPSULE ORAL at 21:21

## 2025-07-05 RX ADMIN — FOLIC ACID 1 MG: 1 TABLET ORAL at 08:31

## 2025-07-05 RX ADMIN — OXYCODONE HYDROCHLORIDE 5 MG: 5 TABLET ORAL at 01:29

## 2025-07-05 RX ADMIN — ATORVASTATIN CALCIUM 20 MG: 20 TABLET, FILM COATED ORAL at 08:31

## 2025-07-05 RX ADMIN — THIAMINE HCL TAB 100 MG 100 MG: 100 TAB at 08:31

## 2025-07-05 NOTE — ASSESSMENT & PLAN NOTE
Lab Results   Component Value Date    HGBA1C 8.0 (H) 06/24/2025       Recent Labs     07/04/25  1538 07/04/25  2126 07/05/25  0722 07/05/25  1018   POCGLU 133 141* 121 221*     Home: Metformin 1000mg daily. In hospital he was on lantus 6U daily while the home metformin was on hold  Here: metformin was ordered on admission to [facility] but now discontinued. Currently patient only on SSI  Metformin was held AM 7/3 but was given 7/4 7/4- Will stop Metformin since has elevated TB/DB, AST, AP (although improving) and ETOH use.  Will possibly need alternative PO medication = ?Januvia but if LFTs normalize then restart.  It appears he has been on this for quite a while

## 2025-07-05 NOTE — PROGRESS NOTES
Progress Note - Hospitalist   Name: Aquilino Dowell 64 y.o. male I MRN: [id number]  Unit/Bed#: -01 I Date of Admission: 7/2/2025   Date of Service: 7/5/2025 I Hospital Day: 3    Assessment & Plan  Peripheral neuropathy  Continue gabapentin  Sepsis (HCC)  Presented to ED after fall at home, found to have tachycardia and tachypnea  Blood cultures:    Klebsiella aerogenes Abnormal    Avoid ceftriaxone, lower generation cephalosporins, penicillins and aztreonam even when susceptible, as organism may develop resistance on therapy   Staphylococcus hominis Abnormal    Probable contamination      Completed 7 days of cefepime on 6/30  Unclear etiology of bacteremia, workup was unrevealing.     Fall  S/p fall at home ambulating to the bathroom  Uses a cane at baseline due to LE neuropathy and vision issues  PT/OT  Left shoulder pain  Xray done on 6/24/25 = + glenohumeral DJD, no fracture  Bengay ordered per PMR  Coronary artery disease  S/p cardiac cath 2013    95% prox LAD, 40% mid LAD, EF 60%, MANUEL to prox LAD (Xience Rx 3.5x18mm   Continue home Prasugrel, ASA, statin  Diabetes (HCC)  Lab Results   Component Value Date    HGBA1C 8.0 (H) 06/24/2025       Recent Labs     07/04/25  1538 07/04/25  2126 07/05/25  0722 07/05/25  1018   POCGLU 133 141* 121 221*     Home: Metformin 1000mg daily. In hospital he was on lantus 6U daily while the home metformin was on hold  Here: metformin was ordered on admission to [facility] but now discontinued. Currently patient only on SSI  Metformin was held AM 7/3 but was given 7/4 7/4- Will stop Metformin since has elevated TB/DB, AST, AP (although improving) and ETOH use.  Will possibly need alternative PO medication = ?Januvia but if LFTs normalize then restart.  It appears he has been on this for quite a while  Alcohol use disorder  Per hospital chart, wife reported up to 1 bottle of wine per day  ETOH on admit 6/24 was normal  Was on CIWA protocol  Continue B12, folic  acid  Hypertension  Home: Lopressor 50 mg BID  Here: same  No changes today  Sinusitis  Seen on CT head done in ED and  CTH 7/3/25  Continue flonase, claritin  Back pain  MRI lumbar spine:  No discitis/osteomyelitis in lumbar spine, as clinically questioned.  Small focal areas of enhancement in the L1-L2 and L2-L3 supraspinous ligament regions, suspicious for enthesitis.  Mild multilevel degenerative changes of lumbar spine, as detailed above. No significant canal stenosis or foraminal narrowing  Pain meds/therapy per PMR  Hyperbilirubinemia  Peaked at 2.52, currently 2.26  Right UQ US- Hepatomegaly with steatosis, no liver mass  Recommend outpatient GI follow up  Vitamin D deficiency  Continue supplement  Lung nodule  Found incidentally on CT scan  4mm right upper lobe nodule which is new  Will need repeat CT in 12 months  Tobacco use disorder  Encourage cessation at discharge.  Depression  Pt does not want to start a medication for mood.  Nausea & vomiting  Occurred in evening 7/3/25  CTH was negative, labs w/o acute changes except hemoglobin down a bit likely 2/2 hydration/dilutional  Feels ok today  He felt the N/V was from too much in therapy  Anemia  May be dilutional from IVF last night (500ml NSS)  Recheck today 9.7, stable from 9.8 day prior    The above assessment and plan was reviewed and updated as determined by my evaluation of the patient on 7/5/2025.    History of Present Illness   Patient seen and examined. Patients overnight issues or events were reviewed with nursing staff. New or overnight issues include the following:   Patient sitting up in chair. Denies CP/SOB. VSS    Review of Systems    Objective :  Temp:  [98.1 °F (36.7 °C)-98.7 °F (37.1 °C)] 98.7 °F (37.1 °C)  HR:  [81-96] 95  BP: (129-142)/(68-82) 129/76  Resp:  [18] 18  SpO2:  [97 %] 97 %  O2 Device: None (Room air)    Invasive Devices       Peripheral Intravenous Line  Duration             Peripheral IV 07/03/25 Distal;Dorsal  (posterior);Left Forearm 1 day                    Physical Exam  General Appearance: NAD; pleasant  HEENT: PERRLA, conjuctiva normal; mucous membranes moist; face symmetrical  Neck:  Supple  Lungs: clear bilaterally, normal respiratory effort, no retractions, expiratory effort normal, on room air  CV: regular rate and rhythm, no murmurs rubs or gallops noted   ABD: soft non tender, +BS x4  EXT: DP pulses intact, no lymphadenopathy  Skin: normal turgor, normal texture, no rash  Psych: affect normal, mood normal  Neuro: Awake and alert  The above physical exam was reviewed and updated as determined by my evaluation of the patient on 7/5/2025.      Lab Results: I have reviewed the following results:  Results from last 7 days   Lab Units 07/05/25  0559 07/04/25  0558   WBC Thousand/uL 5.28 5.84   HEMOGLOBIN g/dL 9.7* 9.8*   HEMATOCRIT % 31.1* 30.7*   PLATELETS Thousands/uL 131* 143*     Results from last 7 days   Lab Units 07/04/25  0558 07/03/25  1820   SODIUM mmol/L 137 134*   POTASSIUM mmol/L 4.1 3.5   CHLORIDE mmol/L 106 101   CO2 mmol/L 26 21   BUN mg/dL 7 8   CREATININE mg/dL 0.80 0.88   CALCIUM mg/dL 7.6* 7.9*         Results from last 7 days   Lab Units 07/04/25  0558   INR  1.02     Results from last 7 days   Lab Units 07/05/25  1018 07/05/25  0722 07/04/25  2126   POC GLUCOSE mg/dl 221* 121 141*           Review of Scheduled Meds: Medications  reviewed and reconciled as needed  Current Facility-Administered Medications   Medication Dose Route Frequency Provider Last Rate    acetaminophen  650 mg Oral Q8H PRN Ashley Depadua, MD      aspirin  81 mg Oral Daily Nadiya Shah PA-C      atorvastatin  20 mg Oral Daily Nadiya Shah PA-C      benzonatate  200 mg Oral TID PRN Nadiya Shah PA-C      bisacodyl  10 mg Rectal Daily PRN PEARL Bahena-MARS      vitamin B-12  1,000 mcg Oral Daily PEARL Bahena-MARS      enoxaparin  40 mg Subcutaneous Daily Nadiya Shah PA-C      [START ON 7/6/2025]  ergocalciferol  50,000 Units Oral Weekly Nadiya Shah, PA-C      fluticasone  2 spray Each Nare BID Nadiya Shah, PA-C      folic acid  1 mg Oral Daily Nadiyapawel Garberes, PA-C      gabapentin  100 mg Oral HS Nadiya Garberes, PA-C      insulin lispro  1-5 Units Subcutaneous HS Nadiyapawel Garberes, PA-C      insulin lispro  1-6 Units Subcutaneous TID AC Nadiya Garberes, PA-C      loratadine  10 mg Oral Daily Nadiya Shah, PA-C      menthol-methyl salicylate   Apply externally 4x Daily Nadiya Shah, PA-C      methocarbamol  500 mg Oral Q6H PRN Nadiya Shah, PA-C      metoprolol tartrate  50 mg Oral Q12H TERRENCE Nadiya Shah, PA-C      ondansetron  4 mg Oral Q6H PRN Rayne Da Silva, PA-C      ondansetron  4 mg Oral Once Aroldo Wyatt MD      oxyCODONE  2.5 mg Oral Q4H PRN Ashley Depadua, MD      Or    oxyCODONE  5 mg Oral Q4H PRN Ashley Depadua, MD      polyethylene glycol  17 g Oral Daily PRN Ashley Depadua, MD      prasugrel  10 mg Oral Daily Nadiya Shah, PA-C      senna  2 tablet Oral BID Nadiya Shah, PA-C      thiamine  100 mg Oral Daily Nadiya Shah, PA-C         VTE Pharmacologic Prophylaxis: lovenox  Code Status: Level 1 - Full Code  Current Length of Stay: 3 day(s)    Administrative Statements     ** Please Note:  voice to text software may have been used in the creation of this document. Although proof errors in transcription or interpretation are a potential of such software**

## 2025-07-05 NOTE — PROGRESS NOTES
07/05/25 0830   Pain Assessment   Pain Assessment Tool 0-10   Pain Score 6   Pain Location/Orientation Orientation: Left;Location: Shoulder   Pain Onset/Description Onset: Ongoing;Frequency: Intermittent;Descriptor: Sore   Patient's Stated Pain Goal No pain   Hospital Pain Intervention(s) Heat applied   Restrictions/Precautions   Precautions Bed/chair alarms;Cognitive;Fall Risk;Pain;Supervision on toilet/commode;Visual deficit   Weight Bearing Restrictions No   ROM Restrictions No   Cognition   Overall Cognitive Status WFL   Arousal/Participation Alert;Cooperative   Attention Attends with cues to redirect   Orientation Level Oriented X4   Memory Decreased short term memory   Following Commands Follows one step commands without difficulty   Sit to Lying   Type of Assistance Needed Supervision   Sit to Lying CARE Score 4   Lying to Sitting on Side of Bed   Type of Assistance Needed Supervision   Lying to Sitting on Side of Bed CARE Score 4   Sit to Stand   Type of Assistance Needed Incidental touching;Adaptive equipment   Comment RW/SPC   Sit to Stand CARE Score 4   Bed-Chair Transfer   Type of Assistance Needed Incidental touching;Physical assistance;Adaptive equipment   Physical Assistance Level 25% or less   Comment KAMINI with SPC, CG with RW   Chair/Bed-to-Chair Transfer CARE Score 3   Transfer Bed/Chair/Wheelchair   Adaptive Equipment Roller Walker;Cane   Walk 10 Feet   Type of Assistance Needed Incidental touching;Adaptive equipment;Verbal cues   Comment CGA with RW and SPC   Walk 10 Feet CARE Score 4   Walk 50 Feet with Two Turns   Type of Assistance Needed Physical assistance;Incidental touching;Adaptive equipment   Physical Assistance Level 25% or less   Comment CGA with RW and CG/KAMINI with SPC   Walk 50 Feet with Two Turns CARE Score 3   Walk 150 Feet   Type of Assistance Needed Incidental touching;Physical assistance;Adaptive equipment   Physical Assistance Level Total assistance   Comment CGA with RW and  CG/KAMINI with SPC. WC follow for safety.   Walk 150 Feet CARE Score 1   Ambulation   Primary Mode of Locomotion Prior to Admission Walk   Distance Walked (feet) 120 ft  (150')   Assist Device Cane;Roller Walker   Gait Pattern Slow Vaishali;Decreased foot clearance;Inconsistant Vaishali;Forward Flexion;Narrow TAMEKA;Improper weight shift   Limitations Noted In Balance;Coordination;Endurance;Heel Strike;Posture;Safety;Speed;Strength;Swing   Provided Assistance with: Direction;Balance   Walk Assist Level Minimum Assist;Contact Guard   Does the patient walk? 2. Yes   Wheelchair mobility   Does the patient use a wheelchair? 0. No   Toilet Transfer   Type of Assistance Needed Incidental touching;Adaptive equipment   Comment with RW   Toilet Transfer CARE Score 4   Therapeutic Interventions   Strengthening seated LAQ, hip flex, ankle DF/PF 3 x10 reps BLE w2ith 2#   Balance amb x120' with SPC   Equipment Use   NuStep L1 x8 min BLE and RUE only   Assessment   Treatment Assessment Pt participated in skilled PT session with increased focus on gait, LE strengthening and endurance. Pt cont to be limited by poor act tolerance. He requested to not focus on gait so much this session so increased TE's and endurance training was performed. Pt expressed he preferred use of RW now as he feels more secure. Pt educated on cont use of both RW and SPC to cont to work on increased balance. Pt will cont POC as tolerated with cont focus on gait, balance, stair management, increased safety and endurance to decrease burden of care.   Problem List Decreased strength;Decreased range of motion;Decreased endurance;Impaired balance;Decreased mobility;Decreased coordination;Decreased safety awareness;Impaired sensation;Decreased skin integrity;Pain   Barriers to Discharge Inaccessible home environment;Decreased caregiver support   PT Barriers   Functional Limitation Car transfers;Stair negotiation;Standing;Transfers;Walking   Plan   Treatment/Interventions  Functional transfer training;LE strengthening/ROM;Therapeutic exercise;Endurance training;Cognitive reorientation;Patient/family training;Gait training   Progress Slow progress, decreased activity tolerance   PT Therapy Minutes   PT Time In 0830   PT Time Out 1000   PT Total Time (minutes) 90   PT Mode of treatment - Individual (minutes) 90   PT Mode of treatment - Concurrent (minutes) 0   PT Mode of treatment - Group (minutes) 0   PT Mode of treatment - Co-treat (minutes) 0   PT Mode of Treatment - Total time(minutes) 90 minutes   PT Cumulative Minutes 270   Therapy Time missed   Time missed? No

## 2025-07-05 NOTE — ASSESSMENT & PLAN NOTE
Occurred in evening 7/3/25  CTH was negative, labs w/o acute changes except hemoglobin down a bit likely 2/2 hydration/dilutional  Feels ok today  He felt the N/V was from too much in therapy

## 2025-07-05 NOTE — PROGRESS NOTES
07/05/25 1230   Pain Assessment   Pain Assessment Tool   (denies pain at rest. intense pain with L shoulder ROM)   Restrictions/Precautions   Precautions Bed/chair alarms;Cognitive;Fall Risk;Pain;Supervision on toilet/commode;Visual deficit   Weight Bearing Restrictions No   ROM Restrictions No   Sit to Stand   Type of Assistance Needed Incidental touching   Physical Assistance Level No physical assistance   Comment CG with RW   Sit to Stand CARE Score 4   Bed-Chair Transfer   Type of Assistance Needed Incidental touching   Physical Assistance Level No physical assistance   Comment mostly CG with RW however pt had one episode where he experienced major posterior LOB requiring mod A to safely recover, otherwise pt was CG for transfers.   Chair/Bed-to-Chair Transfer CARE Score 4   Toileting Hygiene   Type of Assistance Needed Incidental touching   Physical Assistance Level No physical assistance   Comment in stance for CM; CG for balance.   Toileting Hygiene CARE Score 4   Toilet Transfer   Type of Assistance Needed Incidental touching   Physical Assistance Level No physical assistance   Comment CG with RW to standard toilet.   Toilet Transfer CARE Score 4   Exercise Tools   Exercise Tools Yes   Other Exercise Tool 1 pt engages in light UE strengthening using table top pearl and 2.5#, completing 3x10 of each of the following: forward motion, side to side and clockwise circles. pt tolerates well with no c/o pain/discomfort in L shoulder. strengthening completed with focus on increasing strength/endurance for ADLs/transfers.   Other Exercise Tool 2 additionally, pt engages in UE Strengthening using green flexbar, completing 2x10 of each of the following: forearm pronation/forearm supination with rest breaks as needed to manage fatigue. good tolerance to exercise as well with no c/o pain/discomfort in L shoulder. finally, pt engages in isometric exercises focusing on shoulder adduction and shoulder flexion. pt  "reports no pain/discomfort with adduction but \"started to feel it\" with shoulder flexion to exercise stopped. at this time it was end of session so pt was returned to his room.   Cognition   Overall Cognitive Status WFL   Arousal/Participation Alert;Cooperative   Attention Attends with cues to redirect   Orientation Level Oriented X4   Memory Decreased short term memory   Following Commands Follows one step commands without difficulty   Activity Tolerance   Activity Tolerance Patient tolerated treatment well   Assessment   Treatment Assessment pt engages in 90 minute skilled OT session focusing on func transfers, standing vaibhav/bal and light UE strengthening. see above for full func details. pt mostly CG for all basic func transfers with use of RW except for one major posterior LOB requiring mod A to safely recover, without physical assist, anticipate that pt would have likely fallen backwards. pt reports falling frequently at home, \"just losing his balance\" but that he is able to \"grab on to the couch, or the love seat\" but he reports between his vision and neuropathy in his feet, his balance is just \"bad\". pt also reports feeling more comfortable with the RW vs SPC at this time as it provides him with more support/stability especially with his impaired vision and sensation in his feet. recommend continued skilled care to focus on ADL retraining, func transfers, standing vaibhav/bal, light UE strengthening, kitchen mobility, in order to decrease burden of care at d/c.   Prognosis Good   Problem List Decreased strength;Decreased range of motion;Decreased endurance;Impaired balance;Decreased mobility;Decreased coordination;Decreased safety awareness;Impaired sensation;Decreased skin integrity;Pain   Plan   Treatment/Interventions ADL retraining;Functional transfer training;Therapeutic exercise;Endurance training;Cognitive reorientation;Patient/family training;Equipment eval/education;Compensatory technique education   OT " Therapy Minutes   OT Time In 1230   OT Time Out 1400   OT Total Time (minutes) 90   OT Mode of treatment - Individual (minutes) 90   OT Mode of treatment - Concurrent (minutes) 0   OT Mode of treatment - Group (minutes) 0   OT Mode of treatment - Co-treat (minutes) 0   OT Mode of Treatment - Total time(minutes) 90 minutes   OT Cumulative Minutes 270   Therapy Time missed   Time missed? No

## 2025-07-06 LAB
GLUCOSE SERPL-MCNC: 150 MG/DL (ref 65–140)
GLUCOSE SERPL-MCNC: 150 MG/DL (ref 65–140)
GLUCOSE SERPL-MCNC: 154 MG/DL (ref 65–140)
GLUCOSE SERPL-MCNC: 154 MG/DL (ref 65–140)
GLUCOSE SERPL-MCNC: 173 MG/DL (ref 65–140)
GLUCOSE SERPL-MCNC: 173 MG/DL (ref 65–140)
GLUCOSE SERPL-MCNC: 207 MG/DL (ref 65–140)
GLUCOSE SERPL-MCNC: 207 MG/DL (ref 65–140)

## 2025-07-06 PROCEDURE — 99231 SBSQ HOSP IP/OBS SF/LOW 25: CPT | Performed by: PHYSICIAN ASSISTANT

## 2025-07-06 PROCEDURE — 82948 REAGENT STRIP/BLOOD GLUCOSE: CPT

## 2025-07-06 RX ADMIN — INSULIN LISPRO 1 UNITS: 100 INJECTION, SOLUTION INTRAVENOUS; SUBCUTANEOUS at 15:55

## 2025-07-06 RX ADMIN — CYANOCOBALAMIN TAB 500 MCG 1000 MCG: 500 TAB at 08:38

## 2025-07-06 RX ADMIN — THIAMINE HCL TAB 100 MG 100 MG: 100 TAB at 08:38

## 2025-07-06 RX ADMIN — METOPROLOL TARTRATE 50 MG: 50 TABLET, FILM COATED ORAL at 21:33

## 2025-07-06 RX ADMIN — INSULIN LISPRO 1 UNITS: 100 INJECTION, SOLUTION INTRAVENOUS; SUBCUTANEOUS at 22:01

## 2025-07-06 RX ADMIN — METHOCARBAMOL 500 MG: 500 TABLET ORAL at 21:36

## 2025-07-06 RX ADMIN — MUSCLE RUB CREAM: 100; 150 CREAM TOPICAL at 16:47

## 2025-07-06 RX ADMIN — FOLIC ACID 1 MG: 1 TABLET ORAL at 08:38

## 2025-07-06 RX ADMIN — ATORVASTATIN CALCIUM 20 MG: 20 TABLET, FILM COATED ORAL at 08:39

## 2025-07-06 RX ADMIN — FLUTICASONE PROPIONATE 2 SPRAY: 50 SPRAY, METERED NASAL at 08:40

## 2025-07-06 RX ADMIN — ASPIRIN 81 MG CHEWABLE TABLET 81 MG: 81 TABLET CHEWABLE at 08:38

## 2025-07-06 RX ADMIN — ERGOCALCIFEROL CAPSULES, 50000 UNITS: 1.25 CAPSULE ORAL at 08:39

## 2025-07-06 RX ADMIN — OXYCODONE HYDROCHLORIDE 5 MG: 5 TABLET ORAL at 15:52

## 2025-07-06 RX ADMIN — METHOCARBAMOL 500 MG: 500 TABLET ORAL at 10:18

## 2025-07-06 RX ADMIN — FLUTICASONE PROPIONATE 2 SPRAY: 50 SPRAY, METERED NASAL at 16:47

## 2025-07-06 RX ADMIN — MUSCLE RUB CREAM: 100; 150 CREAM TOPICAL at 21:34

## 2025-07-06 RX ADMIN — INSULIN LISPRO 2 UNITS: 100 INJECTION, SOLUTION INTRAVENOUS; SUBCUTANEOUS at 11:22

## 2025-07-06 RX ADMIN — ENOXAPARIN SODIUM 40 MG: 40 INJECTION SUBCUTANEOUS at 08:38

## 2025-07-06 RX ADMIN — INSULIN LISPRO 1 UNITS: 100 INJECTION, SOLUTION INTRAVENOUS; SUBCUTANEOUS at 07:55

## 2025-07-06 RX ADMIN — GABAPENTIN 100 MG: 100 CAPSULE ORAL at 21:33

## 2025-07-06 RX ADMIN — METOPROLOL TARTRATE 50 MG: 50 TABLET, FILM COATED ORAL at 08:39

## 2025-07-06 RX ADMIN — LORATADINE 10 MG: 10 TABLET ORAL at 08:38

## 2025-07-06 RX ADMIN — OXYCODONE HYDROCHLORIDE 5 MG: 5 TABLET ORAL at 08:43

## 2025-07-06 RX ADMIN — PRASUGREL 10 MG: 10 TABLET, FILM COATED ORAL at 08:39

## 2025-07-06 RX ADMIN — MUSCLE RUB CREAM: 100; 150 CREAM TOPICAL at 08:39

## 2025-07-06 NOTE — ASSESSMENT & PLAN NOTE
Seen on CT head done in ED and  CTH 7/3/25  No complaints of sinus pressure or headaches  Continue flonase, claritin

## 2025-07-06 NOTE — ASSESSMENT & PLAN NOTE
Occurred in evening 7/3/25  CTH was negative, labs w/o acute changes except hemoglobin down a bit likely 2/2 hydration/dilutional  He felt the N/V was because he did too much in therapy  No N/V currently

## 2025-07-06 NOTE — PROGRESS NOTES
Progress Note - Hospitalist   Name: Aquilino Dowell 64 y.o. male I MRN: [id number]  Unit/Bed#: -01 I Date of Admission: 7/2/2025   Date of Service: 7/6/2025 I Hospital Day: 4    Assessment & Plan  Peripheral neuropathy  Continue gabapentin  Sepsis (HCC)  Presented to ED after fall at home, found to have tachycardia and tachypnea  Blood cultures:    Klebsiella aerogenes Abnormal    Avoid ceftriaxone, lower generation cephalosporins, penicillins and aztreonam even when susceptible, as organism may develop resistance on therapy   Staphylococcus hominis Abnormal    Probable contamination      Completed 7 days of cefepime on 6/30  Unclear etiology of bacteremia, workup was unrevealing.     Fall  S/p fall at home ambulating to the bathroom  Uses a cane at baseline due to LE neuropathy and vision issues  PT/OT  Left shoulder pain  Xray done on 6/24/25 = + glenohumeral DJD, no fracture  Bengay ordered per PMR  Coronary artery disease  S/p cardiac cath 2013    95% prox LAD, 40% mid LAD, EF 60%, MANUEL to prox LAD (Xience Rx 3.5x18mm   Continue home Prasugrel, ASA, statin  Diabetes (HCC)  Lab Results   Component Value Date    HGBA1C 8.0 (H) 06/24/2025       Recent Labs     07/05/25  1528 07/05/25  2107 07/06/25  0636 07/06/25  1015   POCGLU 157* 203* 150* 207*     Home: Metformin 1000mg daily. In hospital he was on lantus 6U daily while the home metformin was on hold  Here: metformin was ordered on admission to [facility] but now discontinued. Currently patient only on SSI  Metformin was held AM 7/3 but was given 7/4 7/4- Will stop Metformin since has elevated TB/DB, AST, AP (although improving) and ETOH use.  Will possibly need alternative PO medication = ?Januvia but if LFTs normalize then restart.  It appears he has been on this for quite a while  Alcohol use disorder  Per hospital chart, wife reported up to 1 bottle of wine per day  ETOH on admit 6/24 was normal  Was on CIWA protocol  Continue B12, folic  acid  Hypertension  Home: Lopressor 50 mg BID  Here: same  No changes today  Sinusitis  Seen on CT head done in ED and  CTH 7/3/25  No complaints of sinus pressure or headaches  Continue flonase, claritin  Back pain  MRI lumbar spine:  No discitis/osteomyelitis in lumbar spine, as clinically questioned.  Small focal areas of enhancement in the L1-L2 and L2-L3 supraspinous ligament regions, suspicious for enthesitis.  Mild multilevel degenerative changes of lumbar spine, as detailed above. No significant canal stenosis or foraminal narrowing  Pain meds/therapy per PMR  Hyperbilirubinemia  Peaked at 2.52, currently 2.26  Right UQ US- Hepatomegaly with steatosis, no liver mass  Recommend outpatient GI follow up  Vitamin D deficiency  Continue supplement  Lung nodule  Found incidentally on CT scan  4mm right upper lobe nodule which is new  Will need repeat CT in 12 months  Tobacco use disorder  Encourage cessation at discharge.  Depression  Pt does not want to start a medication for mood.  Nausea & vomiting  Occurred in evening 7/3/25  CTH was negative, labs w/o acute changes except hemoglobin down a bit likely 2/2 hydration/dilutional  He felt the N/V was because he did too much in therapy  No N/V currently  Anemia  May be dilutional from recent IVF  (500ml NSS)  Recheck 7/5 was 9.7, stable from 9.8 day prior    The above assessment and plan was reviewed and updated as determined by my evaluation of the patient on 7/6/2025.    History of Present Illness   Patient seen and examined. Patients overnight issues or events were reviewed with nursing staff. New or overnight issues include the following:   Patient resting comfortably in bed. He is upset about having to call for help to get to the bathroom, he also refuses to use the urinal. I did discuss with nursing. He has a bed alarm on    Review of Systems    Objective :  Temp:  [98.4 °F (36.9 °C)-98.5 °F (36.9 °C)] 98.5 °F (36.9 °C)  HR:  [] 99  BP: (145)/(74-82)  145/74  Resp:  [17] 17  SpO2:  [95 %-98 %] 95 %  O2 Device: None (Room air)    Invasive Devices       Peripheral Intravenous Line  Duration             Peripheral IV 07/03/25 Distal;Dorsal (posterior);Left Forearm 2 days                    Physical Exam  General Appearance: NAD  HEENT: PERRLA, conjuctiva normal; mucous membranes moist; face symmetrical  Neck:  Supple  Lungs: clear bilaterally, normal respiratory effort, no retractions, expiratory effort normal, on room air  CV: regular rate and rhythm, no murmurs rubs or gallops noted   ABD: soft non tender, +BS x4  EXT: DP pulses intact, no lymphadenopathy  Skin: normal turgor, normal texture, no rash  Neuro: AAOx3    The above physical exam was reviewed and updated as determined by my evaluation of the patient on 7/6/2025.      Lab Results: I have reviewed the following results:  Results from last 7 days   Lab Units 07/05/25  0559 07/04/25  0558   WBC Thousand/uL 5.28 5.84   HEMOGLOBIN g/dL 9.7* 9.8*   HEMATOCRIT % 31.1* 30.7*   PLATELETS Thousands/uL 131* 143*     Results from last 7 days   Lab Units 07/04/25  0558 07/03/25  1820   SODIUM mmol/L 137 134*   POTASSIUM mmol/L 4.1 3.5   CHLORIDE mmol/L 106 101   CO2 mmol/L 26 21   BUN mg/dL 7 8   CREATININE mg/dL 0.80 0.88   CALCIUM mg/dL 7.6* 7.9*         Results from last 7 days   Lab Units 07/04/25  0558   INR  1.02     Results from last 7 days   Lab Units 07/06/25  1015 07/06/25  0636 07/05/25  2107   POC GLUCOSE mg/dl 207* 150* 203*           Review of Scheduled Meds: Medications  reviewed and reconciled as needed  Current Facility-Administered Medications   Medication Dose Route Frequency Provider Last Rate    acetaminophen  650 mg Oral Q8H PRN Ashley Depadua, MD      aspirin  81 mg Oral Daily Nadiya Shah PA-C      atorvastatin  20 mg Oral Daily Nadiya Shah PA-C      benzonatate  200 mg Oral TID PRN Nadiya Shah PA-C      bisacodyl  10 mg Rectal Daily PRN Nadiya Shah PA-C      vitamin B-12   1,000 mcg Oral Daily Nadiya Shah, PA-C      enoxaparin  40 mg Subcutaneous Daily Nadiya Shah, PA-C      ergocalciferol  50,000 Units Oral Weekly Nadiyapawel Garberes, PA-C      fluticasone  2 spray Each Nare BID Nadiya Shah, PA-C      folic acid  1 mg Oral Daily Nadiya Seathaoes, PA-C      gabapentin  100 mg Oral HS Nadiya Shah, PA-C      insulin lispro  1-5 Units Subcutaneous HS Nadiyapawel Garberes, PA-C      insulin lispro  1-6 Units Subcutaneous TID AC Nadiya Shah, PA-C      loratadine  10 mg Oral Daily Nadiya Garberes, PA-C      menthol-methyl salicylate   Apply externally 4x Daily Nadiya Shah, PA-C      methocarbamol  500 mg Oral Q6H PRN Nadiya Shah, PA-C      metoprolol tartrate  50 mg Oral Q12H TERRENCE Nadiya Shah, PA-C      ondansetron  4 mg Oral Q6H PRN Rayne Da Silva, PA-C      ondansetron  4 mg Oral Once Aroldo Wyatt MD      oxyCODONE  2.5 mg Oral Q4H PRN Ashley Depadua, MD      Or    oxyCODONE  5 mg Oral Q4H PRN Ashley Depadua, MD      polyethylene glycol  17 g Oral Daily PRN Ashley Depadua, MD      prasugrel  10 mg Oral Daily Nadiya Shah, PA-C      senna  2 tablet Oral BID Nadiya Shah, PA-C      thiamine  100 mg Oral Daily Nadiya Shah, PA-C         VTE Pharmacologic Prophylaxis: lovenox  Code Status: Level 1 - Full Code  Current Length of Stay: 4 day(s)    Administrative Statements     ** Please Note:  voice to text software may have been used in the creation of this document. Although proof errors in transcription or interpretation are a potential of such software**

## 2025-07-06 NOTE — ASSESSMENT & PLAN NOTE
Lab Results   Component Value Date    HGBA1C 8.0 (H) 06/24/2025       Recent Labs     07/05/25  1528 07/05/25  2107 07/06/25  0636 07/06/25  1015   POCGLU 157* 203* 150* 207*     Home: Metformin 1000mg daily. In hospital he was on lantus 6U daily while the home metformin was on hold  Here: metformin was ordered on admission to [facility] but now discontinued. Currently patient only on SSI  Metformin was held AM 7/3 but was given 7/4 7/4- Will stop Metformin since has elevated TB/DB, AST, AP (although improving) and ETOH use.  Will possibly need alternative PO medication = ?Januvia but if LFTs normalize then restart.  It appears he has been on this for quite a while

## 2025-07-07 PROBLEM — K21.9 GERD (GASTROESOPHAGEAL REFLUX DISEASE): Status: ACTIVE | Noted: 2025-07-07

## 2025-07-07 LAB
ATRIAL RATE: 135 BPM
ATRIAL RATE: 135 BPM
GLUCOSE SERPL-MCNC: 135 MG/DL (ref 65–140)
GLUCOSE SERPL-MCNC: 135 MG/DL (ref 65–140)
GLUCOSE SERPL-MCNC: 154 MG/DL (ref 65–140)
GLUCOSE SERPL-MCNC: 154 MG/DL (ref 65–140)
GLUCOSE SERPL-MCNC: 207 MG/DL (ref 65–140)
GLUCOSE SERPL-MCNC: 207 MG/DL (ref 65–140)
GLUCOSE SERPL-MCNC: 258 MG/DL (ref 65–140)
GLUCOSE SERPL-MCNC: 258 MG/DL (ref 65–140)
P AXIS: 57 DEGREES
P AXIS: 57 DEGREES
PR INTERVAL: 144 MS
PR INTERVAL: 144 MS
QRS AXIS: 47 DEGREES
QRS AXIS: 47 DEGREES
QRSD INTERVAL: 62 MS
QRSD INTERVAL: 62 MS
QT INTERVAL: 354 MS
QT INTERVAL: 354 MS
QTC INTERVAL: 531 MS
QTC INTERVAL: 531 MS
T WAVE AXIS: 72 DEGREES
T WAVE AXIS: 72 DEGREES
VENTRICULAR RATE: 135 BPM
VENTRICULAR RATE: 135 BPM

## 2025-07-07 PROCEDURE — 93010 ELECTROCARDIOGRAM REPORT: CPT | Performed by: STUDENT IN AN ORGANIZED HEALTH CARE EDUCATION/TRAINING PROGRAM

## 2025-07-07 PROCEDURE — 97116 GAIT TRAINING THERAPY: CPT

## 2025-07-07 PROCEDURE — 97110 THERAPEUTIC EXERCISES: CPT

## 2025-07-07 PROCEDURE — 82948 REAGENT STRIP/BLOOD GLUCOSE: CPT

## 2025-07-07 PROCEDURE — 97530 THERAPEUTIC ACTIVITIES: CPT

## 2025-07-07 PROCEDURE — 99233 SBSQ HOSP IP/OBS HIGH 50: CPT | Performed by: PHYSICIAN ASSISTANT

## 2025-07-07 PROCEDURE — 99232 SBSQ HOSP IP/OBS MODERATE 35: CPT | Performed by: PHYSICAL MEDICINE & REHABILITATION

## 2025-07-07 PROCEDURE — 97535 SELF CARE MNGMENT TRAINING: CPT

## 2025-07-07 RX ORDER — BISACODYL 10 MG
10 SUPPOSITORY, RECTAL RECTAL DAILY PRN
Status: DISCONTINUED | OUTPATIENT
Start: 2025-07-07 | End: 2025-07-14

## 2025-07-07 RX ORDER — SENNOSIDES 8.6 MG
2 TABLET ORAL
Status: DISCONTINUED | OUTPATIENT
Start: 2025-07-07 | End: 2025-07-15 | Stop reason: HOSPADM

## 2025-07-07 RX ORDER — FAMOTIDINE 20 MG/1
20 TABLET, FILM COATED ORAL 2 TIMES DAILY
Status: DISCONTINUED | OUTPATIENT
Start: 2025-07-07 | End: 2025-07-15 | Stop reason: HOSPADM

## 2025-07-07 RX ORDER — INSULIN GLARGINE 100 [IU]/ML
5 INJECTION, SOLUTION SUBCUTANEOUS
Status: DISCONTINUED | OUTPATIENT
Start: 2025-07-07 | End: 2025-07-14

## 2025-07-07 RX ADMIN — ATORVASTATIN CALCIUM 20 MG: 20 TABLET, FILM COATED ORAL at 08:01

## 2025-07-07 RX ADMIN — FLUTICASONE PROPIONATE 2 SPRAY: 50 SPRAY, METERED NASAL at 16:53

## 2025-07-07 RX ADMIN — MUSCLE RUB CREAM 1 APPLICATION: 100; 150 CREAM TOPICAL at 16:53

## 2025-07-07 RX ADMIN — FAMOTIDINE 20 MG: 20 TABLET, FILM COATED ORAL at 16:51

## 2025-07-07 RX ADMIN — METHOCARBAMOL 500 MG: 500 TABLET ORAL at 18:09

## 2025-07-07 RX ADMIN — PRASUGREL 10 MG: 10 TABLET, FILM COATED ORAL at 08:00

## 2025-07-07 RX ADMIN — THIAMINE HCL TAB 100 MG 100 MG: 100 TAB at 08:01

## 2025-07-07 RX ADMIN — INSULIN LISPRO 1 UNITS: 100 INJECTION, SOLUTION INTRAVENOUS; SUBCUTANEOUS at 21:42

## 2025-07-07 RX ADMIN — INSULIN GLARGINE 5 UNITS: 100 INJECTION, SOLUTION SUBCUTANEOUS at 21:42

## 2025-07-07 RX ADMIN — MUSCLE RUB CREAM: 100; 150 CREAM TOPICAL at 07:58

## 2025-07-07 RX ADMIN — FAMOTIDINE 20 MG: 20 TABLET, FILM COATED ORAL at 11:28

## 2025-07-07 RX ADMIN — LORATADINE 10 MG: 10 TABLET ORAL at 08:01

## 2025-07-07 RX ADMIN — ENOXAPARIN SODIUM 40 MG: 40 INJECTION SUBCUTANEOUS at 07:59

## 2025-07-07 RX ADMIN — METOPROLOL TARTRATE 50 MG: 50 TABLET, FILM COATED ORAL at 08:01

## 2025-07-07 RX ADMIN — INSULIN LISPRO 1 UNITS: 100 INJECTION, SOLUTION INTRAVENOUS; SUBCUTANEOUS at 16:19

## 2025-07-07 RX ADMIN — MUSCLE RUB CREAM: 100; 150 CREAM TOPICAL at 11:31

## 2025-07-07 RX ADMIN — OXYCODONE HYDROCHLORIDE 5 MG: 5 TABLET ORAL at 16:51

## 2025-07-07 RX ADMIN — MUSCLE RUB CREAM 1 APPLICATION: 100; 150 CREAM TOPICAL at 21:42

## 2025-07-07 RX ADMIN — CYANOCOBALAMIN TAB 500 MCG 1000 MCG: 500 TAB at 08:01

## 2025-07-07 RX ADMIN — METOPROLOL TARTRATE 50 MG: 50 TABLET, FILM COATED ORAL at 21:41

## 2025-07-07 RX ADMIN — GABAPENTIN 100 MG: 100 CAPSULE ORAL at 21:41

## 2025-07-07 RX ADMIN — FOLIC ACID 1 MG: 1 TABLET ORAL at 08:01

## 2025-07-07 RX ADMIN — FLUTICASONE PROPIONATE 2 SPRAY: 50 SPRAY, METERED NASAL at 07:59

## 2025-07-07 RX ADMIN — INSULIN LISPRO 3 UNITS: 100 INJECTION, SOLUTION INTRAVENOUS; SUBCUTANEOUS at 11:31

## 2025-07-07 RX ADMIN — OXYCODONE HYDROCHLORIDE 5 MG: 5 TABLET ORAL at 10:12

## 2025-07-07 RX ADMIN — ASPIRIN 81 MG CHEWABLE TABLET 81 MG: 81 TABLET CHEWABLE at 08:01

## 2025-07-07 RX ADMIN — OXYCODONE HYDROCHLORIDE 5 MG: 5 TABLET ORAL at 03:57

## 2025-07-07 NOTE — ASSESSMENT & PLAN NOTE
Lab Results   Component Value Date    HGBA1C 8.0 (H) 06/24/2025       Recent Labs     07/06/25  1547 07/06/25  2100 07/07/25  0613 07/07/25  1042   POCGLU 173* 154* 135 258*     Home: Metformin 1000mg daily. In hospital he was on lantus 6U daily while the home metformin was on hold  Here: metformin was ordered on admission to [facility] but now discontinued. hospital ordered lantus was discontinued when he was discharged to [facility]. Currently patient only on SSI  Metformin was held AM 7/3 but was given 7/4 7/4- metformin now back on hold due to elevated LFT's and ETOH use.  Could consider an alternative PO medication but it appears he was on the metformin for quite a while at home so hesitant to change his home regimen at this point as his A1c from 2024 was 6.6. will continue to monitor LFT's for now and consider resuming metformin if able  7/7- blood sugars have been running a bit high as he is off his home metformin and has not been on scheduled lantus since arriving to [facility]. For now will resume lantus 5U qHS for better blood sugar control until hopefully we can resume his metformin, repeat CMP ordered for 7/8

## 2025-07-07 NOTE — PROGRESS NOTES
Progress Note - Hospitalist   Name: Aquilino Dowell 64 y.o. male I MRN: [id number]  Unit/Bed#: [facility] 971-01 I Date of Admission: 7/2/2025   Date of Service: 7/7/2025 I Hospital Day: 5    Assessment & Plan  Peripheral neuropathy  Continue gabapentin  Sepsis ([facility])  Presented to ED after fall at home, found to have tachycardia and tachypnea  Blood cultures:    Klebsiella aerogenes Abnormal    Avoid ceftriaxone, lower generation cephalosporins, penicillins and aztreonam even when susceptible, as organism may develop resistance on therapy   Staphylococcus hominis Abnormal    Probable contamination      Completed 7 days of cefepime on 6/30  Unclear etiology of bacteremia, workup was unrevealing.   WBC has been normal and he has been afebrile    Fall  S/p fall at home ambulating to the bathroom  Uses a cane at baseline due to LE neuropathy and vision issues  PT/OT  Left shoulder pain  Xray done on 6/24/25 = + glenohumeral DJD, no fracture  Bengay ordered per PMR  Coronary artery disease  S/p cardiac cath 2013    95% prox LAD, 40% mid LAD, EF 60%, MANUEL to prox LAD (Xience Rx 3.5x18mm   Continue home Prasugrel, ASA, statin  Diabetes ([facility])  Lab Results   Component Value Date    HGBA1C 8.0 (H) 06/24/2025       Recent Labs     07/06/25  1547 07/06/25  2100 07/07/25  0613 07/07/25  1042   POCGLU 173* 154* 135 258*     Home: Metformin 1000mg daily. In hospital he was on lantus 6U daily while the home metformin was on hold  Here: metformin was ordered on admission to [facility] but now discontinued. hospital ordered lantus was discontinued when he was discharged to [facility]. Currently patient only on SSI  Metformin was held AM 7/3 but was given 7/4 7/4- metformin now back on hold due to elevated LFT's and ETOH use.  Could consider an alternative PO medication but it appears he was on the metformin for quite a while at home so hesitant to change his home regimen at this point as his A1c from 2024 was 6.6. will continue to monitor LFT's for  now and consider resuming metformin if able  7/7- blood sugars have been running a bit high as he is off his home metformin and has not been on scheduled lantus since arriving to [facility]. For now will resume lantus 5U qHS for better blood sugar control until hopefully we can resume his metformin, repeat CMP ordered for 7/8  Alcohol use disorder  Per hospital chart, wife reported up to 1 bottle of wine per day  ETOH on admit 6/24 was normal  Was on CIWA protocol  Continue B12, folic acid  Hypertension  Home: Lopressor 50 mg BID  Here: same  No changes today  Sinusitis  Seen on CT head done in ED and  CTH 7/3/25  No complaints of sinus pressure or headaches  Continue flonase, claritin  Back pain  MRI lumbar spine:  No discitis/osteomyelitis in lumbar spine, as clinically questioned.  Small focal areas of enhancement in the L1-L2 and L2-L3 supraspinous ligament regions, suspicious for enthesitis.  Mild multilevel degenerative changes of lumbar spine, as detailed above. No significant canal stenosis or foraminal narrowing  Pain meds/therapy per PMR  Hyperbilirubinemia  Peaked at 2.52, currently 2.26  Right UQ US- Hepatomegaly with steatosis, no liver mass  Recommend outpatient GI follow up  Recheck labs 7/8  Vitamin D deficiency  Continue supplement  Lung nodule  Found incidentally on CT scan  4mm right upper lobe nodule which is new  Will need repeat CT in 12 months  Tobacco use disorder  Encourage cessation at discharge.  Depression  Pt does not want to start a medication for mood.  Nausea & vomiting  Occurred in evening 7/3/25  CTH was negative, labs w/o acute changes except hemoglobin down a bit likely 2/2 hydration/dilutional  He felt the N/V was because he did too much in therapy  No N/V currently, does complain of reflux symptoms- see below  Anemia  May be dilutional from recent IVF  (500ml NSS)  Recheck 7/5 was 9.7, stable from 9.8 day prior  Recheck labs 7/8  GERD (gastroesophageal reflux disease)  On PPI at home  which was discontinued on admission due to elevated LFT's  He is complaining of GERD symptoms, will order pepcid for now    The above assessment and plan was reviewed and updated as determined by my evaluation of the patient on 7/7/2025.    History of Present Illness   Patient seen and examined. Patients overnight issues or events were reviewed with nursing staff. New or overnight issues include the following:   Patient laying flat in bed with door closed. He participated in OT this AM but tells me he doesn't know if he feels like doing PT this afternoon. I encouraged him to continue to participate in therapy in order to get strong to go home. He states he has reflux today and he doesn't want to drink the water they are encouraging him to drink.     Review of Systems    Objective :  Temp:  [98 °F (36.7 °C)-98.7 °F (37.1 °C)] 98.7 °F (37.1 °C)  HR:  [88-98] 89  BP: (139-156)/(66-84) 156/83  Resp:  [18] 18  SpO2:  [94 %-100 %] 97 %  O2 Device: None (Room air)    Invasive Devices       Peripheral Intravenous Line  Duration             Peripheral IV 07/03/25 Distal;Dorsal (posterior);Left Forearm 3 days                    Physical Exam  General Appearance: NAD; pleasant  HEENT: PERRLA, conjuctiva normal; mucous membranes moist; face symmetrical  Neck:  Supple  Lungs: clear bilaterally, normal respiratory effort, no retractions, expiratory effort normal, on room air  CV: regular rate and rhythm, no murmurs rubs or gallops noted   ABD: soft non tender, +BS x4  EXT: DP pulses intact, legs wrapped with ace bandages  Skin: normal turgor, normal texture, no rash  Psych: affect normal, mood normal  Neuro: AAOx3    The above physical exam was reviewed and updated as determined by my evaluation of the patient on 7/7/2025.      Lab Results: I have reviewed the following results:  Results from last 7 days   Lab Units 07/05/25  0559 07/04/25  0558   WBC Thousand/uL 5.28 5.84   HEMOGLOBIN g/dL 9.7* 9.8*   HEMATOCRIT % 31.1* 30.7*    PLATELETS Thousands/uL 131* 143*     Results from last 7 days   Lab Units 07/04/25  0558 07/03/25  1820   SODIUM mmol/L 137 134*   POTASSIUM mmol/L 4.1 3.5   CHLORIDE mmol/L 106 101   CO2 mmol/L 26 21   BUN mg/dL 7 8   CREATININE mg/dL 0.80 0.88   CALCIUM mg/dL 7.6* 7.9*         Results from last 7 days   Lab Units 07/04/25  0558   INR  1.02     Results from last 7 days   Lab Units 07/07/25  1042 07/07/25  0613 07/06/25  2100   POC GLUCOSE mg/dl 258* 135 154*           Review of Scheduled Meds: Medications  reviewed and reconciled as needed  Current Facility-Administered Medications   Medication Dose Route Frequency Provider Last Rate    acetaminophen  650 mg Oral Q8H PRN Ashley Depadua, MD      aspirin  81 mg Oral Daily Nadiya Garberes, PA-C      atorvastatin  20 mg Oral Daily Nadiya Jcarloses, PA-C      benzonatate  200 mg Oral TID PRN Nadiya Garberes, PA-C      bisacodyl  10 mg Rectal Daily PRN Nadiya Garberes, PA-C      vitamin B-12  1,000 mcg Oral Daily Nadiya Seagrprincees, PA-C      enoxaparin  40 mg Subcutaneous Daily Nadiya Seathaoes, PA-C      ergocalciferol  50,000 Units Oral Weekly Nadiya Seathaoes, PA-C      famotidine  20 mg Oral BID Nadiya Seathaoes, PA-C      fluticasone  2 spray Each Nare BID Nadiya Shah, PA-C      folic acid  1 mg Oral Daily Nadiya Seagrprincees, PA-C      gabapentin  100 mg Oral HS Nadiya Seagreaves, PA-C      insulin glargine  5 Units Subcutaneous HS Nadiya Seagrprincees, PA-C      insulin lispro  1-5 Units Subcutaneous HS Nadiya Seagreaves, PA-C      insulin lispro  1-6 Units Subcutaneous TID AC Nadiya Seagrprincees, PA-C      loratadine  10 mg Oral Daily Nadiya Seagreaves, PA-C      menthol-methyl salicylate   Apply externally 4x Daily Nadiya Seagreaves, PA-C      methocarbamol  500 mg Oral Q6H PRN Nadiya Seagrprincees, PA-C      metoprolol tartrate  50 mg Oral Q12H [facility] Nadiya Shah PA-C      ondansetron  4 mg Oral Q6H PRN Ranye Da Silva PA-C      ondansetron  4 mg Oral Once Aroldo Wyatt MD       oxyCODONE  2.5 mg Oral Q4H PRN Ashley Depadua, MD      Or    oxyCODONE  5 mg Oral Q4H PRN Ashley Depadua, MD      polyethylene glycol  17 g Oral Daily PRN Ashley Depadua, MD      prasugrel  10 mg Oral Daily Nadiya Shah PA-C      senna  2 tablet Oral HS PRN Robert Bo MD      thiamine  100 mg Oral Daily Nadiya Shah PA-C         VTE Pharmacologic Prophylaxis: lovenox  Code Status: Level 1 - Full Code  Current Length of Stay: 5 day(s)    Administrative Statements     ** Please Note:  voice to text software may have been used in the creation of this document. Although proof errors in transcription or interpretation are a potential of such software**

## 2025-07-07 NOTE — CONSULTS
Consult received and chart reviewed. RD visited with patient and family at bedside and provided education on foods which may cause reflux symptoms. RD encouraged patient to sit upright at meals and eat slowly.Encouraged small, frequent meals if better tolerated. Provided patient and family with GERD nutrition therapy handout.     Patient's family reports patient is currently with reduced appetite and usually does not eat lunch. Encouraged adequate caloric and protein intake. Patient agreeable to trial ONS at lunch. See pended order. Nutrition will continue to follow.

## 2025-07-07 NOTE — PCC OCCUPATIONAL THERAPY
7/7/25  64 y.o. male admission to [facility] for Debility from Sepsis s/p fall at home. Prior to admission Pt was completing ADLs at [facility] with use of SPC. Pt does not complete IADls at home, wife completes. Pt lives with lives with their spouse and is able to provide physical assistance at time of discharge. Pt reports 1 recent falls in the last 6 months. Personal factors affecting the patient at this time include: co morbidities/Other health conditions, Coping abilities, Habits and past and current behavioral patterns, and Pain. Pt is currently limited due to the following deficits impacting occupational performance, Higher level cognitive functions (Judgment, executive functions, praxis, cognitive flexibility, insight), Memory , Impaired standing balance, Impaired righting reactions, impaired Gross motor control, impaired Fine motor control, Impaired UE AROM, Impaired UE strength, limited activity tolerance, Pain, and severe sensory deficits in b/l LE . The patient has shown a decline from prior level of function. Recommend skilled OT services for I/ADL retraining to support the ability to safely participate in daily occupations safely and independently in the most least restrictive way. Pt demonstrates Good rehab potential to meet LTG's of independent with assistive device with use of rolling walker vs SPC. Anticipate 10day(s) length of stay. Occupational Therapy plan of care will address the following areas: ADL re-training, LHAE training, fxnl xfers, standing tolerance, standing balance, UE strengthening, UE endurance, FMC/GMC, FMS/GMS, DME training/education, family training/education, EC techniques/education, healthy coping education, leisure pursuits, and body awareness. Pt currently completing modified ADLs at [facility].

## 2025-07-07 NOTE — ASSESSMENT & PLAN NOTE
Presented to ED after fall at home, found to have tachycardia and tachypnea  Blood cultures:    Klebsiella aerogenes Abnormal    Avoid ceftriaxone, lower generation cephalosporins, penicillins and aztreonam even when susceptible, as organism may develop resistance on therapy   Staphylococcus hominis Abnormal    Probable contamination      Completed 7 days of cefepime on 6/30  Unclear etiology of bacteremia, workup was unrevealing.   WBC has been normal and he has been afebrile

## 2025-07-07 NOTE — PLAN OF CARE
Problem: SAFETY ADULT  Goal: Patient will remain free of falls  Description: INTERVENTIONS:  - Educate patient/family on patient safety including physical limitations  - Instruct patient to call for assistance with activity   - Consider consulting OT/PT to assist with strengthening/mobility based on AM PAC & JH-HLM score  - Consult OT/PT to assist with strengthening/mobility   - Keep Call bell within reach  - Keep bed low and locked with side rails adjusted as appropriate  - Keep care items and personal belongings within reach  - Initiate and maintain comfort rounds  - Make Fall Risk Sign visible to staff  - Offer Toileting every 2 Hours, in advance of need  - Initiate/Maintain bed/chairalarm  - Obtain necessary fall risk management equipment: non skid footwear  - Apply yellow socks and bracelet for high fall risk patients  - Consider moving patient to room near nurses station  Outcome: Progressing  Goal: Maintain or return to baseline ADL function  Description: INTERVENTIONS:  -  Assess patient's ability to carry out ADLs; assess patient's baseline for ADL function and identify physical deficits which impact ability to perform ADLs (bathing, care of mouth/teeth, toileting, grooming, dressing, etc.)  - Assess/evaluate cause of self-care deficits   - Assess range of motion  - Assess patient's mobility; develop plan if impaired  - Assess patient's need for assistive devices and provide as appropriate  - Encourage maximum independence but intervene and supervise when necessary  - Involve family in performance of ADLs  - Assess for home care needs following discharge   - Consider OT consult to assist with ADL evaluation and planning for discharge  - Provide patient education as appropriate  - Monitor functional capacity and physical performance, use of AM PAC & JH-HLM   - Monitor gait, balance and fatigue with ambulation    Outcome: Progressing  Goal: Maintains/Returns to pre admission functional level  Description:  INTERVENTIONS:  - Perform AM-PAC 6 Click Basic Mobility/ Daily Activity assessment daily.  - Set and communicate daily mobility goal to care team and patient/family/caregiver.   - Collaborate with rehabilitation services on mobility goals if consulted  - Perform Range of Motion 3 times a day.  - Reposition patient every 2 hours.  - Dangle patient 3 times a day  - Stand patient 3 times a day  - Ambulate patient 3 times a day  - Out of bed to chair 3 times a day   - Out of bed for meals 3 times a day  - Out of bed for toileting  - Record patient progress and toleration of activity level   Outcome: Progressing

## 2025-07-07 NOTE — CASE MANAGEMENT
Clinical update faxed via [employer] to [employer] [phone] requesting additional 7 days, determination pending.

## 2025-07-07 NOTE — ASSESSMENT & PLAN NOTE
Occurred in evening 7/3/25  CTH was negative, labs w/o acute changes except hemoglobin down a bit likely 2/2 hydration/dilutional  He felt the N/V was because he did too much in therapy  No N/V currently, does complain of reflux symptoms- see below

## 2025-07-07 NOTE — ASSESSMENT & PLAN NOTE
Lab Results   Component Value Date    HGBA1C 8.0 (H) 06/24/2025       Recent Labs     07/06/25  1015 07/06/25  1547 07/06/25  2100 07/07/25  0613   POCGLU 207* 173* 154* 135       Blood Sugar Average: Last 72 hrs:  (P) [id number]: Metformin 1000mg daily  -Unclear if family checks BG at home or if he has a kit.   >Here: Lantus 6 units QHS, CDI/Accuchecks  >Goal to transition back to home regimen.   >Diabetic Diet  >Management as per IM

## 2025-07-07 NOTE — PROGRESS NOTES
Progress Note - PMR   Name: Aquilino Dowell 64 y.o. 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   - Suspect component also related to T2DM  - Has impaired vision as well  - Has had a history of Vitamin B12 deficiency previously contributing as well (6/25 level 940)  - Also has history of alcohol use disorder  - Unclear etiology of fall per patient.   > Ambulatory dysfunction I suspect is driven by progression of his neuropathy in addition to worsening vision impacting his ability to compensate  > PT/OT 3-5 hours/day, 5-7 days/week.  > Also has a component of deconditioning and worsening proximal weakness related to increasingly sedentary lifestyle (this is 2/2 I suspect to some depression since losing his job in November).   > Gabapentin 100mg QHS for shooting pains (higher doses made him too lethargic)  > Alcohol cessation as that can progress neuropathy  > Continue B12 supplement, optimize diabetes management.   > Check orthostatics here   > Recommend f/u with Neuromuscular Neurology   Sepsis (HCC)  Largely resolved   Found to have occult bacteremia.  S/p 7 day abx  Hemodynamically stable. Monitor.   Left shoulder pain  Examines like bicipital tendinosis with anterior pain, positive speeds, yergason.   Could also have other rotator cuff involvement with positive neers, mild external rotation weakness.  Subscapularis/internal rotators not implicated on exam  Family reports chronic L shoulder issues but worse since  the fall.   6/24 XR without acute osseous abnormality   > Plan for outpatient MRI  > Considerations in therapy   > Bengay QID  Constipation  Last BM 7/3  >Continue miralax daily and senna PRN, suppository PRN (Last adjusted 7/7/2025, senna now as needed)  >Monitor and adjust as appropriate  At risk for venous thromboembolism (VTE)  >Lovenox, SCDs, ambulation  >Will not go home on lovenox.  >Monitor platelet count closely.   Depression  -Has lost interests he previously had, sleep has been poor, energy levels/motivation poor, feelings of guilt and frustration.   -Appetite worse  -All since he lost his job in November.  -Wife has noticed a sharp decline  -Denies suicidal ideation/self-harm.  > Discussed trial of SNRI (cymbalta may also help with pain)  > He would like to defer for now.   > Referral to Psych at discharge   Back pain  -Denies any back pain for me  -Findings on Mri L-Spine would not explain LE symptoms  >Monitor   >Continue current pain management   Nausea & vomiting  7/3 with episode of N/V late in the day with sinus tachycardia to the 130s  - Transient AMS  - Had large BM in the AM  - AM dose of metoprolol had not been giving  > Got metoprolo and zofran  > Given a gentle IVF bolus for any dehydration  > Repaet CMP today unremarkable with improved LFTs. Ammonia 61  > CBC with WBC WNL, but hgb down to 9.8 (after getting IVF)    - Recheck in the AM  > Mag WNL  > CTH unremarkable, save for some possible sinusitis  > Monitor for now.   Diabetic retinopathy (HCC)  -R eye with fully impaired vision/blind  -L eye with retinopathy and gets injections.   - Next due 7/14  -This likely is contributing to his imbalance in the setting of his peripheral neuropathy  Coronary artery disease  -S/p stent/cath 2013  -Home: Prasugrel 10mg daily, ASA 81mg daily   >Here: Same   Diabetes (HCC)  Lab Results   Component Value Date    HGBA1C 8.0 (H) 06/24/2025       Recent Labs     07/06/25  1015 07/06/25  1547 07/06/25  2100  07/07/25  0613   POCGLU 207* 173* 154* 135       Blood Sugar Average: Last 72 hrs:  (P) [id number]: Metformin 1000mg daily  -Unclear if family checks BG at home or if he has a kit.   >Here: Lantus 6 units QHS, CDI/Accuchecks  >Goal to transition back to home regimen.   >Diabetic Diet  >Management as per IM  Alcohol use disorder  -Denies history of withdrawal  >Cessation counseling  Hypertension  -Home: Toprol 50mg BID  >Here: Same  >Monitor and adjust as per IM  Sinusitis  >Flonase, claritin  Hyperbilirubinemia  -And chronic transaminitis  -In setting of alcohol use disorder  -RUQ US with steatosis and hepatomegaly  > Counseled on cessation  > minimize hepatotoxic meds  > Outpatient f/u with GI recommended  Vitamin D deficiency  -6/27 12.8  >Ergocalciferol weekly for 12 weeks  >Repeat with PCP   Lung nodule  -Noted incidentally on imaging  >Recommended for f/u chest CT in 12 months  Tobacco use disorder  -Quit smoking in the 90s  -Now chewing tobacco.  >Nicotine patch  >Cessation counseling.   Anemia      Health Maintenance  #Delirium/Sleep: At risk. Optimize sleep/wake, pain, bowel, bladder management. Avoid deliriogenic meds and physical restraint. Environmental/behavioral interventions.   #Bladder: Voiding and Continent  #Skin/Pressure Injury Prevention: Turn Q2hr in bed, with weight shifts [id number] in wheelchair. Float heels in bed.  #GI Prophylaxis: Not indicated  #Code Status: Full Code  #FEN: Diabetic Diet   #Dispo: ELOS 7-10 days with goals to discharge home at a Sup-mod Ind level. With support from his wife. They have a trip to [place] planned on August 6th.                 > F/U PCP, Neuromuscular, Orthopedics    Chief Complaint: No complaints    Interval History/Subjective:  Patient seen and examined in the gym.  No acute events overnight. Vital signs reviewed and were stable overnight.  Patient reports better sleep overall.  Additionally reported some nausea with eating over the weekend.   Although he reports that he this morning he was able to tolerate an Ensure with English muffin.  Patient reports no other acute issues or concerns overnight, including fevers, chills, chest pain, shortness of breath. Adequate urine output overnight w/ continent voids. Last BM Date: 07/06/25, continent.     Functional Update:   PT: Petra transfers, Sup bed mobility, CGA car transfers, Petra ambulation 118'  OT: Petra-CGA with ADLs    Objective     Temp:  [98 °F (36.7 °C)-98.7 °F (37.1 °C)] 98.7 °F (37.1 °C)  HR:  [88-98] 89  Resp:  [18] 18  BP: (139-156)/(66-84) 156/83  SpO2:  [94 %-100 %] 97 %    GENERAL: alert, no apparent distress, cooperative, and comfortable  HEENT:  Normocephalic, no lesions, without obvious abnormality.  CARDIAC:  regular rate and rhythm, S1, S2 normal, no murmur, click, rub or gallop  LUNGS:  no abnormal respiratory pattern, no retractions noted, non-labored breathing   ABDOMEN:  soft, non-tender, non-distended  EXTREMITIES:  extremities normal, warm and well-perfused  NEURO:  clear speech, following all commands, oriented x4  PSYCH:  Normal and appropriate affect    Physical examination is otherwise unchanged from previous encounter, except as noted above.    Scheduled Meds:  Current Facility-Administered Medications   Medication Dose Route Frequency Provider Last Rate    acetaminophen  650 mg Oral Q8H PRN Ashley Depadua, MD      aspirin  81 mg Oral Daily Nadiya Shah, PA-C      atorvastatin  20 mg Oral Daily Nadiya Shah, PA-C      benzonatate  200 mg Oral TID PRN Nadiya Garberes, PA-C      bisacodyl  10 mg Rectal Daily PRN Nadiya Garberes, PA-C      vitamin B-12  1,000 mcg Oral Daily Nadiya Seagrprincees, PA-C      enoxaparin  40 mg Subcutaneous Daily Nadiya Seathaoes, PA-C      ergocalciferol  50,000 Units Oral Weekly Nadiya Seagrprincees, PA-C      fluticasone  2 spray Each Nare BID Nadiya Shah, PA-C      folic acid  1 mg Oral Daily Nadiya Seathaoes, PA-C      gabapentin  100 mg Oral HS Nadiya  Alyssa, PA-C      insulin lispro  1-5 Units Subcutaneous HS Nadiya Shah, PA-C      insulin lispro  1-6 Units Subcutaneous TID AC Nadiya Shah, PA-C      loratadine  10 mg Oral Daily Nadiya Shah, PA-C      menthol-methyl salicylate   Apply externally 4x Daily Nadiya Shah, PA-C      methocarbamol  500 mg Oral Q6H PRN Nadiya Shah, PA-C      metoprolol tartrate  50 mg Oral Q12H TERRENCE Nadiya Shah, PA-C      ondansetron  4 mg Oral Q6H PRN Rayne Da Silva, PA-C      ondansetron  4 mg Oral Once Aroldo Wyatt MD      oxyCODONE  2.5 mg Oral Q4H PRN Ashley Depadua, MD      Or    oxyCODONE  5 mg Oral Q4H PRN Ashley Depadua, MD      polyethylene glycol  17 g Oral Daily PRN Ashley Depadua, MD      prasugrel  10 mg Oral Daily Nadiya Shah, PA-C      senna  2 tablet Oral BID Nadiya Shah, PA-C      thiamine  100 mg Oral Daily Nadiya Shah, PA-MARS       Imaging: CTH with no acute intracranial pathology, but possibly L maxillary sinusitis.       Lab Results: I have reviewed the following results:  Results from last 7 days   Lab Units 07/05/25  0559 07/04/25  0558 07/03/25  1820   HEMOGLOBIN g/dL 9.7* 9.8* 11.8*   HEMATOCRIT % 31.1* 30.7* 37.0   WBC Thousand/uL 5.28 5.84 6.91   PLATELETS Thousands/uL 131* 143* 176     Results from last 7 days   Lab Units 07/04/25  0558 07/03/25  1820 07/02/25  0825   BUN mg/dL 7 8 4*   SODIUM mmol/L 137 134* 134*   POTASSIUM mmol/L 4.1 3.5 3.9   CHLORIDE mmol/L 106 101 102   CREATININE mg/dL 0.80 0.88 0.68   AST U/L 113* 123*  --    ALT U/L 24 27  --        Results from last 7 days   Lab Units 07/04/25  0558   PROTIME seconds 13.7   INR  1.02        0658}      ** Please Note: Fluency Direct voice to text software may have been used in the creation of this document. **

## 2025-07-07 NOTE — ASSESSMENT & PLAN NOTE
Last BM 7/3  >Continue miralax daily and senna PRN, suppository PRN (Last adjusted 7/7/2025, senna now as needed)  >Monitor and adjust as appropriate

## 2025-07-07 NOTE — ASSESSMENT & PLAN NOTE
On PPI at home which was discontinued on admission due to elevated LFT's  He is complaining of GERD symptoms, will order pepcid for now

## 2025-07-07 NOTE — ASSESSMENT & PLAN NOTE
Peaked at 2.52, currently 2.26  Right UQ US- Hepatomegaly with steatosis, no liver mass  Recommend outpatient GI follow up  Recheck labs 7/8

## 2025-07-07 NOTE — ASSESSMENT & PLAN NOTE
May be dilutional from recent IVF  (500ml NSS)  Recheck 7/5 was 9.7, stable from 9.8 day prior  Recheck labs 7/8

## 2025-07-07 NOTE — PROGRESS NOTES
07/07/25 1400   Pain Assessment   Pain Assessment Tool 0-10   Pain Score No Pain   Restrictions/Precautions   Precautions Bed/chair alarms;Cognitive;Fall Risk   Subjective   Subjective pt agreeable to perorm skilled PT a t this time   Roll Left and Right   Type of Assistance Needed Supervision   Roll Left and Right CARE Score 4   Sit to Lying   Type of Assistance Needed Supervision   Sit to Lying CARE Score 4   Lying to Sitting on Side of Bed   Type of Assistance Needed Supervision   Lying to Sitting on Side of Bed CARE Score 4   Sit to Stand   Type of Assistance Needed Incidental touching   Comment RW /[facility]   Sit to Stand CARE Score 4   Bed-Chair Transfer   Type of Assistance Needed Incidental touching   Comment RW /SPC   Chair/Bed-to-Chair Transfer CARE Score 4   Transfer Bed/Chair/Wheelchair   Adaptive Equipment Roller Walker;Cane   Walk 10 Feet   Type of Assistance Needed Incidental touching;Independent;Adaptive equipment   Comment [facility]   Walk 10 Feet CARE Score -   Walk 50 Feet with Two Turns   Type of Assistance Needed Incidental touching;Adaptive equipment   Comment [facility]   Walk 50 Feet with Two Turns CARE Score 4   Ambulation   Primary Mode of Locomotion Prior to Admission Walk   Distance Walked (feet) 120 ft  (100)   Assist Device Roller Walker;Cane   Does the patient walk? 2. Yes   Wheel 50 Feet with Two Turns   Reason if not Attempted Activity not applicable   Wheel 50 Feet with Two Turns CARE Score 9   Wheel 150 Feet   Reason if not Attempted Safety concerns   Wheel 150 Feet CARE Score 88   4 Steps   Type of Assistance Needed Physical assistance;Adaptive equipment   Physical Assistance Level 26%-50%   Comment  steps RHR and Susanna w [facility]   4 Steps CARE Score 3   12 Steps   Reason if not Attempted Activity not applicable   12 Steps CARE Score 9   Toilet Transfer   Type of Assistance Needed Incidental touching;Adaptive equipment   Comment    Toilet Transfer CARE Score 4   Therapeutic  Interventions   Strengthening seated LAQ AP marching ball 20   Flexibility seated manaul stretch LE   Balance standing balance   Equipment Use   NuStep lvl 1 for 5 min   Assessment   Treatment Assessment pt cont to need skilled PT and work on increase ambulation with RW/ and SPC to challenge Lj dynamic balance and overall endurance lvl .Pt also perform bike for 5 min ut Left shoulder having pain.Pt also perform TE  for LE and cont work ing on increase walking distance with RW . Cont POC   Barriers to Discharge Inaccessible home environment;Decreased caregiver support   Plan   Progress Slow progress, decreased activity tolerance   PT Therapy Minutes   PT Time In 1400   PT Time Out 1530   PT Total Time (minutes) 90   PT Mode of treatment - Individual (minutes) 90   PT Mode of treatment - Concurrent (minutes) 0   PT Mode of treatment - Group (minutes) 0   PT Mode of treatment - Co-treat (minutes) 0   PT Mode of Treatment - Total time(minutes) 90 minutes   PT Cumulative Minutes 360   Therapy Time missed   Time missed? No

## 2025-07-07 NOTE — PROGRESS NOTES
"   07/07/25 0851   Pain Assessment   Pain Assessment Tool 0-10   Pain Score 7  (reported 7/10 at end of session)   Restrictions/Precautions   Precautions Bed/chair alarms;Fall Risk;Supervision on toilet/commode;Visual deficit   Lifestyle   Autonomy \"Do you think I'm doing OK?\"   Oral Hygiene   Type of Assistance Needed Set-up / clean-up   Comment seated at sink as method of EC   Oral Hygiene CARE Score 5   Grooming   Findings RIVERS seated at sink   Shower/Bathe Self   Type of Assistance Needed Physical assistance   Physical Assistance Level 25% or less   Comment SB at sink. Able to wash UB and LB to feet via cross leg tech w/ Sup. CGA in stance at sink to wash groin, assist to thoroughly wash buttocks 2' pts fear of falling, plan to challenge fxnl reach in therapuetic interventions to inc pts confidence.   Shower/Bathe Self CARE Score 3   Bathing   Assessed Bath Style Sponge Bath   Anticipated D/C Bath Style Sponge Bath;Shower   Tub/Shower Transfer   Reason Not Assessed Sponge Bath   Findings has active shower orders. Plan to trial shower ADL tomorrow w/ modified tech 2' dec endurance and balance, pain.   Upper Body Dressing   Type of Assistance Needed Supervision   Comment seated don/doff tshirt, extra time 2' shoulder pain and limited ROM   Upper Body Dressing CARE Score 4   Lower Body Dressing   Type of Assistance Needed Incidental touching   Comment sup to thread BLE CGA un stance for CM over hips   Lower Body Dressing CARE Score 4   Putting On/Taking Off Footwear   Type of Assistance Needed Physical assistance   Physical Assistance Level 26%-50%   Comment seated w/ cross leg tech pt able to don/doff  socks, assist for BLE ACE wrap for edema management   Putting On/Taking Off Footwear CARE Score 3   Sit to Lying   Type of Assistance Needed Supervision   Comment HOB elevated. extra time to manage BLE into bed   Sit to Lying CARE Score 4   Sit to Stand   Type of Assistance Needed Incidental touching   Comment CGA " RW   Sit to Stand CARE Score 4   Bed-Chair Transfer   Type of Assistance Needed Incidental touching   Comment CGA no AD modifed SPT, nearly sit pivot tech   Chair/Bed-to-Chair Transfer CARE Score 4   Transfers   Additional Comments fxnl mobility OT gym>room at CGA level, WC follow by therapist for safety 2' dec endurance but not utilized   Exercise Tools   Other Exercise Tool 1 Light BUE TE using table pearl w/ 3# inserted in all tolerated planes for 20x each. Pt w/ complaints of inc discomfort in L shoulder and then TE dc'd. Otherwise tolerated well w/ short rest breaks. LUE shoulder pain is chronic.   Activity Tolerance   Activity Tolerance Patient tolerated treatment well   Assessment   Treatment Assessment OT session focusing on ADL retraining, BUE TE, mobility and transfers. By end of session pt reorting 7/10 pain in LUE and requesting pain meds, epic chat message sent to his RN, Stephie as OT was unable to locate her in the moment. Pt performed well this session, pleasant, agreeable and motivated. He continues to require skilled hands on assit at his time in order to maintain his safety. OT to continue POC.   Prognosis Good   Problem List Decreased strength;Decreased endurance;Decreased range of motion;Impaired balance;Decreased mobility;Decreased coordination;Decreased safety awareness;Impaired judgement;Impaired sensation;Decreased skin integrity;Pain   Plan   Treatment/Interventions ADL retraining;Functional transfer training;Therapeutic exercise;Endurance training;Patient/family training;Equipment eval/education;Compensatory technique education;Continued evaluation;Spoke to nursing   Progress Slow progress, decreased activity tolerance   OT Therapy Minutes   OT Time In 0830   OT Time Out 1000   OT Total Time (minutes) 90   OT Mode of treatment - Individual (minutes) 90   OT Mode of treatment - Concurrent (minutes) 0   OT Mode of treatment - Group (minutes) 0   OT Mode of treatment - Co-treat (minutes) 0   OT  Mode of Treatment - Total time(minutes) 90 minutes   OT Cumulative Minutes 360   Therapy Time missed   Time missed? No

## 2025-07-07 NOTE — ASSESSMENT & PLAN NOTE
7/3 with episode of N/V late in the day with sinus tachycardia to the 130s  - Transient AMS  - Had large BM in the AM  - AM dose of metoprolol had not been giving  > Got metoprolo and zofran  > Given a gentle IVF bolus for any dehydration  > Repaet CMP today unremarkable with improved LFTs. Ammonia 61  > CBC with WBC WNL, but hgb down to 9.8 (after getting IVF)    - Recheck in the AM  > Mag WNL  > CTH unremarkable, save for some possible sinusitis  > Monitor for now.

## 2025-07-07 NOTE — PCC CARE MANAGEMENT
Pt admitted s/p sepsis and is experiencing generalized weakness. Pt is participating in therapy and is expected to return home. Pt has a supportive wife who can assist as needed. Following for continued stay review and dc planning needs.

## 2025-07-08 LAB
ALBUMIN SERPL BCG-MCNC: 2.7 G/DL (ref 3.5–5)
ALBUMIN SERPL BCG-MCNC: 2.7 G/DL (ref 3.5–5)
ALP SERPL-CCNC: 175 U/L (ref 34–104)
ALP SERPL-CCNC: 175 U/L (ref 34–104)
ALT SERPL W P-5'-P-CCNC: 21 U/L (ref 7–52)
ALT SERPL W P-5'-P-CCNC: 21 U/L (ref 7–52)
ANION GAP SERPL CALCULATED.3IONS-SCNC: 10 MMOL/L (ref 4–13)
ANION GAP SERPL CALCULATED.3IONS-SCNC: 10 MMOL/L (ref 4–13)
AST SERPL W P-5'-P-CCNC: 119 U/L (ref 13–39)
AST SERPL W P-5'-P-CCNC: 119 U/L (ref 13–39)
BILIRUB SERPL-MCNC: 3 MG/DL (ref 0.2–1)
BILIRUB SERPL-MCNC: 3 MG/DL (ref 0.2–1)
BUN SERPL-MCNC: 5 MG/DL (ref 5–25)
BUN SERPL-MCNC: 5 MG/DL (ref 5–25)
CALCIUM ALBUM COR SERPL-MCNC: 9.2 MG/DL (ref 8.3–10.1)
CALCIUM ALBUM COR SERPL-MCNC: 9.2 MG/DL (ref 8.3–10.1)
CALCIUM SERPL-MCNC: 8.2 MG/DL (ref 8.4–10.2)
CALCIUM SERPL-MCNC: 8.2 MG/DL (ref 8.4–10.2)
CHLORIDE SERPL-SCNC: 103 MMOL/L (ref 96–108)
CHLORIDE SERPL-SCNC: 103 MMOL/L (ref 96–108)
CO2 SERPL-SCNC: 22 MMOL/L (ref 21–32)
CO2 SERPL-SCNC: 22 MMOL/L (ref 21–32)
CREAT SERPL-MCNC: 0.81 MG/DL (ref 0.6–1.3)
CREAT SERPL-MCNC: 0.81 MG/DL (ref 0.6–1.3)
ERYTHROCYTE [DISTWIDTH] IN BLOOD BY AUTOMATED COUNT: 14.6 % (ref 11.6–15.1)
ERYTHROCYTE [DISTWIDTH] IN BLOOD BY AUTOMATED COUNT: 14.6 % (ref 11.6–15.1)
GFR SERPL CREATININE-BSD FRML MDRD: 94 ML/MIN/1.73SQ M
GFR SERPL CREATININE-BSD FRML MDRD: 94 ML/MIN/1.73SQ M
GLUCOSE P FAST SERPL-MCNC: 114 MG/DL (ref 65–99)
GLUCOSE P FAST SERPL-MCNC: 114 MG/DL (ref 65–99)
GLUCOSE SERPL-MCNC: 114 MG/DL (ref 65–140)
GLUCOSE SERPL-MCNC: 114 MG/DL (ref 65–140)
GLUCOSE SERPL-MCNC: 141 MG/DL (ref 65–140)
GLUCOSE SERPL-MCNC: 141 MG/DL (ref 65–140)
GLUCOSE SERPL-MCNC: 146 MG/DL (ref 65–140)
GLUCOSE SERPL-MCNC: 146 MG/DL (ref 65–140)
GLUCOSE SERPL-MCNC: 268 MG/DL (ref 65–140)
GLUCOSE SERPL-MCNC: 268 MG/DL (ref 65–140)
GLUCOSE SERPL-MCNC: 281 MG/DL (ref 65–140)
GLUCOSE SERPL-MCNC: 281 MG/DL (ref 65–140)
HCT VFR BLD AUTO: 34.2 % (ref 36.5–49.3)
HCT VFR BLD AUTO: 34.2 % (ref 36.5–49.3)
HGB BLD-MCNC: 10.9 G/DL (ref 12–17)
HGB BLD-MCNC: 10.9 G/DL (ref 12–17)
MCH RBC QN AUTO: 32.8 PG (ref 26.8–34.3)
MCH RBC QN AUTO: 32.8 PG (ref 26.8–34.3)
MCHC RBC AUTO-ENTMCNC: 31.9 G/DL (ref 31.4–37.4)
MCHC RBC AUTO-ENTMCNC: 31.9 G/DL (ref 31.4–37.4)
MCV RBC AUTO: 103 FL (ref 82–98)
MCV RBC AUTO: 103 FL (ref 82–98)
PLATELET # BLD AUTO: 143 THOUSANDS/UL (ref 149–390)
PLATELET # BLD AUTO: 143 THOUSANDS/UL (ref 149–390)
PMV BLD AUTO: 10.2 FL (ref 8.9–12.7)
PMV BLD AUTO: 10.2 FL (ref 8.9–12.7)
POTASSIUM SERPL-SCNC: 3.6 MMOL/L (ref 3.5–5.3)
POTASSIUM SERPL-SCNC: 3.6 MMOL/L (ref 3.5–5.3)
PROT SERPL-MCNC: 6.6 G/DL (ref 6.4–8.4)
PROT SERPL-MCNC: 6.6 G/DL (ref 6.4–8.4)
RBC # BLD AUTO: 3.32 MILLION/UL (ref 3.88–5.62)
RBC # BLD AUTO: 3.32 MILLION/UL (ref 3.88–5.62)
SODIUM SERPL-SCNC: 135 MMOL/L (ref 135–147)
SODIUM SERPL-SCNC: 135 MMOL/L (ref 135–147)
WBC # BLD AUTO: 5.64 THOUSAND/UL (ref 4.31–10.16)
WBC # BLD AUTO: 5.64 THOUSAND/UL (ref 4.31–10.16)

## 2025-07-08 PROCEDURE — 97530 THERAPEUTIC ACTIVITIES: CPT

## 2025-07-08 PROCEDURE — 82948 REAGENT STRIP/BLOOD GLUCOSE: CPT

## 2025-07-08 PROCEDURE — 97116 GAIT TRAINING THERAPY: CPT

## 2025-07-08 PROCEDURE — 80053 COMPREHEN METABOLIC PANEL: CPT | Performed by: PHYSICIAN ASSISTANT

## 2025-07-08 PROCEDURE — 85027 COMPLETE CBC AUTOMATED: CPT | Performed by: PHYSICIAN ASSISTANT

## 2025-07-08 PROCEDURE — 97535 SELF CARE MNGMENT TRAINING: CPT

## 2025-07-08 PROCEDURE — 99233 SBSQ HOSP IP/OBS HIGH 50: CPT | Performed by: PHYSICAL MEDICINE & REHABILITATION

## 2025-07-08 PROCEDURE — 97110 THERAPEUTIC EXERCISES: CPT

## 2025-07-08 RX ORDER — ATORVASTATIN CALCIUM 10 MG/1
10 TABLET, FILM COATED ORAL DAILY
Status: DISCONTINUED | OUTPATIENT
Start: 2025-07-09 | End: 2025-07-09

## 2025-07-08 RX ORDER — IBUPROFEN 400 MG/1
400 TABLET, FILM COATED ORAL EVERY 6 HOURS PRN
Status: DISCONTINUED | OUTPATIENT
Start: 2025-07-08 | End: 2025-07-15 | Stop reason: HOSPADM

## 2025-07-08 RX ADMIN — METHOCARBAMOL 500 MG: 500 TABLET ORAL at 06:31

## 2025-07-08 RX ADMIN — MUSCLE RUB CREAM: 100; 150 CREAM TOPICAL at 21:10

## 2025-07-08 RX ADMIN — INSULIN GLARGINE 5 UNITS: 100 INJECTION, SOLUTION SUBCUTANEOUS at 21:09

## 2025-07-08 RX ADMIN — OXYCODONE HYDROCHLORIDE 5 MG: 5 TABLET ORAL at 18:28

## 2025-07-08 RX ADMIN — METHOCARBAMOL 500 MG: 500 TABLET ORAL at 23:45

## 2025-07-08 RX ADMIN — CYANOCOBALAMIN TAB 500 MCG 1000 MCG: 500 TAB at 08:05

## 2025-07-08 RX ADMIN — FAMOTIDINE 20 MG: 20 TABLET, FILM COATED ORAL at 08:05

## 2025-07-08 RX ADMIN — METOPROLOL TARTRATE 50 MG: 50 TABLET, FILM COATED ORAL at 21:10

## 2025-07-08 RX ADMIN — MUSCLE RUB CREAM: 100; 150 CREAM TOPICAL at 11:37

## 2025-07-08 RX ADMIN — METOPROLOL TARTRATE 50 MG: 50 TABLET, FILM COATED ORAL at 08:06

## 2025-07-08 RX ADMIN — INSULIN LISPRO 2 UNITS: 100 INJECTION, SOLUTION INTRAVENOUS; SUBCUTANEOUS at 21:11

## 2025-07-08 RX ADMIN — ASPIRIN 81 MG CHEWABLE TABLET 81 MG: 81 TABLET CHEWABLE at 08:04

## 2025-07-08 RX ADMIN — FOLIC ACID 1 MG: 1 TABLET ORAL at 08:05

## 2025-07-08 RX ADMIN — FLUTICASONE PROPIONATE 2 SPRAY: 50 SPRAY, METERED NASAL at 18:29

## 2025-07-08 RX ADMIN — FAMOTIDINE 20 MG: 20 TABLET, FILM COATED ORAL at 18:28

## 2025-07-08 RX ADMIN — LORATADINE 10 MG: 10 TABLET ORAL at 08:04

## 2025-07-08 RX ADMIN — THIAMINE HCL TAB 100 MG 100 MG: 100 TAB at 08:05

## 2025-07-08 RX ADMIN — PRASUGREL 10 MG: 10 TABLET, FILM COATED ORAL at 08:07

## 2025-07-08 RX ADMIN — FLUTICASONE PROPIONATE 2 SPRAY: 50 SPRAY, METERED NASAL at 08:07

## 2025-07-08 RX ADMIN — GABAPENTIN 100 MG: 100 CAPSULE ORAL at 21:10

## 2025-07-08 RX ADMIN — ATORVASTATIN CALCIUM 20 MG: 20 TABLET, FILM COATED ORAL at 08:05

## 2025-07-08 RX ADMIN — INSULIN LISPRO 3 UNITS: 100 INJECTION, SOLUTION INTRAVENOUS; SUBCUTANEOUS at 11:35

## 2025-07-08 RX ADMIN — ENOXAPARIN SODIUM 40 MG: 40 INJECTION SUBCUTANEOUS at 08:06

## 2025-07-08 RX ADMIN — OXYCODONE HYDROCHLORIDE 5 MG: 5 TABLET ORAL at 06:31

## 2025-07-08 NOTE — ASSESSMENT & PLAN NOTE
S/p fall at home ambulating to the bathroom  Uses a cane at baseline due to LE neuropathy and vision issues  PMR and PT/OT managing.

## 2025-07-08 NOTE — PCC PHYSICAL THERAPY
Aquilino was admitted to the [facility] on 6/24/25 w/ debility & sepsis. Barriers to return home include: impaired vision (baseline prior to arrival), impaired static and dynamic balance, LE weakness, and 3 FARTUN. To address these barriers physical therapy has been working on the following: NuStep for conditioning, LE strengthening, neuromuscular re education, introduction of RW/AD and stair management trailing various techniques. Aquilino was making progress towards his goals (Mod I) with DC set for 7/14, but on 7/8 and 7/9, Aquilino's safety and quality of movement deteriorated. Aquilino was orthostatic on 7/9 which limited participation in therapy. Wife was present for family training but will need to be rescheduled in the future. New DC date to be set during team

## 2025-07-08 NOTE — PLAN OF CARE
Problem: PAIN - ADULT  Goal: Verbalizes/displays adequate comfort level or baseline comfort level  Description: Interventions:  - Encourage patient to monitor pain and request assistance  - Assess pain using appropriate pain scale  - Administer analgesics as ordered based on type and severity of pain and evaluate response  - Implement non-pharmacological measures as appropriate and evaluate response  - Consider cultural and social influences on pain and pain management  - Notify physician/advanced practitioner if interventions unsuccessful or patient reports new pain  - Educate patient/family on pain management process including their role and importance of  reporting pain   - Provide non-pharmacologic/complimentary pain relief interventions  Outcome: Progressing     Problem: INFECTION - ADULT  Goal: Absence or prevention of progression during hospitalization  Description: INTERVENTIONS:  - Assess and monitor for signs and symptoms of infection  - Monitor lab/diagnostic results  - Monitor all insertion sites, i.e. indwelling lines, tubes, and drains  - Monitor endotracheal if appropriate and nasal secretions for changes in amount and color  - [place] appropriate cooling/warming therapies per order  - Administer medications as ordered  - Instruct and encourage patient and family to use good hand hygiene technique  - Identify and instruct in appropriate isolation precautions for identified infection/condition  Outcome: Progressing  Goal: Absence of fever/infection during neutropenic period  Description: INTERVENTIONS:  - Monitor WBC  - Perform strict hand hygiene  - Limit to healthy visitors only  - No plants, dried, fresh or silk flowers with chadwick in patient room  Outcome: Progressing     Problem: SAFETY ADULT  Goal: Patient will remain free of falls  Description: INTERVENTIONS:  - Educate patient/family on patient safety including physical limitations  - Instruct patient to call for assistance with activity   -  Consider consulting OT/PT to assist with strengthening/mobility based on AM PAC & JH-HLM score  - Consult OT/PT to assist with strengthening/mobility   - Keep Call bell within reach  - Keep bed low and locked with side rails adjusted as appropriate  - Keep care items and personal belongings within reach  - Initiate and maintain comfort rounds  - Make Fall Risk Sign visible to staff  - Offer Toileting every  Hours, in advance of need  - Initiate/Maintain alarm  - Obtain necessary fall risk management equipment:  - Apply yellow socks and bracelet for high fall risk patients  - Consider moving patient to room near nurses station  Outcome: Progressing  Goal: Maintain or return to baseline ADL function  Description: INTERVENTIONS:  -  Assess patient's ability to carry out ADLs; assess patient's baseline for ADL function and identify physical deficits which impact ability to perform ADLs (bathing, care of mouth/teeth, toileting, grooming, dressing, etc.)  - Assess/evaluate cause of self-care deficits   - Assess range of motion  - Assess patient's mobility; develop plan if impaired  - Assess patient's need for assistive devices and provide as appropriate  - Encourage maximum independence but intervene and supervise when necessary  - Involve family in performance of ADLs  - Assess for home care needs following discharge   - Consider OT consult to assist with ADL evaluation and planning for discharge  - Provide patient education as appropriate  - Monitor functional capacity and physical performance, use of AM PAC & JH-HLM   - Monitor gait, balance and fatigue with ambulation    Outcome: Progressing  Goal: Maintains/Returns to pre admission functional level  Description: INTERVENTIONS:  - Perform AM-PAC 6 Click Basic Mobility/ Daily Activity assessment daily.  - Set and communicate daily mobility goal to care team and patient/family/caregiver.   - Collaborate with rehabilitation services on mobility goals if consulted  -  Perform Range of Motion  times a day.  - Reposition patient every  hours.  - Dangle patient  times a day  - Stand patient  times a day  - Ambulate patient  times a day  - Out of bed to chair  times a day   - Out of bed for meals  times a day  - Out of bed for toileting  - Record patient progress and toleration of activity level   Outcome: Progressing     Problem: DISCHARGE PLANNING  Goal: Discharge to home or other facility with appropriate resources  Description: INTERVENTIONS:  - Identify barriers to discharge w/patient and caregiver  - Arrange for needed discharge resources and transportation as appropriate  - Identify discharge learning needs (meds, wound care, etc.)  - Arrange for interpretive services to assist at discharge as needed  - Refer to Case Management Department for coordinating discharge planning if the patient needs post-hospital services based on physician/advanced practitioner order or complex needs related to functional status, cognitive ability, or social support system  Outcome: Progressing     Problem: Knowledge Deficit  Goal: Patient/family/caregiver demonstrates understanding of disease process, treatment plan, medications, and discharge instructions  Description: Complete learning assessment and assess knowledge base.  Interventions:  - Provide teaching at level of understanding  - Provide teaching via preferred learning methods  Outcome: Progressing     Problem: Prexisting or High Potential for Compromised Skin Integrity  Goal: Skin integrity is maintained or improved  Description: INTERVENTIONS:  - Identify patients at risk for skin breakdown  - Assess and monitor skin integrity including under and around medical devices   - Assess and monitor nutrition and hydration status  - Monitor labs  - Assess for incontinence   - Turn and reposition patient  - Assist with mobility/ambulation  - Relieve pressure over surinder prominences   - Avoid friction and shearing  - Provide appropriate hygiene  as needed including keeping skin clean and dry  - Evaluate need for skin moisturizer/barrier cream  - Collaborate with interdisciplinary team  - Patient/family teaching  - Consider wound care consult    Assess:  - Review Yosef scale daily  - Clean and moisturize skin every   - Inspect skin when repositioning, toileting, and assisting with ADLS  - Assess under medical devices such as  every   - Assess extremities for adequate circulation and sensation     Bed Management:  - Have minimal linens on bed & keep smooth, unwrinkled  - Change linens as needed when moist or perspiring  - Avoid sitting or lying in one position for more than  hours while in bed?Keep HOB at degrees   - Toileting:  - Offer bedside commode  - Assess for incontinence every   - Use incontinent care products after each incontinent episode such as     Activity:  - Mobilize patient  times a day  - Encourage activity and walks on unit  - Encourage or provide ROM exercises   - Turn and reposition patient every  Hours  - Use appropriate equipment to lift or move patient in bed  - Instruct/ Assist with weight shifting every  when out of bed in chair  - Consider limitation of chair time  hour intervals    Skin Care:  - Avoid use of baby powder, tape, friction and shearing, hot water or constrictive clothing  - Relieve pressure over bony prominences using   - Do not massage red bony areas    Next Steps:  - Teach patient strategies to minimize risks such as   - Consider consults to  interdisciplinary teams such as   Outcome: Progressing     Problem: PAIN - ADULT  Goal: Verbalizes/displays adequate comfort level or baseline comfort level  Description: Interventions:  - Encourage patient to monitor pain and request assistance  - Assess pain using appropriate pain scale  - Administer analgesics as ordered based on type and severity of pain and evaluate response  - Implement non-pharmacological measures as appropriate and evaluate response  - Consider cultural  and social influences on pain and pain management  - Notify physician/advanced practitioner if interventions unsuccessful or patient reports new pain  - Educate patient/family on pain management process including their role and importance of  reporting pain   - Provide non-pharmacologic/complimentary pain relief interventions  Outcome: Progressing     Problem: INFECTION - ADULT  Goal: Absence or prevention of progression during hospitalization  Description: INTERVENTIONS:  - Assess and monitor for signs and symptoms of infection  - Monitor lab/diagnostic results  - Monitor all insertion sites, i.e. indwelling lines, tubes, and drains  - Monitor endotracheal if appropriate and nasal secretions for changes in amount and color  - [place] appropriate cooling/warming therapies per order  - Administer medications as ordered  - Instruct and encourage patient and family to use good hand hygiene technique  - Identify and instruct in appropriate isolation precautions for identified infection/condition  Outcome: Progressing  Goal: Absence of fever/infection during neutropenic period  Description: INTERVENTIONS:  - Monitor WBC  - Perform strict hand hygiene  - Limit to healthy visitors only  - No plants, dried, fresh or silk flowers with [place] in patient room  Outcome: Progressing     Problem: SAFETY ADULT  Goal: Patient will remain free of falls  Description: INTERVENTIONS:  - Educate patient/family on patient safety including physical limitations  - Instruct patient to call for assistance with activity   - Consider consulting OT/PT to assist with strengthening/mobility based on AM PAC & JH-HLM score  - Consult OT/PT to assist with strengthening/mobility   - Keep Call bell within reach  - Keep bed low and locked with side rails adjusted as appropriate  - Keep care items and personal belongings within reach  - Initiate and maintain comfort rounds  - Make Fall Risk Sign visible to staff  - Offer Toileting every  Hours, in  advance of need  - Initiate/Maintain alarm  - Obtain necessary fall risk management equipment:   - Apply yellow socks and bracelet for high fall risk patients  - Consider moving patient to room near nurses station  Outcome: Progressing  Goal: Maintain or return to baseline ADL function  Description: INTERVENTIONS:  -  Assess patient's ability to carry out ADLs; assess patient's baseline for ADL function and identify physical deficits which impact ability to perform ADLs (bathing, care of mouth/teeth, toileting, grooming, dressing, etc.)  - Assess/evaluate cause of self-care deficits   - Assess range of motion  - Assess patient's mobility; develop plan if impaired  - Assess patient's need for assistive devices and provide as appropriate  - Encourage maximum independence but intervene and supervise when necessary  - Involve family in performance of ADLs  - Assess for home care needs following discharge   - Consider OT consult to assist with ADL evaluation and planning for discharge  - Provide patient education as appropriate  - Monitor functional capacity and physical performance, use of AM PAC & JH-HLM   - Monitor gait, balance and fatigue with ambulation    Outcome: Progressing  Goal: Maintains/Returns to pre admission functional level  Description: INTERVENTIONS:  - Perform AM-PAC 6 Click Basic Mobility/ Daily Activity assessment daily.  - Set and communicate daily mobility goal to care team and patient/family/caregiver.   - Collaborate with rehabilitation services on mobility goals if consulted  - Perform Range of Motion  times a day.  - Reposition patient every  hours.  - Dangle patient  times a day  - Stand patient  times a day  - Ambulate patient  times a day  - Out of bed to chair  times a day   - Out of bed for meals  times a day  - Out of bed for toileting  - Record patient progress and toleration of activity level   Outcome: Progressing     Problem: DISCHARGE PLANNING  Goal: Discharge to home or other  facility with appropriate resources  Description: INTERVENTIONS:  - Identify barriers to discharge w/patient and caregiver  - Arrange for needed discharge resources and transportation as appropriate  - Identify discharge learning needs (meds, wound care, etc.)  - Arrange for interpretive services to assist at discharge as needed  - Refer to Case Management Department for coordinating discharge planning if the patient needs post-hospital services based on physician/advanced practitioner order or complex needs related to functional status, cognitive ability, or social support system  Outcome: Progressing     Problem: Nutrition/Hydration-ADULT  Goal: Nutrient/Hydration intake appropriate for improving, restoring or maintaining nutritional needs  Description: Monitor and assess patient's nutrition/hydration status for malnutrition. Collaborate with interdisciplinary team and initiate plan and interventions as ordered.  Monitor patient's weight and dietary intake as ordered or per policy. Utilize nutrition screening tool and intervene as necessary. Determine patient's food preferences and provide high-protein, high-caloric foods as appropriate.     INTERVENTIONS:  - Monitor oral intake, urinary output, labs, and treatment plans  - Assess nutrition and hydration status and recommend course of action  - Evaluate amount of meals eaten  - Assist patient with eating if necessary   - Allow adequate time for meals  - Recommend/ encourage appropriate diets, oral nutritional supplements, and vitamin/mineral supplements  - Order, calculate, and assess calorie counts as needed  - Recommend, monitor, and adjust tube feedings and TPN/PPN based on assessed needs  - Assess need for intravenous fluids  - Provide specific nutrition/hydration education as appropriate  - Include patient/family/caregiver in decisions related to nutrition  Outcome: Progressing

## 2025-07-08 NOTE — ASSESSMENT & PLAN NOTE
-Examines like bicipital tendinosis with anterior pain, positive speeds, yergason as well as possible rotator cuff pathology with positive Fitzgerald  -Could also have other rotator cuff involvement with positive neers (not +7/8), mild external rotation weakness.  -Family reports chronic L shoulder issues but worse since the fall.  Patient reports no significant history of any repetitive use.  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  -6/24 XR without acute osseous abnormality   > Plan for outpatient MRI  > Considerations in therapy   > [place] QID  > Will start ibuprofen 400 mg as needed every 6 hours for breakthrough left shoulder pain.

## 2025-07-08 NOTE — TEAM CONFERENCE
Acute RehabilitationTeam Conference Note  Date: 7/8/2025   Time: 9:13 AM       Patient Name:  Aquilino Dowell       Medical Record Number: [id number]   YOB: 1960  Sex: Male          Room/Bed:  [place] [address]/[place] [address]-01  Payor Info:  Payor: BLUE CROSS / Plan: CAPITAL BC PLAN 361 / Product Type: Blue Fee for Service /      Admitting Diagnosis: Sepsis (HCC) [A41.9]   Admit Date/Time:  7/2/2025  2:41 PM  Admission Comments: No comment available     Primary Diagnosis:  Peripheral neuropathy  Principal Problem: Peripheral neuropathy    Patient Active Problem List    Diagnosis Date Noted    GERD (gastroesophageal reflux disease) 07/07/2025    Nausea & vomiting 07/04/2025    Anemia 07/04/2025    Lung nodule 07/02/2025    Peripheral neuropathy 07/02/2025    At risk for venous thromboembolism (VTE) 07/02/2025    Tobacco use disorder 07/02/2025    Depression 07/02/2025    Diabetic retinopathy (HCC) 07/02/2025    Vitamin D deficiency 06/29/2025    Constipation 06/27/2025    Back pain 06/26/2025    Hyperbilirubinemia 06/26/2025    Sepsis (HCC) 06/25/2025    Polymicrobial bacteremia 06/25/2025    Fall 06/24/2025    Left shoulder pain 06/24/2025    Coronary artery disease 06/24/2025    Diabetes (HCC) 06/24/2025    Alcohol use disorder 06/24/2025    Hypertension 06/24/2025    Sinusitis 06/24/2025       Physical Therapy:    Weight Bearing Status: Full Weight Bearing  Transfers: Incidental Touching  Bed Mobility: Supervision  Amulation Distance (ft): 120 feet  Ambulation: Incidental Touching  Assistive Device for Ambulation: Roller Walker (RW & SPC)  Number of Stairs: 4  Assistive Device for Stairs: Right Hand Rail (R HR & SPC)  Stair Assistance: Minimal Assistance  Ramp: Minimal Assistance  Assistive Device for Ramp: Roller Walker  Discharge Recommendations: Home Independently    Aquilino was admitted to the [facility] on 6/24/25 w/ debility & sepsis. PTA pt lives in [facility] and was independent utilizing [facility] for community  ambulation. Currently pt requires supervision for bed mobility, CGA for STSs, SPTs, and ambulation; minAx1 for stair negotiation. At this time barriers include inaccessible home environment and decreased caregiver support. Pt remains limited by decreased strength, range of motion, endurance, impaired balance, decreased coordination, decreased safety awareness and impaired sensation. Interventions currently focused on progressive ambulation, stair negotiation, functional transfers, AD determination between RW & SPC, dynamic and static balance, righting reactions, LE muscle strength and safety awareness. Pt has demonstrated improvements since initial evaluation in regards to assistance level required and overall activity tolerance, pt projects to progress home with OP PT. Goals are for pt to reach Independent to close supervision goals, estimated length of stay of 10 days.     Occupational Therapy:  Eating: Independent  Grooming: Incidental Touching  Bathing: Minimal Assistance  Bathing: Minimal Assistance  Upper Body Dressing: Supervision  Lower Body Dressing: Incidental Touching  Toileting: Minimal Assistance  Toilet Transfer: Incidental Touching, Minimal Assistance  Cognition: Exceptions to WNL  Cognition: Decreased Memory, Decreased Safety  Orientation: Person, Place, Time, Situation  Discharge Recommendations:  (pending progress)       7/7/25  64 y.o. male admission to [facility] for Debility from Sepsis s/p fall at home. Prior to admission Pt was completing ADLs at independent with use of SPC. Pt does not complete IADls at home, wife completes. Pt lives with lives with their spouse and is able to provide physical assistance at time of discharge. Pt reports 1 recent falls in the last 6 months. Personal factors affecting the patient at this time include: co morbidities/Other health conditions, Coping abilities, Habits and past and current behavioral patterns, and Pain. Pt is currently limited due to the following  deficits impacting occupational performance, Higher level cognitive functions (Judgment, executive functions, praxis, cognitive flexibility, insight), Memory , Impaired standing balance, Impaired righting reactions, impaired Gross motor control, impaired Fine motor control, Impaired UE AROM, Impaired UE strength, limited activity tolerance, Pain, and severe sensory deficits in b/l LE . The patient has shown a decline from prior level of function. Recommend skilled OT services for I/ADL retraining to support the ability to safely participate in daily occupations safely and independently in the most least restrictive way. Pt demonstrates Good rehab potential to meet LTG's of independent with assistive device with use of rolling walker vs SPC. Anticipate 10day(s) length of stay. Occupational Therapy plan of care will address the following areas: ADL re-training, LHAE training, fxnl xfers, standing tolerance, standing balance, UE strengthening, UE endurance, FMC/GMC, FMS/GMS, DME training/education, family training/education, EC techniques/education, healthy coping education, leisure pursuits, and body awareness. Pt currently completing [facility] at [facility].    Speech Therapy:           No notes on file    Nursing Notes:  Appetite: Fair  Diet Type: Diabetic                      Diet Patient/Family Education Complete: No                            Bladder: Continent     Bladder Patient/Family Education: No  Bowel: Continent     Bowel Patient/Family Education: No  Pain Location/Orientation: Orientation: Left, Location: Shoulder  Pain Score: 8                       Hospital Pain Intervention(s): Medication (See MAR), Rest  Pain Patient/Family Education: No  Medication Management/Safety  Injectable: Insulin (lovenox)  Safe Administration: No  Reason for non-safe administration: will need to be evaluated before discharge  Medication Patient/Family Education Complete: No    Aquilino Dowell is a 64 y.o. male with a  PMH of DM, HTN, CAD who presented to the [facility] after a fall at home. He was found to have sepsis/bacteremia on admission. He is now being admitted to [facility] for rehab.    This week we will work on increasing independence with ADL's, monitor labs and vital signs, and educate patient on prevention of skin breakdown. We will perform skin checks and monitor patient for constipation. We will monitor pain and educate patient on pain management. We will increase safety awareness and keep patient free from falls.    7/8: Alarmed for safety. Baseline confusion. Metformin on hold due to elevated LFT's & ETOH use. Blood sugars have been running high since off of metformin. Remain on lantus 5 units at night - plan to restart metformin. CMP ordered for 7/8. Hemoglobin stable at 9.8. Complains of L shoulder pain - maintained with scheduled bengay & PRN robaxin and oxycodone. Continent of bowel and bladder. Last bowel movement was 7/6. Min Ax1 with RW.     Case Management:     Discharge Planning  Living Arrangements: Lives w/ Spouse/significant other  Support Systems: Self, Spouse/significant other  Assistance Needed: TBD  Type of Current Residence: Other (Comment) (r[place])  Current Home Care Services: No  Pt admitted s/p sepsis and is experiencing generalized weakness. Pt is participating in therapy and is expected to return home. Pt has a supportive wife who can assist as needed. Following for continued stay review and dc planning needs.     Is the patient actively participating in therapies? yes  List any modifications to the treatment plan:     Barriers Interventions   Depression, declining medication Coping education,    Poor po intake Nutrition following, recommend smaller more frequent meals   Steps to ener Therapy stair training, le strengthening exercises   Activity tolerance endurance Endurance training, nustep, energy conservation education   Righting reactions AD training family education,  neuro re ed     Is the patient making expected progress toward goals? yes  List any update or changes to goals:     Medical Goals: Patient will be medically stable for discharge to [facility] restrictive envrionment upon completion of rehab program and Patient will be able to manage medical conditions and comorbid conditions with medications and follow up upon completion of rehab program    Weekly Team Goals:   Rehab Team Goals  ADL Team Goal: Patient will be independent with ADLs with least restrictive device upon completion of rehab program  Transfer Team Goal: Patient will be independent with transfers with least restrictive device upon completion of rehab program  Locomotion Team Goal: Patient will require supervision with locomotion with least restrictive device upon completion of rehab program    Discussion: pt presents with the above barriers and in addition le decreased reach and rom, introduction to lhae has begun. Pt is min a overall with adls and contact guard for transfers and mobility. Pt is currently using a roller walker but will be trialed with spc for dc. Pt is min a on stairs. Family education to be completed on pts level of assist. Goals are for independent on dc with the appropriate AD identified with progression. Recommendations are for contd outpt pt and ot. Site tbd as OT needs are for shoulder rom.    Anticipated Discharge Date:  7/14/25  [facility] Team Members Present:The following team members are supervising care for this patient and were present during this Weekly Team Conference.    Physician: Dr. DePadua, MD  : Anni Tay MSW  Registered Nurse: Nava Cantu RN  Physical Therapist: Nelly Vegas DPT  Occupational Therapist: Chiara Morales MS, OTR/L  Speech Therapist:

## 2025-07-08 NOTE — ASSESSMENT & PLAN NOTE
Lab Results   Component Value Date    HGBA1C 8.0 (H) 06/24/2025     Recent Labs     07/07/25  2051 07/08/25  0621 07/08/25  1020 07/08/25  1535   POCGLU 207* 141* 268* 146*     Home: Metformin 1000mg daily. In hospital he was on lantus 6U daily while the home metformin was on hold  7/4- metformin on hold due to elevated LFT's and ETOH use.  Could consider an alternative PO medication but it appears he was on the metformin for quite a while at home so hesitant to change his home regimen at this point as his A1c from 2024 was 6.6. will continue to monitor LFT's for now and consider resuming metformin if able  LFTs may also be increased secondary to Lipitor.  Will decrease dose to 10 mg daily.  7/7- blood sugars have been running a bit high as he is off his home metformin and has not been on scheduled lantus since arriving to [facility]. For now will resume lantus 5U qHS for better blood sugar control until hopefully we can resume his metformin, repeat CMP ordered for 7/8

## 2025-07-08 NOTE — PROGRESS NOTES
07/08/25 1230   Pain Assessment   Pain Assessment Tool 0-10   Pain Score 3   Pain Location/Orientation Orientation: Left;Location: Shoulder   Restrictions/Precautions   Precautions Bed/chair alarms;Cognitive;Fall Risk;Pain;Supervision on toilet/commode;Visual deficit   Cognition   Overall Cognitive Status WFL   Arousal/Participation Alert;Cooperative   Attention Attends with cues to redirect   Orientation Level Oriented X4   Memory Decreased short term memory;Decreased recall of recent events;Decreased recall of precautions   Following Commands Follows one step commands with increased time or repetition   Sit to Stand   Type of Assistance Needed Supervision;Adaptive equipment   Comment CS with RW   Sit to Stand CARE Score 4   Bed-Chair Transfer   Type of Assistance Needed Incidental touching;Adaptive equipment   Comment CGA/CS with RW   Chair/Bed-to-Chair Transfer CARE Score 4   Walk 10 Feet   Type of Assistance Needed Incidental touching   Comment CGA with RW   Walk 10 Feet CARE Score 4   Walk 50 Feet with Two Turns   Type of Assistance Needed Incidental touching   Comment CGA with RW   Walk 50 Feet with Two Turns CARE Score 4   Walk 150 Feet   Comment limited by fatigue   Reason if not Attempted Safety concerns   Walk 150 Feet CARE Score 88   Ambulation   Primary Mode of Locomotion Prior to Admission Walk   Distance Walked (feet) 70 ft  (45 ft)   Assist Device Roller Walker;Cane   Gait Pattern Slow Vaishali;Decreased foot clearance;Improper weight shift;Shuffle   Limitations Noted In Balance;Coordination;Endurance;Safety;Speed;Swing;Strength   Provided Assistance with: Balance;Limb Advancement   Walk Assist Level Contact Guard   Does the patient walk? 2. Yes   Curb or Single Stair   Style negotiated Single stair   Type of Assistance Needed Physical assistance   Physical Assistance Level 25% or less   Comment Jacques/CGA to ascend/descend 6 inch curb step   1 Step (Curb) CARE Score 3   4 Steps   Type of Assistance  Needed Physical assistance   Physical Assistance Level 25% or less   Comment Jacqeus when fatigued otherwise mostly CGA level of assist. Trialed various methods, R handrail only for u/l UE support, bilateral UE support via R handrail, and R handrail L SPC. Pt wanting to hold onto bilateranl handrails although he doesn't have that set up at home. Pt reports he can install second handrail which would be a good long term option for patient   4 Steps CARE Score 3   Therapeutic Interventions   Balance Toe Taps with unilateral UE support 2x10-15   Equipment Use   NuStep Level 2 x 10 minutes   Other Comments   Comments With patient, call to wife to provide functional update and informed her DC set for 7/14. Both pt and wife state pt has eye injection appointment scheduled for Monday. Pt states he would like to DC before then. Message sent out to medical team about pt requesting earlier DC date likely over the weekend. Goal is for patient to reach modified [place] with RW, but may require Supervision for functional mobility and assist with ADLs intermittently based on performance during OT today. Wife is coming in for observation/family training tomorrow. To further discuss discharge and recommendations with wife tomorrow.   Assessment   Treatment Assessment Patient participated in 90 minute skilled physical therapy session focusing on assessing RW vs SPC, stair management, and TE/TA to improve overall activity tolerance. Patient very fatigues this session requiring frequent rest breaks. Due to flucutating level of fatigue recommending use of RW at DC and NOT SPC. Patient appeared depressed/down. Goal is for patient to reach mod I with RW prior to DC home but given poor activity tolerance may require Supervision with RW. Education provided on energy conservation techniques. Tomorrow plan is to complete family training with wife   Problem List Decreased strength;Decreased endurance;Impaired balance;Decreased  mobility;Decreased cognition;Impaired vision   PT Barriers   Functional Limitation Car transfers;Stair negotiation;Standing;Transfers;Walking   Plan   Treatment/Interventions Functional transfer training;LE strengthening/ROM;Therapeutic exercise;Endurance training;Patient/family training;Equipment eval/education;Bed mobility;Gait training   Progress Slow progress, decreased activity tolerance   PT Therapy Minutes   PT Time In 1230   PT Time Out 1400   PT Total Time (minutes) 90   PT Mode of treatment - Individual (minutes) 90   PT Mode of treatment - Concurrent (minutes) 0   PT Mode of treatment - Group (minutes) 0   PT Mode of treatment - Co-treat (minutes) 0   PT Mode of Treatment - Total time(minutes) 90 minutes   PT Cumulative Minutes 450     Nelly Vegas, PT, DPT

## 2025-07-08 NOTE — PROGRESS NOTES
"   07/08/25 0830   Pain Assessment   Pain Assessment Tool FLACC   Pain Rating: FLACC (Rest) - Face 1   Pain Rating: FLACC (Rest) - Legs 1   Pain Rating: FLACC (Rest) - Activity 1   Pain Rating: FLACC (Rest) - Cry 1   Pain Rating: FLACC (Rest) - Consolability 1   Score: FLACC (Rest) 5   Pain Rating: FLACC (Activity) - Face 1   Pain Rating: FLACC (Activity) - Legs 1   Pain Rating: FLACC (Activity) - Activity 2   Pain Rating: FLACC (Activity) - Cry 1   Pain Rating: FLACC (Activity) - Consolability 1   Score: FLACC (Activity) 6   Restrictions/Precautions   Precautions Bed/chair alarms;Cognitive;Fall Risk;Pain;Supervision on toilet/commode   Braces or Orthoses   (B ACE wraps)   Lifestyle   Autonomy \"The pain has me feeling a bit depressed. I can't do anything like how I used to.\"   Oral Hygiene   Type of Assistance Needed Set-up / clean-up   Comment seated at sink 2' fatigue and pain this morning   Oral Hygiene CARE Score 5   Grooming   Findings RIVERS seated   Shower/Bathe Self   Type of Assistance Needed Physical assistance   Physical Assistance Level 25% or less   Comment Trialed full shower today. At baseline pt reports he stands for shower, but does have access to shower chair at home. Pt also reported feeling nervous for shower but w/ questioning from OT was unable to clearly state why. D/t safety concerns related to dec balance, insight, endurance pt completed shower primarily seated on tub bench w/ HHSH. Pt w/ diffiuculty completed cross leg for LE washing 2' legs feeling \"heavy\" and unable to use UE to lift BLE repeatedly 2' shoulder pain. Assist for BLE feet, CGA in stance for CM over hips   Shower/Bathe Self CARE Score 3   Bathing   Assessed Bath Style Shower   Tub/Shower Transfer   Findings Petra SPT in<>out of shower to tub bench w/ cues for tech   Upper Body Dressing   Type of Assistance Needed Physical assistance   Physical Assistance Level 25% or less   Comment seated, Petra 2' shoulder pain   Upper Body " Dressing CARE Score 3   Lower Body Dressing   Type of Assistance Needed Physical assistance   Physical Assistance Level 26%-50%   Comment 2' pain required assist to thread BLE into underwear and shorts, Jacques in stance for CM over hips   Lower Body Dressing CARE Score 3   Putting On/Taking Off Footwear   Type of Assistance Needed Physical assistance   Physical Assistance Level 76% or more   Comment pt struggled to remove BLE w/ cross leg tech seated. Observed inc difficulty d/t overgrown toenails, required assist for donning ACE wraps and  socks. Discussed this w/ MD Depadua and Anupam regarding a podiatry consult to inc pts IND w/ footwear tasks   Putting On/Taking Off Footwear CARE Score 2   Lying to Sitting on Side of Bed   Type of Assistance Needed Supervision   Lying to Sitting on Side of Bed CARE Score 4   Sit to Stand   Type of Assistance Needed Incidental touching   Comment CGA to RW   Sit to Stand CARE Score 4   Toileting Hygiene   Type of Assistance Needed Supervision   Comment CS to void in stance at toilet w/ GB for steadying assist   Toileting Hygiene CARE Score 4   Toilet Transfer   Comment stood to void   Cognition   Overall Cognitive Status WFL   Arousal/Participation Alert;Cooperative   Attention Attends with cues to redirect   Orientation Level Oriented X4   Memory Decreased short term memory;Decreased recall of recent events;Decreased recall of precautions   Following Commands Follows one step commands with increased time or repetition   Comments appears down, noted inc pain this date, fearful of shower but did complete w/ encouragement   Activity Tolerance   Activity Tolerance Patient limited by fatigue;Patient limited by pain   Medical Staff Made Aware (S)  MD Depadua and Anupam present briefly during session to speak w/ pt, discussed his pain, fxnl status and completed BUE assessment. MD Depadua advising L shoulder pain w/ suspected rotator cuff and bicep tendon involvement, encouraging  gentle ROM and strengthening in upcoming sessions.   Assessment   Treatment Assessment OT session focusing on ADL retraining including shower routine, transfers, discussion w/ MDs regarding ongoing shoulder pain (which is also chronic.) Pt more limited by pain and fatigue this session compared to previous. Pt continues to requrie skilled hands on assist in order to maintain his safety. OT to continue POC.   Prognosis Good   Problem List Decreased strength;Decreased range of motion;Decreased endurance;Impaired balance;Decreased mobility;Decreased coordination;Impaired judgement;Impaired vision;Impaired sensation;Impaired tone;Decreased skin integrity;Pain   Plan   Treatment/Interventions ADL retraining;Functional transfer training;Therapeutic exercise;Endurance training;Equipment eval/education;Patient/family training;Compensatory technique education;Continued evaluation;Spoke to MD   Progress Slow progress, decreased activity tolerance   OT Therapy Minutes   OT Time In 0830   OT Time Out 1000   OT Total Time (minutes) 90   OT Mode of treatment - Individual (minutes) 90   OT Mode of treatment - Concurrent (minutes) 0   OT Mode of treatment - Group (minutes) 0   OT Mode of treatment - Co-treat (minutes) 0   OT Mode of Treatment - Total time(minutes) 90 minutes   OT Cumulative Minutes 450   Therapy Time missed   Time missed? No

## 2025-07-08 NOTE — ASSESSMENT & PLAN NOTE
Lab Results   Component Value Date    HGBA1C 8.0 (H) 06/24/2025       Recent Labs     07/07/25  1042 07/07/25  1542 07/07/25 2051 07/08/25  0621   POCGLU 258* 154* 207* 141*       Blood Sugar Average: Last 72 hrs:  (P) [id number]: Metformin 1000mg daily  -Unclear if family checks BG at home or if he has a kit.   >Here: Lantus 6 units QHS, CDI/Accuchecks  >Goal to transition back to home regimen.   >Diabetic Diet  >Management as per IM

## 2025-07-08 NOTE — PROGRESS NOTES
Progress Note - PMR   Name: Aquilino Dowell 64 y.o. 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   - Suspect component also related to T2DM  - Has impaired vision as well  - Has had a history of Vitamin B12 deficiency previously contributing as well (6/25 level 940)  - Also has history of alcohol use disorder  - Unclear etiology of fall per patient.   > Ambulatory dysfunction I suspect is driven by progression of his neuropathy in addition to worsening vision impacting his ability to compensate  > PT/OT 3-5 hours/day, 5-7 days/week.  > Also has a component of deconditioning and worsening proximal weakness related to increasingly sedentary lifestyle (this is 2/2 I suspect to some depression since losing his job in November).   > Gabapentin 100mg QHS for shooting pains (higher doses made him too lethargic)  > Alcohol cessation as that can progress neuropathy  > Continue B12 supplement, optimize diabetes management.   > Check orthostatics here   > Recommend f/u with Neuromuscular Neurology   Sepsis (HCC)  Largely resolved   Found to have occult bacteremia.  S/p 7 day abx  Hemodynamically stable. Monitor.   Left shoulder pain  -Examines like bicipital tendinosis with anterior pain, positive speeds, yergason as well as possible rotator cuff pathology with positive Fitzgerald  -Could also have other rotator cuff involvement with positive neers (not +7/8), mild external rotation weakness.  -Family reports chronic L shoulder  issues but worse since the fall.  Patient reports no significant history of any repetitive use.  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  -6/24 XR without acute osseous abnormality   > Plan for outpatient MRI  > Considerations in therapy   > Bengay QID  > Will start ibuprofen 400 mg as needed every 6 hours for breakthrough left shoulder pain.  Constipation  Last BM 7/3  >Continue miralax daily and senna PRN, suppository PRN (Last adjusted 7/7/2025, senna now as needed)  >Monitor and adjust as appropriate  At risk for venous thromboembolism (VTE)  >Lovenox, SCDs, ambulation  >Will not go home on lovenox.  >Monitor platelet count closely.   Depression  -Has lost interests he previously had, sleep has been poor, energy levels/motivation poor, feelings of guilt and frustration.   -Appetite worse  -All since he lost his job in November.  -Wife has noticed a sharp decline  -Denies suicidal ideation/self-harm.  > Discussed trial of SNRI (cymbalta may also help with pain)  > He would like to defer for now.   > Referral to Psych at discharge   Back pain  -Denies any back pain for me  -Findings on Mri L-Spine would not explain LE symptoms  >Monitor   >Continue current pain management   Nausea & vomiting  7/3 with episode of N/V late in the day with sinus tachycardia to the 130s  - Transient AMS  - Had large BM in the AM  - AM dose of metoprolol had not been giving  > Got metoprolo and zofran  > Given a gentle IVF bolus for any dehydration  > Repaet CMP today unremarkable with improved LFTs. Ammonia 61  > CBC with WBC WNL, but hgb down to 9.8 (after getting IVF)    - Recheck in the AM  > Mag WNL  > CTH unremarkable, save for some possible sinusitis  > Monitor for now.   Diabetic retinopathy (HCC)  -R eye with fully impaired vision/blind  -L eye with retinopathy and gets injections.   - Next due 7/14  -This likely is contributing to his imbalance in the setting of his peripheral neuropathy  Coronary artery  disease  -S/p stent/cath 2013  -Home: Prasugrel 10mg daily, ASA 81mg daily   >Here: Same   Diabetes (HCC)  Lab Results   Component Value Date    HGBA1C 8.0 (H) 06/24/2025       Recent Labs     07/07/25  1042 07/07/25  1542 07/07/25  2051 07/08/25  0621   POCGLU 258* 154* 207* 141*       Blood Sugar Average: Last 72 hrs:  (P) [id number]: Metformin 1000mg daily  -Unclear if family checks BG at home or if he has a kit.   >Here: Lantus 6 units QHS, CDI/Accuchecks  >Goal to transition back to home regimen.   >Diabetic Diet  >Management as per IM  Alcohol use disorder  -Denies history of withdrawal  >Cessation counseling  Hypertension  -Home: Toprol 50mg BID  >Here: Same  >Monitor and adjust as per IM  Sinusitis  >Flonase, claritin  Hyperbilirubinemia  -And chronic transaminitis  -In setting of alcohol use disorder  -RUQ US with steatosis and hepatomegaly  > Counseled on cessation  > minimize hepatotoxic meds  > Outpatient f/u with GI recommended  Vitamin D deficiency  -6/27 12.8  >Ergocalciferol weekly for 12 weeks  >Repeat with PCP   Lung nodule  -Noted incidentally on imaging  >Recommended for f/u chest CT in 12 months  Tobacco use disorder  -Quit smoking in the 90s  -Now chewing tobacco.  >Nicotine patch  >Cessation counseling.   Anemia    GERD (gastroesophageal reflux disease)      Health Maintenance  #Delirium/Sleep: At risk. Optimize sleep/wake, pain, bowel, bladder management. Avoid deliriogenic meds and physical restraint. Environmental/behavioral interventions.   #Bladder: Voiding and Continent  #Skin/Pressure Injury Prevention: Turn Q2hr in bed, with weight shifts [id number] in wheelchair. Float heels in bed.  #GI Prophylaxis: Not indicated  #Code Status: Full Code  #FEN: Diabetic Diet   #Dispo: Teams 7/8, ADD 7/14 with OP PT/OT                 > F/U PCP, Neuromuscular, Orthopedics    Chief Complaint: Left shoulder pain.      Interval History/Subjective:  Patient seen and examined in the gym.  No  acute events overnight. Vital signs reviewed and were stable overnight.  Patient reports worsening of left shoulder pain today.  Additionally reports no for other issues of nausea upon eating.  Seemed agreeable to smaller meals with more frequency.  Patient reports no other acute issues or concerns overnight, including fevers, chills, chest pain, shortness of breath. Adequate urine output overnight w/ continent voids. Last BM Date: 07/06/25, continent.     Functional Update:   PT: Petra transfers, Sup bed mobility, CGA car transfers, incidental touching ambulation 120'  OT: Independent eating, supervision UB dressing, incidental touching LB dressing, min assist toileting    Objective     Temp:  [97.8 °F (36.6 °C)-98.7 °F (37.1 °C)] 97.8 °F (36.6 °C)  HR:  [] 91  Resp:  [16-18] 17  BP: (111-150)/(53-83) 150/80  SpO2:  [91 %-98 %] 98 %    GENERAL: alert, no apparent distress, cooperative, and comfortable  HEENT:  Normocephalic, no lesions, without obvious abnormality.  CARDIAC:  regular rate and rhythm, S1, S2 normal, no murmur, click, rub or gallop  LUNGS:  no abnormal respiratory pattern, no retractions noted, non-labored breathing   ABDOMEN:  soft, non-tender, non-distended   EXTREMITIES:  extremities normal, warm and well-perfused; no edema  NEURO:  clear speech, following all commands, oriented x4  PSYCH:  Normal and appropriate affect  MSK (L SHOULDER):  Neer's- negative  Fitzgerald-positive  Scarf-negative  Full can-negative  Yergason's-positive    Physical examination is otherwise unchanged from previous encounter, except as noted above.    Scheduled Meds:  Current Facility-Administered Medications   Medication Dose Route Frequency Provider Last Rate    acetaminophen  650 mg Oral Q8H PRN Ashley Depadua, MD      aspirin  81 mg Oral Daily Nadiya Shah PA-C      atorvastatin  20 mg Oral Daily Nadiya Shah PA-C      benzonatate  200 mg Oral TID PRN Nadiya Shah PA-C      bisacodyl  10 mg Rectal Daily  PRN Nadiya Shah, PA-C      vitamin B-12  1,000 mcg Oral Daily Nadiyapawel Garberes, PA-C      enoxaparin  40 mg Subcutaneous Daily Nadiyapawel Garberes, PA-C      ergocalciferol  50,000 Units Oral Weekly Nadiyapawel Garberes, PA-C      famotidine  20 mg Oral BID Nadiya Shah, PA-C      fluticasone  2 spray Each Nare BID Nadiya Shah, PA-C      folic acid  1 mg Oral Daily Nadiyapawel Garberes, PA-C      gabapentin  100 mg Oral HS Nadiya Jcarloses, PA-C      insulin glargine  5 Units Subcutaneous HS Nadiya Garberes, PA-C      insulin lispro  1-5 Units Subcutaneous HS Nadiyapawel Garberes, PA-C      insulin lispro  1-6 Units Subcutaneous TID AC Nadiya Shah, PA-C      loratadine  10 mg Oral Daily Nadiya Shah, PA-C      menthol-methyl salicylate   Apply externally 4x Daily Nadiya Shah, PA-C      methocarbamol  500 mg Oral Q6H PRN Nadiya Shah, PA-C      metoprolol tartrate  50 mg Oral Q12H TERRENCE Nadiya Shah, PA-C      ondansetron  4 mg Oral Q6H PRN Rayne Da Silva, PA-C      ondansetron  4 mg Oral Once Aroldo Wyatt MD      oxyCODONE  2.5 mg Oral Q4H PRN Ashley Depadua, MD      Or    oxyCODONE  5 mg Oral Q4H PRN Ashley Depadua, MD      polyethylene glycol  17 g Oral Daily PRN Ashley Depadua, MD      prasugrel  10 mg Oral Daily Nadiya Shah, PA-C      senna  2 tablet Oral HS PRN Robert Bo MD      thiamine  100 mg Oral Daily Nadiya Shah, PA-C       Imaging: CTH with no acute intracranial pathology, but possibly L maxillary sinusitis.       Lab Results: I have reviewed the following results:  Results from last 7 days   Lab Units 07/08/25  0518 07/05/25  0559 07/04/25  0558   HEMOGLOBIN g/dL 10.9* 9.7* 9.8*   HEMATOCRIT % 34.2* 31.1* 30.7*   WBC Thousand/uL 5.64 5.28 5.84   PLATELETS Thousands/uL 143* 131* 143*     Results from last 7 days   Lab Units 07/08/25  0518 07/04/25  0558 07/03/25  1820   BUN mg/dL 5 7 8   SODIUM mmol/L 135 137 134*   POTASSIUM mmol/L 3.6 4.1 3.5   CHLORIDE mmol/L 103 106 101    CREATININE mg/dL 0.81 0.80 0.88   AST U/L 119* 113* 123*   ALT U/L 21 24 27       Results from last 7 days   Lab Units 07/04/25  0558   PROTIME seconds 13.7   INR  1.02        0658}      ** Please Note: Fluency Direct voice to text software may have been used in the creation of this document. **

## 2025-07-08 NOTE — CERTIFIED RECOVERY SPECIALIST
Certified  Note      Name: Aquilino Dowell 64 y.o. male I MRN: [id number]  Unit/Bed#: -01 I Date of Admission: 7/2/2025   Date of Service: 7/8/2025 I Hospital Day: 6    Summary of Contact   CRS met with patient to offer support for alcohol use. Patient said he used to have issue with alcohol but no longer drinks. CRS provided contact information,patient accepted.    Follow up: CRS signing off but remains available if needed for patient support                      '      Administrative Statements  I have spent a total time of 5 minutes in caring for this patient on the day of the visit/encounter.    Germaine Morel

## 2025-07-09 PROBLEM — I95.1 ORTHOSTATIC HYPOTENSION: Status: ACTIVE | Noted: 2025-07-09

## 2025-07-09 PROBLEM — L60.2 NAIL OVERGROWTH: Status: ACTIVE | Noted: 2025-07-09

## 2025-07-09 PROBLEM — R11.2 NAUSEA & VOMITING: Status: RESOLVED | Noted: 2025-07-04 | Resolved: 2025-07-09

## 2025-07-09 PROBLEM — A41.9 SEPSIS (HCC): Status: RESOLVED | Noted: 2025-06-25 | Resolved: 2025-07-09

## 2025-07-09 LAB
GLUCOSE SERPL-MCNC: 119 MG/DL (ref 65–140)
GLUCOSE SERPL-MCNC: 119 MG/DL (ref 65–140)
GLUCOSE SERPL-MCNC: 188 MG/DL (ref 65–140)
GLUCOSE SERPL-MCNC: 188 MG/DL (ref 65–140)
GLUCOSE SERPL-MCNC: 222 MG/DL (ref 65–140)
GLUCOSE SERPL-MCNC: 222 MG/DL (ref 65–140)
GLUCOSE SERPL-MCNC: 223 MG/DL (ref 65–140)
GLUCOSE SERPL-MCNC: 223 MG/DL (ref 65–140)
GLUCOSE SERPL-MCNC: 230 MG/DL (ref 65–140)
GLUCOSE SERPL-MCNC: 230 MG/DL (ref 65–140)

## 2025-07-09 PROCEDURE — 82948 REAGENT STRIP/BLOOD GLUCOSE: CPT

## 2025-07-09 PROCEDURE — 0HBRXZZ EXCISION OF TOE NAIL, EXTERNAL APPROACH: ICD-10-PCS | Performed by: PODIATRIST

## 2025-07-09 PROCEDURE — 97535 SELF CARE MNGMENT TRAINING: CPT

## 2025-07-09 PROCEDURE — 99221 1ST HOSP IP/OBS SF/LOW 40: CPT | Performed by: PODIATRIST

## 2025-07-09 PROCEDURE — 99232 SBSQ HOSP IP/OBS MODERATE 35: CPT | Performed by: PHYSICIAN ASSISTANT

## 2025-07-09 PROCEDURE — 97110 THERAPEUTIC EXERCISES: CPT

## 2025-07-09 PROCEDURE — 99232 SBSQ HOSP IP/OBS MODERATE 35: CPT | Performed by: PHYSICAL MEDICINE & REHABILITATION

## 2025-07-09 PROCEDURE — 11721 DEBRIDE NAIL 6 OR MORE: CPT | Performed by: PODIATRIST

## 2025-07-09 PROCEDURE — 97530 THERAPEUTIC ACTIVITIES: CPT

## 2025-07-09 RX ADMIN — INSULIN LISPRO 2 UNITS: 100 INJECTION, SOLUTION INTRAVENOUS; SUBCUTANEOUS at 17:57

## 2025-07-09 RX ADMIN — FOLIC ACID 1 MG: 1 TABLET ORAL at 08:31

## 2025-07-09 RX ADMIN — ASPIRIN 81 MG CHEWABLE TABLET 81 MG: 81 TABLET CHEWABLE at 08:31

## 2025-07-09 RX ADMIN — CYANOCOBALAMIN TAB 500 MCG 1000 MCG: 500 TAB at 08:31

## 2025-07-09 RX ADMIN — METOPROLOL TARTRATE 50 MG: 50 TABLET, FILM COATED ORAL at 21:16

## 2025-07-09 RX ADMIN — MUSCLE RUB CREAM: 100; 150 CREAM TOPICAL at 12:34

## 2025-07-09 RX ADMIN — SODIUM CHLORIDE 500 ML: 0.9 INJECTION, SOLUTION INTRAVENOUS at 20:29

## 2025-07-09 RX ADMIN — FLUTICASONE PROPIONATE 2 SPRAY: 50 SPRAY, METERED NASAL at 08:32

## 2025-07-09 RX ADMIN — FAMOTIDINE 20 MG: 20 TABLET, FILM COATED ORAL at 08:30

## 2025-07-09 RX ADMIN — THIAMINE HCL TAB 100 MG 100 MG: 100 TAB at 08:31

## 2025-07-09 RX ADMIN — ATORVASTATIN CALCIUM 10 MG: 10 TABLET, FILM COATED ORAL at 08:30

## 2025-07-09 RX ADMIN — INSULIN LISPRO 2 UNITS: 100 INJECTION, SOLUTION INTRAVENOUS; SUBCUTANEOUS at 21:18

## 2025-07-09 RX ADMIN — MUSCLE RUB CREAM: 100; 150 CREAM TOPICAL at 21:17

## 2025-07-09 RX ADMIN — MUSCLE RUB CREAM: 100; 150 CREAM TOPICAL at 08:36

## 2025-07-09 RX ADMIN — FLUTICASONE PROPIONATE 2 SPRAY: 50 SPRAY, METERED NASAL at 17:55

## 2025-07-09 RX ADMIN — INSULIN GLARGINE 5 UNITS: 100 INJECTION, SOLUTION SUBCUTANEOUS at 21:17

## 2025-07-09 RX ADMIN — INSULIN LISPRO 1 UNITS: 100 INJECTION, SOLUTION INTRAVENOUS; SUBCUTANEOUS at 12:29

## 2025-07-09 RX ADMIN — MUSCLE RUB CREAM 1 APPLICATION: 100; 150 CREAM TOPICAL at 17:56

## 2025-07-09 RX ADMIN — METFORMIN HYDROCHLORIDE 1000 MG: 500 TABLET ORAL at 17:54

## 2025-07-09 RX ADMIN — ENOXAPARIN SODIUM 40 MG: 40 INJECTION SUBCUTANEOUS at 08:28

## 2025-07-09 RX ADMIN — PRASUGREL 10 MG: 10 TABLET, FILM COATED ORAL at 08:32

## 2025-07-09 RX ADMIN — METOPROLOL TARTRATE 50 MG: 50 TABLET, FILM COATED ORAL at 08:30

## 2025-07-09 RX ADMIN — METHOCARBAMOL 500 MG: 500 TABLET ORAL at 06:13

## 2025-07-09 RX ADMIN — FAMOTIDINE 20 MG: 20 TABLET, FILM COATED ORAL at 17:54

## 2025-07-09 RX ADMIN — OXYCODONE HYDROCHLORIDE 5 MG: 5 TABLET ORAL at 12:34

## 2025-07-09 RX ADMIN — LORATADINE 10 MG: 10 TABLET ORAL at 08:30

## 2025-07-09 RX ADMIN — GABAPENTIN 100 MG: 100 CAPSULE ORAL at 21:16

## 2025-07-09 NOTE — PLAN OF CARE
Problem: PAIN - ADULT  Goal: Verbalizes/displays adequate comfort level or baseline comfort level  Description: Interventions:  - Encourage patient to monitor pain and request assistance  - Assess pain using appropriate pain scale  - Administer analgesics as ordered based on type and severity of pain and evaluate response  - Implement non-pharmacological measures as appropriate and evaluate response  - Consider cultural and social influences on pain and pain management  - Notify physician/advanced practitioner if interventions unsuccessful or patient reports new pain  - Educate patient/family on pain management process including their role and importance of  reporting pain   - Provide non-pharmacologic/complimentary pain relief interventions  Outcome: Progressing     Problem: INFECTION - ADULT  Goal: Absence or prevention of progression during hospitalization  Description: INTERVENTIONS:  - Assess and monitor for signs and symptoms of infection  - Monitor lab/diagnostic results  - Monitor all insertion sites, i.e. indwelling lines, tubes, and drains  - Monitor endotracheal if appropriate and nasal secretions for changes in amount and color  - [place] appropriate cooling/warming therapies per order  - Administer medications as ordered  - Instruct and encourage patient and family to use good hand hygiene technique  - Identify and instruct in appropriate isolation precautions for identified infection/condition  Outcome: Progressing  Goal: Absence of fever/infection during neutropenic period  Description: INTERVENTIONS:  - Monitor WBC  - Perform strict hand hygiene  - Limit to healthy visitors only  - No plants, dried, fresh or silk flowers with chadwick in patient room  Outcome: Progressing     Problem: SAFETY ADULT  Goal: Patient will remain free of falls  Description: INTERVENTIONS:  - Educate patient/family on patient safety including physical limitations  - Instruct patient to call for assistance with activity   -  Consider consulting OT/PT to assist with strengthening/mobility based on AM PAC & JH-HLM score  - Consult OT/PT to assist with strengthening/mobility   - Keep Call bell within reach  - Keep bed low and locked with side rails adjusted as appropriate  - Keep care items and personal belongings within reach  - Initiate and maintain comfort rounds  - Make Fall Risk Sign visible to staff  - Offer Toileting every  Hours, in advance of need  - Initiate/Maintain alarm  - Obtain necessary fall risk management equipment:   - Apply yellow socks and bracelet for high fall risk patients  - Consider moving patient to room near nurses station  Outcome: Progressing  Goal: Maintain or return to baseline ADL function  Description: INTERVENTIONS:  -  Assess patient's ability to carry out ADLs; assess patient's baseline for ADL function and identify physical deficits which impact ability to perform ADLs (bathing, care of mouth/teeth, toileting, grooming, dressing, etc.)  - Assess/evaluate cause of self-care deficits   - Assess range of motion  - Assess patient's mobility; develop plan if impaired  - Assess patient's need for assistive devices and provide as appropriate  - Encourage maximum independence but intervene and supervise when necessary  - Involve family in performance of ADLs  - Assess for home care needs following discharge   - Consider OT consult to assist with ADL evaluation and planning for discharge  - Provide patient education as appropriate  - Monitor functional capacity and physical performance, use of AM PAC & JH-HLM   - Monitor gait, balance and fatigue with ambulation    Outcome: Progressing  Goal: Maintains/Returns to pre admission functional level  Description: INTERVENTIONS:  - Perform AM-PAC 6 Click Basic Mobility/ Daily Activity assessment daily.  - Set and communicate daily mobility goal to care team and patient/family/caregiver.   - Collaborate with rehabilitation services on mobility goals if consulted  -  Perform Range of Motion  times a day.  - Reposition patient every  hours.  - Dangle patient  times a day  - Stand patient  times a day  - Ambulate patient  times a day  - Out of bed to chair  times a day   - Out of bed for meals  times a day  - Out of bed for toileting  - Record patient progress and toleration of activity level   Outcome: Progressing     Problem: DISCHARGE PLANNING  Goal: Discharge to home or other facility with appropriate resources  Description: INTERVENTIONS:  - Identify barriers to discharge w/patient and caregiver  - Arrange for needed discharge resources and transportation as appropriate  - Identify discharge learning needs (meds, wound care, etc.)  - Arrange for interpretive services to assist at discharge as needed  - Refer to Case Management Department for coordinating discharge planning if the patient needs post-hospital services based on physician/advanced practitioner order or complex needs related to functional status, cognitive ability, or social support system  Outcome: Progressing     Problem: Knowledge Deficit  Goal: Patient/family/caregiver demonstrates understanding of disease process, treatment plan, medications, and discharge instructions  Description: Complete learning assessment and assess knowledge base.  Interventions:  - Provide teaching at level of understanding  - Provide teaching via preferred learning methods  Outcome: Progressing     Problem: Prexisting or High Potential for Compromised Skin Integrity  Goal: Skin integrity is maintained or improved  Description: INTERVENTIONS:  - Identify patients at risk for skin breakdown  - Assess and monitor skin integrity including under and around medical devices   - Assess and monitor nutrition and hydration status  - Monitor labs  - Assess for incontinence   - Turn and reposition patient  - Assist with mobility/ambulation  - Relieve pressure over surinder prominences   - Avoid friction and shearing  - Provide appropriate hygiene  as needed including keeping skin clean and dry  - Evaluate need for skin moisturizer/barrier cream  - Collaborate with interdisciplinary team  - Patient/family teaching  - Consider wound care consult    Assess:  - Review Yosef scale daily  - Clean and moisturize skin every   - Inspect skin when repositioning, toileting, and assisting with ADLS  - Assess under medical devices such as  every   - Assess extremities for adequate circulation and sensation     Bed Management:  - Have minimal linens on bed & keep smooth, unwrinkled  - Change linens as needed when moist or perspiring  - Avoid sitting or lying in one position for more than  hours while in bed?Keep HOB at degrees   - Toileting:  - Offer bedside commode  - Assess for incontinence every   - Use incontinent care products after each incontinent episode such as     Activity:  - Mobilize patient  times a day  - Encourage activity and walks on unit  - Encourage or provide ROM exercises   - Turn and reposition patient every  Hours  - Use appropriate equipment to lift or move patient in bed  - Instruct/ Assist with weight shifting every  when out of bed in chair  - Consider limitation of chair time  hour intervals    Skin Care:  - Avoid use of baby powder, tape, friction and shearing, hot water or constrictive clothing  - Relieve pressure over bony prominences using   - Do not massage red bony areas    Next Steps:  - Teach patient strategies to minimize risks such as   - Consider consults to  interdisciplinary teams such as   Outcome: Progressing     Problem: PAIN - ADULT  Goal: Verbalizes/displays adequate comfort level or baseline comfort level  Description: Interventions:  - Encourage patient to monitor pain and request assistance  - Assess pain using appropriate pain scale  - Administer analgesics as ordered based on type and severity of pain and evaluate response  - Implement non-pharmacological measures as appropriate and evaluate response  - Consider cultural  and social influences on pain and pain management  - Notify physician/advanced practitioner if interventions unsuccessful or patient reports new pain  - Educate patient/family on pain management process including their role and importance of  reporting pain   - Provide non-pharmacologic/complimentary pain relief interventions  Outcome: Progressing     Problem: INFECTION - ADULT  Goal: Absence or prevention of progression during hospitalization  Description: INTERVENTIONS:  - Assess and monitor for signs and symptoms of infection  - Monitor lab/diagnostic results  - Monitor all insertion sites, i.e. indwelling lines, tubes, and drains  - Monitor endotracheal if appropriate and nasal secretions for changes in amount and color  - [place] appropriate cooling/warming therapies per order  - Administer medications as ordered  - Instruct and encourage patient and family to use good hand hygiene technique  - Identify and instruct in appropriate isolation precautions for identified infection/condition  Outcome: Progressing  Goal: Absence of fever/infection during neutropenic period  Description: INTERVENTIONS:  - Monitor WBC  - Perform strict hand hygiene  - Limit to healthy visitors only  - No plants, dried, fresh or silk flowers with [place] in patient room  Outcome: Progressing     Problem: SAFETY ADULT  Goal: Patient will remain free of falls  Description: INTERVENTIONS:  - Educate patient/family on patient safety including physical limitations  - Instruct patient to call for assistance with activity   - Consider consulting OT/PT to assist with strengthening/mobility based on AM PAC & JH-HLM score  - Consult OT/PT to assist with strengthening/mobility   - Keep Call bell within reach  - Keep bed low and locked with side rails adjusted as appropriate  - Keep care items and personal belongings within reach  - Initiate and maintain comfort rounds  - Make Fall Risk Sign visible to staff  - Offer Toileting every  Hours, in  advance of need  - Initiate/Maintain alarm  - Obtain necessary fall risk management equipment:   - Apply yellow socks and bracelet for high fall risk patients  - Consider moving patient to room near nurses station  Outcome: Progressing  Goal: Maintain or return to baseline ADL function  Description: INTERVENTIONS:  -  Assess patient's ability to carry out ADLs; assess patient's baseline for ADL function and identify physical deficits which impact ability to perform ADLs (bathing, care of mouth/teeth, toileting, grooming, dressing, etc.)  - Assess/evaluate cause of self-care deficits   - Assess range of motion  - Assess patient's mobility; develop plan if impaired  - Assess patient's need for assistive devices and provide as appropriate  - Encourage maximum independence but intervene and supervise when necessary  - Involve family in performance of ADLs  - Assess for home care needs following discharge   - Consider OT consult to assist with ADL evaluation and planning for discharge  - Provide patient education as appropriate  - Monitor functional capacity and physical performance, use of AM PAC & JH-HLM   - Monitor gait, balance and fatigue with ambulation    Outcome: Progressing  Goal: Maintains/Returns to pre admission functional level  Description: INTERVENTIONS:  - Perform AM-PAC 6 Click Basic Mobility/ Daily Activity assessment daily.  - Set and communicate daily mobility goal to care team and patient/family/caregiver.   - Collaborate with rehabilitation services on mobility goals if consulted  - Perform Range of Motion  times a day.  - Reposition patient every  hours.  - Dangle patient  times a day  - Stand patient  times a day  - Ambulate patient  times a day  - Out of bed to chair  times a day   - Out of bed for meals  times a day  - Out of bed for toileting  - Record patient progress and toleration of activity level   Outcome: Progressing     Problem: DISCHARGE PLANNING  Goal: Discharge to home or other  facility with appropriate resources  Description: INTERVENTIONS:  - Identify barriers to discharge w/patient and caregiver  - Arrange for needed discharge resources and transportation as appropriate  - Identify discharge learning needs (meds, wound care, etc.)  - Arrange for interpretive services to assist at discharge as needed  - Refer to Case Management Department for coordinating discharge planning if the patient needs post-hospital services based on physician/advanced practitioner order or complex needs related to functional status, cognitive ability, or social support system  Outcome: Progressing     Problem: Nutrition/Hydration-ADULT  Goal: Nutrient/Hydration intake appropriate for improving, restoring or maintaining nutritional needs  Description: Monitor and assess patient's nutrition/hydration status for malnutrition. Collaborate with interdisciplinary team and initiate plan and interventions as ordered.  Monitor patient's weight and dietary intake as ordered or per policy. Utilize nutrition screening tool and intervene as necessary. Determine patient's food preferences and provide high-protein, high-caloric foods as appropriate.     INTERVENTIONS:  - Monitor oral intake, urinary output, labs, and treatment plans  - Assess nutrition and hydration status and recommend course of action  - Evaluate amount of meals eaten  - Assist patient with eating if necessary   - Allow adequate time for meals  - Recommend/ encourage appropriate diets, oral nutritional supplements, and vitamin/mineral supplements  - Order, calculate, and assess calorie counts as needed  - Recommend, monitor, and adjust tube feedings and TPN/PPN based on assessed needs  - Assess need for intravenous fluids  - Provide specific nutrition/hydration education as appropriate  - Include patient/family/caregiver in decisions related to nutrition  Outcome: Progressing

## 2025-07-09 NOTE — ASSESSMENT & PLAN NOTE
Lab Results   Component Value Date    HGBA1C 8.0 (H) 06/24/2025       Recent Labs     07/08/25  1020 07/08/25  1535 07/08/25  2107 07/09/25  0603   POCGLU 268* 146* 281* 119       Blood Sugar Average: Last 72 hrs:  (P) 184.0795035699889399

## 2025-07-09 NOTE — ASSESSMENT & PLAN NOTE
Lab Results   Component Value Date    HGBA1C 8.0 (H) 06/24/2025       Recent Labs     07/08/25  1020 07/08/25  1535 07/08/25  2107 07/09/25  0603   POCGLU 268* 146* 281* 119       Blood Sugar Average: Last 72 hrs:  (P) 184.2828257818497349

## 2025-07-09 NOTE — ASSESSMENT & PLAN NOTE
S/p cardiac cath 2013    95% prox LAD, 40% mid LAD, EF 60%, MANUEL to prox LAD (Xience Rx 3.5x18mm   Continue home Prasugrel, ASA  Patient has not been taking statin for several years and reports having a side effect; however, cannot recall what that was.  Thorough review of his medical record shows his cardiologist is aware that he was not taking it so discontinued it.

## 2025-07-09 NOTE — ASSESSMENT & PLAN NOTE
Lab Results   Component Value Date    HGBA1C 8.0 (H) 06/24/2025     Aquilino Dowell is a 64 y.o. male admitted 7/2/2025 for a fall at home and for treatment of sepsis/bacteremia on admission. Podiatry was consulted for management of elongated, dystrophic toenails.    Plan  Patient seen at bedside. No acute pedal concerns at this time.    Elongated toenails x10 were excisionally debrided. See procedure note below.   Rest of Medical care per primary team.  Podiatry signing off at this time, please re-consult with any questions or concerns as needed.    Procedure Note: Nail Debridement   After verbal consent was obtained, patient's elongated nails x10 were sharply debrided to near normal length and thickness using a large nail nipper. The patient tolerated this well and without incident.       Recent Labs     07/08/25  1535 07/08/25  2107 07/09/25  0603 07/09/25  1043   POCGLU 146* 281* 119 188*       Blood Sugar Average: Last 72 hrs:  (P) 184.4922838584072671

## 2025-07-09 NOTE — ASSESSMENT & PLAN NOTE
-Examines like bicipital tendinosis with anterior pain, positive speeds, yergason as well as possible rotator cuff pathology with positive Fitzgerald  -Could also have other rotator cuff involvement/tendinopathy with positive neers (not +7/8), mild external rotation weakness.  -Family reports chronic L shoulder issues but worse since the fall.  Patient reports no significant history of any repetitive use.  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  -6/24 XR without acute osseous abnormality   > Plan for outpatient MRI  > Considerations in therapy   > Bengay QID  > Will start ibuprofen 400 mg as needed every 6 hours for breakthrough left shoulder pain.

## 2025-07-09 NOTE — ASSESSMENT & PLAN NOTE
Lab Results   Component Value Date    HGBA1C 8.0 (H) 06/24/2025       Recent Labs     07/08/25  1535 07/08/25  2107 07/09/25  0603 07/09/25  1043   POCGLU 146* 281* 119 188*       Blood Sugar Average: Last 72 hrs:  (P) 184.1731278561755874

## 2025-07-09 NOTE — ASSESSMENT & PLAN NOTE
Lab Results   Component Value Date    HGBA1C 8.0 (H) 06/24/2025       Recent Labs     07/08/25  1020 07/08/25  1535 07/08/25  2107 07/09/25  0603   POCGLU 268* 146* 281* 119       Blood Sugar Average: Last 72 hrs:  (P) 184.9792428445341946

## 2025-07-09 NOTE — ASSESSMENT & PLAN NOTE
Lab Results   Component Value Date    HGBA1C 8.0 (H) 06/24/2025       Recent Labs     07/08/25  1020 07/08/25  1535 07/08/25  2107 07/09/25  0603   POCGLU 268* 146* 281* 119       Blood Sugar Average: Last 72 hrs:  (P) 184.9913943439609853

## 2025-07-09 NOTE — ASSESSMENT & PLAN NOTE
Lab Results   Component Value Date    HGBA1C 8.0 (H) 06/24/2025       Recent Labs     07/08/25  1535 07/08/25  2107 07/09/25  0603 07/09/25  1043   POCGLU 146* 281* 119 188*       Blood Sugar Average: Last 72 hrs:  (P) 184.6553779102600125

## 2025-07-09 NOTE — ASSESSMENT & PLAN NOTE
Lab Results   Component Value Date    HGBA1C 8.0 (H) 06/24/2025       Recent Labs     07/08/25  1535 07/08/25  2107 07/09/25  0603 07/09/25  1043   POCGLU 146* 281* 119 188*       Blood Sugar Average: Last 72 hrs:  (P) 184.7061485132626201

## 2025-07-09 NOTE — ASSESSMENT & PLAN NOTE
Lab Results   Component Value Date    HGBA1C 8.0 (H) 06/24/2025       Recent Labs     07/08/25  1535 07/08/25  2107 07/09/25  0603 07/09/25  1043   POCGLU 146* 281* 119 188*       Blood Sugar Average: Last 72 hrs:  (P) 184.4871716795240550

## 2025-07-09 NOTE — ASSESSMENT & PLAN NOTE
Home: Lopressor 50 mg BID.  He was switched from lisinopril to metoprolol when he had an episode of NSVT.    Here: same  No changes today.  BPs have been low but rates are high.  Continue lopressor.

## 2025-07-09 NOTE — ASSESSMENT & PLAN NOTE
Lab Results   Component Value Date    HGBA1C 8.0 (H) 06/24/2025       Recent Labs     07/08/25  1535 07/08/25  2107 07/09/25  0603 07/09/25  1043   POCGLU 146* 281* 119 188*       Blood Sugar Average: Last 72 hrs:  (P) 184.3694571020530396

## 2025-07-09 NOTE — ASSESSMENT & PLAN NOTE
Peaked at 3.00 (7/8/25)  Right UQ US- Hepatomegaly with steatosis, no liver mass  Recommend outpatient GI follow up  Continue to monitor closely once Metformin has been restarted.

## 2025-07-09 NOTE — PROGRESS NOTES
07/09/25 1400   Pain Assessment   Pain Assessment Tool 0-10   Pain Score 2   Pain Location/Orientation Orientation: Left;Location: Shoulder   Pain Onset/Description Onset: Ongoing;Descriptor: [facility] Pain Intervention(s) Medication (See MAR);Repositioned;Rest   Restrictions/Precautions   Precautions Bed/chair alarms;Cognitive;Fall Risk;Pain;Supervision on toilet/commode;Visual deficit   Weight Bearing Restrictions No   ROM Restrictions No   Cognition   Overall Cognitive Status WFL   Arousal/Participation Alert;Cooperative   Attention Attends with cues to redirect   Orientation Level Oriented X4   Memory Decreased short term memory;Decreased recall of recent events;Decreased recall of precautions   Following Commands Follows one step commands with increased time or repetition   Subjective   Subjective Pt agreeable for skilled PT completing seated TE   Sit to Lying   Type of Assistance Needed Physical assistance   Physical Assistance Level 26%-50%   Comment MinAx1 for LE assistance   Sit to Lying CARE Score 3   Sit to Stand   Type of Assistance Needed Physical assistance   Physical Assistance Level 25% or less   Comment CGA-minAx1 to RW   Sit to Stand CARE Score 3   Bed-Chair Transfer   Type of Assistance Needed Incidental touching   Physical Assistance Level No physical assistance   Comment CGAx1 w/ RW   Chair/Bed-to-Chair Transfer CARE Score 4   Transfer Bed/Chair/Wheelchair   Limitations Noted In Balance;Coordination;Confidence;Endurance;Pain Management;LE Strength;Vision   Adaptive Equipment Roller Walker   Therapeutic Interventions   Strengthening Seated ankle/heel raises 3x10, Seated hip ADD ball squeeze 3x10, and straight leg raises in bed 2x10   Other Comments   Comments Pt completed 60 minute OT session prior to PT session. Pt had symptoms of dizziness during OT session and BP drop once standing. Pt reported lightheadedness while seated to begin PT session, /64. Pt stood up, reported  increased lightheadedness and BP 88/58. Pt rested, lightheadedness resolved and BP up to 116/68. Medical team present and RN staff made aware. End of session pt returned to bed and nursing staff was setting up fluid IV.   Assessment   Treatment Assessment Pt completed 60 minute PT session, that was limited by new onset of lightheadedness and dizziness accompanied with decreased BP upon standing. See other comments sections for more details. Pt wife Ene present for family training, however unable throughout session to assess functional mobility due to medical status. Discussion had with pt and family regarding potential DC plans, and conversations to be had with medical team due to pt current status, and upcoming scheduled eye injection. Discussion also had regarding potential for [facility] utilization at DC to ensure safety and pt independence, pt wife seemed open to the idea as she will be returning to work in August. Pt was able to complete seated and bed level TE for LE strength, however pt reported 10/10 fatigue throughout. Pt to continue with POC as tolerated focusing on functional mobility, transfers, strength, balance, endurance, and overall safety awareness.   Problem List Decreased strength;Decreased range of motion;Decreased endurance;Impaired balance;Decreased mobility;Decreased cognition;Decreased coordination;Impaired judgement;Impaired vision;Decreased safety awareness;Impaired sensation;Pain   Barriers to Discharge Inaccessible home environment;Decreased caregiver support   PT Barriers   Functional Limitation Car transfers;Stair negotiation;Standing;Transfers;Walking   Plan   Treatment/Interventions Functional transfer training;LE strengthening/ROM;Elevations;Therapeutic exercise;Endurance training;Patient/family training;Equipment eval/education;Bed mobility;Gait training;Compensatory technique education;Continued evaluation   Progress Slow progress, medical status limitations   PT Therapy Minutes   PT  Time In 1400   PT Time Out 1500   PT Total Time (minutes) 60   PT Mode of treatment - Individual (minutes) 60   PT Mode of treatment - Concurrent (minutes) 0   PT Mode of treatment - Group (minutes) 0   PT Mode of treatment - Co-treat (minutes) 0   PT Mode of Treatment - Total time(minutes) 60 minutes   PT Cumulative Minutes 510   Therapy Time missed   Time missed? Charlotte Hector, SPT

## 2025-07-09 NOTE — ASSESSMENT & PLAN NOTE
Last BM 7/6  >Continue miralax daily and senna PRN, suppository PRN (Last adjusted 7/7/2025, senna now as needed)  >Monitor and adjust as appropriate

## 2025-07-09 NOTE — ASSESSMENT & PLAN NOTE
Lab Results   Component Value Date    HGBA1C 8.0 (H) 06/24/2025       Recent Labs     07/08/25  1020 07/08/25  1535 07/08/25  2107 07/09/25  0603   POCGLU 268* 146* 281* 119       Blood Sugar Average: Last 72 hrs:  (P) [id number]

## 2025-07-09 NOTE — ASSESSMENT & PLAN NOTE
Lab Results   Component Value Date    HGBA1C 8.0 (H) 06/24/2025       Recent Labs     07/08/25  1535 07/08/25  2107 07/09/25  0603 07/09/25  1043   POCGLU 146* 281* 119 188*       Blood Sugar Average: Last 72 hrs:  (P) 184.2943191792064907

## 2025-07-09 NOTE — CONSULTS
Consultation - Podiatry   Name: Aquilino Dowell 64 y.o. male I MRN: [id number]  Unit/Bed#: -01 I Date of Admission: 7/2/2025   Date of Service: 7/9/2025 I Hospital Day: 7   Inpatient consult to Podiatry  Consult performed by: Glenn Lockwood DPM  Consult ordered by: Robert Bo MD        Physician Requesting Evaluation: Patricia Billingsley MD   Reason for Evaluation / Principal Problem: Elongated nails    Assessment & Plan  Diabetic retinopathy (HCC)  Nail overgrowth  Lab Results   Component Value Date    HGBA1C 8.0 (H) 06/24/2025     Aquilino Dowell is a 64 y.o. male admitted 7/2/2025 for a fall at home and for treatment of sepsis/bacteremia on admission. Podiatry was consulted for management of elongated, dystrophic toenails.    Plan  Patient seen at bedside. No acute pedal concerns at this time.    Elongated toenails x10 were excisionally debrided. See procedure note below.   Rest of Medical care per primary team.  Podiatry signing off at this time, please re-consult with any questions or concerns as needed.    Procedure Note: Nail Debridement   After verbal consent was obtained, patient's elongated nails x10 were sharply debrided to near normal length and thickness using a large nail nipper. The patient tolerated this well and without incident.       Recent Labs     07/08/25  1535 07/08/25  2107 07/09/25  0603 07/09/25  1043   POCGLU 146* 281* 119 188*       Blood Sugar Average: Last 72 hrs:  (P) 184.9195074941255747    Peripheral neuropathy  Coronary artery disease  Left shoulder pain  Diabetes (HCC)  Fall  Alcohol use disorder  Hypertension  Sinusitis  Sepsis (HCC)  Back pain  Lab Results   Component Value Date    HGBA1C 8.0 (H) 06/24/2025       Recent Labs     07/08/25  1535 07/08/25  2107 07/09/25  0603 07/09/25  1043   POCGLU 146* 281* 119 188*       Blood Sugar Average: Last 72 hrs:  (P) 184.4768289775404093    Hyperbilirubinemia  GERD (gastroesophageal reflux  disease)  Constipation  Vitamin D deficiency  Lung nodule  At risk for venous thromboembolism (VTE)  Tobacco use disorder  Depression  Nausea & vomiting  Anemia    - management per primary team      History of Present Illness   Aquilino Dowell is a 64 y.o. male who presents with a PMH of DM, HTN, CAD who presented to [facility] after a fall at home and for treatment of sepsis/bacteremia on admission. He is currently admitted to [facility] for rehab. Podiatry was consulted for elongated nails.    Review of Systems  Review of Systems   Constitutional:  Negative for chills and fever.   HENT:  Negative for ear pain and sore throat.    Eyes:  Negative for pain and visual disturbance.   Respiratory:  Negative for cough and shortness of breath.    Cardiovascular:  Negative for chest pain and palpitations.   Gastrointestinal: Negative for abdominal pain. Negative for vomiting.   Genitourinary:  Negative for dysuria and hematuria.   Musculoskeletal:  Negative for arthralgias and back pain.   Skin:  Negative for color change and rash. Overgrown toe nails  Neurological:  Negative for seizures and syncope.   All other systems reviewed and are negative.      Objective :  Temp:  [97.3 °F (36.3 °C)-98.1 °F (36.7 °C)] 98.1 °F (36.7 °C)  HR:  [] 85  BP: (118-148)/(70-80) 118/70  Resp:  [16-18] 18  SpO2:  [97 %-100 %] 100 %  O2 Device: None (Room air)    Physical Exam  Physical Exam:    General Appearance: Alert, cooperative, no distress.  HEENT: Head normocephalic, atraumatic, without obvious abnormality.  Heart: Normal rate and rhythm.  Lungs: Non-labored breathing. No respiratory distress.  Abdomen: Without distension.  Psychiatric: AAOx3  Lower Extremity:  Vascular:   Right DP pulse is biphasic on doppler  Right PT pulse is biphasic on doppler  Left DP pulse is biphasic on doppler  Left PT pulse is biphasic on doppler   CRT < 3 seconds at the digits.   Skin temperature is WNL  bilaterally.    Musculoskeletal:  MMT is 5/5 in all muscle compartments bilaterally.   No Pain on palpation of digits x 10  No gross deformities noted.     Dermatological:    Elongated nails x 10   No wounds were noted, no signs of local infection were seen, which includes any open wounds, drainage, purulence, erythema.    Neurological:  Gross sensation is diminished.   Protective sensation is diminished.         Additional data:     Lab Results: I have personally reviewed pertinent labs including:    Results from last 7 days   Lab Units 07/08/25  0518 07/05/25  0559   WBC Thousand/uL 5.64 5.28   HEMOGLOBIN g/dL 10.9* 9.7*   HEMATOCRIT % 34.2* 31.1*   PLATELETS Thousands/uL 143* 131*   SEGS PCT %  --  62   LYMPHO PCT %  --  27   MONO PCT %  --  8   EOS PCT %  --  1     Results from last 7 days   Lab Units 07/08/25  0518   POTASSIUM mmol/L 3.6   CHLORIDE mmol/L 103   CO2 mmol/L 22   BUN mg/dL 5   CREATININE mg/dL 0.81   CALCIUM mg/dL 8.2*   ALK PHOS U/L 175*   ALT U/L 21   AST U/L 119*     Results from last 7 days   Lab Units 07/04/25  0558   INR  1.02       Cultures: I have personally reviewed pertinent cultures including:              Imaging: I have personally reviewed pertinent reports in PACS.  EKG, Pathology, and Other Studies: I have personally reviewed pertinent reports.

## 2025-07-09 NOTE — ASSESSMENT & PLAN NOTE
Lab Results   Component Value Date    HGBA1C 8.0 (H) 06/24/2025       Recent Labs     07/08/25  1020 07/08/25  1535 07/08/25  2107 07/09/25  0603   POCGLU 268* 146* 281* 119       Blood Sugar Average: Last 72 hrs:  (P) 184.9270227030965666

## 2025-07-09 NOTE — ASSESSMENT & PLAN NOTE
Lab Results   Component Value Date    HGBA1C 8.0 (H) 06/24/2025       Recent Labs     07/08/25  1535 07/08/25  2107 07/09/25  0603 07/09/25  1043   POCGLU 146* 281* 119 188*       Blood Sugar Average: Last 72 hrs:  (P) 184.8314095795742889

## 2025-07-09 NOTE — ASSESSMENT & PLAN NOTE
While working with PT/OT today, patient became dizzy when sitting on edge of bed and standing.  BPs (+) for orthostasis.  Give 500 cc bolus NS now and monitor.  Patient with little PO intake.

## 2025-07-09 NOTE — ASSESSMENT & PLAN NOTE
Lab Results   Component Value Date    HGBA1C 8.0 (H) 06/24/2025     Recent Labs     07/08/25  2107 07/09/25  0603 07/09/25  1043 07/09/25  1420   POCGLU 281* 119 188* 222*     Home: Metformin 1000mg daily. In hospital he was on lantus 6U daily while the home metformin was on hold  Patient extremely frustrated with high blood sugars.  Resume Metformin 1000 mg daily.  Continue accuchecks and SSI for now.    Likely need to discontinue Lantus as I would not recommend discharging patient on insulin.  Consider addition of SGLT such as Jardiance for tighter glycemic control in the patient with a history of CAD.  Continue to monitor LFTs closely and if there is any concern their elevation is due to Metformin, I would recommend endocrine consult for treatment recommendations.

## 2025-07-09 NOTE — ASSESSMENT & PLAN NOTE
Lab Results   Component Value Date    HGBA1C 8.0 (H) 06/24/2025       Recent Labs     07/08/25  1020 07/08/25  1535 07/08/25  2107 07/09/25  0603   POCGLU 268* 146* 281* 119       Blood Sugar Average: Last 72 hrs:  (P) 184.5489730900285259

## 2025-07-09 NOTE — ASSESSMENT & PLAN NOTE
Lab Results   Component Value Date    HGBA1C 8.0 (H) 06/24/2025       Recent Labs     07/08/25  1020 07/08/25  1535 07/08/25  2107 07/09/25  0603   POCGLU 268* 146* 281* 119       Blood Sugar Average: Last 72 hrs:  (P) 184.0593742692454661

## 2025-07-09 NOTE — ASSESSMENT & PLAN NOTE
Lab Results   Component Value Date    HGBA1C 8.0 (H) 06/24/2025       Recent Labs     07/08/25  1020 07/08/25  1535 07/08/25  2107 07/09/25  0603   POCGLU 268* 146* 281* 119       Blood Sugar Average: Last 72 hrs:  (P) 184.6580663607107404

## 2025-07-09 NOTE — ASSESSMENT & PLAN NOTE
Lab Results   Component Value Date    HGBA1C 8.0 (H) 06/24/2025     Aquilino Dowell is a 64 y.o. male admitted 7/2/2025 for a fall at home and for treatment of sepsis/bacteremia on admission. Podiatry was consulted for management of elongated, dystrophic toenails.    Plan  Patient seen at bedside. No acute pedal concerns at this time.    Elongated toenails x10 were excisionally debrided. See procedure note below.   Rest of Medical care per primary team.  Podiatry signing off at this time, please re-consult with any questions or concerns as needed.    Procedure Note: Nail Debridement   After verbal consent was obtained, patient's elongated nails x10 were sharply debrided to near normal length and thickness using a large nail nipper. The patient tolerated this well and without incident.       Recent Labs     07/08/25  1535 07/08/25  2107 07/09/25  0603 07/09/25  1043   POCGLU 146* 281* 119 188*       Blood Sugar Average: Last 72 hrs:  (P) 184.7664714092680927

## 2025-07-09 NOTE — PROGRESS NOTES
"   07/09/25 1300   Pain Assessment   Pain Assessment Tool 0-10   Pain Score 2   Pain Location/Orientation Orientation: Left;Location: Shoulder   Restrictions/Precautions   Precautions Bed/chair alarms;Cognitive;Fall Risk;Pain;Supervision on toilet/commode;Visual deficit   Braces or Orthoses   (BLE ACE wraps)   Lifestyle   Autonomy \"I feel crummy...I'm like eeyore today\"   Tub/Shower Transfer   Findings Tub RIVERS at home includes shower chair  available and suction GB. Trialed dry step over tub transfer to tub bench. Provided edu to pt and wife regarding tub transfer techs using step over to shower chair vs tub bench sit and swivel. This date, pt completes setp over tech at [facility], moderate difficulty clearing B feet over tub ( socks adding extra friction.) Pt and wife report pt apprehensive about showers at home d/t fear of falling and how fatigued they make him feel. Discussed SB and occasional showers for pt, both receptive. OT to plan to continue step over tub transfer training to improve performance, but wife also states they could get a tub bench if necessary. Hand out not provided this date.   Lying to Sitting on Side of Bed   Type of Assistance Needed Supervision   Lying to Sitting on Side of Bed CARE Score 4   Sit to Stand   Type of Assistance Needed Incidental touching   Sit to Stand CARE Score 4   Transfers   Additional Comments fxnl mobility from room towards ADL suite at [facility], but did require seated rest break, wife was pushing WC along, mobility trial of ~75ft prior to seated rest   Cognition   Overall Cognitive Status WFL  (grossly)   Arousal/Participation Alert;Cooperative   Attention Attends with cues to redirect   Orientation Level Oriented X4   Memory Decreased short term memory;Decreased recall of recent events;Decreased recall of precautions   Following Commands Follows one step commands with increased time or repetition   Activity Tolerance   Activity Tolerance Patient limited by fatigue   Other " "Comments   Assessment Bp did drop systolically 133 to 100 w/ STS, pt w/ complaints of \"dizziness\" at EOB that never fully cleared during session. pt did receive oxycodone at ~1230   Assessment   Treatment Assessment OT session focusing on dry tub transfer and FT w/ wife Ene. See above for fxnl details and below for FT details. Pt continues to require skilled hands on assist in order to maintain his safety. OT to continue POC.   OT Family training done with: FT completed w/ pt and his wife, Ene. reviewed role of OT in this setting, pts CLOF. Addressed OT related questions and passed on questions to medical team as requested. Pts performance has varied this week. Reviewed potential DC dates, initially this Friday was a possibility, but based on inc pain, dec fxnl performance as of late OT can not rec Friday DC. Will continue to assess and treat and work towards a DC date of next week.   Prognosis Good   Problem List Decreased strength;Decreased range of motion;Decreased endurance;Impaired balance;Decreased mobility;Decreased cognition;Decreased coordination;Impaired judgement;Impaired vision;Decreased safety awareness;Impaired sensation;Pain   Plan   Treatment/Interventions ADL retraining;Functional transfer training;LE strengthening/ROM;Therapeutic exercise;Endurance training;Cognitive reorientation;Patient/family training;Equipment eval/education;Compensatory technique education;Continued evaluation;Spoke to advanced practitioner;Spoke to nursing;Spoke to MD   OT Therapy Minutes   OT Time In 1300   OT Time Out 1400   OT Total Time (minutes) 60   OT Mode of treatment - Individual (minutes) 60   OT Mode of treatment - Concurrent (minutes) 0   OT Mode of treatment - Group (minutes) 0   OT Mode of treatment - Co-treat (minutes) 0   OT Mode of Treatment - Total time(minutes) 60 minutes   OT Cumulative Minutes 570   Therapy Time missed   Time missed? No       "

## 2025-07-09 NOTE — ASSESSMENT & PLAN NOTE
Lab Results   Component Value Date    HGBA1C 8.0 (H) 06/24/2025       Recent Labs     07/08/25  1535 07/08/25  2107 07/09/25  0603 07/09/25  1043   POCGLU 146* 281* 119 188*       Blood Sugar Average: Last 72 hrs:  (P) 184.4438060377123179

## 2025-07-09 NOTE — ASSESSMENT & PLAN NOTE
-Denies any back pain   -Findings on Mri L-Spine would not explain LE symptoms  >Monitor   >Continue current pain management

## 2025-07-09 NOTE — ASSESSMENT & PLAN NOTE
Lab Results   Component Value Date    HGBA1C 8.0 (H) 06/24/2025       Recent Labs     07/08/25  1020 07/08/25  1535 07/08/25  2107 07/09/25  0603   POCGLU 268* 146* 281* 119       Blood Sugar Average: Last 72 hrs:  (P) 184.8232211316594149

## 2025-07-09 NOTE — PROGRESS NOTES
"   07/09/25 0830   Pain Assessment   Pain Assessment Tool 0-10   Pain Score 6   Pain Location/Orientation Orientation: Lower;Location: Back   Restrictions/Precautions   Precautions Bed/chair alarms;Cognitive;Fall Risk;Pain;Supervision on toilet/commode;Visual deficit   Braces or Orthoses   (B ACE wraps-edema appears to be improving)   Lifestyle   Autonomy \"Should I be walking faster?\"   Putting On/Taking Off Footwear   Type of Assistance Needed Physical assistance   Physical Assistance Level 26%-50%   Comment assist for BLE ACE wraps. ModA for socks over ace wraps   Putting On/Taking Off Footwear CARE Score 3   Sit to Stand   Type of Assistance Needed Incidental touching   Comment CGA to RW, demos dec activity tolerance this date compared to previous days   Sit to Stand CARE Score 4   Health Management   Health Management Current med management goal is sup. at baseline pts wife sets up pill box and pt takes meds from box mostly IND. This date completed med reference sheet as pt is currently taking considerably more meds in hosital vs at baseline. Pt demos dec understanding of meds and their purposes. Wife will be here this PM for OT FT, will discuss med management w/ her at this time as pt may benefit from additional sup/assist w/ task upon DC.   Transfers   Additional Comments fxnl mobility RW from room<>O gym at [facility] level. Noted dec pace, much slower than 2 days ago. pt questioning if he should be walking faster. Required cues to walk inside of walker, endorses back pain.   Exercise Tools   Other Exercise Tool 2 To address ongoing L shoulder pain and dec ROM completed unweighted towel slides 3x10 in all tolerated planes. Completed to improve ROM for inc IND w/ ADLs and mobility.   Cognition   Overall Cognitive Status WFL  (grossly)   Arousal/Participation Alert;Cooperative   Attention Attends with cues to redirect   Orientation Level Oriented X4   Memory Decreased short term memory;Decreased recall of recent " events;Decreased recall of precautions   Following Commands Follows one step commands with increased time or repetition   Comments again appears a bit down today, cooperative and talkative when spoken to, but attitude is more pessimisstic   Activity Tolerance   Activity Tolerance Patient limited by pain   Assessment   Treatment Assessment OT session focusing on transfers and mobility, towel slides for improved ROM and pain control, initiated med review. Pt demos dec activity tolerance again this date, today endorsing fatigue and back pain. Plan for PM OT sesion w/ wife present for FT. OT to continue POC.   Prognosis Good   Problem List Decreased strength;Decreased range of motion;Impaired balance;Decreased mobility;Decreased endurance;Decreased coordination;Impaired judgement;Decreased safety awareness;Impaired vision;Impaired sensation;Pain   Plan   Treatment/Interventions ADL retraining;Functional transfer training;Therapeutic exercise;Endurance training;Cognitive reorientation;Patient/family training;Equipment eval/education;Compensatory technique education;Continued evaluation;Spoke to MD;Spoke to nursing   Progress Slow progress, decreased activity tolerance   OT Therapy Minutes   OT Time In 0830   OT Time Out 0930   OT Total Time (minutes) 60   OT Mode of treatment - Individual (minutes) 60   OT Mode of treatment - Concurrent (minutes) 0   OT Mode of treatment - Group (minutes) 0   OT Mode of treatment - Co-treat (minutes) 0   OT Mode of Treatment - Total time(minutes) 60 minutes   OT Cumulative Minutes 510   Therapy Time missed   Time missed? No

## 2025-07-09 NOTE — ASSESSMENT & PLAN NOTE
Lab Results   Component Value Date    HGBA1C 8.0 (H) 06/24/2025       Recent Labs     07/08/25  1535 07/08/25  2107 07/09/25  0603 07/09/25  1043   POCGLU 146* 281* 119 188*       Blood Sugar Average: Last 72 hrs:  (P) 184.9771572141425687

## 2025-07-09 NOTE — ASSESSMENT & PLAN NOTE
Lab Results   Component Value Date    HGBA1C 8.0 (H) 06/24/2025       Recent Labs     07/08/25  1020 07/08/25  1535 07/08/25  2107 07/09/25  0603   POCGLU 268* 146* 281* 119       Blood Sugar Average: Last 72 hrs:  (P) 184.9339670741026478

## 2025-07-09 NOTE — PROGRESS NOTES
While working with PT , therapist stated that pt bp was orthostatic with standing. Pt was lightheaded. After seated pt blood pressure normal and pt without lightheadeness. Pt is alert and oriented . Pt blood sugar 223. Bettie KANG notified by Nelly MALONE who saw pt. Orders for IV bolus. One attempt at IV not successful . Next RN Mary  notified of need for IV site. Bettie KANG aware. Pt resting in bed blood pressure stable and pt has no complaints right now

## 2025-07-09 NOTE — ASSESSMENT & PLAN NOTE
Lab Results   Component Value Date    HGBA1C 8.0 (H) 06/24/2025       Recent Labs     07/08/25  1535 07/08/25  2107 07/09/25  0603 07/09/25  1043   POCGLU 146* 281* 119 188*       Blood Sugar Average: Last 72 hrs:  (P) 184.8632286107959713

## 2025-07-09 NOTE — PROGRESS NOTES
Progress Note - PMR   Name: Aquilino Dowell 64 y.o. male I MRN: [id number]  Unit/Bed#: -01 I Date of Admission: 7/2/2025   Date of Service: 7/9/2025 I Hospital Day: 7     Assessment & Plan  Peripheral neuropathy  Fall  - Has been increasingly off balance at home  - CT C-Spine without significant degenerative diseaes  - CTH with only chronic microangiopathic change  - Previous EMG showed axonal and demyelinating neuropathy + tremor, Neuro was concerned for anti-MAG neuropathy back in 2023   - They had discussed getting demyelinating neuropathy panel, but not completed as he did not f/u.   - Suspect component also related to T2DM  - Has impaired vision as well  - Has had a history of Vitamin B12 deficiency previously contributing as well (6/25 level 940)  - Also has history of alcohol use disorder  - Unclear etiology of fall per patient.   > Ambulatory dysfunction I suspect is driven by progression of his neuropathy in addition to worsening vision impacting his ability to compensate  > PT/OT 3-5 hours/day, 5-7 days/week.  > Also has a component of deconditioning and worsening proximal weakness related to increasingly sedentary lifestyle (this is 2/2 I suspect to some depression since losing his job in November).   > Gabapentin 100mg QHS for shooting pains (higher doses made him too lethargic)  > Alcohol cessation as that can progress neuropathy  > Continue B12 supplement, optimize diabetes management.   > Check orthostatics here   > Recommend f/u with Neuromuscular Neurology   Sepsis (HCC) (Resolved: 7/9/2025)  Largely resolved   Found to have occult bacteremia.  S/p 7 day abx  Hemodynamically stable. Monitor.   Left shoulder pain  -Examines like bicipital tendinosis with anterior pain, positive speeds, yergason as well as possible rotator cuff pathology with positive Fitzgerald  -Could also have other rotator cuff involvement/tendinopathy with positive neers (not +7/8), mild external rotation  weakness.  -Family reports chronic L shoulder issues but worse since the fall.  Patient reports no significant history of any repetitive use.  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  -6/24 XR without acute osseous abnormality   > Plan for outpatient MRI  > Considerations in therapy   > Bengay QID  > Will start ibuprofen 400 mg as needed every 6 hours for breakthrough left shoulder pain.  Constipation  Last BM 7/6  >Continue miralax daily and senna PRN, suppository PRN (Last adjusted 7/7/2025, senna now as needed)  >Monitor and adjust as appropriate  At risk for venous thromboembolism (VTE)  >Lovenox, SCDs, ambulation  >Will not go home on lovenox.  >Monitor platelet count closely.   Depression  -Has lost interests he previously had, sleep has been poor, energy levels/motivation poor, feelings of guilt and frustration.   -Appetite worse  -All since he lost his job in November.  -Wife has noticed a sharp decline  -Denies suicidal ideation/self-harm.  > Discussed trial of SNRI (cymbalta may also help with pain)  > He would like to defer for now.   > Referral to Psych at discharge   Back pain  -Denies any back pain   -Findings on Mri L-Spine would not explain LE symptoms  >Monitor   >Continue current pain management   Nausea & vomiting  7/3 with episode of N/V late in the day with sinus tachycardia to the 130s  - Transient AMS  - Had large BM in the AM  - AM dose of metoprolol had not been giving  - At this time no further episodes.   > Got metoprolo and zofran  > Given a gentle IVF bolus for any dehydration  > Repaet CMP today unremarkable with improved LFTs. Ammonia 61  > CBC with WBC WNL, but hgb down to 9.8 (after getting IVF)    - Recheck in the AM  > Mag WNL  > CTH unremarkable, save for some possible sinusitis  > Monitor for now.   > Has reglan ordered PRN.   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   -  Next due 7/14  -This likely is contributing to his imbalance in the setting of his peripheral neuropathy  > Previous notes show he worked with Dr. Mart Garcia At [facility] in [place].  > Would call their office to see if they are ok with transport from rehab to perform procedure on Monday.   Coronary artery disease  -S/p stent/cath 2013  -Home: Prasugrel 10mg daily, ASA 81mg daily   >Here: Same   Diabetes (HCC)  Lab Results   Component Value Date    HGBA1C 8.0 (H) 06/24/2025       Recent Labs     07/08/25  1020 07/08/25  1535 07/08/25  2107 07/09/25  0603   POCGLU 268* 146* 281* 119       Blood Sugar Average: Last 72 hrs:  (P) [id number]: Metformin 1000mg daily  -Unclear if family checks BG at home or if he has a kit.   >Here: Lantus 5 units QHS, CDI/Accuchecks  >Goal to transition back to home regimen.  Reviewed with IM  >Diabetic Diet  >Management as per IM  Alcohol use disorder  -Denies history of withdrawal  >Cessation counseling  Hypertension  -Home: Toprol 50mg BID  >Here: Same  >Monitor and adjust as per IM  Sinusitis  >Flonase, claritin  Hyperbilirubinemia  -And chronic transaminitis  -In setting of alcohol use disorder  -RUQ US with steatosis and hepatomegaly  > Counseled on cessation  > minimize hepatotoxic meds  > Outpatient f/u with GI recommended  Vitamin D deficiency  -6/27 12.8  >Ergocalciferol weekly for 12 weeks  >Repeat with PCP   Lung nodule  -Noted incidentally on imaging  >Recommended for f/u chest CT in 12 months  Tobacco use disorder  -Quit smoking in the 90s  -Now chewing tobacco.  >Nicotine patch  >Cessation counseling.     Health Maintenance  #Delirium/Sleep: At risk. Optimize sleep/wake, pain, bowel, bladder management. Avoid deliriogenic meds and physical restraint. Environmental/behavioral interventions.   #Bladder: Voiding and Continent  #Skin/Pressure Injury Prevention: Turn Q2hr in bed, with weight shifts [id number] in wheelchair. Float heels in bed.  #GI  Prophylaxis: Not indicated  #Code Status: Full Code  #FEN: Diabetic Diet   #Dispo: Teams 7/8, ADD 7/14 with OP PT/OT. May need to move discharge sooner, to accommodate an eye appointment on 7/14.                  > F/U PCP, Neuromuscular, Orthopedics, Psych    Chief Complaint: No new issues.     Interval History/Subjective:  No acute events overnight. Sleep acceptable. No new CP, SOB, fevers, chills, N/V, abdominal pain. Last BM was 7/6. We discussed the options for his eye appointment. He has family training today. He denies any other new concerns at this time.     Functional Update:   PT: CGA transfers, Sup bed mobility, CGA ambulation 120' with RW, Petra stairs, Petra ramp  OT: Ind eating, CGA grooming, Petra bathing, Sup UB dressing, CGA LB dressing, Petra toileting, CGA-Petra toilet transfers        Objective     Temp:  [97.3 °F (36.3 °C)-98.1 °F (36.7 °C)] 98.1 °F (36.7 °C)  HR:  [] 92  Resp:  [16-18] 18  BP: (119-148)/(66-80) 147/80  SpO2:  [97 %-100 %] 100 %    Gen: No acute distress, Well-nourished, well-appearing.  HEENT: Moist mucus membranes, Normocephalic/Atraumatic. R eye prosthetic   Cardiovascular: Regular rate, rhythm, S1/S2. Distal pulses palpable  Heme/Extr: No edema  Pulmonary: Non-labored breathing. Lungs CTAB  : No dolan  GI: Soft, non-tender, non-distended. BS+  Integumentary: Skin is warm, dry.  Neuro: AAOx3, Speech is intact. Appropriate to questioning.   Psych: Normal mood and affect.     Physical examination is otherwise unchanged from previous encounter, except as noted above.    Scheduled Meds:  Current Facility-Administered Medications   Medication Dose Route Frequency Provider Last Rate    acetaminophen  650 mg Oral Q8H PRN Ashley Depadua, MD      aspirin  81 mg Oral Daily Nadiya Shah PA-C      atorvastatin  10 mg Oral Daily Bettie Lyon PA-C      benzonatate  200 mg Oral TID PRN Nadiya Shah PA-C      bisacodyl  10 mg Rectal Daily PRN Nadiya Shah PA-C      vitamin  B-12  1,000 mcg Oral Daily Nadiya Shah, PA-C      enoxaparin  40 mg Subcutaneous Daily Nadiyapawel Shah, PA-C      ergocalciferol  50,000 Units Oral Weekly Nadiyapawel Garberes, PA-C      famotidine  20 mg Oral BID Nadiya Shah, PA-C      fluticasone  2 spray Each Nare BID Nadiya Shah, PA-C      folic acid  1 mg Oral Daily Nadiya Shah, PA-C      gabapentin  100 mg Oral HS Nadiya Shah, PA-C      ibuprofen  400 mg Oral Q6H PRN Robert Bo MD      insulin glargine  5 Units Subcutaneous HS Nadiya Shah, PA-C      insulin lispro  1-5 Units Subcutaneous HS Nadiya Shah, PA-C      insulin lispro  1-6 Units Subcutaneous TID AC Nadiya Shah, PA-C      loratadine  10 mg Oral Daily Nadiya Shah, PA-C      menthol-methyl salicylate   Apply externally 4x Daily Nadiya Shah, PA-C      methocarbamol  500 mg Oral Q6H PRN Naidya Shah, PA-C      metoprolol tartrate  50 mg Oral Q12H TERRENCE Nadiya Shah, PA-C      ondansetron  4 mg Oral Q6H PRN Rayne Da Silva, PA-C      ondansetron  4 mg Oral Once Aroldo Wyatt MD      oxyCODONE  2.5 mg Oral Q4H PRN Ashley Depadua, MD      Or    oxyCODONE  5 mg Oral Q4H PRN Ashley Depadua, MD      polyethylene glycol  17 g Oral Daily PRN Ashley Depadua, MD      prasugrel  10 mg Oral Daily Nadiya Shah, PA-C      senna  2 tablet Oral HS PRN Robert Bo MD      thiamine  100 mg Oral Daily Nadiya Shah, PA-MARS       Imaging: Reviewed, no new imaging        Lab Results: I have reviewed the following results:  Results from last 7 days   Lab Units 07/08/25  0518 07/05/25  0559 07/04/25  0558   HEMOGLOBIN g/dL 10.9* 9.7* 9.8*   HEMATOCRIT % 34.2* 31.1* 30.7*   WBC Thousand/uL 5.64 5.28 5.84   PLATELETS Thousands/uL 143* 131* 143*     Results from last 7 days   Lab Units 07/08/25  0518 07/04/25  0558 07/03/25  1820   BUN mg/dL 5 7 8   SODIUM mmol/L 135 137 134*   POTASSIUM mmol/L 3.6 4.1 3.5   CHLORIDE mmol/L 103 106 101   CREATININE mg/dL 0.81 0.80 0.88   AST  U/L 119* 113* 123*   ALT U/L 21 24 27       Results from last 7 days   Lab Units 07/04/25  0558   PROTIME seconds 13.7   INR  1.02        0658}      ** Please Note: Fluency Direct voice to text software may have been used in the creation of this document. **

## 2025-07-09 NOTE — ASSESSMENT & PLAN NOTE
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   - Next due 7/14  -This likely is contributing to his imbalance in the setting of his peripheral neuropathy  > Previous notes show he worked with Dr. Mart Garcia At [facility] in [place].  > Would call their office to see if they are ok with transport from rehab to perform procedure on Monday.

## 2025-07-09 NOTE — PROGRESS NOTES
Progress Note - Hospitalist   Name: Aquilino Dowell 64 y.o. male I MRN: [id number]  Unit/Bed#: -01 I Date of Admission: 7/2/2025   Date of Service: 7/9/2025 I Hospital Day: 7    Assessment & Plan  Diabetes (HCC)  Lab Results   Component Value Date    HGBA1C 8.0 (H) 06/24/2025     Recent Labs     07/08/25  2107 07/09/25  0603 07/09/25  1043 07/09/25  1420   POCGLU 281* 119 188* 222*     Home: Metformin 1000mg daily. In hospital he was on lantus 6U daily while the home metformin was on hold  Patient extremely frustrated with high blood sugars.  Resume Metformin 1000 mg daily.  Continue accuchecks and SSI for now.    Likely need to discontinue Lantus as I would not recommend discharging patient on insulin.  Consider addition of SGLT such as Jardiance for tighter glycemic control in the patient with a history of CAD.  Continue to monitor LFTs closely and if there is any concern their elevation is due to Metformin, I would recommend endocrine consult for treatment recommendations.  Fall  S/p fall at home ambulating to the bathroom  Uses a cane at baseline due to LE neuropathy and vision issues  PT/OT  Left shoulder pain  Xray done on 6/24/25 = + glenohumeral DJD, no fracture  Bengay ordered per PMR  Patient may benefit from out-patient orthopedic evaluation for local injections for pain control.  Coronary artery disease  S/p cardiac cath 2013    95% prox LAD, 40% mid LAD, EF 60%, MANUEL to prox LAD (Xience Rx 3.5x18mm   Continue home Prasugrel, ASA  Patient has not been taking statin for several years and reports having a side effect; however, cannot recall what that was.  Thorough review of his medical record shows his cardiologist is aware that he was not taking it so discontinued it.  Alcohol use disorder  Per hospital chart, wife reported up to 1 bottle of wine per day  ETOH on admit 6/24 was normal  Was on CIWA protocol  Continue B12, folic acid  Hypertension  Home: Lopressor 50 mg BID.  He was switched  from lisinopril to metoprolol when he had an episode of NSVT.    Here: same  No changes today.  BPs have been low but rates are high.  Continue lopressor.  Sinusitis  Seen on CT head done in ED and  CTH 7/3/25  No complaints of sinus pressure or headaches  Continue flonase, claritin  Back pain  MRI lumbar spine:  No discitis/osteomyelitis in lumbar spine, as clinically questioned.  Small focal areas of enhancement in the L1-L2 and L2-L3 supraspinous ligament regions, suspicious for enthesitis.  Mild multilevel degenerative changes of lumbar spine, as detailed above. No significant canal stenosis or foraminal narrowing  Pain meds/therapy per PMR  Hyperbilirubinemia  Peaked at 3.00 (7/8/25)  Right UQ US- Hepatomegaly with steatosis, no liver mass  Recommend outpatient GI follow up  Continue to monitor closely once Metformin has been restarted.  Vitamin D deficiency  Continue supplement  Lung nodule  Found incidentally on CT scan  4mm right upper lobe nodule which is new  Will need repeat CT in 12 months  Tobacco use disorder  Encourage cessation at discharge.  Depression  Pt does not want to start a medication for mood.  Nausea & vomiting (Resolved: 7/9/2025)  Occurred in evening 7/3/25  CTH was negative, labs w/o acute changes except hemoglobin down a bit likely 2/2 hydration/dilutional  He felt the N/V was because he did too much in therapy  No N/V currently, does complain of reflux symptoms- see below  Anemia  Appears stable.  MCV high.  Check iron panel.  Last noted was >1 year ago.  GERD (gastroesophageal reflux disease)  On PPI at home which was discontinued on admission due to elevated LFT's  He is complaining of GERD symptoms, will order pepcid for now  Nail overgrowth    Orthostatic hypotension  While working with PT/OT today, patient became dizzy when sitting on edge of bed and standing.  BPs (+) for orthostasis.  Give 500 cc bolus NS now and monitor.  Patient with little PO intake.    The above assessment  and plan was reviewed and updated as determined by my evaluation of the patient on 7/9/2025.    History of Present Illness   Patient seen and examined. Patient seen twice today.  Originally, he was feeling fine but when I saw him after working with OT and PT, he was complaining of lightheadedness with position changes and had (+) orthostatics.  He reports eating and drinking small amounts with each meal.      Review of Systems   Respiratory:  Negative for cough and shortness of breath.    Cardiovascular:  Negative for chest pain and palpitations.   Gastrointestinal:  Negative for abdominal pain, constipation, diarrhea, nausea and vomiting.   Neurological:  Positive for dizziness and light-headedness.   All other systems reviewed and are negative.      Objective :  Temp:  [97.8 °F (36.6 °C)-98.6 °F (37 °C)] 98.6 °F (37 °C)  HR:  [] 98  BP: (100-148)/(66-80) 100/66  Resp:  [16-18] 18  SpO2:  [97 %-100 %] 97 %  O2 Device: None (Room air)    Invasive Devices       None                   Physical Exam  General Appearance: NAD; pleasant  HEENT: PERRLA, conjuctiva normal; mucous membranes moist; face symmetrical  Neck:  Supple  Lungs: clear bilaterally, normal respiratory effort, no retractions, expiratory effort normal, on room air  CV: regular rate and rhythm, no murmurs rubs or gallops noted   ABD: soft non tender, +BS x4  EXT: DP pulses intact, no lymphadenopathy, no edema  Skin: normal turgor, normal texture, no rash  Psych: affect normal, mood normal  Neuro: AAOx3    The above physical exam was reviewed and updated as determined by my evaluation of the patient on 7/9/2025.      Lab Results: I have reviewed the following results:  Results from last 7 days   Lab Units 07/08/25  0518 07/05/25  0559   WBC Thousand/uL 5.64 5.28   HEMOGLOBIN g/dL 10.9* 9.7*   HEMATOCRIT % 34.2* 31.1*   PLATELETS Thousands/uL 143* 131*     Results from last 7 days   Lab Units 07/08/25  0518 07/04/25  0558   SODIUM mmol/L 135 137    POTASSIUM mmol/L 3.6 4.1   CHLORIDE mmol/L 103 106   CO2 mmol/L 22 26   BUN mg/dL 5 7   CREATININE mg/dL 0.81 0.80   CALCIUM mg/dL 8.2* 7.6*         Results from last 7 days   Lab Units 07/04/25  0558   INR  1.02     Results from last 7 days   Lab Units 07/09/25  1420 07/09/25  1043 07/09/25  0603   POC GLUCOSE mg/dl 222* 188* 119       Imaging Results Review: No pertinent imaging studies reviewed.  Other Study Results Review: No additional pertinent studies reviewed.    Review of Scheduled Meds: Medications  reviewed and reconciled as needed  Current Facility-Administered Medications   Medication Dose Route Frequency Provider Last Rate    acetaminophen  650 mg Oral Q8H PRN Ashley Depadua, MD      aspirin  81 mg Oral Daily Nadiya Shah, PA-C      benzonatate  200 mg Oral TID PRN Nadiya Shah, PA-C      bisacodyl  10 mg Rectal Daily PRN Nadiya Shah, PA-C      vitamin B-12  1,000 mcg Oral Daily Nadiya Shah, PA-C      enoxaparin  40 mg Subcutaneous Daily Nadiya Shah, PA-C      ergocalciferol  50,000 Units Oral Weekly Nadiya Shah, PA-C      famotidine  20 mg Oral BID Nadiya Shah, PA-C      fluticasone  2 spray Each Nare BID Nadiya Shah, PA-C      folic acid  1 mg Oral Daily Nadiya Garberes, PA-C      gabapentin  100 mg Oral HS Nadiya Shah, PA-C      ibuprofen  400 mg Oral Q6H PRN Robert Bo MD      insulin glargine  5 Units Subcutaneous HS Nadiya Shah, PA-C      insulin lispro  1-5 Units Subcutaneous HS Nadiya Garberes, PA-C      insulin lispro  1-6 Units Subcutaneous TID AC Nadiya Shah, PA-C      loratadine  10 mg Oral Daily Nadiya Garberes, PA-C      menthol-methyl salicylate   Apply externally 4x Daily Nadiya Garberes, PA-C      metFORMIN  1,000 mg Oral BID With Meals Bettie Lyon PA-C      methocarbamol  500 mg Oral Q6H PRN Nadiya Shah, PA-C      metoprolol tartrate  50 mg Oral Q12H TERRENCE Nadiya Shah PA-C      ondansetron  4 mg Oral Q6H PRN Rayne  ZACH Da Silva      ondansetron  4 mg Oral Once Aroldo Wyatt MD      oxyCODONE  2.5 mg Oral Q4H PRN Ashley Depadua, MD      Or    oxyCODONE  5 mg Oral Q4H PRN Ashley Depadua, MD      polyethylene glycol  17 g Oral Daily PRN Ashley Depadua, MD      prasugrel  10 mg Oral Daily Nadiya Shah PA-C      senna  2 tablet Oral HS PRN Robert Bo MD      sodium chloride  500 mL Intravenous Once Bettie Lyon PA-C      thiamine  100 mg Oral Daily Nadiya Shah PA-C         VTE Pharmacologic Prophylaxis: Lovenox  Code Status: Level 1 - Full Code  Current Length of Stay: 7 day(s)    Administrative Statements   I have spent a total time of 35 minutes in caring for this patient on the day of the visit/encounter including Risks and benefits of tx options, Patient and family education, Importance of tx compliance, Counseling / Coordination of care, Documenting in the medical record, Reviewing/placing orders in the medical record (including tests, medications, and/or procedures), Obtaining or reviewing history  , and Communicating with other healthcare professionals .  ** Please Note:  voice to text software may have been used in the creation of this document. Although proof errors in transcription or interpretation are a potential of such software**

## 2025-07-09 NOTE — ASSESSMENT & PLAN NOTE
Xray done on 6/24/25 = + glenohumeral DJD, no fracture  Bengay ordered per PMR  Patient may benefit from out-patient orthopedic evaluation for local injections for pain control.

## 2025-07-09 NOTE — ASSESSMENT & PLAN NOTE
7/3 with episode of N/V late in the day with sinus tachycardia to the 130s  - Transient AMS  - Had large BM in the AM  - AM dose of metoprolol had not been giving  - At this time no further episodes.   > Got metoprolo and zofran  > Given a gentle IVF bolus for any dehydration  > Repaet CMP today unremarkable with improved LFTs. Ammonia 61  > CBC with WBC WNL, but hgb down to 9.8 (after getting IVF)    - Recheck in the AM  > Mag WNL  > CTH unremarkable, save for some possible sinusitis  > Monitor for now.   > Has reglan ordered PRN.

## 2025-07-09 NOTE — ASSESSMENT & PLAN NOTE
Lab Results   Component Value Date    HGBA1C 8.0 (H) 06/24/2025       Recent Labs     07/08/25  1020 07/08/25  1535 07/08/25  2107 07/09/25  0603   POCGLU 268* 146* 281* 119       Blood Sugar Average: Last 72 hrs:  (P) [id number]: Metformin 1000mg daily  -Unclear if family checks BG at home or if he has a kit.   >Here: Lantus 5 units QHS, CDI/Accuchecks  >Goal to transition back to home regimen.  Reviewed with IM  >Diabetic Diet  >Management as per IM

## 2025-07-10 PROBLEM — R00.0 TACHYCARDIA: Status: ACTIVE | Noted: 2025-07-10

## 2025-07-10 LAB
ALBUMIN SERPL BCG-MCNC: 2.4 G/DL (ref 3.5–5)
ALBUMIN SERPL BCG-MCNC: 2.4 G/DL (ref 3.5–5)
ALP SERPL-CCNC: 157 U/L (ref 34–104)
ALP SERPL-CCNC: 157 U/L (ref 34–104)
ALT SERPL W P-5'-P-CCNC: 18 U/L (ref 7–52)
ALT SERPL W P-5'-P-CCNC: 18 U/L (ref 7–52)
ANION GAP SERPL CALCULATED.3IONS-SCNC: 5 MMOL/L (ref 4–13)
ANION GAP SERPL CALCULATED.3IONS-SCNC: 5 MMOL/L (ref 4–13)
AST SERPL W P-5'-P-CCNC: 104 U/L (ref 13–39)
AST SERPL W P-5'-P-CCNC: 104 U/L (ref 13–39)
ATRIAL RATE: 108 BPM
BILIRUB SERPL-MCNC: 2.53 MG/DL (ref 0.2–1)
BILIRUB SERPL-MCNC: 2.53 MG/DL (ref 0.2–1)
BUN SERPL-MCNC: 6 MG/DL (ref 5–25)
BUN SERPL-MCNC: 6 MG/DL (ref 5–25)
CALCIUM ALBUM COR SERPL-MCNC: 9.3 MG/DL (ref 8.3–10.1)
CALCIUM ALBUM COR SERPL-MCNC: 9.3 MG/DL (ref 8.3–10.1)
CALCIUM SERPL-MCNC: 8 MG/DL (ref 8.4–10.2)
CALCIUM SERPL-MCNC: 8 MG/DL (ref 8.4–10.2)
CHLORIDE SERPL-SCNC: 103 MMOL/L (ref 96–108)
CHLORIDE SERPL-SCNC: 103 MMOL/L (ref 96–108)
CO2 SERPL-SCNC: 26 MMOL/L (ref 21–32)
CO2 SERPL-SCNC: 26 MMOL/L (ref 21–32)
CREAT SERPL-MCNC: 0.74 MG/DL (ref 0.6–1.3)
CREAT SERPL-MCNC: 0.74 MG/DL (ref 0.6–1.3)
FERRITIN SERPL-MCNC: 475 NG/ML (ref 30–336)
FERRITIN SERPL-MCNC: 475 NG/ML (ref 30–336)
GFR SERPL CREATININE-BSD FRML MDRD: 98 ML/MIN/1.73SQ M
GFR SERPL CREATININE-BSD FRML MDRD: 98 ML/MIN/1.73SQ M
GLUCOSE P FAST SERPL-MCNC: 104 MG/DL (ref 65–99)
GLUCOSE P FAST SERPL-MCNC: 104 MG/DL (ref 65–99)
GLUCOSE SERPL-MCNC: 104 MG/DL (ref 65–140)
GLUCOSE SERPL-MCNC: 104 MG/DL (ref 65–140)
GLUCOSE SERPL-MCNC: 111 MG/DL (ref 65–140)
GLUCOSE SERPL-MCNC: 111 MG/DL (ref 65–140)
GLUCOSE SERPL-MCNC: 126 MG/DL (ref 65–140)
GLUCOSE SERPL-MCNC: 126 MG/DL (ref 65–140)
GLUCOSE SERPL-MCNC: 189 MG/DL (ref 65–140)
GLUCOSE SERPL-MCNC: 189 MG/DL (ref 65–140)
GLUCOSE SERPL-MCNC: 221 MG/DL (ref 65–140)
GLUCOSE SERPL-MCNC: 221 MG/DL (ref 65–140)
IRON SATN MFR SERPL: 34 % (ref 15–50)
IRON SATN MFR SERPL: 34 % (ref 15–50)
IRON SERPL-MCNC: 57 UG/DL (ref 50–212)
IRON SERPL-MCNC: 57 UG/DL (ref 50–212)
P AXIS: 44 DEGREES
P AXIS: 44 DEGREES
P AXIS: 47 DEGREES
P AXIS: 47 DEGREES
POTASSIUM SERPL-SCNC: 3.6 MMOL/L (ref 3.5–5.3)
POTASSIUM SERPL-SCNC: 3.6 MMOL/L (ref 3.5–5.3)
PR INTERVAL: 150 MS
PR INTERVAL: 150 MS
PR INTERVAL: 158 MS
PR INTERVAL: 158 MS
PROCALCITONIN SERPL-MCNC: 1.35 NG/ML
PROCALCITONIN SERPL-MCNC: 1.35 NG/ML
PROT SERPL-MCNC: 5.8 G/DL (ref 6.4–8.4)
PROT SERPL-MCNC: 5.8 G/DL (ref 6.4–8.4)
QRS AXIS: 19 DEGREES
QRS AXIS: 19 DEGREES
QRS AXIS: 20 DEGREES
QRS AXIS: 20 DEGREES
QRSD INTERVAL: 60 MS
QT INTERVAL: 336 MS
QT INTERVAL: 336 MS
QT INTERVAL: 338 MS
QT INTERVAL: 338 MS
QTC INTERVAL: 451 MS
QTC INTERVAL: 451 MS
QTC INTERVAL: 453 MS
QTC INTERVAL: 453 MS
SODIUM SERPL-SCNC: 134 MMOL/L (ref 135–147)
SODIUM SERPL-SCNC: 134 MMOL/L (ref 135–147)
T WAVE AXIS: 32 DEGREES
TIBC SERPL-MCNC: 166.6 UG/DL (ref 250–450)
TIBC SERPL-MCNC: 166.6 UG/DL (ref 250–450)
TRANSFERRIN SERPL-MCNC: 119 MG/DL (ref 203–362)
TRANSFERRIN SERPL-MCNC: 119 MG/DL (ref 203–362)
UIBC SERPL-MCNC: 110 UG/DL (ref 155–355)
UIBC SERPL-MCNC: 110 UG/DL (ref 155–355)
VENTRICULAR RATE: 108 BPM

## 2025-07-10 PROCEDURE — 93005 ELECTROCARDIOGRAM TRACING: CPT

## 2025-07-10 PROCEDURE — 97535 SELF CARE MNGMENT TRAINING: CPT

## 2025-07-10 PROCEDURE — 82948 REAGENT STRIP/BLOOD GLUCOSE: CPT

## 2025-07-10 PROCEDURE — 93010 ELECTROCARDIOGRAM REPORT: CPT | Performed by: INTERNAL MEDICINE

## 2025-07-10 PROCEDURE — 83540 ASSAY OF IRON: CPT | Performed by: PHYSICIAN ASSISTANT

## 2025-07-10 PROCEDURE — 97110 THERAPEUTIC EXERCISES: CPT

## 2025-07-10 PROCEDURE — 80053 COMPREHEN METABOLIC PANEL: CPT | Performed by: PHYSICIAN ASSISTANT

## 2025-07-10 PROCEDURE — 84145 PROCALCITONIN (PCT): CPT

## 2025-07-10 PROCEDURE — 97116 GAIT TRAINING THERAPY: CPT

## 2025-07-10 PROCEDURE — 99233 SBSQ HOSP IP/OBS HIGH 50: CPT | Performed by: PHYSICIAN ASSISTANT

## 2025-07-10 PROCEDURE — 97530 THERAPEUTIC ACTIVITIES: CPT

## 2025-07-10 PROCEDURE — 99233 SBSQ HOSP IP/OBS HIGH 50: CPT | Performed by: PHYSICAL MEDICINE & REHABILITATION

## 2025-07-10 PROCEDURE — 83550 IRON BINDING TEST: CPT | Performed by: PHYSICIAN ASSISTANT

## 2025-07-10 PROCEDURE — 82728 ASSAY OF FERRITIN: CPT | Performed by: PHYSICIAN ASSISTANT

## 2025-07-10 RX ORDER — ALBUMIN (HUMAN) 12.5 G/50ML
25 SOLUTION INTRAVENOUS ONCE
Status: COMPLETED | OUTPATIENT
Start: 2025-07-10 | End: 2025-07-10

## 2025-07-10 RX ORDER — DOCUSATE SODIUM 100 MG/1
100 CAPSULE, LIQUID FILLED ORAL 2 TIMES DAILY
Status: DISCONTINUED | OUTPATIENT
Start: 2025-07-10 | End: 2025-07-15 | Stop reason: HOSPADM

## 2025-07-10 RX ADMIN — PRASUGREL 10 MG: 10 TABLET, FILM COATED ORAL at 08:21

## 2025-07-10 RX ADMIN — CYANOCOBALAMIN TAB 500 MCG 1000 MCG: 500 TAB at 08:21

## 2025-07-10 RX ADMIN — MUSCLE RUB CREAM 1 APPLICATION: 100; 150 CREAM TOPICAL at 22:18

## 2025-07-10 RX ADMIN — INSULIN LISPRO 1 UNITS: 100 INJECTION, SOLUTION INTRAVENOUS; SUBCUTANEOUS at 12:11

## 2025-07-10 RX ADMIN — OXYCODONE HYDROCHLORIDE 5 MG: 5 TABLET ORAL at 18:04

## 2025-07-10 RX ADMIN — FLUTICASONE PROPIONATE 2 SPRAY: 50 SPRAY, METERED NASAL at 08:22

## 2025-07-10 RX ADMIN — FOLIC ACID 1 MG: 1 TABLET ORAL at 08:21

## 2025-07-10 RX ADMIN — THIAMINE HCL TAB 100 MG 100 MG: 100 TAB at 08:21

## 2025-07-10 RX ADMIN — GABAPENTIN 100 MG: 100 CAPSULE ORAL at 22:16

## 2025-07-10 RX ADMIN — OXYCODONE HYDROCHLORIDE 5 MG: 5 TABLET ORAL at 05:07

## 2025-07-10 RX ADMIN — DOCUSATE SODIUM 100 MG: 100 CAPSULE, LIQUID FILLED ORAL at 14:16

## 2025-07-10 RX ADMIN — ACETAMINOPHEN 650 MG: 325 TABLET ORAL at 05:07

## 2025-07-10 RX ADMIN — INSULIN LISPRO 2 UNITS: 100 INJECTION, SOLUTION INTRAVENOUS; SUBCUTANEOUS at 17:12

## 2025-07-10 RX ADMIN — FAMOTIDINE 20 MG: 20 TABLET, FILM COATED ORAL at 08:21

## 2025-07-10 RX ADMIN — OXYCODONE HYDROCHLORIDE 5 MG: 5 TABLET ORAL at 09:24

## 2025-07-10 RX ADMIN — SODIUM CHLORIDE 500 ML: 0.9 INJECTION, SOLUTION INTRAVENOUS at 20:12

## 2025-07-10 RX ADMIN — ENOXAPARIN SODIUM 40 MG: 40 INJECTION SUBCUTANEOUS at 08:21

## 2025-07-10 RX ADMIN — METFORMIN HYDROCHLORIDE 1000 MG: 500 TABLET ORAL at 08:21

## 2025-07-10 RX ADMIN — LORATADINE 10 MG: 10 TABLET ORAL at 08:21

## 2025-07-10 RX ADMIN — ALBUMIN (HUMAN) 25 G: 0.25 INJECTION, SOLUTION INTRAVENOUS at 16:44

## 2025-07-10 RX ADMIN — FLUTICASONE PROPIONATE 2 SPRAY: 50 SPRAY, METERED NASAL at 17:12

## 2025-07-10 RX ADMIN — MUSCLE RUB CREAM: 100; 150 CREAM TOPICAL at 08:22

## 2025-07-10 RX ADMIN — FAMOTIDINE 20 MG: 20 TABLET, FILM COATED ORAL at 17:12

## 2025-07-10 RX ADMIN — METFORMIN HYDROCHLORIDE 1000 MG: 500 TABLET ORAL at 17:12

## 2025-07-10 RX ADMIN — ASPIRIN 81 MG CHEWABLE TABLET 81 MG: 81 TABLET CHEWABLE at 08:21

## 2025-07-10 RX ADMIN — MUSCLE RUB CREAM 1 APPLICATION: 100; 150 CREAM TOPICAL at 12:11

## 2025-07-10 RX ADMIN — METOPROLOL TARTRATE 50 MG: 50 TABLET, FILM COATED ORAL at 22:16

## 2025-07-10 RX ADMIN — INSULIN GLARGINE 5 UNITS: 100 INJECTION, SOLUTION SUBCUTANEOUS at 22:17

## 2025-07-10 RX ADMIN — MUSCLE RUB CREAM: 100; 150 CREAM TOPICAL at 17:12

## 2025-07-10 NOTE — PROGRESS NOTES
Progress Note - PMR   Name: Aquilino Dowell 64 y.o. male I MRN: [id number]  Unit/Bed#: -01 I Date of Admission: 7/2/2025   Date of Service: 7/10/2025 I Hospital Day: 8     Assessment & Plan  Peripheral neuropathy  Fall  - Has been increasingly off balance at home  - CT C-Spine without significant degenerative diseaes  - CTH with only chronic microangiopathic change  - Previous EMG showed axonal and demyelinating neuropathy + tremor, Neuro was concerned for anti-MAG neuropathy back in 2023   - They had discussed getting demyelinating neuropathy panel, but not completed as he did not f/u.   - Suspect component also related to T2DM  - Has impaired vision as well  - Has had a history of Vitamin B12 deficiency previously contributing as well (6/25 level 940)  - Also has history of alcohol use disorder  - Unclear etiology of fall per patient.   > Ambulatory dysfunction I suspect is driven by progression of his neuropathy in addition to worsening vision impacting his ability to compensate  > PT/OT 3-5 hours/day, 5-7 days/week.  > Also has a component of deconditioning and worsening proximal weakness related to increasingly sedentary lifestyle (this is 2/2 I suspect to some depression since losing his job in November).   > Gabapentin 100mg QHS for shooting pains (higher doses made him too lethargic)  > Alcohol cessation as that can progress neuropathy  > Continue B12 supplement, optimize diabetes management.   > Check orthostatics here   > Recommend f/u with Neuromuscular Neurology   Left shoulder pain  -Examines like bicipital tendinosis with anterior pain, positive speeds, yergason as well as possible rotator cuff pathology with positive Fitzgerald  -Could also have other rotator cuff involvement/tendinopathy with positive neers (not +7/8), mild external rotation weakness.  -Family reports chronic L shoulder issues but worse since the fall.  Patient reports no significant history of any repetitive use.  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  -6/24 XR without acute osseous abnormality   > Plan for outpatient MRI  > Considerations in therapy   > Bengay QID  > Will start ibuprofen 400 mg as needed every 6 hours for breakthrough left shoulder pain.  Constipation  Last BM 7/6 (per RN, patient did have bowel movement on 7/8.)  >Continue miralax daily and senna PRN, suppository PRN (Last adjusted 7/7/2025, senna now as needed)  >Monitor and adjust as appropriate  At risk for venous thromboembolism (VTE)  >Lovenox, SCDs, ambulation  >Will not go home on lovenox.  >Monitor platelet count closely.   Depression  -Has lost interests he previously had, sleep has been poor, energy levels/motivation poor, feelings of guilt and frustration.   -Appetite worse  -All since he lost his job in November.  -Wife has noticed a sharp decline  -Denies suicidal ideation/self-harm.  > Discussed trial of SNRI (cymbalta may also help with pain)  > He would like to defer for now.   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   - Next due 7/14  -This likely is contributing to his imbalance in the setting of his peripheral neuropathy  > Previous notes show he worked with Dr. Mart Garcia [facility] in [place].  > Would call their office to see if they are ok with transport from rehab to perform procedure on Monday.   Coronary artery disease  -S/p stent/cath 2013  -Home: Prasugrel 10mg daily, ASA 81mg daily   >Here: Same   Diabetes ([facility])  Lab Results   Component Value Date    HGBA1C 8.0 (H) 06/24/2025       Recent Labs     07/09/25  1530 07/09/25  2050 07/10/25  0606 07/10/25  1039   POCGLU 223* 230* 111 189*       Blood Sugar Average: Last 72 hrs:  (P)  [id number]: Metformin 1000mg daily  -Unclear if family checks BG at home or if he has a kit.   >Here: Lantus 5 units QHS, CDI/Accuchecks  >Goal to transition back to home regimen.  Reviewed with IM  >Diabetic Diet  >Management as per IM  Alcohol use disorder  -Denies history of withdrawal  >Cessation counseling  Hypertension  -Home: Toprol 50mg BID  >Here: Same  >Monitor and adjust as per IM  Sinusitis  >Flonase, claritin  Hyperbilirubinemia  -And chronic transaminitis  -In setting of alcohol use disorder  -RUQ US with steatosis and hepatomegaly  > Counseled on cessation  > minimize hepatotoxic meds  > Outpatient f/u with GI recommended  Vitamin D deficiency  -6/27 12.8  >Ergocalciferol weekly for 12 weeks  >Repeat with PCP   Lung nodule  -Noted incidentally on imaging  >Recommended for f/u chest CT in 12 months  Tobacco use disorder  -Quit smoking in the 90s  -Now chewing tobacco.  >Nicotine patch  >Cessation counseling.   Orthostatic hypotension  - Did have episode of symptomatic orthostatic hypotension on 7/9 during therapies.  - Patient received half liter bolus, subjective improvement.  > Encourage oral hydration  > Volume optimization as per IM    Health Maintenance  #Delirium/Sleep: At risk. Optimize sleep/wake, pain, bowel, bladder management. Avoid deliriogenic meds and physical restraint. Environmental/behavioral interventions.   #Bladder: Voiding and Continent  #Skin/Pressure Injury Prevention: Turn Q2hr in bed, with weight shifts [id number] in wheelchair. Float heels in bed.  #GI Prophylaxis: Not indicated  #Code Status: Full Code  #FEN: Diabetic Diet   #Dispo: Teams 7/8, ADD ~7/16 with OP PT/OT. Transport will be provided to patient's appointment on 7/14 at 0815 at [facility] Dr. Garcia                  > F/U PCP, Neuromuscular, Orthopedics, Psych    Chief Complaint: Feels better after IV fluids yesterday    Interval History/Subjective:  Patient seen and examined at bedside. No  acute events overnight. Vital signs reviewed and patient was tachycardic intermittently overnight and this morning.  Patient reports feeling a little bit better this morning after receiving fluids yesterday.  Still reports left shoulder pain does improve with pain regimen.  Patient reports no other acute issues or concerns overnight, including fevers, chills, chest pain, shortness of breath. Adequate urine output overnight w/ continent voids. Last BM Date: 07/08/25 (per RN, not charted), continent.     Functional Update:   PT: CGA transfers, Sup bed mobility, CGA ambulation 120' with RW, Petra stairs, Petra ramp  OT: Ind eating, CGA grooming, Petra bathing, Sup UB dressing, CGA LB dressing, Petra toileting, CGA-Petra toilet transfers        Objective     Temp:  [97.5 °F (36.4 °C)-99.5 °F (37.5 °C)] 97.9 °F (36.6 °C)  HR:  [] 102  Resp:  [18-20] 20  BP: ()/(58-84) 147/74  SpO2:  [96 %-98 %] 97 %    GENERAL: alert, no apparent distress, cooperative, and comfortable  HEENT:  Normocephalic, no lesions, without obvious abnormality.  CARDIAC:  regular rate and rhythm, S1, S2 normal, no murmur, click, rub or gallop  LUNGS:  no abnormal respiratory pattern, no retractions noted, non-labored breathing   ABDOMEN: Distended, nontender, increased bowel sounds in LLQ  EXTREMITIES:  extremities normal, warm and well-perfused; bilateral edema  NEURO:  clear speech, following all commands, oriented x4   PSYCH:  Normal and appropriate affect    Physical examination is otherwise unchanged from previous encounter, except as noted above.    Scheduled Meds:  Current Facility-Administered Medications   Medication Dose Route Frequency Provider Last Rate    acetaminophen  650 mg Oral Q8H PRN Ashley Depadua, MD      aspirin  81 mg Oral Daily Nadiya Shah PA-C      benzonatate  200 mg Oral TID PRN Nadiya Shah PA-C      bisacodyl  10 mg Rectal Daily PRN Nadiya Shah PA-C      vitamin B-12  1,000 mcg Oral Daily Nadiya  Alyssa, PA-C      enoxaparin  40 mg Subcutaneous Daily Nadiya Shah, PA-C      ergocalciferol  50,000 Units Oral Weekly Nadiya Shah, PA-C      famotidine  20 mg Oral BID Nadiya Shah, PA-C      fluticasone  2 spray Each Nare BID Nadiya Shah, PA-C      folic acid  1 mg Oral Daily Nadiya Shah, PA-C      gabapentin  100 mg Oral HS Nadiya Shah, PA-C      ibuprofen  400 mg Oral Q6H PRN Robert Bo MD      insulin glargine  5 Units Subcutaneous HS Nadiya Shah, PA-C      insulin lispro  1-5 Units Subcutaneous HS Nadiya Shah, PA-C      insulin lispro  1-6 Units Subcutaneous TID AC Nadiya Shah, PA-C      loratadine  10 mg Oral Daily Nadiya Shah, PA-C      menthol-methyl salicylate   Apply externally 4x Daily Nadiya Shah, PA-C      metFORMIN  1,000 mg Oral BID With Meals Bettie Lyon, ZACH      methocarbamol  500 mg Oral Q6H PRN Nadiya Shah, PA-C      metoprolol tartrate  50 mg Oral Q12H TERRENCE Nadiya Shah, PA-C      ondansetron  4 mg Oral Q6H PRN Rayne Da Silva, PA-MARS      ondansetron  4 mg Oral Once Aroldo Wyatt MD      oxyCODONE  2.5 mg Oral Q4H PRN Ashley Depadua, MD      Or    oxyCODONE  5 mg Oral Q4H PRN Ashley Depadua, MD      polyethylene glycol  17 g Oral Daily PRN Ashley Depadua, MD      prasugrel  10 mg Oral Daily Nadiya Shah, PA-C      senna  2 tablet Oral HS PRN Robert Bo MD      thiamine  100 mg Oral Daily Nadiya Shah, ZACH       Imaging: Reviewed, no new imaging        Lab Results: I have reviewed the following results:  Results from last 7 days   Lab Units 07/08/25  0518 07/05/25  0559 07/04/25  0558   HEMOGLOBIN g/dL 10.9* 9.7* 9.8*   HEMATOCRIT % 34.2* 31.1* 30.7*   WBC Thousand/uL 5.64 5.28 5.84   PLATELETS Thousands/uL 143* 131* 143*     Results from last 7 days   Lab Units 07/10/25  0610 07/08/25  0518 07/04/25  0558   BUN mg/dL 6 5 7   SODIUM mmol/L 134* 135 137   POTASSIUM mmol/L 3.6 3.6 4.1   CHLORIDE mmol/L 103 103 106    CREATININE mg/dL 0.74 0.81 0.80   AST U/L 104* 119* 113*   ALT U/L 18 21 24       Results from last 7 days   Lab Units 07/04/25  0558   PROTIME seconds 13.7   INR  1.02        0658}      ** Please Note: Fluency Direct voice to text software may have been used in the creation of this document. **

## 2025-07-10 NOTE — NURSING NOTE
[facility]  RN Shift Note        Vitals  Vital Signs  Temperature: 98.3 °F (36.8 °C)  Temp Source: Oral  Pulse: (!) 112 (SLIM aware)  Heart Rate Source: Monitor  Respirations: 20  Blood Pressure: 134/82  MAP (mmHg): 92  BP Location: Right arm  BP Method: Manual  Patient Position - Orthostatic VS: Sitting        Follow up/Interventions- elevated resting HR today.  EKG done.  Albumin ordered and given.  500ml bolus to be given overnight.  Labs scheduled for AM   Pain Pain Score: 3  Hospital Pain Intervention(s): Medication (See MAR)       GI   (WNL/X)  LBM  Incontinence (yes/no)     Gastrointestinal (WDL): Within Defined Limits  LBM 7/8/2025  Bowel Incontinence: No    Follow up/Interventions: Scheduled Colace added      (WNL/X)  Incontinence (yes/no)  Bladder Scan  Urine Output  Shift Total Output  Unmeasured Occurrence   Genitourinary (WDL): Within Defined Limits  Urinary Incontinence: No    Urine: 200 mL    Unmeasured Urine Occurrence: 1         Nutrition  Diet/Precautions   PO Intake  Meal Consumption   Nutrition  Feeding: Able to feed self  Diet Type: Diabetic  Appetite: Fair  P.O.: 480 mL  Percent Meals Eaten (%): 50      Follow up- Pt needs encouragement for fluids and PO intake   LDA  Drain Output              Skin   (WNL/X)  Integrity  Pressure Injury YES/NO: no     Integumentary (WDL): Within Defined Limits  Skin Integrity: Bruising  Description      Behavior/Mood  Behavior log YES/NO: no Patient Behaviors/Mood: Flat affect       Sleep Pattern  Sleep log no        Education  Medication/Device        ADL/Mobility Summary  (transfer, bed mobility, ambulation)   Min A w/ RW     Miscellaneous    TEDs for Edema  Pt has an outpatient eye disha. Monday at 815am.  Will schedule transport for 730-055.  PCA will go with pt  DC 7/16

## 2025-07-10 NOTE — PROGRESS NOTES
07/10/25 1230   Pain Assessment   Pain Assessment Tool 0-10   Pain Score 4   Pain Location/Orientation Orientation: Left;Location: Shoulder   Restrictions/Precautions   Precautions Bed/chair alarms;Cognitive;Fall Risk;Pain;Supervision on toilet/commode;Visual deficit   Braces or Orthoses   (BLE ACE wraps)   Subjective   Subjective pt agreeable to perform skilled PT   Roll Left and Right   Type of Assistance Needed Supervision   Roll Left and Right CARE Score 4   Sit to Lying   Type of Assistance Needed Incidental touching   Sit to Lying CARE Score 4   Lying to Sitting on Side of Bed   Type of Assistance Needed Supervision   Lying to Sitting on Side of Bed CARE Score 4   Sit to Stand   Type of Assistance Needed Incidental touching   Comment CG  VC for hand placement   Sit to Stand CARE Score 4   Bed-Chair Transfer   Type of Assistance Needed Incidental touching;Adaptive equipment   Comment RW   Chair/Bed-to-Chair Transfer CARE Score 4   Transfer Bed/Chair/Wheelchair   Adaptive Equipment Roller Walker   Walk 10 Feet   Type of Assistance Needed Incidental touching;Adaptive equipment   Comment RW   Walk 10 Feet CARE Score 4   Walk 50 Feet with Two Turns   Type of Assistance Needed Incidental touching;Adaptive equipment   Comment RW   Walk 50 Feet with Two Turns CARE Score 4   Walk 150 Feet   Reason if not Attempted Safety concerns   Walk 150 Feet CARE Score 88   Ambulation   Primary Mode of Locomotion Prior to Admission Walk   Distance Walked (feet) 50 ft  (x2)   Assist Device Roller Walker   Gait Pattern Slow Vaishali;Decreased foot clearance;Improper weight shift;Shuffle   Does the patient walk? 2. Yes   Wheel 50 Feet with Two Turns   Reason if not Attempted Activity not applicable   Wheel 50 Feet with Two Turns CARE Score 9   Wheel 150 Feet   Reason if not Attempted Activity not applicable   Wheel 150 Feet CARE Score 9   Wheelchair mobility   Does the patient use a wheelchair? 0. No   Toilet Transfer   Type of  Assistance Needed Incidental touching   Comment STOOD TO VOID   Toilet Transfer CARE Score 4   Therapeutic Interventions   Strengthening seated 2# LAQ AP marching heel rasies 20- reps all exs   Flexibility manual stretch LE   Balance standing balance with RW marching   Equipment Use   NuStep lvl 2 for 10 min   Assessment   Treatment Assessment pt perform skilled PT and BP 95/70  in sitting , pt HR at 117-121 post walking and at rest after walking 50 feet  HR in sitting position at rest 114 manual .Nsging aware, and pt under monitoring for possible EKG . pt perform TE ex' s and overall a little better this AM session then yesterday . Pt will cont toward DC goals and monitor BP and HR next session   Barriers to Discharge Decreased caregiver support;Inaccessible home environment   Plan   Progress Slow progress, medical status limitations   PT Therapy Minutes   PT Time In 1230   PT Time Out 1330   PT Total Time (minutes) 60   PT Mode of treatment - Individual (minutes) 60   PT Mode of treatment - Concurrent (minutes) 0   PT Mode of treatment - Group (minutes) 0   PT Mode of treatment - Co-treat (minutes) 0   PT Mode of Treatment - Total time(minutes) 60 minutes   PT Cumulative Minutes 570   Therapy Time missed   Time missed? No

## 2025-07-10 NOTE — ASSESSMENT & PLAN NOTE
Seen on CT head done in ED and  [facility] 7/3/25  No complaints of increased sinus pressure or headaches, he and his wife state he always has some degree of chronic sinus issues but this has been for years  Continue flonase, claritin

## 2025-07-10 NOTE — PROGRESS NOTES
Progress Note - Hospitalist   Name: Aquilino Dowell 64 y.o. male I MRN: [id number]  Unit/Bed#: -01 I Date of Admission: 7/2/2025   Date of Service: 7/10/2025 I Hospital Day: 8    Assessment & Plan  Diabetes (HCC)  Lab Results   Component Value Date    HGBA1C 8.0 (H) 06/24/2025     Recent Labs     07/09/25  2050 07/10/25  0606 07/10/25  1039 07/10/25  1605   POCGLU 230* 111 189* 221*     Home: Metformin 1000mg daily. In hospital he was on lantus 6U daily while the home metformin was on hold  Patient extremely frustrated with high blood sugars.  Initially resumed Metformin 1000 mg daily but now increased to BID  Continue accuchecks and SSI for now.    Likely need to discontinue Lantus as I would not recommend discharging patient on insulin however blood sugars still running high so won't d/c the lantus yet  Consider addition of SGLT such as Jardiance for tighter glycemic control in the patient with a history of CAD.  Continue to monitor LFTs closely and if there is any concern their elevation is due to Metformin, I would recommend endocrine consult for treatment recommendations.  Fall  S/p fall at home ambulating to the bathroom  Uses a cane at baseline due to LE neuropathy and vision issues  PT/OT  Left shoulder pain  Xray done on 6/24/25 = + glenohumeral DJD, no fracture  Bengay ordered per PMR  Patient may benefit from out-patient orthopedic evaluation for local injections for pain control.  Coronary artery disease  S/p cardiac cath 2013    95% prox LAD, 40% mid LAD, EF 60%, MANUEL to prox LAD (Xience Rx 3.5x18mm   Continue home Prasugrel, ASA  Patient has not been taking statin for several years and reports having a side effect; however, cannot recall what that was.  Thorough review of his medical record shows his cardiologist is aware that he was not taking it so discontinued it.  Alcohol use disorder  Per hospital chart, wife reported up to 1 bottle of wine per day  ETOH on admit 6/24 was normal  Was  on CIWA protocol  Continue B12, folic acid  Hypertension  Home: Lopressor 50 mg BID.  He was switched from lisinopril to metoprolol when he had an episode of NSVT.    Here: same  No changes today.  Patient does get orthostatic in therapy. Has legs wrapped. Getting albumin this evening  Sinusitis  Seen on CT head done in ED and  [facility] 7/3/25  No complaints of increased sinus pressure or headaches, he and his wife state he always has some degree of chronic sinus issues but this has been for years  Continue flonase, claritin  Back pain  MRI lumbar spine:  No discitis/osteomyelitis in lumbar spine, as clinically questioned.  Small focal areas of enhancement in the L1-L2 and L2-L3 supraspinous ligament regions, suspicious for enthesitis.  Mild multilevel degenerative changes of lumbar spine, as detailed above. No significant canal stenosis or foraminal narrowing  Pain meds/therapy per PMR  Hyperbilirubinemia  Peaked at 3.00 (7/8/25) today 2.53  Right UQ US- Hepatomegaly with steatosis, no liver mass  Recommend outpatient GI follow up  Continue to monitor closely once Metformin has been restarted.  Vitamin D deficiency  Continue supplement  Lung nodule  Found incidentally on CT scan  4mm right upper lobe nodule which is new  Will need repeat CT in 12 months  Tobacco use disorder  Encourage cessation at discharge.  Depression  Pt does not want to start a medication for mood.  Anemia  Appears stable.  Iron and iron sat normal  GERD (gastroesophageal reflux disease)  On PPI at home which was discontinued on admission due to elevated LFT's  Continue pepcid  Nail overgrowth    Orthostatic hypotension  7/9- While working with PT/OT today, patient became dizzy when sitting on edge of bed and standing.  BPs (+) for orthostasis.  Give 500 cc bolus NS now and monitor.  Patient with little PO intake.  Continue leg wraps  Polymicrobial bacteremia  Patient with 1 of 2 blood cultures positive for klebsiella and staph when admitted to  hospital on 6/24  Seen by ID in hospital, completed course of cefepime and vanco  Procal was ordered today by PMR due to tachycardia.   Procal up to 1.35  Will repeat blood cultures  Tachycardia  Therapy recorded a HR in the 120's this afternoon after patient did the stairs  On recheck after resting he is still 100-110  EKG done- sinus tach with   afebrile  Clinically he looks dry. Albumin low on recent lab work. Will order IV albumin and small IVF bolus overnight  Will order labs in AM including mag and CBC. Repeat blood cultures as he was treated recently for bacteremia  Continue current dose of metoprolol, hesitant to increase dose due to orthostasis     The above assessment and plan was reviewed and updated as determined by my evaluation of the patient on 7/10/2025.    History of Present Illness   Patient seen and examined. Patients overnight issues or events were reviewed with nursing staff. New or overnight issues include the following:   Patient seen this afternoon after therapy. He was tachycardic with HR's in the 120's per therapy. Patient denies feeling palpitations. No CP/SOB. No cough. No fever. No urinary symptoms. He is not eating or drinking well. Wife brought alycia    Review of Systems    Objective :  Temp:  [97.9 °F (36.6 °C)-99.5 °F (37.5 °C)] 98.3 °F (36.8 °C)  HR:  [] 112  BP: ()/(58-82) 134/82  Resp:  [18-20] 20  SpO2:  [96 %-98 %] 98 %  O2 Device: None (Room air)    Invasive Devices       Peripheral Intravenous Line  Duration             Peripheral IV 07/09/25 Distal;Dorsal (posterior);Left Forearm <1 day                    Physical Exam  General Appearance: NAD; pleasant  HEENT: PERRLA, conjuctiva normal; mucous membranes moist; face symmetrical  Neck:  Supple  Lungs: clear bilaterally, normal respiratory effort, no retractions, expiratory effort normal, on room air  CV: HR in low 100's, regular  ABD: soft non tender, +BS x4  EXT: DP pulses intact, no lymphadenopathy, no  edema  Skin: normal turgor, normal texture, no rash  Psych: affect normal, mood normal  Neuro: AAOx3    The above physical exam was reviewed and updated as determined by my evaluation of the patient on 7/10/2025.      Lab Results: I have reviewed the following results:  Results from last 7 days   Lab Units 07/08/25  0518 07/05/25  0559   WBC Thousand/uL 5.64 5.28   HEMOGLOBIN g/dL 10.9* 9.7*   HEMATOCRIT % 34.2* 31.1*   PLATELETS Thousands/uL 143* 131*     Results from last 7 days   Lab Units 07/10/25  0610 07/08/25  0518   SODIUM mmol/L 134* 135   POTASSIUM mmol/L 3.6 3.6   CHLORIDE mmol/L 103 103   CO2 mmol/L 26 22   BUN mg/dL 6 5   CREATININE mg/dL 0.74 0.81   CALCIUM mg/dL 8.0* 8.2*         Results from last 7 days   Lab Units 07/04/25  0558   INR  1.02     Results from last 7 days   Lab Units 07/10/25  1605 07/10/25  1039 07/10/25  0606   POC GLUCOSE mg/dl 221* 189* 111         Other Study Results Review: EKG was reviewed.     Review of Scheduled Meds: Medications  reviewed and reconciled as needed  Current Facility-Administered Medications   Medication Dose Route Frequency Provider Last Rate    acetaminophen  650 mg Oral Q8H PRN Ashley Depadua, MD      Albumin 25%  25 g Intravenous Once Nadiya Shah PA-C      aspirin  81 mg Oral Daily Nadiya Shah PA-C      benzonatate  200 mg Oral TID PRN Nadiya Shah PA-C      bisacodyl  10 mg Rectal Daily PRN Nadiya Shah, PA-C      vitamin B-12  1,000 mcg Oral Daily Nadiya Shah, PA-C      docusate sodium  100 mg Oral BID Robert Bo MD      enoxaparin  40 mg Subcutaneous Daily PEARL Bahena-MARS      ergocalciferol  50,000 Units Oral Weekly Nadiya Shah PA-C      famotidine  20 mg Oral BID Nadiya Shah PA-C      fluticasone  2 spray Each Nare BID Nadiya Shah PA-C      folic acid  1 mg Oral Daily Nadiya Shah, PA-C      gabapentin  100 mg Oral HS Nadiya Shah PA-C      ibuprofen  400 mg Oral Q6H PRN Robert Bo MD       insulin glargine  5 Units Subcutaneous HS Nadiya Shah, PA-C      insulin lispro  1-5 Units Subcutaneous HS Nadiya Shah, PA-C      insulin lispro  1-6 Units Subcutaneous TID AC Nadiya Shah, PA-C      loratadine  10 mg Oral Daily Nadiya Shah, PA-C      menthol-methyl salicylate   Apply externally 4x Daily Nadiyapawel GarberPEARL larios-MARS      metFORMIN  1,000 mg Oral BID With Meals Bettie Lyon PA-C      methocarbamol  500 mg Oral Q6H PRN Nadiya CortezevelynZACH peter      metoprolol tartrate  50 mg Oral Q12H TERRENCE Nadiya GarberZACH larios      ondansetron  4 mg Oral Q6H PRN Rayne Da Silva PA-C      ondansetron  4 mg Oral Once Aroldo Wyatt MD      oxyCODONE  2.5 mg Oral Q4H PRN Ashley Depadua, MD      Or    oxyCODONE  5 mg Oral Q4H PRN Ashley Depadua, MD      polyethylene glycol  17 g Oral Daily PRN Ashley Depadua, MD      prasugrel  10 mg Oral Daily Nadiya GarberZACH larios      senna  2 tablet Oral HS PRN Robert Bo MD      sodium chloride  500 mL Intravenous Once Nadiyapawel GarberZACH larios      thiamine  100 mg Oral Daily Nadiyapawel DoranZACH peter         VTE Pharmacologic Prophylaxis: lovenox  Code Status: Level 1 - Full Code  Current Length of Stay: 8 day(s)    Administrative Statements     ** Please Note:  voice to text software may have been used in the creation of this document. Although proof errors in transcription or interpretation are a potential of such software**

## 2025-07-10 NOTE — CASE MANAGEMENT
Met w/pt and reviewed team update and dc date for 7/16. Pt confirmed his wife was in for training yesterday, and that he did not feel well and he didn't do well with therapy. Pt feels he is dehydrated and the iv fluids helped tremendously. Cm encouraged PO intake as he will not be going home with iv fluids.  Pt did not accept any suggestions cm made for alternatives other than water. At home pt confirmed he drinks unsweetened iced tea and wine. Cm reviewed eye appmt for Monday for which cm will be scheduling transportation. Cm to follow up with recommendations based on progress.

## 2025-07-10 NOTE — ASSESSMENT & PLAN NOTE
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   - Next due 7/14  -This likely is contributing to his imbalance in the setting of his peripheral neuropathy  > Previous notes show he worked with Dr. Mart Garcia At [facility] in [place].  > Would call their office to see if they are ok with transport from rehab to perform procedure on Monday.

## 2025-07-10 NOTE — PROGRESS NOTES
07/10/25 1500   Pain Assessment   Pain Assessment Tool 0-10   Pain Score 3   Pain Location/Orientation Orientation: Left;Location: Shoulder   Restrictions/Precautions   Precautions Bed/chair alarms;Cognitive;Fall Risk;Pain;Supervision on toilet/commode;Visual deficit   Braces or Orthoses   (BLE ACE wraps)   Subjective   Subjective pt agreeable to perform skilled PT   Sit to Lying   Type of Assistance Needed Incidental touching   Sit to Lying CARE Score 4   Lying to Sitting on Side of Bed   Type of Assistance Needed Supervision   Lying to Sitting on Side of Bed CARE Score 4   Sit to Stand   Type of Assistance Needed Incidental touching   Comment CG RW VC for hand placement   Sit to Stand CARE Score 4   Bed-Chair Transfer   Type of Assistance Needed Incidental touching;Adaptive equipment   Comment RW   Chair/Bed-to-Chair Transfer CARE Score 4   Transfer Bed/Chair/Wheelchair   Adaptive Equipment Roller Walker   Walk 10 Feet   Type of Assistance Needed Incidental touching;Adaptive equipment   Comment RW with WC follow with his wife   Walk 10 Feet CARE Score 4   Walk 50 Feet with Two Turns   Type of Assistance Needed Incidental touching;Adaptive equipment   Comment RW with WC follow with his wife   Walk 50 Feet with Two Turns CARE Score 4   Ambulation   Primary Mode of Locomotion Prior to Admission Walk   Distance Walked (feet) 110 ft   Assist Device Roller Walker   Does the patient walk? 2. Yes   4 Steps   Type of Assistance Needed Physical assistance   Physical Assistance Level 25% or less   Comment  steps 6 steps with BL HR and descending BWD   4 Steps CARE Score 3   Assessment   Treatment Assessment pt overall progressing a little better today duirng ambulation using RW up to 100 feet and a little more but his HR at 136 post walking back to his room .Pt Wife concern about HR but nsging and MD recommend EKG  and awaiting reports . Pt cont to need skilled PT to meet DC goals before returning home with his  wife   Barriers to Discharge Decreased caregiver support;Inaccessible home environment   Plan   Progress Slow progress, medical status limitations   PT Therapy Minutes   PT Time In 1500   PT Time Out 1530   PT Total Time (minutes) 30   PT Mode of treatment - Individual (minutes) 30   PT Mode of treatment - Concurrent (minutes) 0   PT Mode of treatment - Group (minutes) 0   PT Mode of treatment - Co-treat (minutes) 0   PT Mode of Treatment - Total time(minutes) 30 minutes   PT Cumulative Minutes 600   Therapy Time missed   Time missed? No

## 2025-07-10 NOTE — ASSESSMENT & PLAN NOTE
Home: Lopressor 50 mg BID.  He was switched from lisinopril to metoprolol when he had an episode of NSVT.    Here: same  No changes today.  Patient does get orthostatic in therapy. Has legs wrapped. Getting albumin this evening

## 2025-07-10 NOTE — ASSESSMENT & PLAN NOTE
Lab Results   Component Value Date    HGBA1C 8.0 (H) 06/24/2025       Recent Labs     07/09/25  1530 07/09/25  2050 07/10/25  0606 07/10/25  1039   POCGLU 223* 230* 111 189*       Blood Sugar Average: Last 72 hrs:  (P) [id number]: Metformin 1000mg daily  -Unclear if family checks BG at home or if he has a kit.   >Here: Lantus 5 units QHS, CDI/Accuchecks  >Goal to transition back to home regimen.  Reviewed with IM  >Diabetic Diet  >Management as per IM

## 2025-07-10 NOTE — ASSESSMENT & PLAN NOTE
- Did have episode of symptomatic orthostatic hypotension on 7/9 during therapies.  - Patient received half liter bolus, subjective improvement.  > Encourage oral hydration  > Volume optimization as per IM

## 2025-07-10 NOTE — PROGRESS NOTES
07/10/25 0700   Pain Assessment   Pain Assessment Tool 0-10   Pain Score No Pain   Restrictions/Precautions   Precautions Bed/chair alarms;Cognitive;Fall Risk;Pain;Supervision on toilet/commode;Visual deficit   Weight Bearing Restrictions No   ROM Restrictions No   Oral Hygiene   Type of Assistance Needed Independent   Physical Assistance Level No physical assistance   Comment seated due to fluctuating  BPs   Oral Hygiene CARE Score 6   Shower/Bathe Self   Type of Assistance Needed Incidental touching   Physical Assistance Level No physical assistance   Comment sponge bathing routine completed per pt request. pt able to bathe all parts via Markr and CG when in stance to complete juan/rear.   Shower/Bathe Self CARE Score 4   Upper Body Dressing   Type of Assistance Needed Set-up / clean-up   Physical Assistance Level No physical assistance   Comment seated to don/doff shirt   Upper Body Dressing CARE Score 5   Lower Body Dressing   Type of Assistance Needed Incidental touching   Physical Assistance Level No physical assistance   Comment seated to thread shorts. stands with CG for CM   Lower Body Dressing CARE Score 4   Putting On/Taking Off Footwear   Type of Assistance Needed Physical assistance   Physical Assistance Level 26%-50%   Comment seated via cross leg tech, A for ace wraps   Putting On/Taking Off Footwear CARE Score 3   Sit to Stand   Type of Assistance Needed Incidental touching   Physical Assistance Level No physical assistance   Comment CG with RW   Sit to Stand CARE Score 4   Bed-Chair Transfer   Type of Assistance Needed Incidental touching   Physical Assistance Level No physical assistance   Comment CG with RW   Chair/Bed-to-Chair Transfer CARE Score 4   Exercise Tools   Other Exercise Tool 1 seated at table top, pt engages in light UE stretching/ROM completing table top pearl and 2.5#, completing x20 reps in all available planes. good tolerance to stretching/exercises with no c/o pain.  strengthening completed in order to increase strength/endurance for ADLs/transfers.   Cognition   Overall Cognitive Status WFL   Arousal/Participation Alert;Cooperative   Attention Attends with cues to redirect   Orientation Level Oriented X4   Memory Decreased short term memory;Decreased recall of recent events;Decreased recall of precautions   Following Commands Follows one step commands with increased time or repetition   Additional Activities   Additional Activities Comments pt engages in repetitive func transfers with RW, CG level, no LOB noted. repetitive transfers completed to increase general strength/endurance and activity tolerance. pt reports feeling better today vs yesterday.   Activity Tolerance   Activity Tolerance Patient tolerated treatment well   Medical Staff Made Aware RN informed of BP and charted in vitals tab.   Assessment   Treatment Assessment pt engages in 90 minute skilled OT session focusing on ADL retraining, func transfers, standing vaibhav/bal for self care tasks and light UE strengthening. see above for full func details. pt reports feeling better today vs yesterday after receiving IV fluids however fluctuations in BP noted with pt initially starting at 118/68 seated EOB dropping to 98/62 at end of bathing routine. BP returned to 108/58 post short distance ambulation and then dropping again to 96/60 at end of session. pt able to complete overall basic ADL routine at CG level, no LOB noted and asymptomatic of lower BPs. RN informed. ace wraps noted donned due to no order. RN to follow up. continue OT POC to focus on ADL Retraining, func transfers, standing vaibhav/bal, light UE strengthening, in order to decrease burden of care at d/c.   Prognosis Good   Problem List Decreased strength;Decreased range of motion;Decreased endurance;Impaired balance;Decreased mobility;Decreased cognition;Decreased coordination;Impaired judgement;Impaired vision;Decreased safety awareness;Impaired sensation;Pain    Plan   Treatment/Interventions ADL retraining;Functional transfer training;Therapeutic exercise;Endurance training;Cognitive reorientation;Patient/family training;Equipment eval/education;Compensatory technique education   OT Therapy Minutes   OT Time In 0700   OT Time Out 0830   OT Total Time (minutes) 90   OT Mode of treatment - Individual (minutes) 90   OT Mode of treatment - Concurrent (minutes) 0   OT Mode of treatment - Group (minutes) 0   OT Mode of treatment - Co-treat (minutes) 0   OT Mode of Treatment - Total time(minutes) 90 minutes   OT Cumulative Minutes 660   Therapy Time missed   Time missed? No

## 2025-07-10 NOTE — ASSESSMENT & PLAN NOTE
Therapy recorded a HR in the 120's this afternoon after patient did the stairs  On recheck after resting he is still 100-110  EKG done- sinus tach with   afebrile  Clinically he looks dry. Albumin low on recent lab work. Will order IV albumin and small IVF bolus overnight  Will order labs in AM including mag and CBC. Repeat blood cultures as he was treated recently for bacteremia  Continue current dose of metoprolol, hesitant to increase dose due to orthostasis

## 2025-07-10 NOTE — ASSESSMENT & PLAN NOTE
Last BM 7/6 (per RN, patient did have bowel movement on 7/8.)  >Continue miralax daily and senna PRN, suppository PRN (Last adjusted 7/7/2025, senna now as needed)  >Monitor and adjust as appropriate

## 2025-07-10 NOTE — TEAM CONFERENCE
Acute RehabilitationTeam Conference Note  Date: 7/10/2025   Time: 11:14 AM       Patient Name:  Aquilino Dowell       Medical Record Number: [id number]   YOB: 1960  Sex: Male          Room/Bed:  [place] [address]/[place] [address]-01  Payor Info:  Payor: BLUE CROSS / Plan: CAPITAL BC PLAN 361 / Product Type: Blue Fee for Service /      Admitting Diagnosis: Sepsis (HCC) [A41.9]   Admit Date/Time:  7/2/2025  2:41 PM  Admission Comments: No comment available     Primary Diagnosis:  Peripheral neuropathy  Principal Problem: Peripheral neuropathy    Patient Active Problem List    Diagnosis Date Noted    Nail overgrowth 07/09/2025    Orthostatic hypotension 07/09/2025    GERD (gastroesophageal reflux disease) 07/07/2025    Anemia 07/04/2025    Lung nodule 07/02/2025    Peripheral neuropathy 07/02/2025    At risk for venous thromboembolism (VTE) 07/02/2025    Tobacco use disorder 07/02/2025    Depression 07/02/2025    Diabetic retinopathy (HCC) 07/02/2025    Vitamin D deficiency 06/29/2025    Constipation 06/27/2025    Back pain 06/26/2025    Hyperbilirubinemia 06/26/2025    Polymicrobial bacteremia 06/25/2025    Fall 06/24/2025    Left shoulder pain 06/24/2025    Coronary artery disease 06/24/2025    Diabetes (HCC) 06/24/2025    Alcohol use disorder 06/24/2025    Hypertension 06/24/2025    Sinusitis 06/24/2025       Physical Therapy:    Weight Bearing Status: Full Weight Bearing  Transfers: Incidental Touching  Bed Mobility: Supervision  Amulation Distance (ft): 120 feet  Ambulation: Incidental Touching  Assistive Device for Ambulation: Roller Walker (RW & SPC)  Number of Stairs: 4  Assistive Device for Stairs: Right Hand Rail (R HR & SPC)  Stair Assistance: Minimal Assistance  Ramp: Minimal Assistance  Assistive Device for Ramp: Roller Walker  Discharge Recommendations: Home Independently    Aquilino was admitted to the [facility] on 6/24/25 w/ debility & sepsis. Barriers to return home include: impaired vision (baseline  prior to arrival), impaired static and dynamic balance, LE weakness, and 3 FARTUN. To address these barriers physical therapy has been working on the following: NuStep for conditioning, LE strengthening, neuromuscular re education, introduction of RW/AD and stair management trailing various techniques. Aquilino was making progress towards his goals (Mod I) with DC set for 7/14, but on 7/8 and 7/9, Aquilino's safety and quality of movement deteriorated. Aquilino was orthostatic on 7/9 which limited participation in therapy. Wife was present for family training but will need to be rescheduled in the future. New DC date to be set during team     Occupational Therapy:  Eating: Independent  Grooming: Incidental Touching  Bathing: Minimal Assistance  Bathing: Minimal Assistance  Upper Body Dressing: Supervision  Lower Body Dressing: Incidental Touching  Toileting: Minimal Assistance  Toilet Transfer: Incidental Touching, Minimal Assistance  Cognition: Exceptions to WNL  Cognition: Decreased Memory, Decreased Safety  Orientation: Person, Place, Time, Situation  Discharge Recommendations:  (pending progress)       7/7/25  64 y.o. male admission to [facility] for Debility from Sepsis s/p fall at home. Prior to admission Pt was completing ADLs at independent with use of SPC. Pt does not complete IADls at home, wife completes. Pt lives with lives with their spouse and is able to provide physical assistance at time of discharge. Pt reports 1 recent falls in the last 6 months. Personal factors affecting the patient at this time include: co morbidities/Other health conditions, Coping abilities, Habits and past and current behavioral patterns, and Pain. Pt is currently limited due to the following deficits impacting occupational performance, Higher level cognitive functions (Judgment, executive functions, praxis, cognitive flexibility, insight), Memory , Impaired standing balance, Impaired righting reactions, impaired Gross motor control,  impaired Fine motor control, Impaired UE AROM, Impaired UE strength, limited activity tolerance, Pain, and severe sensory deficits in b/l LE . The patient has shown a decline from prior level of function. Recommend skilled OT services for I/ADL retraining to support the ability to safely participate in daily occupations safely and independently in the most least restrictive way. Pt demonstrates Good rehab potential to meet LTG's of independent with assistive device with use of rolling walker vs SPC. Anticipate 10day(s) length of stay. Occupational Therapy plan of care will address the following areas: ADL re-training, LHAE training, fxnl xfers, standing tolerance, standing balance, UE strengthening, UE endurance, FMC/GMC, FMS/GMS, DME training/education, family training/education, EC techniques/education, healthy coping education, leisure pursuits, and body awareness. Pt currently completing [facility] ADLs at [facility].    Speech Therapy:           No notes on file    Nursing Notes:  Appetite: Fair  Diet Type: Diabetic                      Diet Patient/Family Education Complete: No                            Bladder: Continent     Bladder Patient/Family Education: No  Bowel: Continent     Bowel Patient/Family Education: No  Pain Location/Orientation: Orientation: Left, Location: Shoulder  Pain Score: 8                       Hospital Pain Intervention(s): Medication (See MAR)  Pain Patient/Family Education: No  Medication Management/Safety  Injectable: Insulin (lovenox)  Safe Administration: No  Reason for non-safe administration: will need to be evaluated before discharge  Medication Patient/Family Education Complete: No (ongoing)    Aquilino Dowell is a 64 y.o. male with a PMH of DM, HTN, CAD who presented to the [facility] after a fall at home. He was found to have sepsis/bacteremia on admission. He is now being admitted to [facility] for rehab.    This week we will work on increasing  independence with ADL's, monitor labs and vital signs, and educate patient on prevention of skin breakdown. We will perform skin checks and monitor patient for constipation. We will monitor pain and educate patient on pain management. We will increase safety awareness and keep patient free from falls.    7/8: Alarmed for safety. Baseline confusion. Metformin on hold due to elevated LFT's & ETOH use. Blood sugars have been running high since off of metformin. Remain on lantus 5 units at night - plan to restart metformin. CMP ordered for 7/8. Hemoglobin stable at 9.8. Complains of L shoulder pain - maintained with scheduled bengay & PRN robaxin and oxycodone. Continent of bowel and bladder. Last bowel movement was 7/6. Min Ax1 with RW.    7/10: Alarmed for safety. QID blood sugar - resume metformin. Likely need to discontinue Lantus prior to discharge - consider addition of SGLT such as Jardiance for tighter glycemic control in the patient with a history of CAD - Continue to monitor LFTs closely and if there is any concern their elevation is due to Metformin, recommend endocrine consult for treatment recommendations. BPs have been low but rates are high. Continue lopressor. Lipitor was discontinued. While working with PT/OT 7/9 - patient became dizzy when sitting on edge of bed and standing.  BPs (+) for orthostasis.  Give 500 cc bolus NS now and monitor.  Patient with little PO intake. Continent of bowel and bladder. Last bowel movement was 7/6 - refused miralax. Family training was on 7/9. LFA IV due 7/13. Min Ax1 with RW. D/C planned for 7/14.     Case Management:     Discharge Planning  Living Arrangements: Lives w/ Spouse/significant other  Support Systems: Self, Spouse/significant other  Assistance Needed: TBD  Type of Current Residence: Other (Comment) ([place])  Current Home Care Services: No  Pt admitted s/p sepsis and is experiencing generalized weakness. Pt is participating in therapy and is expected to  return home. Pt has a supportive wife who can assist as needed. Following for continued stay review and dc planning needs.     Is the patient actively participating in therapies? yes  List any modifications to the treatment plan:     Barriers Interventions   Orthostatis during therapy Bolus of fluids, use of teds encourage po intake   Endurance, activity tolerance Strengthening exercises, use of AD, nustep   Depression declining medication Coping education   Poor po intake Nutrition following, recommend smaller more frequent meals   Steps to enter Therapy stair training, le strengthening exercises     Is the patient making expected progress toward goals? yes  List any update or changes to goals:     Medical Goals: Patient will be medically stable for discharge to [facility] upon completion of rehab program and Patient will be able to manage medical conditions and comorbid conditions with medications and follow up upon completion of rehab program    Weekly Team Goals:   Rehab Team Goals  ADL Team Goal: Patient will be independent with ADLs with least restrictive device upon completion of rehab program  Transfer Team Goal: Patient will be independent with transfers with least restrictive device upon completion of rehab program  Locomotion Team Goal: Patient will require supervision with locomotion with least restrictive device upon completion of rehab program    Discussion: pt is reteamed as he may not dc within 7 days of initial team conference meeting. Pt continues to present with the above barriers and family training occurred yesterday with his wife. Pt did not demonstrate ability to safely perform transfers and ambulate with wife in order to return home this wknd. Pt  has an eye appmt on Monday and was hoping to dc prior to the appmt. Team has decided to extend rehab stay and transportation will be arranged for pt to attend the appmt. Recommendations tbd    Anticipated Discharge Date:   7/16/25  [facility] Team Members Present:The following team members are supervising care for this patient and were present during this Weekly Team Conference.    Physician: Dr. DePadua, MD  : DIOR Pierson  Registered Nurse: Yue May RN  Physical Therapist: Imelda Powers DPT  Occupational Therapist: Chiara Morales MS, OTR/L  Speech Therapist:

## 2025-07-10 NOTE — ASSESSMENT & PLAN NOTE
Patient with 1 of 2 blood cultures positive for klebsiella and staph when admitted to hospital on 6/24  Seen by ID in hospital, completed course of cefepime and vanco  Procal was ordered today by PMR due to tachycardia.   Procal up to 1.35  Will repeat blood cultures

## 2025-07-10 NOTE — ASSESSMENT & PLAN NOTE
7/9- While working with PT/OT today, patient became dizzy when sitting on edge of bed and standing.  BPs (+) for orthostasis.  Give 500 cc bolus NS now and monitor.  Patient with little PO intake.  Continue leg wraps

## 2025-07-11 ENCOUNTER — APPOINTMENT (INPATIENT)
Dept: NON INVASIVE DIAGNOSTICS | Facility: HOSPITAL | Age: 65
DRG: 949 | End: 2025-07-11
Payer: COMMERCIAL

## 2025-07-11 LAB
ANION GAP SERPL CALCULATED.3IONS-SCNC: 6 MMOL/L (ref 4–13)
ANION GAP SERPL CALCULATED.3IONS-SCNC: 6 MMOL/L (ref 4–13)
BUN SERPL-MCNC: 5 MG/DL (ref 5–25)
BUN SERPL-MCNC: 5 MG/DL (ref 5–25)
CALCIUM SERPL-MCNC: 8 MG/DL (ref 8.4–10.2)
CALCIUM SERPL-MCNC: 8 MG/DL (ref 8.4–10.2)
CHLORIDE SERPL-SCNC: 104 MMOL/L (ref 96–108)
CHLORIDE SERPL-SCNC: 104 MMOL/L (ref 96–108)
CO2 SERPL-SCNC: 27 MMOL/L (ref 21–32)
CO2 SERPL-SCNC: 27 MMOL/L (ref 21–32)
CREAT SERPL-MCNC: 0.71 MG/DL (ref 0.6–1.3)
CREAT SERPL-MCNC: 0.71 MG/DL (ref 0.6–1.3)
D DIMER PPP FEU-MCNC: 0.97 UG/ML FEU
D DIMER PPP FEU-MCNC: 0.97 UG/ML FEU
ERYTHROCYTE [DISTWIDTH] IN BLOOD BY AUTOMATED COUNT: 14.6 % (ref 11.6–15.1)
ERYTHROCYTE [DISTWIDTH] IN BLOOD BY AUTOMATED COUNT: 14.6 % (ref 11.6–15.1)
GFR SERPL CREATININE-BSD FRML MDRD: 99 ML/MIN/1.73SQ M
GFR SERPL CREATININE-BSD FRML MDRD: 99 ML/MIN/1.73SQ M
GLUCOSE P FAST SERPL-MCNC: 109 MG/DL (ref 65–99)
GLUCOSE P FAST SERPL-MCNC: 109 MG/DL (ref 65–99)
GLUCOSE SERPL-MCNC: 109 MG/DL (ref 65–140)
GLUCOSE SERPL-MCNC: 109 MG/DL (ref 65–140)
GLUCOSE SERPL-MCNC: 119 MG/DL (ref 65–140)
GLUCOSE SERPL-MCNC: 119 MG/DL (ref 65–140)
GLUCOSE SERPL-MCNC: 143 MG/DL (ref 65–140)
GLUCOSE SERPL-MCNC: 143 MG/DL (ref 65–140)
GLUCOSE SERPL-MCNC: 154 MG/DL (ref 65–140)
GLUCOSE SERPL-MCNC: 154 MG/DL (ref 65–140)
GLUCOSE SERPL-MCNC: 233 MG/DL (ref 65–140)
GLUCOSE SERPL-MCNC: 233 MG/DL (ref 65–140)
HCT VFR BLD AUTO: 30.1 % (ref 36.5–49.3)
HCT VFR BLD AUTO: 30.1 % (ref 36.5–49.3)
HGB BLD-MCNC: 9.3 G/DL (ref 12–17)
HGB BLD-MCNC: 9.3 G/DL (ref 12–17)
MAGNESIUM SERPL-MCNC: 1.6 MG/DL (ref 1.9–2.7)
MAGNESIUM SERPL-MCNC: 1.6 MG/DL (ref 1.9–2.7)
MCH RBC QN AUTO: 32 PG (ref 26.8–34.3)
MCH RBC QN AUTO: 32 PG (ref 26.8–34.3)
MCHC RBC AUTO-ENTMCNC: 30.9 G/DL (ref 31.4–37.4)
MCHC RBC AUTO-ENTMCNC: 30.9 G/DL (ref 31.4–37.4)
MCV RBC AUTO: 103 FL (ref 82–98)
MCV RBC AUTO: 103 FL (ref 82–98)
PLATELET # BLD AUTO: 101 THOUSANDS/UL (ref 149–390)
PLATELET # BLD AUTO: 101 THOUSANDS/UL (ref 149–390)
PMV BLD AUTO: 9.9 FL (ref 8.9–12.7)
PMV BLD AUTO: 9.9 FL (ref 8.9–12.7)
POTASSIUM SERPL-SCNC: 4 MMOL/L (ref 3.5–5.3)
POTASSIUM SERPL-SCNC: 4 MMOL/L (ref 3.5–5.3)
RBC # BLD AUTO: 2.91 MILLION/UL (ref 3.88–5.62)
RBC # BLD AUTO: 2.91 MILLION/UL (ref 3.88–5.62)
SODIUM SERPL-SCNC: 137 MMOL/L (ref 135–147)
SODIUM SERPL-SCNC: 137 MMOL/L (ref 135–147)
WBC # BLD AUTO: 3.86 THOUSAND/UL (ref 4.31–10.16)
WBC # BLD AUTO: 3.86 THOUSAND/UL (ref 4.31–10.16)

## 2025-07-11 PROCEDURE — 93970 EXTREMITY STUDY: CPT

## 2025-07-11 PROCEDURE — 97110 THERAPEUTIC EXERCISES: CPT

## 2025-07-11 PROCEDURE — 99233 SBSQ HOSP IP/OBS HIGH 50: CPT | Performed by: PHYSICAL MEDICINE & REHABILITATION

## 2025-07-11 PROCEDURE — 97530 THERAPEUTIC ACTIVITIES: CPT

## 2025-07-11 PROCEDURE — 85027 COMPLETE CBC AUTOMATED: CPT | Performed by: PHYSICIAN ASSISTANT

## 2025-07-11 PROCEDURE — 82948 REAGENT STRIP/BLOOD GLUCOSE: CPT

## 2025-07-11 PROCEDURE — 85379 FIBRIN DEGRADATION QUANT: CPT | Performed by: PHYSICIAN ASSISTANT

## 2025-07-11 PROCEDURE — 83735 ASSAY OF MAGNESIUM: CPT | Performed by: PHYSICIAN ASSISTANT

## 2025-07-11 PROCEDURE — 80048 BASIC METABOLIC PNL TOTAL CA: CPT | Performed by: PHYSICIAN ASSISTANT

## 2025-07-11 PROCEDURE — 93970 EXTREMITY STUDY: CPT | Performed by: SURGERY

## 2025-07-11 PROCEDURE — 99233 SBSQ HOSP IP/OBS HIGH 50: CPT | Performed by: PHYSICIAN ASSISTANT

## 2025-07-11 PROCEDURE — 87040 BLOOD CULTURE FOR BACTERIA: CPT | Performed by: PHYSICIAN ASSISTANT

## 2025-07-11 RX ORDER — LANOLIN ALCOHOL/MO/W.PET/CERES
400 CREAM (GRAM) TOPICAL 2 TIMES DAILY
Status: DISCONTINUED | OUTPATIENT
Start: 2025-07-11 | End: 2025-07-13

## 2025-07-11 RX ORDER — MAGNESIUM SULFATE HEPTAHYDRATE 40 MG/ML
2 INJECTION, SOLUTION INTRAVENOUS ONCE
Status: COMPLETED | OUTPATIENT
Start: 2025-07-11 | End: 2025-07-11

## 2025-07-11 RX ADMIN — DOCUSATE SODIUM 100 MG: 100 CAPSULE, LIQUID FILLED ORAL at 08:00

## 2025-07-11 RX ADMIN — FLUTICASONE PROPIONATE 2 SPRAY: 50 SPRAY, METERED NASAL at 08:01

## 2025-07-11 RX ADMIN — FAMOTIDINE 20 MG: 20 TABLET, FILM COATED ORAL at 08:01

## 2025-07-11 RX ADMIN — METOPROLOL TARTRATE 50 MG: 50 TABLET, FILM COATED ORAL at 21:53

## 2025-07-11 RX ADMIN — THIAMINE HCL TAB 100 MG 100 MG: 100 TAB at 08:00

## 2025-07-11 RX ADMIN — PRASUGREL 10 MG: 10 TABLET, FILM COATED ORAL at 08:01

## 2025-07-11 RX ADMIN — INSULIN GLARGINE 5 UNITS: 100 INJECTION, SOLUTION SUBCUTANEOUS at 21:53

## 2025-07-11 RX ADMIN — CYANOCOBALAMIN TAB 500 MCG 1000 MCG: 500 TAB at 08:00

## 2025-07-11 RX ADMIN — MAGNESIUM OXIDE TAB 400 MG (241.3 MG ELEMENTAL MG) 400 MG: 400 (241.3 MG) TAB at 12:13

## 2025-07-11 RX ADMIN — INSULIN LISPRO 1 UNITS: 100 INJECTION, SOLUTION INTRAVENOUS; SUBCUTANEOUS at 12:13

## 2025-07-11 RX ADMIN — ASPIRIN 81 MG CHEWABLE TABLET 81 MG: 81 TABLET CHEWABLE at 08:00

## 2025-07-11 RX ADMIN — MUSCLE RUB CREAM: 100; 150 CREAM TOPICAL at 08:01

## 2025-07-11 RX ADMIN — MUSCLE RUB CREAM 1 APPLICATION: 100; 150 CREAM TOPICAL at 21:59

## 2025-07-11 RX ADMIN — MAGNESIUM OXIDE TAB 400 MG (241.3 MG ELEMENTAL MG) 400 MG: 400 (241.3 MG) TAB at 23:20

## 2025-07-11 RX ADMIN — FAMOTIDINE 20 MG: 20 TABLET, FILM COATED ORAL at 17:08

## 2025-07-11 RX ADMIN — METHOCARBAMOL 500 MG: 500 TABLET ORAL at 21:53

## 2025-07-11 RX ADMIN — INSULIN LISPRO 2 UNITS: 100 INJECTION, SOLUTION INTRAVENOUS; SUBCUTANEOUS at 21:54

## 2025-07-11 RX ADMIN — OXYCODONE HYDROCHLORIDE 5 MG: 5 TABLET ORAL at 13:51

## 2025-07-11 RX ADMIN — GABAPENTIN 100 MG: 100 CAPSULE ORAL at 21:53

## 2025-07-11 RX ADMIN — METFORMIN HYDROCHLORIDE 1000 MG: 500 TABLET ORAL at 08:00

## 2025-07-11 RX ADMIN — MUSCLE RUB CREAM: 100; 150 CREAM TOPICAL at 12:16

## 2025-07-11 RX ADMIN — FLUTICASONE PROPIONATE 2 SPRAY: 50 SPRAY, METERED NASAL at 17:08

## 2025-07-11 RX ADMIN — LORATADINE 10 MG: 10 TABLET ORAL at 08:01

## 2025-07-11 RX ADMIN — METOPROLOL TARTRATE 50 MG: 50 TABLET, FILM COATED ORAL at 08:01

## 2025-07-11 RX ADMIN — METHOCARBAMOL 500 MG: 500 TABLET ORAL at 08:10

## 2025-07-11 RX ADMIN — MAGNESIUM SULFATE HEPTAHYDRATE 2 G: 40 INJECTION, SOLUTION INTRAVENOUS at 13:54

## 2025-07-11 RX ADMIN — OXYCODONE HYDROCHLORIDE 5 MG: 5 TABLET ORAL at 00:02

## 2025-07-11 RX ADMIN — MUSCLE RUB CREAM: 100; 150 CREAM TOPICAL at 17:09

## 2025-07-11 RX ADMIN — FOLIC ACID 1 MG: 1 TABLET ORAL at 08:00

## 2025-07-11 RX ADMIN — METHOCARBAMOL 500 MG: 500 TABLET ORAL at 00:02

## 2025-07-11 RX ADMIN — ENOXAPARIN SODIUM 40 MG: 40 INJECTION SUBCUTANEOUS at 08:06

## 2025-07-11 RX ADMIN — DOCUSATE SODIUM 100 MG: 100 CAPSULE, LIQUID FILLED ORAL at 21:53

## 2025-07-11 NOTE — PROGRESS NOTES
Progress Note - PMR   Name: Aquilino Dowell 64 y.o. 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   - Suspect component also related to T2DM  - Has impaired vision as well  - Has had a history of Vitamin B12 deficiency previously contributing as well (6/25 level 940)  - Also has history of alcohol use disorder  - Unclear etiology of fall per patient.   > Ambulatory dysfunction I suspect is driven by progression of his neuropathy in addition to worsening vision impacting his ability to compensate  > PT/OT 3-5 hours/day, 5-7 days/week.  > Also has a component of deconditioning and worsening proximal weakness related to increasingly sedentary lifestyle (this is 2/2 I suspect to some depression since losing his job in November).   > Gabapentin 100mg QHS for shooting pains (higher doses made him too lethargic)  > Alcohol cessation as that can progress neuropathy  > Continue B12 supplement, optimize diabetes management.   > Check orthostatics here   > Recommend f/u with Neuromuscular Neurology   Left shoulder pain  -Examines like bicipital tendinosis with anterior pain, positive speeds, yergason as well as possible rotator cuff pathology with positive Fitzgerald  -Could also have other rotator cuff involvement/tendinopathy with positive neers (not +7/8), mild external rotation weakness.  -Family reports chronic L shoulder issues but worse since the fall.  Patient reports no significant history of any repetitive use.  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  -6/24 XR without acute osseous abnormality   > Plan for outpatient MRI  > Considerations in therapy   > Bengay QID  > Will start ibuprofen 400 mg as needed every 6 hours for breakthrough left shoulder pain.  Constipation  - Last BM date 7/8/2025  >Continue miralax daily and senna PRN, suppository PRN (Last adjusted 7/7/2025, senna now as needed)  >Monitor and adjust as appropriate  At risk for venous thromboembolism (VTE)  >Lovenox, SCDs, ambulation  >Will not go home on lovenox.  >Monitor platelet count closely.   Depression  -Has lost interests he previously had, sleep has been poor, energy levels/motivation poor, feelings of guilt and frustration.   -Appetite worse  -All since he lost his job in November.  -Wife has noticed a sharp decline  -Denies suicidal ideation/self-harm.  > Discussed trial of SNRI (cymbalta may also help with pain)  > He would like to defer for now.   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   - Next due 7/14  -This likely is contributing to his imbalance in the setting of his peripheral neuropathy  > Previous notes show he worked with Dr. Mart Garcia At [facility] in [place].  > Would call their office to see if they are ok with transport from rehab to perform procedure on Monday.   Coronary artery disease  -S/p stent/cath 2013  -Home: Prasugrel 10mg daily, ASA 81mg daily   >Here: Same   Diabetes ([facility])  Lab Results   Component Value Date    HGBA1C 8.0 (H) 06/24/2025       Recent Labs     07/10/25  1039 07/10/25  1605 07/10/25  2143 07/11/25  0607   POCGLU 189* 221* 126 119       Blood Sugar Average: Last 72 hrs:  (P) [id number]: Metformin 1000mg  daily  -Unclear if family checks BG at home or if he has a kit.   >Here: Lantus 5 units QHS, CDI/Accuchecks  >Goal to transition back to home regimen.  Reviewed with IM  >Diabetic Diet  >Management as per IM  Alcohol use disorder  -Denies history of withdrawal  >Cessation counseling  Hypertension  -Home: Toprol 50mg BID  >Here: Same  >Monitor and adjust as per IM  Sinusitis  >Flonase, claritin  Hyperbilirubinemia  -And chronic transaminitis  -In setting of alcohol use disorder  -RUQ US with steatosis and hepatomegaly  > Counseled on cessation  > minimize hepatotoxic meds  > Outpatient f/u with GI recommended  Vitamin D deficiency  -6/27 12.8  >Ergocalciferol weekly for 12 weeks  >Repeat with PCP   Lung nodule  -Noted incidentally on imaging  >Recommended for f/u chest CT in 12 months  Tobacco use disorder  -Quit smoking in the 90s  -Now chewing tobacco.  >Nicotine patch  >Cessation counseling.   Orthostatic hypotension  - Did have episode of symptomatic orthostatic hypotension on 7/9 during therapies.  - Patient received half liter bolus, subjective improvement.  > Encourage oral hydration  > Volume optimization as per IM  Tachycardia  - Patient with sinus tachycardia for the last couple of days at rest.  - Patient does not have overt symptoms of infection, although procalcitonin was elevated;   - Lung exam benign  > Will start with bilateral lower extremity Dopplers per IM with low threshold to order CT PE study if concerning for clot  > Replace magnesium as above  Hypomagnesemia  - 1.6 on 7/11  > Patient will receive IV and p.o. replacement  > Follow-up repeat lab  Polymicrobial bacteremia      Health Maintenance  #Delirium/Sleep: At risk. Optimize sleep/wake, pain, bowel, bladder management. Avoid deliriogenic meds and physical restraint. Environmental/behavioral interventions.   #Bladder: Voiding and Continent  #Skin/Pressure Injury Prevention: Turn Q2hr in bed, with weight shifts [id number] in wheelchair. Float  heels in bed.  #GI Prophylaxis: Not indicated  #Code Status: Full Code  #FEN: Diabetic Diet   #Dispo: Teams 7/8, ADD ~7/16 with OP PT/OT. Transport will be provided to patient's appointment on 7/14 at 0815 at [facility] w/ Dr. Garcia                  > F/U PCP, Neuromuscular, Orthopedics, Psych    Chief Complaint: No complaints    Interval History/Subjective:  Patient seen and examined at bedside. No acute events overnight. Vital signs reviewed and were stable overnight.  Patient reports receiving IV fluids and albumin last night.  Patient reports feeling improved after these fluids.  Otherwise reports no new concerning symptoms.  Pain in left shoulder is controlled with medications.  Patient will have appointment at [facility] at 815 on Monday, transport arranged.  Otherwise patient tolerating therapy well.  Patient reports no other acute issues or concerns overnight, including fevers, chills, chest pain, abdominal pain, shortness of breath, dysuria. Adequate urine output overnight w/ continent voids. Last BM Date: 07/08/25, continent.     Functional Update:   PT: CGA transfers, Sup bed mobility, CGA ambulation 120' with RW, Petra stairs, Petra ramp  OT: Ind eating, CGA grooming, Petra bathing, Sup UB dressing, CGA LB dressing, Petra toileting, CGA-Petra toilet transfers        Objective     Temp:  [97.9 °F (36.6 °C)-98.9 °F (37.2 °C)] 98.3 °F (36.8 °C)  HR:  [] 88  Resp:  [16-20] 18  BP: ()/(60-82) 118/62  SpO2:  [96 %-98 %] 97 %    GENERAL: alert, no apparent distress, cooperative, and comfortable  HEENT:  Normocephalic, no lesions, without obvious abnormality.  CARDIAC:  regular rate and rhythm, S1, S2 normal, no murmur, click, rub or gallop  LUNGS:  no abnormal respiratory pattern, no retractions noted, non-labored breathing   ABDOMEN:  soft, non-tender, non-distended   EXTREMITIES:  extremities normal, warm and well-perfused; no edema  NEURO:  clear speech, following all commands, oriented  x4  PSYCH:  Normal and appropriate affect    Physical examination is otherwise unchanged from previous encounter, except as noted above.    Scheduled Meds:  Current Facility-Administered Medications   Medication Dose Route Frequency Provider Last Rate    acetaminophen  650 mg Oral Q8H PRN Ashley Depadua, MD      aspirin  81 mg Oral Daily Nadiya Shah, PA-MARS      benzonatate  200 mg Oral TID PRN Nadiya Shah, PA-C      bisacodyl  10 mg Rectal Daily PRN Nadiya Shah, PA-C      vitamin B-12  1,000 mcg Oral Daily Nadiya Shah, PA-C      docusate sodium  100 mg Oral BID Robert Bo MD      enoxaparin  40 mg Subcutaneous Daily Nadiya Shah, PA-C      ergocalciferol  50,000 Units Oral Weekly Nadiya Shah, PA-C      famotidine  20 mg Oral BID Nadiya Shah, PA-C      fluticasone  2 spray Each Nare BID Nadiya Shah, PA-C      folic acid  1 mg Oral Daily Nadiya Shah, PA-C      gabapentin  100 mg Oral HS Nadiya Shah, PA-C      ibuprofen  400 mg Oral Q6H PRN Robert Bo MD      insulin glargine  5 Units Subcutaneous HS Nadiya Shah, PA-C      insulin lispro  1-5 Units Subcutaneous HS Nadiya Shah, PA-C      insulin lispro  1-6 Units Subcutaneous TID AC Nadiya Shah, PA-C      loratadine  10 mg Oral Daily Nadiya Shah, PA-C      menthol-methyl salicylate   Apply externally 4x Daily Nadiya Shah, PA-C      metFORMIN  1,000 mg Oral BID With Meals Bettie Lyon, ZACH      methocarbamol  500 mg Oral Q6H PRN Nadiya Shah, ZACH      metoprolol tartrate  50 mg Oral Q12H TERRENCE Nadiya GarberZACH larios      ondansetron  4 mg Oral Q6H PRN Rayne Da Silva PA-C      ondansetron  4 mg Oral Once Aroldo Wyatt MD      oxyCODONE  2.5 mg Oral Q4H PRN Ashley Depadua, MD      Or    oxyCODONE  5 mg Oral Q4H PRN Ashley Depadua, MD      polyethylene glycol  17 g Oral Daily PRN Ashley Depadua, MD      prasugrel  10 mg Oral Daily Nadiya Shah PA-C      senna  2 tablet Oral HS PRN Robert  MD Anupam      thiamine  100 mg Oral Daily Nadiya Shah PA-C       Imaging: Reviewed, no new imaging        Lab Results: I have reviewed the following results:  Results from last 7 days   Lab Units 07/11/25 0633 07/08/25 0518 07/05/25  0559   HEMOGLOBIN g/dL 9.3* 10.9* 9.7*   HEMATOCRIT % 30.1* 34.2* 31.1*   WBC Thousand/uL 3.86* 5.64 5.28   PLATELETS Thousands/uL 101* 143* 131*     Results from last 7 days   Lab Units 07/11/25  0633 07/10/25  0610 07/08/25  0518   BUN mg/dL 5 6 5   SODIUM mmol/L 137 134* 135   POTASSIUM mmol/L 4.0 3.6 3.6   CHLORIDE mmol/L 104 103 103   CREATININE mg/dL 0.71 0.74 0.81   AST U/L  --  104* 119*   ALT U/L  --  18 21                0658}      ** Please Note: Fluency Direct voice to text software may have been used in the creation of this document. **

## 2025-07-11 NOTE — ASSESSMENT & PLAN NOTE
Lab Results   Component Value Date    HGBA1C 8.0 (H) 06/24/2025     Recent Labs     07/11/25  1635 07/11/25  2045 07/12/25  0617 07/12/25  1047   POCGLU 143* 233* 140 215*     Home: Metformin 1000mg daily. In hospital he was on lantus 6U daily while the home metformin was on hold and he remain on lantus at this time  Patient extremely frustrated with high blood sugars.  Here: Metformin 1000 mg BID as of 7/9  Metformin placed on hold again in anticipation of need for CTA to r/o PE. Will continue lantus with sliding scale for now for blood sugar control

## 2025-07-11 NOTE — ASSESSMENT & PLAN NOTE
- Last BM date 7/8/2025  >Continue miralax daily and senna PRN, suppository PRN (Last adjusted 7/7/2025, senna now as needed)  >Monitor and adjust as appropriate

## 2025-07-11 NOTE — ASSESSMENT & PLAN NOTE
7/9- While working with PT/OT, patient became dizzy when sitting on edge of bed and standing.  BPs (+) for orthostasis.    Given 500 cc bolus NS x2 and also received a dose of IV albumin.  Patient with little PO intake.  Continue leg wraps  Today 7/12/25 at 0500 BP was 144/79 with HR 85.  With therapy, SBP dropped to 100 and HR was 107  Add binder for when OOB = consider reflex tachy

## 2025-07-11 NOTE — ASSESSMENT & PLAN NOTE
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   - Next due 7/14  -This likely is contributing to his imbalance in the setting of his peripheral neuropathy  > Previous notes show he worked with Dr. Mart Garcia At [facility] in [place].  > Would call their office to see if they are ok with transport from rehab to perform procedure on Monday.

## 2025-07-11 NOTE — ASSESSMENT & PLAN NOTE
7/9- While working with PT/OT, patient became dizzy when sitting on edge of bed and standing.  BPs (+) for orthostasis.    Given 500 cc bolus NS x2 and also received a dose of IV albumin.  Patient with little PO intake.  Continue leg wraps

## 2025-07-11 NOTE — PROGRESS NOTES
07/11/25 1500   Pain Assessment   Pain Assessment Tool 0-10   Pain Score 3   Pain Location/Orientation Orientation: Left;Location: Shoulder   Restrictions/Precautions   Precautions Bed/chair alarms;Cognitive;Fall Risk;Pain;Supervision on toilet/commode;Visual deficit   Braces or Orthoses   (BLE ACE WRAP)   Subjective   Subjective pt agreeable to perform skilled PT and reports being tired   Sit to Lying   Type of Assistance Needed Supervision   Sit to Lying CARE Score 4   Sit to Stand   Type of Assistance Needed Incidental touching   Sit to Stand CARE Score 4   Bed-Chair Transfer   Type of Assistance Needed Incidental touching   Comment CG to RW   Chair/Bed-to-Chair Transfer CARE Score 4   Transfer Bed/Chair/Wheelchair   Limitations Noted In Balance;Coordination;Confidence;Endurance;Pain Management;LE Strength;Vision   Adaptive Equipment Roller Walker   Walk 10 Feet   Type of Assistance Needed Incidental touching   Comment gait belt in his room with IV pole walking w/o RW to test balance   Walk 10 Feet CARE Score 4   Ambulation   Primary Mode of Locomotion Prior to Admission Walk   Does the patient walk? 2. Yes   Wheel 50 Feet with Two Turns   Reason if not Attempted Activity not applicable   Wheel 50 Feet with Two Turns CARE Score 9   Wheel 150 Feet   Reason if not Attempted Activity not applicable   Wheel 150 Feet CARE Score 9   Assessment   Treatment Assessment pt and hihs wife in his room with IV pole and IV . Pt also need BLE ace wrap d/t doppler procedure . Pt perform STS and SPT to bed level and spoke with his wife about progress and meeting his goals for DC next week   Barriers to Discharge Inaccessible home environment;Decreased caregiver support   Plan   Progress Progressing toward goals   PT Therapy Minutes   PT Time In 1500   PT Time Out 1530   PT Total Time (minutes) 30   PT Mode of treatment - Individual (minutes) 30   PT Mode of treatment - Concurrent (minutes) 0   PT Mode of treatment - Group  (minutes) 0   PT Mode of treatment - Co-treat (minutes) 0   PT Mode of Treatment - Total time(minutes) 30 minutes   PT Cumulative Minutes 690   Therapy Time missed   Time missed? No

## 2025-07-11 NOTE — PROGRESS NOTES
Progress Note - Hospitalist   Name: Aquilino Dowell 64 y.o. male I MRN: [id number]  Unit/Bed#: -01 I Date of Admission: 7/2/2025   Date of Service: 7/12/2025 I Hospital Day: 10    Assessment & Plan  Diabetes (HCC)  Lab Results   Component Value Date    HGBA1C 8.0 (H) 06/24/2025     Recent Labs     07/11/25  1635 07/11/25  2045 07/12/25  0617 07/12/25  1047   POCGLU 143* 233* 140 215*     Home: Metformin 1000mg daily. In hospital he was on lantus 6U daily while the home metformin was on hold and he remain on lantus at this time  Patient extremely frustrated with high blood sugars.  Here: Metformin 1000 mg BID as of 7/9  Metformin placed on hold again in anticipation of need for CTA to r/o PE. Will continue lantus with sliding scale for now for blood sugar control  Fall  S/p fall at home ambulating to the bathroom  Uses a cane at baseline due to LE neuropathy and vision issues  PT/OT  Left shoulder pain  Xray done on 6/24/25 = + glenohumeral DJD, no fracture  Bengay ordered per PMR  Patient may benefit from out-patient orthopedic evaluation for local injections for pain control.  Coronary artery disease  S/p cardiac cath 2013    95% prox LAD, 40% mid LAD, EF 60%, MANUEL to prox LAD (Xience Rx 3.5x18mm   Continue home Prasugrel, ASA  Patient has not been taking statin for several years and reports having a side effect; however, cannot recall what that was.  Thorough review of his medical record shows his cardiologist is aware that he was not taking it so discontinued it.  Alcohol use disorder  Per hospital chart, wife reported up to 1 bottle of wine per day  ETOH on admit 6/24 was normal  Was on CIWA protocol  Continue B12, folic acid  Hypertension  Home: Lopressor 50 mg BID.  He was switched from lisinopril to metoprolol when he had an episode of NSVT.    Here: same  No changes today.  Patient does get orthostatic in therapy. Has legs wrapped. Got albumin 7/10 and 500ml IV saline bolus  Sinusitis  Seen on CT  head done in ED and  [facility] 7/3/25  No complaints of increased sinus pressure or headaches, he and his wife state he always has some degree of chronic sinus issues but this has been for years  Continue flonase, claritin  Back pain  MRI lumbar spine:  No discitis/osteomyelitis in lumbar spine, as clinically questioned.  Small focal areas of enhancement in the L1-L2 and L2-L3 supraspinous ligament regions, suspicious for enthesitis.  Mild multilevel degenerative changes of lumbar spine, as detailed above. No significant canal stenosis or foraminal narrowing  Pain meds/therapy per PMR  Hyperbilirubinemia  Peaked at 3.00 (7/8/25) today 2.53  Right UQ US- Hepatomegaly with steatosis, no liver mass  Recommend outpatient GI follow up  Metformin was briefly restarted but now back on hold as of 7/11 due to possible need for CTA  Continue to monitor- recheck labs 7/13/25  Vitamin D deficiency  Continue supplement  Lung nodule  Found incidentally on CT scan  4mm right upper lobe nodule which is new  Will need repeat CT in 12 months  Tobacco use disorder  Encourage cessation at discharge.  Depression  Pt does not want to start a medication for mood.  Anemia  Iron and iron sat normal  Hgb today 9.3, down from 10.9  Likely dilutional as he received IVF the last 2 nights  Continue to monitor  GERD (gastroesophageal reflux disease)  On PPI at home which was discontinued on admission due to elevated LFT's  Continue pepcid  Orthostatic hypotension  7/9- While working with PT/OT, patient became dizzy when sitting on edge of bed and standing.  BPs (+) for orthostasis.    Given 500 cc bolus NS x2 and also received a dose of IV albumin.  Patient with little PO intake.  Continue leg wraps  Today 7/12/25 at 0500 BP was 144/79 with HR 85.  With therapy, SBP dropped to 100 and HR was 107  Add binder for when OOB = consider reflex tachy   Polymicrobial bacteremia  Patient with 1 of 2 blood cultures positive for klebsiella and staph when admitted  to hospital on 6/24  Seen by ID in hospital, completed course of cefepime and vanco  Procal was ordered by PMR due to tachycardia.   Procal up to 1.35  Afebrile  WBC 3.86  Will repeat blood cultures- drawn and pending  Tachycardia  Therapy recorded a HR in the 120's on 7/11 afternoon after patient did the stairs  On recheck after resting he is still 100-110  EKG done- sinus tach with   afebrile  7/11- Clinically he looks dry. Albumin low on recent lab work. ordered IV albumin and small IVF bolus overnight  Continue current dose of metoprolol, hesitant to increase dose due to orthostasis   HRs in low 100's 7/11/25, slightly better than 7/10/25.   Mag low which may be contributing- repleted  D-dimer elevated at 0.97. Pulse ox has been stable on room air but PE should be considered in setting of ongoing tachycardia and elevated d-dimer  Unable to get CTA as he has been on Metformin which will need to be held for 48 hours.   Metformin now on hold.  Last dose given AM 7/11/25 = 48 hrs will be Sunday AM  Venous doppler of the LEs was negative  In PT today 7/12/25HR was 107 bpm. He walked 120 ft heart rate was 126. BP was 100/68. He was complaining of significant fatigue.  His 0500 BP today was 144/79  Could be reflex tachycardia?  TSH 6/24/25 was 1.57  Consider getting an ECHO since last was 2024 but not sure he will agree to it  Hypomagnesemia  Mag on 7/11/25 was 1.6  Repleted  Recheck labs Sunday 7/13/25    The above assessment and plan was reviewed and updated as determined by my evaluation of the patient on 7/12/2025.    History of Present Illness   Patient seen and examined. Patients overnight issues or events were reviewed with nursing staff. New or overnight issues include the following:   No new or overnight issues.  Offers no complaints    Review of Systems   All other systems reviewed and are negative.      Objective :  Temp:  [97.6 °F (36.4 °C)-98.7 °F (37.1 °C)] 97.6 °F (36.4 °C)  HR:  [] 125  BP:  (100-156)/(62-98) 106/62  Resp:  [19-20] 20  SpO2:  [95 %-98 %] 98 %  O2 Device: None (Room air)    Invasive Devices       Peripheral Intravenous Line  Duration             Peripheral IV 07/11/25 Right;Ventral (anterior) Forearm <1 day                    Physical Exam  General Appearance: no distress, non toxic appearing  HEENT: PERRLA, conjuctiva normal; oropharynx clear; mucous membranes moist   Neck:  Supple, normal ROM  Lungs: CTA, normal respiratory effort, no retractions, expiratory effort normal  CV: regular rate and rhythm; no rubs/murmurs/gallops, PMI normal   ABD: soft; ND/NT; +BS  EXT: no edema  Skin: normal turgor, normal texture  Psych: affect normal, mood normal  Neuro: AA        The above physical exam was reviewed and updated as determined by my evaluation of the patient on 7/12/2025.      Lab Results: I have reviewed the following results:  Results from last 7 days   Lab Units 07/11/25  0633 07/08/25  0518   WBC Thousand/uL 3.86* 5.64   HEMOGLOBIN g/dL 9.3* 10.9*   HEMATOCRIT % 30.1* 34.2*   PLATELETS Thousands/uL 101* 143*     Results from last 7 days   Lab Units 07/11/25  0633 07/10/25  0610   SODIUM mmol/L 137 134*   POTASSIUM mmol/L 4.0 3.6   CHLORIDE mmol/L 104 103   CO2 mmol/L 27 26   BUN mg/dL 5 6   CREATININE mg/dL 0.71 0.74   CALCIUM mg/dL 8.0* 8.0*             Results from last 7 days   Lab Units 07/12/25  1047 07/12/25  0617 07/11/25  2045   POC GLUCOSE mg/dl 215* 140 233*       Imaging Results Review: No pertinent imaging studies reviewed.  Other Study Results Review: No additional pertinent studies reviewed.    Review of Scheduled Meds: Medications  reviewed and reconciled as needed  Current Facility-Administered Medications   Medication Dose Route Frequency Provider Last Rate    acetaminophen  650 mg Oral Q8H PRN Ashley Depadua, MD      aspirin  81 mg Oral Daily Nadiya Shah PA-C      benzonatate  200 mg Oral TID PRN Nadiya Shah PA-C      bisacodyl  10 mg Rectal Daily PRCARON Hearn  Alyssa, PA-MARS      vitamin B-12  1,000 mcg Oral Daily Nadiya Shah, PA-C      docusate sodium  100 mg Oral BID Robert Bo MD      enoxaparin  40 mg Subcutaneous Daily Nadiya Shah, PA-MARS      ergocalciferol  50,000 Units Oral Weekly Nadiya Shah, PA-C      famotidine  20 mg Oral BID Nadiya Shah, PA-C      fluticasone  2 spray Each Nare BID Nadiya Shah, PA-C      folic acid  1 mg Oral Daily Nadiya Shah, PA-C      gabapentin  100 mg Oral HS Nadiya Shah, PA-C      ibuprofen  400 mg Oral Q6H PRN Robert Bo MD      insulin glargine  5 Units Subcutaneous HS Nadiya Shah, PA-MARS      insulin lispro  1-5 Units Subcutaneous HS Nadiya Shah, PA-C      insulin lispro  1-6 Units Subcutaneous TID AC Nadiya Shah, PA-MARS      loratadine  10 mg Oral Daily Nadiya Shah, PA-C      magnesium Oxide  400 mg Oral BID Nadiya Shah, ZACH      menthol-methyl salicylate   Apply externally 4x Daily Nadiya ShahZACH      [Held by provider] metFORMIN  1,000 mg Oral BID With Meals Bettie Lyon PA-C      methocarbamol  500 mg Oral Q6H PRN Nadiya ShahZACH      metoprolol tartrate  50 mg Oral Q12H TERRENCE Nadiya ShahZACH      ondansetron  4 mg Oral Q6H PRN Rayne Da Silva PA-C      ondansetron  4 mg Oral Once Aroldo Wyatt MD      oxyCODONE  2.5 mg Oral Q4H PRN Ashley Depadua, MD      Or    oxyCODONE  5 mg Oral Q4H PRN Ashley Depadua, MD      polyethylene glycol  17 g Oral Daily PRN Ashley Depadua, MD      prasugrel  10 mg Oral Daily Nadiya Shah, ZACH      senna  2 tablet Oral HS PRN Robert Bo MD      thiamine  100 mg Oral Daily Nadiya Shah, ZACH         VTE Pharmacologic Prophylaxis: Lovenox  Code Status: Level 1 - Full Code  Current Length of Stay: 10 day(s)    Administrative Statements     ** Please Note:  voice to text software may have been used in the creation of this document. Although proof errors in transcription or interpretation are a potential of such  software**

## 2025-07-11 NOTE — PROGRESS NOTES
07/11/25 0830   Pain Assessment   Pain Assessment Tool 0-10   Pain Score No Pain   Restrictions/Precautions   Precautions Bed/chair alarms;Cognitive;Fall Risk;Pain;Supervision on toilet/commode;Visual deficit   Subjective   Subjective pt agreeable to perform skilled PT   Sit to Lying   Type of Assistance Needed Supervision   Sit to Lying CARE Score 4   Lying to Sitting on Side of Bed   Type of Assistance Needed Supervision   Lying to Sitting on Side of Bed CARE Score 4   Sit to Stand   Type of Assistance Needed Incidental touching   Sit to Stand CARE Score 4   Bed-Chair Transfer   Type of Assistance Needed Incidental touching;Adaptive equipment   Comment RW   Chair/Bed-to-Chair Transfer CARE Score 4   Transfer Bed/Chair/Wheelchair   Limitations Noted In Balance;Coordination;Confidence;Endurance;Pain Management;LE Strength;Vision   Adaptive Equipment Roller Walker   Car Transfer   Type of Assistance Needed Incidental touching;Adaptive equipment   Comment [facility] RW   Car Transfer CARE Score 4   Walk 10 Feet   Type of Assistance Needed Incidental touching;Adaptive equipment   Comment  RW   Walk 10 Feet CARE Score 4   Walk 50 Feet with Two Turns   Type of Assistance Needed Incidental touching;Adaptive equipment   Comment [facility]   Walk 50 Feet with Two Turns CARE Score 4   Walk 150 Feet   Type of Assistance Needed Incidental touching;Adaptive equipment   Comment [facility]   Walk 150 Feet CARE Score 4   Walking 10 Feet on Uneven Surfaces   Type of Assistance Needed Incidental touching;Adaptive equipment   Comment Floor mat [facility]   Walking 10 Feet on Uneven Surfaces CARE Score 4   Ambulation   Primary Mode of Locomotion Prior to Admission Walk   Distance Walked (feet) 150 ft  (80)   Assist Device Roller Walker   Gait Pattern Slow Vaishali;Decreased foot clearance;Improper weight shift;Shuffle   Does the patient walk? 2. Yes   Wheel 50 Feet with Two Turns   Reason if not Attempted Activity not applicable   Wheel 50 Feet with  Two Turns CARE Score 9   Wheel 150 Feet   Reason if not Attempted Activity not applicable   Wheel 150 Feet CARE Score 9   Wheelchair mobility   Does the patient use a wheelchair? 0. No   Curb or Single Stair   Style negotiated Single stair   Type of Assistance Needed Incidental touching   Comment  steps BL HR gait belt just in case knee buckle   1 Step (Curb) CARE Score 4   4 Steps   Type of Assistance Needed Incidental touching   Comment  steps BL HR gait belt just in case knee buckle.   4 Steps CARE Score 4   12 Steps   Reason if not Attempted Activity not applicable   12 Steps CARE Score 9   Stairs   Findings  steps 6 inch BL HR   Therapeutic Interventions   Strengthening seated 2# LAQ AP marching heel rasies 20- reps all exs   Flexibility manual stretch LE   Equipment Use   NuStep lvl 2 for 10 min   Assessment   Treatment Assessment pt perfor skilled PT and focus on bed mobility S level  and progressing to CG w RW most of the time and pt perform  steps at CG at 6 inch step/  gait belt recommend d/t knee may buckle at times .Pt ascending and descending FWD . Pt instructed to drink more fluid and eat more food . Pt will cont to need skilled PT to meet DCgoals , pt in bed lvl with all needs in reach and bed alarm on   Barriers to Discharge Inaccessible home environment;Decreased caregiver support   Plan   Progress Slow progress, decreased activity tolerance   PT Therapy Minutes   PT Time In 0830   PT Time Out 0930   PT Total Time (minutes) 60   PT Mode of treatment - Individual (minutes) 60   PT Mode of treatment - Concurrent (minutes) 0   PT Mode of treatment - Group (minutes) 0   PT Mode of treatment - Co-treat (minutes) 0   PT Mode of Treatment - Total time(minutes) 60 minutes   PT Cumulative Minutes 660   Therapy Time missed   Time missed? No

## 2025-07-11 NOTE — PROGRESS NOTES
Progress Note - Hospitalist   Name: Aquilino Dowell 64 y.o. male I MRN: [id number]  Unit/Bed#: -01 I Date of Admission: 7/2/2025   Date of Service: 7/11/2025 I Hospital Day: 9    Assessment & Plan  Diabetes (HCC)  Lab Results   Component Value Date    HGBA1C 8.0 (H) 06/24/2025     Recent Labs     07/10/25  1605 07/10/25  2143 07/11/25  0607 07/11/25  1132   POCGLU 221* 126 119 154*     Home: Metformin 1000mg daily. In hospital he was on lantus 6U daily while the home metformin was on hold and he remain on lantus at this time  Patient extremely frustrated with high blood sugars.  Initially resumed Metformin 1000 mg daily but now increased to BID as of 7/9  Patient recently has been having tachycardia. D-dimer elevated and PE should be ruled out. Metformin placed on hold again in anticipation of need for CTA. Will continue lantus with sliding scale for now for blood sugar control  Fall  S/p fall at home ambulating to the bathroom  Uses a cane at baseline due to LE neuropathy and vision issues  PT/OT  Left shoulder pain  Xray done on 6/24/25 = + glenohumeral DJD, no fracture  Bengay ordered per PMR  Patient may benefit from out-patient orthopedic evaluation for local injections for pain control.  Coronary artery disease  S/p cardiac cath 2013    95% prox LAD, 40% mid LAD, EF 60%, MANUEL to prox LAD (Xience Rx 3.5x18mm   Continue home Prasugrel, ASA  Patient has not been taking statin for several years and reports having a side effect; however, cannot recall what that was.  Thorough review of his medical record shows his cardiologist is aware that he was not taking it so discontinued it.  Alcohol use disorder  Per hospital chart, wife reported up to 1 bottle of wine per day  ETOH on admit 6/24 was normal  Was on CIWA protocol  Continue B12, folic acid  Hypertension  Home: Lopressor 50 mg BID.  He was switched from lisinopril to metoprolol when he had an episode of NSVT.    Here: same  No changes today.   Patient does get orthostatic in therapy. Has legs wrapped. Got albumin 7/10 and 500ml IV saline bolus  Sinusitis  Seen on CT head done in ED and  [facility] 7/3/25  No complaints of increased sinus pressure or headaches, he and his wife state he always has some degree of chronic sinus issues but this has been for years  Continue flonase, claritin  Back pain  MRI lumbar spine:  No discitis/osteomyelitis in lumbar spine, as clinically questioned.  Small focal areas of enhancement in the L1-L2 and L2-L3 supraspinous ligament regions, suspicious for enthesitis.  Mild multilevel degenerative changes of lumbar spine, as detailed above. No significant canal stenosis or foraminal narrowing  Pain meds/therapy per PMR  Hyperbilirubinemia  Peaked at 3.00 (7/8/25) today 2.53  Right UQ US- Hepatomegaly with steatosis, no liver mass  Recommend outpatient GI follow up  Metformin was briefly restarted but now back on hold as of 7/11 due to possible need for CTA  Continue to monitor- recheck labs sunday  Vitamin D deficiency  Continue supplement  Lung nodule  Found incidentally on CT scan  4mm right upper lobe nodule which is new  Will need repeat CT in 12 months  Tobacco use disorder  Encourage cessation at discharge.  Depression  Pt does not want to start a medication for mood.  Anemia  Iron and iron sat normal  Hgb today 9.3, down from 10.9  Likely dilutional as he received IVF the last 2 nights  Continue to monitor  GERD (gastroesophageal reflux disease)  On PPI at home which was discontinued on admission due to elevated LFT's  Continue pepcid  Nail overgrowth    Orthostatic hypotension  7/9- While working with PT/OT, patient became dizzy when sitting on edge of bed and standing.  BPs (+) for orthostasis.    Given 500 cc bolus NS x2 and also received a dose of IV albumin.  Patient with little PO intake.  Continue leg wraps  Polymicrobial bacteremia  Patient with 1 of 2 blood cultures positive for klebsiella and staph when admitted to  hospital on 6/24  Seen by ID in hospital, completed course of cefepime and vanco  Procal was ordered by PMR due to tachycardia.   Procal up to 1.35  Afebrile  WBC 3.86  Will repeat blood cultures- drawn and pending  Tachycardia  Therapy recorded a HR in the 120's on 7/11 afternoon after patient did the stairs  On recheck after resting he is still 100-110  EKG done- sinus tach with   afebrile  7/11- Clinically he looks dry. Albumin low on recent lab work. ordered IV albumin and small IVF bolus overnight  Continue current dose of metoprolol, hesitant to increase dose due to orthostasis   HRs in low 100's today, slightly better than yesterday.   Mag low which may be contributing- will replete  D-dimer elevated at 0.97. his pulse ox has been stable on room air but PE should be considered in setting of ongoing tachycardia and elevated d-dimer  Unable to get CTA as he has been on metformin which will need to be held for 48 hours. Metformin now on hold. Will order venous duplex for now.   Continue to monitor HR and BP closely  Hypomagnesemia  Mag today 1.6  Replete  Recheck labs sunday    The above assessment and plan was reviewed and updated as determined by my evaluation of the patient on 7/11/2025.    History of Present Illness   Patient seen and examined. Patients overnight issues or events were reviewed with nursing staff. New or overnight issues include the following:   Patient seen sitting up in chair this morning, had not had therapy yet when I saw him. HR in the low 100's this AM which is slightly better than yesterday. Denies CP/SOB. No fever.     Review of Systems    Objective :  Temp:  [97.9 °F (36.6 °C)-98.9 °F (37.2 °C)] 98.3 °F (36.8 °C)  HR:  [] 88  BP: ()/(62-82) 118/62  Resp:  [16-20] 18  SpO2:  [96 %-98 %] 97 %  O2 Device: None (Room air)    Invasive Devices       Peripheral Intravenous Line  Duration             Peripheral IV 07/09/25 Distal;Dorsal (posterior);Left Forearm 1 day                     Physical Exam  General Appearance: NAD; pleasant  HEENT: PERRLA, conjuctiva normal; mucous membranes moist; face symmetrical  Neck:  Supple  Lungs: clear bilaterally, normal respiratory effort, no retractions, expiratory effort normal, on room air  CV: HR in low 100s, regular  ABD: soft non tender, +BS x4  EXT: DP pulses intact, no lymphadenopathy  Skin: normal turgor, normal texture, no rash  Psych: affect normal, mood normal  Neuro: AAOx3    The above physical exam was reviewed and updated as determined by my evaluation of the patient on 7/11/2025.      Lab Results: I have reviewed the following results:  Results from last 7 days   Lab Units 07/11/25  0633 07/08/25  0518   WBC Thousand/uL 3.86* 5.64   HEMOGLOBIN g/dL 9.3* 10.9*   HEMATOCRIT % 30.1* 34.2*   PLATELETS Thousands/uL 101* 143*     Results from last 7 days   Lab Units 07/11/25  0633 07/10/25  0610   SODIUM mmol/L 137 134*   POTASSIUM mmol/L 4.0 3.6   CHLORIDE mmol/L 104 103   CO2 mmol/L 27 26   BUN mg/dL 5 6   CREATININE mg/dL 0.71 0.74   CALCIUM mg/dL 8.0* 8.0*             Results from last 7 days   Lab Units 07/11/25  1132 07/11/25  0607 07/10/25  2143   POC GLUCOSE mg/dl 154* 119 126           Review of Scheduled Meds: Medications  reviewed and reconciled as needed  Current Facility-Administered Medications   Medication Dose Route Frequency Provider Last Rate    acetaminophen  650 mg Oral Q8H PRN Ashley Depadua, MD      aspirin  81 mg Oral Daily Nadiya Shah PA-C      benzonatate  200 mg Oral TID PRN Nadiya Shah PA-C      bisacodyl  10 mg Rectal Daily PRN Nadiya Shah PA-C      vitamin B-12  1,000 mcg Oral Daily Nadiya Shah PA-C      docusate sodium  100 mg Oral BID Robert Bo MD      enoxaparin  40 mg Subcutaneous Daily Nadiya Shah PA-C      ergocalciferol  50,000 Units Oral Weekly Nadiya Shah PA-C      famotidine  20 mg Oral BID Nadiya Shah PA-C      fluticasone  2 spray Each Nare BID Nadiya  AlsysaZACH      folic acid  1 mg Oral Daily Nadiya Shah, PA-MARS      gabapentin  100 mg Oral HS Nadiya Shah, ZACH      ibuprofen  400 mg Oral Q6H PRN Robert Bo MD      insulin glargine  5 Units Subcutaneous HS Nadiya Shah, PA-MARS      insulin lispro  1-5 Units Subcutaneous HS Nadiya Shah, PA-MARS      insulin lispro  1-6 Units Subcutaneous TID AC Nadiya ShahZACH      loratadine  10 mg Oral Daily Nadiya Shah, PA-MARS      magnesium Oxide  400 mg Oral BID Nadiya Shah, PA-MARS      magnesium sulfate  2 g Intravenous Once Nadiya ShahZACH      menthol-methyl salicylate   Apply externally 4x Daily Nadiya CortezthaoZACH larios      [Held by provider] metFORMIN  1,000 mg Oral BID With Meals Bettie Lyon PA-C      methocarbamol  500 mg Oral Q6H PRN Nadiya ShahZACH      metoprolol tartrate  50 mg Oral Q12H TERRENCE Nadiya ShahZACH      ondansetron  4 mg Oral Q6H PRN Rayne Da Silva PA-C      ondansetron  4 mg Oral Once Aroldo Wyatt MD      oxyCODONE  2.5 mg Oral Q4H PRN Ashley Depadua, MD      Or    oxyCODONE  5 mg Oral Q4H PRN Ashley Depadua, MD      polyethylene glycol  17 g Oral Daily PRN Ashley Depadua, MD      prasugrel  10 mg Oral Daily Nadiya ShahZACH      senna  2 tablet Oral HS PRN Robert Bo MD      thiamine  100 mg Oral Daily Nadiya DoranZACH peter         VTE Pharmacologic Prophylaxis: lovenox  Code Status: Level 1 - Full Code  Current Length of Stay: 9 day(s)    Administrative Statements     ** Please Note:  voice to text software may have been used in the creation of this document. Although proof errors in transcription or interpretation are a potential of such software**

## 2025-07-11 NOTE — ASSESSMENT & PLAN NOTE
Therapy recorded a HR in the 120's on 7/11 afternoon after patient did the stairs  On recheck after resting he is still 100-110  EKG done- sinus tach with   afebrile  7/11- Clinically he looks dry. Albumin low on recent lab work. ordered IV albumin and small IVF bolus overnight  Continue current dose of metoprolol, hesitant to increase dose due to orthostasis   HRs in low 100's today, slightly better than yesterday.   Mag low which may be contributing- will replete  D-dimer elevated at 0.97. his pulse ox has been stable on room air but PE should be considered in setting of ongoing tachycardia and elevated d-dimer  Unable to get CTA as he has been on metformin which will need to be held for 48 hours. Metformin now on hold. Will order venous duplex for now.   Continue to monitor HR and BP closely

## 2025-07-11 NOTE — ASSESSMENT & PLAN NOTE
Lab Results   Component Value Date    HGBA1C 8.0 (H) 06/24/2025     Recent Labs     07/10/25  1605 07/10/25  2143 07/11/25  0607 07/11/25  1132   POCGLU 221* 126 119 154*     Home: Metformin 1000mg daily. In hospital he was on lantus 6U daily while the home metformin was on hold and he remain on lantus at this time  Patient extremely frustrated with high blood sugars.  Initially resumed Metformin 1000 mg daily but now increased to BID as of 7/9  Patient recently has been having tachycardia. D-dimer elevated and PE should be ruled out. Metformin placed on hold again in anticipation of need for CTA. Will continue lantus with sliding scale for now for blood sugar control

## 2025-07-11 NOTE — PROGRESS NOTES
"OT Daily Treatment Note       07/11/25 1400   Pain Assessment   Pain Assessment Tool 0-10   Pain Score 5   Pain Location/Orientation Orientation: Left;Location: Shoulder   Pain Onset/Description Onset: Ongoing   Hospital Pain Intervention(s) Rest   Restrictions/Precautions   Precautions Bed/chair alarms;Cognitive;Fall Risk;Pain;Supervision on toilet/commode;Visual deficit   Weight Bearing Restrictions No   ROM Restrictions No   Lifestyle   Autonomy \"Today has not been a good day, I missed lunch for an ultrasound and a new IV\"   Lying to Sitting on Side of Bed   Type of Assistance Needed Supervision   Physical Assistance Level No physical assistance   Comment S with HOB flat   Lying to Sitting on Side of Bed CARE Score 4   Sit to Stand   Type of Assistance Needed Incidental touching   Physical Assistance Level No physical assistance   Comment CGA with RW, occasional cues for safe hand placement   Sit to Stand CARE Score 4   Bed-Chair Transfer   Type of Assistance Needed Incidental touching   Physical Assistance Level No physical assistance   Comment CGA with RW, cues to complete full pivot within RW frame   Chair/Bed-to-Chair Transfer CARE Score 4   Toileting Hygiene   Type of Assistance Needed Supervision   Physical Assistance Level No physical assistance   Comment CS for balance while voiding in stance with RW, pt able to manage clothing   Toileting Hygiene CARE Score 4   Toilet Transfer   Type of Assistance Needed Incidental touching   Physical Assistance Level No physical assistance   Comment CGA  with RW for ambulatory toilet transfer, pt stood to void and for handwashing at sink after task   Toilet Transfer CARE Score 4   Exercise Tools   Exercise Tools Yes   Other Exercise Tool 1 Seated at table top, pt completed 30 reps forward/backward and clockwise circles with BUE and 4# weighted pearl to facilitate UB endurance and shoulder ROM. Pt reports no increase in L shoulder pain, cues for counting reps. "   Activity Tolerance   Activity Tolerance Patient tolerated treatment well   Medical Staff Made Aware Pt without ACE wraps donned at start of session s/p doppler to Johana OTR confirmed with RN that pt can complete session without ACE wraps.   Assessment   Treatment Assessment Pt with good tolerance to 30 minute session focused on household mobility, fxl transfers, toileting, and UB endurance/ROM. Pt required CS-CGA for safe completion of all fxl transfers/mobility with therapist managing IV line this session. Pt requires moderate cues for carryover of safe hand placement during transfer and RW navigation when approaching transfer surface. Pt cooperative and demo'd good tolerance to shoulder ROM activities without increase in pain. Continue OT POC to maximize safety, activity tolerance, strength, and balance in order to increase independence and ease of ADL completion upon d/c.   Prognosis Good   Problem List Decreased strength;Decreased endurance;Impaired balance;Decreased mobility;Decreased cognition;Impaired judgement;Decreased safety awareness;Impaired vision   Barriers to Discharge Inaccessible home environment;Decreased caregiver support   Plan   Treatment/Interventions ADL retraining;Functional transfer training;Therapeutic exercise;Endurance training;Cognitive reorientation;Patient/family training;Equipment eval/education   Progress Progressing toward goals   OT Therapy Minutes   OT Time In 1400   OT Time Out 1430   OT Total Time (minutes) 30   OT Mode of treatment - Individual (minutes) 30   OT Mode of treatment - Concurrent (minutes) 0   OT Mode of treatment - Group (minutes) 0   OT Mode of treatment - Co-treat (minutes) 0   OT Mode of Treatment - Total time(minutes) 30 minutes   OT Cumulative Minutes 750   Therapy Time missed   Time missed? No

## 2025-07-11 NOTE — PROGRESS NOTES
"OT Daily Treatment Note       07/11/25 1030   Pain Assessment   Pain Assessment Tool 0-10   Pain Score 5   Pain Location/Orientation Orientation: Lower;Location: Back   Pain Onset/Description Onset: Ongoing   Effect of Pain on Daily Activities Limited standing tolerance   Patient's Stated Pain Goal No pain   Hospital Pain Intervention(s) Rest   Restrictions/Precautions   Precautions Bed/chair alarms;Cognitive;Fall Risk;Pain;Supervision on toilet/commode;Visual deficit   Weight Bearing Restrictions No   ROM Restrictions No   Lifestyle   Autonomy \"I'm not the best at standing. I'm better at laying in bed\"   Grooming   Able To Comb/Brush Hair   Findings Set-up seated at sink to comb hair at start of session.   Putting On/Taking Off Footwear   Type of Assistance Needed Independent   Physical Assistance Level No physical assistance   Comment Pt demo'd ability to doff/don  socks on BLE at mod I level using figure four technique without cues, good tolerance.   Putting On/Taking Off Footwear CARE Score 6   Lying to Sitting on Side of Bed   Type of Assistance Needed Supervision   Physical Assistance Level No physical assistance   Comment S with HOB flat   Lying to Sitting on Side of Bed CARE Score 4   Sit to Stand   Type of Assistance Needed Incidental touching   Physical Assistance Level No physical assistance   Comment CGA with RW and at raised table t/o session, good carryover of safe hand placement.   Sit to Stand CARE Score 4   Bed-Chair Transfer   Type of Assistance Needed Incidental touching   Physical Assistance Level No physical assistance   Comment with RW at end of session   Chair/Bed-to-Chair Transfer CARE Score 4   Commmunity Re-entry   Community Re-entry Level Walker   Community Re-entry Level of Assistance Contact guard   Community Re-entry Pt ambulating ~100 ft with RW and CGA from therapy gym > recliner in room at end of session  to simulate community distances. Slow pace towards end of task, cues to " "stay within RW frame during pivot to recliner, no LOB however increased UE support on RW with fatigue.   Functional Standing Tolerance   Time 4 x 3-4' trials   Comments Pt in stance 4 trials x 3-4 each at CS-CGA level, reaching to rotate all large foam dominoes pieces and form into \"train\" with each ends matching same amount of dots to challenge fxl cognition. Pt able to see high contrasting dots on dominos with primarily OS, min VCs for scanning and problem solving to fit all pieces into train. Pt required multiple rest breaks due to fatigue and increased low back pain in stance, subsided with rest.   Cognition   Overall Cognitive Status WFL   Arousal/Participation Alert;Cooperative   Attention Attends with cues to redirect   Orientation Level Oriented X4   Memory Decreased short term memory   Following Commands Follows one step commands with increased time or repetition   Comments Pt required cues for orientation to correct month and date at start of session, reporting \"I don't work anymore so every day is Saturday\"   Additional Activities   Additional Activities Comments Pt presents with decreased motivation in regards to resuming leisure activities upon d/c. During seated rest breaks between standing trials, pt discussed prior preferred leisure activities including spending time with his two cats (Tubs and Dip), watching \"[employer]\" on the [employer], and singing in his [employer] choir/for weddings.   Activity Tolerance   Activity Tolerance Patient tolerated treatment well   Other Comments   Assessment Pt reporting difficulty seeing and understanding therapy schedule, therapist wrote today's schedule in large print with bold black Sharpie. Pt able to read list of sessions and check off completed sessions at start/end of session.   Assessment   Treatment Assessment Pt with good tolerance to 60 minute skilled IPOT session focused on standing tolerance, fxl transfers and mobility, fxl cognition, and activity " tolerance in order to maximize functional independence with ADLs/IADLs. Pt occasionally reporting decreased motivation to engage in fxl tasks, however, able to be redirected with encouragement. Pt without significant LOB during transfers, standing trials, or community distances with RW, however, is limited by increased back pain and decreased endurance. Continue OT POC to maximize activity tolerance, safety awareness, strength, and overall independence with fxl transfers and ADLs in preparation for safe d/c.   Prognosis Good   Problem List Decreased strength;Decreased endurance;Impaired balance;Decreased coordination;Decreased mobility;Decreased cognition;Impaired judgement;Decreased safety awareness;Impaired vision;Decreased range of motion   Plan   Treatment/Interventions ADL retraining;Functional transfer training;Therapeutic exercise;Endurance training;Cognitive reorientation;Equipment eval/education;Patient/family training;Compensatory technique education   Progress Progressing toward goals   OT Therapy Minutes   OT Time In 1030   OT Time Out 1130   OT Total Time (minutes) 60   OT Mode of treatment - Individual (minutes) 60   OT Mode of treatment - Concurrent (minutes) 0   OT Mode of treatment - Group (minutes) 0   OT Mode of treatment - Co-treat (minutes) 0   OT Mode of Treatment - Total time(minutes) 60 minutes   OT Cumulative Minutes 720   Therapy Time missed   Time missed? No

## 2025-07-11 NOTE — CASE MANAGEMENT
Met w/pt and his wife and reviewed transport arrangements and oop costs for monday's appt at the [facility]. They are in agreement. Discussed dc for 7/16 and wife stated they have a cardiology appmt at 10 am that day at [place], cm inquired if they preferred a dc late Tuesday afternoon so they don't feel rushed.  Wife in agreement as long as pt is medically stable. Mssg sent to the treating team with inquiry. Cm discussed outpt therapy and they are in agreement and prefer to go to [facility]. Cm to make appmt.     Phone call placed to [facility], s/w cristian and appmt scheduled for 7/22 at 12 15. Info placed on dc instructions.

## 2025-07-11 NOTE — ASSESSMENT & PLAN NOTE
Therapy recorded a HR in the 120's on 7/11 afternoon after patient did the stairs  On recheck after resting he is still 100-110  EKG done- sinus tach with   afebrile  7/11- Clinically he looks dry. Albumin low on recent lab work. ordered IV albumin and small IVF bolus overnight  Continue current dose of metoprolol, hesitant to increase dose due to orthostasis   HRs in low 100's 7/11/25, slightly better than 7/10/25.   Mag low which may be contributing- repleted  D-dimer elevated at 0.97. Pulse ox has been stable on room air but PE should be considered in setting of ongoing tachycardia and elevated d-dimer  Unable to get CTA as he has been on Metformin which will need to be held for 48 hours.   Metformin now on hold.  Last dose given AM 7/11/25 = 48 hrs will be Sunday AM  Venous doppler of the LEs was negative  In PT today 7/12/25HR was 107 bpm. He walked 120 ft heart rate was 126. BP was 100/68. He was complaining of significant fatigue.  His 0500 BP today was 144/79  Could be reflex tachycardia?  TSH 6/24/25 was 1.57  Consider getting an ECHO since last was 2024 but not sure he will agree to it

## 2025-07-11 NOTE — ASSESSMENT & PLAN NOTE
Lab Results   Component Value Date    HGBA1C 8.0 (H) 06/24/2025       Recent Labs     07/10/25  1039 07/10/25  1605 07/10/25  2143 07/11/25  0607   POCGLU 189* 221* 126 119       Blood Sugar Average: Last 72 hrs:  (P) [id number]: Metformin 1000mg daily  -Unclear if family checks BG at home or if he has a kit.   >Here: Lantus 5 units QHS, CDI/Accuchecks  >Goal to transition back to home regimen.  Reviewed with IM  >Diabetic Diet  >Management as per IM

## 2025-07-11 NOTE — ASSESSMENT & PLAN NOTE
Iron and iron sat normal  Hgb today 9.3, down from 10.9  Likely dilutional as he received IVF the last 2 nights  Continue to monitor

## 2025-07-11 NOTE — ASSESSMENT & PLAN NOTE
Patient with 1 of 2 blood cultures positive for klebsiella and staph when admitted to hospital on 6/24  Seen by ID in hospital, completed course of cefepime and vanco  Procal was ordered by PMR due to tachycardia.   Procal up to 1.35  Afebrile  WBC 3.86  Will repeat blood cultures- drawn and pending

## 2025-07-11 NOTE — CASE MANAGEMENT
Request placed for a [facility] at 8 15 on  [facility].  request is for 7 45am. The return  time is 9 30.     Phone call placed to pts wife Ene, received voice mail awaiting call back.    No

## 2025-07-11 NOTE — ASSESSMENT & PLAN NOTE
Home: Lopressor 50 mg BID.  He was switched from lisinopril to metoprolol when he had an episode of NSVT.    Here: same  No changes today.  Patient does get orthostatic in therapy. Has legs wrapped. Got albumin 7/10 and 500ml IV saline bolus

## 2025-07-11 NOTE — ASSESSMENT & PLAN NOTE
- Patient with sinus tachycardia for the last couple of days at rest.  - Patient does not have overt symptoms of infection, although procalcitonin was elevated;   - Lung exam benign  > Will start with bilateral lower extremity Dopplers per IM with low threshold to order CT PE study if concerning for clot  > Replace magnesium as above

## 2025-07-11 NOTE — ASSESSMENT & PLAN NOTE
Peaked at 3.00 (7/8/25) today 2.53  Right UQ US- Hepatomegaly with steatosis, no liver mass  Recommend outpatient GI follow up  Metformin was briefly restarted but now back on hold as of 7/11 due to possible need for CTA  Continue to monitor- recheck labs sunday

## 2025-07-11 NOTE — ASSESSMENT & PLAN NOTE
Peaked at 3.00 (7/8/25) today 2.53  Right UQ US- Hepatomegaly with steatosis, no liver mass  Recommend outpatient GI follow up  Metformin was briefly restarted but now back on hold as of 7/11 due to possible need for CTA  Continue to monitor- recheck labs 7/13/25

## 2025-07-12 LAB
GLUCOSE SERPL-MCNC: 140 MG/DL (ref 65–140)
GLUCOSE SERPL-MCNC: 140 MG/DL (ref 65–140)
GLUCOSE SERPL-MCNC: 179 MG/DL (ref 65–140)
GLUCOSE SERPL-MCNC: 179 MG/DL (ref 65–140)
GLUCOSE SERPL-MCNC: 215 MG/DL (ref 65–140)
GLUCOSE SERPL-MCNC: 215 MG/DL (ref 65–140)
GLUCOSE SERPL-MCNC: 238 MG/DL (ref 65–140)
GLUCOSE SERPL-MCNC: 238 MG/DL (ref 65–140)

## 2025-07-12 PROCEDURE — 97530 THERAPEUTIC ACTIVITIES: CPT

## 2025-07-12 PROCEDURE — 99232 SBSQ HOSP IP/OBS MODERATE 35: CPT | Performed by: NURSE PRACTITIONER

## 2025-07-12 PROCEDURE — 82948 REAGENT STRIP/BLOOD GLUCOSE: CPT

## 2025-07-12 RX ADMIN — METOPROLOL TARTRATE 50 MG: 50 TABLET, FILM COATED ORAL at 21:20

## 2025-07-12 RX ADMIN — INSULIN LISPRO 1 UNITS: 100 INJECTION, SOLUTION INTRAVENOUS; SUBCUTANEOUS at 17:37

## 2025-07-12 RX ADMIN — INSULIN GLARGINE 5 UNITS: 100 INJECTION, SOLUTION SUBCUTANEOUS at 21:20

## 2025-07-12 RX ADMIN — INSULIN LISPRO 2 UNITS: 100 INJECTION, SOLUTION INTRAVENOUS; SUBCUTANEOUS at 21:26

## 2025-07-12 RX ADMIN — GABAPENTIN 100 MG: 100 CAPSULE ORAL at 21:20

## 2025-07-12 RX ADMIN — ENOXAPARIN SODIUM 40 MG: 40 INJECTION SUBCUTANEOUS at 10:41

## 2025-07-12 RX ADMIN — FLUTICASONE PROPIONATE 2 SPRAY: 50 SPRAY, METERED NASAL at 10:42

## 2025-07-12 RX ADMIN — DOCUSATE SODIUM 100 MG: 100 CAPSULE, LIQUID FILLED ORAL at 21:20

## 2025-07-12 RX ADMIN — MUSCLE RUB CREAM: 100; 150 CREAM TOPICAL at 10:43

## 2025-07-12 RX ADMIN — INSULIN LISPRO 2 UNITS: 100 INJECTION, SOLUTION INTRAVENOUS; SUBCUTANEOUS at 10:53

## 2025-07-12 RX ADMIN — PRASUGREL 10 MG: 10 TABLET, FILM COATED ORAL at 10:42

## 2025-07-12 RX ADMIN — OXYCODONE HYDROCHLORIDE 5 MG: 5 TABLET ORAL at 10:50

## 2025-07-12 RX ADMIN — THIAMINE HCL TAB 100 MG 100 MG: 100 TAB at 10:41

## 2025-07-12 RX ADMIN — FLUTICASONE PROPIONATE 2 SPRAY: 50 SPRAY, METERED NASAL at 17:38

## 2025-07-12 RX ADMIN — MAGNESIUM OXIDE TAB 400 MG (241.3 MG ELEMENTAL MG) 400 MG: 400 (241.3 MG) TAB at 10:41

## 2025-07-12 RX ADMIN — FOLIC ACID 1 MG: 1 TABLET ORAL at 10:41

## 2025-07-12 RX ADMIN — DOCUSATE SODIUM 100 MG: 100 CAPSULE, LIQUID FILLED ORAL at 10:41

## 2025-07-12 RX ADMIN — ASPIRIN 81 MG CHEWABLE TABLET 81 MG: 81 TABLET CHEWABLE at 10:41

## 2025-07-12 RX ADMIN — METOPROLOL TARTRATE 50 MG: 50 TABLET, FILM COATED ORAL at 10:41

## 2025-07-12 RX ADMIN — MUSCLE RUB CREAM: 100; 150 CREAM TOPICAL at 17:38

## 2025-07-12 RX ADMIN — METHOCARBAMOL 500 MG: 500 TABLET ORAL at 21:20

## 2025-07-12 RX ADMIN — FAMOTIDINE 20 MG: 20 TABLET, FILM COATED ORAL at 10:41

## 2025-07-12 RX ADMIN — LORATADINE 10 MG: 10 TABLET ORAL at 10:41

## 2025-07-12 RX ADMIN — CYANOCOBALAMIN TAB 500 MCG 1000 MCG: 500 TAB at 10:41

## 2025-07-12 RX ADMIN — MUSCLE RUB CREAM 1 APPLICATION: 100; 150 CREAM TOPICAL at 21:25

## 2025-07-12 RX ADMIN — MAGNESIUM OXIDE TAB 400 MG (241.3 MG ELEMENTAL MG) 400 MG: 400 (241.3 MG) TAB at 21:20

## 2025-07-12 RX ADMIN — FAMOTIDINE 20 MG: 20 TABLET, FILM COATED ORAL at 17:37

## 2025-07-12 NOTE — PHYSICAL THERAPY NOTE
Call to patient's wife, Ene, to provide update on functional status. Due to patient continuing to deal with low BP and being symptomatic as well as high fatigue level recommending Supervision for ambulation at discharge. Spoke with wife about making WC level goals to allow him to be more independent when she is out of the home. Wife agreeable with this plan. Wife to purchase RW out of pocket for patient and to pursue WC through insurance. Plan for FT on Monday to review stair management in preparation for DC.

## 2025-07-12 NOTE — PROGRESS NOTES
"   07/12/25 0906   Pain Assessment   Pain Assessment Tool 0-10   Pain Score 5   Pain Location/Orientation Orientation: Left;Location: Shoulder   Restrictions/Precautions   Precautions Bed/chair alarms;Cognitive;Fall Risk;Pain;Supervision on toilet/commode;Visual deficit   Cognition   Overall Cognitive Status WFL   Arousal/Participation Alert;Cooperative   Attention Attends with cues to redirect   Orientation Level Oriented X4   Memory Decreased short term memory   Following Commands Follows one step commands with increased time or repetition   Subjective   Subjective \" I think I am going to ditch the RW soon.\"   Sit to Stand   Type of Assistance Needed Supervision;Adaptive equipment   Comment CS with RW   Sit to Stand CARE Score 4   Bed-Chair Transfer   Type of Assistance Needed Supervision;Adaptive equipment;Incidental touching   Comment CS/CGA with RW   Chair/Bed-to-Chair Transfer CARE Score 4   Car Transfer   Type of Assistance Needed Incidental touching   Comment CGA with RW   Car Transfer CARE Score 4   Walk 10 Feet   Type of Assistance Needed Supervision;Adaptive equipment   Comment CS with RW   Walk 10 Feet CARE Score 4   Walk 50 Feet with Two Turns   Type of Assistance Needed Supervision;Adaptive equipment   Comment CS/CGA  with RW   Walk 50 Feet with Two Turns CARE Score 4   Walking 10 Feet on Uneven Surfaces   Type of Assistance Needed Incidental touching;Adaptive equipment   Comment CGA with RW over floor mat   Walking 10 Feet on Uneven Surfaces CARE Score 4   Ambulation   Primary Mode of Locomotion Prior to Admission Walk   Distance Walked (feet) 120 ft   Assist Device Roller Walker   Gait Pattern Slow Vaishali;Decreased foot clearance;Improper weight shift;Shuffle   Limitations Noted In Balance;Coordination;Posture;Safety;Speed;Swing;Strength   Does the patient walk? 2. Yes   Wheel 50 Feet with Two Turns   Type of Assistance Needed Supervision   Comment VCs for brake management   Wheel 50 Feet with Two " Turns CARE Score 4   Wheelchair mobility   Method Left upper extremity;Right upper extremity;Right lower extremity;Left lower extremity   Assistance Provided For   (VCs to lock brakes prior to transferring)   Distance Level Surface (feet) 120 ft   Findings Discussed with patient about setting WC level goals to maximize independence if he continues to be symptomatic with low BP and high level of fatigue requiring  Supervision for ambulation on discharge. Pt unsure of WC level goals. plan to call wife and discuss options   Type of Wheelchair Used 1. Manual   Curb or Single Stair   Style negotiated Curb   Type of Assistance Needed Incidental touching;Adaptive equipment   Comment CGA to ascend/descend 6 inch curb step with RW   1 Step (Curb) CARE Score 4   4 Steps   Type of Assistance Needed Incidental touching;Adaptive equipment   Comment CGA to ascend/descend 6 inch  steps with bilateral UE support via R handrail   4 Steps CARE Score 4   Assessment   Treatment Assessment Patient participated in 54 minute skilled physical therapy session focusing on transfer training and gait training with RW while closely monitoring vitals. Patient continues to have low BP (100/62) and resting heart rate around 105-107 bpm. Pt reports intermittent lightedness and high levels of fatigue. Commuciated with RN and physician. Cleared by CRNP to wear KIERA stockings. Provided frequent rest breaks and encouraged hydration throughout the session.Due to patient being symptomatic with low BP and high levels of fatigue did establish WC level goals to maximize patient independence especially when wife is not at home. Next session please assess stand pivot transfer from EOB<>WC as well as longer distance WC mobility.   Problem List Decreased strength;Decreased endurance;Impaired balance;Decreased mobility;Decreased cognition;Impaired judgement;Decreased safety awareness;Impaired vision   PT Barriers   Functional Limitation Stair  negotiation;Transfers;Walking   Plan   Treatment/Interventions Functional transfer training;LE strengthening/ROM;Therapeutic exercise;Cognitive reorientation   Progress Progressing toward goals   Discharge Recommendation   Rehab Resource Intensity Level, PT   (OP PT)   PT Therapy Minutes   PT Time In 0906   PT Time Out 1000   PT Total Time (minutes) 54   PT Mode of treatment - Individual (minutes) 54   PT Mode of treatment - Concurrent (minutes) 0   PT Mode of treatment - Group (minutes) 0   PT Mode of treatment - Co-treat (minutes) 0   PT Mode of Treatment - Total time(minutes) 54 minutes   PT Cumulative Minutes 744     Nelly Vegas, PT, DPT

## 2025-07-13 PROBLEM — D69.6 THROMBOCYTOPENIA (HCC): Status: ACTIVE | Noted: 2025-07-13

## 2025-07-13 LAB
ALBUMIN SERPL BCG-MCNC: 2.7 G/DL (ref 3.5–5)
ALP SERPL-CCNC: 166 U/L (ref 34–104)
ALT SERPL W P-5'-P-CCNC: 17 U/L (ref 7–52)
ANION GAP SERPL CALCULATED.3IONS-SCNC: 6 MMOL/L (ref 4–13)
AST SERPL W P-5'-P-CCNC: 96 U/L (ref 13–39)
BILIRUB SERPL-MCNC: 2.67 MG/DL (ref 0.2–1)
BUN SERPL-MCNC: 6 MG/DL (ref 5–25)
CALCIUM ALBUM COR SERPL-MCNC: 9.4 MG/DL (ref 8.3–10.1)
CALCIUM SERPL-MCNC: 8.4 MG/DL (ref 8.4–10.2)
CHLORIDE SERPL-SCNC: 105 MMOL/L (ref 96–108)
CO2 SERPL-SCNC: 26 MMOL/L (ref 21–32)
CREAT SERPL-MCNC: 0.64 MG/DL (ref 0.6–1.3)
ERYTHROCYTE [DISTWIDTH] IN BLOOD BY AUTOMATED COUNT: 14.6 % (ref 11.6–15.1)
GFR SERPL CREATININE-BSD FRML MDRD: 104 ML/MIN/1.73SQ M
GLUCOSE P FAST SERPL-MCNC: 117 MG/DL (ref 65–99)
GLUCOSE SERPL-MCNC: 117 MG/DL (ref 65–140)
GLUCOSE SERPL-MCNC: 124 MG/DL (ref 65–140)
GLUCOSE SERPL-MCNC: 142 MG/DL (ref 65–140)
GLUCOSE SERPL-MCNC: 207 MG/DL (ref 65–140)
GLUCOSE SERPL-MCNC: 251 MG/DL (ref 65–140)
HCT VFR BLD AUTO: 31.8 % (ref 36.5–49.3)
HGB BLD-MCNC: 10 G/DL (ref 12–17)
MAGNESIUM SERPL-MCNC: 1.9 MG/DL (ref 1.9–2.7)
MCH RBC QN AUTO: 32.1 PG (ref 26.8–34.3)
MCHC RBC AUTO-ENTMCNC: 31.4 G/DL (ref 31.4–37.4)
MCV RBC AUTO: 102 FL (ref 82–98)
PLATELET # BLD AUTO: 120 THOUSANDS/UL (ref 149–390)
PMV BLD AUTO: 9.6 FL (ref 8.9–12.7)
POTASSIUM SERPL-SCNC: 3.8 MMOL/L (ref 3.5–5.3)
PROT SERPL-MCNC: 6.2 G/DL (ref 6.4–8.4)
RBC # BLD AUTO: 3.12 MILLION/UL (ref 3.88–5.62)
SODIUM SERPL-SCNC: 137 MMOL/L (ref 135–147)
WBC # BLD AUTO: 4 THOUSAND/UL (ref 4.31–10.16)

## 2025-07-13 PROCEDURE — 97116 GAIT TRAINING THERAPY: CPT

## 2025-07-13 PROCEDURE — 80053 COMPREHEN METABOLIC PANEL: CPT | Performed by: PHYSICIAN ASSISTANT

## 2025-07-13 PROCEDURE — 85027 COMPLETE CBC AUTOMATED: CPT | Performed by: PHYSICIAN ASSISTANT

## 2025-07-13 PROCEDURE — 83735 ASSAY OF MAGNESIUM: CPT | Performed by: PHYSICIAN ASSISTANT

## 2025-07-13 PROCEDURE — 97530 THERAPEUTIC ACTIVITIES: CPT

## 2025-07-13 PROCEDURE — 82948 REAGENT STRIP/BLOOD GLUCOSE: CPT

## 2025-07-13 PROCEDURE — 99233 SBSQ HOSP IP/OBS HIGH 50: CPT | Performed by: PHYSICIAN ASSISTANT

## 2025-07-13 PROCEDURE — 97110 THERAPEUTIC EXERCISES: CPT

## 2025-07-13 RX ORDER — ALPRAZOLAM 0.25 MG
0.25 TABLET ORAL ONCE
Status: COMPLETED | OUTPATIENT
Start: 2025-07-13 | End: 2025-07-13

## 2025-07-13 RX ORDER — LANOLIN ALCOHOL/MO/W.PET/CERES
800 CREAM (GRAM) TOPICAL 2 TIMES DAILY
Status: DISCONTINUED | OUTPATIENT
Start: 2025-07-13 | End: 2025-07-15 | Stop reason: HOSPADM

## 2025-07-13 RX ADMIN — FAMOTIDINE 20 MG: 20 TABLET, FILM COATED ORAL at 07:18

## 2025-07-13 RX ADMIN — FLUTICASONE PROPIONATE 2 SPRAY: 50 SPRAY, METERED NASAL at 07:27

## 2025-07-13 RX ADMIN — PRASUGREL 10 MG: 10 TABLET, FILM COATED ORAL at 07:30

## 2025-07-13 RX ADMIN — GABAPENTIN 100 MG: 100 CAPSULE ORAL at 21:25

## 2025-07-13 RX ADMIN — INSULIN LISPRO 2 UNITS: 100 INJECTION, SOLUTION INTRAVENOUS; SUBCUTANEOUS at 21:21

## 2025-07-13 RX ADMIN — ASPIRIN 81 MG CHEWABLE TABLET 81 MG: 81 TABLET CHEWABLE at 07:19

## 2025-07-13 RX ADMIN — METOPROLOL TARTRATE 50 MG: 50 TABLET, FILM COATED ORAL at 21:25

## 2025-07-13 RX ADMIN — ENOXAPARIN SODIUM 40 MG: 40 INJECTION SUBCUTANEOUS at 07:18

## 2025-07-13 RX ADMIN — FLUTICASONE PROPIONATE 2 SPRAY: 50 SPRAY, METERED NASAL at 17:07

## 2025-07-13 RX ADMIN — MAGNESIUM OXIDE TAB 400 MG (241.3 MG ELEMENTAL MG) 800 MG: 400 (241.3 MG) TAB at 21:25

## 2025-07-13 RX ADMIN — METOPROLOL TARTRATE 50 MG: 50 TABLET, FILM COATED ORAL at 07:19

## 2025-07-13 RX ADMIN — MUSCLE RUB CREAM: 100; 150 CREAM TOPICAL at 17:07

## 2025-07-13 RX ADMIN — ALPRAZOLAM 0.25 MG: 0.25 TABLET ORAL at 20:13

## 2025-07-13 RX ADMIN — CYANOCOBALAMIN TAB 500 MCG 1000 MCG: 500 TAB at 07:19

## 2025-07-13 RX ADMIN — INSULIN GLARGINE 5 UNITS: 100 INJECTION, SOLUTION SUBCUTANEOUS at 21:19

## 2025-07-13 RX ADMIN — THIAMINE HCL TAB 100 MG 100 MG: 100 TAB at 07:19

## 2025-07-13 RX ADMIN — MUSCLE RUB CREAM 1 APPLICATION: 100; 150 CREAM TOPICAL at 21:21

## 2025-07-13 RX ADMIN — DOCUSATE SODIUM 100 MG: 100 CAPSULE, LIQUID FILLED ORAL at 07:20

## 2025-07-13 RX ADMIN — MAGNESIUM OXIDE TAB 400 MG (241.3 MG ELEMENTAL MG) 400 MG: 400 (241.3 MG) TAB at 07:19

## 2025-07-13 RX ADMIN — MUSCLE RUB CREAM: 100; 150 CREAM TOPICAL at 12:32

## 2025-07-13 RX ADMIN — MUSCLE RUB CREAM: 100; 150 CREAM TOPICAL at 07:27

## 2025-07-13 RX ADMIN — FAMOTIDINE 20 MG: 20 TABLET, FILM COATED ORAL at 17:05

## 2025-07-13 RX ADMIN — LORATADINE 10 MG: 10 TABLET ORAL at 07:19

## 2025-07-13 RX ADMIN — ERGOCALCIFEROL CAPSULES, 50000 UNITS: 1.25 CAPSULE ORAL at 07:28

## 2025-07-13 RX ADMIN — FOLIC ACID 1 MG: 1 TABLET ORAL at 07:19

## 2025-07-13 RX ADMIN — INSULIN LISPRO 2 UNITS: 100 INJECTION, SOLUTION INTRAVENOUS; SUBCUTANEOUS at 12:31

## 2025-07-13 NOTE — ASSESSMENT & PLAN NOTE
Platelet count today 120  Likely has cirrhosis- right UQ US- Most consistent with severe hepatic steatosis.   Continue to monitor

## 2025-07-13 NOTE — PROGRESS NOTES
Progress Note - Hospitalist   Name: Aquilino Dowell 64 y.o. male I MRN: [id number]  Unit/Bed#: -01 I Date of Admission: 7/2/2025   Date of Service: 7/13/2025 I Hospital Day: 11    Assessment & Plan  Diabetes (HCC)  Lab Results   Component Value Date    HGBA1C 8.0 (H) 06/24/2025     Recent Labs     07/12/25  1623 07/12/25  2124 07/13/25  0659 07/13/25  1038   POCGLU 179* 238* 124 207*     Home: Metformin 1000mg daily. In hospital he was on lantus 6U daily while the home metformin was on hold and he remain on lantus at this time  Patient extremely frustrated with high blood sugars.  Here: lantus 5U qHS. SSI  Metformin placed on hold again 7/11 in anticipation of possible need for CTA to r/o PE. Will continue lantus with sliding scale for now for blood sugar control  Unfortunately even with metformin on hold again since 7/11, his labs today show his T bili and alk phos starting to trend up again. In light of this will discontinue his home metformin completely.   Patient does not want to be discharged home on insulin. Would be a good candidate for jardiance but that is only approved for heart failure in hospital. Reached out to endocrinology for recommendations for a safe discharge plan in regards to his diabetes management. They will see him Monday morning  Fall  S/p fall at home ambulating to the bathroom  Uses a cane at baseline due to LE neuropathy and vision issues  PT/OT  Left shoulder pain  Xray done on 6/24/25 = + glenohumeral DJD, no fracture  Bengay ordered per PMR  Patient may benefit from out-patient orthopedic evaluation for local injections for pain control.  Coronary artery disease  S/p cardiac cath 2013    95% prox LAD, 40% mid LAD, EF 60%, MANUEL to prox LAD (Xience Rx 3.5x18mm   Continue home Prasugrel, ASA  Patient has not been taking statin for several years and reports having a side effect; however, cannot recall what that was.  Thorough review of his medical record shows his cardiologist  is aware that he was not taking it so discontinued it.  Alcohol use disorder  Per hospital chart, wife reported up to 1 bottle of wine per day  ETOH on admit 6/24 was normal  Was on CIWA protocol  Continue B12, folic acid  Hypertension  Home: Lopressor 50 mg BID.  He was switched from lisinopril to metoprolol when he had an episode of NSVT.    Here: same  No changes today.  Patient does get orthostatic in therapy. Has legs wrapped. Got albumin 7/10 and 500ml IV saline bolus  Sinusitis  Seen on CT head done in ED and  [facility] 7/3/25  No complaints of increased sinus pressure or headaches, he and his wife state he always has some degree of chronic sinus issues but this has been for years  Continue flonase, claritin  Back pain  MRI lumbar spine:  No discitis/osteomyelitis in lumbar spine, as clinically questioned.  Small focal areas of enhancement in the L1-L2 and L2-L3 supraspinous ligament regions, suspicious for enthesitis.  Mild multilevel degenerative changes of lumbar spine, as detailed above. No significant canal stenosis or foraminal narrowing  Pain meds/therapy per PMR  Hyperbilirubinemia  Peaked at 3.00 (7/8/25) today 2.53  Right UQ US- Hepatomegaly with steatosis, no liver mass  Metformin was briefly restarted but now back on hold as of 7/11 due to possible need for CTA  7/13- repeat labs today, T bili and alk phos both trending back up despite metformin being held. Metformin now discontinued completely  Consult GI since labs slightly worse today  Vitamin D deficiency  Continue supplement  Lung nodule  Found incidentally on CT scan  4mm right upper lobe nodule which is new  Will need repeat CT in 12 months  Tobacco use disorder  Encourage cessation at discharge.  Depression  Pt does not want to start a medication for mood.  Anemia  Iron and iron sat normal  Hgb stable at 10.0, had some readings in the 9's which were likely dilutional after IVF  Continue to monitor  GERD (gastroesophageal reflux disease)  On PPI  at home which was discontinued on admission due to elevated LFT's  Continue pepcid  Orthostatic hypotension  7/9- While working with PT/OT, patient became dizzy when sitting on edge of bed and standing.  BPs (+) for orthostasis.    Given 500 cc bolus NS x2 and also received a dose of IV albumin.  Patient with little PO intake.  Continue leg wraps  7/12/25 at 0500 BP was 144/79 with HR 85.  With therapy, SBP dropped to 100 and HR was 107  Add binder for when OOB = consider reflex tachy   Polymicrobial bacteremia  Patient with 1 of 2 blood cultures positive for klebsiella and staph when admitted to hospital on 6/24  Seen by ID in hospital, completed course of cefepime and vanco  Procal was ordered by PMR due to tachycardia on 7/10  Procal up to 1.35  Afebrile  WBC 3.86  Repeat blood cultures done 7/11 negative at 48 hours  Tachycardia  Therapy recorded a HR in the 120's on 7/11 afternoon after patient did the stairs  On recheck after resting he is still 100-110  EKG done- sinus tach with   afebrile  7/11- Clinically he looks dry. Albumin low on recent lab work. ordered IV albumin and small IVF bolus overnight  Continue current dose of metoprolol, hesitant to increase dose due to orthostasis   HRs in low 100's 7/11/25, slightly better than 7/10/25.   Mag low which may be contributing- repleted  D-dimer elevated at 0.97. Pulse ox has been stable on room air but PE should be considered in setting of ongoing tachycardia and elevated d-dimer  Unable to get CTA as he has been on Metformin which will need to be held for 48 hours.   Metformin now discontinued.  Last dose given AM 7/11/25 = 48 hrs will be Sunday AM. There was consideration  Venous doppler of the LEs was negative and his pulse ox has remained in high 90's on RA so unlikely a PE  In PT 7/12/25 HR was 107 bpm. He walked 120 ft heart rate was 126. BP was 100/68. He was complaining of significant fatigue.  His 0500 BP was 144/79  Could be reflex  tachycardia?  TSH 6/24/25 was 1.57  Could consider getting an ECHO since last was 2024 but not sure he will agree to it  Was considering a CT PE study but earliest that could be done would have been today. His HR's seem improved over the last 24 hours so will hold off on additional studies for now. Patient keeps stating he doesn't want any more studies and just wants to go home but if needed his wife is very understanding and helps convince him to have things done  Hypomagnesemia  Mag on 7/11/25 was 1.6  Repleted  Now 1.9, will increase PO mag dose to 800 mg BID to try to keep mag >2.0 due to recent episodes of tachycardia  Thrombocytopenia (HCC)  Platelet count today 120  Likely has cirrhosis- right UQ US- Most consistent with severe hepatic steatosis.   Continue to monitor    The above assessment and plan was reviewed and updated as determined by my evaluation of the patient on 7/13/2025.    History of Present Illness   Patient seen and examined. Patients overnight issues or events were reviewed with nursing staff. New or overnight issues include the following:   Patient resting comfortably in bed. Looking forward to discharge home early this week. No CP/SOB. He does get intermittent abd discomfort which he thinks is due to reflux    Review of Systems    Objective :  Temp:  [97.8 °F (36.6 °C)-98.4 °F (36.9 °C)] 97.8 °F (36.6 °C)  HR:  [79-99] 90  BP: (143-150)/(79-80) 143/79  Resp:  [16-18] 16  SpO2:  [97 %] 97 %    Invasive Devices       Peripheral Intravenous Line  Duration             Peripheral IV 07/11/25 Right;Ventral (anterior) Forearm 2 days                    Physical Exam  General Appearance: NAD; pleasant  HEENT: mucous membranes moist  Neck:  Supple  Lungs: clear bilaterally, normal respiratory effort, no retractions, expiratory effort normal, on room air  CV: regular rate and rhythm, no murmurs rubs or gallops noted   ABD: soft non tender, +BS x4  EXT: DP pulses intact, no lymphadenopathy  Skin:  normal turgor, normal texture, no rash. Multiple ecchymotic areas noted on arms  Psych: affect normal, mood normal  Neuro: AAOx3    The above physical exam was reviewed and updated as determined by my evaluation of the patient on 7/13/2025.      Lab Results: I have reviewed the following results:  Results from last 7 days   Lab Units 07/13/25  0639 07/11/25  0633   WBC Thousand/uL 4.00* 3.86*   HEMOGLOBIN g/dL 10.0* 9.3*   HEMATOCRIT % 31.8* 30.1*   PLATELETS Thousands/uL 120* 101*     Results from last 7 days   Lab Units 07/13/25  0639 07/11/25  0633   SODIUM mmol/L 137 137   POTASSIUM mmol/L 3.8 4.0   CHLORIDE mmol/L 105 104   CO2 mmol/L 26 27   BUN mg/dL 6 5   CREATININE mg/dL 0.64 0.71   CALCIUM mg/dL 8.4 8.0*             Results from last 7 days   Lab Units 07/13/25  1038 07/13/25  0659 07/12/25  2124   POC GLUCOSE mg/dl 207* 124 238*         Imaging reviewed- ultrasound    Review of Scheduled Meds: Medications  reviewed and reconciled as needed  Current Facility-Administered Medications   Medication Dose Route Frequency Provider Last Rate    acetaminophen  650 mg Oral Q8H PRN Ashley Depadua, MD      aspirin  81 mg Oral Daily Nadiya Toroevelynprinceharriet, PA-C      benzonatate  200 mg Oral TID PRN Nadiyapawel Doranbrittani, PA-C      bisacodyl  10 mg Rectal Daily PRN Nadiyapawel Doranbrittani, PA-C      vitamin B-12  1,000 mcg Oral Daily Nadiyapawel Garberharriet, PA-C      docusate sodium  100 mg Oral BID Robert Bo MD      enoxaparin  40 mg Subcutaneous Daily Nadiyapawel Shah, PA-C      ergocalciferol  50,000 Units Oral Weekly Nadiya Shah, PA-C      famotidine  20 mg Oral BID Nadiya Shah, PA-C      fluticasone  2 spray Each Nare BID Nadiyapawel Doranprinceharriet, PA-C      folic acid  1 mg Oral Daily Nadiya Jcarlosharriet, PA-C      gabapentin  100 mg Oral HS Nadiya Shah, PA-C      ibuprofen  400 mg Oral Q6H PRN Robert Bo MD      insulin glargine  5 Units Subcutaneous HS Nadiya Shah PA-C      insulin lispro  1-5 Units Subcutaneous HS Nadiya  Jcralosharriet, PA-MARS      insulin lispro  1-6 Units Subcutaneous TID AC Nadiya Shah, PA-MARS      loratadine  10 mg Oral Daily Nadiya Shah, PA-MARS      magnesium Oxide  800 mg Oral BID Nadiya Shah, ZACH      menthol-methyl salicylate   Apply externally 4x Daily Nadiya GarberZACH larios      methocarbamol  500 mg Oral Q6H PRN Nadiya GarberPEARL larios-MARS      metoprolol tartrate  50 mg Oral Q12H TERRENCE Nadiya Shah, PA-MARS      ondansetron  4 mg Oral Q6H PRN Rayne Da Silva PA-C      ondansetron  4 mg Oral Once Aroldo Wyatt MD      oxyCODONE  2.5 mg Oral Q4H PRN Ashley Depadua, MD      Or    oxyCODONE  5 mg Oral Q4H PRN Ashley Depadua, MD      polyethylene glycol  17 g Oral Daily PRN Ashley Depadua, MD      prasugrel  10 mg Oral Daily Nadiya GarberZACH larios      senna  2 tablet Oral HS PRN Robert Bo MD      thiamine  100 mg Oral Daily Nadiya CortezevelynZACH peter         VTE Pharmacologic Prophylaxis: lovenox  Code Status: Level 1 - Full Code  Current Length of Stay: 11 day(s)    Administrative Statements     ** Please Note:  voice to text software may have been used in the creation of this document. Although proof errors in transcription or interpretation are a potential of such software**

## 2025-07-13 NOTE — ASSESSMENT & PLAN NOTE
Therapy recorded a HR in the 120's on 7/11 afternoon after patient did the stairs  On recheck after resting he is still 100-110  EKG done- sinus tach with   afebrile  7/11- Clinically he looks dry. Albumin low on recent lab work. ordered IV albumin and small IVF bolus overnight  Continue current dose of metoprolol, hesitant to increase dose due to orthostasis   HRs in low 100's 7/11/25, slightly better than 7/10/25.   Mag low which may be contributing- repleted  D-dimer elevated at 0.97. Pulse ox has been stable on room air but PE should be considered in setting of ongoing tachycardia and elevated d-dimer  Unable to get CTA as he has been on Metformin which will need to be held for 48 hours.   Metformin now discontinued.  Last dose given AM 7/11/25 = 48 hrs will be Sunday AM. There was consideration  Venous doppler of the LEs was negative and his pulse ox has remained in high 90's on RA so unlikely a PE  In PT 7/12/25 HR was 107 bpm. He walked 120 ft heart rate was 126. BP was 100/68. He was complaining of significant fatigue.  His 0500 BP was 144/79  Could be reflex tachycardia?  TSH 6/24/25 was 1.57  Could consider getting an ECHO since last was 2024 but not sure he will agree to it  Was considering a CT PE study but earliest that could be done would have been today. His HR's seem improved over the last 24 hours so will hold off on additional studies for now. Patient keeps stating he doesn't want any more studies and just wants to go home but if needed his wife is very understanding and helps convince him to have things done

## 2025-07-13 NOTE — ASSESSMENT & PLAN NOTE
7/9- While working with PT/OT, patient became dizzy when sitting on edge of bed and standing.  BPs (+) for orthostasis.    Given 500 cc bolus NS x2 and also received a dose of IV albumin.  Patient with little PO intake.  Continue leg wraps  7/12/25 at 0500 BP was 144/79 with HR 85.  With therapy, SBP dropped to 100 and HR was 107  Add binder for when OOB = consider reflex tachy

## 2025-07-13 NOTE — ASSESSMENT & PLAN NOTE
Mag on 7/11/25 was 1.6  Repleted  Now 1.9, will increase PO mag dose to 800 mg BID to try to keep mag >2.0 due to recent episodes of tachycardia

## 2025-07-13 NOTE — ASSESSMENT & PLAN NOTE
Iron and iron sat normal  Hgb stable at 10.0, had some readings in the 9's which were likely dilutional after IVF  Continue to monitor

## 2025-07-13 NOTE — ASSESSMENT & PLAN NOTE
Lab Results   Component Value Date    HGBA1C 8.0 (H) 06/24/2025     Recent Labs     07/12/25  1623 07/12/25  2124 07/13/25  0659 07/13/25  1038   POCGLU 179* 238* 124 207*     Home: Metformin 1000mg daily. In hospital he was on lantus 6U daily while the home metformin was on hold and he remain on lantus at this time  Patient extremely frustrated with high blood sugars.  Here: lantus 5U qHS. SSI  Metformin placed on hold again 7/11 in anticipation of possible need for CTA to r/o PE. Will continue lantus with sliding scale for now for blood sugar control  Unfortunately even with metformin on hold again since 7/11, his labs today show his T bili and alk phos starting to trend up again. In light of this will discontinue his home metformin completely.   Patient does not want to be discharged home on insulin. Would be a good candidate for jardiance but that is only approved for heart failure in hospital. Reached out to endocrinology for recommendations for a safe discharge plan in regards to his diabetes management. They will see him Monday morning

## 2025-07-13 NOTE — ASSESSMENT & PLAN NOTE
Peaked at 3.00 (7/8/25) today 2.53  Right UQ US- Hepatomegaly with steatosis, no liver mass  Metformin was briefly restarted but now back on hold as of 7/11 due to possible need for CTA  7/13- repeat labs today, T bili and alk phos both trending back up despite metformin being held. Metformin now discontinued completely  Consult GI since labs slightly worse today

## 2025-07-13 NOTE — PROGRESS NOTES
07/13/25 0889   Pain Assessment   Pain Assessment Tool 0-10   Pain Score No Pain   Restrictions/Precautions   Precautions Bed/chair alarms;Cognitive;Fall Risk;Pain;Supervision on toilet/commode;Visual deficit   Braces or Orthoses   (TEDS BLE)   Subjective   Subjective pt agreeable to perform skilled PT   Roll Left and Right   Type of Assistance Needed Independent   Roll Left and Right CARE Score 6   Sit to Lying   Type of Assistance Needed Independent   Sit to Lying CARE Score 6   Lying to Sitting on Side of Bed   Type of Assistance Needed Independent   Lying to Sitting on Side of Bed CARE Score 6   Sit to Stand   Type of Assistance Needed Supervision;Adaptive equipment   Comment RW   Sit to Stand CARE Score 4   Bed-Chair Transfer   Type of Assistance Needed Supervision;Adaptive equipment   Comment RW   Chair/Bed-to-Chair Transfer CARE Score 4   Transfer Bed/Chair/Wheelchair   Adaptive Equipment Roller Walker   Car Transfer   Type of Assistance Needed Supervision   Comment RW   Car Transfer CARE Score 4   Ambulation   Primary Mode of Locomotion Prior to Admission Walk   Assist Device Roller Walker   Does the patient walk? 2. Yes   Wheel 50 Feet with Two Turns   Type of Assistance Needed Set-up / clean-up   Wheel 50 Feet with Two Turns CARE Score 5   Wheel 150 Feet   Type of Assistance Needed Set-up / clean-up   Wheel 150 Feet CARE Score 5   Wheelchair mobility   Does the patient use a wheelchair? 0. No   Type of Wheelchair Used 1. Manual   Curb or Single Stair   Style negotiated Curb   Type of Assistance Needed Supervision;Adaptive equipment   Comment 6 Inch curb steps CS w RW   1 Step (Curb) CARE Score 4   4 Steps   Type of Assistance Needed Supervision   Comment 6inch  steps BLHR descending FWD   4 Steps CARE Score 4   12 Steps   Reason if not Attempted Activity not applicable   12 Steps CARE Score 9   Picking Up Object   Type of Assistance Needed Supervision;Adaptive equipment   Comment reacher for  marker with RW for support   Picking Up Object CARE Score 4   Toilet Transfer   Type of Assistance Needed Supervision;Adaptive equipment   Comment CS w RW   Toilet Transfer CARE Score 4   Toilet Transfer   Surface Assessed Standard Toilet   Therapeutic Interventions   Strengthening seated 2# LAQ AP marching heel rasies 20- reps all exs and 3lb arm curls 2x10 reps   Flexibility manual stretch LE   Balance standing balance   Equipment Use   NuStep lvl 2 for 10 min   Assessment   Treatment Assessment pt reports today feeling a little better then yesterday .Pt 02 99% and HR 89-88 better today. Pt instructed and perform WC propl and pt set up with WC for locking breaks and Lj perform SPT form EOB <> WC , Lj is progressing with bed mobility to indep level and STS SPT with walking up to S/CS level . Pt is S level with curb steps using his RW and FARTUN at S level.at this time , but levels of assistance and transfers can very . Cont POC next week and WC measurement taken for home DC   Barriers to Discharge Inaccessible home environment;Decreased caregiver support   Plan   Progress Progressing toward goals   Discharge Recommendation   Rehab Resource Intensity Level, PT   (OP PT)   Equipment Recommended Walker;Wheelchair   PT Equipment ordered wheel chair   Date ordered 07/13/25   Discharge Summary RW order thru his wife   PT Therapy Minutes   PT Time In 0830   PT Time Out 1000   PT Total Time (minutes) 90   PT Mode of treatment - Individual (minutes) 90   PT Mode of treatment - Concurrent (minutes) 0   PT Mode of treatment - Group (minutes) 0   PT Mode of treatment - Co-treat (minutes) 0   PT Mode of Treatment - Total time(minutes) 90 minutes   PT Cumulative Minutes 834   Therapy Time missed   Time missed? No

## 2025-07-13 NOTE — ASSESSMENT & PLAN NOTE
Patient with 1 of 2 blood cultures positive for klebsiella and staph when admitted to hospital on 6/24  Seen by ID in hospital, completed course of cefepime and vanco  Procal was ordered by PMR due to tachycardia on 7/10  Procal up to 1.35  Afebrile  WBC 3.86  Repeat blood cultures done 7/11 negative at 48 hours

## 2025-07-14 PROBLEM — R78.81 BACTEREMIA: Status: RESOLVED | Noted: 2025-06-25 | Resolved: 2025-07-14

## 2025-07-14 PROBLEM — R74.8 ABNORMAL LIVER ENZYMES: Status: ACTIVE | Noted: 2025-07-14

## 2025-07-14 LAB
ALBUMIN SERPL BCG-MCNC: 3.7 G/DL (ref 3.5–5)
ALP SERPL-CCNC: 225 U/L (ref 34–104)
ALT SERPL W P-5'-P-CCNC: 30 U/L (ref 7–52)
ANION GAP SERPL CALCULATED.3IONS-SCNC: 9 MMOL/L (ref 4–13)
AST SERPL W P-5'-P-CCNC: 179 U/L (ref 13–39)
BASOPHILS # BLD AUTO: 0.03 THOUSANDS/ÂΜL (ref 0–0.1)
BASOPHILS NFR BLD AUTO: 1 % (ref 0–1)
BILIRUB DIRECT SERPL-MCNC: 1.9 MG/DL (ref 0–0.2)
BILIRUB SERPL-MCNC: 3.74 MG/DL (ref 0.2–1)
BUN SERPL-MCNC: 6 MG/DL (ref 5–25)
CALCIUM SERPL-MCNC: 9.9 MG/DL (ref 8.4–10.2)
CHLORIDE SERPL-SCNC: 100 MMOL/L (ref 96–108)
CO2 SERPL-SCNC: 29 MMOL/L (ref 21–32)
CREAT SERPL-MCNC: 0.75 MG/DL (ref 0.6–1.3)
EOSINOPHIL # BLD AUTO: 0.12 THOUSAND/ÂΜL (ref 0–0.61)
EOSINOPHIL NFR BLD AUTO: 2 % (ref 0–6)
ERYTHROCYTE [DISTWIDTH] IN BLOOD BY AUTOMATED COUNT: 14.7 % (ref 11.6–15.1)
GFR SERPL CREATININE-BSD FRML MDRD: 97 ML/MIN/1.73SQ M
GLUCOSE P FAST SERPL-MCNC: 170 MG/DL (ref 65–99)
GLUCOSE SERPL-MCNC: 117 MG/DL (ref 65–140)
GLUCOSE SERPL-MCNC: 161 MG/DL (ref 65–140)
GLUCOSE SERPL-MCNC: 170 MG/DL (ref 65–140)
GLUCOSE SERPL-MCNC: 186 MG/DL (ref 65–140)
GLUCOSE SERPL-MCNC: 235 MG/DL (ref 65–140)
HAV AB SER QL IA: NORMAL
HAV IGM SER QL: NORMAL
HBV CORE IGM SER QL: NORMAL
HBV SURFACE AB SER-ACNC: <3 MIU/ML
HBV SURFACE AG SER QL: NORMAL
HCT VFR BLD AUTO: 41.2 % (ref 36.5–49.3)
HCV AB SER QL: NORMAL
HGB BLD-MCNC: 12.7 G/DL (ref 12–17)
IMM GRANULOCYTES # BLD AUTO: 0.02 THOUSAND/UL (ref 0–0.2)
IMM GRANULOCYTES NFR BLD AUTO: 0 % (ref 0–2)
INR PPP: 0.93 (ref 0.85–1.19)
LYMPHOCYTES # BLD AUTO: 1.34 THOUSANDS/ÂΜL (ref 0.6–4.47)
LYMPHOCYTES NFR BLD AUTO: 26 % (ref 14–44)
MCH RBC QN AUTO: 32.1 PG (ref 26.8–34.3)
MCHC RBC AUTO-ENTMCNC: 30.8 G/DL (ref 31.4–37.4)
MCV RBC AUTO: 104 FL (ref 82–98)
MONOCYTES # BLD AUTO: 0.39 THOUSAND/ÂΜL (ref 0.17–1.22)
MONOCYTES NFR BLD AUTO: 8 % (ref 4–12)
NEUTROPHILS # BLD AUTO: 3.18 THOUSANDS/ÂΜL (ref 1.85–7.62)
NEUTS SEG NFR BLD AUTO: 63 % (ref 43–75)
NRBC BLD AUTO-RTO: 0 /100 WBCS
PLATELET # BLD AUTO: 171 THOUSANDS/UL (ref 149–390)
PMV BLD AUTO: 10.1 FL (ref 8.9–12.7)
POTASSIUM SERPL-SCNC: 4.3 MMOL/L (ref 3.5–5.3)
PROT SERPL-MCNC: 8.8 G/DL (ref 6.4–8.4)
PROTHROMBIN TIME: 12.8 SECONDS (ref 12.3–15)
RBC # BLD AUTO: 3.96 MILLION/UL (ref 3.88–5.62)
SODIUM SERPL-SCNC: 138 MMOL/L (ref 135–147)
WBC # BLD AUTO: 5.08 THOUSAND/UL (ref 4.31–10.16)

## 2025-07-14 PROCEDURE — 86706 HEP B SURFACE ANTIBODY: CPT

## 2025-07-14 PROCEDURE — 97110 THERAPEUTIC EXERCISES: CPT

## 2025-07-14 PROCEDURE — 97530 THERAPEUTIC ACTIVITIES: CPT

## 2025-07-14 PROCEDURE — 99232 SBSQ HOSP IP/OBS MODERATE 35: CPT | Performed by: PHYSICIAN ASSISTANT

## 2025-07-14 PROCEDURE — 86708 HEPATITIS A ANTIBODY: CPT

## 2025-07-14 PROCEDURE — 87522 HEPATITIS C REVRS TRNSCRPJ: CPT

## 2025-07-14 PROCEDURE — 85025 COMPLETE CBC W/AUTO DIFF WBC: CPT | Performed by: STUDENT IN AN ORGANIZED HEALTH CARE EDUCATION/TRAINING PROGRAM

## 2025-07-14 PROCEDURE — 99254 IP/OBS CNSLTJ NEW/EST MOD 60: CPT | Performed by: INTERNAL MEDICINE

## 2025-07-14 PROCEDURE — 97112 NEUROMUSCULAR REEDUCATION: CPT

## 2025-07-14 PROCEDURE — 80048 BASIC METABOLIC PNL TOTAL CA: CPT | Performed by: STUDENT IN AN ORGANIZED HEALTH CARE EDUCATION/TRAINING PROGRAM

## 2025-07-14 PROCEDURE — 85610 PROTHROMBIN TIME: CPT | Performed by: STUDENT IN AN ORGANIZED HEALTH CARE EDUCATION/TRAINING PROGRAM

## 2025-07-14 PROCEDURE — NC001 PR NO CHARGE: Performed by: INTERNAL MEDICINE

## 2025-07-14 PROCEDURE — 99232 SBSQ HOSP IP/OBS MODERATE 35: CPT | Performed by: PHYSICAL MEDICINE & REHABILITATION

## 2025-07-14 PROCEDURE — 80076 HEPATIC FUNCTION PANEL: CPT | Performed by: STUDENT IN AN ORGANIZED HEALTH CARE EDUCATION/TRAINING PROGRAM

## 2025-07-14 PROCEDURE — 82948 REAGENT STRIP/BLOOD GLUCOSE: CPT

## 2025-07-14 PROCEDURE — 80074 ACUTE HEPATITIS PANEL: CPT

## 2025-07-14 RX ORDER — GLUCOSAMINE HCL/CHONDROITIN SU 500-400 MG
CAPSULE ORAL
Qty: 200 EACH | Refills: 0 | Status: SHIPPED | OUTPATIENT
Start: 2025-07-14

## 2025-07-14 RX ORDER — LANCETS
EACH MISCELLANEOUS
Qty: 200 EACH | Refills: 0 | Status: SHIPPED | OUTPATIENT
Start: 2025-07-14

## 2025-07-14 RX ORDER — INSULIN GLARGINE 100 [IU]/ML
5 INJECTION, SOLUTION SUBCUTANEOUS EVERY MORNING
Status: DISCONTINUED | OUTPATIENT
Start: 2025-07-15 | End: 2025-07-15 | Stop reason: HOSPADM

## 2025-07-14 RX ORDER — POLYETHYLENE GLYCOL 3350 17 G/17G
17 POWDER, FOR SOLUTION ORAL DAILY PRN
Status: DISCONTINUED | OUTPATIENT
Start: 2025-07-14 | End: 2025-07-15 | Stop reason: HOSPADM

## 2025-07-14 RX ORDER — FAMOTIDINE 20 MG/1
20 TABLET, FILM COATED ORAL 2 TIMES DAILY
Qty: 60 TABLET | Refills: 0 | Status: SHIPPED | OUTPATIENT
Start: 2025-07-14

## 2025-07-14 RX ORDER — BISACODYL 10 MG
10 SUPPOSITORY, RECTAL RECTAL DAILY PRN
Status: DISCONTINUED | OUTPATIENT
Start: 2025-07-14 | End: 2025-07-15 | Stop reason: HOSPADM

## 2025-07-14 RX ORDER — PEN NEEDLE, DIABETIC 32GX 5/32"
NEEDLE, DISPOSABLE MISCELLANEOUS
Qty: 100 EACH | Refills: 0 | Status: SHIPPED | OUTPATIENT
Start: 2025-07-14 | End: 2025-07-14

## 2025-07-14 RX ORDER — INSULIN GLARGINE-YFGN 100 [IU]/ML
5 INJECTION, SOLUTION SUBCUTANEOUS
Qty: 3 ML | Refills: 0 | Status: SHIPPED | OUTPATIENT
Start: 2025-07-14 | End: 2025-07-14

## 2025-07-14 RX ORDER — ERGOCALCIFEROL 1.25 MG/1
50000 CAPSULE, LIQUID FILLED ORAL WEEKLY
Qty: 3 CAPSULE | Refills: 0 | Status: SHIPPED | OUTPATIENT
Start: 2025-07-14 | End: 2025-07-29

## 2025-07-14 RX ORDER — FLUTICASONE PROPIONATE 50 MCG
2 SPRAY, SUSPENSION (ML) NASAL 2 TIMES DAILY
Qty: 9.9 ML | Refills: 0 | Status: SHIPPED | OUTPATIENT
Start: 2025-07-14

## 2025-07-14 RX ORDER — BLOOD SUGAR DIAGNOSTIC
STRIP MISCELLANEOUS
Qty: 200 EACH | Refills: 0 | Status: SHIPPED | OUTPATIENT
Start: 2025-07-14

## 2025-07-14 RX ORDER — LANOLIN ALCOHOL/MO/W.PET/CERES
800 CREAM (GRAM) TOPICAL 2 TIMES DAILY
Start: 2025-07-14

## 2025-07-14 RX ORDER — BLOOD-GLUCOSE METER
KIT MISCELLANEOUS
Qty: 1 KIT | Refills: 0 | Status: SHIPPED | OUTPATIENT
Start: 2025-07-14

## 2025-07-14 RX ORDER — LORATADINE 10 MG/1
10 TABLET ORAL DAILY
Start: 2025-07-15

## 2025-07-14 RX ADMIN — MUSCLE RUB CREAM: 100; 150 CREAM TOPICAL at 09:49

## 2025-07-14 RX ADMIN — INSULIN LISPRO 1 UNITS: 100 INJECTION, SOLUTION INTRAVENOUS; SUBCUTANEOUS at 17:11

## 2025-07-14 RX ADMIN — GABAPENTIN 100 MG: 100 CAPSULE ORAL at 20:58

## 2025-07-14 RX ADMIN — METOPROLOL TARTRATE 50 MG: 50 TABLET, FILM COATED ORAL at 09:48

## 2025-07-14 RX ADMIN — INSULIN LISPRO 1 UNITS: 100 INJECTION, SOLUTION INTRAVENOUS; SUBCUTANEOUS at 11:32

## 2025-07-14 RX ADMIN — ASPIRIN 81 MG CHEWABLE TABLET 81 MG: 81 TABLET CHEWABLE at 09:48

## 2025-07-14 RX ADMIN — MAGNESIUM OXIDE TAB 400 MG (241.3 MG ELEMENTAL MG) 800 MG: 400 (241.3 MG) TAB at 09:49

## 2025-07-14 RX ADMIN — FOLIC ACID 1 MG: 1 TABLET ORAL at 09:48

## 2025-07-14 RX ADMIN — THIAMINE HCL TAB 100 MG 100 MG: 100 TAB at 09:49

## 2025-07-14 RX ADMIN — LORATADINE 10 MG: 10 TABLET ORAL at 09:48

## 2025-07-14 RX ADMIN — FAMOTIDINE 20 MG: 20 TABLET, FILM COATED ORAL at 09:48

## 2025-07-14 RX ADMIN — DOCUSATE SODIUM 100 MG: 100 CAPSULE, LIQUID FILLED ORAL at 09:48

## 2025-07-14 RX ADMIN — FLUTICASONE PROPIONATE 2 SPRAY: 50 SPRAY, METERED NASAL at 09:49

## 2025-07-14 RX ADMIN — CYANOCOBALAMIN TAB 500 MCG 1000 MCG: 500 TAB at 09:49

## 2025-07-14 RX ADMIN — FLUTICASONE PROPIONATE 2 SPRAY: 50 SPRAY, METERED NASAL at 17:11

## 2025-07-14 RX ADMIN — PRASUGREL 10 MG: 10 TABLET, FILM COATED ORAL at 09:49

## 2025-07-14 RX ADMIN — METOPROLOL TARTRATE 50 MG: 50 TABLET, FILM COATED ORAL at 20:56

## 2025-07-14 RX ADMIN — MUSCLE RUB CREAM: 100; 150 CREAM TOPICAL at 17:12

## 2025-07-14 RX ADMIN — DOCUSATE SODIUM 100 MG: 100 CAPSULE, LIQUID FILLED ORAL at 20:56

## 2025-07-14 RX ADMIN — FAMOTIDINE 20 MG: 20 TABLET, FILM COATED ORAL at 17:11

## 2025-07-14 RX ADMIN — ENOXAPARIN SODIUM 40 MG: 40 INJECTION SUBCUTANEOUS at 09:49

## 2025-07-14 RX ADMIN — INSULIN LISPRO 1 UNITS: 100 INJECTION, SOLUTION INTRAVENOUS; SUBCUTANEOUS at 21:00

## 2025-07-14 RX ADMIN — MUSCLE RUB CREAM: 100; 150 CREAM TOPICAL at 20:57

## 2025-07-14 RX ADMIN — MAGNESIUM OXIDE TAB 400 MG (241.3 MG ELEMENTAL MG) 800 MG: 400 (241.3 MG) TAB at 20:56

## 2025-07-14 NOTE — ASSESSMENT & PLAN NOTE
Lab Results   Component Value Date    HGBA1C 8.0 (H) 06/24/2025       Recent Labs     07/13/25  1038 07/13/25  1626 07/13/25  2114 07/14/25  0616   POCGLU 207* 142* 251* 117     Blood Sugar Average: Last 72 hrs:  (P) 174  Patient with type 2 diabetes mellitus with hyperglycemia  Home regimen: Metformin  Current regimen: Lantus 5 units daily at bedtime, correctional scale insulin algorithm 3 before meals and 1 at bedtime  BG reviewed-fasting sugars have been at goal, he does have mild postprandial hyperglycemia.    Plan:    Continue correctional scale insulin algorithm 3 before meals and 1 at bedtime  Continue Accu-Cheks  Monitor for hypoglycemia, treat per protocol  Goal blood glucose while in the [id number] mg/dL  Discharge recommendations pending clinical course  Endocrinology will continue following

## 2025-07-14 NOTE — PROGRESS NOTES
Progress Note - PMR   Name: Aquilino Dowell 64 y.o. 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   - Suspect component also related to T2DM  - Has impaired vision as well  - Has had a history of Vitamin B12 deficiency previously contributing as well (6/25 level 940)  - Also has history of alcohol use disorder  - Unclear etiology of fall per patient.   > Ambulatory dysfunction I suspect is driven by progression of his neuropathy in addition to worsening vision impacting his ability to compensate  > PT/OT 3-5 hours/day, 5-7 days/week.  > Also has a component of deconditioning and worsening proximal weakness related to increasingly sedentary lifestyle (this is 2/2 I suspect to some depression since losing his job in November).   > Gabapentin 100mg QHS for shooting pains (higher doses made him too lethargic)  > Alcohol cessation as that can progress neuropathy  > Continue B12 supplement, optimize diabetes management.   > Check orthostatics here   > Recommend f/u with Neuromuscular Neurology   Left shoulder pain  -Examines like bicipital tendinosis with anterior pain, positive speeds, yergason as well as possible rotator cuff pathology with positive Fitzgerald  -Could also have other rotator cuff involvement/tendinopathy with positive neers (not +7/8), mild external rotation weakness.  -Family reports chronic L shoulder issues but worse since the fall.  Patient reports no significant history of any repetitive use.  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  -6/24 XR without acute osseous abnormality   > Plan for outpatient MRI  > Considerations in therapy   > Bengay QID  > Will start ibuprofen 400 mg as needed every 6 hours for breakthrough left shoulder pain.  Constipation  - Last BM date 7/8/2025  >Continue miralax daily and senna PRN, suppository PRN (Last adjusted 7/7/2025, senna now as needed)  >Monitor and adjust as appropriate  At risk for venous thromboembolism (VTE)  >Lovenox, SCDs, ambulation  >Will not go home on lovenox.  >Monitor platelet count closely.   Depression  -Has lost interests he previously had, sleep has been poor, energy levels/motivation poor, feelings of guilt and frustration.   -Appetite worse  -All since he lost his job in November.  -Wife has noticed a sharp decline  -Denies suicidal ideation/self-harm.  > Discussed trial of SNRI (cymbalta may also help with pain)  > He would like to defer for now.   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   - Next due 7/14  -This likely is contributing to his imbalance in the setting of his peripheral neuropathy  > Previous notes show he worked with Dr. Mart Garcia [facility] in [place].  > Would call their office to see if they are ok with transport from rehab to perform procedure on Monday.   Coronary artery disease  -S/p stent/cath 2013  -Home: Prasugrel 10mg daily, ASA 81mg daily   >Here: Same   Diabetes ([facility])  Lab Results   Component Value Date    HGBA1C 8.0 (H) 06/24/2025       Recent Labs     07/13/25  1626 07/13/25  2114 07/14/25  0616 07/14/25  1048   POCGLU 142* 251* 117 186*       Blood Sugar Average: Last 72 hrs:  (P) [id number]: Metformin 1000mg  daily  -Unclear if family checks BG at home or if he has a kit.   >Here: Lantus 5 units QHS, CDI/Accuchecks  >Goal to transition back to home regimen.  Reviewed with IM  > IM Added 2 units lispro with meals given postprandial hyperglycemia  >Diabetic Diet  >Management as per IM  Alcohol use disorder  -Denies history of withdrawal  >Cessation counseling  Hypertension  -Home: Toprol 50mg BID  >Here: Same  >Monitor and adjust as per IM  Sinusitis  >Flonase, claritin  Hyperbilirubinemia  -And chronic transaminitis  -In setting of alcohol use disorder  -RUQ US with steatosis and hepatomegaly  > Counseled on cessation  > minimize hepatotoxic meds  > GI Consulted, 7/14 recommendations:  Obtain acute and chronic hepatitis panel   Obtain hepatitis A and B serologies with hepatitis C PCR  Trend liver enzymes and bilirubin  If patient develops worsening signs of fever, jaundice, and persistent elevation of liver enzymes and bilirubin, can consider MRI/MRCP.  Consider ultrasound elastography as outpatient to assess for liver fibrosis in the setting of severe hepatic steatosis.  Will arrange for outpatient gastroenterology follow-up  Vitamin D deficiency  -6/27 12.8  >Ergocalciferol weekly for 12 weeks  >Repeat with PCP   Lung nodule  -Noted incidentally on imaging  >Recommended for f/u chest CT in 12 months  Tobacco use disorder  -Quit smoking in the 90s  -Now chewing tobacco.  >Nicotine patch  >Cessation counseling.   Orthostatic hypotension  - Did have episode of symptomatic orthostatic hypotension on 7/9 during therapies.  - Patient received half liter bolus, subjective improvement.  > Encourage oral hydration  > Volume optimization as per IM  Tachycardia  - Patient with sinus tachycardia for the last couple of days at rest.  - Patient does not have overt symptoms of infection, although procalcitonin was elevated;   - Lung exam benign  > Will start with bilateral lower extremity Dopplers per IM with low threshold to order CT  PE study if concerning for clot  > Replace magnesium as above  Hypomagnesemia  - resolved  - 1.6 on 7/11, repeat 1.9  > Patient will receive IV and p.o. replacement as needed  > Will continue to monitor  Polymicrobial bacteremia    Thrombocytopenia (HCC)    Abnormal liver enzymes      Adele Wallace MD  Physical Medicine and Rehabilitation  [facility]      Bolivar HOPPER. Went to outpatient eye appointment today. Left eye is still dilated and patient still has blurred vision. Left shoulder pain is present but patient has not been requiring pain medications. Encouraged to use over the counter ibuprofen if needed. Otherwise, no concerns.      Objective :  Temp:  [97.5 °F (36.4 °C)-98.3 °F (36.8 °C)] 98.3 °F (36.8 °C)  HR:  [] 93  BP: (124-140)/(63-69) 140/63  Resp:  [18] 18  SpO2:  [98 %-100 %] 100 %  O2 Device: None (Room air)    Functional Update:  Mobility: Supervision  Transfers: Supervision  ADLs: Supervision    Physical Exam  Vitals and nursing note reviewed.   Constitutional:       General: He is not in acute distress.     Appearance: Normal appearance.   HENT:      Head: Normocephalic and atraumatic.     Eyes:      General: No scleral icterus.     Comments: Right ocular prosthesis  Left eye dilated     Cardiovascular:      Rate and Rhythm: Normal rate and regular rhythm.      Pulses: Normal pulses.      Heart sounds: Normal heart sounds. No murmur heard.  Pulmonary:      Effort: Pulmonary effort is normal. No respiratory distress.      Breath sounds: Normal breath sounds.   Abdominal:      General: Abdomen is flat. There is no distension.      Palpations: Abdomen is soft.      Tenderness: There is no abdominal tenderness.     Musculoskeletal:         General: No swelling.      Cervical back: Neck supple.     Skin:     General: Skin is warm and dry.     Neurological:      General: No focal deficit present.      Mental Status: He is alert and oriented to person, place,  and time.     Psychiatric:         Mood and Affect: Mood normal.         Behavior: Behavior normal.           Scheduled Meds:  Current Facility-Administered Medications   Medication Dose Route Frequency Provider Last Rate    acetaminophen  650 mg Oral Q8H PRN Ashley Depadua, MD      aspirin  81 mg Oral Daily Nadiya Shah, PA-C      benzonatate  200 mg Oral TID PRN Nadiya Shah, PA-C      bisacodyl  10 mg Rectal Daily PRN Ashley Depadua, MD      vitamin B-12  1,000 mcg Oral Daily Nadiya Shha, PA-C      docusate sodium  100 mg Oral BID Robert Bo MD      enoxaparin  40 mg Subcutaneous Daily Nadiya Shah, PA-C      ergocalciferol  50,000 Units Oral Weekly Nadiya Shah, PA-C      famotidine  20 mg Oral BID Nadiya Shah, PA-C      fluticasone  2 spray Each Nare BID Nadiya Shah, PA-C      folic acid  1 mg Oral Daily Nadiya Shah, PA-C      gabapentin  100 mg Oral HS Nadiya Shah, PA-C      ibuprofen  400 mg Oral Q6H PRN Robert Bo MD      insulin glargine  5 Units Subcutaneous HS Nadiya Shah, PA-C      insulin lispro  1-5 Units Subcutaneous HS Nadiya Shah, PA-C      insulin lispro  1-6 Units Subcutaneous TID AC Nadiya Shah, PA-C      loratadine  10 mg Oral Daily Nadiya Shah, PA-C      magnesium Oxide  800 mg Oral BID Nadiya Shah, PA-C      menthol-methyl salicylate   Apply externally 4x Daily Nadiya Shah, PA-C      methocarbamol  500 mg Oral Q6H PRN Nadiya Shah, PA-C      metoprolol tartrate  50 mg Oral Q12H TERRENCE Nadiya Shah, PA-C      ondansetron  4 mg Oral Q6H PRN Rayne Da Silva, PA-MARS      ondansetron  4 mg Oral Once Aroldo Wyatt MD      oxyCODONE  2.5 mg Oral Q4H PRN Ashley Depadua, MD      Or    oxyCODONE  5 mg Oral Q4H PRN Ashley Depadua, MD      polyethylene glycol  17 g Oral Daily PRN Ashley Depadua, MD prasugrel  10 mg Oral Daily Nadiya Shah PA-C      senna  2 tablet Oral HS PRN Robert Bo MD      thiamine  100 mg Oral Daily  Nadiya Shah PA-C           Lab Results: I have reviewed the following results:  Results from last 7 days   Lab Units 07/14/25  0742 07/13/25 0639 07/11/25 0633   HEMOGLOBIN g/dL 12.7 10.0* 9.3*   HEMATOCRIT % 41.2 31.8* 30.1*   WBC Thousand/uL 5.08 4.00* 3.86*   PLATELETS Thousands/uL 171 120* 101*     Results from last 7 days   Lab Units 07/14/25  0742 07/13/25  0639 07/11/25  0633 07/10/25  0610   BUN mg/dL 6 6 5 6   SODIUM mmol/L 138 137 137 134*   POTASSIUM mmol/L 4.3 3.8 4.0 3.6   CHLORIDE mmol/L 100 105 104 103   CREATININE mg/dL 0.75 0.64 0.71 0.74   AST U/L 179* 96*  --  104*   ALT U/L 30 17  --  18       Results from last 7 days   Lab Units 07/14/25 0742   PROTIME seconds 12.8   INR  0.93

## 2025-07-14 NOTE — ASSESSMENT & PLAN NOTE
>Lovenox, SCDs, ambulation  >Will not go home on lovenox.  >Monitor platelet count closely.    [Follow - Up] : a follow-up visit [DM Type 2] : DM Type 2

## 2025-07-14 NOTE — CONSULTS
Consultation - Internal Medicine   Name: Aquilino Dowell 64 y.o. male I MRN: [id number]  Unit/Bed#: [facility] 971-01 I Date of Admission: 7/2/2025   Date of Service: 7/14/2025 I Hospital Day: 12   Consults  Physician Requesting Evaluation: Patricia Billingsley MD   Reason for Evaluation / Principal Problem: diabetes     Assessment & Plan  Diabetes (HCC)  Lab Results   Component Value Date    HGBA1C 8.0 (H) 06/24/2025       Recent Labs     07/13/25  1626 07/13/25 2114 07/14/25  0616 07/14/25  1048   POCGLU 142* 251* 117 186*       Blood Sugar Average: Last 72 hrs:  (P) [id number]  H/o T2DM previously controlled on metformin 1g bid, last A1c check prior to this admission was approx 1 year ago when A1c was stable around 6.6.   A1c this admission now elevated to 8.0.    Previously continued metformin while in [facility] but this was stopped due to LFT elevation.   Currently on lantus 5U hs + SSI. With this, fasting BG around 120 and postprandial BG ranging from 140-250. Pt is quite sensitive to insulin and previously insulin naive.     Plan:   Add standing 2U lispro with meals given postprandial hyperglycemia  Continue lantus 5U hs  Continue correctional SSI  Goal -180  Hypoglycemia protocol  Carb control diet   Will follow up GI recommendations regarding etiology of elevated LFTs prior to establishing definitive outpatient regimen   Peripheral neuropathy    Fall    Diabetic retinopathy (HCC)  Lab Results   Component Value Date    HGBA1C 8.0 (H) 06/24/2025       Recent Labs     07/13/25  1626 07/13/25  2114 07/14/25  0616 07/14/25  1048   POCGLU 142* 251* 117 186*       Blood Sugar Average: Last 72 hrs:  (P) [id number]    Abnormal liver enzymes          History of Present Illness   63 yo male, PMH T2DM with neuropathy, CAD, HTN, alcohol use. Presented initially to [facility] on 6/[facility] after a fall, no acute trauma was found. He met sepsis criteria on arrival and was found to have bacteremia with  Klebsiella and staph. ID was following and he completed 7 days of cefepime. He was then discharged to [facility].     Review of Systems   All other systems reviewed and are negative.    Medical History Review: I have reviewed the patient's PMH, PSH, Social History, Family History, Meds, and Allergies     Objective :  Temp:  [97.5 °F (36.4 °C)-98.3 °F (36.8 °C)] 98.3 °F (36.8 °C)  HR:  [] 93  BP: (124-140)/(63-69) 140/63  Resp:  [18] 18  SpO2:  [98 %-100 %] 100 %  O2 Device: None (Room air)    Intake & Output:  I/O         07/12 0701 07/13 0700 07/13 0701 07/14 0700 07/14 0701  07/15 0700    P.O. 540 240 180    Total Intake(mL/kg) 540 (6.4) 240 (2.8) 180 (2.1)    Urine (mL/kg/hr) 400 (0.2)      Stool 0      Total Output 400      Net +140 +240 +180           Unmeasured Urine Occurrence 2 x 1 x     Unmeasured Stool Occurrence 1 x 1 x           Weights:   IBW (Ideal Body Weight): 77.6 kg    Body mass index is 25.43 kg/m².  Weight (last 2 days)       None          Physical Exam  Vitals reviewed.   Constitutional:       General: He is not in acute distress.  HENT:      Head: Normocephalic and atraumatic.      Right Ear: External ear normal.      Left Ear: External ear normal.      Nose: Nose normal.      Mouth/Throat:      Mouth: Mucous membranes are moist.     Eyes:      Conjunctiva/sclera: Conjunctivae normal.       Cardiovascular:      Rate and Rhythm: Normal rate and regular rhythm.      Heart sounds: Normal heart sounds.   Pulmonary:      Effort: Pulmonary effort is normal. No respiratory distress.      Breath sounds: Normal breath sounds.   Abdominal:      General: Bowel sounds are normal. There is no distension.      Palpations: Abdomen is soft.      Tenderness: There is no abdominal tenderness. There is no guarding.     Musculoskeletal:         General: No swelling.      Right lower leg: No edema.      Left lower leg: No edema.     Skin:     General: Skin is warm and dry.     Neurological:      Mental Status: He  is alert and oriented to person, place, and time. Mental status is at baseline.      Cranial Nerves: No cranial nerve deficit.     Psychiatric:         Mood and Affect: Mood normal.         Behavior: Behavior normal.           Lab Results: I have reviewed the following results:  Recent Labs     07/13/25  0639 07/14/25  0742   WBC 4.00* 5.08   HGB 10.0* 12.7   HCT 31.8* 41.2   * 171   SODIUM 137 138   K 3.8 4.3    100   CO2 26 29   BUN 6 6   CREATININE 0.64 0.75   GLUC 117 170*   MG 1.9  --    AST 96* 179*   ALT 17 30   ALB 2.7* 3.7   TBILI 2.67* 3.74*   ALKPHOS 166* 225*   INR  --  0.93     Micro:  Lab Results   Component Value Date    BLOODCX No Growth at 48 hrs. 07/11/2025    BLOODCX No Growth at 48 hrs. 07/11/2025    BLOODCX No Growth After 5 Days. 06/24/2025    BLOODCX Klebsiella aerogenes (A) 06/24/2025    BLOODCX Staphylococcus hominis (A) 06/24/2025       Imaging Results Review: No pertinent imaging studies reviewed.  Other Study Results Review: No additional pertinent studies reviewed.    Currently Ordered Meds:   Current Facility-Administered Medications:     acetaminophen (TYLENOL) tablet 650 mg, Q8H PRN    aspirin chewable tablet 81 mg, Daily    benzonatate (TESSALON PERLES) capsule 200 mg, TID PRN    bisacodyl (DULCOLAX) rectal suppository 10 mg, Daily PRN    cyanocobalamin (VITAMIN B-12) tablet 1,000 mcg, Daily    docusate sodium (COLACE) capsule 100 mg, BID    enoxaparin (LOVENOX) subcutaneous injection 40 mg, Daily    ergocalciferol (VITAMIN D2) capsule 50,000 Units, Weekly    famotidine (PEPCID) tablet 20 mg, BID    fluticasone (FLONASE) 50 mcg/act nasal spray 2 spray, BID    folic acid (FOLVITE) tablet 1 mg, Daily    gabapentin (NEURONTIN) capsule 100 mg, HS    ibuprofen (MOTRIN) tablet 400 mg, Q6H PRN    insulin glargine (LANTUS) subcutaneous injection 5 Units 0.05 mL, HS    insulin lispro (HumALOG/ADMELOG) 100 units/mL subcutaneous injection 1-5 Units, HS    insulin lispro  "(HumALOG/ADMELOG) 100 units/mL subcutaneous injection 1-6 Units, TID AC **AND** [CANCELED] Fingerstick Glucose (POCT), TID AC    loratadine (CLARITIN) tablet 10 mg, Daily    magnesium Oxide (MAG-OX) tablet 800 mg, BID    menthol-methyl salicylate (BENGAY) 10-15 % cream, 4x Daily    methocarbamol (ROBAXIN) tablet 500 mg, Q6H PRN    metoprolol tartrate (LOPRESSOR) tablet 50 mg, Q12H TERRENCE    ondansetron (ZOFRAN-ODT) dispersible tablet 4 mg, Q6H PRN    ondansetron (ZOFRAN-ODT) dispersible tablet 4 mg, Once    oxyCODONE (ROXICODONE) split tablet 2.5 mg, Q4H PRN **OR** oxyCODONE (ROXICODONE) IR tablet 5 mg, Q4H PRN    polyethylene glycol (MIRALAX) packet 17 g, Daily PRN    prasugrel (EFFIENT) tablet 10 mg, Daily    senna (SENOKOT) tablet 17.2 mg, HS PRN    thiamine tablet 100 mg, Daily  VTE Pharmacologic Prophylaxis: VTE covered by:  enoxaparin, Subcutaneous, 40 mg at 07/14/25 0949     VTE Mechanical Prophylaxis: sequential compression device    Administrative Statements     Portions of the record may have been created with voice recognition software.  Occasional wrong word or \"sound a like\" substitutions may have occurred due to the inherent limitations of voice recognition software.  Read the chart carefully and recognize, using context, where substitutions have occurred.  ==  Perla Watts MD  [facility]  Internal Medicine Residency PGY-3  "

## 2025-07-14 NOTE — PROGRESS NOTES
"   07/14/25 1030   Pain Assessment   Pain Assessment Tool 0-10   Pain Score No Pain   Restrictions/Precautions   Precautions Bed/chair alarms;Cognitive;Fall Risk;Supervision on toilet/commode;Visual deficit   Weight Bearing Restrictions No   ROM Restrictions No   Lifestyle   Autonomy \"I'm a little eeyore today\"   Sit to Stand   Type of Assistance Needed Supervision   Physical Assistance Level No physical assistance   Comment CS with RW and/or SPC; gait belt donned   Sit to Stand CARE Score 4   Bed-Chair Transfer   Type of Assistance Needed Physical assistance   Physical Assistance Level 25% or less   Comment CG/min A with SPC And gait belt donned--one episode pt \"Stumbling\" but no overt LOB. CS when using RW   Chair/Bed-to-Chair Transfer CARE Score 3   Kitchen Mobility   Kitchen-Mobility Level Walker   Kitchen Activity Retrieve items;Transport items   Kitchen Mobility Comments completed brief kitchen mobility/item retrieval from fridge with use of RW and simulated walker bag. spouse confirms that she makes pt lunch and places in fridge, but pt reports that the fridge is so full that with his vision, he was unable to locate meals wife would prepare. problem solved that wife will select a designated spot in the fridge/on fridge door to place pt prepared meals for ease of item retrieval and increased attempt to ensure adequate food intake. also edu on use of walker tray however due to visual impairments, anticipate tray would be more of a hinder than a help. so edu on use of walker bag which spouse reports having extra resuable grocery bags at home and will attach one on walker at d/c. pt able to retrieve small items and place in simulated bag and transport w/o difficulty.   Cognition   Overall Cognitive Status WFL   Arousal/Participation Alert;Cooperative   Attention Attends with cues to redirect   Orientation Level Oriented X4   Memory Decreased short term memory   Following Commands Follows one step commands with " "increased time or repetition   Activity Tolerance   Activity Tolerance Patient tolerated treatment well   Assessment   Treatment Assessment pt engages in 60 minute skilled OT Session focusing on func transfers, kitchen mobility, and family training/observation. see above for full func details. pt spouse present for OT session, reports eye appointment went well. edu spouse on gait belt and provided handout from [employer]. demo'd func transfers using RW vs SPC with differing levels of assistance. spouse providing hands on assistance with RW and gait belt. discussed hand positioning when using SPC and gait belt. pt and spouse confirm PT to order w/c for \"off days\" and community purposes. spouse confirms that pt is primarily responsible for basic self care tasks, item retrieval from fridge and toileting tasks. spouse assists with all other IADL tasks due to premorbid balance and visual deficits. PM session to focus on additional func transfers with SPC with spouse providing hands on assist and to address any remaining barriers. complete D/C ADL tomorrow in prep for d/c tomorrow afternoon/evening. pt on track for d/c home tomorrow after therapy sessions (due to early AM appointment on wednesday morning) with no additional OT services needed at this time.   Prognosis Good   Problem List Decreased strength;Decreased endurance;Impaired balance;Decreased mobility;Decreased cognition;Impaired judgement;Decreased safety awareness;Impaired vision   Plan   Treatment/Interventions ADL retraining;Functional transfer training;Therapeutic exercise;Endurance training;Patient/family training;Equipment eval/education;Compensatory technique education;Cognitive reorientation   OT Therapy Minutes   OT Time In 1030   OT Time Out 1130   OT Total Time (minutes) 60   OT Mode of treatment - Individual (minutes) 60   OT Mode of treatment - Concurrent (minutes) 0   OT Mode of treatment - Group (minutes) 0   OT Mode of treatment - Co-treat (minutes) " 0   OT Mode of Treatment - Total time(minutes) 60 minutes   OT Cumulative Minutes 810   Therapy Time missed   Time missed? No

## 2025-07-14 NOTE — ASSESSMENT & PLAN NOTE
Lab Results   Component Value Date    HGBA1C 8.0 (H) 06/24/2025       Recent Labs     07/13/25  1626 07/13/25  2114 07/14/25  0616 07/14/25  1048   POCGLU 142* 251* 117 186*       Blood Sugar Average: Last 72 hrs:  (P) [id number]

## 2025-07-14 NOTE — ASSESSMENT & PLAN NOTE
Lab Results   Component Value Date    HGBA1C 8.0 (H) 06/24/2025     Recent Labs     07/13/25  1626 07/13/25  2114 07/14/25  0616 07/14/25  1048   POCGLU 142* 251* 117 186*     Home: Metformin 1000mg daily. In hospital he was on lantus 6U daily while the home metformin was on hold and he remain on lantus at this time  Patient extremely frustrated with high blood sugars.  Here: lantus 5U qHS. SSI  Metformin placed on hold again 7/11 in anticipation of possible need for CTA to r/o PE. Will continue lantus with sliding scale for now for blood sugar control  Patient does not want to be discharged home on insulin. Would be a good candidate for jardiance but that is only approved for heart failure in hospital.   Spoke with Dr. Schmitz. Pt will go home on Jardiance. $0 copay at [facility].  Glucometer was sent in as well as supplies. Pt's wife to  and bring in tomorrow for education.  Dr. Schmitz suspects that pt has gastroparesis and recommends a gastric emptying study as an outpt.

## 2025-07-14 NOTE — ASSESSMENT & PLAN NOTE
Resolved.  Platelet count today 171  Likely has cirrhosis- right UQ US- Most consistent with severe hepatic steatosis.   Continue to monitor

## 2025-07-14 NOTE — ASSESSMENT & PLAN NOTE
-And chronic transaminitis  -In setting of alcohol use disorder  -RUQ US with steatosis and hepatomegaly  > Counseled on cessation  > minimize hepatotoxic meds  > GI Consulted, 7/14 recommendations:  Obtain acute and chronic hepatitis panel   Obtain hepatitis A and B serologies with hepatitis C PCR  Trend liver enzymes and bilirubin  If patient develops worsening signs of fever, jaundice, and persistent elevation of liver enzymes and bilirubin, can consider MRI/MRCP.  Consider ultrasound elastography as outpatient to assess for liver fibrosis in the setting of severe hepatic steatosis.  Will arrange for outpatient gastroenterology follow-up

## 2025-07-14 NOTE — CASE MANAGEMENT
Clinical update faxed to [facility] via epic [phone], requesting one additional day. Determination pending.

## 2025-07-14 NOTE — CONSULTS
Consultation - Gastroenterology   Name: Aquilino Dowell 64 y.o. male I MRN: [id number]  Unit/Bed#: [facility] 971-01 I Date of Admission: 7/2/2025   Date of Service: 7/14/2025 I Hospital Day: 12   Inpatient consult to gastroenterology  Consult performed by: Manpreet Hurtado DO  Consult ordered by: Nadiya Shah PA-C        Physician Requesting Evaluation: Patricia Billingsley MD   Reason for Evaluation / Principal Problem: Elevated liver enzymes, hyperbilirubinemia    Assessment & Plan  Abnormal liver enzymes  This is a 64 year old male with past medical history of DM, HTN, CAD, ambulatory dysfunction who presented after a fall at home. He was found to have sepsis and bacteremia on admission and was admitted to [facility] for rehab. He presented with elevated liver enzymes and hyperbilirubinemia. RUQ ultrasound on 6/26 showed a moderately enlarged liver with severe hepatic steatosis. There was no evidence of cirrhotic morphology or liver masses. Doppler imaging of the liver showed patency of the portal vein. There was no evidence of acute cholecystitis and biliary dilatation. CBD measured normal diameter at 4 mm and without choledocholithiasis. Recent CT abdomen on 6/24 was unremarkable for cholecystitis and biliary obstruction.     Differentials include mild ischemic hepatitis in the setting of sepsis vs DILI and drug-induced cholestasis as he recently completed an antibiotic course on 6/30 for bacteremia with cefepime and vancomycin vs alcoholic cirrhosis in the setting of thrombocytopenia and hepatic steatosis    Plan:  Obtain acute and chronic hepatitis panel   Obtain hepatitis A and B serologies with hepatitis C PCR  Trend liver enzymes and bilirubin  If patient develops worsening signs of fever, jaundice, and persistent elevation of liver enzymes and bilirubin, can consider MRI/MRCP.  Consider ultrasound elastography as outpatient to assess for liver fibrosis in the setting of severe hepatic steatosis.  Will arrange for  outpatient gastroenterology follow-up    Hyperbilirubinemia  POA total bilirubin 2.8 with increase to 3.74.  Direct bilirubin elevated to 1.9.  RUQ ultrasound with liver dopplers unremarkable.  Hemolysis panel on 6/27 was negative for schistocytes and normal haptoglobin.     Plan:  Continue to monitor CMP  I have discussed the above management plan in detail with the primary service.     History of Present Illness   HPI:  Aquilino Dowell is a 64 y.o. male who presents with ambulatory dysfunction and sepsis. He initially presented on 7/2 due to ambulatory dysfunction and sepsis with polymicrobial bacteremia with blood cultures positive for Klebsiella and Staphylococcus hominis for which he was treated with a course of cefepime and vancomycin up through 6/30 at [facility]. Gastroenterology service has been consulted to assist in management of elevated liver enzymes. The patient reports feeling well. He does endorse drinking alcohol for many years and had been drinking one bottle of wine daily up to his hospitalization at [facility] on 6/28. He denies any history of viral hepatitis and high risk behavior including sexual activity and intravenous drug use. On presentation, AST and ALT were 123 and 27, respectively. Alkaline phosphatase was elevated to 205 and total bilirubin of 2.8 with uptrend to 3.74 today. Liver enzymes have mildly increased in addition to total bilirubin. INR is within normal limits. Ultrasound of the RUQ showed steatosis of the liver with normal patency of the portal vein. Recent CT abdomen on 6/24 was unremarkable for cholecystitis and biliary obstruction.       Review of Systems   Constitutional:  Negative for chills and fever.   HENT:  Negative for ear pain and sore throat.    Eyes:  Negative for pain and visual disturbance.   Respiratory:  Negative for cough and shortness of breath.    Cardiovascular:  Negative for chest pain and palpitations.   Gastrointestinal:  Negative for abdominal  pain, diarrhea, nausea and vomiting.   Genitourinary:  Negative for dysuria and hematuria.   Musculoskeletal:  Negative for arthralgias and back pain.   Skin:  Negative for color change and rash.   Neurological:  Negative for seizures and syncope.   Psychiatric/Behavioral:  Negative for agitation and confusion.    All other systems reviewed and are negative.    Medical History Review: I have reviewed the patient's PMH, PSH, Social History, Family History, Meds, and Allergies   Historical Information   Past Medical History[1]  Past Surgical History[2]  Social History[3]  E-Cigarette/Vaping    E-Cigarette Use Never User      E-Cigarette/Vaping Substances     Family History[4]  Social History[5]    Current Facility-Administered Medications:     acetaminophen (TYLENOL) tablet 650 mg, Q8H PRN    aspirin chewable tablet 81 mg, Daily    benzonatate (TESSALON PERLES) capsule 200 mg, TID PRN    bisacodyl (DULCOLAX) rectal suppository 10 mg, Daily PRN    cyanocobalamin (VITAMIN B-12) tablet 1,000 mcg, Daily    docusate sodium (COLACE) capsule 100 mg, BID    enoxaparin (LOVENOX) subcutaneous injection 40 mg, Daily    ergocalciferol (VITAMIN D2) capsule 50,000 Units, Weekly    famotidine (PEPCID) tablet 20 mg, BID    fluticasone (FLONASE) 50 mcg/act nasal spray 2 spray, BID    folic acid (FOLVITE) tablet 1 mg, Daily    gabapentin (NEURONTIN) capsule 100 mg, HS    ibuprofen (MOTRIN) tablet 400 mg, Q6H PRN    insulin glargine (LANTUS) subcutaneous injection 5 Units 0.05 mL, HS    insulin lispro (HumALOG/ADMELOG) 100 units/mL subcutaneous injection 1-5 Units, HS    insulin lispro (HumALOG/ADMELOG) 100 units/mL subcutaneous injection 1-6 Units, TID AC **AND** [CANCELED] Fingerstick Glucose (POCT), TID AC    loratadine (CLARITIN) tablet 10 mg, Daily    magnesium Oxide (MAG-OX) tablet 800 mg, BID    menthol-methyl salicylate (BENGAY) 10-15 % cream, 4x Daily    methocarbamol (ROBAXIN) tablet 500 mg, Q6H PRN    metoprolol tartrate  (LOPRESSOR) tablet 50 mg, Q12H TERRENCE    ondansetron (ZOFRAN-ODT) dispersible tablet 4 mg, Q6H PRN    ondansetron (ZOFRAN-ODT) dispersible tablet 4 mg, Once    oxyCODONE (ROXICODONE) split tablet 2.5 mg, Q4H PRN **OR** oxyCODONE (ROXICODONE) IR tablet 5 mg, Q4H PRN    polyethylene glycol (MIRALAX) packet 17 g, Daily PRN    prasugrel (EFFIENT) tablet 10 mg, Daily    senna (SENOKOT) tablet 17.2 mg, HS PRN    thiamine tablet 100 mg, Daily  Prior to Admission Medications   Prescriptions Last Dose Informant Patient Reported? Taking?   acetaminophen (TYLENOL) 325 mg tablet   No No   Sig: Take 2 tablets (650 mg total) by mouth every 4 (four) hours as needed for mild pain, headaches or fever   aspirin 81 mg chewable tablet   Yes No   Sig: Chew 81 mg daily   atorvastatin (LIPITOR) 20 mg tablet   Yes No   Sig: Take 20 mg by mouth daily   folic acid (FOLVITE) 1 mg tablet   Yes No   Sig: Take 1 mg by mouth daily   gabapentin (NEURONTIN) 100 mg capsule   No No   Sig: Take 1 capsule (100 mg total) by mouth daily at bedtime   metFORMIN (GLUCOPHAGE) 1000 MG tablet   Yes No   Sig: Take 1,000 mg by mouth daily with breakfast   methocarbamol (ROBAXIN) 500 mg tablet   No No   Sig: Take 1 tablet (500 mg total) by mouth every 6 (six) hours as needed for muscle spasms   metoprolol tartrate (LOPRESSOR) 50 mg tablet   Yes No   Sig: Take 50 mg by mouth every 12 (twelve) hours   prasugrel (EFFIENT) tablet   Yes No   Sig: Take 10 mg by mouth daily   thiamine 100 MG tablet   Yes No   Sig: Take 100 mg by mouth daily   vitamin B-12 (VITAMIN B-12) 1,000 mcg tablet   Yes No   Sig: Take 1,000 mcg by mouth daily      Facility-Administered Medications: None     Patient has no known allergies.    Objective :  Temp:  [97.5 °F (36.4 °C)-98.3 °F (36.8 °C)] 98.3 °F (36.8 °C)  HR:  [] 88  BP: (124-132)/(63-69) 132/63  Resp:  [18] 18  SpO2:  [98 %-100 %] 100 %  O2 Device: None (Room air)    Physical Exam  Vitals and nursing note reviewed.    Constitutional:       General: He is not in acute distress.     Appearance: Normal appearance. He is well-developed. He is not ill-appearing.   HENT:      Head: Normocephalic and atraumatic.      Right Ear: External ear normal.      Left Ear: External ear normal.     Eyes:      Extraocular Movements: Extraocular movements intact.      Conjunctiva/sclera: Conjunctivae normal.       Cardiovascular:      Rate and Rhythm: Normal rate and regular rhythm.      Pulses: Normal pulses.      Heart sounds: Normal heart sounds. No murmur heard.  Pulmonary:      Effort: Pulmonary effort is normal. No respiratory distress.      Breath sounds: Normal breath sounds.   Abdominal:      General: Abdomen is flat. There is no distension.      Palpations: Abdomen is soft.      Tenderness: There is no abdominal tenderness.     Musculoskeletal:         General: No swelling.      Cervical back: Neck supple.      Right lower leg: No edema.      Left lower leg: No edema.     Skin:     General: Skin is warm and dry.      Capillary Refill: Capillary refill takes less than 2 seconds.     Neurological:      Mental Status: He is alert and oriented to person, place, and time.     Psychiatric:         Mood and Affect: Mood normal.         Behavior: Behavior normal. Behavior is cooperative.           Lab Results: I have reviewed the following results:CBC/BMP:   .     07/14/25  0742   WBC 5.08   HGB 12.7   HCT 41.2      SODIUM 138   K 4.3      CO2 29   BUN 6   CREATININE 0.75   GLUC 170*    , LFTs:   .     07/14/25  0742   *   ALT 30   ALB 3.7   TBILI 3.74*   ALKPHOS 225*        Imaging Results Review: I reviewed radiology reports from this admission including: CT head.  Other Study Results Review: No additional pertinent studies reviewed.    Administrative Statements   I have spent a total time of 60 minutes in caring for this patient on the day of the visit/encounter including Diagnostic results, Prognosis, Risks and benefits  of tx options, Instructions for management, Patient and family education, Importance of tx compliance, Risk factor reductions, Impressions, Counseling / Coordination of care, Documenting in the medical record, Reviewing/placing orders in the medical record (including tests, medications, and/or procedures), Obtaining or reviewing history  , and Communicating with other healthcare professionals .         [1] No past medical history on file.  [2] No past surgical history on file.  [3]   Social History  Tobacco Use    Smoking status: Former     Types: Cigarettes    Smokeless tobacco: Current     Types: Chew   Vaping Use    Vaping status: Never Used   Substance and Sexual Activity    Alcohol use: Yes     Alcohol/week: 5.0 standard drinks of alcohol     Types: 5 Glasses of wine per week    Drug use: Never   [4] No family history on file.  [5]   Social History  Tobacco Use    Smoking status: Former     Types: Cigarettes    Smokeless tobacco: Current     Types: Chew   Vaping Use    Vaping status: Never Used   Substance and Sexual Activity    Alcohol use: Yes     Alcohol/week: 5.0 standard drinks of alcohol     Types: 5 Glasses of wine per week    Drug use: Never

## 2025-07-14 NOTE — ASSESSMENT & PLAN NOTE
- resolved  - 1.6 on 7/11, repeat 1.9  > Patient will receive IV and p.o. replacement as needed  > Will continue to monitor

## 2025-07-14 NOTE — ASSESSMENT & PLAN NOTE
Patient is a 64-year-old male with a past medical history of alcohol use, diabetes, chronic pain who presented to the hospital secondary to a fall found to be in sepsis with isolated coagulase-negative staph along with Klebsiella positive blood cultures.  GI was consulted due to abnormal liver enzymes.  Noted to have elevated AST, ALT, and bilirubin levels.  Possible differentials in the setting of significant alcohol use could be cirrhosis as patient's platelet count are low at baseline as well as imaging concerning for hepatomegaly.  Will check INR to look at the synthetic function of the liver.  Other differentials to consider as this patient has been hospitalized for quite some time and is drug-induced liver injury.  History of being on Augmentin due to a sinusitis infection as well as most recently on cefepime due to concerns of bacteremia. ***

## 2025-07-14 NOTE — NURSING NOTE
[facility]  RN Shift Note        Vitals  Vital Signs  Temperature: 98.2 °F (36.8 °C)  Temp Source: Oral  Pulse: 82  Heart Rate Source: Monitor  Respirations: 18  Blood Pressure: 150/80  MAP (mmHg): 98  BP Location: Right arm  BP Method: Automatic  Patient Position - Orthostatic VS: Sitting        Follow up/Interventions-   Pain Pain Score: 3  Hospital Pain Intervention(s): Rest    Follow up/Interventions-    GI   (WNL/X)  LBM  Incontinence (yes/no)     Gastrointestinal (WDL): Within Defined Limits  Last BM Date: 07/13/25  Bowel Incontinence: No    Follow up/Interventions-       (WNL/X)  Incontinence (yes/no)  Bladder Scan  Urine Output  Shift Total Output  Unmeasured Occurrence   Genitourinary (WDL): Within Defined Limits  Urinary Incontinence: No    Urine: 200 mL    Unmeasured Urine Occurrence: 1      Follow up/Interventions-    Nutrition  Diet/Precautions   PO Intake  Meal Consumption   Nutrition  Feeding: Able to feed self  Diet Type: Diabetic  Appetite: Fair  P.O.: 120 mL  Percent Meals Eaten (%): 75      Strategies/Interventions-   Follow up-    LDA  Drain Output             Follow up/Interventions- IV due out before DC tomorrow   Skin   (WNL/X)  Integrity  Pressure Injury YES/NO: no     Integumentary (WDL): Exceptions to WDL  Skin Integrity: Bruising  Description     Follow up/Interventions-    Behavior/Mood  Behavior log YES/NO: no Patient Behaviors/Mood: Flat affect    Follow up/Interventions-    Sleep Pattern  Sleep log no        Discharge Planning Education  (Medication/Device/Injections/Etc)     Started insulin teaching with both patient and wife.  Pt will be going home on oral medications and checking BS QID.  Wife will bring in glucometer tomorrow to continue teaching with their device   ADL/Mobility Summary  (transfer, bed mobility, ambulation)   A x1 w/ SPC     Miscellaneous

## 2025-07-14 NOTE — PROGRESS NOTES
07/14/25 1400   Pain Assessment   Pain Assessment Tool 0-10   Pain Score No Pain   Restrictions/Precautions   Precautions Bed/chair alarms;Fall Risk;Cognitive;Supervision on toilet/commode;Visual deficit   Cognition   Overall Cognitive Status WFL   Arousal/Participation Alert;Cooperative   Attention Attends with cues to redirect   Orientation Level Oriented X4   Memory Decreased short term memory   Following Commands Follows one step commands with increased time or repetition   Subjective   Subjective He reports he is very tired because he had a busy day so far   Roll Left and Right   Type of Assistance Needed Independent   Roll Left and Right CARE Score 6   Sit to Lying   Type of Assistance Needed Independent   Sit to Lying CARE Score 6   Lying to Sitting on Side of Bed   Type of Assistance Needed Independent   Lying to Sitting on Side of Bed CARE Score 6   Sit to Stand   Type of Assistance Needed Supervision;Adaptive equipment   Comment CS with RW   Sit to Stand CARE Score 4   Bed-Chair Transfer   Type of Assistance Needed Supervision;Adaptive equipment   Comment CS with RW   Chair/Bed-to-Chair Transfer CARE Score 4   Transfer Bed/Chair/Wheelchair   Limitations Noted In Balance;Endurance;UE Strength;LE Strength;Vision   Adaptive Equipment Roller Walker   Stand Pivot Supervision   Sit to Stand Supervision   Stand to Sit Supervision   Supine to Sit Independent   Sit to Supine Independent   Car Transfer Supervision   Car Transfer   Type of Assistance Needed Supervision   Comment CS with RW, verbal cue to use the RW and back up to the seat before sitting   Car Transfer CARE Score 4   Walk 10 Feet   Type of Assistance Needed Incidental touching;Adaptive equipment   Comment RW   Walk 10 Feet CARE Score 4   Walk 50 Feet with Two Turns   Type of Assistance Needed Incidental touching;Adaptive equipment   Comment RW   Walk 50 Feet with Two Turns CARE Score 4   Walk 150 Feet   Type of Assistance Needed Incidental  "touching;Adaptive equipment   Comment RW   Walk 150 Feet CARE Score 4   Walking 10 Feet on Uneven Surfaces   Type of Assistance Needed Incidental touching   Comment RW   Walking 10 Feet on Uneven Surfaces CARE Score 4   Ambulation   Primary Mode of Locomotion Prior to Admission Walk   Distance Walked (feet) 150 ft  (x2)   Assist Device Roller Walker   Gait Pattern Inconsistant Vaishali;Slow Vaishali;Forward Flexion;Step through   Limitations Noted In Balance;Endurance;Heel Strike;Posture;Speed;Strength;Swing   Provided Assistance with: Direction   Walk Assist Level Contact Guard   Does the patient walk? 2. Yes   Wheel 50 Feet with Two Turns   Type of Assistance Needed Set-up / clean-up   Wheel 50 Feet with Two Turns CARE Score 5   Wheel 150 Feet   Type of Assistance Needed Set-up / clean-up   Wheel 150 Feet CARE Score 5   Wheelchair mobility   Method Right upper extremity;Left upper extremity;Right lower extremity;Left lower extremity   Distance Level Surface (feet)   (150'x1, 75'x1)   Does the patient use a wheelchair? 0. No   Type of Wheelchair Used 1. Manual   Curb or Single Stair   Style negotiated Curb   Type of Assistance Needed Supervision;Adaptive equipment   Comment 6\" curb   1 Step (Curb) CARE Score 4   4 Steps   Type of Assistance Needed Incidental touching   Comment 6\"  steps   4 Steps CARE Score 4   Stairs   Type Stairs;Curb   # of Steps 4   Weight Bearing Precautions Fall Risk   Assist Devices Single Rail   Findings RW up and down 6\" curb, CG on 6\"  steps reciprocal with 2 hands on one rail and up forward then down forward/lateral   Toilet Transfer   Comment Stood to urinate   Toilet Transfer   Findings Stood to urinate   Therapeutic Interventions   Neuromuscular Re-Education Lateral walking 15'x2 each way with bilateral HHA in front, forward/backward walking with bilateral UE support 15'x1 each   Equipment Use   NuStep 10 mins total on level 2   Assessment   Treatment Assessment Goals were " reviewed early in the session and at the end of the session. The pt and his family were able to ask questions and answers were given to their satisfaction. He does walk with a slow speed and is not able to increase it by much. He is mostly looking down due to the visual deficit, but this can cause him to walk into things. He does need rest breaks throughout the session secondary to fatigue in his legs. Safety awareness is lacking at times because he does not reach for a chair before sitting, which can cause him to miss the chair and fall. He reported he looks for the chair before sitting, but he controls the eccentric lowering portion poorly. His wife reported a gait belt with handles was ordered and we trialed this on the stairs. She needed some verbal cues for positioning and how to hold the gait belt, but she was safe. He will continue to benefit from skilled PT to improve his strength, endurance, and balance to maximize his safety and function.   Family/Caregiver Present Spouse, daughter, grandson   PT Family training done with: Spouse   Problem List Decreased strength;Decreased range of motion;Decreased endurance;Impaired balance;Decreased mobility;Decreased cognition;Impaired judgement;Decreased safety awareness;Impaired vision   PT Barriers   Physical Impairment Decreased strength;Decreased range of motion;Decreased endurance;Impaired balance;Decreased mobility;Decreased cognition;Impaired judgement;Decreased safety awareness;Impaired vision   Functional Limitation Stair negotiation;Walking   Plan   Treatment/Interventions ADL retraining;Functional transfer training;LE strengthening/ROM;Elevations;Therapeutic exercise;Endurance training;Cognitive reorientation;Bed mobility;Gait training   Progress Progressing toward goals   Discharge Recommendation   Rehab Resource Intensity Level, PT   (Continue to assess)   Equipment Recommended Walker;Wheelchair   PT Therapy Minutes   PT Time In 1400   PT Time Out 1530    PT Total Time (minutes) 90   PT Mode of treatment - Individual (minutes) 90   PT Mode of treatment - Concurrent (minutes) 0   PT Mode of treatment - Group (minutes) 0   PT Mode of treatment - Co-treat (minutes) 0   PT Mode of Treatment - Total time(minutes) 90 minutes   PT Cumulative Minutes 924   Therapy Time missed   Time missed? No

## 2025-07-14 NOTE — PROGRESS NOTES
"   07/14/25 1230   Pain Assessment   Pain Assessment Tool 0-10   Pain Score No Pain   Restrictions/Precautions   Precautions Bed/chair alarms;Cognitive;Fall Risk;Supervision on toilet/commode;Visual deficit   Weight Bearing Restrictions No   ROM Restrictions No   Lifestyle   Autonomy \"My legs are just too weak\"   Sit to Stand   Type of Assistance Needed Incidental touching   Physical Assistance Level No physical assistance   Comment CG with SPC   Sit to Stand CARE Score 4   Bed-Chair Transfer   Type of Assistance Needed Incidental touching   Physical Assistance Level No physical assistance   Comment CG with SPC, SPT; attempted to perform short distance transfer from OT gym to pt room however pt unable to make it, requesting seated rest break due to significant fatigue in LEs. edu pt and spouse that if this were to happen, then pt could use his w/c at home to mobilize around house vs using SPC/RW   Chair/Bed-to-Chair Transfer CARE Score 4   Exercise Tools   Other Exercise Tool 1 Seated at table top focusing on increasing strength and endurance, pt uses table top pearl with 4#, pt completed 30 reps forward/backward and clockwise circles with BUE. Pt reports no increase in L shoulder pain, cues for counting reps   Cognition   Overall Cognitive Status WFL   Arousal/Participation Alert;Cooperative   Attention Attends with cues to redirect   Orientation Level Oriented X4   Memory Decreased short term memory   Following Commands Follows one step commands with increased time or repetition   Activity Tolerance   Activity Tolerance Patient limited by fatigue   Assessment   Treatment Assessment pt engages in brief 30 minute skilled OT session focusing on func transfers, light UE strengthening. see above for full func details. pt reports significant increased fatigue this PM session, demo'ing difficulty with short distance transfers with SPC, resulting in needing to use w/c for mobility. edu pt and spouse on various AD for " mobilizing at home, RW vs SPC vs w/c depending upon fatigue. pt on track for d/c home tomorrow after therapy sessions. complete D/C ADL tomorrow with no additional OT services needed at this time.   Prognosis Good   Problem List Decreased strength;Decreased endurance;Impaired balance;Decreased mobility;Decreased cognition;Impaired judgement;Decreased safety awareness;Impaired vision   Plan   Treatment/Interventions ADL retraining;Functional transfer training;Therapeutic exercise;Endurance training;Cognitive reorientation;Patient/family training;Equipment eval/education;Compensatory technique education   OT Therapy Minutes   OT Time In 1230   OT Time Out 1300   OT Total Time (minutes) 30   OT Mode of treatment - Individual (minutes) 30   OT Mode of treatment - Concurrent (minutes) 0   OT Mode of treatment - Group (minutes) 0   OT Mode of treatment - Co-treat (minutes) 0   OT Mode of Treatment - Total time(minutes) 30 minutes   OT Cumulative Minutes 840   Therapy Time missed   Time missed? No

## 2025-07-14 NOTE — ASSESSMENT & PLAN NOTE
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   - Next due 7/14  -This likely is contributing to his imbalance in the setting of his peripheral neuropathy  > Previous notes show he worked with Dr. Mart Garcia At [facility] in [place].  > Would call their office to see if they are ok with transport from rehab to perform procedure on Monday.

## 2025-07-14 NOTE — PROGRESS NOTES
Progress Note - Hospitalist   Name: Aquilino Dowell 64 y.o. male I MRN: [id number]  Unit/Bed#: -01 I Date of Admission: 7/2/2025   Date of Service: 7/14/2025 I Hospital Day: 12    Assessment & Plan  Diabetes (HCC)  Lab Results   Component Value Date    HGBA1C 8.0 (H) 06/24/2025     Recent Labs     07/13/25  1626 07/13/25  2114 07/14/25  0616 07/14/25  1048   POCGLU 142* 251* 117 186*     Home: Metformin 1000mg daily. In hospital he was on lantus 6U daily while the home metformin was on hold and he remain on lantus at this time  Patient extremely frustrated with high blood sugars.  Here: lantus 5U qHS. SSI  Metformin placed on hold again 7/11 in anticipation of possible need for CTA to r/o PE. Will continue lantus with sliding scale for now for blood sugar control  Patient does not want to be discharged home on insulin. Would be a good candidate for jardiance but that is only approved for heart failure in hospital.   Spoke with Dr. Schmitz. Pt will go home on Jardiance. $0 copay at [employer].  Glucometer was sent in as well as supplies. Pt's wife to  and bring in tomorrow for education.  Dr. Schmitz suspects that pt has gastroparesis and recommends a gastric emptying study as an outpt.  Fall  S/p fall at home ambulating to the bathroom  Uses a cane at baseline due to LE neuropathy and vision issues  PT/OT  Left shoulder pain  Xray done on 6/24/25 = + glenohumeral DJD, no fracture  Bengay ordered per PMR  Patient may benefit from out-patient orthopedic evaluation for local injections for pain control.  Coronary artery disease  S/p cardiac cath 2013    95% prox LAD, 40% mid LAD, EF 60%, MANUEL to prox LAD (Xience Rx 3.5x18mm   Continue home Prasugrel, ASA  Patient has not been taking statin for several years and reports having a side effect; however, cannot recall what that was.  Thorough review of his medical record shows his cardiologist is aware that he was not taking it so discontinued  it.  Alcohol use disorder  Per hospital chart, wife reported up to 1 bottle of wine per day  ETOH on admit 6/24 was normal  Was on CIWA protocol  Continue B12, folic acid  Hypertension  Home: Lopressor 50 mg BID.  He was switched from lisinopril to metoprolol when he had an episode of NSVT.    Here: same  No changes today.  Patient does get orthostatic in therapy. Has legs wrapped. Got albumin 7/10 and 500ml IV saline bolus  Sinusitis  Seen on CT head done in ED and  [facility] 7/3/25  No complaints of increased sinus pressure or headaches, he and his wife state he always has some degree of chronic sinus issues but this has been for years  Continue flonase, claritin  Back pain  MRI lumbar spine:  No discitis/osteomyelitis in lumbar spine, as clinically questioned.  Small focal areas of enhancement in the L1-L2 and L2-L3 supraspinous ligament regions, suspicious for enthesitis.  Mild multilevel degenerative changes of lumbar spine, as detailed above. No significant canal stenosis or foraminal narrowing  Pain meds/therapy per PMR  Hyperbilirubinemia  Peaked at 3.00 (7/8/25) today 2.53  Right UQ US- Hepatomegaly with steatosis, no liver mass  Metformin was briefly restarted but now back on hold as of 7/11 due to possible need for CTA  7/13- repeat labs with T bili and alk phos both trending back up  Pt seen by GI and labs for viral causes ordered.  He will need outpt GI follow-up.  Vitamin D deficiency  Continue supplement  Lung nodule  Found incidentally on CT scan  4mm right upper lobe nodule which is new  Will need repeat CT in 12 months  Tobacco use disorder  Encourage cessation at discharge.  Depression  Pt does not want to start a medication for mood.  Anemia  Iron and iron sat normal  Hgb stable at 10.0, had some readings in the 9's which were likely dilutional after IVF  Continue to monitor  GERD (gastroesophageal reflux disease)  On PPI at home which was discontinued on admission due to elevated LFT's  Continue  pepcid  Orthostatic hypotension  7/9- While working with PT/OT, patient became dizzy when sitting on edge of bed and standing.  BPs (+) for orthostasis.    Given 500 cc bolus NS x2 and also received a dose of IV albumin.  Patient with little PO intake.  Continue leg wraps  7/12/25 at 0500 BP was 144/79 with HR 85.  With therapy, SBP dropped to 100 and HR was 107  Add binder for when OOB = consider reflex tachy   Polymicrobial bacteremia  Patient with 1 of 2 blood cultures positive for klebsiella and staph when admitted to hospital on 6/24  Seen by ID in hospital, completed course of cefepime and vanco  Procal was ordered by PMR due to tachycardia on 7/10  Procal up to 1.35  Afebrile  WBC 3.86  Repeat blood cultures done 7/11 negative at 48 hours  Tachycardia  Therapy recorded a HR in the 120's on 7/11 afternoon after patient did the stairs  On recheck after resting he is still 100-110  EKG done- sinus tach with   afebrile  7/11- Clinically he looks dry. Albumin low on recent lab work. ordered IV albumin and small IVF bolus overnight  Continue current dose of metoprolol, hesitant to increase dose due to orthostasis   HRs in low 100's 7/11/25, slightly better than 7/10/25.   Mag low which may be contributing- repleted  D-dimer elevated at 0.97. Pulse ox has been stable on room air but PE should be considered in setting of ongoing tachycardia and elevated d-dimer  Unable to get CTA as he has been on Metformin which will need to be held for 48 hours.   Metformin now discontinued.  Last dose given AM 7/11/25 = 48 hrs will be Sunday AM. There was consideration  Venous doppler of the LEs was negative and his pulse ox has remained in high 90's on RA so unlikely a PE  In PT 7/12/25 HR was 107 bpm. He walked 120 ft heart rate was 126. BP was 100/68. He was complaining of significant fatigue.  His 0500 BP was 144/79  Could be reflex tachycardia?  TSH 6/24/25 was 1.57  Could consider getting an ECHO since last was 2024  but not sure he will agree to it  Was considering a CT PE study but HR's seem to be improving so will hold off on additional studies for now. Patient keeps stating he doesn't want any more studies and just wants to go home but if needed his wife is very understanding and helps convince him to have things done  Hypomagnesemia  Mag on 7/11/25 was 1.6  Repleted  Now 1.9, will increase PO mag dose to 800 mg BID to try to keep mag >2.0 due to recent episodes of tachycardia  Thrombocytopenia (HCC)  Resolved.  Platelet count today 171  Likely has cirrhosis- right UQ US- Most consistent with severe hepatic steatosis.   Continue to monitor  Abnormal liver enzymes  Continuing to trend up.  Outpt GI follow-up.    The above assessment and plan was reviewed and updated as determined by my evaluation of the patient on 7/14/2025.    History of Present Illness   Patient seen and examined. Patients overnight issues or events were reviewed with nursing staff. New or overnight issues include the following:     Pt seen in his room with his wife and daughter present. He is looking forward to DC tomorrow. Reviewed the plan for Jardiance at DC and no insulin. Pt's wife will bring in the glucometer so she can be educated on the use of it. Pt denies any current complaints.    A 10 point review of systems was negative except for what is noted in the HPI.    Objective :  Temp:  [97.5 °F (36.4 °C)-98.3 °F (36.8 °C)] 98.3 °F (36.8 °C)  HR:  [] 93  BP: (124-140)/(63-69) 140/63  Resp:  [18] 18  SpO2:  [98 %-100 %] 100 %  O2 Device: None (Room air)    Invasive Devices       Peripheral Intravenous Line  Duration             Peripheral IV 07/11/25 Right;Ventral (anterior) Forearm 2 days                    Physical Exam  General Appearance: NAD; pleasant  HEENT: PERRLA, conjuctiva normal; mucous membranes moist; face symmetrical  Neck:  Supple  Lungs: clear bilaterally, normal respiratory effort, no retractions, expiratory effort normal, on  room air  CV: regular rate and rhythm, no murmurs rubs or gallops noted   ABD: soft non tender, +BS x4  EXT: DP pulses intact, no lymphadenopathy, no edema  Skin: normal turgor, normal texture, no rash  Psych: affect normal, mood normal  Neuro: AAOx3    The above physical exam was reviewed and updated as determined by my evaluation of the patient on 7/14/2025.      Lab Results: I have reviewed the following results:  Results from last 7 days   Lab Units 07/14/25  0742 07/13/25  0639   WBC Thousand/uL 5.08 4.00*   HEMOGLOBIN g/dL 12.7 10.0*   HEMATOCRIT % 41.2 31.8*   PLATELETS Thousands/uL 171 120*     Results from last 7 days   Lab Units 07/14/25  0742 07/13/25  0639   SODIUM mmol/L 138 137   POTASSIUM mmol/L 4.3 3.8   CHLORIDE mmol/L 100 105   CO2 mmol/L 29 26   BUN mg/dL 6 6   CREATININE mg/dL 0.75 0.64   CALCIUM mg/dL 9.9 8.4         Results from last 7 days   Lab Units 07/14/25  0742   INR  0.93     Results from last 7 days   Lab Units 07/14/25  1048 07/14/25  0616 07/13/25  2114   POC GLUCOSE mg/dl 186* 117 251*       Imaging Results Review: No pertinent imaging studies reviewed.  Other Study Results Review: Other studies reviewed include: blood work    Review of Scheduled Meds: Medications  reviewed and reconciled as needed  Current Facility-Administered Medications   Medication Dose Route Frequency Provider Last Rate    acetaminophen  650 mg Oral Q8H PRN Ashley Depadua, MD      aspirin  81 mg Oral Daily Nadiya Shah PA-C      benzonatate  200 mg Oral TID PRN Nadiya Shah PA-C      bisacodyl  10 mg Rectal Daily PRN Ashley Depadua, MD      vitamin B-12  1,000 mcg Oral Daily Nadiya Shah PA-C      docusate sodium  100 mg Oral BID Robert Bo MD      enoxaparin  40 mg Subcutaneous Daily Nadiya Shah PA-C      ergocalciferol  50,000 Units Oral Weekly Nadiya Shah PA-C      famotidine  20 mg Oral BID Nadiya Shah PA-C      fluticasone  2 spray Each Nare BID Nadiya Shah PA-C       folic acid  1 mg Oral Daily Nadiya Shah, PA-C      gabapentin  100 mg Oral HS Nadiya Shah, PA-C      ibuprofen  400 mg Oral Q6H PRN Robert Bo MD      insulin glargine  5 Units Subcutaneous HS Nadiya Shah, PA-C      insulin lispro  1-5 Units Subcutaneous HS Nadiya Shah, PA-C      insulin lispro  1-6 Units Subcutaneous TID AC Nadiya Shah, PA-C      loratadine  10 mg Oral Daily Nadiya Shah, PA-C      magnesium Oxide  800 mg Oral BID Nadiya Shah, PA-C      menthol-methyl salicylate   Apply externally 4x Daily Nadiya Shah, PA-C      methocarbamol  500 mg Oral Q6H PRN Nadiya Shah, PA-C      metoprolol tartrate  50 mg Oral Q12H TERRENCE Nadiya Shah, PA-C      ondansetron  4 mg Oral Q6H PRN Rayne Da Silva, PA-C      ondansetron  4 mg Oral Once Aroldo Wyatt MD      oxyCODONE  2.5 mg Oral Q4H PRN Ashley Depadua, MD      polyethylene glycol  17 g Oral Daily PRN Ashley Depadua, MD      prasugrel  10 mg Oral Daily Nadiya Shah, PA-MARS      senna  2 tablet Oral HS PRN Robert Bo MD      thiamine  100 mg Oral Daily Nadiya Shah, PA-C         VTE Pharmacologic Prophylaxis: Lovenox  Code Status: Level 1 - Full Code  Current Length of Stay: 12 day(s)    Administrative Statements   I have spent a total time of 35 minutes in caring for this patient on the day of the visit/encounter including Diagnostic results, Prognosis, Risks and benefits of tx options, Instructions for management, Patient and family education, Importance of tx compliance, Risk factor reductions, Impressions, Counseling / Coordination of care, Documenting in the medical record, Reviewing/placing orders in the medical record (including tests, medications, and/or procedures), Obtaining or reviewing history  , and Communicating with other healthcare professionals .  ** Please Note:  voice to text software may have been used in the creation of this document. Although proof errors in transcription or interpretation  are a potential of such software**

## 2025-07-14 NOTE — ASSESSMENT & PLAN NOTE
This is a 64 year old male with past medical history of DM, HTN, CAD, ambulatory dysfunction who presented after a fall at home. He was found to have sepsis and bacteremia on admission and was admitted to [facility] for rehab. He presented with elevated liver enzymes and hyperbilirubinemia. RUQ ultrasound on 6/26 showed a moderately enlarged liver with severe hepatic steatosis. There was no evidence of cirrhotic morphology or liver masses. Doppler imaging of the liver showed patency of the portal vein. There was no evidence of acute cholecystitis and biliary dilatation. CBD measured normal diameter at 4 mm and without choledocholithiasis. Recent CT abdomen on 6/24 was unremarkable for cholecystitis and biliary obstruction.     Differentials include mild ischemic hepatitis in the setting of sepsis vs DILI and drug-induced cholestasis as he recently completed an antibiotic course on 6/30 for bacteremia with cefepime and vancomycin vs alcoholic cirrhosis in the setting of thrombocytopenia and hepatic steatosis    Plan:  Obtain acute and chronic hepatitis panel   Obtain hepatitis A and B serologies with hepatitis C PCR  Trend liver enzymes and bilirubin  If patient develops worsening signs of fever, jaundice, and persistent elevation of liver enzymes and bilirubin, can consider MRI/MRCP.  Consider ultrasound elastography as outpatient to assess for liver fibrosis in the setting of severe hepatic steatosis.  Will arrange for outpatient gastroenterology follow-up

## 2025-07-14 NOTE — CASE MANAGEMENT
Received order for 18x16 wheelchair from therapy, submitted order via parachute to [employer] to be delivered to pt's room prior to discharge.

## 2025-07-14 NOTE — ASSESSMENT & PLAN NOTE
Lab Results   Component Value Date    HGBA1C 8.0 (H) 06/24/2025       Recent Labs     07/13/25  1626 07/13/25  2114 07/14/25  0616 07/14/25  1048   POCGLU 142* 251* 117 186*       Blood Sugar Average: Last 72 hrs:  (P) [id number]: Metformin 1000mg daily  -Unclear if family checks BG at home or if he has a kit.   >Here: Lantus 5 units QHS, CDI/Accuchecks  >Goal to transition back to home regimen.  Reviewed with IM  > IM Added 2 units lispro with meals given postprandial hyperglycemia  >Diabetic Diet  >Management as per IM

## 2025-07-14 NOTE — ASSESSMENT & PLAN NOTE
Peaked at 3.00 (7/8/25) today 2.53  Right UQ US- Hepatomegaly with steatosis, no liver mass  Metformin was briefly restarted but now back on hold as of 7/11 due to possible need for CTA  7/13- repeat labs with T bili and alk phos both trending back up  Pt seen by GI and labs for viral causes ordered.  He will need outpt GI follow-up.

## 2025-07-14 NOTE — ASSESSMENT & PLAN NOTE
Therapy recorded a HR in the 120's on 7/11 afternoon after patient did the stairs  On recheck after resting he is still 100-110  EKG done- sinus tach with   afebrile  7/11- Clinically he looks dry. Albumin low on recent lab work. ordered IV albumin and small IVF bolus overnight  Continue current dose of metoprolol, hesitant to increase dose due to orthostasis   HRs in low 100's 7/11/25, slightly better than 7/10/25.   Mag low which may be contributing- repleted  D-dimer elevated at 0.97. Pulse ox has been stable on room air but PE should be considered in setting of ongoing tachycardia and elevated d-dimer  Unable to get CTA as he has been on Metformin which will need to be held for 48 hours.   Metformin now discontinued.  Last dose given AM 7/11/25 = 48 hrs will be Sunday AM. There was consideration  Venous doppler of the LEs was negative and his pulse ox has remained in high 90's on RA so unlikely a PE  In PT 7/12/25 HR was 107 bpm. He walked 120 ft heart rate was 126. BP was 100/68. He was complaining of significant fatigue.  His 0500 BP was 144/79  Could be reflex tachycardia?  TSH 6/24/25 was 1.57  Could consider getting an ECHO since last was 2024 but not sure he will agree to it  Was considering a CT PE study but HR's seem to be improving so will hold off on additional studies for now. Patient keeps stating he doesn't want any more studies and just wants to go home but if needed his wife is very understanding and helps convince him to have things done

## 2025-07-14 NOTE — ASSESSMENT & PLAN NOTE
POA total bilirubin 2.8 with increase to 3.74.  Direct bilirubin elevated to 1.9.  RUQ ultrasound with liver dopplers unremarkable.  Hemolysis panel on 6/27 was negative for schistocytes and normal haptoglobin.     Plan:  Continue to monitor CMP

## 2025-07-15 VITALS
OXYGEN SATURATION: 98 % | RESPIRATION RATE: 17 BRPM | DIASTOLIC BLOOD PRESSURE: 68 MMHG | BODY MASS INDEX: 25.4 KG/M2 | HEART RATE: 80 BPM | HEIGHT: 72 IN | WEIGHT: 187.5 LBS | TEMPERATURE: 98.4 F | SYSTOLIC BLOOD PRESSURE: 98 MMHG
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
## 2025-07-15 PROBLEM — L60.2 NAIL OVERGROWTH: Status: RESOLVED | Noted: 2025-07-09 | Resolved: 2025-07-15

## 2025-07-15 PROBLEM — R00.0 TACHYCARDIA: Status: RESOLVED | Noted: 2025-07-10 | Resolved: 2025-07-15

## 2025-07-15 PROBLEM — Z91.89 AT RISK FOR VENOUS THROMBOEMBOLISM (VTE): Status: RESOLVED | Noted: 2025-07-02 | Resolved: 2025-07-15

## 2025-07-15 PROBLEM — I95.1 ORTHOSTATIC HYPOTENSION: Status: RESOLVED | Noted: 2025-07-09 | Resolved: 2025-07-15

## 2025-07-15 LAB
ALBUMIN SERPL BCG-MCNC: 3 G/DL (ref 3.5–5)
ALP SERPL-CCNC: 173 U/L (ref 34–104)
ALT SERPL W P-5'-P-CCNC: 31 U/L (ref 7–52)
ANION GAP SERPL CALCULATED.3IONS-SCNC: 7 MMOL/L (ref 4–13)
AST SERPL W P-5'-P-CCNC: 194 U/L (ref 13–39)
BASOPHILS # BLD AUTO: 0.02 THOUSANDS/ÂΜL (ref 0–0.1)
BASOPHILS NFR BLD AUTO: 0 % (ref 0–1)
BILIRUB DIRECT SERPL-MCNC: 1.55 MG/DL (ref 0–0.2)
BILIRUB SERPL-MCNC: 3.15 MG/DL (ref 0.2–1)
BUN SERPL-MCNC: 6 MG/DL (ref 5–25)
CALCIUM SERPL-MCNC: 8.9 MG/DL (ref 8.4–10.2)
CHLORIDE SERPL-SCNC: 105 MMOL/L (ref 96–108)
CO2 SERPL-SCNC: 25 MMOL/L (ref 21–32)
CREAT SERPL-MCNC: 0.73 MG/DL (ref 0.6–1.3)
EOSINOPHIL # BLD AUTO: 0.14 THOUSAND/ÂΜL (ref 0–0.61)
EOSINOPHIL NFR BLD AUTO: 3 % (ref 0–6)
ERYTHROCYTE [DISTWIDTH] IN BLOOD BY AUTOMATED COUNT: 15.1 % (ref 11.6–15.1)
GFR SERPL CREATININE-BSD FRML MDRD: 98 ML/MIN/1.73SQ M
GLUCOSE SERPL-MCNC: 116 MG/DL (ref 65–140)
GLUCOSE SERPL-MCNC: 129 MG/DL (ref 65–140)
GLUCOSE SERPL-MCNC: 212 MG/DL (ref 65–140)
HCT VFR BLD AUTO: 36 % (ref 36.5–49.3)
HGB BLD-MCNC: 11.2 G/DL (ref 12–17)
IMM GRANULOCYTES # BLD AUTO: 0.02 THOUSAND/UL (ref 0–0.2)
IMM GRANULOCYTES NFR BLD AUTO: 0 % (ref 0–2)
INR PPP: 1 (ref 0.85–1.19)
LYMPHOCYTES # BLD AUTO: 1.44 THOUSANDS/ÂΜL (ref 0.6–4.47)
LYMPHOCYTES NFR BLD AUTO: 27 % (ref 14–44)
MCH RBC QN AUTO: 31.9 PG (ref 26.8–34.3)
MCHC RBC AUTO-ENTMCNC: 31.1 G/DL (ref 31.4–37.4)
MCV RBC AUTO: 103 FL (ref 82–98)
MONOCYTES # BLD AUTO: 0.43 THOUSAND/ÂΜL (ref 0.17–1.22)
MONOCYTES NFR BLD AUTO: 8 % (ref 4–12)
NEUTROPHILS # BLD AUTO: 3.36 THOUSANDS/ÂΜL (ref 1.85–7.62)
NEUTS SEG NFR BLD AUTO: 62 % (ref 43–75)
NRBC BLD AUTO-RTO: 0 /100 WBCS
PLATELET # BLD AUTO: 181 THOUSANDS/UL (ref 149–390)
PMV BLD AUTO: 9.9 FL (ref 8.9–12.7)
POTASSIUM SERPL-SCNC: 4.3 MMOL/L (ref 3.5–5.3)
PROT SERPL-MCNC: 7 G/DL (ref 6.4–8.4)
PROTHROMBIN TIME: 13.5 SECONDS (ref 12.3–15)
RBC # BLD AUTO: 3.51 MILLION/UL (ref 3.88–5.62)
SODIUM SERPL-SCNC: 137 MMOL/L (ref 135–147)
WBC # BLD AUTO: 5.41 THOUSAND/UL (ref 4.31–10.16)

## 2025-07-15 PROCEDURE — 82948 REAGENT STRIP/BLOOD GLUCOSE: CPT

## 2025-07-15 PROCEDURE — 80048 BASIC METABOLIC PNL TOTAL CA: CPT | Performed by: STUDENT IN AN ORGANIZED HEALTH CARE EDUCATION/TRAINING PROGRAM

## 2025-07-15 PROCEDURE — 80076 HEPATIC FUNCTION PANEL: CPT | Performed by: STUDENT IN AN ORGANIZED HEALTH CARE EDUCATION/TRAINING PROGRAM

## 2025-07-15 PROCEDURE — 85025 COMPLETE CBC W/AUTO DIFF WBC: CPT | Performed by: STUDENT IN AN ORGANIZED HEALTH CARE EDUCATION/TRAINING PROGRAM

## 2025-07-15 PROCEDURE — 97530 THERAPEUTIC ACTIVITIES: CPT

## 2025-07-15 PROCEDURE — 99239 HOSP IP/OBS DSCHRG MGMT >30: CPT | Performed by: PHYSICIAN ASSISTANT

## 2025-07-15 PROCEDURE — 97110 THERAPEUTIC EXERCISES: CPT

## 2025-07-15 PROCEDURE — 97535 SELF CARE MNGMENT TRAINING: CPT

## 2025-07-15 PROCEDURE — NC001 PR NO CHARGE: Performed by: INTERNAL MEDICINE

## 2025-07-15 PROCEDURE — 99232 SBSQ HOSP IP/OBS MODERATE 35: CPT | Performed by: PHYSICAL MEDICINE & REHABILITATION

## 2025-07-15 PROCEDURE — 85610 PROTHROMBIN TIME: CPT | Performed by: STUDENT IN AN ORGANIZED HEALTH CARE EDUCATION/TRAINING PROGRAM

## 2025-07-15 RX ORDER — CELECOXIB 200 MG/1
200 CAPSULE ORAL 2 TIMES DAILY PRN
Qty: 40 CAPSULE | Refills: 0 | Status: SHIPPED | OUTPATIENT
Start: 2025-07-15

## 2025-07-15 RX ORDER — ACETAMINOPHEN 325 MG/1
650 TABLET ORAL EVERY 8 HOURS PRN
Start: 2025-07-15

## 2025-07-15 RX ORDER — GABAPENTIN 100 MG/1
100 CAPSULE ORAL
Qty: 30 CAPSULE | Refills: 0 | Status: SHIPPED | OUTPATIENT
Start: 2025-07-15

## 2025-07-15 RX ORDER — DOCUSATE SODIUM 100 MG/1
100 CAPSULE, LIQUID FILLED ORAL 2 TIMES DAILY
Qty: 60 CAPSULE | Refills: 0 | Status: SHIPPED | OUTPATIENT
Start: 2025-07-15

## 2025-07-15 RX ORDER — METHOCARBAMOL 500 MG/1
500 TABLET, FILM COATED ORAL 3 TIMES DAILY PRN
Qty: 30 TABLET | Refills: 0 | Status: SHIPPED | OUTPATIENT
Start: 2025-07-15

## 2025-07-15 RX ADMIN — FOLIC ACID 1 MG: 1 TABLET ORAL at 08:17

## 2025-07-15 RX ADMIN — LORATADINE 10 MG: 10 TABLET ORAL at 08:17

## 2025-07-15 RX ADMIN — PRASUGREL 10 MG: 10 TABLET, FILM COATED ORAL at 08:17

## 2025-07-15 RX ADMIN — MAGNESIUM OXIDE TAB 400 MG (241.3 MG ELEMENTAL MG) 800 MG: 400 (241.3 MG) TAB at 08:17

## 2025-07-15 RX ADMIN — ASPIRIN 81 MG CHEWABLE TABLET 81 MG: 81 TABLET CHEWABLE at 08:17

## 2025-07-15 RX ADMIN — INSULIN GLARGINE 5 UNITS: 100 INJECTION, SOLUTION SUBCUTANEOUS at 09:37

## 2025-07-15 RX ADMIN — THIAMINE HCL TAB 100 MG 100 MG: 100 TAB at 08:17

## 2025-07-15 RX ADMIN — DOCUSATE SODIUM 100 MG: 100 CAPSULE, LIQUID FILLED ORAL at 08:17

## 2025-07-15 RX ADMIN — FAMOTIDINE 20 MG: 20 TABLET, FILM COATED ORAL at 08:17

## 2025-07-15 RX ADMIN — CYANOCOBALAMIN TAB 500 MCG 1000 MCG: 500 TAB at 08:17

## 2025-07-15 RX ADMIN — FLUTICASONE PROPIONATE 2 SPRAY: 50 SPRAY, METERED NASAL at 08:17

## 2025-07-15 RX ADMIN — INSULIN LISPRO 1 UNITS: 100 INJECTION, SOLUTION INTRAVENOUS; SUBCUTANEOUS at 11:53

## 2025-07-15 RX ADMIN — ENOXAPARIN SODIUM 40 MG: 40 INJECTION SUBCUTANEOUS at 08:17

## 2025-07-16 ENCOUNTER — OFFICE VISIT (OUTPATIENT)
Dept: CARDIOLOGY CLINIC | Facility: CLINIC | Age: 65
End: 2025-07-16
Payer: COMMERCIAL

## 2025-07-16 VITALS
OXYGEN SATURATION: 98 % | HEIGHT: 72 IN | SYSTOLIC BLOOD PRESSURE: 110 MMHG | BODY MASS INDEX: 25.58 KG/M2 | DIASTOLIC BLOOD PRESSURE: 72 MMHG | HEART RATE: 84 BPM

## 2025-07-16 DIAGNOSIS — E78.2 MIXED HYPERLIPIDEMIA: ICD-10-CM

## 2025-07-16 DIAGNOSIS — I47.10 SVT (SUPRAVENTRICULAR TACHYCARDIA) (HCC): Primary | ICD-10-CM

## 2025-07-16 DIAGNOSIS — E11.3312 TYPE 2 DIABETES MELLITUS WITH LEFT EYE AFFECTED BY MODERATE NONPROLIFERATIVE RETINOPATHY AND MACULAR EDEMA, WITHOUT LONG-TERM CURRENT USE OF INSULIN (HCC): ICD-10-CM

## 2025-07-16 DIAGNOSIS — I25.10 ATHEROSCLEROSIS OF NATIVE CORONARY ARTERY OF NATIVE HEART WITHOUT ANGINA PECTORIS: ICD-10-CM

## 2025-07-16 DIAGNOSIS — I10 BENIGN ESSENTIAL HYPERTENSION: ICD-10-CM

## 2025-07-16 LAB
BACTERIA BLD CULT: NORMAL
BACTERIA BLD CULT: NORMAL
DME PARACHUTE DELIVERY DATE EXPECTED: NORMAL
DME PARACHUTE DELIVERY DATE REQUESTED: NORMAL
DME PARACHUTE ITEM DESCRIPTION: NORMAL
DME PARACHUTE ORDER STATUS: NORMAL
DME PARACHUTE SUPPLIER NAME: NORMAL
DME PARACHUTE SUPPLIER PHONE: NORMAL
HCV RNA SERPL NAA+PROBE-ACNC: NOT DETECTED K[IU]/ML

## 2025-07-16 PROCEDURE — 93000 ELECTROCARDIOGRAM COMPLETE: CPT | Performed by: INTERNAL MEDICINE

## 2025-07-16 PROCEDURE — 99214 OFFICE O/P EST MOD 30 MIN: CPT | Performed by: INTERNAL MEDICINE

## 2025-07-16 RX ORDER — FAMOTIDINE 10 MG
10 TABLET ORAL 2 TIMES DAILY
COMMUNITY

## 2025-07-16 RX ORDER — GABAPENTIN 100 MG/1
100 CAPSULE ORAL DAILY
COMMUNITY

## 2025-07-16 RX ORDER — METHOCARBAMOL 500 MG/1
500 TABLET, FILM COATED ORAL AS NEEDED
COMMUNITY

## 2025-07-16 NOTE — PROGRESS NOTES
Cardiology Follow Up    Aquilino Dowell  1960  [id number]  [facility] [place]  [address]  [address]  [place] [address] [phone]      Assessment & Plan  SVT (supraventricular tachycardia) (HCC)  New diagnosis in July 2024 with presentation to the hospital  SVT was aborted with adenosine  Echocardiogram at the time revealed no significant changes  ZIO monitor was completed thereafter with heart rate variability between 59 bpm up to 226 bpm with an average heart rate of 74 bpm.  He had 95 SVT runs with the longest lasting 19 seconds.  Overall ectopy burden was low and he did not have any symptoms during the monitoring time.  Continue on beta-blocker for suppression  If has continued recurrences of prolonged episodes, then will refer to EP for potential ablation  Atherosclerosis of native coronary artery of native heart without angina pectoris  s/p MANUEL to prox LAD, doing well and asymptomatic.   Continue medical management with aspirin, Effient, beta-blocker, and ACE inhibitor.    echocardiogram was September 2023 revealing normal LV size and function with no wall motion abnormalities with no significant valvular disease.    He also had a nuclear stress test in April 2022 that revealed no perfusion defects and a normal ejection fraction.  Repeat echocardiogram in August 2024 revealed normal LV size and function with grade 1 diastolic dysfunction  Benign essential hypertension  BP well-controlled, if not low resulting in falls occasionally.    Lisinopril was stopped at hospitalization in July 2024 in the setting of SVT and lower blood pressures  Blood pressure remains well-controlled currently on Toprol-XL only  Mixed hyperlipidemia  Values have been well-controlled with LDL of 61 (goal<70).    He is no longer on Lipitor and LDL was 84 in September 2023.  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  He had an episode of SVT in July 2024 resulting in hospitalization.  He was also in the hospital earlier this month due to a fall, and then resulted in sepsis.  No reported chest pain, shortness of breath, palpitations, lightheadedness, syncope, LE edema, orthopnea, PND, or significant weight changes.  Patient remains relatively inactive currently.        Blood pressure 110/72, pulse 84, height 6' (1.829 m), SpO2 98%.    EKG:  Normal sinus rhythm  Normal ECG    Review of Systems:  Review of Systems   Constitutional:  Negative for activity change, appetite change, fatigue and fever.   HENT:  Negative for nosebleeds and sore throat.    Eyes:  Negative for photophobia and visual disturbance.   Respiratory:  Negative for cough, chest tightness, shortness of breath and wheezing.    Cardiovascular:  Negative for chest pain, palpitations and leg swelling.   Gastrointestinal:  Negative for abdominal pain, diarrhea, nausea and vomiting.   Endocrine: Negative for polyuria.   Genitourinary:  Negative for dysuria, frequency and hematuria.   Musculoskeletal:  Negative for arthralgias, back pain and gait problem.   Skin:  Negative for pallor and rash.   Neurological:  Negative for dizziness, syncope, speech difficulty and light-headedness.   Hematological:  Does not bruise/bleed easily.   Psychiatric/Behavioral:  Negative for agitation, behavioral problems and confusion.        Physical Exam:  Physical Exam  Vitals reviewed.   Constitutional:       General: He is not in acute distress.     Appearance: Normal appearance. He is  well-developed. He is not diaphoretic.   HENT:      Head: Normocephalic and atraumatic.      Nose: Nose normal.     Eyes:      General: No scleral icterus.     Extraocular Movements: Extraocular movements intact.      Pupils: Pupils are equal, round, and reactive to light.     Neck:      Vascular: No JVD.     Cardiovascular:      Rate and Rhythm: Normal rate and regular rhythm.      Heart sounds: S1 normal and S2 normal. Heart sounds not distant. No murmur heard.     No systolic murmur is present.      No friction rub. No gallop. No S3 sounds.   Pulmonary:      Effort: Pulmonary effort is normal. No respiratory distress.      Breath sounds: Normal breath sounds. No wheezing or rales.   Abdominal:      General: Bowel sounds are normal. There is no distension.      Palpations: Abdomen is soft.     Musculoskeletal:         General: No deformity.      Cervical back: Normal range of motion and neck supple.      Right lower leg: No edema.      Left lower leg: No edema.     Skin:     General: Skin is warm and dry.      Findings: No erythema.     Neurological:      Mental Status: He is alert and oriented to person, place, and time.      Cranial Nerves: No cranial nerve deficit.     Psychiatric:         Mood and Affect: Mood normal.         Behavior: Behavior normal.         Problem List[1]  Past Medical History[2]  Social History     Socioeconomic History    Marital status: /Civil Union     Spouse name: Not on file    Number of children: Not on file    Years of education: Not on file    Highest education level: Not on file   Occupational History    Not on file   Tobacco Use    Smoking status: Former     Current packs/day: 1.00     Types: Cigarettes    Smokeless tobacco: Current     Types: Chew   Vaping Use    Vaping status: Never Used   Substance and Sexual Activity    Alcohol use: Yes     Alcohol/week: 21.0 standard drinks of alcohol     Types: 21 Glasses of wine per week     Comment: At least 3 per day    Drug use:  No    Sexual activity: Not on file   Other Topics Concern    Not on file   Social History Narrative    Not on file     Social Drivers of Health     Financial Resource Strain: Not on file   Food Insecurity: No Food Insecurity (6/5/2024)    Nursing - Inadequate Food Risk Classification     Worried About Running Out of Food in the Last Year: Never true     Ran Out of Food in the Last Year: Never true     Ran Out of Food in the Last Year: Not on file   Transportation Needs: No Transportation Needs (6/5/2024)    PRAPARE - Transportation     Lack of Transportation (Medical): No     Lack of Transportation (Non-Medical): No   Physical Activity: Not on file   Stress: Not on file   Social Connections: Not on file   Intimate Partner Violence: Not on file   Housing Stability: Unknown (6/5/2024)    Housing Stability Vital Sign     Unable to Pay for Housing in the Last Year: No     Number of Times Moved in the Last Year: Not on file     Homeless in the Last Year: No      Family History[3]  Past Surgical History[4]  Current Medications[5]  No Known Allergies    Labs:  No visits with results within 6 Month(s) from this visit.   Latest known visit with results is:   Admission on 07/19/2024, Discharged on 07/20/2024   Component Date Value    Ventricular Rate 07/19/2024 202     Atrial Rate 07/19/2024 250     QRSD Interval 07/19/2024 112     QT Interval 07/19/2024 230     QTC Interval 07/19/2024 421     QRS Axis 07/19/2024 59     T Wave Axis 07/19/2024 81     WBC 07/19/2024 8.07     RBC 07/19/2024 3.97     Hemoglobin 07/19/2024 13.3     Hematocrit 07/19/2024 40.0     MCV 07/19/2024 101 (H)     MCH 07/19/2024 33.5     MCHC 07/19/2024 33.3     RDW 07/19/2024 12.2     MPV 07/19/2024 9.6     Platelets 07/19/2024 192     nRBC 07/19/2024 0     Segmented % 07/19/2024 48     Immature Grans % 07/19/2024 1     Lymphocytes % 07/19/2024 41     Monocytes % 07/19/2024 8     Eosinophils Relative 07/19/2024 1     Basophils Relative 07/19/2024 1      Absolute Neutrophils 07/19/2024 3.92     Absolute Immature Grans 07/19/2024 0.04     Absolute Lymphocytes 07/19/2024 3.31     Absolute Monocytes 07/19/2024 0.64     Eosinophils Absolute 07/19/2024 0.11     Basophils Absolute 07/19/2024 0.05     Sodium 07/19/2024 134 (L)     Potassium 07/19/2024 4.7     Chloride 07/19/2024 100     CO2 07/19/2024 19 (L)     ANION GAP 07/19/2024 15 (H)     BUN 07/19/2024 14     Creatinine 07/19/2024 1.41 (H)     Glucose 07/19/2024 270 (H)     Calcium 07/19/2024 10.0     AST 07/19/2024 62 (H)     ALT 07/19/2024 36     Alkaline Phosphatase 07/19/2024 93     Total Protein 07/19/2024 7.9     Albumin 07/19/2024 4.3     Total Bilirubin 07/19/2024 0.78     eGFR 07/19/2024 53     TSH 3RD GENERATION 07/19/2024 4.160     hs TnI 0hr 07/19/2024 7     Lipase 07/19/2024 46     D-Dimer, Quant 07/19/2024 0.60 (H)     Ventricular Rate 07/19/2024 129     Atrial Rate 07/19/2024 129     NV Interval 07/19/2024 140     QRSD Interval 07/19/2024 60     QT Interval 07/19/2024 282     QTC Interval 07/19/2024 413     P Axis 07/19/2024 63     QRS Axis 07/19/2024 68     T Wave [place] 07/19/2024 78     Ventricular Rate 07/19/2024 113     Atrial Rate 07/19/2024 153     QRSD Interval 07/19/2024 58     QT Interval 07/19/2024 264     QTC Interval 07/19/2024 362     QRS Axis 07/19/2024 66     T Wave [place] 07/19/2024 78     BNP 07/19/2024 108 (H)     Magnesium 07/19/2024 1.4 (L)     hs TnI 2hr 07/19/2024 10     Delta 2hr hsTnI 07/19/2024 3     pH, Nasir 07/19/2024 7.438 (H)     pCO2, Nasir 07/19/2024 30.8 (L)     pO2, Nasir 07/19/2024 33.7 (L)     HCO3, Nasir 07/19/2024 20.4 (L)     Base Excess, Nasir 07/19/2024 -2.9     O2 Content, Nasir 07/19/2024 10.9     O2 HGB, VENOUS 07/19/2024 63.3     LACTIC ACID 07/19/2024 2.0     Beta- Hydroxybutyrate 07/19/2024 0.71 (H)     Ventricular Rate 07/19/2024 127     Atrial Rate 07/19/2024 127     NV Interval 07/19/2024 146     QRSD Interval 07/19/2024 58     QT Interval 07/19/2024 308     QTC  Interval 07/19/2024 447     P Axis 07/19/2024 68     QRS Axis 07/19/2024 57     T Wave Axis 07/19/2024 65     RAA A4C 07/19/2024 12.1     LA Volume Index (BP) 07/19/2024 17.4     MV Peak A Elías 07/19/2024 0.78     MV stenosis pressure 1/2* 07/19/2024 44     MV Peak E Elías 07/19/2024 53     AV peak gradient 07/19/2024 8     LVOT peak VTI 07/19/2024 18.81     LVOT peak elías 07/19/2024 0.87     E wave deceleration time 07/19/2024 153     E/A ratio 07/19/2024 0.68     MV valve area p 1/2 meth* 07/19/2024 5.00     AV LVOT peak gradient 07/19/2024 3     LVOT mn grad 07/19/2024 2.0     RVID d 07/19/2024 3.5     A4C EF 07/19/2024 59     Left ventricular stroke * 07/19/2024 16.00     IVSd 07/19/2024 1.20     Tricuspid annular plane * 07/19/2024 1.70     Ao root 07/19/2024 3.40     LVPWd 07/19/2024 1.20     LA size 07/19/2024 3.1     Asc Ao 07/19/2024 3     LA volume (BP) 07/19/2024 36     FS 07/19/2024 16     LVIDS 07/19/2024 2.70     IVS 07/19/2024 1.2     LVIDd 07/19/2024 3.20     LA length (A2C) 07/19/2024 4.90     LEFT VENTRICLE SYSTOLIC * 07/19/2024 26     LV DIASTOLIC VOLUME (MOD* 07/19/2024 42     Left Atrium Area-systoli* 07/19/2024 14     Left Atrium Area-systoli* 07/19/2024 15.7     MV E' Tissue Velocity La* 07/19/2024 10     MV E' Tissue Velocity Se* 07/19/2024 7     LVSV, 2D 07/19/2024 16     BSA 07/19/2024 2.07     LV EF 07/19/2024 60     POC Glucose 07/19/2024 165 (H)     POC Glucose 07/19/2024 171 (H)     Hemoglobin A1C 07/19/2024 6.6 (H)     EAG 07/19/2024 143     Platelets 07/19/2024 121 (L)     MPV 07/19/2024 9.5     Sodium 07/19/2024 136     Potassium 07/19/2024 4.8     Chloride 07/19/2024 106     CO2 07/19/2024 24     ANION GAP 07/19/2024 6     BUN 07/19/2024 14     Creatinine 07/19/2024 1.17     Glucose 07/19/2024 155 (H)     Calcium 07/19/2024 8.6     eGFR 07/19/2024 66     POC Glucose 07/19/2024 158 (H)     POC Glucose 07/19/2024 240 (H)     Magnesium 07/20/2024 1.6 (L)     Sodium 07/20/2024 138      Potassium 07/20/2024 4.4     Chloride 07/20/2024 108     CO2 07/20/2024 22     ANION GAP 07/20/2024 8     BUN 07/20/2024 10     Creatinine 07/20/2024 1.03     Glucose 07/20/2024 142 (H)     Calcium 07/20/2024 8.6     Corrected Calcium 07/20/2024 9.2     AST 07/20/2024 37     ALT 07/20/2024 26     Alkaline Phosphatase 07/20/2024 57     Total Protein 07/20/2024 5.8 (L)     Albumin 07/20/2024 3.2 (L)     Total Bilirubin 07/20/2024 0.62     eGFR 07/20/2024 77     WBC 07/20/2024 3.90 (L)     RBC 07/20/2024 3.12 (L)     Hemoglobin 07/20/2024 10.4 (L)     Hematocrit 07/20/2024 31.0 (L)     MCV 07/20/2024 99 (H)     MCH 07/20/2024 33.3     MCHC 07/20/2024 33.5     RDW 07/20/2024 12.1     Platelets 07/20/2024 98 (L)     MPV 07/20/2024 9.6     POC Glucose 07/20/2024 132     POC Glucose 07/20/2024 194 (H)      Lab Results   Component Value Date    TRIG 97 09/01/2023    TRIG 84 09/15/2018    HDL 84 09/01/2023    HDL 71 09/15/2018     Imaging: No results found.           [1]   Patient Active Problem List  Diagnosis    Benign essential hypertension    Type 2 or unspecified type diabetes mellitus    Hyperlipidemia    Obstructive sleep apnea    Tremor, essential    Peripheral neuropathy    Alcohol use    Ambulatory dysfunction    Chronic back pain    Type 2 diabetes mellitus (HCC)    Atherosclerosis of native coronary artery of native heart without angina pectoris    Alcohol dependence (HCC)    Facial contusion    Rib pain on left side    Vitamin B12 deficiency    Mixed axonal-demyelinating polyneuropathy    Peripheral vascular disease (HCC)    Ulcer of right foot, limited to breakdown of skin (HCC)    Metabolic acidosis    Abdominal pain    Right foot pain    Cardiac chest pain    Hypomagnesemia    Elevated serum creatinine    SVT (supraventricular tachycardia) (HCC)    Tobacco chew use    Type 2 diabetes mellitus with left eye affected by moderate nonproliferative retinopathy and macular edema, without long-term current use of  insulin ([facility])   [2]   Past Medical History:  Diagnosis Date    Coronary artery disease     cath Oct 2013: 95% prox LAD, 40% mid LAD, EF 60%, MANUEL to prox LAD ([employer] Rx 3.5x18mm)    Diabetes mellitus ([facility]) 2010    not sure on date    Difficulty walking     Hypertension 2013    medication    Memory loss     Neuropathy in diabetes ([facility])     Peripheral neuropathy     Sleep apnea    [3]   Family History  Problem Relation Name Age of Onset    No Known Problems Mother      Heart attack Father      Arrhythmia Neg Hx      Clotting disorder Neg Hx      Fainting Neg Hx      Asthma Neg Hx      Anuerysm Neg Hx      Hypertension Neg Hx      Heart disease Neg Hx     [4] No past surgical history on file.  [5]   Current Outpatient Medications:     aspirin (ASPIRIN LOW DOSE) 81 MG tablet, Take 1 tablet by mouth in the morning., Disp: , Rfl:     cyanocobalamin 1,000 mcg/mL, Inject 1 mL (1,000 mcg total) into a muscle every 7 days, Disp: 12 mL, Rfl: 0    Empagliflozin 25 MG TABS, Take 25 mg by mouth every morning, Disp: , Rfl:     famotidine (PEPCID) 10 mg tablet, Take 10 mg by mouth 2 (two) times a day, Disp: , Rfl:     fexofenadine (ALLEGRA) 60 MG tablet, Take 60 mg by mouth in the morning., Disp: , Rfl:     folic acid (FOLVITE) 1 mg tablet, Take 1 tablet (1 mg total) by mouth daily, Disp: 90 tablet, Rfl: 0    gabapentin (NEURONTIN) 100 mg capsule, Take 100 mg by mouth daily, Disp: , Rfl:     magnesium oxide-pyridoxine (BEELITH) 362-20 MG TABS, Take 2 tablets by mouth 2 (two) times a day, Disp: , Rfl:     methocarbamol (ROBAXIN) 500 mg tablet, Take 500 mg by mouth if needed for muscle spasms, Disp: , Rfl:     metoprolol succinate (TOPROL-XL) 50 mg 24 hr tablet, TAKE 1 TABLET TWICE A DAY. HOLD IF SYSTOLIC BLOOD PRESSURE LESS THAN 100 MMHG. HOLD FOR HEART RATE LESS THAN 50 BPM, Disp: 180 tablet, Rfl: 0    prasugrel (EFFIENT) tablet, Take 1 tablet (10 mg total) by mouth daily, Disp: 90 tablet, Rfl: 3    thiamine 100 MG tablet, Take 1  tablet (100 mg total) by mouth daily Do not start before September 5, 2023., Disp: 90 tablet, Rfl: 0    metFORMIN (GLUCOPHAGE) 1000 MG tablet, Take 1,000 mg by mouth daily with breakfast (Patient not taking: Reported on 7/16/2025), Disp: , Rfl:     pantoprazole (PROTONIX) 40 mg tablet, Take 1 tablet (40 mg total) by mouth daily in the early morning (Patient not taking: Reported on 7/16/2025), Disp: 30 tablet, Rfl: 0

## 2025-07-16 NOTE — ASSESSMENT & PLAN NOTE
s/p MANUEL to prox LAD, doing well and asymptomatic.   Continue medical management with aspirin, Effient, beta-blocker, and ACE inhibitor.    echocardiogram was September 2023 revealing normal LV size and function with no wall motion abnormalities with no significant valvular disease.    He also had a nuclear stress test in April 2022 that revealed no perfusion defects and a normal ejection fraction.  Repeat echocardiogram in August 2024 revealed normal LV size and function with grade 1 diastolic dysfunction

## 2025-07-16 NOTE — ASSESSMENT & PLAN NOTE
BP well-controlled, if not low resulting in falls occasionally.    Lisinopril was stopped at hospitalization in July 2024 in the setting of SVT and lower blood pressures  Blood pressure remains well-controlled currently on Toprol-XL only

## 2025-07-16 NOTE — ASSESSMENT & PLAN NOTE
New diagnosis in July 2024 with presentation to the hospital  SVT was aborted with adenosine  Echocardiogram at the time revealed no significant changes  ZIO monitor was completed thereafter with heart rate variability between 59 bpm up to 226 bpm with an average heart rate of 74 bpm.  He had 95 SVT runs with the longest lasting 19 seconds.  Overall ectopy burden was low and he did not have any symptoms during the monitoring time.  Continue on beta-blocker for suppression  If has continued recurrences of prolonged episodes, then will refer to EP for potential ablation

## 2025-07-16 NOTE — CASE MANAGEMENT
Team DC Summary: Pt made good progress in [facility] and returned home with family support. Wife was present at discharge and dc instructions were reviewed as well as scheduled for PT at [facility]. DME ordered included w/c via parachute from [employer]. Due to delays in shipping, and w/c not being necessary upon dc, pt opted to have w/c shipped to home address of [address] [place], [place] [address] in [facility].

## 2025-07-16 NOTE — ASSESSMENT & PLAN NOTE
Lab Results   Component Value Date    HGBA1C 6.6 (H) 07/19/2024   Management as per primary care provider

## 2025-07-18 ENCOUNTER — OFFICE VISIT (OUTPATIENT)
Dept: OBGYN CLINIC | Facility: CLINIC | Age: 65
End: 2025-07-18
Attending: PHYSICIAN ASSISTANT
Payer: COMMERCIAL

## 2025-07-18 VITALS — WEIGHT: 187.5 LBS | HEIGHT: 72 IN | BODY MASS INDEX: 25.4 KG/M2

## 2025-07-18 DIAGNOSIS — R78.81 BACTEREMIA: ICD-10-CM

## 2025-07-18 DIAGNOSIS — M67.912 ROTATOR CUFF DYSFUNCTION, LEFT: Primary | ICD-10-CM

## 2025-07-18 PROCEDURE — 99244 OFF/OP CNSLTJ NEW/EST MOD 40: CPT | Performed by: STUDENT IN AN ORGANIZED HEALTH CARE EDUCATION/TRAINING PROGRAM

## 2025-07-18 NOTE — PROGRESS NOTES
[facility] [place]  [address]  [address]  [place] [id number] [phone]       Aquilino MERAZ Magalys  [id number]  1960    ORTHOPAEDIC SURGERY OUTPATIENT NOTE  7/18/2025    :  Assessment & Plan  Rotator cuff dysfunction, left  Date of Injury: Fall on 6/24/2025  We did discuss the possibility of a cortisone injection, however given his recent infection I advised against this at today's visit.  If he continues to have ongoing pain and discomfort we can consider his next follow-up visit  Activity as tolerated  Home exercise program reviewed  Begin outpatient PT for rotator cuff dysfunction  Anti-inflammatories or Tylenol prn pain  Ice and heat as needed  Return in about 6 weeks (around 8/29/2025) for Recheck.    Orders:    Ambulatory Referral to Orthopedic Surgery    Ambulatory referral to Physical Therapy; Future    Polymicrobial bacteremia  He did have a recent stent of sepsis while hospitalized.  He has completed his antibiotic and this is currently being managed by his family doctor in [place] with other medical providers         The patient's diagnosis and treatment were discussed at length today. We discussed no treatment, non-operative treatment, and operative treatment.    Procedures  No procedures today.    Translation: N/A    HISTORY:  64 y.o. male  who is right hand dominant. Referred to me by Nadiya Shah PA-C, seen in clinic for consultation of left shoulder pain.  He states he has been having ongoing left shoulder pain for several years with a recent fall approximately 2 weeks ago.  He mentions that he was hospitalized after this fall due to sepsis.  He was on a 7-day course of antibiotics which he has completed.  After his discharge from the hospital he was at a rehab facility which she has now been discharged from.  He states that his pain is predominantly in the anterior aspect of his shoulder does cause sharp and significant  pain with any slight movement.  He states he has noticed limited overhead motion as well as reaching behind his back.  He has not attempted any cortisone injections or outpatient physical therapy in the past.  He denies any previous history of injury or trauma to his shoulder.  He denies any numbness or tingling.    Surgical History:  None    Previous Injection(s): none      The following portions of the patient's history were reviewed and updated as appropriate: allergies, current medications, past family history, past social history, past surgical history and problem list.    Ht 6' (1.829 m)   Wt 85 kg (187 lb 8 oz)   BMI 25.43 kg/m²    Lab Results   Component Value Date    HGBA1C 8.0 (H) 06/24/2025         Past Medical History[1]    Past Surgical History[2]    Social History     Socioeconomic History    Marital status: /Civil Union     Spouse name: Not on file    Number of children: Not on file    Years of education: Not on file    Highest education level: Not on file   Occupational History    Not on file   Tobacco Use    Smoking status: Former     Current packs/day: 1.00     Types: Cigarettes    Smokeless tobacco: Current     Types: Chew   Vaping Use    Vaping status: Never Used   Substance and Sexual Activity    Alcohol use: Yes     Alcohol/week: 21.0 standard drinks of alcohol     Types: 21 Glasses of wine per week     Comment: At least 3 per day    Drug use: No    Sexual activity: Not on file   Other Topics Concern    Not on file   Social History Narrative    ** Merged History Encounter **          Social Drivers of Health     Financial Resource Strain: Not on file   Food Insecurity: No Food Insecurity (7/2/2025)    Nursing - Inadequate Food Risk Classification     Worried About Running Out of Food in the Last Year: Not on file     Ran Out of Food in the Last Year: Not on file     Ran Out of Food in the Last Year: Never true   Transportation Needs: No Transportation Needs (7/2/2025)    Nursing -  Transportation Risk Classification     Lack of Transportation: Not on file     Lack of Transportation: No   Physical Activity: Not on file   Stress: Not on file   Social Connections: Not on file   Intimate Partner Violence: Unknown (7/2/2025)    Nursing IPS     Feels Physically and Emotionally Safe: Not on file     Physically Hurt by Someone: Not on file     Humiliated or Emotionally Abused by Someone: Not on file     Physically Hurt by Someone: No     Hurt or Threatened by Someone: No   Housing Stability: Unknown (7/2/2025)    Nursing: Inadequate Housing Risk Classification     Has Housing: Not on file     Worried About Losing Housing: Not on file     Unable to Get Utilities: Not on file     Unable to Pay for Housing in the Last Year: No     Has Housing: No       Family History[3]     Patient's Medications   New Prescriptions    No medications on file   Previous Medications    ACCU-CHEK FASTCLIX LANCETS MISC    Use 4 (four) times a day (before meals and at bedtime)    ACETAMINOPHEN (TYLENOL) 325 MG TABLET    Take 2 tablets (650 mg total) by mouth every 8 (eight) hours as needed for mild pain, headaches or fever    ALCOHOL SWABS 70 % PADS    May substitute brand based on insurance coverage. Check glucose ACHS.    ASPIRIN (ASPIRIN LOW DOSE) 81 MG TABLET    Take 1 tablet by mouth in the morning.    ASPIRIN 81 MG CHEWABLE TABLET    Chew 81 mg daily    BLOOD GLUCOSE MONITORING SUPPL (ONETOUCH VERIO REFLECT) W/DEVICE KIT    May substitute brand based on insurance coverage. Check glucose ACHS.    CELECOXIB (CELEBREX) 200 MG CAPSULE    Take 1 capsule (200 mg total) by mouth 2 (two) times a day as needed for mild pain or moderate pain    CYANOCOBALAMIN 1,000 MCG/ML    Inject 1 mL (1,000 mcg total) into a muscle every 7 days    DOCUSATE SODIUM (COLACE) 100 MG CAPSULE    Take 1 capsule (100 mg total) by mouth in the morning and 1 capsule (100 mg total) before bedtime.    EMPAGLIFLOZIN (JARDIANCE) 25 MG TABS    Take 1 tablet  (25 mg total) by mouth every morning    EMPAGLIFLOZIN 25 MG TABS    Take 25 mg by mouth every morning    ERGOCALCIFEROL (VITAMIN D2) 50,000 UNITS    Take 1 capsule (50,000 Units total) by mouth once a week for 3 doses    FAMOTIDINE (PEPCID) 10 MG TABLET    Take 10 mg by mouth 2 (two) times a day    FAMOTIDINE (PEPCID) 20 MG TABLET    Take 1 tablet (20 mg total) by mouth 2 (two) times a day    FEXOFENADINE (ALLEGRA) 60 MG TABLET    Take 60 mg by mouth in the morning.    FLUTICASONE (FLONASE) 50 MCG/ACT NASAL SPRAY    2 sprays into each nostril 2 (two) times a day    FOLIC ACID (FOLVITE) 1 MG TABLET    Take 1 tablet (1 mg total) by mouth daily    FOLIC ACID (FOLVITE) 1 MG TABLET    Take 1 mg by mouth daily    GABAPENTIN (NEURONTIN) 100 MG CAPSULE    Take 100 mg by mouth daily    GABAPENTIN (NEURONTIN) 100 MG CAPSULE    Take 1 capsule (100 mg total) by mouth daily at bedtime    GLUCOSE BLOOD (ONETOUCH VERIO) TEST STRIP    May substitute brand based on insurance coverage. Check glucose ACHS.    LORATADINE (CLARITIN) 10 MG TABLET    Take 1 tablet (10 mg total) by mouth daily    MAGNESIUM OXIDE (MAG-OX) 400 MG TABS    Take 2 tablets (800 mg total) by mouth in the morning and 2 tablets (800 mg total) before bedtime.    MAGNESIUM OXIDE-PYRIDOXINE (BEELITH) 362-20 MG TABS    Take 2 tablets by mouth 2 (two) times a day    METFORMIN (GLUCOPHAGE) 1000 MG TABLET    Take 1,000 mg by mouth daily with breakfast    METHOCARBAMOL (ROBAXIN) 500 MG TABLET    Take 500 mg by mouth if needed for muscle spasms    METHOCARBAMOL (ROBAXIN) 500 MG TABLET    Take 1 tablet (500 mg total) by mouth 3 (three) times a day as needed for muscle spasms    METOPROLOL SUCCINATE (TOPROL-XL) 50 MG 24 HR TABLET    TAKE 1 TABLET TWICE A DAY. HOLD IF SYSTOLIC BLOOD PRESSURE LESS THAN 100 MMHG. HOLD FOR HEART RATE LESS THAN 50 BPM    METOPROLOL TARTRATE (LOPRESSOR) 50 MG TABLET    Take 50 mg by mouth every 12 (twelve) hours    PANTOPRAZOLE (PROTONIX) 40 MG  TABLET    Take 1 tablet (40 mg total) by mouth daily in the early morning    PRASUGREL (EFFIENT) TABLET    Take 1 tablet (10 mg total) by mouth daily    PRASUGREL (EFFIENT) TABLET    Take 10 mg by mouth daily    THIAMINE 100 MG TABLET    Take 1 tablet (100 mg total) by mouth daily Do not start before September 5, 2023.    THIAMINE 100 MG TABLET    Take 100 mg by mouth daily    VITAMIN B-12 (VITAMIN B-12) 1,000 MCG TABLET    Take 1,000 mcg by mouth daily   Modified Medications    No medications on file   Discontinued Medications    No medications on file       Allergies[4]       REVIEW OF SYSTEMS:  Constitutional: Negative.    HEENT: Negative.    Respiratory: Negative.    Skin: Negative.    Neurological: Negative.    Psychiatric/Behavioral: Negative.  Musculoskeletal: Negative except for that mentioned in the HPI.    Gen: No acute distress, resting comfortably in bed  HEENT: Eyes clear, moist mucus membranes, hearing intact  Respiratory: No audible wheezing or stridor  Cardiovascular: Well Perfused peripherally, 2+ distal pulse  Abdomen: nondistended, no peritoneal signs     PHYSICAL EXAM:      Left Shoulder Exam  Alignment / Posture:  Normal shoulder posture. Normal scapular position. No scapular dyskinesis or winging.  Inspection:  No swelling. No edema. No erythema.  Diffuse ecchymosis. No muscle atrophy. No deformity.  Palpation:  Subacromial and bicipital groove tenderness. No effusion. No warmth. Mild crepitus.  ROM:  Forward elevation to 100, Abduction to 90, External rotation to 30, Internal rotation to hip, and able to reach back of head  Strength:  Forward elevation 4/5. Abduction 4/5. External rotation 4/5. Internal rotation  Stability:  No objective shoulder instability.  Tests: (+) Fitzgerald. (+) Neer. (+) Painful arc. (+) Speed. (-) Belly press.  Neurovascular:  Sensation intact in Ax/R/M/U nerve distributions. 2+ radial pulse.    IMAGING:  I have personally reviewed pertinent films in PACS and/or  "[employer].  XR of left shoulder - performed 6/24/2025 demonstrates mild degenerative changes with slight evidence of high riding humeral head, however this may be attributed to the orientation of the x-ray. I have reviewed the radiology report(s) and agree with their impression.      Electronic Medical Records were reviewed including Medications/Labs/Past Medical History , Imaging, and ED Note(s) from 7/2/2025    Yonny Ferrer    Scribe Attestation      I,:  Yonny Ferrer am acting as a scribe while in the presence of the attending physician.:       I,:  Hitesh Ramos DO personally performed the services described in this documentation    as scribed in my presence.:               Portions of the record may have been created with voice recognition software.  Occasional wrong word or \"sound a like\" substitutions may have occurred due to the inherent limitations of voice recognition software.  Read the chart carefully and recognize, using context, where substitutions have occurred. All patient's questions were answered to their satisfaction.          [1]   Past Medical History:  Diagnosis Date    Coronary artery disease     cath Oct 2013: 95% prox LAD, 40% mid LAD, EF 60%, MANUEL to prox LAD ([facility] Rx 3.5x18mm)    Diabetes mellitus (HCC) 2010    not sure on date    Difficulty walking     Hypertension 2013    medication    Memory loss     Neuropathy in diabetes (HCC)     Peripheral neuropathy     Sleep apnea    [2] No past surgical history on file.  [3]   Family History  Problem Relation Name Age of Onset    No Known Problems Mother      Heart attack Father      Arrhythmia Neg Hx      Clotting disorder Neg Hx      Fainting Neg Hx      Asthma Neg Hx      Anuerysm Neg Hx      Hypertension Neg Hx      Heart disease Neg Hx     [4] No Known Allergies    "

## 2025-07-18 NOTE — ASSESSMENT & PLAN NOTE
Date of Injury: Fall on 6/24/2025  We did discuss the possibility of a cortisone injection, however given his recent infection I advised against this at today's visit.  If he continues to have ongoing pain and discomfort we can consider his next follow-up visit  Activity as tolerated  Home exercise program reviewed  Begin outpatient PT for rotator cuff dysfunction  Anti-inflammatories or Tylenol prn pain  Ice and heat as needed  Return in about 6 weeks (around 8/29/2025) for Recheck.    Orders:    Ambulatory Referral to Orthopedic Surgery    Ambulatory referral to Physical Therapy; Future

## 2025-07-22 ENCOUNTER — EVALUATION (OUTPATIENT)
Dept: PHYSICAL THERAPY | Facility: REHABILITATION | Age: 65
End: 2025-07-22
Payer: COMMERCIAL

## 2025-07-22 DIAGNOSIS — R26.89 IMBALANCE: Primary | ICD-10-CM

## 2025-07-22 DIAGNOSIS — M67.912 ROTATOR CUFF DYSFUNCTION, LEFT: ICD-10-CM

## 2025-07-22 DIAGNOSIS — R53.81 PHYSICAL DECONDITIONING: ICD-10-CM

## 2025-07-22 PROCEDURE — 97162 PT EVAL MOD COMPLEX 30 MIN: CPT | Performed by: PHYSICAL THERAPIST

## 2025-07-22 NOTE — PROGRESS NOTES
PT Evaluation     Today's date: 2025  Patient name: Aquilino Dowell  : 1960  MRN: [id number]  Referring provider: Depadua, Ashley, MD  Dx:   Encounter Diagnosis     ICD-10-CM    1. Imbalance  R26.89       2. Physical deconditioning  R53.81                      Assessment  Impairments: abnormal gait, abnormal or restricted ROM, activity intolerance, impaired balance, impaired physical strength, lacks appropriate home exercise program, pain with function and safety issue  Functional limitations: Difficulty ambulating, navigating stairs, lifting overhead, lifting/carrying/pushing/pulling objects    Assessment details: Aquilino is a 64 y.o. male presenting to PT due to worsening balance and weakness due to hospital stay in which he had sepsis. Also L shoulder pain due to recent fall. Pt would benefit from skilled PT services to address the below listed impairments and functional limitations to encourage return to PLOF.         Goals  STG: Completes 1xSTS w/ 1UE in 4-6 weeks.   STG: LE MMT improved by at least 1/2 grade in all deficient planes in 4-6 weeks.   STG: I w/ HEPs 1 week after prescription.   LTG: CORONADO, 5xSTS, TUG improved by at least MCID by d/c.   LTG: Ambulates >3' w/ SPC by d/c.   LTG: I w/ comprehensive HEP by d/c.     Plan  Patient would benefit from: PT eval and skilled physical therapy    Planned therapy interventions: IASTM, joint mobilization, kinesiology taping, manual therapy, massage, nerve gliding, neuromuscular re-education, strengthening, stretching, therapeutic exercise, therapeutic activities, flexibility, functional ROM exercises, gait training, graded activity, graded exercise, therapeutic training, patient/caregiver education, balance and ADL training    Frequency: 2x week  Duration in weeks: 12  Plan of Care beginning date: 2025  Plan of Care expiration date: 10/14/2025      Subjective Evaluation    History of Present Illness  Mechanism of injury: Hospitalized  recently due to sepsis. Went to rehab and felt that he was worked too hard and had difficulty moving after. He uses a cane at baseline, recently uses walker for longer distances. 3 steps to enter his house, railing on right side. He can do this as long as his legs are feeling okay. He has neuropathy in both feet diagnosed a few years ago. He has fallen a few times in the last couple of months. He can't get up on his own, sometimes he is able to get up with help of his wife. Has a first floor setup at home. Balance and strength worse since hospitalization. He feels that his is getting back to his baseline, maybe even better since he has been exercising. No reported shortness of breath, he is limited in activity due to weakness and decreased endurance in his legs.       Objective     Strength/Myotome Testing     Left Hip   Planes of Motion   Flexion: 4    Right Hip   Planes of Motion   Flexion: 4    Left Knee   Flexion: 4  Extension: 4    Right Knee   Flexion: 4  Extension: 4    Left Ankle/Foot   Dorsiflexion: 4  Plantar flexion: 3+    Right Ankle/Foot   Dorsiflexion: 4  Plantar flexion: 3+    Functional Assessment        Comments  25:   CORONADO/54  5xSTS (2UE) - 40.32s   Ambulation: 80' in 1:05              Precautions: Fall risk, decreased vision, see medical history       Manuals                                                                 Neuro Re-Ed                                                                                                        Ther Ex                                                                                                                     Ther Activity                                       Gait Training                                       Modalities

## 2025-07-22 NOTE — PHYSICAL THERAPY NOTE
Physical Therapy Discharge Summary       Aquilino Dowell presented to the [facility] following hospitalization for peripheral neuropathy resulting in significant decline in functional mobility.  Physical therapy plan of care focused on bed mobility, transfer training, gait training, stair navigation, standing balance training , neuromuscular re education , LE strengthening , activities to improve endurance/activity tolerance, and family training . Patient made good to fair progress during their stay. Goals were partially met. Pt ended up discharging with mixed level goals at WC level and RW due to fluctuating blood pressure in which patient was symptomatic. DC home at supervision level with RW which wife could provide. WC level with set up assist for leg rest when wife is not home.  Transitioning to OP PT

## 2025-07-25 NOTE — PROGRESS NOTES
Occupational Therapy Discharge Summary       Pt has made good Progress in Occupational Therapy. Pt MET long term goals. Pt progressed to Independent  level for functional transfers with W/C. Independent level for ADL function with use of W/C.  Pt discharged home with spouse and with family support. OT recommending use of wheelchair . pt/family received and/or purchased prior to d/c for home use. Family training was completed prior to d/c to maximize independence and safety with ADL/IADL function at home environment. Pt safe to d/c home at current level of function and continues to present with deficits of weakness and decreased endurance, fluctuations in blood pressure. .Recommending In home supervision. Pt does not require any further OT needs at time of discharge.

## 2025-07-28 ENCOUNTER — APPOINTMENT (OUTPATIENT)
Dept: LAB | Facility: AMBULARY SURGERY CENTER | Age: 65
End: 2025-07-28
Payer: COMMERCIAL

## 2025-07-28 ENCOUNTER — TRANSCRIBE ORDERS (OUTPATIENT)
Dept: LAB | Facility: AMBULARY SURGERY CENTER | Age: 65
End: 2025-07-28

## 2025-07-28 DIAGNOSIS — E11.40 DIABETIC NEUROPATHY WITH NEUROLOGIC COMPLICATION (HCC): ICD-10-CM

## 2025-07-28 DIAGNOSIS — E78.2 MIXED HYPERLIPIDEMIA: ICD-10-CM

## 2025-07-28 DIAGNOSIS — Z91.81 PERSONAL HISTORY OF FALL: ICD-10-CM

## 2025-07-28 DIAGNOSIS — E11.3312 MODERATE NONPROLIFERATIVE DIABETIC RETINOPATHY OF LEFT EYE WITH MACULAR EDEMA ASSOCIATED WITH TYPE 2 DIABETES MELLITUS (HCC): ICD-10-CM

## 2025-07-28 DIAGNOSIS — Z90.01: ICD-10-CM

## 2025-07-28 DIAGNOSIS — E13.3311: ICD-10-CM

## 2025-07-28 DIAGNOSIS — I25.118 ATHEROSCLEROSIS OF NATIVE CORONARY ARTERY WITH OTHER FORM OF ANGINA PECTORIS, UNSPECIFIED WHETHER NATIVE OR TRANSPLANTED HEART (HCC): ICD-10-CM

## 2025-07-28 DIAGNOSIS — E11.49 DIABETIC NEUROPATHY WITH NEUROLOGIC COMPLICATION (HCC): ICD-10-CM

## 2025-07-28 DIAGNOSIS — R79.0 CALCIUM BLOOD DECREASED: ICD-10-CM

## 2025-07-28 DIAGNOSIS — E11.3312 MODERATE NONPROLIFERATIVE DIABETIC RETINOPATHY OF LEFT EYE WITH MACULAR EDEMA ASSOCIATED WITH TYPE 2 DIABETES MELLITUS (HCC): Primary | ICD-10-CM

## 2025-07-28 LAB
25(OH)D3 SERPL-MCNC: 34.6 NG/ML (ref 30–100)
ALBUMIN SERPL BCG-MCNC: 3.3 G/DL (ref 3.5–5)
ALP SERPL-CCNC: 136 U/L (ref 34–104)
ALT SERPL W P-5'-P-CCNC: 42 U/L (ref 7–52)
ANION GAP SERPL CALCULATED.3IONS-SCNC: 14 MMOL/L (ref 4–13)
AST SERPL W P-5'-P-CCNC: 92 U/L (ref 13–39)
BASOPHILS # BLD AUTO: 0.05 THOUSANDS/ÂΜL (ref 0–0.1)
BASOPHILS NFR BLD AUTO: 1 % (ref 0–1)
BILIRUB SERPL-MCNC: 1.75 MG/DL (ref 0.2–1)
BUN SERPL-MCNC: 12 MG/DL (ref 5–25)
CALCIUM ALBUM COR SERPL-MCNC: 10.4 MG/DL (ref 8.3–10.1)
CALCIUM SERPL-MCNC: 9.8 MG/DL (ref 8.4–10.2)
CHLORIDE SERPL-SCNC: 101 MMOL/L (ref 96–108)
CHOLEST SERPL-MCNC: 177 MG/DL (ref ?–200)
CO2 SERPL-SCNC: 23 MMOL/L (ref 21–32)
CREAT SERPL-MCNC: 1.05 MG/DL (ref 0.6–1.3)
EOSINOPHIL # BLD AUTO: 0.13 THOUSAND/ÂΜL (ref 0–0.61)
EOSINOPHIL NFR BLD AUTO: 2 % (ref 0–6)
ERYTHROCYTE [DISTWIDTH] IN BLOOD BY AUTOMATED COUNT: 13.4 % (ref 11.6–15.1)
EST. AVERAGE GLUCOSE BLD GHB EST-MCNC: 88 MG/DL
GFR SERPL CREATININE-BSD FRML MDRD: 75 ML/MIN/1.73SQ M
GLUCOSE P FAST SERPL-MCNC: 156 MG/DL (ref 65–99)
HBA1C MFR BLD: 4.7 %
HCT VFR BLD AUTO: 44 % (ref 36.5–49.3)
HDLC SERPL-MCNC: 24 MG/DL
HGB BLD-MCNC: 14.2 G/DL (ref 12–17)
IMM GRANULOCYTES # BLD AUTO: 0.02 THOUSAND/UL (ref 0–0.2)
IMM GRANULOCYTES NFR BLD AUTO: 0 % (ref 0–2)
LDLC SERPL CALC-MCNC: 121 MG/DL (ref 0–100)
LYMPHOCYTES # BLD AUTO: 2.15 THOUSANDS/ÂΜL (ref 0.6–4.47)
LYMPHOCYTES NFR BLD AUTO: 32 % (ref 14–44)
MAGNESIUM SERPL-MCNC: 2.3 MG/DL (ref 1.9–2.7)
MCH RBC QN AUTO: 32.7 PG (ref 26.8–34.3)
MCHC RBC AUTO-ENTMCNC: 32.3 G/DL (ref 31.4–37.4)
MCV RBC AUTO: 101 FL (ref 82–98)
MONOCYTES # BLD AUTO: 0.65 THOUSAND/ÂΜL (ref 0.17–1.22)
MONOCYTES NFR BLD AUTO: 10 % (ref 4–12)
NEUTROPHILS # BLD AUTO: 3.67 THOUSANDS/ÂΜL (ref 1.85–7.62)
NEUTS SEG NFR BLD AUTO: 55 % (ref 43–75)
NONHDLC SERPL-MCNC: 153 MG/DL
NRBC BLD AUTO-RTO: 0 /100 WBCS
PLATELET # BLD AUTO: 222 THOUSANDS/UL (ref 149–390)
PMV BLD AUTO: 9.9 FL (ref 8.9–12.7)
POTASSIUM SERPL-SCNC: 4.3 MMOL/L (ref 3.5–5.3)
PROT SERPL-MCNC: 7.7 G/DL (ref 6.4–8.4)
PSA SERPL-MCNC: 0.71 NG/ML (ref 0–4)
RBC # BLD AUTO: 4.34 MILLION/UL (ref 3.88–5.62)
SODIUM SERPL-SCNC: 138 MMOL/L (ref 135–147)
TRIGL SERPL-MCNC: 161 MG/DL (ref ?–150)
VIT B12 SERPL-MCNC: 5748 PG/ML (ref 180–914)
WBC # BLD AUTO: 6.67 THOUSAND/UL (ref 4.31–10.16)

## 2025-07-28 PROCEDURE — 80053 COMPREHEN METABOLIC PANEL: CPT

## 2025-07-28 PROCEDURE — 83036 HEMOGLOBIN GLYCOSYLATED A1C: CPT

## 2025-07-28 PROCEDURE — 36415 COLL VENOUS BLD VENIPUNCTURE: CPT

## 2025-07-28 PROCEDURE — 82607 VITAMIN B-12: CPT

## 2025-07-28 PROCEDURE — 82306 VITAMIN D 25 HYDROXY: CPT

## 2025-07-28 PROCEDURE — 80061 LIPID PANEL: CPT

## 2025-07-28 PROCEDURE — 83735 ASSAY OF MAGNESIUM: CPT

## 2025-07-28 PROCEDURE — 84153 ASSAY OF PSA TOTAL: CPT

## 2025-07-28 PROCEDURE — 85025 COMPLETE CBC W/AUTO DIFF WBC: CPT

## 2025-07-29 ENCOUNTER — OFFICE VISIT (OUTPATIENT)
Dept: PHYSICAL THERAPY | Facility: REHABILITATION | Age: 65
End: 2025-07-29
Payer: COMMERCIAL

## 2025-07-29 DIAGNOSIS — R53.81 PHYSICAL DECONDITIONING: ICD-10-CM

## 2025-07-29 DIAGNOSIS — M67.912 ROTATOR CUFF DYSFUNCTION, LEFT: ICD-10-CM

## 2025-07-29 DIAGNOSIS — R26.89 IMBALANCE: Primary | ICD-10-CM

## 2025-07-29 PROCEDURE — 97110 THERAPEUTIC EXERCISES: CPT | Performed by: PHYSICAL THERAPIST

## 2025-07-29 PROCEDURE — 97530 THERAPEUTIC ACTIVITIES: CPT | Performed by: PHYSICAL THERAPIST

## 2025-07-29 PROCEDURE — 97140 MANUAL THERAPY 1/> REGIONS: CPT | Performed by: PHYSICAL THERAPIST

## 2025-07-29 PROCEDURE — 97164 PT RE-EVAL EST PLAN CARE: CPT | Performed by: PHYSICAL THERAPIST

## 2025-08-01 PROBLEM — J32.9 SINUSITIS: Status: RESOLVED | Noted: 2025-06-24 | Resolved: 2025-08-01

## 2025-08-05 ENCOUNTER — OFFICE VISIT (OUTPATIENT)
Dept: PHYSICAL THERAPY | Facility: REHABILITATION | Age: 65
End: 2025-08-05
Payer: COMMERCIAL

## 2025-08-05 DIAGNOSIS — R26.89 IMBALANCE: Primary | ICD-10-CM

## 2025-08-05 DIAGNOSIS — R53.81 PHYSICAL DECONDITIONING: ICD-10-CM

## 2025-08-05 DIAGNOSIS — M67.912 ROTATOR CUFF DYSFUNCTION, LEFT: ICD-10-CM

## 2025-08-05 PROCEDURE — 97140 MANUAL THERAPY 1/> REGIONS: CPT | Performed by: PHYSICAL THERAPIST

## 2025-08-05 PROCEDURE — 97530 THERAPEUTIC ACTIVITIES: CPT | Performed by: PHYSICAL THERAPIST

## 2025-08-05 PROCEDURE — 97110 THERAPEUTIC EXERCISES: CPT | Performed by: PHYSICAL THERAPIST
